# Patient Record
Sex: MALE | Race: WHITE | NOT HISPANIC OR LATINO
[De-identification: names, ages, dates, MRNs, and addresses within clinical notes are randomized per-mention and may not be internally consistent; named-entity substitution may affect disease eponyms.]

---

## 2017-01-24 ENCOUNTER — APPOINTMENT (OUTPATIENT)
Dept: SURGERY | Facility: CLINIC | Age: 62
End: 2017-01-24

## 2017-01-28 ENCOUNTER — FORM ENCOUNTER (OUTPATIENT)
Age: 62
End: 2017-01-28

## 2017-01-29 ENCOUNTER — APPOINTMENT (OUTPATIENT)
Dept: NUCLEAR MEDICINE | Facility: CLINIC | Age: 62
End: 2017-01-29

## 2017-01-29 ENCOUNTER — OUTPATIENT (OUTPATIENT)
Dept: OUTPATIENT SERVICES | Facility: HOSPITAL | Age: 62
LOS: 1 days | End: 2017-01-29
Payer: COMMERCIAL

## 2017-01-29 DIAGNOSIS — Z00.8 ENCOUNTER FOR OTHER GENERAL EXAMINATION: ICD-10-CM

## 2017-01-29 DIAGNOSIS — Z85.819 PERSONAL HISTORY OF MALIGNANT NEOPLASM OF UNSPECIFIED SITE OF LIP, ORAL CAVITY, AND PHARYNX: Chronic | ICD-10-CM

## 2017-01-29 DIAGNOSIS — Z43.1 ENCOUNTER FOR ATTENTION TO GASTROSTOMY: Chronic | ICD-10-CM

## 2017-01-29 PROCEDURE — A9552: CPT

## 2017-01-29 PROCEDURE — 78815 PET IMAGE W/CT SKULL-THIGH: CPT

## 2017-02-14 ENCOUNTER — APPOINTMENT (OUTPATIENT)
Dept: RADIATION ONCOLOGY | Facility: CLINIC | Age: 62
End: 2017-02-14

## 2017-02-14 VITALS
BODY MASS INDEX: 27.13 KG/M2 | DIASTOLIC BLOOD PRESSURE: 88 MMHG | TEMPERATURE: 98.1 F | WEIGHT: 179 LBS | HEIGHT: 68 IN | OXYGEN SATURATION: 99 % | SYSTOLIC BLOOD PRESSURE: 135 MMHG | HEART RATE: 67 BPM | RESPIRATION RATE: 16 BRPM

## 2017-02-16 ENCOUNTER — OTHER (OUTPATIENT)
Age: 62
End: 2017-02-16

## 2017-02-23 ENCOUNTER — APPOINTMENT (OUTPATIENT)
Dept: SURGERY | Facility: CLINIC | Age: 62
End: 2017-02-23

## 2017-04-06 ENCOUNTER — APPOINTMENT (OUTPATIENT)
Dept: SURGERY | Facility: CLINIC | Age: 62
End: 2017-04-06

## 2017-05-04 ENCOUNTER — APPOINTMENT (OUTPATIENT)
Dept: SURGERY | Facility: CLINIC | Age: 62
End: 2017-05-04

## 2017-05-16 ENCOUNTER — APPOINTMENT (OUTPATIENT)
Dept: RADIATION ONCOLOGY | Facility: CLINIC | Age: 62
End: 2017-05-16

## 2017-05-16 VITALS
RESPIRATION RATE: 16 BRPM | HEIGHT: 68 IN | SYSTOLIC BLOOD PRESSURE: 120 MMHG | TEMPERATURE: 98.1 F | WEIGHT: 175 LBS | DIASTOLIC BLOOD PRESSURE: 84 MMHG | BODY MASS INDEX: 26.52 KG/M2 | OXYGEN SATURATION: 97 % | HEART RATE: 79 BPM

## 2017-06-08 ENCOUNTER — APPOINTMENT (OUTPATIENT)
Dept: SURGERY | Facility: CLINIC | Age: 62
End: 2017-06-08

## 2017-07-18 ENCOUNTER — APPOINTMENT (OUTPATIENT)
Dept: SURGERY | Facility: CLINIC | Age: 62
End: 2017-07-18

## 2017-08-22 ENCOUNTER — APPOINTMENT (OUTPATIENT)
Dept: RADIATION ONCOLOGY | Facility: CLINIC | Age: 62
End: 2017-08-22
Payer: COMMERCIAL

## 2017-08-22 VITALS
HEIGHT: 68 IN | RESPIRATION RATE: 16 BRPM | SYSTOLIC BLOOD PRESSURE: 131 MMHG | BODY MASS INDEX: 27.77 KG/M2 | HEART RATE: 92 BPM | WEIGHT: 183.2 LBS | OXYGEN SATURATION: 96 % | TEMPERATURE: 97.9 F | DIASTOLIC BLOOD PRESSURE: 87 MMHG

## 2017-08-22 PROCEDURE — 99214 OFFICE O/P EST MOD 30 MIN: CPT

## 2017-09-28 ENCOUNTER — APPOINTMENT (OUTPATIENT)
Dept: SURGERY | Facility: CLINIC | Age: 62
End: 2017-09-28
Payer: COMMERCIAL

## 2017-09-28 PROCEDURE — 99213 OFFICE O/P EST LOW 20 MIN: CPT

## 2017-10-08 ENCOUNTER — RX RENEWAL (OUTPATIENT)
Age: 62
End: 2017-10-08

## 2018-02-28 ENCOUNTER — APPOINTMENT (OUTPATIENT)
Dept: RADIATION ONCOLOGY | Facility: CLINIC | Age: 63
End: 2018-02-28
Payer: COMMERCIAL

## 2018-02-28 VITALS
TEMPERATURE: 97.7 F | HEART RATE: 70 BPM | RESPIRATION RATE: 16 BRPM | DIASTOLIC BLOOD PRESSURE: 92 MMHG | SYSTOLIC BLOOD PRESSURE: 136 MMHG | WEIGHT: 187.2 LBS | OXYGEN SATURATION: 97 % | HEIGHT: 68 IN | BODY MASS INDEX: 28.37 KG/M2

## 2018-02-28 PROCEDURE — 99213 OFFICE O/P EST LOW 20 MIN: CPT

## 2018-03-02 ENCOUNTER — APPOINTMENT (OUTPATIENT)
Dept: NUCLEAR MEDICINE | Facility: CLINIC | Age: 63
End: 2018-03-02
Payer: COMMERCIAL

## 2018-03-02 ENCOUNTER — OUTPATIENT (OUTPATIENT)
Dept: OUTPATIENT SERVICES | Facility: HOSPITAL | Age: 63
LOS: 1 days | End: 2018-03-02
Payer: COMMERCIAL

## 2018-03-02 DIAGNOSIS — Z85.819 PERSONAL HISTORY OF MALIGNANT NEOPLASM OF UNSPECIFIED SITE OF LIP, ORAL CAVITY, AND PHARYNX: Chronic | ICD-10-CM

## 2018-03-02 DIAGNOSIS — Z00.8 ENCOUNTER FOR OTHER GENERAL EXAMINATION: ICD-10-CM

## 2018-03-02 DIAGNOSIS — Z43.1 ENCOUNTER FOR ATTENTION TO GASTROSTOMY: Chronic | ICD-10-CM

## 2018-03-02 PROCEDURE — 78815 PET IMAGE W/CT SKULL-THIGH: CPT | Mod: 26,PS

## 2018-03-02 PROCEDURE — A9552: CPT

## 2018-03-02 PROCEDURE — 78815 PET IMAGE W/CT SKULL-THIGH: CPT

## 2018-03-15 ENCOUNTER — APPOINTMENT (OUTPATIENT)
Dept: SURGERY | Facility: CLINIC | Age: 63
End: 2018-03-15
Payer: COMMERCIAL

## 2018-03-15 PROCEDURE — 99214 OFFICE O/P EST MOD 30 MIN: CPT

## 2018-03-21 ENCOUNTER — FORM ENCOUNTER (OUTPATIENT)
Age: 63
End: 2018-03-21

## 2018-03-22 ENCOUNTER — OUTPATIENT (OUTPATIENT)
Dept: OUTPATIENT SERVICES | Facility: HOSPITAL | Age: 63
LOS: 1 days | End: 2018-03-22
Payer: COMMERCIAL

## 2018-03-22 ENCOUNTER — APPOINTMENT (OUTPATIENT)
Dept: CT IMAGING | Facility: CLINIC | Age: 63
End: 2018-03-22
Payer: COMMERCIAL

## 2018-03-22 DIAGNOSIS — Z43.1 ENCOUNTER FOR ATTENTION TO GASTROSTOMY: Chronic | ICD-10-CM

## 2018-03-22 DIAGNOSIS — Z85.819 PERSONAL HISTORY OF MALIGNANT NEOPLASM OF UNSPECIFIED SITE OF LIP, ORAL CAVITY, AND PHARYNX: Chronic | ICD-10-CM

## 2018-03-22 DIAGNOSIS — Z00.8 ENCOUNTER FOR OTHER GENERAL EXAMINATION: ICD-10-CM

## 2018-03-22 PROCEDURE — 70491 CT SOFT TISSUE NECK W/DYE: CPT

## 2018-03-22 PROCEDURE — 82565 ASSAY OF CREATININE: CPT

## 2018-03-22 PROCEDURE — 70491 CT SOFT TISSUE NECK W/DYE: CPT | Mod: 26

## 2018-03-26 ENCOUNTER — OTHER (OUTPATIENT)
Age: 63
End: 2018-03-26

## 2018-03-27 ENCOUNTER — OTHER (OUTPATIENT)
Age: 63
End: 2018-03-27

## 2018-03-29 ENCOUNTER — APPOINTMENT (OUTPATIENT)
Dept: RADIATION ONCOLOGY | Facility: CLINIC | Age: 63
End: 2018-03-29
Payer: COMMERCIAL

## 2018-03-29 VITALS
DIASTOLIC BLOOD PRESSURE: 86 MMHG | HEART RATE: 86 BPM | SYSTOLIC BLOOD PRESSURE: 121 MMHG | WEIGHT: 184 LBS | HEIGHT: 68 IN | BODY MASS INDEX: 27.89 KG/M2 | OXYGEN SATURATION: 98 % | TEMPERATURE: 98.1 F | RESPIRATION RATE: 16 BRPM

## 2018-03-29 PROCEDURE — 99213 OFFICE O/P EST LOW 20 MIN: CPT

## 2018-03-29 RX ORDER — OXYCODONE AND ACETAMINOPHEN 10; 325 MG/1; MG/1
10-325 TABLET ORAL
Qty: 42 | Refills: 0 | Status: DISCONTINUED | COMMUNITY
Start: 2017-05-23 | End: 2018-03-29

## 2018-03-29 RX ORDER — SODIUM FLUORIDE 1.1 G/100G
1.1 GEL, DENTIFRICE ORAL
Qty: 51 | Refills: 0 | Status: DISCONTINUED | COMMUNITY
Start: 2017-07-17 | End: 2018-03-29

## 2018-03-29 RX ORDER — AMITRIPTYLINE HYDROCHLORIDE 10 MG/1
10 TABLET, FILM COATED ORAL
Qty: 30 | Refills: 2 | Status: DISCONTINUED | COMMUNITY
Start: 2017-07-18 | End: 2018-03-29

## 2018-03-29 RX ORDER — SODIUM FLUORIDE 6 MG/ML
1.1 PASTE, DENTIFRICE DENTAL
Qty: 100 | Refills: 0 | Status: DISCONTINUED | COMMUNITY
Start: 2017-01-16 | End: 2018-03-29

## 2018-03-30 ENCOUNTER — APPOINTMENT (OUTPATIENT)
Dept: OTOLARYNGOLOGY | Facility: CLINIC | Age: 63
End: 2018-03-30
Payer: COMMERCIAL

## 2018-03-30 PROCEDURE — 31575 DIAGNOSTIC LARYNGOSCOPY: CPT

## 2018-03-30 PROCEDURE — 99205 OFFICE O/P NEW HI 60 MIN: CPT | Mod: 25

## 2018-03-30 RX ORDER — METHYLPREDNISOLONE 4 MG/1
4 TABLET ORAL
Qty: 21 | Refills: 0 | Status: DISCONTINUED | COMMUNITY
Start: 2018-03-06 | End: 2018-03-30

## 2018-03-30 RX ORDER — CEPHALEXIN 500 MG/1
500 CAPSULE ORAL
Qty: 20 | Refills: 0 | Status: DISCONTINUED | COMMUNITY
Start: 2018-01-25 | End: 2018-03-30

## 2018-03-30 RX ORDER — HYDROCODONE BITARTRATE AND ACETAMINOPHEN 10; 300 MG/1; MG/1
10-300 TABLET ORAL
Qty: 60 | Refills: 0 | Status: DISCONTINUED | COMMUNITY
Start: 2017-12-05 | End: 2018-03-30

## 2018-04-06 ENCOUNTER — FORM ENCOUNTER (OUTPATIENT)
Age: 63
End: 2018-04-06

## 2018-04-07 ENCOUNTER — OUTPATIENT (OUTPATIENT)
Dept: OUTPATIENT SERVICES | Facility: HOSPITAL | Age: 63
LOS: 1 days | End: 2018-04-07
Payer: COMMERCIAL

## 2018-04-07 ENCOUNTER — APPOINTMENT (OUTPATIENT)
Dept: CT IMAGING | Facility: CLINIC | Age: 63
End: 2018-04-07

## 2018-04-07 ENCOUNTER — APPOINTMENT (OUTPATIENT)
Dept: CT IMAGING | Facility: CLINIC | Age: 63
End: 2018-04-07
Payer: COMMERCIAL

## 2018-04-07 DIAGNOSIS — Z43.1 ENCOUNTER FOR ATTENTION TO GASTROSTOMY: Chronic | ICD-10-CM

## 2018-04-07 DIAGNOSIS — Z85.819 PERSONAL HISTORY OF MALIGNANT NEOPLASM OF UNSPECIFIED SITE OF LIP, ORAL CAVITY, AND PHARYNX: Chronic | ICD-10-CM

## 2018-04-07 DIAGNOSIS — C06.9 MALIGNANT NEOPLASM OF MOUTH, UNSPECIFIED: ICD-10-CM

## 2018-04-07 PROCEDURE — 75635 CT ANGIO ABDOMINAL ARTERIES: CPT | Mod: 26

## 2018-04-07 PROCEDURE — 70486 CT MAXILLOFACIAL W/O DYE: CPT | Mod: 26

## 2018-04-07 PROCEDURE — 75635 CT ANGIO ABDOMINAL ARTERIES: CPT

## 2018-04-07 PROCEDURE — 70486 CT MAXILLOFACIAL W/O DYE: CPT

## 2018-04-20 ENCOUNTER — APPOINTMENT (OUTPATIENT)
Dept: OTOLARYNGOLOGY | Facility: CLINIC | Age: 63
End: 2018-04-20
Payer: COMMERCIAL

## 2018-04-20 VITALS
DIASTOLIC BLOOD PRESSURE: 74 MMHG | WEIGHT: 180 LBS | HEART RATE: 87 BPM | SYSTOLIC BLOOD PRESSURE: 130 MMHG | BODY MASS INDEX: 27.28 KG/M2 | HEIGHT: 68 IN | OXYGEN SATURATION: 98 %

## 2018-04-20 PROCEDURE — 99215 OFFICE O/P EST HI 40 MIN: CPT

## 2018-04-22 RX ORDER — GABAPENTIN 100 MG/1
100 CAPSULE ORAL
Qty: 90 | Refills: 0 | Status: COMPLETED | COMMUNITY
Start: 2017-11-03

## 2018-04-23 ENCOUNTER — OUTPATIENT (OUTPATIENT)
Dept: OUTPATIENT SERVICES | Facility: HOSPITAL | Age: 63
LOS: 1 days | End: 2018-04-23
Payer: COMMERCIAL

## 2018-04-23 VITALS
HEIGHT: 66.5 IN | HEART RATE: 62 BPM | RESPIRATION RATE: 16 BRPM | DIASTOLIC BLOOD PRESSURE: 90 MMHG | WEIGHT: 179.9 LBS | TEMPERATURE: 96 F | SYSTOLIC BLOOD PRESSURE: 130 MMHG

## 2018-04-23 DIAGNOSIS — C06.9 MALIGNANT NEOPLASM OF MOUTH, UNSPECIFIED: ICD-10-CM

## 2018-04-23 DIAGNOSIS — Z43.1 ENCOUNTER FOR ATTENTION TO GASTROSTOMY: Chronic | ICD-10-CM

## 2018-04-23 DIAGNOSIS — Z85.819 PERSONAL HISTORY OF MALIGNANT NEOPLASM OF UNSPECIFIED SITE OF LIP, ORAL CAVITY, AND PHARYNX: Chronic | ICD-10-CM

## 2018-04-23 LAB
BLD GP AB SCN SERPL QL: NEGATIVE — SIGNIFICANT CHANGE UP
BUN SERPL-MCNC: 19 MG/DL — SIGNIFICANT CHANGE UP (ref 7–23)
CALCIUM SERPL-MCNC: 9.8 MG/DL — SIGNIFICANT CHANGE UP (ref 8.4–10.5)
CHLORIDE SERPL-SCNC: 98 MMOL/L — SIGNIFICANT CHANGE UP (ref 98–107)
CO2 SERPL-SCNC: 29 MMOL/L — SIGNIFICANT CHANGE UP (ref 22–31)
CREAT SERPL-MCNC: 1.11 MG/DL — SIGNIFICANT CHANGE UP (ref 0.5–1.3)
GLUCOSE SERPL-MCNC: 88 MG/DL — SIGNIFICANT CHANGE UP (ref 70–99)
HCT VFR BLD CALC: 47.9 % — SIGNIFICANT CHANGE UP (ref 39–50)
HGB BLD-MCNC: 15.4 G/DL — SIGNIFICANT CHANGE UP (ref 13–17)
MCHC RBC-ENTMCNC: 30.4 PG — SIGNIFICANT CHANGE UP (ref 27–34)
MCHC RBC-ENTMCNC: 32.2 % — SIGNIFICANT CHANGE UP (ref 32–36)
MCV RBC AUTO: 94.7 FL — SIGNIFICANT CHANGE UP (ref 80–100)
NRBC # FLD: 0 — SIGNIFICANT CHANGE UP
PLATELET # BLD AUTO: 237 K/UL — SIGNIFICANT CHANGE UP (ref 150–400)
PMV BLD: 10.2 FL — SIGNIFICANT CHANGE UP (ref 7–13)
POTASSIUM SERPL-MCNC: 3.9 MMOL/L — SIGNIFICANT CHANGE UP (ref 3.5–5.3)
POTASSIUM SERPL-SCNC: 3.9 MMOL/L — SIGNIFICANT CHANGE UP (ref 3.5–5.3)
RBC # BLD: 5.06 M/UL — SIGNIFICANT CHANGE UP (ref 4.2–5.8)
RBC # FLD: 13.4 % — SIGNIFICANT CHANGE UP (ref 10.3–14.5)
RH IG SCN BLD-IMP: POSITIVE — SIGNIFICANT CHANGE UP
SODIUM SERPL-SCNC: 140 MMOL/L — SIGNIFICANT CHANGE UP (ref 135–145)
WBC # BLD: 5.65 K/UL — SIGNIFICANT CHANGE UP (ref 3.8–10.5)
WBC # FLD AUTO: 5.65 K/UL — SIGNIFICANT CHANGE UP (ref 3.8–10.5)

## 2018-04-23 PROCEDURE — 93010 ELECTROCARDIOGRAM REPORT: CPT

## 2018-04-23 RX ORDER — SODIUM CHLORIDE 9 MG/ML
1000 INJECTION, SOLUTION INTRAVENOUS
Qty: 0 | Refills: 0 | Status: DISCONTINUED | OUTPATIENT
Start: 2018-04-26 | End: 2018-04-28

## 2018-04-23 RX ORDER — SODIUM CHLORIDE 9 MG/ML
3 INJECTION INTRAMUSCULAR; INTRAVENOUS; SUBCUTANEOUS EVERY 8 HOURS
Qty: 0 | Refills: 0 | Status: DISCONTINUED | OUTPATIENT
Start: 2018-04-26 | End: 2018-05-09

## 2018-04-23 NOTE — H&P PST ADULT - NEGATIVE ENMT SYMPTOMS
no vertigo/no sinus symptoms/no ear pain/no throat pain/no dysphagia/no gum bleeding/no tinnitus/no nose bleeds/no nasal congestion

## 2018-04-23 NOTE — H&P PST ADULT - HISTORY OF PRESENT ILLNESS
63 y/o male presents to PST for preoperative evaluation with dx of malignant neoplasm of mouth and mandible. 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 Pt presented to the Oral Surgeon a few weeks ago c/o chronic jaw discomfort. s/p biopsy and informed of recurrence of oral cancer. Scheduled for Composite Resection Mandibular Alvelous  Cancer with Resection of Floor of Mouth, Bilateral Neck Dissection, Tracheostomy; Yasir- Resection of Right Mandible and Placement of Reconstruction Plate, Possible Bone Graft on 4/26/2018. 63 y/o male presents to PST for preoperative evaluation with dx of malignant neoplasm of mouth and mandible. 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 Pt presented to the Oral Surgeon a few weeks ago c/o chronic jaw discomfort. s/p biopsy and informed of recurrence of oral cancer. Scheduled for Composite Resection Mandibular Alvelous Cancer with Resection of Floor of Mouth, Bilateral Neck Dissection, Tracheostomy; Yasir- Resection of Right Mandible and Placement of Reconstruction Plate, Possible Bone Graft on 4/26/2018.

## 2018-04-23 NOTE — H&P PST ADULT - PROBLEM SELECTOR PLAN 1
Scheduled for Composite Resection Mandibular Alvelous Cancer with Resection of Floor of Mouth, Bilateral Neck Dissection, Tracheostomy; Yasir- Resection of Right Mandible and Placement of Reconstruction Plate, Possible Bone Graft on 4/26/2018.    Preop instructions given, pt verbalized understanding   Chlorhexidine wash and GI prophylaxis provided   Pt will see his PCP today for medical evaluation  PST labs and EKG to be faxed over   Medical Evaluation requested

## 2018-04-23 NOTE — H&P PST ADULT - ENMT COMMENTS
dx of malignant neoplasm of mouth and mandible. See HPI ulceration to anterior lower jaw with white plaque

## 2018-04-23 NOTE — H&P PST ADULT - NSANTHOSAYNRD_GEN_A_CORE
No. SHIRA screening performed.  STOP BANG Legend: 0-2 = LOW Risk; 3-4 = INTERMEDIATE Risk; 5-8 = HIGH Risk

## 2018-04-23 NOTE — H&P PST ADULT - LYMPHATIC
anterior cervical L/posterior cervical R/anterior cervical R/supraclavicular R/supraclavicular L/posterior cervical L

## 2018-04-23 NOTE — H&P PST ADULT - PMH
Anxiety    Malignant neoplasm  skin  Malignant neoplasm of mouth  and mandible  Squamous cell carcinoma of oral cavity  s/p radiation, chemotherapy 2015- 4/2016

## 2018-04-23 NOTE — H&P PST ADULT - PSH
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

## 2018-04-24 ENCOUNTER — APPOINTMENT (OUTPATIENT)
Dept: PLASTIC SURGERY | Facility: CLINIC | Age: 63
End: 2018-04-24
Payer: COMMERCIAL

## 2018-04-24 VITALS
HEIGHT: 68 IN | OXYGEN SATURATION: 97 % | DIASTOLIC BLOOD PRESSURE: 83 MMHG | BODY MASS INDEX: 27.28 KG/M2 | SYSTOLIC BLOOD PRESSURE: 117 MMHG | RESPIRATION RATE: 16 BRPM | HEART RATE: 79 BPM | WEIGHT: 180 LBS

## 2018-04-24 PROCEDURE — 99244 OFF/OP CNSLTJ NEW/EST MOD 40: CPT

## 2018-04-25 ENCOUNTER — TRANSCRIPTION ENCOUNTER (OUTPATIENT)
Age: 63
End: 2018-04-25

## 2018-04-26 ENCOUNTER — APPOINTMENT (OUTPATIENT)
Dept: OTOLARYNGOLOGY | Facility: HOSPITAL | Age: 63
End: 2018-04-26

## 2018-04-26 ENCOUNTER — RESULT REVIEW (OUTPATIENT)
Age: 63
End: 2018-04-26

## 2018-04-26 ENCOUNTER — INPATIENT (INPATIENT)
Facility: HOSPITAL | Age: 63
LOS: 12 days | Discharge: HOME HEALTH | End: 2018-05-09
Attending: OTOLARYNGOLOGY | Admitting: OTOLARYNGOLOGY
Payer: COMMERCIAL

## 2018-04-26 VITALS
DIASTOLIC BLOOD PRESSURE: 87 MMHG | SYSTOLIC BLOOD PRESSURE: 129 MMHG | OXYGEN SATURATION: 95 % | TEMPERATURE: 98 F | RESPIRATION RATE: 16 BRPM | HEIGHT: 66.5 IN | WEIGHT: 179.9 LBS | HEART RATE: 82 BPM

## 2018-04-26 DIAGNOSIS — Z85.819 PERSONAL HISTORY OF MALIGNANT NEOPLASM OF UNSPECIFIED SITE OF LIP, ORAL CAVITY, AND PHARYNX: Chronic | ICD-10-CM

## 2018-04-26 DIAGNOSIS — Z43.1 ENCOUNTER FOR ATTENTION TO GASTROSTOMY: Chronic | ICD-10-CM

## 2018-04-26 DIAGNOSIS — C06.9 MALIGNANT NEOPLASM OF MOUTH, UNSPECIFIED: ICD-10-CM

## 2018-04-26 LAB
BASE EXCESS BLDA CALC-SCNC: -0.4 MMOL/L — SIGNIFICANT CHANGE UP
BASE EXCESS BLDA CALC-SCNC: 0.8 MMOL/L — SIGNIFICANT CHANGE UP
BASE EXCESS BLDA CALC-SCNC: 0.9 MMOL/L — SIGNIFICANT CHANGE UP
BASE EXCESS BLDA CALC-SCNC: 1.1 MMOL/L — SIGNIFICANT CHANGE UP
BASE EXCESS BLDA CALC-SCNC: 2.7 MMOL/L — SIGNIFICANT CHANGE UP
CA-I BLDA-SCNC: 1.09 MMOL/L — LOW (ref 1.15–1.29)
CA-I BLDA-SCNC: 1.1 MMOL/L — LOW (ref 1.15–1.29)
CA-I BLDA-SCNC: 1.11 MMOL/L — LOW (ref 1.15–1.29)
CA-I BLDA-SCNC: 1.15 MMOL/L — SIGNIFICANT CHANGE UP (ref 1.15–1.29)
CA-I BLDA-SCNC: 1.2 MMOL/L — SIGNIFICANT CHANGE UP (ref 1.15–1.29)
GLUCOSE BLDA-MCNC: 115 MG/DL — HIGH (ref 70–99)
GLUCOSE BLDA-MCNC: 115 MG/DL — HIGH (ref 70–99)
GLUCOSE BLDA-MCNC: 116 MG/DL — HIGH (ref 70–99)
GLUCOSE BLDA-MCNC: 117 MG/DL — HIGH (ref 70–99)
GLUCOSE BLDA-MCNC: 137 MG/DL — HIGH (ref 70–99)
HCO3 BLDA-SCNC: 24 MMOL/L — SIGNIFICANT CHANGE UP (ref 22–26)
HCO3 BLDA-SCNC: 25 MMOL/L — SIGNIFICANT CHANGE UP (ref 22–26)
HCO3 BLDA-SCNC: 25 MMOL/L — SIGNIFICANT CHANGE UP (ref 22–26)
HCO3 BLDA-SCNC: 26 MMOL/L — SIGNIFICANT CHANGE UP (ref 22–26)
HCO3 BLDA-SCNC: 26 MMOL/L — SIGNIFICANT CHANGE UP (ref 22–26)
HCT VFR BLDA CALC: 28.9 % — LOW (ref 39–51)
HCT VFR BLDA CALC: 29.3 % — LOW (ref 39–51)
HCT VFR BLDA CALC: 31.6 % — LOW (ref 39–51)
HCT VFR BLDA CALC: 36.6 % — LOW (ref 39–51)
HCT VFR BLDA CALC: 43 % — SIGNIFICANT CHANGE UP (ref 39–51)
HGB BLDA-MCNC: 10.2 G/DL — LOW (ref 13–17)
HGB BLDA-MCNC: 11.9 G/DL — LOW (ref 13–17)
HGB BLDA-MCNC: 14 G/DL — SIGNIFICANT CHANGE UP (ref 13–17)
HGB BLDA-MCNC: 9.3 G/DL — LOW (ref 13–17)
HGB BLDA-MCNC: 9.5 G/DL — LOW (ref 13–17)
PCO2 BLDA: 36 MMHG — SIGNIFICANT CHANGE UP (ref 35–48)
PCO2 BLDA: 37 MMHG — SIGNIFICANT CHANGE UP (ref 35–48)
PCO2 BLDA: 46 MMHG — SIGNIFICANT CHANGE UP (ref 35–48)
PH BLDA: 7.39 PH — SIGNIFICANT CHANGE UP (ref 7.35–7.45)
PH BLDA: 7.42 PH — SIGNIFICANT CHANGE UP (ref 7.35–7.45)
PH BLDA: 7.43 PH — SIGNIFICANT CHANGE UP (ref 7.35–7.45)
PH BLDA: 7.44 PH — SIGNIFICANT CHANGE UP (ref 7.35–7.45)
PH BLDA: 7.45 PH — SIGNIFICANT CHANGE UP (ref 7.35–7.45)
PO2 BLDA: 184 MMHG — HIGH (ref 83–108)
PO2 BLDA: 226 MMHG — HIGH (ref 83–108)
PO2 BLDA: 239 MMHG — HIGH (ref 83–108)
PO2 BLDA: 274 MMHG — HIGH (ref 83–108)
PO2 BLDA: 322 MMHG — HIGH (ref 83–108)
POTASSIUM BLDA-SCNC: 3.5 MMOL/L — SIGNIFICANT CHANGE UP (ref 3.4–4.5)
POTASSIUM BLDA-SCNC: 3.7 MMOL/L — SIGNIFICANT CHANGE UP (ref 3.4–4.5)
POTASSIUM BLDA-SCNC: 4.1 MMOL/L — SIGNIFICANT CHANGE UP (ref 3.4–4.5)
RH IG SCN BLD-IMP: POSITIVE — SIGNIFICANT CHANGE UP
SAO2 % BLDA: 99 % — SIGNIFICANT CHANGE UP (ref 95–99)
SAO2 % BLDA: 99.4 % — HIGH (ref 95–99)
SAO2 % BLDA: 99.4 % — HIGH (ref 95–99)
SAO2 % BLDA: 99.5 % — HIGH (ref 95–99)
SAO2 % BLDA: 99.6 % — HIGH (ref 95–99)
SODIUM BLDA-SCNC: 136 MMOL/L — SIGNIFICANT CHANGE UP (ref 136–146)
SODIUM BLDA-SCNC: 137 MMOL/L — SIGNIFICANT CHANGE UP (ref 136–146)

## 2018-04-26 PROCEDURE — 38724 REMOVAL OF LYMPH NODES NECK: CPT | Mod: RT,GC

## 2018-04-26 PROCEDURE — 13132 CMPLX RPR F/C/C/M/N/AX/G/H/F: CPT | Mod: 59

## 2018-04-26 PROCEDURE — 88305 TISSUE EXAM BY PATHOLOGIST: CPT | Mod: 26

## 2018-04-26 PROCEDURE — 88309 TISSUE EXAM BY PATHOLOGIST: CPT | Mod: 26

## 2018-04-26 PROCEDURE — 21045 EXTENSIVE JAW SURGERY: CPT | Mod: GC

## 2018-04-26 PROCEDURE — 40844 RECONSTRUCTION OF MOUTH: CPT

## 2018-04-26 PROCEDURE — 88300 SURGICAL PATH GROSS: CPT | Mod: 26,59

## 2018-04-26 PROCEDURE — 20969 BONE/SKIN GRAFT MICROVASC: CPT

## 2018-04-26 PROCEDURE — 88331 PATH CONSLTJ SURG 1 BLK 1SPC: CPT | Mod: 26

## 2018-04-26 PROCEDURE — 88307 TISSUE EXAM BY PATHOLOGIST: CPT | Mod: 26

## 2018-04-26 PROCEDURE — 15100 SPLT AGRFT T/A/L 1ST 100SQCM: CPT

## 2018-04-26 PROCEDURE — 88311 DECALCIFY TISSUE: CPT | Mod: 26

## 2018-04-26 PROCEDURE — 31600 PLANNED TRACHEOSTOMY: CPT | Mod: GC

## 2018-04-26 PROCEDURE — 41116 EXCISION OF MOUTH LESION: CPT | Mod: GC

## 2018-04-26 RX ORDER — HYDROMORPHONE HYDROCHLORIDE 2 MG/ML
0.5 INJECTION INTRAMUSCULAR; INTRAVENOUS; SUBCUTANEOUS
Qty: 0 | Refills: 0 | Status: DISCONTINUED | OUTPATIENT
Start: 2018-04-26 | End: 2018-04-27

## 2018-04-26 RX ORDER — ENOXAPARIN SODIUM 100 MG/ML
40 INJECTION SUBCUTANEOUS ONCE
Qty: 0 | Refills: 0 | Status: COMPLETED | OUTPATIENT
Start: 2018-04-26 | End: 2018-04-26

## 2018-04-26 RX ORDER — ASPIRIN/CALCIUM CARB/MAGNESIUM 324 MG
300 TABLET ORAL DAILY
Qty: 0 | Refills: 0 | Status: DISCONTINUED | OUTPATIENT
Start: 2018-04-26 | End: 2018-04-27

## 2018-04-26 RX ORDER — ONDANSETRON 8 MG/1
4 TABLET, FILM COATED ORAL ONCE
Qty: 0 | Refills: 0 | Status: COMPLETED | OUTPATIENT
Start: 2018-04-26 | End: 2018-04-27

## 2018-04-26 RX ORDER — AMPICILLIN SODIUM AND SULBACTAM SODIUM 250; 125 MG/ML; MG/ML
1.5 INJECTION, POWDER, FOR SUSPENSION INTRAMUSCULAR; INTRAVENOUS EVERY 6 HOURS
Qty: 0 | Refills: 0 | Status: DISCONTINUED | OUTPATIENT
Start: 2018-04-26 | End: 2018-05-05

## 2018-04-26 RX ORDER — AMPICILLIN SODIUM AND SULBACTAM SODIUM 250; 125 MG/ML; MG/ML
3 INJECTION, POWDER, FOR SUSPENSION INTRAMUSCULAR; INTRAVENOUS ONCE
Qty: 0 | Refills: 0 | Status: COMPLETED | OUTPATIENT
Start: 2018-04-26 | End: 2018-04-26

## 2018-04-26 RX ADMIN — HYDROMORPHONE HYDROCHLORIDE 0.5 MILLIGRAM(S): 2 INJECTION INTRAMUSCULAR; INTRAVENOUS; SUBCUTANEOUS at 22:30

## 2018-04-26 RX ADMIN — ENOXAPARIN SODIUM 40 MILLIGRAM(S): 100 INJECTION SUBCUTANEOUS at 22:46

## 2018-04-26 RX ADMIN — HYDROMORPHONE HYDROCHLORIDE 0.5 MILLIGRAM(S): 2 INJECTION INTRAMUSCULAR; INTRAVENOUS; SUBCUTANEOUS at 21:32

## 2018-04-26 RX ADMIN — HYDROMORPHONE HYDROCHLORIDE 0.5 MILLIGRAM(S): 2 INJECTION INTRAMUSCULAR; INTRAVENOUS; SUBCUTANEOUS at 23:15

## 2018-04-26 RX ADMIN — HYDROMORPHONE HYDROCHLORIDE 0.5 MILLIGRAM(S): 2 INJECTION INTRAMUSCULAR; INTRAVENOUS; SUBCUTANEOUS at 21:15

## 2018-04-26 RX ADMIN — AMPICILLIN SODIUM AND SULBACTAM SODIUM 200 GRAM(S): 250; 125 INJECTION, POWDER, FOR SUSPENSION INTRAMUSCULAR; INTRAVENOUS at 22:16

## 2018-04-26 RX ADMIN — SODIUM CHLORIDE 3 MILLILITER(S): 9 INJECTION INTRAMUSCULAR; INTRAVENOUS; SUBCUTANEOUS at 22:17

## 2018-04-26 RX ADMIN — HYDROMORPHONE HYDROCHLORIDE 0.5 MILLIGRAM(S): 2 INJECTION INTRAMUSCULAR; INTRAVENOUS; SUBCUTANEOUS at 22:10

## 2018-04-26 RX ADMIN — HYDROMORPHONE HYDROCHLORIDE 0.5 MILLIGRAM(S): 2 INJECTION INTRAMUSCULAR; INTRAVENOUS; SUBCUTANEOUS at 23:35

## 2018-04-26 NOTE — BRIEF OPERATIVE NOTE - OPERATION/FINDINGS
reconstruction of mandible and floor of mouth with free fibula flap from right leg, skin graft from right thigh

## 2018-04-26 NOTE — CONSULT NOTE ADULT - SUBJECTIVE AND OBJECTIVE BOX
Vital Signs Last 24 Hrs  T(C): 37.5 (04-26-18 @ 21:00), Max: 37.5 (04-26-18 @ 21:00)  T(F): 99.5 (04-26-18 @ 21:00), Max: 99.5 (04-26-18 @ 21:00)  HR: 81 (04-26-18 @ 21:30) (81 - 90)  BP: 118/70 (04-26-18 @ 21:30) (118/70 - 129/87)  BP(mean): --  RR: 12 (04-26-18 @ 21:30) (12 - 16)  SpO2: 100% (04-26-18 @ 21:30) (95% - 100%)  I&O's Detail    26 Apr 2018 07:01  -  26 Apr 2018 22:49  --------------------------------------------------------  IN:  Total IN: 0 mL    OUT:    Bulb: 25 mL    Bulb: 10 mL    Indwelling Catheter - Urethral: 75 mL  Total OUT: 110 mL    Total NET: -110 mL                  CAPILLARY BLOOD GLUCOSE          MEDICATIONS  (STANDING):  ampicillin/sulbactam  IVPB 1.5 Gram(s) IV Intermittent every 6 hours  aspirin Suppository 300 milliGRAM(s) Rectal daily  lactated ringers. 1000 milliLiter(s) (125 mL/Hr) IV Continuous <Continuous>  sodium chloride 0.9% lock flush 3 milliLiter(s) IV Push every 8 hours    MEDICATIONS  (PRN):  HYDROmorphone  Injectable 0.5 milliGRAM(s) IV Push every 10 minutes PRN Severe Pain (7 - 10)  ondansetron Injectable 4 milliGRAM(s) IV Push once PRN Nausea and/or Vomiting Vital Signs Last 24 Hrs  T(C): 37.5 (04-26-18 @ 21:00), Max: 37.5 (04-26-18 @ 21:00)  T(F): 99.5 (04-26-18 @ 21:00), Max: 99.5 (04-26-18 @ 21:00)  HR: 81 (04-26-18 @ 21:30) (81 - 90)  BP: 118/70 (04-26-18 @ 21:30) (118/70 - 129/87)  BP(mean): --  RR: 12 (04-26-18 @ 21:30) (12 - 16)  SpO2: 100% (04-26-18 @ 21:30) (95% - 100%)  I&O's Detail    26 Apr 2018 07:01  -  26 Apr 2018 22:49  --------------------------------------------------------  IN:  Total IN: 0 mL    OUT:    Bulb: 25 mL    Bulb: 10 mL    Indwelling Catheter - Urethral: 75 mL  Total OUT: 110 mL    Total NET: -110 mL      CAPILLARY BLOOD GLUCOSE      MEDICATIONS  (STANDING):  ampicillin/sulbactam  IVPB 1.5 Gram(s) IV Intermittent every 6 hours  aspirin Suppository 300 milliGRAM(s) Rectal daily  lactated ringers. 1000 milliLiter(s) (125 mL/Hr) IV Continuous <Continuous>  sodium chloride 0.9% lock flush 3 milliLiter(s) IV Push every 8 hours    MEDICATIONS  (PRN):  HYDROmorphone  Injectable 0.5 milliGRAM(s) IV Push every 10 minutes PRN Severe Pain (7 - 10)  ondansetron Injectable 4 milliGRAM(s) IV Push once PRN Nausea and/or Vomiting HISTORY OF PRESENT ILLNESS:  63 y/o male presents to [facility] for preoperative evaluation with dx of malignant neoplasm of mouth and mandible. 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 Pt presented to the Oral Surgeon a few weeks ago c/o chronic jaw discomfort. s/p biopsy and informed of recurrence of oral cancer. Now s/p resection andibular alvelous cancer with resection of floor of mouth, bilateral neck dissection, tracheostomy;, Resection of Right Mandible and reconstruction of mandible and floor of mouth with free fibula flap from right leg, skin graft from right thigh    Patient complaining of some pain postop but following all commands and denies any other symptoms.    PAST MEDICAL HISTORY: Malignant neoplasm of mouth  Anxiety  Squamous cell carcinoma of oral cavity  Malignant neoplasm      PAST SURGICAL HISTORY: History of oral cancer  Encounter for PEG (percutaneous endoscopic gastrostomy)  History of lip cancer  H/O shoulder surgery      FAMILY HISTORY: Family history of lung cancer (Father)    ALLERGIES: No Known Allergies      VITAL SIGNS:  ICU Vital Signs Last 24 Hrs  T(C): 36.9 (27 Apr 2018 02:00), Max: 37.5 (26 Apr 2018 21:00)  T(F): 98.4 (27 Apr 2018 02:00), Max: 99.5 (26 Apr 2018 21:00)  HR: 71 (27 Apr 2018 04:00) (67 - 90)  BP: 106/64 (27 Apr 2018 04:00) (104/61 - 129/87)  BP(mean): --  ABP: 109/54 (27 Apr 2018 04:00) (105/51 - 119/66)  ABP(mean): --  RR: 11 (27 Apr 2018 04:00) (10 - 16)  SpO2: 100% (27 Apr 2018 04:00) (95% - 100%)      NEURO  Exam: Following commands, motor and sensation intact  Meds:aspirin Suppository 300 milliGRAM(s) Rectal daily  morphine  - Injectable 2 milliGRAM(s) IV Push every 4 hours PRN Moderate Pain (4 - 6)  morphine  - Injectable 4 milliGRAM(s) IV Push every 4 hours PRN Severe Pain (7 - 10)  ondansetron Injectable 4 milliGRAM(s) IV Push once PRN Nausea and/or Vomiting      RESPIRATORY   HEENT: incision c/d/i flap pink, well perfused, BEKAH drains x 2 serosang  Mechanical Ventilation: trach collar  ABG - ( 26 Apr 2018 19:06 )  pH: 7.44  /  pCO2: 37    /  pO2: 239   / HCO3: 25    / Base Excess: 0.9   /  SaO2: 99.4    Lactate: x      Exam: CTABL  Meds: x    CARDIOVASCULAR  VBG - ( 27 Apr 2018 00:20 )  pH: 7.37  /  pCO2: 46    /  pO2: 252   / HCO3: 25    / Base Excess: 0.7   /  SaO2: 99.2   Lactate: x      Exam: RRR   Meds:x    GI/NUTRITION  Exam: soft, nt, nd  Diet: NPO  Meds: x    GENITOURINARY/RENAL  Meds:lactated ringers. 1000 milliLiter(s) IV Continuous <Continuous>  sodium chloride 0.9% lock flush 3 milliLiter(s) IV Push every 8 hours      04-26 @ 07:01  -  04-27 @ 05:07  --------------------------------------------------------  IN:    IV PiggyBack: 200 mL    lactated ringers.: 875 mL  Total IN: 1075 mL    OUT:    Bulb: 90 mL    Bulb: 45 mL    Indwelling Catheter - Urethral: 445 mL  Total OUT: 580 mL    Total NET: 495 mL        Weight (kg): 81.6 (04-26 @ 06:39)  04-27    136  |  99  |  14  ----------------------------<  195<H>  4.0   |  23  |  0.92    Ca    7.7<L>      27 Apr 2018 00:20  Phos  2.9     04-27  Mg     1.6     04-27      [ x] Sprague catheter, indication: urine output monitoring in critically ill patient    HEMATOLOGIC  [x ] VTE Prophylaxis:  heparin  Injectable 5000 Unit(s) SubCutaneous every 8 hours                          9.7    13.51 )-----------( 163      ( 27 Apr 2018 00:20 )             29.2       Transfusion: [ ] PRBC	[ ] Platelets	[ ] FFP	[ ] Cryoprecipitate      INFECTIOUS DISEASES  Meds:ampicillin/sulbactam  IVPB 1.5 Gram(s) IV Intermittent every 6 hours    RECENT CULTURES:      ENDOCRINE  Meds:  CAPILLARY BLOOD GLUCOSE          PATIENT CARE ACCESS DEVICES:  [x ] Peripheral IV  [ ] Central Venous Line	[ ] R	[ ] L	[ ] IJ	[ ] Fem	[ ] SC	Placed:   [x ] Arterial Line		[ ] R	[ ] L	[ ] Fem	[ ] Rad	[ ] Ax	Placed:   [ ] PICC:					[ ] Mediport  [ ] Urinary Catheter, Date Placed:   [x] Necessity of urinary, arterial, and venous catheters discussed    OTHER MEDICATIONS:     IMAGING STUDIES:

## 2018-04-26 NOTE — ASU PATIENT PROFILE, ADULT - PATIENT REPRESENTATIVE: ( YOU CAN CHOOSE ANY PERSON THAT CAN ASSIST YOU WITH YOUR HEALTH CARE PREFERENCES, DOES NOT HAVE TO BE A SPOUSE, IMMEDIATE FAMILY OR SIGNIFICANT OTHER/PARTNER)
I contacted Niya Reyes at [facility] at [phone] and gave clinicals/codes. She states CPT is listed as \"other medical procedure\" and will need to have notes faxed to [phone] and marked \"urgent\".  I faxed MRI, CT, operative and path reports and marked urg Yes

## 2018-04-27 LAB
BASE EXCESS BLDV CALC-SCNC: -0.3 MMOL/L — SIGNIFICANT CHANGE UP
BASE EXCESS BLDV CALC-SCNC: 0.7 MMOL/L — SIGNIFICANT CHANGE UP
BUN SERPL-MCNC: 14 MG/DL — SIGNIFICANT CHANGE UP (ref 7–23)
BUN SERPL-MCNC: 15 MG/DL — SIGNIFICANT CHANGE UP (ref 7–23)
CALCIUM SERPL-MCNC: 7.6 MG/DL — LOW (ref 8.4–10.5)
CALCIUM SERPL-MCNC: 7.7 MG/DL — LOW (ref 8.4–10.5)
CHLORIDE SERPL-SCNC: 99 MMOL/L — SIGNIFICANT CHANGE UP (ref 98–107)
CHLORIDE SERPL-SCNC: 99 MMOL/L — SIGNIFICANT CHANGE UP (ref 98–107)
CO2 SERPL-SCNC: 23 MMOL/L — SIGNIFICANT CHANGE UP (ref 22–31)
CO2 SERPL-SCNC: 23 MMOL/L — SIGNIFICANT CHANGE UP (ref 22–31)
CREAT SERPL-MCNC: 0.87 MG/DL — SIGNIFICANT CHANGE UP (ref 0.5–1.3)
CREAT SERPL-MCNC: 0.92 MG/DL — SIGNIFICANT CHANGE UP (ref 0.5–1.3)
GAS PNL BLDV: 136 MMOL/L — SIGNIFICANT CHANGE UP (ref 136–146)
GAS PNL BLDV: 137 MMOL/L — SIGNIFICANT CHANGE UP (ref 136–146)
GLUCOSE BLDV-MCNC: 174 — HIGH (ref 70–99)
GLUCOSE BLDV-MCNC: 194 — HIGH (ref 70–99)
GLUCOSE SERPL-MCNC: 179 MG/DL — HIGH (ref 70–99)
GLUCOSE SERPL-MCNC: 195 MG/DL — HIGH (ref 70–99)
HCO3 BLDV-SCNC: 24 MMOL/L — SIGNIFICANT CHANGE UP (ref 20–27)
HCO3 BLDV-SCNC: 25 MMOL/L — SIGNIFICANT CHANGE UP (ref 20–27)
HCT VFR BLD CALC: 27.6 % — LOW (ref 39–50)
HCT VFR BLD CALC: 29.2 % — LOW (ref 39–50)
HCT VFR BLDV CALC: 28.1 % — LOW (ref 39–51)
HCT VFR BLDV CALC: 29.2 % — LOW (ref 39–51)
HGB BLD-MCNC: 9.3 G/DL — LOW (ref 13–17)
HGB BLD-MCNC: 9.7 G/DL — LOW (ref 13–17)
HGB BLDV-MCNC: 9.1 G/DL — LOW (ref 13–17)
HGB BLDV-MCNC: 9.4 G/DL — LOW (ref 13–17)
MAGNESIUM SERPL-MCNC: 1.6 MG/DL — SIGNIFICANT CHANGE UP (ref 1.6–2.6)
MAGNESIUM SERPL-MCNC: 1.7 MG/DL — SIGNIFICANT CHANGE UP (ref 1.6–2.6)
MCHC RBC-ENTMCNC: 30.9 PG — SIGNIFICANT CHANGE UP (ref 27–34)
MCHC RBC-ENTMCNC: 31.4 PG — SIGNIFICANT CHANGE UP (ref 27–34)
MCHC RBC-ENTMCNC: 33.2 % — SIGNIFICANT CHANGE UP (ref 32–36)
MCHC RBC-ENTMCNC: 33.7 % — SIGNIFICANT CHANGE UP (ref 32–36)
MCV RBC AUTO: 93 FL — SIGNIFICANT CHANGE UP (ref 80–100)
MCV RBC AUTO: 93.2 FL — SIGNIFICANT CHANGE UP (ref 80–100)
NRBC # FLD: 0 — SIGNIFICANT CHANGE UP
NRBC # FLD: 0 — SIGNIFICANT CHANGE UP
PCO2 BLDV: 45 MMHG — SIGNIFICANT CHANGE UP (ref 41–51)
PCO2 BLDV: 46 MMHG — SIGNIFICANT CHANGE UP (ref 41–51)
PH BLDV: 7.36 PH — SIGNIFICANT CHANGE UP (ref 7.32–7.43)
PH BLDV: 7.37 PH — SIGNIFICANT CHANGE UP (ref 7.32–7.43)
PHOSPHATE SERPL-MCNC: 2.6 MG/DL — SIGNIFICANT CHANGE UP (ref 2.5–4.5)
PHOSPHATE SERPL-MCNC: 2.9 MG/DL — SIGNIFICANT CHANGE UP (ref 2.5–4.5)
PLATELET # BLD AUTO: 163 K/UL — SIGNIFICANT CHANGE UP (ref 150–400)
PLATELET # BLD AUTO: 182 K/UL — SIGNIFICANT CHANGE UP (ref 150–400)
PMV BLD: 9.4 FL — SIGNIFICANT CHANGE UP (ref 7–13)
PMV BLD: 9.6 FL — SIGNIFICANT CHANGE UP (ref 7–13)
PO2 BLDV: 252 MMHG — HIGH (ref 35–40)
PO2 BLDV: 275 MMHG — HIGH (ref 35–40)
POTASSIUM BLDV-SCNC: 3.7 MMOL/L — SIGNIFICANT CHANGE UP (ref 3.4–4.5)
POTASSIUM BLDV-SCNC: 3.8 MMOL/L — SIGNIFICANT CHANGE UP (ref 3.4–4.5)
POTASSIUM SERPL-MCNC: 3.9 MMOL/L — SIGNIFICANT CHANGE UP (ref 3.5–5.3)
POTASSIUM SERPL-MCNC: 4 MMOL/L — SIGNIFICANT CHANGE UP (ref 3.5–5.3)
POTASSIUM SERPL-SCNC: 3.9 MMOL/L — SIGNIFICANT CHANGE UP (ref 3.5–5.3)
POTASSIUM SERPL-SCNC: 4 MMOL/L — SIGNIFICANT CHANGE UP (ref 3.5–5.3)
RBC # BLD: 2.96 M/UL — LOW (ref 4.2–5.8)
RBC # BLD: 3.14 M/UL — LOW (ref 4.2–5.8)
RBC # FLD: 13.3 % — SIGNIFICANT CHANGE UP (ref 10.3–14.5)
RBC # FLD: 13.4 % — SIGNIFICANT CHANGE UP (ref 10.3–14.5)
SAO2 % BLDV: 99.2 % — HIGH (ref 60–85)
SAO2 % BLDV: 99.4 % — HIGH (ref 60–85)
SODIUM SERPL-SCNC: 136 MMOL/L — SIGNIFICANT CHANGE UP (ref 135–145)
SODIUM SERPL-SCNC: 136 MMOL/L — SIGNIFICANT CHANGE UP (ref 135–145)
WBC # BLD: 13.51 K/UL — HIGH (ref 3.8–10.5)
WBC # BLD: 14.2 K/UL — HIGH (ref 3.8–10.5)
WBC # FLD AUTO: 13.51 K/UL — HIGH (ref 3.8–10.5)
WBC # FLD AUTO: 14.2 K/UL — HIGH (ref 3.8–10.5)

## 2018-04-27 PROCEDURE — 99233 SBSQ HOSP IP/OBS HIGH 50: CPT | Mod: GC

## 2018-04-27 PROCEDURE — 71045 X-RAY EXAM CHEST 1 VIEW: CPT | Mod: 26

## 2018-04-27 PROCEDURE — 93010 ELECTROCARDIOGRAM REPORT: CPT

## 2018-04-27 RX ORDER — HYDROMORPHONE HYDROCHLORIDE 2 MG/ML
0.5 INJECTION INTRAMUSCULAR; INTRAVENOUS; SUBCUTANEOUS EVERY 4 HOURS
Qty: 0 | Refills: 0 | Status: DISCONTINUED | OUTPATIENT
Start: 2018-04-27 | End: 2018-04-27

## 2018-04-27 RX ORDER — ACETAMINOPHEN 500 MG
1000 TABLET ORAL ONCE
Qty: 0 | Refills: 0 | Status: COMPLETED | OUTPATIENT
Start: 2018-04-27 | End: 2018-04-27

## 2018-04-27 RX ORDER — MORPHINE SULFATE 50 MG/1
4 CAPSULE, EXTENDED RELEASE ORAL EVERY 4 HOURS
Qty: 0 | Refills: 0 | Status: DISCONTINUED | OUTPATIENT
Start: 2018-04-27 | End: 2018-04-27

## 2018-04-27 RX ORDER — OXYCODONE HYDROCHLORIDE 5 MG/1
10 TABLET ORAL EVERY 4 HOURS
Qty: 0 | Refills: 0 | Status: DISCONTINUED | OUTPATIENT
Start: 2018-04-27 | End: 2018-04-27

## 2018-04-27 RX ORDER — MORPHINE SULFATE 50 MG/1
2 CAPSULE, EXTENDED RELEASE ORAL EVERY 4 HOURS
Qty: 0 | Refills: 0 | Status: DISCONTINUED | OUTPATIENT
Start: 2018-04-27 | End: 2018-04-27

## 2018-04-27 RX ORDER — OXYCODONE HYDROCHLORIDE 5 MG/1
5 TABLET ORAL EVERY 4 HOURS
Qty: 0 | Refills: 0 | Status: DISCONTINUED | OUTPATIENT
Start: 2018-04-27 | End: 2018-04-27

## 2018-04-27 RX ORDER — HYDROMORPHONE HYDROCHLORIDE 2 MG/ML
1 INJECTION INTRAMUSCULAR; INTRAVENOUS; SUBCUTANEOUS EVERY 4 HOURS
Qty: 0 | Refills: 0 | Status: DISCONTINUED | OUTPATIENT
Start: 2018-04-27 | End: 2018-04-28

## 2018-04-27 RX ORDER — ZOLPIDEM TARTRATE 10 MG/1
5 TABLET ORAL AT BEDTIME
Qty: 0 | Refills: 0 | Status: DISCONTINUED | OUTPATIENT
Start: 2018-04-27 | End: 2018-05-01

## 2018-04-27 RX ORDER — ONDANSETRON 8 MG/1
4 TABLET, FILM COATED ORAL ONCE
Qty: 0 | Refills: 0 | Status: COMPLETED | OUTPATIENT
Start: 2018-04-27 | End: 2018-04-27

## 2018-04-27 RX ORDER — OXYCODONE HYDROCHLORIDE 5 MG/1
10 TABLET ORAL EVERY 4 HOURS
Qty: 0 | Refills: 0 | Status: DISCONTINUED | OUTPATIENT
Start: 2018-04-27 | End: 2018-04-28

## 2018-04-27 RX ORDER — ACETAMINOPHEN 500 MG
650 TABLET ORAL EVERY 6 HOURS
Qty: 0 | Refills: 0 | Status: COMPLETED | OUTPATIENT
Start: 2018-04-27 | End: 2018-04-29

## 2018-04-27 RX ORDER — MAGNESIUM SULFATE 500 MG/ML
2 VIAL (ML) INJECTION ONCE
Qty: 0 | Refills: 0 | Status: COMPLETED | OUTPATIENT
Start: 2018-04-27 | End: 2018-04-27

## 2018-04-27 RX ORDER — OXYCODONE HYDROCHLORIDE 5 MG/1
5 TABLET ORAL EVERY 4 HOURS
Qty: 0 | Refills: 0 | Status: DISCONTINUED | OUTPATIENT
Start: 2018-04-27 | End: 2018-04-28

## 2018-04-27 RX ORDER — GABAPENTIN 400 MG/1
300 CAPSULE ORAL THREE TIMES A DAY
Qty: 0 | Refills: 0 | Status: DISCONTINUED | OUTPATIENT
Start: 2018-04-27 | End: 2018-05-07

## 2018-04-27 RX ORDER — HEPARIN SODIUM 5000 [USP'U]/ML
5000 INJECTION INTRAVENOUS; SUBCUTANEOUS EVERY 8 HOURS
Qty: 0 | Refills: 0 | Status: DISCONTINUED | OUTPATIENT
Start: 2018-04-27 | End: 2018-05-05

## 2018-04-27 RX ORDER — HYDROMORPHONE HYDROCHLORIDE 2 MG/ML
1 INJECTION INTRAMUSCULAR; INTRAVENOUS; SUBCUTANEOUS ONCE
Qty: 0 | Refills: 0 | Status: DISCONTINUED | OUTPATIENT
Start: 2018-04-27 | End: 2018-04-27

## 2018-04-27 RX ORDER — PANTOPRAZOLE SODIUM 20 MG/1
40 TABLET, DELAYED RELEASE ORAL ONCE
Qty: 0 | Refills: 0 | Status: COMPLETED | OUTPATIENT
Start: 2018-04-27 | End: 2018-04-27

## 2018-04-27 RX ORDER — ASPIRIN/CALCIUM CARB/MAGNESIUM 324 MG
81 TABLET ORAL DAILY
Qty: 0 | Refills: 0 | Status: DISCONTINUED | OUTPATIENT
Start: 2018-04-27 | End: 2018-05-09

## 2018-04-27 RX ADMIN — ONDANSETRON 4 MILLIGRAM(S): 8 TABLET, FILM COATED ORAL at 06:26

## 2018-04-27 RX ADMIN — HEPARIN SODIUM 5000 UNIT(S): 5000 INJECTION INTRAVENOUS; SUBCUTANEOUS at 05:37

## 2018-04-27 RX ADMIN — GABAPENTIN 300 MILLIGRAM(S): 400 CAPSULE ORAL at 22:31

## 2018-04-27 RX ADMIN — MORPHINE SULFATE 4 MILLIGRAM(S): 50 CAPSULE, EXTENDED RELEASE ORAL at 07:50

## 2018-04-27 RX ADMIN — Medication 81 MILLIGRAM(S): at 12:25

## 2018-04-27 RX ADMIN — GABAPENTIN 300 MILLIGRAM(S): 400 CAPSULE ORAL at 16:30

## 2018-04-27 RX ADMIN — Medication 400 MILLIGRAM(S): at 23:37

## 2018-04-27 RX ADMIN — OXYCODONE HYDROCHLORIDE 10 MILLIGRAM(S): 5 TABLET ORAL at 15:50

## 2018-04-27 RX ADMIN — HEPARIN SODIUM 5000 UNIT(S): 5000 INJECTION INTRAVENOUS; SUBCUTANEOUS at 21:36

## 2018-04-27 RX ADMIN — Medication 1 MILLIGRAM(S): at 16:30

## 2018-04-27 RX ADMIN — PANTOPRAZOLE SODIUM 40 MILLIGRAM(S): 20 TABLET, DELAYED RELEASE ORAL at 18:39

## 2018-04-27 RX ADMIN — OXYCODONE HYDROCHLORIDE 5 MILLIGRAM(S): 5 TABLET ORAL at 22:06

## 2018-04-27 RX ADMIN — HYDROMORPHONE HYDROCHLORIDE 0.5 MILLIGRAM(S): 2 INJECTION INTRAMUSCULAR; INTRAVENOUS; SUBCUTANEOUS at 01:20

## 2018-04-27 RX ADMIN — Medication 1000 MILLIGRAM(S): at 02:35

## 2018-04-27 RX ADMIN — Medication 400 MILLIGRAM(S): at 02:05

## 2018-04-27 RX ADMIN — ONDANSETRON 4 MILLIGRAM(S): 8 TABLET, FILM COATED ORAL at 12:24

## 2018-04-27 RX ADMIN — OXYCODONE HYDROCHLORIDE 5 MILLIGRAM(S): 5 TABLET ORAL at 21:36

## 2018-04-27 RX ADMIN — HYDROMORPHONE HYDROCHLORIDE 1 MILLIGRAM(S): 2 INJECTION INTRAMUSCULAR; INTRAVENOUS; SUBCUTANEOUS at 20:15

## 2018-04-27 RX ADMIN — AMPICILLIN SODIUM AND SULBACTAM SODIUM 100 GRAM(S): 250; 125 INJECTION, POWDER, FOR SUSPENSION INTRAMUSCULAR; INTRAVENOUS at 04:15

## 2018-04-27 RX ADMIN — HYDROMORPHONE HYDROCHLORIDE 0.5 MILLIGRAM(S): 2 INJECTION INTRAMUSCULAR; INTRAVENOUS; SUBCUTANEOUS at 01:00

## 2018-04-27 RX ADMIN — HYDROMORPHONE HYDROCHLORIDE 0.5 MILLIGRAM(S): 2 INJECTION INTRAMUSCULAR; INTRAVENOUS; SUBCUTANEOUS at 12:48

## 2018-04-27 RX ADMIN — SODIUM CHLORIDE 3 MILLILITER(S): 9 INJECTION INTRAMUSCULAR; INTRAVENOUS; SUBCUTANEOUS at 21:47

## 2018-04-27 RX ADMIN — SODIUM CHLORIDE 125 MILLILITER(S): 9 INJECTION, SOLUTION INTRAVENOUS at 19:55

## 2018-04-27 RX ADMIN — AMPICILLIN SODIUM AND SULBACTAM SODIUM 100 GRAM(S): 250; 125 INJECTION, POWDER, FOR SUSPENSION INTRAMUSCULAR; INTRAVENOUS at 21:36

## 2018-04-27 RX ADMIN — Medication 400 MILLIGRAM(S): at 17:38

## 2018-04-27 RX ADMIN — HYDROMORPHONE HYDROCHLORIDE 0.5 MILLIGRAM(S): 2 INJECTION INTRAMUSCULAR; INTRAVENOUS; SUBCUTANEOUS at 12:33

## 2018-04-27 RX ADMIN — ZOLPIDEM TARTRATE 5 MILLIGRAM(S): 10 TABLET ORAL at 23:37

## 2018-04-27 RX ADMIN — SODIUM CHLORIDE 3 MILLILITER(S): 9 INJECTION INTRAMUSCULAR; INTRAVENOUS; SUBCUTANEOUS at 05:32

## 2018-04-27 RX ADMIN — Medication 1 MILLIGRAM(S): at 09:50

## 2018-04-27 RX ADMIN — SODIUM CHLORIDE 3 MILLILITER(S): 9 INJECTION INTRAMUSCULAR; INTRAVENOUS; SUBCUTANEOUS at 14:11

## 2018-04-27 RX ADMIN — HYDROMORPHONE HYDROCHLORIDE 1 MILLIGRAM(S): 2 INJECTION INTRAMUSCULAR; INTRAVENOUS; SUBCUTANEOUS at 14:27

## 2018-04-27 RX ADMIN — AMPICILLIN SODIUM AND SULBACTAM SODIUM 100 GRAM(S): 250; 125 INJECTION, POWDER, FOR SUSPENSION INTRAMUSCULAR; INTRAVENOUS at 16:00

## 2018-04-27 RX ADMIN — OXYCODONE HYDROCHLORIDE 5 MILLIGRAM(S): 5 TABLET ORAL at 12:26

## 2018-04-27 RX ADMIN — AMPICILLIN SODIUM AND SULBACTAM SODIUM 100 GRAM(S): 250; 125 INJECTION, POWDER, FOR SUSPENSION INTRAMUSCULAR; INTRAVENOUS at 10:23

## 2018-04-27 RX ADMIN — MORPHINE SULFATE 4 MILLIGRAM(S): 50 CAPSULE, EXTENDED RELEASE ORAL at 03:45

## 2018-04-27 RX ADMIN — HYDROMORPHONE HYDROCHLORIDE 1 MILLIGRAM(S): 2 INJECTION INTRAMUSCULAR; INTRAVENOUS; SUBCUTANEOUS at 19:54

## 2018-04-27 RX ADMIN — Medication 50 GRAM(S): at 06:27

## 2018-04-27 RX ADMIN — SODIUM CHLORIDE 125 MILLILITER(S): 9 INJECTION, SOLUTION INTRAVENOUS at 13:00

## 2018-04-27 RX ADMIN — HEPARIN SODIUM 5000 UNIT(S): 5000 INJECTION INTRAVENOUS; SUBCUTANEOUS at 14:18

## 2018-04-27 RX ADMIN — Medication 1000 MILLIGRAM(S): at 17:53

## 2018-04-27 RX ADMIN — MORPHINE SULFATE 4 MILLIGRAM(S): 50 CAPSULE, EXTENDED RELEASE ORAL at 08:05

## 2018-04-27 RX ADMIN — HYDROMORPHONE HYDROCHLORIDE 1 MILLIGRAM(S): 2 INJECTION INTRAMUSCULAR; INTRAVENOUS; SUBCUTANEOUS at 14:12

## 2018-04-27 RX ADMIN — OXYCODONE HYDROCHLORIDE 10 MILLIGRAM(S): 5 TABLET ORAL at 16:05

## 2018-04-27 RX ADMIN — MORPHINE SULFATE 4 MILLIGRAM(S): 50 CAPSULE, EXTENDED RELEASE ORAL at 04:00

## 2018-04-27 RX ADMIN — OXYCODONE HYDROCHLORIDE 5 MILLIGRAM(S): 5 TABLET ORAL at 12:41

## 2018-04-27 NOTE — PROGRESS NOTE ADULT - SUBJECTIVE AND OBJECTIVE BOX
patient is POD1 s/p mandibular reconstruction with fibula free flap from right leg. recovering well. Awake and alert, communicating well.   no complaints this AM. on trach collar    ICU Vital Signs Last 24 Hrs  T(C): 37.2 (27 Apr 2018 06:00), Max: 37.5 (26 Apr 2018 21:00)  T(F): 99 (27 Apr 2018 06:00), Max: 99.5 (26 Apr 2018 21:00)  HR: 66 (27 Apr 2018 06:00) (66 - 90)  BP: 126/72 (27 Apr 2018 06:00) (104/61 - 126/75)  BP(mean): --  ABP: 118/60 (27 Apr 2018 06:00) (105/51 - 119/66)  ABP(mean): --  RR: 14 (27 Apr 2018 06:00) (10 - 16)  SpO2: 100% (27 Apr 2018 06:00) (100% - 100%)    I&O's Detail    26 Apr 2018 07:01  -  27 Apr 2018 07:00  --------------------------------------------------------  IN:    IV PiggyBack: 300 mL    lactated ringers.: 1250 mL  Total IN: 1550 mL    OUT:    Bulb: 60 mL    Bulb: 150 mL    Indwelling Catheter - Urethral: 615 mL    VAC (Vacuum Assisted Closure) System: 150 mL  Total OUT: 975 mL    Total NET: 575 mL                              9.3    14.20 )-----------( 182      ( 27 Apr 2018 05:00 )             27.6   04-27    136  |  99  |  15  ----------------------------<  179<H>  3.9   |  23  |  0.87    Ca    7.6<L>      27 Apr 2018 05:00  Phos  2.6     04-27  Mg     1.7     04-27    NAD  intraoral flap is pink, no congestion. soft  audible implantable doppler signal  skin paddle bleed bright red blood when scratched  suture line is intact  neck soft, c/d/i  trach site clean  LE VAC dressing is on and functioning

## 2018-04-27 NOTE — PROGRESS NOTE ADULT - ASSESSMENT
POD#1 s/p free fibula for mandibular reconstruction    continue q1 hr flap checks  elevate HOB  continue VAC to LE  CAM boot to LE  continue DVT ppx  Aspirin daily  Antibiotics  pain control  TF for nutritional optimization

## 2018-04-27 NOTE — PROGRESS NOTE ADULT - SUBJECTIVE AND OBJECTIVE BOX
62y Male 1 day s/p resection of anterior mandible and floor of mouth, bilateral neck dissection, tracheostomy, reconstruction of mandible and floor of mouth with free fibula flap from right leg, skin graft from right thigh.  Patient examined at bedside in PACU.  MERCEDES overnight. Patient states pain is well controlled. Recovering well. Awake and alert, communicating well.     Vital Signs Last 24 Hrs  T(C): 37.2 (27 Apr 2018 06:00), Max: 37.5 (26 Apr 2018 21:00)  T(F): 99 (27 Apr 2018 06:00), Max: 99.5 (26 Apr 2018 21:00)  HR: 75 (27 Apr 2018 07:00) (66 - 90)  BP: 110/63 (27 Apr 2018 07:00) (104/61 - 126/75)  BP(mean): --  RR: 16 (27 Apr 2018 07:00) (10 - 16)  SpO2: 100% (27 Apr 2018 07:00) (100% - 100%)    PE:   Gen: AAOx3, NAD  IOE: intraoral flap is pink, warm, soft, hemostatic, strong doppler.  Neck: neck soft, c/d/i, 2x BEKAH drains serosanguinous output  Extremities: LE dressing intact                       9.3    14.20 )-----------( 182      ( 27 Apr 2018 05:00 )             27.6     04-27    136  |  99  |  15  ----------------------------<  179<H>  3.9   |  23  |  0.87    Ca    7.6<L>      27 Apr 2018 05:00  Phos  2.6     04-27  Mg     1.7     04-27      I&O's Summary    26 Apr 2018 07:01  -  27 Apr 2018 07:00  --------------------------------------------------------  IN: 1550 mL / OUT: 975 mL / NET: 575 mL          A/P: 62y Male 1 day s/p resection of anterior mandible and floor of mouth, bilateral neck dissection, tracheostomy, reconstruction of mandible and floor of mouth with free fibula flap from right leg, skin graft from right thigh. Patient recovering well.  - Care per primary team 62y Male 1 day s/p resection of squamous cell carcinoma of anterior mandible and floor of mouth, bilateral neck dissection, tracheostomy, reconstruction of mandible and floor of mouth with free fibula flap from right leg, skin graft from right thigh.  Patient examined at bedside in PACU.  MERCEDES overnight. Patient states pain is well controlled. Recovering well. Awake and alert, communicating well.     Vital Signs Last 24 Hrs  T(C): 37.2 (27 Apr 2018 06:00), Max: 37.5 (26 Apr 2018 21:00)  T(F): 99 (27 Apr 2018 06:00), Max: 99.5 (26 Apr 2018 21:00)  HR: 75 (27 Apr 2018 07:00) (66 - 90)  BP: 110/63 (27 Apr 2018 07:00) (104/61 - 126/75)  BP(mean): --  RR: 16 (27 Apr 2018 07:00) (10 - 16)  SpO2: 100% (27 Apr 2018 07:00) (100% - 100%)    PE:   Gen: NAD  EOE: NG tube in place  IOE: intraoral flap is pink, warm, soft, hemostatic, strong doppler.  Neck: neck soft, c/d/i, 2x BEKAH drains with serosanguinous output  Extremities: Right LE dressing intact, BEKAH drain with serosanguinous output                       9.3    14.20 )-----------( 182      ( 27 Apr 2018 05:00 )             27.6     04-27    136  |  99  |  15  ----------------------------<  179<H>  3.9   |  23  |  0.87    Ca    7.6<L>      27 Apr 2018 05:00  Phos  2.6     04-27  Mg     1.7     04-27      I&O's Summary    26 Apr 2018 07:01  -  27 Apr 2018 07:00  --------------------------------------------------------  IN: 1550 mL / OUT: 975 mL / NET: 575 mL          A/P: 62y Male 1 day s/p resection of squamous cell carcinoma of anterior mandible and floor of mouth, bilateral neck dissection, tracheostomy, reconstruction of mandible and floor of mouth with free fibula flap from right leg, skin graft from right thigh. Patient recovering well.  - Care per primary team

## 2018-04-27 NOTE — PROGRESS NOTE ADULT - ASSESSMENT
Neurologic: wean sedation in AM    Respiratory: check ABG/CXR, CPAP -> trach collar     Cardiovascular: HD stable, monitor hemodynamics continuously, implanted doppler checks q1h    Gastrointestinal/Nutrition: NPO,    Genitourinary/Renal: monitor lytes, replete as needed, monitor Is & Os    Hematologic: DVT PPX/ASA, transfuse Hb < 7    Infectious Disease: c/w unasyn drain prophylaxis    Endocrine: no acute issues, not diabetic    ANDREI Roe MD PGY 2   [id number] Neurologic: wean sedation in AM  restart home med gabapentin    Respiratory: continue trach collar     Cardiovascular: HD stable, monitor hemodynamics continuously, implanted doppler checks q1h    Gastrointestinal/Nutrition: NPO - feeding tube in place, start feeds    Genitourinary/Renal: monitor lytes, replete as needed, monitor Is & Os  d/c IVF    Hematologic: DVT PPX/ASA, transfuse Hb < 7    Infectious Disease: c/w unasyn drain prophylaxis    Endocrine: no acute issues, not diabetic    ANDREI Roe MD PGY 2   [id number]

## 2018-04-27 NOTE — PROGRESS NOTE ADULT - SUBJECTIVE AND OBJECTIVE BOX
Pt seen and examined in PACU. No acute events overnight.     AFVSS   NAD, awake and alert   Breathing comfortably on TC   OC/OP: flap soft, warm, pink   +[employer] doppler signal   Neck incision c/d/i with staples, no fluctuance   BEKAH in place with SS output     A/P: 62M POD1 s/p composite resection, b/l SND, trach, and RFFF recon  -routine trach care  -trach collar  -flap checks per PRS   -lovenox/asa   -unasyn   -f/u CXR final read   -NGT feeds  -pain control prn   -dispo: SICU

## 2018-04-27 NOTE — PROGRESS NOTE ADULT - SUBJECTIVE AND OBJECTIVE BOX
HISTORY  61 y/o male presents to [facility] for preoperative evaluation with dx of malignant neoplasm of mouth and mandible. 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 Pt presented to the Oral Surgeon a few weeks ago c/o chronic jaw discomfort. s/p biopsy and informed of recurrence of oral cancer. Now s/p resection andibular alvelous cancer with resection of floor of mouth, bilateral neck dissection, tracheostomy;, Resection of Right Mandible and reconstruction of mandible and floor of mouth with free fibula flap from right leg, skin graft from right thigh    Patient complaining of some pain postop but following all commands and denies any other symptoms.    SUBJECTIVE/ROS:  [ ] A ten-point review of systems was otherwise negative except as noted.  [x ] Due to altered mental status/intubation, subjective information were not able to be obtained from the patient. History was obtained, to the extent possible, from review of the chart and collateral sources of information.      NEURO  Exam: Following commands, motor/sensation intact in upper and lower extremities bilaterally  Meds: aspirin Suppository 300 milliGRAM(s) Rectal daily  morphine  - Injectable 2 milliGRAM(s) IV Push every 4 hours PRN Moderate Pain (4 - 6)  morphine  - Injectable 4 milliGRAM(s) IV Push every 4 hours PRN Severe Pain (7 - 10)  ondansetron Injectable 4 milliGRAM(s) IV Push once PRN Nausea and/or Vomiting    [x] Adequacy of sedation and pain control has been assessed and adjusted      RESPIRATORY  RR: 11 (04-27-18 @ 04:00) (10 - 16)  SpO2: 100% (04-27-18 @ 04:00) (95% - 100%)  Wt(kg): --  Exam: unlabored, clear to auscultation bilaterally  Mechanical Ventilation: trach collar  ABG - ( 26 Apr 2018 19:06 )  pH: 7.44  /  pCO2: 37    /  pO2: 239   / HCO3: 25    / Base Excess: 0.9   /  SaO2: 99.4    Lactate: x      [ NA] Extubation Readiness Assessed  Meds:       CARDIOVASCULAR  HR: 71 (04-27-18 @ 04:00) (67 - 90)  BP: 106/64 (04-27-18 @ 04:00) (104/61 - 129/87)  BP(mean): --  ABP: 109/54 (04-27-18 @ 04:00) (105/51 - 119/66)  ABP(mean): --  Wt(kg): --  CVP(cm H2O): --  VBG - ( 27 Apr 2018 00:20 )  pH: 7.37  /  pCO2: 46    /  pO2: 252   / HCO3: 25    / Base Excess: 0.7   /  SaO2: 99.2   Lactate: x      Exam: RRR   Cardiac Rhythm: NS  Perfusion     [x ]Adequate   [ ]Inadequate  Mentation   [x ]Normal       [ ]Reduced  Extremities  [ x]Warm         [ ]Cool  Volume Status [ ]Hypervolemic [x ]Euvolemic [ ]Hypovolemic  Meds:       GI/NUTRITION  Exam: soft nt nd  Diet: NPO  Meds: x    GENITOURINARY  I&O's Detail    04-26 @ 07:01  -  04-27 @ 05:00  --------------------------------------------------------  IN:    IV PiggyBack: 200 mL    lactated ringers.: 875 mL  Total IN: 1075 mL    OUT:    Bulb: 90 mL    Bulb: 45 mL    Indwelling Catheter - Urethral: 445 mL  Total OUT: 580 mL    Total NET: 495 mL        Weight (kg): 81.6 (04-26 @ 06:39)  04-27    136  |  99  |  14  ----------------------------<  195<H>  4.0   |  23  |  0.92    Ca    7.7<L>      27 Apr 2018 00:20  Phos  2.9     04-27  Mg     1.6     04-27      [ ] Sprague catheter, indication: UOP monitoring  Meds: lactated ringers. 1000 milliLiter(s) IV Continuous <Continuous>  sodium chloride 0.9% lock flush 3 milliLiter(s) IV Push every 8 hours        HEMATOLOGIC  Meds: heparin  Injectable 5000 Unit(s) SubCutaneous every 8 hours    [x] VTE Prophylaxis                        9.7    13.51 )-----------( 163      ( 27 Apr 2018 00:20 )             29.2       Transfusion     [ ] PRBC   [ ] Platelets   [ ] FFP   [ ] Cryoprecipitate      INFECTIOUS DISEASES  T(C): 36.9 (04-27-18 @ 02:00), Max: 37.5 (04-26-18 @ 21:00)  Wt(kg): --  WBC Count: 13.51 K/uL (04-27 @ 00:20)    Recent Cultures: x    Meds: ampicillin/sulbactam  IVPB 1.5 Gram(s) IV Intermittent every 6 hours        ENDOCRINE  Capillary Blood Glucose    Meds: x      ACCESS DEVICES:  [x ] Peripheral IV  [ ] Central Venous Line	[ ] R	[ ] L	[ ] IJ	[ ] Fem	[ ] SC	Placed:   [x ] Arterial Line		[ ] R	[ ] L	[ ] Fem	[ ] Rad	[ ] Ax	Placed:   [ ] PICC:					[ ] Mediport  [x ] Urinary Catheter, Date Placed:   [x ] Necessity of urinary, arterial, and venous catheters discussed    OTHER MEDICATIONS:      CODE STATUS: Full    IMAGING:

## 2018-04-28 LAB
APTT BLD: 25 SEC — LOW (ref 27.5–37.4)
BASE EXCESS BLDA CALC-SCNC: 3.5 MMOL/L — SIGNIFICANT CHANGE UP
BUN SERPL-MCNC: 15 MG/DL — SIGNIFICANT CHANGE UP (ref 7–23)
CA-I BLD-SCNC: 1.06 MMOL/L — SIGNIFICANT CHANGE UP (ref 1.03–1.23)
CALCIUM SERPL-MCNC: 7.6 MG/DL — LOW (ref 8.4–10.5)
CHLORIDE SERPL-SCNC: 101 MMOL/L — SIGNIFICANT CHANGE UP (ref 98–107)
CO2 SERPL-SCNC: 25 MMOL/L — SIGNIFICANT CHANGE UP (ref 22–31)
CREAT SERPL-MCNC: 0.84 MG/DL — SIGNIFICANT CHANGE UP (ref 0.5–1.3)
GLUCOSE SERPL-MCNC: 130 MG/DL — HIGH (ref 70–99)
HCO3 BLDA-SCNC: 28 MMOL/L — HIGH (ref 22–26)
HCT VFR BLD CALC: 22.7 % — LOW (ref 39–50)
HCT VFR BLD CALC: 23.4 % — LOW (ref 39–50)
HGB BLD-MCNC: 7.6 G/DL — LOW (ref 13–17)
HGB BLD-MCNC: 7.7 G/DL — LOW (ref 13–17)
INR BLD: 1.03 — SIGNIFICANT CHANGE UP (ref 0.88–1.17)
MAGNESIUM SERPL-MCNC: 2.2 MG/DL — SIGNIFICANT CHANGE UP (ref 1.6–2.6)
MCHC RBC-ENTMCNC: 31 PG — SIGNIFICANT CHANGE UP (ref 27–34)
MCHC RBC-ENTMCNC: 31.8 PG — SIGNIFICANT CHANGE UP (ref 27–34)
MCHC RBC-ENTMCNC: 32.5 % — SIGNIFICANT CHANGE UP (ref 32–36)
MCHC RBC-ENTMCNC: 33.9 % — SIGNIFICANT CHANGE UP (ref 32–36)
MCV RBC AUTO: 93.8 FL — SIGNIFICANT CHANGE UP (ref 80–100)
MCV RBC AUTO: 95.5 FL — SIGNIFICANT CHANGE UP (ref 80–100)
NRBC # FLD: 0 — SIGNIFICANT CHANGE UP
NRBC # FLD: 0 — SIGNIFICANT CHANGE UP
PCO2 BLDA: 44 MMHG — SIGNIFICANT CHANGE UP (ref 35–48)
PH BLDA: 7.42 PH — SIGNIFICANT CHANGE UP (ref 7.35–7.45)
PHOSPHATE SERPL-MCNC: 1.4 MG/DL — LOW (ref 2.5–4.5)
PLATELET # BLD AUTO: 161 K/UL — SIGNIFICANT CHANGE UP (ref 150–400)
PLATELET # BLD AUTO: 177 K/UL — SIGNIFICANT CHANGE UP (ref 150–400)
PMV BLD: 10 FL — SIGNIFICANT CHANGE UP (ref 7–13)
PMV BLD: 10.3 FL — SIGNIFICANT CHANGE UP (ref 7–13)
PO2 BLDA: 95 MMHG — SIGNIFICANT CHANGE UP (ref 83–108)
POTASSIUM SERPL-MCNC: 3.6 MMOL/L — SIGNIFICANT CHANGE UP (ref 3.5–5.3)
POTASSIUM SERPL-SCNC: 3.6 MMOL/L — SIGNIFICANT CHANGE UP (ref 3.5–5.3)
PROTHROM AB SERPL-ACNC: 11.4 SEC — SIGNIFICANT CHANGE UP (ref 9.8–13.1)
RBC # BLD: 2.42 M/UL — LOW (ref 4.2–5.8)
RBC # BLD: 2.45 M/UL — LOW (ref 4.2–5.8)
RBC # FLD: 13.8 % — SIGNIFICANT CHANGE UP (ref 10.3–14.5)
RBC # FLD: 13.9 % — SIGNIFICANT CHANGE UP (ref 10.3–14.5)
SAO2 % BLDA: 98 % — SIGNIFICANT CHANGE UP (ref 95–99)
SODIUM SERPL-SCNC: 137 MMOL/L — SIGNIFICANT CHANGE UP (ref 135–145)
WBC # BLD: 13.27 K/UL — HIGH (ref 3.8–10.5)
WBC # BLD: 13.36 K/UL — HIGH (ref 3.8–10.5)
WBC # FLD AUTO: 13.27 K/UL — HIGH (ref 3.8–10.5)
WBC # FLD AUTO: 13.36 K/UL — HIGH (ref 3.8–10.5)

## 2018-04-28 PROCEDURE — 93010 ELECTROCARDIOGRAM REPORT: CPT

## 2018-04-28 PROCEDURE — 71045 X-RAY EXAM CHEST 1 VIEW: CPT | Mod: 26

## 2018-04-28 PROCEDURE — 99233 SBSQ HOSP IP/OBS HIGH 50: CPT | Mod: GC

## 2018-04-28 RX ORDER — POTASSIUM PHOSPHATE, MONOBASIC POTASSIUM PHOSPHATE, DIBASIC 236; 224 MG/ML; MG/ML
15 INJECTION, SOLUTION INTRAVENOUS ONCE
Qty: 0 | Refills: 0 | Status: COMPLETED | OUTPATIENT
Start: 2018-04-28 | End: 2018-04-28

## 2018-04-28 RX ORDER — HYDROMORPHONE HYDROCHLORIDE 2 MG/ML
4 INJECTION INTRAMUSCULAR; INTRAVENOUS; SUBCUTANEOUS EVERY 4 HOURS
Qty: 0 | Refills: 0 | Status: DISCONTINUED | OUTPATIENT
Start: 2018-04-28 | End: 2018-04-28

## 2018-04-28 RX ORDER — HYDROMORPHONE HYDROCHLORIDE 2 MG/ML
2 INJECTION INTRAMUSCULAR; INTRAVENOUS; SUBCUTANEOUS EVERY 4 HOURS
Qty: 0 | Refills: 0 | Status: DISCONTINUED | OUTPATIENT
Start: 2018-04-28 | End: 2018-04-28

## 2018-04-28 RX ORDER — HYDROMORPHONE HYDROCHLORIDE 2 MG/ML
1 INJECTION INTRAMUSCULAR; INTRAVENOUS; SUBCUTANEOUS EVERY 4 HOURS
Qty: 0 | Refills: 0 | Status: DISCONTINUED | OUTPATIENT
Start: 2018-04-28 | End: 2018-05-05

## 2018-04-28 RX ORDER — ONDANSETRON 8 MG/1
4 TABLET, FILM COATED ORAL ONCE
Qty: 0 | Refills: 0 | Status: COMPLETED | OUTPATIENT
Start: 2018-04-28 | End: 2018-04-28

## 2018-04-28 RX ORDER — HYDROMORPHONE HYDROCHLORIDE 2 MG/ML
1 INJECTION INTRAMUSCULAR; INTRAVENOUS; SUBCUTANEOUS EVERY 4 HOURS
Qty: 0 | Refills: 0 | Status: DISCONTINUED | OUTPATIENT
Start: 2018-04-28 | End: 2018-04-28

## 2018-04-28 RX ORDER — HYDROMORPHONE HYDROCHLORIDE 2 MG/ML
4 INJECTION INTRAMUSCULAR; INTRAVENOUS; SUBCUTANEOUS EVERY 4 HOURS
Qty: 0 | Refills: 0 | Status: DISCONTINUED | OUTPATIENT
Start: 2018-04-28 | End: 2018-05-01

## 2018-04-28 RX ORDER — HYDROMORPHONE HYDROCHLORIDE 2 MG/ML
2 INJECTION INTRAMUSCULAR; INTRAVENOUS; SUBCUTANEOUS EVERY 4 HOURS
Qty: 0 | Refills: 0 | Status: DISCONTINUED | OUTPATIENT
Start: 2018-04-28 | End: 2018-05-01

## 2018-04-28 RX ADMIN — GABAPENTIN 300 MILLIGRAM(S): 400 CAPSULE ORAL at 22:03

## 2018-04-28 RX ADMIN — Medication 650 MILLIGRAM(S): at 18:05

## 2018-04-28 RX ADMIN — AMPICILLIN SODIUM AND SULBACTAM SODIUM 100 GRAM(S): 250; 125 INJECTION, POWDER, FOR SUSPENSION INTRAMUSCULAR; INTRAVENOUS at 18:05

## 2018-04-28 RX ADMIN — HYDROMORPHONE HYDROCHLORIDE 4 MILLIGRAM(S): 2 INJECTION INTRAMUSCULAR; INTRAVENOUS; SUBCUTANEOUS at 21:05

## 2018-04-28 RX ADMIN — OXYCODONE HYDROCHLORIDE 10 MILLIGRAM(S): 5 TABLET ORAL at 03:10

## 2018-04-28 RX ADMIN — OXYCODONE HYDROCHLORIDE 10 MILLIGRAM(S): 5 TABLET ORAL at 08:55

## 2018-04-28 RX ADMIN — GABAPENTIN 300 MILLIGRAM(S): 400 CAPSULE ORAL at 05:57

## 2018-04-28 RX ADMIN — HYDROMORPHONE HYDROCHLORIDE 1 MILLIGRAM(S): 2 INJECTION INTRAMUSCULAR; INTRAVENOUS; SUBCUTANEOUS at 16:49

## 2018-04-28 RX ADMIN — HYDROMORPHONE HYDROCHLORIDE 2 MILLIGRAM(S): 2 INJECTION INTRAMUSCULAR; INTRAVENOUS; SUBCUTANEOUS at 09:05

## 2018-04-28 RX ADMIN — HYDROMORPHONE HYDROCHLORIDE 4 MILLIGRAM(S): 2 INJECTION INTRAMUSCULAR; INTRAVENOUS; SUBCUTANEOUS at 21:20

## 2018-04-28 RX ADMIN — HYDROMORPHONE HYDROCHLORIDE 1 MILLIGRAM(S): 2 INJECTION INTRAMUSCULAR; INTRAVENOUS; SUBCUTANEOUS at 10:59

## 2018-04-28 RX ADMIN — Medication 650 MILLIGRAM(S): at 01:06

## 2018-04-28 RX ADMIN — Medication 650 MILLIGRAM(S): at 05:46

## 2018-04-28 RX ADMIN — Medication 650 MILLIGRAM(S): at 06:00

## 2018-04-28 RX ADMIN — SODIUM CHLORIDE 3 MILLILITER(S): 9 INJECTION INTRAMUSCULAR; INTRAVENOUS; SUBCUTANEOUS at 13:30

## 2018-04-28 RX ADMIN — ONDANSETRON 4 MILLIGRAM(S): 8 TABLET, FILM COATED ORAL at 11:32

## 2018-04-28 RX ADMIN — HEPARIN SODIUM 5000 UNIT(S): 5000 INJECTION INTRAVENOUS; SUBCUTANEOUS at 14:11

## 2018-04-28 RX ADMIN — Medication 1000 MILLIGRAM(S): at 00:10

## 2018-04-28 RX ADMIN — Medication 650 MILLIGRAM(S): at 10:58

## 2018-04-28 RX ADMIN — HYDROMORPHONE HYDROCHLORIDE 4 MILLIGRAM(S): 2 INJECTION INTRAMUSCULAR; INTRAVENOUS; SUBCUTANEOUS at 14:12

## 2018-04-28 RX ADMIN — SODIUM CHLORIDE 3 MILLILITER(S): 9 INJECTION INTRAMUSCULAR; INTRAVENOUS; SUBCUTANEOUS at 05:37

## 2018-04-28 RX ADMIN — AMPICILLIN SODIUM AND SULBACTAM SODIUM 100 GRAM(S): 250; 125 INJECTION, POWDER, FOR SUSPENSION INTRAMUSCULAR; INTRAVENOUS at 04:10

## 2018-04-28 RX ADMIN — HEPARIN SODIUM 5000 UNIT(S): 5000 INJECTION INTRAVENOUS; SUBCUTANEOUS at 05:47

## 2018-04-28 RX ADMIN — Medication 81 MILLIGRAM(S): at 10:59

## 2018-04-28 RX ADMIN — Medication 650 MILLIGRAM(S): at 00:36

## 2018-04-28 RX ADMIN — HYDROMORPHONE HYDROCHLORIDE 1 MILLIGRAM(S): 2 INJECTION INTRAMUSCULAR; INTRAVENOUS; SUBCUTANEOUS at 17:04

## 2018-04-28 RX ADMIN — Medication 650 MILLIGRAM(S): at 11:22

## 2018-04-28 RX ADMIN — AMPICILLIN SODIUM AND SULBACTAM SODIUM 100 GRAM(S): 250; 125 INJECTION, POWDER, FOR SUSPENSION INTRAMUSCULAR; INTRAVENOUS at 11:27

## 2018-04-28 RX ADMIN — HYDROMORPHONE HYDROCHLORIDE 2 MILLIGRAM(S): 2 INJECTION INTRAMUSCULAR; INTRAVENOUS; SUBCUTANEOUS at 08:42

## 2018-04-28 RX ADMIN — HEPARIN SODIUM 5000 UNIT(S): 5000 INJECTION INTRAVENOUS; SUBCUTANEOUS at 22:03

## 2018-04-28 RX ADMIN — SODIUM CHLORIDE 3 MILLILITER(S): 9 INJECTION INTRAMUSCULAR; INTRAVENOUS; SUBCUTANEOUS at 22:02

## 2018-04-28 RX ADMIN — HYDROMORPHONE HYDROCHLORIDE 1 MILLIGRAM(S): 2 INJECTION INTRAMUSCULAR; INTRAVENOUS; SUBCUTANEOUS at 05:20

## 2018-04-28 RX ADMIN — OXYCODONE HYDROCHLORIDE 10 MILLIGRAM(S): 5 TABLET ORAL at 02:54

## 2018-04-28 RX ADMIN — GABAPENTIN 300 MILLIGRAM(S): 400 CAPSULE ORAL at 14:11

## 2018-04-28 RX ADMIN — POTASSIUM PHOSPHATE, MONOBASIC POTASSIUM PHOSPHATE, DIBASIC 62.5 MILLIMOLE(S): 236; 224 INJECTION, SOLUTION INTRAVENOUS at 05:03

## 2018-04-28 RX ADMIN — HYDROMORPHONE HYDROCHLORIDE 1 MILLIGRAM(S): 2 INJECTION INTRAMUSCULAR; INTRAVENOUS; SUBCUTANEOUS at 11:14

## 2018-04-28 RX ADMIN — HYDROMORPHONE HYDROCHLORIDE 1 MILLIGRAM(S): 2 INJECTION INTRAMUSCULAR; INTRAVENOUS; SUBCUTANEOUS at 05:03

## 2018-04-28 RX ADMIN — OXYCODONE HYDROCHLORIDE 10 MILLIGRAM(S): 5 TABLET ORAL at 08:25

## 2018-04-28 RX ADMIN — HYDROMORPHONE HYDROCHLORIDE 4 MILLIGRAM(S): 2 INJECTION INTRAMUSCULAR; INTRAVENOUS; SUBCUTANEOUS at 14:42

## 2018-04-28 NOTE — PROGRESS NOTE ADULT - ASSESSMENT
Neurologic: wean sedation in AM  restart home med gabapentin    Respiratory: continue trach collar     Cardiovascular: HD stable, monitor hemodynamics continuously, implanted doppler checks q1h    Gastrointestinal/Nutrition: NPO - feeding tube in place, start feeds    Genitourinary/Renal: monitor lytes, replete as needed, monitor Is & Os  d/c IVF    Hematologic: DVT PPX/ASA, transfuse Hb < 7    Infectious Disease: c/w unasyn drain prophylaxis    Endocrine: no acute issues, not diabetic    ANDREI Roe MD PGY 2   [id number] 62 m s/p resection of floor of the mouth lesion , tracheostomy , b/l neck dissection , with fibular free flap , STSG  ( right thigh )     Neurologic: PRN pain control ,Gabapentin , Oxy , tylenol . Started on ambien at HS     Respiratory: continue trach collar     Cardiovascular: HD stable, monitor hemodynamics continuously, implanted doppler checks q1h    Gastrointestinal/Nutrition: NPO / elba tube feeds     Genitourinary/Renal: monitor lytes, replete as needed, monitor Is & Os  d/c IVF    Hematologic: DVT PPX/ASA, transfuse Hb < 7    Infectious Disease: c/w unasyn drain prophylaxis    Endocrine: no acute issues, not diabetic

## 2018-04-28 NOTE — PROGRESS NOTE ADULT - SUBJECTIVE AND OBJECTIVE BOX
CASSIE HUMPHREYBRY  [id number]    Subjective:  Patient is a 62y old  Male who presents with a chief complaint of "I'm having the front part of my jaw removed" (23 Apr 2018 07:21). No acute events overnight, pain in leg improving       Objective:  T(C): 36.8 (04-28-18 @ 04:00), Max: 37.7 (04-27-18 @ 16:00)  HR: 80 (04-28-18 @ 07:37) (74 - 112)  BP: 111/52 (04-27-18 @ 20:00) (111/52 - 127/62)  RR: 16 (04-28-18 @ 07:37) (12 - 22)  SpO2: 99% (04-28-18 @ 07:37) (96% - 100%)  Wt(kg): --   04-28    137  |  101  |  15  ----------------------------<  130<H>  3.6   |  25  |  0.84    Ca    7.6<L>      28 Apr 2018 02:40  Phos  1.4     04-28  Mg     2.2     04-28                          7.6    13.27 )-----------( 161      ( 28 Apr 2018 02:40 )             23.4       04-27 @ 07:01  -  04-28 @ 07:00  --------------------------------------------------------  IN: 4000 mL / OUT: 2319 mL / NET: 1681 mL      PHYSICAL EXAM:    General: NAD, resting comfortably in bed  HEENT: Intraoral flap viable, bright red bleeding on pinprick, strong arterial cook doppler signal, suture line intact, neck incision CDI, drain outputs serosanguinous  MSK: MORENITA vac in place      MEDICATIONS  (STANDING):  acetaminophen    Suspension. 650 milliGRAM(s) Oral every 6 hours  ampicillin/sulbactam  IVPB 1.5 Gram(s) IV Intermittent every 6 hours  aspirin  chewable 81 milliGRAM(s) Oral daily  gabapentin   Solution 300 milliGRAM(s) Oral three times a day  heparin  Injectable 5000 Unit(s) SubCutaneous every 8 hours  lactated ringers. 1000 milliLiter(s) (125 mL/Hr) IV Continuous <Continuous>  sodium chloride 0.9% lock flush 3 milliLiter(s) IV Push every 8 hours    MEDICATIONS  (PRN):  HYDROmorphone  Injectable 1 milliGRAM(s) IV Push every 4 hours PRN Breakthrough pain  oxyCODONE    Solution 5 milliGRAM(s) Oral every 4 hours PRN Moderate Pain (4 - 6)  oxyCODONE    Solution 10 milliGRAM(s) Oral every 4 hours PRN Severe Pain (7 - 10)  zolpidem 5 milliGRAM(s) Oral at bedtime PRN Insomnia      Assessment/Plan:  Patient is a 62y old  Male who presents with a chief complaint of "I'm having the front part of my jaw removed" (23 Apr 2018 07:21). Doing well, POD 2 from free fibula recon of mandible.   - Continue vac  - Continue q2h flap checks  - Continue drains  - DVT ppx  - ASA  - TF

## 2018-04-28 NOTE — PROGRESS NOTE ADULT - SUBJECTIVE AND OBJECTIVE BOX
Pt seen and examined at bedside in SICU.    No acute events overnight.    Vital signs reviewed in EMR  NAD, awake and alert   Breathing comfortably on TC   OC/OP: flap soft, warm, pink   +Zhenai doppler signal   Neck incision c/d/i with staples, no fluctuance   JPs in place with SS output   RLE neurovascularly intact  Drain with SS output    A/P: 62M POD2 s/p composite resection, b/l SND, trach, and RFFF recon  -routine trach care  -trach collar  -flap checks per PRS   -lovenox/asa   -unasyn   -NGT feeds  -pain control prn   -OOB to chair with assistance

## 2018-04-28 NOTE — PROGRESS NOTE ADULT - SUBJECTIVE AND OBJECTIVE BOX
HISTORY  61 y/o male presents to [facility] for preoperative evaluation with dx of malignant neoplasm of mouth and mandible. 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 Pt presented to the Oral Surgeon a few weeks ago c/o chronic jaw discomfort. s/p biopsy and informed of recurrence of oral cancer. Now s/p resection andibular alvelous cancer with resection of floor of mouth, bilateral neck dissection, tracheostomy;, Resection of Right Mandible and reconstruction of mandible and floor of mouth with free fibula flap from right leg, skin graft from right thigh    Patient complaining of some pain postop but following all commands and denies any other symptoms.    SUBJECTIVE/ROS:  [ ] A ten-point review of systems was otherwise negative except as noted.  [x ] Due to altered mental status/intubation, subjective information were not able to be obtained from the patient. History was obtained, to the extent possible, from review of the chart and collateral sources of information.      NEURO  Exam: Following commands, motor/sensation intact in upper and lower extremities bilaterally  Meds: aspirin Suppository 300 milliGRAM(s) Rectal daily  morphine  - Injectable 2 milliGRAM(s) IV Push every 4 hours PRN Moderate Pain (4 - 6)  morphine  - Injectable 4 milliGRAM(s) IV Push every 4 hours PRN Severe Pain (7 - 10)  ondansetron Injectable 4 milliGRAM(s) IV Push once PRN Nausea and/or Vomiting    [x] Adequacy of sedation and pain control has been assessed and adjusted      RESPIRATORY  RR: 11 (04-27-18 @ 04:00) (10 - 16)  SpO2: 100% (04-27-18 @ 04:00) (95% - 100%)  Wt(kg): --  Exam: unlabored, clear to auscultation bilaterally  Mechanical Ventilation: trach collar  ABG - ( 26 Apr 2018 19:06 )  pH: 7.44  /  pCO2: 37    /  pO2: 239   / HCO3: 25    / Base Excess: 0.9   /  SaO2: 99.4    Lactate: x      [ NA] Extubation Readiness Assessed  Meds:       CARDIOVASCULAR  HR: 71 (04-27-18 @ 04:00) (67 - 90)  BP: 106/64 (04-27-18 @ 04:00) (104/61 - 129/87)  BP(mean): --  ABP: 109/54 (04-27-18 @ 04:00) (105/51 - 119/66)  ABP(mean): --  Wt(kg): --  CVP(cm H2O): --  VBG - ( 27 Apr 2018 00:20 )  pH: 7.37  /  pCO2: 46    /  pO2: 252   / HCO3: 25    / Base Excess: 0.7   /  SaO2: 99.2   Lactate: x      Exam: RRR   Cardiac Rhythm: NS  Perfusion     [x ]Adequate   [ ]Inadequate  Mentation   [x ]Normal       [ ]Reduced  Extremities  [ x]Warm         [ ]Cool  Volume Status [ ]Hypervolemic [x ]Euvolemic [ ]Hypovolemic  Meds:       GI/NUTRITION  Exam: soft nt nd  Diet: NPO  Meds: x    GENITOURINARY  I&O's Detail    04-26 @ 07:01  -  04-27 @ 05:00  --------------------------------------------------------  IN:    IV PiggyBack: 200 mL    lactated ringers.: 875 mL  Total IN: 1075 mL    OUT:    Bulb: 90 mL    Bulb: 45 mL    Indwelling Catheter - Urethral: 445 mL  Total OUT: 580 mL    Total NET: 495 mL        Weight (kg): 81.6 (04-26 @ 06:39)  04-27    136  |  99  |  14  ----------------------------<  195<H>  4.0   |  23  |  0.92    Ca    7.7<L>      27 Apr 2018 00:20  Phos  2.9     04-27  Mg     1.6     04-27      [ ] Sprague catheter, indication: UOP monitoring  Meds: lactated ringers. 1000 milliLiter(s) IV Continuous <Continuous>  sodium chloride 0.9% lock flush 3 milliLiter(s) IV Push every 8 hours        HEMATOLOGIC  Meds: heparin  Injectable 5000 Unit(s) SubCutaneous every 8 hours    [x] VTE Prophylaxis                        9.7    13.51 )-----------( 163      ( 27 Apr 2018 00:20 )             29.2       Transfusion     [ ] PRBC   [ ] Platelets   [ ] FFP   [ ] Cryoprecipitate      INFECTIOUS DISEASES  T(C): 36.9 (04-27-18 @ 02:00), Max: 37.5 (04-26-18 @ 21:00)  Wt(kg): --  WBC Count: 13.51 K/uL (04-27 @ 00:20)    Recent Cultures: x    Meds: ampicillin/sulbactam  IVPB 1.5 Gram(s) IV Intermittent every 6 hours        ENDOCRINE  Capillary Blood Glucose    Meds: x      ACCESS DEVICES:  [x ] Peripheral IV  [ ] Central Venous Line	[ ] R	[ ] L	[ ] IJ	[ ] Fem	[ ] SC	Placed:   [x ] Arterial Line		[ ] R	[ ] L	[ ] Fem	[ ] Rad	[ ] Ax	Placed:   [ ] PICC:					[ ] Mediport  [x ] Urinary Catheter, Date Placed:   [x ] Necessity of urinary, arterial, and venous catheters discussed    OTHER MEDICATIONS:      CODE STATUS: Full    IMAGING: SICU AM PROGRESS NOTE:  INTERVAL / OVERNIGHT EVENTS:     Pt has stable flap checks . overnight pt was given 5 of Ambien ( home regimen is 10 mg) . On Ofirmev for breakthrough pain .       HISTORY  63 y/o male presents to [facility] for preoperative evaluation with dx of malignant neoplasm of mouth and mandible. 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 Pt presented to the Oral Surgeon a few weeks ago c/o chronic jaw discomfort. s/p biopsy and informed of recurrence of oral cancer. Now s/p resection andibular alvelous cancer with resection of floor of mouth, bilateral neck dissection, tracheostomy;, Resection of Right Mandible and reconstruction of mandible and floor of mouth with free fibula flap from right leg, skin graft from right thigh    Patient complaining of some pain postop but following all commands and denies any other symptoms.    ====================================================      Neurologic Medications:  acetaminophen    Suspension. 650 milliGRAM(s) Oral every 6 hours  gabapentin   Solution 300 milliGRAM(s) Oral three times a day  HYDROmorphone  Injectable 1 milliGRAM(s) IV Push every 4 hours PRN  oxyCODONE    Solution 5 milliGRAM(s) Oral every 4 hours PRN  oxyCODONE    Solution 10 milliGRAM(s) Oral every 4 hours PRN  zolpidem 5 milliGRAM(s) Oral at bedtime PRN    Respiratory Medications:    Cardiovascular Medications:    Gastrointestinal Medications:  lactated ringers. 1000 milliLiter(s) IV Continuous <Continuous>  sodium chloride 0.9% lock flush 3 milliLiter(s) IV Push every 8 hours    Genitourinary Medications:    Hematologic/Oncologic Medications:  aspirin  chewable 81 milliGRAM(s) Oral daily  heparin  Injectable 5000 Unit(s) SubCutaneous every 8 hours    Antimicrobials/Immunologic Medications:  ampicillin/sulbactam  IVPB 1.5 Gram(s) IV Intermittent every 6 hours    Endocrine/Metabolic Medications:    Topical/Other Medications:  =========================================================    T(C): 36.4 (04-28-18 @ 00:00), Max: 37.7 (04-27-18 @ 16:00)  HR: 82 (04-28-18 @ 01:00) (66 - 112)  BP: 111/52 (04-27-18 @ 20:00) (104/61 - 127/62)  BP(mean): 65 (04-27-18 @ 20:00) (65 - 78)  ABP: 115/53 (04-28-18 @ 01:00) (105/51 - 164/74)  ABP(mean): 71 (04-28-18 @ 01:00) (71 - 105)  RR: 14 (04-28-18 @ 01:00) (10 - 22)  SpO2: 99% (04-28-18 @ 01:00) (96% - 100%)  Wt(kg): --  CVP(mm Hg): --  CI: --  CAPILLARY BLOOD GLUCOSE       N/A      04-26 @ 07:01  -  04-27 @ 07:00  --------------------------------------------------------  IN:    IV PiggyBack: 300 mL    lactated ringers.: 1250 mL  Total IN: 1550 mL    OUT:    Bulb: 60 mL    Bulb: 150 mL    Indwelling Catheter - Urethral: 615 mL    VAC (Vacuum Assisted Closure) System: 150 mL  Total OUT: 975 mL    Total NET: 575 mL      04-27 @ 07:01  -  04-28 @ 02:55  --------------------------------------------------------  IN:    IV PiggyBack: 250 mL    lactated ringers.: 2000 mL    Oral Fluid: 25 mL    Pivot: 480 mL  Total IN: 2755 mL    OUT:    Bulb: 28 mL    Bulb: 81 mL    Indwelling Catheter - Urethral: 1560 mL    VAC (Vacuum Assisted Closure) System: 150 mL  Total OUT: 1819 mL    Total NET: 936 mL    ============================================================    NEURO  Exam: Following commands, motor/sensation intact in upper and lower extremities bilaterally    RESPIRATORY  Exam: unlabored, clear to auscultation bilaterally      CARDIOVASCULAR  Exam: RRR   Cardiac Rhythm: NS      GI/NUTRITION  Exam: soft nt nd  Diet: NPO    ACCESS DEVICES:  [x ] Peripheral IV  [ ] Central Venous Line	[ ] R	[ ] L	[ ] IJ	[ ] Fem	[ ] SC	Placed:   [x ] Arterial Line		[ ] R	[ ] L	[ ] Fem	[ ] Rad	[ ] Ax	Placed:   [ ] PICC:					[ ] Mediport  [x ] Urinary Catheter, Date Placed:   [x ] Necessity of urinary, arterial, and venous catheters discussed    OTHER MEDICATIONS:      CODE STATUS: Full    IMAGING:

## 2018-04-29 LAB
BASE EXCESS BLDA CALC-SCNC: 4.5 MMOL/L — SIGNIFICANT CHANGE UP
BUN SERPL-MCNC: 14 MG/DL — SIGNIFICANT CHANGE UP (ref 7–23)
CA-I BLD-SCNC: 1.03 MMOL/L — SIGNIFICANT CHANGE UP (ref 1.03–1.23)
CALCIUM SERPL-MCNC: 7.8 MG/DL — LOW (ref 8.4–10.5)
CHLORIDE SERPL-SCNC: 101 MMOL/L — SIGNIFICANT CHANGE UP (ref 98–107)
CO2 SERPL-SCNC: 25 MMOL/L — SIGNIFICANT CHANGE UP (ref 22–31)
CREAT SERPL-MCNC: 0.77 MG/DL — SIGNIFICANT CHANGE UP (ref 0.5–1.3)
GLUCOSE SERPL-MCNC: 149 MG/DL — HIGH (ref 70–99)
HCO3 BLDA-SCNC: 28 MMOL/L — HIGH (ref 22–26)
HCT VFR BLD CALC: 22.8 % — LOW (ref 39–50)
HGB BLD-MCNC: 7.3 G/DL — LOW (ref 13–17)
MAGNESIUM SERPL-MCNC: 2.2 MG/DL — SIGNIFICANT CHANGE UP (ref 1.6–2.6)
MCHC RBC-ENTMCNC: 30.8 PG — SIGNIFICANT CHANGE UP (ref 27–34)
MCHC RBC-ENTMCNC: 32 % — SIGNIFICANT CHANGE UP (ref 32–36)
MCV RBC AUTO: 96.2 FL — SIGNIFICANT CHANGE UP (ref 80–100)
NRBC # FLD: 0 — SIGNIFICANT CHANGE UP
PCO2 BLDA: 45 MMHG — SIGNIFICANT CHANGE UP (ref 35–48)
PH BLDA: 7.42 PH — SIGNIFICANT CHANGE UP (ref 7.35–7.45)
PHOSPHATE SERPL-MCNC: 1.6 MG/DL — LOW (ref 2.5–4.5)
PLATELET # BLD AUTO: 188 K/UL — SIGNIFICANT CHANGE UP (ref 150–400)
PMV BLD: 9.9 FL — SIGNIFICANT CHANGE UP (ref 7–13)
PO2 BLDA: 85 MMHG — SIGNIFICANT CHANGE UP (ref 83–108)
POTASSIUM SERPL-MCNC: 3.9 MMOL/L — SIGNIFICANT CHANGE UP (ref 3.5–5.3)
POTASSIUM SERPL-SCNC: 3.9 MMOL/L — SIGNIFICANT CHANGE UP (ref 3.5–5.3)
RBC # BLD: 2.37 M/UL — LOW (ref 4.2–5.8)
RBC # FLD: 14 % — SIGNIFICANT CHANGE UP (ref 10.3–14.5)
SAO2 % BLDA: 96.9 % — SIGNIFICANT CHANGE UP (ref 95–99)
SODIUM SERPL-SCNC: 138 MMOL/L — SIGNIFICANT CHANGE UP (ref 135–145)
WBC # BLD: 13.2 K/UL — HIGH (ref 3.8–10.5)
WBC # FLD AUTO: 13.2 K/UL — HIGH (ref 3.8–10.5)

## 2018-04-29 PROCEDURE — 99233 SBSQ HOSP IP/OBS HIGH 50: CPT | Mod: GC

## 2018-04-29 PROCEDURE — 71045 X-RAY EXAM CHEST 1 VIEW: CPT | Mod: 26

## 2018-04-29 RX ORDER — POTASSIUM PHOSPHATE, MONOBASIC POTASSIUM PHOSPHATE, DIBASIC 236; 224 MG/ML; MG/ML
15 INJECTION, SOLUTION INTRAVENOUS ONCE
Qty: 0 | Refills: 0 | Status: COMPLETED | OUTPATIENT
Start: 2018-04-29 | End: 2018-04-29

## 2018-04-29 RX ADMIN — Medication 1 MILLIGRAM(S): at 12:08

## 2018-04-29 RX ADMIN — POTASSIUM PHOSPHATE, MONOBASIC POTASSIUM PHOSPHATE, DIBASIC 62.5 MILLIMOLE(S): 236; 224 INJECTION, SOLUTION INTRAVENOUS at 06:11

## 2018-04-29 RX ADMIN — HYDROMORPHONE HYDROCHLORIDE 1 MILLIGRAM(S): 2 INJECTION INTRAMUSCULAR; INTRAVENOUS; SUBCUTANEOUS at 04:45

## 2018-04-29 RX ADMIN — HEPARIN SODIUM 5000 UNIT(S): 5000 INJECTION INTRAVENOUS; SUBCUTANEOUS at 15:13

## 2018-04-29 RX ADMIN — HYDROMORPHONE HYDROCHLORIDE 1 MILLIGRAM(S): 2 INJECTION INTRAMUSCULAR; INTRAVENOUS; SUBCUTANEOUS at 00:36

## 2018-04-29 RX ADMIN — Medication 81 MILLIGRAM(S): at 12:07

## 2018-04-29 RX ADMIN — HYDROMORPHONE HYDROCHLORIDE 4 MILLIGRAM(S): 2 INJECTION INTRAMUSCULAR; INTRAVENOUS; SUBCUTANEOUS at 17:37

## 2018-04-29 RX ADMIN — Medication 1 MILLIGRAM(S): at 17:30

## 2018-04-29 RX ADMIN — HYDROMORPHONE HYDROCHLORIDE 4 MILLIGRAM(S): 2 INJECTION INTRAMUSCULAR; INTRAVENOUS; SUBCUTANEOUS at 09:09

## 2018-04-29 RX ADMIN — HYDROMORPHONE HYDROCHLORIDE 2 MILLIGRAM(S): 2 INJECTION INTRAMUSCULAR; INTRAVENOUS; SUBCUTANEOUS at 06:55

## 2018-04-29 RX ADMIN — GABAPENTIN 300 MILLIGRAM(S): 400 CAPSULE ORAL at 06:10

## 2018-04-29 RX ADMIN — Medication 650 MILLIGRAM(S): at 12:07

## 2018-04-29 RX ADMIN — AMPICILLIN SODIUM AND SULBACTAM SODIUM 100 GRAM(S): 250; 125 INJECTION, POWDER, FOR SUSPENSION INTRAMUSCULAR; INTRAVENOUS at 12:07

## 2018-04-29 RX ADMIN — AMPICILLIN SODIUM AND SULBACTAM SODIUM 100 GRAM(S): 250; 125 INJECTION, POWDER, FOR SUSPENSION INTRAMUSCULAR; INTRAVENOUS at 17:19

## 2018-04-29 RX ADMIN — HYDROMORPHONE HYDROCHLORIDE 1 MILLIGRAM(S): 2 INJECTION INTRAMUSCULAR; INTRAVENOUS; SUBCUTANEOUS at 04:28

## 2018-04-29 RX ADMIN — GABAPENTIN 300 MILLIGRAM(S): 400 CAPSULE ORAL at 22:03

## 2018-04-29 RX ADMIN — ZOLPIDEM TARTRATE 5 MILLIGRAM(S): 10 TABLET ORAL at 22:03

## 2018-04-29 RX ADMIN — AMPICILLIN SODIUM AND SULBACTAM SODIUM 100 GRAM(S): 250; 125 INJECTION, POWDER, FOR SUSPENSION INTRAMUSCULAR; INTRAVENOUS at 06:10

## 2018-04-29 RX ADMIN — GABAPENTIN 300 MILLIGRAM(S): 400 CAPSULE ORAL at 15:13

## 2018-04-29 RX ADMIN — HYDROMORPHONE HYDROCHLORIDE 2 MILLIGRAM(S): 2 INJECTION INTRAMUSCULAR; INTRAVENOUS; SUBCUTANEOUS at 02:40

## 2018-04-29 RX ADMIN — SODIUM CHLORIDE 3 MILLILITER(S): 9 INJECTION INTRAMUSCULAR; INTRAVENOUS; SUBCUTANEOUS at 22:03

## 2018-04-29 RX ADMIN — HYDROMORPHONE HYDROCHLORIDE 4 MILLIGRAM(S): 2 INJECTION INTRAMUSCULAR; INTRAVENOUS; SUBCUTANEOUS at 09:39

## 2018-04-29 RX ADMIN — Medication 650 MILLIGRAM(S): at 06:10

## 2018-04-29 RX ADMIN — Medication 650 MILLIGRAM(S): at 00:35

## 2018-04-29 RX ADMIN — SODIUM CHLORIDE 3 MILLILITER(S): 9 INJECTION INTRAMUSCULAR; INTRAVENOUS; SUBCUTANEOUS at 14:50

## 2018-04-29 RX ADMIN — Medication 650 MILLIGRAM(S): at 00:30

## 2018-04-29 RX ADMIN — AMPICILLIN SODIUM AND SULBACTAM SODIUM 100 GRAM(S): 250; 125 INJECTION, POWDER, FOR SUSPENSION INTRAMUSCULAR; INTRAVENOUS at 00:35

## 2018-04-29 RX ADMIN — Medication 650 MILLIGRAM(S): at 05:45

## 2018-04-29 RX ADMIN — Medication 63.75 MILLIMOLE(S): at 10:08

## 2018-04-29 RX ADMIN — HEPARIN SODIUM 5000 UNIT(S): 5000 INJECTION INTRAVENOUS; SUBCUTANEOUS at 22:03

## 2018-04-29 RX ADMIN — HYDROMORPHONE HYDROCHLORIDE 4 MILLIGRAM(S): 2 INJECTION INTRAMUSCULAR; INTRAVENOUS; SUBCUTANEOUS at 17:11

## 2018-04-29 RX ADMIN — HYDROMORPHONE HYDROCHLORIDE 2 MILLIGRAM(S): 2 INJECTION INTRAMUSCULAR; INTRAVENOUS; SUBCUTANEOUS at 02:15

## 2018-04-29 RX ADMIN — HYDROMORPHONE HYDROCHLORIDE 1 MILLIGRAM(S): 2 INJECTION INTRAMUSCULAR; INTRAVENOUS; SUBCUTANEOUS at 00:28

## 2018-04-29 RX ADMIN — HYDROMORPHONE HYDROCHLORIDE 2 MILLIGRAM(S): 2 INJECTION INTRAMUSCULAR; INTRAVENOUS; SUBCUTANEOUS at 06:34

## 2018-04-29 RX ADMIN — SODIUM CHLORIDE 3 MILLILITER(S): 9 INJECTION INTRAMUSCULAR; INTRAVENOUS; SUBCUTANEOUS at 06:11

## 2018-04-29 RX ADMIN — Medication 0.5 MILLIGRAM(S): at 10:52

## 2018-04-29 RX ADMIN — HEPARIN SODIUM 5000 UNIT(S): 5000 INJECTION INTRAVENOUS; SUBCUTANEOUS at 06:10

## 2018-04-29 NOTE — PROGRESS NOTE ADULT - SUBJECTIVE AND OBJECTIVE BOX
SICU DAILY PROGRESS NOTE:  ====================================================  INTERVAL / OVERNIGHT EVENTS:     No acute events overnight.       HISTORY  61 y/o male presents to [facility] for preoperative evaluation with dx of malignant neoplasm of mouth and mandible. 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 Pt presented to the Oral Surgeon a few weeks ago c/o chronic jaw discomfort. s/p biopsy and informed of recurrence of oral cancer. Now s/p resection andibular alvelous cancer with resection of floor of mouth, bilateral neck dissection, tracheostomy;, Resection of Right Mandible and reconstruction of mandible and floor of mouth with free fibula flap from right leg, skin graft from right thigh    Patient complaining of some pain postop but following all commands and denies any other symptoms.    VITALS and I&O's  ====================================================  T(C): 37.9 (04-29-18 @ 00:00), Max: 37.9 (04-29-18 @ 00:00)  HR: 85 (04-29-18 @ 03:00) (80 - 104)  BP: 114/63 (04-29-18 @ 03:00) (114/63 - 125/71)  ABP: 143/72 (04-29-18 @ 03:00) (117/57 - 187/114)  ABP(mean): 95 (04-29-18 @ 03:00) (76 - 136)  RR: 12 (04-29-18 @ 03:00) (8 - 21)  SpO2: 97% (04-29-18 @ 03:00) (96% - 100%)  Wt(kg): --  CVP(mm Hg): --      04-27 @ 07:01  -  04-28 @ 07:00  --------------------------------------------------------  IN:    IV PiggyBack: 500 mL    lactated ringers.: 2875 mL    Oral Fluid: 25 mL    Pivot: 600 mL  Total IN: 4000 mL    OUT:    Bulb: 28 mL    Bulb: 81 mL    Indwelling Catheter - Urethral: 1960 mL    VAC (Vacuum Assisted Closure) System: 250 mL  Total OUT: 2319 mL    Total NET: 1681 mL      04-28 @ 07:01 - 04-29 @ 03:13  --------------------------------------------------------  IN:    IV PiggyBack: 250 mL    lactated ringers.: 375 mL    Pivot: 960 mL  Total IN: 1585 mL    OUT:    Bulb: 30 mL    Bulb: 45 mL    Indwelling Catheter - Urethral: 1580 mL    VAC (Vacuum Assisted Closure) System: 200 mL  Total OUT: 1855 mL    Total NET: -270 mL        Neurologic Medications:  acetaminophen    Suspension. 650 milliGRAM(s) Oral every 6 hours  gabapentin   Solution 300 milliGRAM(s) Oral three times a day  HYDROmorphone  Injectable 1 milliGRAM(s) IV Push every 4 hours PRN  oxyCODONE    Solution 5 milliGRAM(s) Oral every 4 hours PRN  oxyCODONE    Solution 10 milliGRAM(s) Oral every 4 hours PRN  zolpidem 5 milliGRAM(s) Oral at bedtime PRN    Respiratory Medications:    Cardiovascular Medications:    Gastrointestinal Medications:  lactated ringers. 1000 milliLiter(s) IV Continuous <Continuous>  sodium chloride 0.9% lock flush 3 milliLiter(s) IV Push every 8 hours    Genitourinary Medications:    Hematologic/Oncologic Medications:  aspirin  chewable 81 milliGRAM(s) Oral daily  heparin  Injectable 5000 Unit(s) SubCutaneous every 8 hours    Antimicrobials/Immunologic Medications:  ampicillin/sulbactam  IVPB 1.5 Gram(s) IV Intermittent every 6 hours    Endocrine/Metabolic Medications:    Topical/Other Medications:  =========================================================    EXAM:    NEURO  Exam: Following commands, motor/sensation intact in upper and lower extremities bilaterally    RESPIRATORY  Exam: unlabored, clear to auscultation bilaterally      CARDIOVASCULAR  Exam: RRR   Cardiac Rhythm: NS      GI/NUTRITION  Exam: soft nt nd  Diet: NPO w/ Tube feeds Pivot @ 60    ACCESS DEVICES:  [x ] Peripheral IV  [ ] Central Venous Line	[ ] R	[ ] L	[ ] IJ	[ ] Fem	[ ] SC	Placed:   [x ] Arterial Line		[ ] R	[ ] L	[ ] Fem	[ ] Rad	[ ] Ax	Placed:   [ ] PICC:					[ ] Mediport  [x ] Urinary Catheter, Date Placed:   [x ] Necessity of urinary, arterial, and venous catheters discussed    OTHER MEDICATIONS:      CODE STATUS: Full    LABS: SICU DAILY PROGRESS NOTE:  ====================================================  INTERVAL / OVERNIGHT EVENTS:     No acute events overnight.     HISTORY  63 y/o male presents to [facility] for preoperative evaluation with dx of malignant neoplasm of mouth and mandible. 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 Pt presented to the Oral Surgeon a few weeks ago c/o chronic jaw discomfort. s/p biopsy and informed of recurrence of oral cancer. Now s/p resection andibular alvelous cancer with resection of floor of mouth, bilateral neck dissection, tracheostomy;, Resection of Right Mandible and reconstruction of mandible and floor of mouth with free fibula flap from right leg, skin graft from right thigh    Patient complaining of some pain postop but following all commands and denies any other symptoms.    VITALS and I&O's  ====================================================  T(C): 37.9 (04-29-18 @ 00:00), Max: 37.9 (04-29-18 @ 00:00)  HR: 85 (04-29-18 @ 03:00) (80 - 104)  BP: 114/63 (04-29-18 @ 03:00) (114/63 - 125/71)  ABP: 143/72 (04-29-18 @ 03:00) (117/57 - 187/114)  ABP(mean): 95 (04-29-18 @ 03:00) (76 - 136)  RR: 12 (04-29-18 @ 03:00) (8 - 21)  SpO2: 97% (04-29-18 @ 03:00) (96% - 100%)  Wt(kg): --  CVP(mm Hg): --      04-27 @ 07:01  -  04-28 @ 07:00  --------------------------------------------------------  IN:    IV PiggyBack: 500 mL    lactated ringers.: 2875 mL    Oral Fluid: 25 mL    Pivot: 600 mL  Total IN: 4000 mL    OUT:    Bulb: 28 mL    Bulb: 81 mL    Indwelling Catheter - Urethral: 1960 mL    VAC (Vacuum Assisted Closure) System: 250 mL  Total OUT: 2319 mL    Total NET: 1681 mL      04-28 @ 07:01 - 04-29 @ 03:13  --------------------------------------------------------  IN:    IV PiggyBack: 250 mL    lactated ringers.: 375 mL    Pivot: 960 mL  Total IN: 1585 mL    OUT:    Bulb: 30 mL    Bulb: 45 mL    Indwelling Catheter - Urethral: 1580 mL    VAC (Vacuum Assisted Closure) System: 200 mL  Total OUT: 1855 mL    Total NET: -270 mL        Neurologic Medications:  acetaminophen    Suspension. 650 milliGRAM(s) Oral every 6 hours  gabapentin   Solution 300 milliGRAM(s) Oral three times a day  HYDROmorphone  Injectable 1 milliGRAM(s) IV Push every 4 hours PRN  oxyCODONE    Solution 5 milliGRAM(s) Oral every 4 hours PRN  oxyCODONE    Solution 10 milliGRAM(s) Oral every 4 hours PRN  zolpidem 5 milliGRAM(s) Oral at bedtime PRN    Respiratory Medications:    Cardiovascular Medications:    Gastrointestinal Medications:  lactated ringers. 1000 milliLiter(s) IV Continuous <Continuous>  sodium chloride 0.9% lock flush 3 milliLiter(s) IV Push every 8 hours    Genitourinary Medications:    Hematologic/Oncologic Medications:  aspirin  chewable 81 milliGRAM(s) Oral daily  heparin  Injectable 5000 Unit(s) SubCutaneous every 8 hours    Antimicrobials/Immunologic Medications:  ampicillin/sulbactam  IVPB 1.5 Gram(s) IV Intermittent every 6 hours    Endocrine/Metabolic Medications:    Topical/Other Medications:  =========================================================    EXAM:    NEURO  Exam: Following commands, motor/sensation intact in upper and lower extremities bilaterally    RESPIRATORY  Exam: unlabored, clear to auscultation bilaterally      CARDIOVASCULAR  Exam: RRR   Cardiac Rhythm: NS      GI/NUTRITION  Exam: soft nt nd  Diet: NPO w/ Tube feeds Pivot @ 60    ACCESS DEVICES:  [x ] Peripheral IV  [ ] Central Venous Line	[ ] R	[ ] L	[ ] IJ	[ ] Fem	[ ] SC	Placed:   [x ] Arterial Line		[ ] R	[ ] L	[ ] Fem	[ ] Rad	[ ] Ax	Placed:   [ ] PICC:					[ ] Mediport  [x ] Urinary Catheter, Date Placed:   [x ] Necessity of urinary, arterial, and venous catheters discussed    OTHER MEDICATIONS:      CODE STATUS: Full    LABS:

## 2018-04-29 NOTE — PROGRESS NOTE ADULT - ASSESSMENT
62 m s/p resection of floor of the mouth lesion , tracheostomy , b/l neck dissection , with fibular free flap , STSG  ( right thigh )     Neurologic: PRN pain control ,Gabapentin , Oxy , tylenol . Started on ambien at HS     Respiratory: continue trach collar     Cardiovascular: HD stable, monitor hemodynamics continuously, implanted doppler checks q1h    Gastrointestinal/Nutrition: NPO w/ tube feeds Pivot @ 60    Genitourinary/Renal: d/c dow   monitor lytes, replete as needed, monitor Is & Os      Hematologic: DVT PPX/ASA, transfuse Hb < 7    Infectious Disease: c/w unasyn drain prophylaxis    Endocrine: no acute issues, not diabetic 62 m s/p resection of floor of the mouth lesion , tracheostomy , b/l neck dissection , with fibular free flap , STSG  ( right thigh )     Neurologic: PRN pain control ,Gabapentin , Oxy , tylenol . Started on ambien at HS   significant anxiety - prn ativan    Respiratory: continue trach collar    Cardiovascular: HD stable, monitor hemodynamics continuously, implanted doppler checks q2h    Gastrointestinal/Nutrition: NPO w/ tube feeds Pivot @ 60    Genitourinary/Renal: no dow  monitor lytes, replete as needed, monitor Is & Os    Hematologic: DVT PPX/ASA, transfuse Hb < 7    Infectious Disease: c/w unasyn drain prophylaxis    Endocrine: no acute issues, not diabetic

## 2018-04-29 NOTE — PROGRESS NOTE ADULT - SUBJECTIVE AND OBJECTIVE BOX
Pt seen and examined at bedside in SICU.    No acute events overnight.    Vital signs reviewed in EMR  NAD, awake and alert   Breathing comfortably on TC   OC/OP: flap soft, warm, pink   +Bestowed doppler signal   Neck incision c/d/i with staples, no fluctuance   JPs in place with SS output   RLE neurovascularly intact  Drain with SS output    A/P: 62M POD3 s/p composite resection, b/l SND, trach, and RFFF recon  -routine trach care  -trach collar  -flap checks per PRS   -lovenox/asa   -unasyn   -NGT feeds  -pain control prn   -OOB to chair with assistance

## 2018-04-29 NOTE — PROGRESS NOTE ADULT - SUBJECTIVE AND OBJECTIVE BOX
CASSIE RAE  [id number]    Subjective:  Patient is a 62y old  Male who presents with a chief complaint of "I'm having the front part of my jaw removed" (23 Apr 2018 07:21). No acute events overnight, pain in leg improving. OOB to chair.     Vital Signs Last 24 Hrs  T(C): 37.3 (04-29-18 @ 04:00), Max: 37.9 (04-29-18 @ 00:00)  T(F): 99.1 (04-29-18 @ 04:00), Max: 100.2 (04-29-18 @ 00:00)  HR: 81 (04-29-18 @ 08:00) (81 - 104)  BP: 109/68 (04-29-18 @ 08:00) (109/68 - 125/71)  BP(mean): 77 (04-29-18 @ 08:00) (71 - 83)  RR: 11 (04-29-18 @ 08:00) (11 - 21)  SpO2: 99% (04-29-18 @ 08:00) (96% - 100%)  I&O's Detail    28 Apr 2018 07:01  -  29 Apr 2018 07:00  --------------------------------------------------------  IN:    IV PiggyBack: 550 mL    lactated ringers.: 375 mL    Pivot: 1020 mL  Total IN: 1945 mL    OUT:    Bulb: 50 mL    Bulb: 65 mL    Indwelling Catheter - Urethral: 1800 mL    VAC (Vacuum Assisted Closure) System: 200 mL  Total OUT: 2115 mL    Total NET: -170 mL      PHYSICAL EXAM:    General: NAD, resting comfortably in bed  HEENT: Intraoral flap viable, bright red bleeding on pinprick, strong arterial cook doppler signal, suture line intact, neck incision CDI, drain outputs serosanguinous  MSK: RLE vac in place, vac serosang                        7.3    13.20 )-----------( 188      ( 29 Apr 2018 03:20 )             22.8     29 Apr 2018 03:20    138    |  101    |  14     ----------------------------<  149    3.9     |  25     |  0.77     Ca    7.8        29 Apr 2018 03:20  Phos  1.6       29 Apr 2018 03:20  Mg     2.2       29 Apr 2018 03:20        PT/INR - ( 28 Apr 2018 02:40 )   PT: 11.4 SEC;   INR: 1.03          PTT - ( 28 Apr 2018 02:40 )  PTT:25.0 SEC  CAPILLARY BLOOD GLUCOSE      Assessment/Plan:  Patient is a 62y old  Male who presents with a chief complaint of "I'm having the front part of my jaw removed" (23 Apr 2018 07:21). Doing well, POD 3 from free fibula recon of mandible.   - Continue vac  - Continue q2h flap checks  - Continue drains  - DVT ppx  - ASA  - TF   -OOB

## 2018-04-30 LAB
BUN SERPL-MCNC: 13 MG/DL — SIGNIFICANT CHANGE UP (ref 7–23)
CA-I BLD-SCNC: 1.11 MMOL/L — SIGNIFICANT CHANGE UP (ref 1.03–1.23)
CALCIUM SERPL-MCNC: 8.3 MG/DL — LOW (ref 8.4–10.5)
CHLORIDE SERPL-SCNC: 100 MMOL/L — SIGNIFICANT CHANGE UP (ref 98–107)
CO2 SERPL-SCNC: 27 MMOL/L — SIGNIFICANT CHANGE UP (ref 22–31)
CREAT SERPL-MCNC: 0.83 MG/DL — SIGNIFICANT CHANGE UP (ref 0.5–1.3)
GLUCOSE SERPL-MCNC: 129 MG/DL — HIGH (ref 70–99)
HCT VFR BLD CALC: 24.5 % — LOW (ref 39–50)
HGB BLD-MCNC: 7.9 G/DL — LOW (ref 13–17)
MAGNESIUM SERPL-MCNC: 2.1 MG/DL — SIGNIFICANT CHANGE UP (ref 1.6–2.6)
MCHC RBC-ENTMCNC: 31 PG — SIGNIFICANT CHANGE UP (ref 27–34)
MCHC RBC-ENTMCNC: 32.2 % — SIGNIFICANT CHANGE UP (ref 32–36)
MCV RBC AUTO: 96.1 FL — SIGNIFICANT CHANGE UP (ref 80–100)
NRBC # FLD: 0.03 — SIGNIFICANT CHANGE UP
PHOSPHATE SERPL-MCNC: 2.5 MG/DL — SIGNIFICANT CHANGE UP (ref 2.5–4.5)
PLATELET # BLD AUTO: 242 K/UL — SIGNIFICANT CHANGE UP (ref 150–400)
PMV BLD: 9.8 FL — SIGNIFICANT CHANGE UP (ref 7–13)
POTASSIUM SERPL-MCNC: 4 MMOL/L — SIGNIFICANT CHANGE UP (ref 3.5–5.3)
POTASSIUM SERPL-SCNC: 4 MMOL/L — SIGNIFICANT CHANGE UP (ref 3.5–5.3)
RBC # BLD: 2.55 M/UL — LOW (ref 4.2–5.8)
RBC # FLD: 13.8 % — SIGNIFICANT CHANGE UP (ref 10.3–14.5)
SODIUM SERPL-SCNC: 137 MMOL/L — SIGNIFICANT CHANGE UP (ref 135–145)
WBC # BLD: 13.28 K/UL — HIGH (ref 3.8–10.5)
WBC # FLD AUTO: 13.28 K/UL — HIGH (ref 3.8–10.5)

## 2018-04-30 PROCEDURE — 71045 X-RAY EXAM CHEST 1 VIEW: CPT | Mod: 26

## 2018-04-30 PROCEDURE — 99233 SBSQ HOSP IP/OBS HIGH 50: CPT

## 2018-04-30 RX ORDER — METOPROLOL TARTRATE 50 MG
25 TABLET ORAL
Qty: 0 | Refills: 0 | Status: DISCONTINUED | OUTPATIENT
Start: 2018-04-30 | End: 2018-04-30

## 2018-04-30 RX ORDER — METOPROLOL TARTRATE 50 MG
25 TABLET ORAL
Qty: 0 | Refills: 0 | Status: DISCONTINUED | OUTPATIENT
Start: 2018-04-30 | End: 2018-05-07

## 2018-04-30 RX ADMIN — Medication 1 MILLIGRAM(S): at 16:04

## 2018-04-30 RX ADMIN — HYDROMORPHONE HYDROCHLORIDE 2 MILLIGRAM(S): 2 INJECTION INTRAMUSCULAR; INTRAVENOUS; SUBCUTANEOUS at 06:41

## 2018-04-30 RX ADMIN — GABAPENTIN 300 MILLIGRAM(S): 400 CAPSULE ORAL at 05:50

## 2018-04-30 RX ADMIN — AMPICILLIN SODIUM AND SULBACTAM SODIUM 100 GRAM(S): 250; 125 INJECTION, POWDER, FOR SUSPENSION INTRAMUSCULAR; INTRAVENOUS at 00:08

## 2018-04-30 RX ADMIN — HEPARIN SODIUM 5000 UNIT(S): 5000 INJECTION INTRAVENOUS; SUBCUTANEOUS at 21:08

## 2018-04-30 RX ADMIN — Medication 25 MILLIGRAM(S): at 17:01

## 2018-04-30 RX ADMIN — HEPARIN SODIUM 5000 UNIT(S): 5000 INJECTION INTRAVENOUS; SUBCUTANEOUS at 13:30

## 2018-04-30 RX ADMIN — Medication 1 MILLIGRAM(S): at 02:41

## 2018-04-30 RX ADMIN — AMPICILLIN SODIUM AND SULBACTAM SODIUM 100 GRAM(S): 250; 125 INJECTION, POWDER, FOR SUSPENSION INTRAMUSCULAR; INTRAVENOUS at 23:29

## 2018-04-30 RX ADMIN — HYDROMORPHONE HYDROCHLORIDE 2 MILLIGRAM(S): 2 INJECTION INTRAMUSCULAR; INTRAVENOUS; SUBCUTANEOUS at 22:00

## 2018-04-30 RX ADMIN — GABAPENTIN 300 MILLIGRAM(S): 400 CAPSULE ORAL at 21:08

## 2018-04-30 RX ADMIN — SODIUM CHLORIDE 3 MILLILITER(S): 9 INJECTION INTRAMUSCULAR; INTRAVENOUS; SUBCUTANEOUS at 13:17

## 2018-04-30 RX ADMIN — HYDROMORPHONE HYDROCHLORIDE 2 MILLIGRAM(S): 2 INJECTION INTRAMUSCULAR; INTRAVENOUS; SUBCUTANEOUS at 21:08

## 2018-04-30 RX ADMIN — HYDROMORPHONE HYDROCHLORIDE 2 MILLIGRAM(S): 2 INJECTION INTRAMUSCULAR; INTRAVENOUS; SUBCUTANEOUS at 06:56

## 2018-04-30 RX ADMIN — ZOLPIDEM TARTRATE 5 MILLIGRAM(S): 10 TABLET ORAL at 23:29

## 2018-04-30 RX ADMIN — HEPARIN SODIUM 5000 UNIT(S): 5000 INJECTION INTRAVENOUS; SUBCUTANEOUS at 05:50

## 2018-04-30 RX ADMIN — GABAPENTIN 300 MILLIGRAM(S): 400 CAPSULE ORAL at 13:30

## 2018-04-30 RX ADMIN — AMPICILLIN SODIUM AND SULBACTAM SODIUM 100 GRAM(S): 250; 125 INJECTION, POWDER, FOR SUSPENSION INTRAMUSCULAR; INTRAVENOUS at 11:15

## 2018-04-30 RX ADMIN — HYDROMORPHONE HYDROCHLORIDE 2 MILLIGRAM(S): 2 INJECTION INTRAMUSCULAR; INTRAVENOUS; SUBCUTANEOUS at 12:11

## 2018-04-30 RX ADMIN — HYDROMORPHONE HYDROCHLORIDE 1 MILLIGRAM(S): 2 INJECTION INTRAMUSCULAR; INTRAVENOUS; SUBCUTANEOUS at 13:34

## 2018-04-30 RX ADMIN — SODIUM CHLORIDE 3 MILLILITER(S): 9 INJECTION INTRAMUSCULAR; INTRAVENOUS; SUBCUTANEOUS at 05:50

## 2018-04-30 RX ADMIN — Medication 1 MILLIGRAM(S): at 08:41

## 2018-04-30 RX ADMIN — SODIUM CHLORIDE 3 MILLILITER(S): 9 INJECTION INTRAMUSCULAR; INTRAVENOUS; SUBCUTANEOUS at 21:19

## 2018-04-30 RX ADMIN — AMPICILLIN SODIUM AND SULBACTAM SODIUM 100 GRAM(S): 250; 125 INJECTION, POWDER, FOR SUSPENSION INTRAMUSCULAR; INTRAVENOUS at 05:50

## 2018-04-30 RX ADMIN — Medication 81 MILLIGRAM(S): at 11:15

## 2018-04-30 RX ADMIN — HYDROMORPHONE HYDROCHLORIDE 2 MILLIGRAM(S): 2 INJECTION INTRAMUSCULAR; INTRAVENOUS; SUBCUTANEOUS at 13:20

## 2018-04-30 RX ADMIN — HYDROMORPHONE HYDROCHLORIDE 2 MILLIGRAM(S): 2 INJECTION INTRAMUSCULAR; INTRAVENOUS; SUBCUTANEOUS at 16:11

## 2018-04-30 RX ADMIN — AMPICILLIN SODIUM AND SULBACTAM SODIUM 100 GRAM(S): 250; 125 INJECTION, POWDER, FOR SUSPENSION INTRAMUSCULAR; INTRAVENOUS at 17:01

## 2018-04-30 RX ADMIN — HYDROMORPHONE HYDROCHLORIDE 2 MILLIGRAM(S): 2 INJECTION INTRAMUSCULAR; INTRAVENOUS; SUBCUTANEOUS at 16:34

## 2018-04-30 RX ADMIN — HYDROMORPHONE HYDROCHLORIDE 1 MILLIGRAM(S): 2 INJECTION INTRAMUSCULAR; INTRAVENOUS; SUBCUTANEOUS at 13:21

## 2018-04-30 NOTE — PROGRESS NOTE ADULT - SUBJECTIVE AND OBJECTIVE BOX
SICU Daily Progress Note  =====================================================  HPI:  63 y/o male presents to PST for preoperative evaluation with dx of malignant neoplasm of mouth and mandible. 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 Pt presented to the Oral Surgeon a few weeks ago c/o chronic jaw discomfort. s/p biopsy and informed of recurrence of oral cancer. Now s/p resection andibular alvelous cancer with resection of floor of mouth, bilateral neck dissection, tracheostomy;, Resection of Right Mandible and reconstruction of mandible and floor of mouth with free fibula flap from right leg, skin graft from right thigh      POD # 		SICU Day #   ***    Interval/Overnight Events: Patient on trach collar. 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  H/O shoulder surgery: impingement left 2005, right 2007  Malignant neoplasm of mouth      MEDICATIONS:   --------------------------------------------------------------------------------------  Neurologic Medications  gabapentin   Solution 300 milliGRAM(s) Oral three times a day  HYDROmorphone   Tablet 2 milliGRAM(s) Oral every 4 hours PRN Moderate Pain (4 - 6)  HYDROmorphone   Tablet 4 milliGRAM(s) Oral every 4 hours PRN Severe Pain (7 - 10)  HYDROmorphone  Injectable 1 milliGRAM(s) IV Push every 4 hours PRN breakthrough  LORazepam   Injectable 1 milliGRAM(s) IV Push every 6 hours PRN Anxiety  zolpidem 5 milliGRAM(s) Oral at bedtime PRN Insomnia    Respiratory Medications    Cardiovascular Medications    Gastrointestinal Medications  sodium chloride 0.9% lock flush 3 milliLiter(s) IV Push every 8 hours    Genitourinary Medications    Hematologic/Oncologic Medications  aspirin  chewable 81 milliGRAM(s) Oral daily  heparin  Injectable 5000 Unit(s) SubCutaneous every 8 hours    Antimicrobial/Immunologic Medications  ampicillin/sulbactam  IVPB 1.5 Gram(s) IV Intermittent every 6 hours    Endocrine/Metabolic Medications    Topical/Other Medications    --------------------------------------------------------------------------------------    VITAL SIGNS, INS/OUTS (last 24 hours):  --------------------------------------------------------------------------------------  T(C): 37.2 (04-30-18 @ 00:00), Max: 37.3 (04-29-18 @ 04:00)  HR: 111 (04-30-18 @ 03:00) (81 - 198)  BP: 129/76 (04-30-18 @ 03:00) (109/68 - 157/78)  BP(mean): 87 (04-30-18 @ 03:00) (71 - 112)  ABP: --  ABP(mean): --  RR: 16 (04-30-18 @ 03:00) (11 - 19)  SpO2: 99% (04-30-18 @ 03:00) (89% - 100%)  Wt(kg): --  CVP(mm Hg): --  CI: --  CAPILLARY BLOOD GLUCOSE       N/A      04-28 @ 07:01  -  04-29 @ 07:00  --------------------------------------------------------  IN:    IV PiggyBack: 550 mL    lactated ringers.: 375 mL    Pivot: 1020 mL  Total IN: 1945 mL    OUT:    Bulb: 50 mL    Bulb: 65 mL    Indwelling Catheter - Urethral: 1800 mL    VAC (Vacuum Assisted Closure) System: 100 mL  Total OUT: 2015 mL    Total NET: -70 mL      04-29 @ 07:01  -  04-30 @ 03:40  --------------------------------------------------------  CBC (04-30 @ 02:30)                              7.9<L>                         13.28<H>  )----------------(  242        --    % Neutrophils, --    % Lymphocytes, ANC: --                                  24.5<L>  CBC (04-29 @ 03:20)                              7.3<L>                         13.20<H>  )----------------(  188        --    % Neutrophils, --    % Lymphocytes, ANC: --                                  22.8<L>    BMP (04-30 @ 02:30)             --      |  --      |  --    		Ca++ 1.11    Ca --                 ---------------------------------( --    		Mg --                 --      |  --      |  --    			Ph --      BMP (04-29 @ 03:20)             138     |  101     |  14    		Ca++ 1.03    Ca 7.8<L>             ---------------------------------( 149<H>		Mg 2.2                3.9     |  25      |  0.77  			Ph 1.6<L>          ABG (04-29 @ 03:20)     7.42 / 45 / 85 / 28<H> / 4.5 / 96.9%     Lactate:           IN:    Pivot: 720 mL  Total IN: 720 mL    OUT:    Indwelling Catheter - Urethral: 1780 mL  Total OUT: 1780 mL    Total NET: -1060 mL        --------------------------------------------------------------------------------------    EXAM:    NEURO  Exam: Following commands, motor/sensation intact in upper and lower extremities bilaterally    RESPIRATORY  Exam: unlabored, clear to auscultation bilaterally      CARDIOVASCULAR  Exam: RRR   Cardiac Rhythm: NS      GI/NUTRITION  Exam: soft nt nd  Diet: NPO w/ Tube feeds Pivot @ 60    ACCESS DEVICES:  [x ] Peripheral IV  [ ] Central Venous Line	[ ] R	[ ] L	[ ] IJ	[ ] Fem	[ ] SC	Placed:   [x ] Arterial Line		[ ] R	[ ] L	[ ] Fem	[ ] Rad	[ ] Ax	Placed:   [ ] PICC:					[ ] Mediport  [x ] Urinary Catheter, Date Placed:   [x ] Necessity of urinary, arterial, and venous catheters discussed    OTHER MEDICATIONS:      CODE STATUS: Full      Assessment and Plan:   · Assessment		  62 m s/p resection of floor of the mouth lesion , tracheostomy , b/l neck dissection , with fibular free flap , STSG  ( right thigh )     Neurologic: PRN pain control ,Gabapentin , Oxycodone , tylenol   significant anxiety - prn ativan    Respiratory: continue trach collar    Cardiovascular: HD stable, monitor hemodynamics continuously, implanted doppler checks q2h    Gastrointestinal/Nutrition: NPO w/ tube feeds Pivot @ 60    Genitourinary/Renal: no dow  monitor lytes, replete as needed, monitor Is & Os    Hematologic: DVT PPX/ASA, transfuse Hb < 7    Infectious Disease: c/w unasyn drain prophylaxis    Endocrine: no acute issues, not diabetic SICU Daily Progress Note  =====================================================  HPI:  61 y/o male presents to PST for preoperative evaluation with dx of malignant neoplasm of mouth and mandible. 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 Pt presented to the Oral Surgeon a few weeks ago c/o chronic jaw discomfort. s/p biopsy and informed of recurrence of oral cancer. Now s/p resection andibular alvelous cancer with resection of floor of mouth, bilateral neck dissection, tracheostomy;, Resection of Right Mandible and reconstruction of mandible and floor of mouth with free fibula flap from right leg, skin graft from right thigh    Interval/Overnight Events: Patient on trach collar. 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  H/O shoulder surgery: impingement left 2005, right 2007  Malignant neoplasm of mouth      MEDICATIONS:   --------------------------------------------------------------------------------------  Neurologic Medications  gabapentin   Solution 300 milliGRAM(s) Oral three times a day  HYDROmorphone   Tablet 2 milliGRAM(s) Oral every 4 hours PRN Moderate Pain (4 - 6)  HYDROmorphone   Tablet 4 milliGRAM(s) Oral every 4 hours PRN Severe Pain (7 - 10)  HYDROmorphone  Injectable 1 milliGRAM(s) IV Push every 4 hours PRN breakthrough  LORazepam   Injectable 1 milliGRAM(s) IV Push every 6 hours PRN Anxiety  zolpidem 5 milliGRAM(s) Oral at bedtime PRN Insomnia    Respiratory Medications    Cardiovascular Medications    Gastrointestinal Medications  sodium chloride 0.9% lock flush 3 milliLiter(s) IV Push every 8 hours    Genitourinary Medications    Hematologic/Oncologic Medications  aspirin  chewable 81 milliGRAM(s) Oral daily  heparin  Injectable 5000 Unit(s) SubCutaneous every 8 hours    Antimicrobial/Immunologic Medications  ampicillin/sulbactam  IVPB 1.5 Gram(s) IV Intermittent every 6 hours    Endocrine/Metabolic Medications    Topical/Other Medications    --------------------------------------------------------------------------------------    VITAL SIGNS, INS/OUTS (last 24 hours):  --------------------------------------------------------------------------------------  T(C): 37.2 (04-30-18 @ 00:00), Max: 37.3 (04-29-18 @ 04:00)  HR: 111 (04-30-18 @ 03:00) (81 - 198)  BP: 129/76 (04-30-18 @ 03:00) (109/68 - 157/78)  BP(mean): 87 (04-30-18 @ 03:00) (71 - 112)  ABP: --  ABP(mean): --  RR: 16 (04-30-18 @ 03:00) (11 - 19)  SpO2: 99% (04-30-18 @ 03:00) (89% - 100%)  Wt(kg): --  CVP(mm Hg): --  CI: --  CAPILLARY BLOOD GLUCOSE       N/A      04-28 @ 07:01  -  04-29 @ 07:00  --------------------------------------------------------  IN:    IV PiggyBack: 550 mL    lactated ringers.: 375 mL    Pivot: 1020 mL  Total IN: 1945 mL    OUT:    Bulb: 50 mL    Bulb: 65 mL    Indwelling Catheter - Urethral: 1800 mL    VAC (Vacuum Assisted Closure) System: 100 mL  Total OUT: 2015 mL    Total NET: -70 mL      04-29 @ 07:01  -  04-30 @ 03:40  --------------------------------------------------------  CBC (04-30 @ 02:30)                              7.9<L>                         13.28<H>  )----------------(  242        --    % Neutrophils, --    % Lymphocytes, ANC: --                                  24.5<L>  CBC (04-29 @ 03:20)                              7.3<L>                         13.20<H>  )----------------(  188        --    % Neutrophils, --    % Lymphocytes, ANC: --                                  22.8<L>    BMP (04-30 @ 02:30)             --      |  --      |  --    		Ca++ 1.11    Ca --                 ---------------------------------( --    		Mg --                 --      |  --      |  --    			Ph --      BMP (04-29 @ 03:20)             138     |  101     |  14    		Ca++ 1.03    Ca 7.8<L>             ---------------------------------( 149<H>		Mg 2.2                3.9     |  25      |  0.77  			Ph 1.6<L>          ABG (04-29 @ 03:20)     7.42 / 45 / 85 / 28<H> / 4.5 / 96.9%     Lactate:           IN:    Pivot: 720 mL  Total IN: 720 mL    OUT:    Indwelling Catheter - Urethral: 1780 mL  Total OUT: 1780 mL    Total NET: -1060 mL        --------------------------------------------------------------------------------------    EXAM:    NEURO  Exam: Following commands, motor/sensation intact in upper and lower extremities bilaterally    RESPIRATORY  Exam: unlabored, clear to auscultation bilaterally      CARDIOVASCULAR  Exam: RRR   Cardiac Rhythm: NS      GI/NUTRITION  Exam: soft nt nd  Diet: NPO w/ Tube feeds Pivot @ 60    ACCESS DEVICES:  [x ] Peripheral IV  [ ] Central Venous Line	[ ] R	[ ] L	[ ] IJ	[ ] Fem	[ ] SC	Placed:   [x ] Arterial Line		[ ] R	[ ] L	[ ] Fem	[ ] Rad	[ ] Ax	Placed:   [ ] PICC:					[ ] Mediport  [x ] Urinary Catheter, Date Placed:   [x ] Necessity of urinary, arterial, and venous catheters discussed    OTHER MEDICATIONS:      CODE STATUS: Full      Assessment and Plan:   · Assessment		  62 m s/p resection of floor of the mouth lesion , tracheostomy , b/l neck dissection , with fibular free flap , STSG  ( right thigh )     Neurologic: PRN pain control ,Gabapentin , Oxycodone , tylenol   anxiety - prn ativan    Respiratory: continue trach collar, good saturations    Cardiovascular: HD stable, monitor hemodynamics continuously, implanted doppler checks q4h    Gastrointestinal/Nutrition: NPO w/ tube feeds Pivot @ 60    Genitourinary/Renal: dow reinserted for urinary retention  monitor lytes, replete as needed, monitor Is & Os    Hematologic: DVT PPX HSQ, ASA, transfuse Hb < 7    Infectious Disease: c/w unasyn drain prophylaxis    Endocrine: no acute issues, not diabetic    dispo: floor  -----------------------------------------------------------------------------------  critical care diagnoses: fresh tracheostomy, intensive flap monitoring

## 2018-04-30 NOTE — PROGRESS NOTE ADULT - ASSESSMENT
Assessment/Plan:  62M s/p mandibulectomy with RLE Free fibula reconstruction POD 4  - Continue vac  - Continue q2h flap checks while in SICU; however, patient is eligible for floor transfer from Plastic Surgery perspective at which point the flap checks may be q4 hours.  Please confirm with ENT/OMFS whether floor transfer is appropriate prior to listing patient  - Continue drains  - DVT ppx  - ASA  - TF   -OOB with [facility] [place] PGY2

## 2018-04-30 NOTE — PROGRESS NOTE ADULT - SUBJECTIVE AND OBJECTIVE BOX
Seen and evaluated on AM rounds    No acute events overnight. Failed TOV yesterday and dow catheter reinserted    Vital signs reviewed in EMR  Tachycardic to low 100s overnight  NAD, awake and alert   Breathing comfortably on TC   OC/OP: flap soft, warm, intact  Strong arterial doppler signal  Neck incision c/d/i with staples, no fluctuance   JPs in place with SS output   RLE neurovascularly intact  Drain with SS output    A/P: 62M s/p composite resection, b/l SND, trach, and RFFF recon 4/26. Remains stable  -routine trach care  -trach collar  -flap checks per PRS   -lovenox/asa   -unasyn   -NGT feeds  -pain control prn   -OOB to chair with assistance  -OK for floor status when medically cleared by SICU

## 2018-04-30 NOTE — PROGRESS NOTE ADULT - SUBJECTIVE AND OBJECTIVE BOX
CASSIE HUMPHREYBRY  [id number]    Subjective:  C/o pain in RLE; dressing taken down with no abnormalities noted.      T(C): 37.2 (04-30-18 @ 04:00), Max: 37.2 (04-29-18 @ 16:00)  HR: 103 (04-30-18 @ 07:00) (81 - 198)  BP: 143/82 (04-30-18 @ 07:00) (109/68 - 157/78)  BP(mean): 96 (04-30-18 @ 07:00) (73 - 112)  ABP: --  ABP(mean): --  RR: 17 (04-30-18 @ 07:00) (11 - 18)  SpO2: 99% (04-30-18 @ 07:00) (89% - 100%)  Wt(kg): --  CVP(mm Hg): --  CI: --  CAPILLARY BLOOD GLUCOSE       N/A      04-29 @ 07:01  -  04-30 @ 07:00  --------------------------------------------------------  IN:    Pivot: 840 mL  Total IN: 840 mL    OUT:    Bulb: 10 mL    Bulb: 10 mL    Indwelling Catheter - Urethral: 2225 mL    VAC (Vacuum Assisted Closure) System: 20 mL  Total OUT: 2265 mL    Total NET: -1425 mL            PHYSICAL EXAM:    General: NAD, resting comfortably in bed  HEENT: Intraoral flap viable, bright red bleeding on pinprick, strong arterial cook doppler signal, suture line intact, neck incision CDI, drain outputs serosanguinous   MSK: RLE vac in place, holding suction.  Able to flex/extend toes  RLE STSG donor site with occlusive dressing in place with clot (as expected)

## 2018-04-30 NOTE — DIETITIAN INITIAL EVALUATION ADULT. - OTHER INFO
Pt seen for critical care LOS.  At this time, pt s/p surgery for malignant neoplasm of mouth and mandible with resection  FOM, b/l neck dissection and trache.  Pt tolerating TF at this time.  He states that he is not feeling hungry.  Pt denies food allergies, modified diet PTA or recent wt change.

## 2018-05-01 LAB
BUN SERPL-MCNC: 18 MG/DL — SIGNIFICANT CHANGE UP (ref 7–23)
CALCIUM SERPL-MCNC: 8.6 MG/DL — SIGNIFICANT CHANGE UP (ref 8.4–10.5)
CHLORIDE SERPL-SCNC: 96 MMOL/L — LOW (ref 98–107)
CO2 SERPL-SCNC: 25 MMOL/L — SIGNIFICANT CHANGE UP (ref 22–31)
CREAT SERPL-MCNC: 0.82 MG/DL — SIGNIFICANT CHANGE UP (ref 0.5–1.3)
GLUCOSE SERPL-MCNC: 121 MG/DL — HIGH (ref 70–99)
HCT VFR BLD CALC: 24.6 % — LOW (ref 39–50)
HGB BLD-MCNC: 8.2 G/DL — LOW (ref 13–17)
MAGNESIUM SERPL-MCNC: 2.1 MG/DL — SIGNIFICANT CHANGE UP (ref 1.6–2.6)
MCHC RBC-ENTMCNC: 30.9 PG — SIGNIFICANT CHANGE UP (ref 27–34)
MCHC RBC-ENTMCNC: 33.3 % — SIGNIFICANT CHANGE UP (ref 32–36)
MCV RBC AUTO: 92.8 FL — SIGNIFICANT CHANGE UP (ref 80–100)
NRBC # FLD: 0.06 — SIGNIFICANT CHANGE UP
PHOSPHATE SERPL-MCNC: 2.9 MG/DL — SIGNIFICANT CHANGE UP (ref 2.5–4.5)
PLATELET # BLD AUTO: 277 K/UL — SIGNIFICANT CHANGE UP (ref 150–400)
PMV BLD: 9.2 FL — SIGNIFICANT CHANGE UP (ref 7–13)
POTASSIUM SERPL-MCNC: 4.2 MMOL/L — SIGNIFICANT CHANGE UP (ref 3.5–5.3)
POTASSIUM SERPL-SCNC: 4.2 MMOL/L — SIGNIFICANT CHANGE UP (ref 3.5–5.3)
RBC # BLD: 2.65 M/UL — LOW (ref 4.2–5.8)
RBC # FLD: 13.6 % — SIGNIFICANT CHANGE UP (ref 10.3–14.5)
SODIUM SERPL-SCNC: 135 MMOL/L — SIGNIFICANT CHANGE UP (ref 135–145)
WBC # BLD: 12.94 K/UL — HIGH (ref 3.8–10.5)
WBC # FLD AUTO: 12.94 K/UL — HIGH (ref 3.8–10.5)

## 2018-05-01 RX ORDER — OXYCODONE AND ACETAMINOPHEN 5; 325 MG/1; MG/1
1 TABLET ORAL EVERY 4 HOURS
Qty: 0 | Refills: 0 | Status: DISCONTINUED | OUTPATIENT
Start: 2018-05-01 | End: 2018-05-03

## 2018-05-01 RX ORDER — ACETAMINOPHEN 500 MG
1000 TABLET ORAL ONCE
Qty: 0 | Refills: 0 | Status: COMPLETED | OUTPATIENT
Start: 2018-05-01 | End: 2018-05-01

## 2018-05-01 RX ORDER — ZOLPIDEM TARTRATE 10 MG/1
5 TABLET ORAL AT BEDTIME
Qty: 0 | Refills: 0 | Status: DISCONTINUED | OUTPATIENT
Start: 2018-05-02 | End: 2018-05-08

## 2018-05-01 RX ORDER — ZOLPIDEM TARTRATE 10 MG/1
5 TABLET ORAL ONCE
Qty: 0 | Refills: 0 | Status: DISCONTINUED | OUTPATIENT
Start: 2018-05-01 | End: 2018-05-01

## 2018-05-01 RX ORDER — ZOLPIDEM TARTRATE 10 MG/1
5 TABLET ORAL AT BEDTIME
Qty: 0 | Refills: 0 | Status: DISCONTINUED | OUTPATIENT
Start: 2018-05-01 | End: 2018-05-08

## 2018-05-01 RX ORDER — OXYCODONE AND ACETAMINOPHEN 5; 325 MG/1; MG/1
2 TABLET ORAL EVERY 4 HOURS
Qty: 0 | Refills: 0 | Status: DISCONTINUED | OUTPATIENT
Start: 2018-05-01 | End: 2018-05-03

## 2018-05-01 RX ADMIN — HYDROMORPHONE HYDROCHLORIDE 2 MILLIGRAM(S): 2 INJECTION INTRAMUSCULAR; INTRAVENOUS; SUBCUTANEOUS at 16:07

## 2018-05-01 RX ADMIN — HYDROMORPHONE HYDROCHLORIDE 1 MILLIGRAM(S): 2 INJECTION INTRAMUSCULAR; INTRAVENOUS; SUBCUTANEOUS at 20:58

## 2018-05-01 RX ADMIN — ZOLPIDEM TARTRATE 5 MILLIGRAM(S): 10 TABLET ORAL at 22:08

## 2018-05-01 RX ADMIN — HEPARIN SODIUM 5000 UNIT(S): 5000 INJECTION INTRAVENOUS; SUBCUTANEOUS at 13:46

## 2018-05-01 RX ADMIN — SODIUM CHLORIDE 3 MILLILITER(S): 9 INJECTION INTRAMUSCULAR; INTRAVENOUS; SUBCUTANEOUS at 05:51

## 2018-05-01 RX ADMIN — SODIUM CHLORIDE 3 MILLILITER(S): 9 INJECTION INTRAMUSCULAR; INTRAVENOUS; SUBCUTANEOUS at 22:05

## 2018-05-01 RX ADMIN — Medication 400 MILLIGRAM(S): at 23:09

## 2018-05-01 RX ADMIN — AMPICILLIN SODIUM AND SULBACTAM SODIUM 100 GRAM(S): 250; 125 INJECTION, POWDER, FOR SUSPENSION INTRAMUSCULAR; INTRAVENOUS at 05:47

## 2018-05-01 RX ADMIN — HYDROMORPHONE HYDROCHLORIDE 2 MILLIGRAM(S): 2 INJECTION INTRAMUSCULAR; INTRAVENOUS; SUBCUTANEOUS at 10:49

## 2018-05-01 RX ADMIN — GABAPENTIN 300 MILLIGRAM(S): 400 CAPSULE ORAL at 05:47

## 2018-05-01 RX ADMIN — HYDROMORPHONE HYDROCHLORIDE 2 MILLIGRAM(S): 2 INJECTION INTRAMUSCULAR; INTRAVENOUS; SUBCUTANEOUS at 20:07

## 2018-05-01 RX ADMIN — HYDROMORPHONE HYDROCHLORIDE 2 MILLIGRAM(S): 2 INJECTION INTRAMUSCULAR; INTRAVENOUS; SUBCUTANEOUS at 15:07

## 2018-05-01 RX ADMIN — SODIUM CHLORIDE 3 MILLILITER(S): 9 INJECTION INTRAMUSCULAR; INTRAVENOUS; SUBCUTANEOUS at 13:45

## 2018-05-01 RX ADMIN — HYDROMORPHONE HYDROCHLORIDE 1 MILLIGRAM(S): 2 INJECTION INTRAMUSCULAR; INTRAVENOUS; SUBCUTANEOUS at 21:13

## 2018-05-01 RX ADMIN — Medication 81 MILLIGRAM(S): at 12:28

## 2018-05-01 RX ADMIN — HYDROMORPHONE HYDROCHLORIDE 4 MILLIGRAM(S): 2 INJECTION INTRAMUSCULAR; INTRAVENOUS; SUBCUTANEOUS at 00:26

## 2018-05-01 RX ADMIN — HYDROMORPHONE HYDROCHLORIDE 2 MILLIGRAM(S): 2 INJECTION INTRAMUSCULAR; INTRAVENOUS; SUBCUTANEOUS at 09:49

## 2018-05-01 RX ADMIN — HYDROMORPHONE HYDROCHLORIDE 2 MILLIGRAM(S): 2 INJECTION INTRAMUSCULAR; INTRAVENOUS; SUBCUTANEOUS at 06:33

## 2018-05-01 RX ADMIN — AMPICILLIN SODIUM AND SULBACTAM SODIUM 100 GRAM(S): 250; 125 INJECTION, POWDER, FOR SUSPENSION INTRAMUSCULAR; INTRAVENOUS at 17:55

## 2018-05-01 RX ADMIN — GABAPENTIN 300 MILLIGRAM(S): 400 CAPSULE ORAL at 13:46

## 2018-05-01 RX ADMIN — HEPARIN SODIUM 5000 UNIT(S): 5000 INJECTION INTRAVENOUS; SUBCUTANEOUS at 21:01

## 2018-05-01 RX ADMIN — HYDROMORPHONE HYDROCHLORIDE 4 MILLIGRAM(S): 2 INJECTION INTRAMUSCULAR; INTRAVENOUS; SUBCUTANEOUS at 01:26

## 2018-05-01 RX ADMIN — HYDROMORPHONE HYDROCHLORIDE 1 MILLIGRAM(S): 2 INJECTION INTRAMUSCULAR; INTRAVENOUS; SUBCUTANEOUS at 16:35

## 2018-05-01 RX ADMIN — AMPICILLIN SODIUM AND SULBACTAM SODIUM 100 GRAM(S): 250; 125 INJECTION, POWDER, FOR SUSPENSION INTRAMUSCULAR; INTRAVENOUS at 12:28

## 2018-05-01 RX ADMIN — Medication 1000 MILLIGRAM(S): at 23:24

## 2018-05-01 RX ADMIN — HYDROMORPHONE HYDROCHLORIDE 1 MILLIGRAM(S): 2 INJECTION INTRAMUSCULAR; INTRAVENOUS; SUBCUTANEOUS at 16:20

## 2018-05-01 RX ADMIN — HYDROMORPHONE HYDROCHLORIDE 2 MILLIGRAM(S): 2 INJECTION INTRAMUSCULAR; INTRAVENOUS; SUBCUTANEOUS at 19:22

## 2018-05-01 RX ADMIN — HYDROMORPHONE HYDROCHLORIDE 2 MILLIGRAM(S): 2 INJECTION INTRAMUSCULAR; INTRAVENOUS; SUBCUTANEOUS at 05:47

## 2018-05-01 RX ADMIN — GABAPENTIN 300 MILLIGRAM(S): 400 CAPSULE ORAL at 21:01

## 2018-05-01 RX ADMIN — HEPARIN SODIUM 5000 UNIT(S): 5000 INJECTION INTRAVENOUS; SUBCUTANEOUS at 05:47

## 2018-05-01 RX ADMIN — Medication 25 MILLIGRAM(S): at 05:47

## 2018-05-01 RX ADMIN — Medication 25 MILLIGRAM(S): at 17:55

## 2018-05-01 RX ADMIN — ZOLPIDEM TARTRATE 5 MILLIGRAM(S): 10 TABLET ORAL at 23:10

## 2018-05-01 NOTE — PHYSICAL THERAPY INITIAL EVALUATION ADULT - DID THE PATIENT HAVE SURGERY?
yes/s/p Composite Resection Mandibular Alvelous Cancer with Resection of Floor of Mouth, Bilateral Neck Dissection, Tracheostomy; Yasir- Resection of Right Mandible and Placement of Reconstruction Plate, Possible Bone Graft

## 2018-05-01 NOTE — PHYSICAL THERAPY INITIAL EVALUATION ADULT - PERTINENT HX OF CURRENT PROBLEM, REHAB EVAL
61 y/o male presents to [facility] for preoperative evaluation with dx of malignant neoplasm of mouth and mandible. 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 Scheduled for Composite Resection Mandibular Alvelous Cancer with Resection of Floor of Mouth, Bilateral Neck Dissection, Tracheostomy; Yasir- Resection of Right Mandible and Placement of Reconstruction Plate, Possible Bone Graft on 4/26/2018. 63 y/o male presents to [facility] for preoperative evaluation with dx of malignant neoplasm of mouth and mandible. 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

## 2018-05-01 NOTE — PROGRESS NOTE ADULT - ASSESSMENT
Assessment/Plan:  62M s/p mandibulectomy with RLE Free fibula reconstruction POD 5  - Continue vac  -q4h flap checks  - Continue drains  - DVT ppx  - ASA  - TF   -OOB with CAM boot--no weight bearing restrictions

## 2018-05-01 NOTE — PHYSICAL THERAPY INITIAL EVALUATION ADULT - DISCHARGE DISPOSITION, PT EVAL
Nursing notes reviewed and accepted.    Moustapha Spivey is a 6 month old male who presents for 6 month well child exam. Patient presents with Mother and with Grandparent(s).    Concerns raised today include: none     Feeding: Oh Gentle  Solids: baby food and table food  Sleeping: parent's room and crib  Elimination:  Normal wet diapers and bowel movements.    SOCIAL:  Primary caretakers: mom, dad grandma  : family    DEVELOPMENT:  bears some weight on legs when held, rolls over, turns toward voice, imitates speech sounds, transfer object from hand-to-hand, cuddles and sits without support    Birth history, medical history, surgical history, and family history reviewed and updated.    PHYSICAL EXAM:  Height 29.25\" (74.3 cm), weight (!) 9.72 kg, head circumference 44.8 cm (17.64\").  GENERAL:  Well appearing male infant, nontoxic, no acute distress.  Alert and     interactive.  SKIN: Warm, normal turgor. No cyanosis. No bruises or lesions.  HEAD: Normocephalic, atraumatic. Anterior fontanel open, soft and flat.  EYES: Conjunctivae appear normal, non-injected, non-icteric.  NOSE: Appears normal, no flaring.  EARS: Normal pinnae, no preauricular skin tags or pit. Tympanic membranes are transparent with good landmarks.  THROAT:  Oropharynx with moist mucous membranes and no lesions.  NECK:  Supple, no lymphadenopathy or masses.  HEART:  Regular rate and rhythm.  Quiet precordium.  Normal S1, S2.  No murmurs, rubs, gallops. Equal femoral pulses.  LUNGS:  Clear to auscultation bilaterally.  No wheezes, rales, rhonchi.  Normal work of breathing.  ABDOMEN:  Soft, nontender.  No organomegaly or masses.  GENITOURINARY:  David 1 male and Testes descended bilaterally.   MUSCULOSKELETAL:  Hips within normal range of motion. Negative Garcia, Ortolani. Spine straight. Normal sacrum.  EXTREMITIES:  Warm, dry, without abnormalities.  NEUROLOGIC:  Normal tone, bulk, strength.    ASSESSMENT:  6 month old male well  infant.    PLAN:    All parental concerns and questions discussed.    Anticipatory guidance provided, handout given.              Development              Starting solids              Accident prevention: scalding, falls, small toys, smothering              Phase out bottle              Analgesics/antipyretics              Sun exposure              Tobacco-free home              Dental care              Lead exposure risk: none              Vitamin D supplementation if breast feeding              Fluoride supplementation discussed  Immunizations per orders.  Counseling given for each component including the   risks, benefits and possible side effects.     Missed 4 mo appointment, received those vaccines at this visit. Will need Pediarix, Prevnar at 9 mo visit.    Return to clinic for 9 month Well Child Exam or sooner prn illness/concerns.    Lucius Park MD    home w/ home PT

## 2018-05-01 NOTE — PHYSICAL THERAPY INITIAL EVALUATION ADULT - ACTIVE RANGE OF MOTION EXAMINATION, REHAB EVAL
bilateral lower extremity Active ROM was WNL (within normal limits)/huy. upper extremity Active ROM was WNL (within normal limits)

## 2018-05-01 NOTE — PHYSICAL THERAPY INITIAL EVALUATION ADULT - GENERAL OBSERVATIONS, REHAB EVAL
Pt encountered in semisupine position, no distress Axox4, with +IV, +trach to wall humidifer, +right LE wrapped in ace bandage dry/intact, +wound Vac, +NGT, +BEKAH drains, and +cardiac monitor

## 2018-05-01 NOTE — PROGRESS NOTE ADULT - SUBJECTIVE AND OBJECTIVE BOX
CASSIE RAE  [id number]    Subjective:  Transferred to floor, no acute events.  around RLE.     Vital Signs Last 24 Hrs  T(C): 37.5 (05-01-18 @ 05:42), Max: 38 (04-30-18 @ 12:00)  T(F): 99.5 (05-01-18 @ 05:42), Max: 100.4 (04-30-18 @ 12:00)  HR: 95 (05-01-18 @ 05:42) (90 - 119)  BP: 133/86 (05-01-18 @ 05:42) (124/82 - 155/84)  BP(mean): 84 (04-30-18 @ 14:00) (84 - 99)  RR: 20 (05-01-18 @ 05:42) (14 - 21)  SpO2: 99% (05-01-18 @ 05:42) (94% - 100%)  I&O's Detail    30 Apr 2018 07:01  -  01 May 2018 07:00  --------------------------------------------------------  IN:    IV PiggyBack: 100 mL    Pivot: 540 mL  Total IN: 640 mL    OUT:    Bulb: 20 mL    Bulb: 10.5 mL    Indwelling Catheter - Urethral: 2515 mL  Total OUT: 2545.5 mL    Total NET: -1905.5 mL      PHYSICAL EXAM:    General: NAD, resting comfortably in bed  HEENT: Intraoral flap viable, bright red bleeding on pinprick, strong arterial cook doppler signal, suture line intact, neck incision CDI, drain outputs serosanguinous   MSK: RLE vac in place, holding suction.  Able to flex/extend toes  RLE STSG donor site with occlusive dressing in place with clot (as expected)

## 2018-05-01 NOTE — PHYSICAL THERAPY INITIAL EVALUATION ADULT - DIAGNOSIS, PT EVAL
Pt s/p Composite Resection Mandibular Alvelous Cancer with Resection of Floor of Mouth, Bilateral Neck Dissection, Tracheostomy; Yasir- Resection of Right Mandible and Placement of Reconstruction Plate, Possible Bone Graft on 04/24/2018; pt presents with decreased strength, decreased balance, and antalgic gait

## 2018-05-01 NOTE — PHYSICAL THERAPY INITIAL EVALUATION ADULT - PATIENT PROFILE REVIEW, REHAB EVAL
No Formal Activity Order in  computer; Spoke with RN Lexii Ugalde prior to PT evaluation--> Pt OK for PT consult/OOB activity/yes

## 2018-05-01 NOTE — PROGRESS NOTE ADULT - SUBJECTIVE AND OBJECTIVE BOX
Seen and evaluated   No acute events overnight. foely removed at 4am.    Vital signs reviewed in EMR  Tachycardic to low 100s overnight  NAD, awake and alert   Breathing comfortably on TC   OC/OP: flap soft, warm, intact  Strong arterial doppler signal  Neck incision c/d/i with staples, no fluctuance   neck JPs in place with SS output, R 10.5cc/L 20cc  RLE neurovascularly intact  wound vac 0cc    A/P: 62M s/p composite resection, b/l SND, trach, and RFFF recon 4/26. Remains stable  -routine trach care  -trach collar  -flap checks per PRS   -lovenox/asa   -unasyn   -tov 12pm  -bolus NGT feeds, free water  -PT eval today   -pain control prn   -OOB to chair with assistance  -OK for floor status when medically cleared by SICU

## 2018-05-01 NOTE — PHYSICAL THERAPY INITIAL EVALUATION ADULT - ADDITIONAL COMMENTS
Pt reports that he lives in a private house with son and brother with ~3 FARIHA; (+)handrails; bedroom/bathroom on first floor. Prior to hospital admission pt was completely independent and used no assistive device with ambulation. Pt denies any recent falls.    Pt left comfortable in chair, NAD, all lines intact, all precautions maintained, with call bell in reach, and RN are of PT session/pt in chair.

## 2018-05-02 ENCOUNTER — TRANSCRIPTION ENCOUNTER (OUTPATIENT)
Age: 63
End: 2018-05-02

## 2018-05-02 DIAGNOSIS — Z51.5 ENCOUNTER FOR PALLIATIVE CARE: ICD-10-CM

## 2018-05-02 DIAGNOSIS — K59.00 CONSTIPATION, UNSPECIFIED: ICD-10-CM

## 2018-05-02 DIAGNOSIS — C06.9 MALIGNANT NEOPLASM OF MOUTH, UNSPECIFIED: ICD-10-CM

## 2018-05-02 DIAGNOSIS — G89.3 NEOPLASM RELATED PAIN (ACUTE) (CHRONIC): ICD-10-CM

## 2018-05-02 LAB
BUN SERPL-MCNC: 23 MG/DL — SIGNIFICANT CHANGE UP (ref 7–23)
CALCIUM SERPL-MCNC: 8.6 MG/DL — SIGNIFICANT CHANGE UP (ref 8.4–10.5)
CHLORIDE SERPL-SCNC: 97 MMOL/L — LOW (ref 98–107)
CO2 SERPL-SCNC: 24 MMOL/L — SIGNIFICANT CHANGE UP (ref 22–31)
CREAT SERPL-MCNC: 0.81 MG/DL — SIGNIFICANT CHANGE UP (ref 0.5–1.3)
GLUCOSE SERPL-MCNC: 105 MG/DL — HIGH (ref 70–99)
HCT VFR BLD CALC: 25.6 % — LOW (ref 39–50)
HGB BLD-MCNC: 8.2 G/DL — LOW (ref 13–17)
MAGNESIUM SERPL-MCNC: 2.3 MG/DL — SIGNIFICANT CHANGE UP (ref 1.6–2.6)
MCHC RBC-ENTMCNC: 30.3 PG — SIGNIFICANT CHANGE UP (ref 27–34)
MCHC RBC-ENTMCNC: 32 % — SIGNIFICANT CHANGE UP (ref 32–36)
MCV RBC AUTO: 94.5 FL — SIGNIFICANT CHANGE UP (ref 80–100)
NRBC # FLD: 0.09 — SIGNIFICANT CHANGE UP
PHOSPHATE SERPL-MCNC: 3.4 MG/DL — SIGNIFICANT CHANGE UP (ref 2.5–4.5)
PLATELET # BLD AUTO: 338 K/UL — SIGNIFICANT CHANGE UP (ref 150–400)
PMV BLD: 9.4 FL — SIGNIFICANT CHANGE UP (ref 7–13)
POTASSIUM SERPL-MCNC: 3.8 MMOL/L — SIGNIFICANT CHANGE UP (ref 3.5–5.3)
POTASSIUM SERPL-SCNC: 3.8 MMOL/L — SIGNIFICANT CHANGE UP (ref 3.5–5.3)
RBC # BLD: 2.71 M/UL — LOW (ref 4.2–5.8)
RBC # FLD: 13.7 % — SIGNIFICANT CHANGE UP (ref 10.3–14.5)
SODIUM SERPL-SCNC: 135 MMOL/L — SIGNIFICANT CHANGE UP (ref 135–145)
WBC # BLD: 13.64 K/UL — HIGH (ref 3.8–10.5)
WBC # FLD AUTO: 13.64 K/UL — HIGH (ref 3.8–10.5)

## 2018-05-02 PROCEDURE — 99223 1ST HOSP IP/OBS HIGH 75: CPT | Mod: GC

## 2018-05-02 RX ORDER — DOCUSATE SODIUM 100 MG
100 CAPSULE ORAL THREE TIMES A DAY
Qty: 0 | Refills: 0 | Status: DISCONTINUED | OUTPATIENT
Start: 2018-05-02 | End: 2018-05-07

## 2018-05-02 RX ORDER — METHADONE HYDROCHLORIDE 40 MG/1
2.5 TABLET ORAL EVERY 12 HOURS
Qty: 0 | Refills: 0 | Status: DISCONTINUED | OUTPATIENT
Start: 2018-05-02 | End: 2018-05-03

## 2018-05-02 RX ORDER — POLYETHYLENE GLYCOL 3350 17 G/17G
17 POWDER, FOR SOLUTION ORAL ONCE
Qty: 0 | Refills: 0 | Status: COMPLETED | OUTPATIENT
Start: 2018-05-02 | End: 2018-05-02

## 2018-05-02 RX ORDER — TAMSULOSIN HYDROCHLORIDE 0.4 MG/1
0.8 CAPSULE ORAL AT BEDTIME
Qty: 0 | Refills: 0 | Status: DISCONTINUED | OUTPATIENT
Start: 2018-05-02 | End: 2018-05-02

## 2018-05-02 RX ORDER — SENNA PLUS 8.6 MG/1
10 TABLET ORAL AT BEDTIME
Qty: 0 | Refills: 0 | Status: DISCONTINUED | OUTPATIENT
Start: 2018-05-02 | End: 2018-05-07

## 2018-05-02 RX ORDER — DOXAZOSIN MESYLATE 4 MG
1 TABLET ORAL AT BEDTIME
Qty: 0 | Refills: 0 | Status: DISCONTINUED | OUTPATIENT
Start: 2018-05-02 | End: 2018-05-07

## 2018-05-02 RX ADMIN — Medication 81 MILLIGRAM(S): at 12:15

## 2018-05-02 RX ADMIN — AMPICILLIN SODIUM AND SULBACTAM SODIUM 100 GRAM(S): 250; 125 INJECTION, POWDER, FOR SUSPENSION INTRAMUSCULAR; INTRAVENOUS at 07:01

## 2018-05-02 RX ADMIN — HYDROMORPHONE HYDROCHLORIDE 1 MILLIGRAM(S): 2 INJECTION INTRAMUSCULAR; INTRAVENOUS; SUBCUTANEOUS at 05:47

## 2018-05-02 RX ADMIN — AMPICILLIN SODIUM AND SULBACTAM SODIUM 100 GRAM(S): 250; 125 INJECTION, POWDER, FOR SUSPENSION INTRAMUSCULAR; INTRAVENOUS at 01:02

## 2018-05-02 RX ADMIN — HYDROMORPHONE HYDROCHLORIDE 1 MILLIGRAM(S): 2 INJECTION INTRAMUSCULAR; INTRAVENOUS; SUBCUTANEOUS at 06:03

## 2018-05-02 RX ADMIN — METHADONE HYDROCHLORIDE 2.5 MILLIGRAM(S): 40 TABLET ORAL at 18:53

## 2018-05-02 RX ADMIN — OXYCODONE AND ACETAMINOPHEN 2 TABLET(S): 5; 325 TABLET ORAL at 10:22

## 2018-05-02 RX ADMIN — Medication 100 MILLIGRAM(S): at 14:22

## 2018-05-02 RX ADMIN — OXYCODONE AND ACETAMINOPHEN 2 TABLET(S): 5; 325 TABLET ORAL at 09:21

## 2018-05-02 RX ADMIN — HEPARIN SODIUM 5000 UNIT(S): 5000 INJECTION INTRAVENOUS; SUBCUTANEOUS at 14:22

## 2018-05-02 RX ADMIN — HYDROMORPHONE HYDROCHLORIDE 1 MILLIGRAM(S): 2 INJECTION INTRAMUSCULAR; INTRAVENOUS; SUBCUTANEOUS at 12:45

## 2018-05-02 RX ADMIN — GABAPENTIN 300 MILLIGRAM(S): 400 CAPSULE ORAL at 07:12

## 2018-05-02 RX ADMIN — HYDROMORPHONE HYDROCHLORIDE 1 MILLIGRAM(S): 2 INJECTION INTRAMUSCULAR; INTRAVENOUS; SUBCUTANEOUS at 12:15

## 2018-05-02 RX ADMIN — ZOLPIDEM TARTRATE 5 MILLIGRAM(S): 10 TABLET ORAL at 22:59

## 2018-05-02 RX ADMIN — AMPICILLIN SODIUM AND SULBACTAM SODIUM 100 GRAM(S): 250; 125 INJECTION, POWDER, FOR SUSPENSION INTRAMUSCULAR; INTRAVENOUS at 12:15

## 2018-05-02 RX ADMIN — SODIUM CHLORIDE 3 MILLILITER(S): 9 INJECTION INTRAMUSCULAR; INTRAVENOUS; SUBCUTANEOUS at 21:24

## 2018-05-02 RX ADMIN — Medication 25 MILLIGRAM(S): at 18:53

## 2018-05-02 RX ADMIN — SODIUM CHLORIDE 3 MILLILITER(S): 9 INJECTION INTRAMUSCULAR; INTRAVENOUS; SUBCUTANEOUS at 07:12

## 2018-05-02 RX ADMIN — Medication 10 MILLIGRAM(S): at 12:49

## 2018-05-02 RX ADMIN — SODIUM CHLORIDE 3 MILLILITER(S): 9 INJECTION INTRAMUSCULAR; INTRAVENOUS; SUBCUTANEOUS at 14:22

## 2018-05-02 RX ADMIN — AMPICILLIN SODIUM AND SULBACTAM SODIUM 100 GRAM(S): 250; 125 INJECTION, POWDER, FOR SUSPENSION INTRAMUSCULAR; INTRAVENOUS at 18:53

## 2018-05-02 RX ADMIN — HEPARIN SODIUM 5000 UNIT(S): 5000 INJECTION INTRAVENOUS; SUBCUTANEOUS at 21:28

## 2018-05-02 RX ADMIN — GABAPENTIN 300 MILLIGRAM(S): 400 CAPSULE ORAL at 21:28

## 2018-05-02 RX ADMIN — Medication 25 MILLIGRAM(S): at 07:12

## 2018-05-02 RX ADMIN — Medication 100 MILLIGRAM(S): at 07:09

## 2018-05-02 RX ADMIN — POLYETHYLENE GLYCOL 3350 17 GRAM(S): 17 POWDER, FOR SOLUTION ORAL at 07:10

## 2018-05-02 RX ADMIN — Medication 1 MILLIGRAM(S): at 22:59

## 2018-05-02 RX ADMIN — OXYCODONE AND ACETAMINOPHEN 2 TABLET(S): 5; 325 TABLET ORAL at 21:29

## 2018-05-02 RX ADMIN — GABAPENTIN 300 MILLIGRAM(S): 400 CAPSULE ORAL at 14:22

## 2018-05-02 RX ADMIN — HEPARIN SODIUM 5000 UNIT(S): 5000 INJECTION INTRAVENOUS; SUBCUTANEOUS at 07:12

## 2018-05-02 RX ADMIN — OXYCODONE AND ACETAMINOPHEN 2 TABLET(S): 5; 325 TABLET ORAL at 16:00

## 2018-05-02 RX ADMIN — Medication 100 MILLIGRAM(S): at 21:28

## 2018-05-02 RX ADMIN — OXYCODONE AND ACETAMINOPHEN 2 TABLET(S): 5; 325 TABLET ORAL at 17:00

## 2018-05-02 RX ADMIN — OXYCODONE AND ACETAMINOPHEN 2 TABLET(S): 5; 325 TABLET ORAL at 22:29

## 2018-05-02 RX ADMIN — Medication 1 ENEMA: at 15:00

## 2018-05-02 NOTE — PROGRESS NOTE ADULT - SUBJECTIVE AND OBJECTIVE BOX
CASSIE RAE  [id number]    Subjective:  No acute events. OOB yesterday.    Vital Signs Last 24 Hrs  T(C): 37 (05-02-18 @ 06:55), Max: 37.2 (05-01-18 @ 21:00)  T(F): 98.6 (05-02-18 @ 06:55), Max: 99 (05-01-18 @ 21:00)  HR: 93 (05-02-18 @ 06:55) (79 - 100)  BP: 123/80 (05-02-18 @ 06:55) (111/66 - 133/77)  BP(mean): --  RR: 18 (05-02-18 @ 06:55) (17 - 20)  SpO2: 97% (05-02-18 @ 06:55) (96% - 100%)  I&O's Detail    01 May 2018 07:01  -  02 May 2018 07:00  --------------------------------------------------------  IN:    Free Water: 690 mL    IV PiggyBack: 50 mL    Pivot: 500 mL  Total IN: 1240 mL    OUT:    Bulb: 5 mL    Bulb: 7.5 mL    Intermittent Catheterization - Urethral: 2200 mL  Total OUT: 2212.5 mL    Total NET: -972.5 mL      PHYSICAL EXAM:    General: NAD, resting comfortably in bed  HEENT: Intraoral flap viable, bright red bleeding on pinprick, strong arterial cook doppler signal, suture line intact, neck incision CDI, drain outputs serosanguinous, edema dependent  MSK: RLE vac in place, holding suction.  Able to flex/extend toes  RLE STSG donor site with occlusive dressing in place with clot (as expected)

## 2018-05-02 NOTE — DISCHARGE NOTE ADULT - MEDICATION SUMMARY - MEDICATIONS TO TAKE
I will START or STAY ON the medications listed below when I get home from the hospital:    oxyCODONE-acetaminophen 5 mg-325 mg oral tablet  -- 2 tab(s) by mouth every 4 hours, As needed, Severe Pain (7 - 10) MDD:6  -- Indication: For Pain, neoplasm-related    aspirin 81 mg oral tablet, chewable  -- 1 tab(s) by mouth once a day  -- Indication: For blood thinner for flap    gabapentin 300 mg oral capsule  -- 1 cap(s) by mouth 3 times a day MDD:3  -- It is very important that you take or use this exactly as directed.  Do not skip doses or discontinue unless directed by your doctor.  May cause drowsiness.  Alcohol may intensify this effect.  Use care when operating dangerous machinery.    -- Indication: For Pain, neoplasm-related    chlorhexidine 0.12% mucous membrane liquid  -- 15 milliliter(s) by mouth 2 times a day   -- Indication: For oral care    Ambien 10 mg oral tablet  -- 1 tab(s) by mouth once a day (at bedtime), As Needed MDD:1  -- Indication: For insomnia    docusate sodium 10 mg/mL oral liquid  -- 10 milliliter(s) by mouth 3 times a day  -- Indication: For Constipation    senna 8.8 mg/5 mL oral syrup  -- 10 milliliter(s) by mouth once a day (at bedtime)  -- Indication: For Constipation    pentoxifylline 400 mg oral tablet, extended release  -- 1 tab(s) by mouth 2 times a day  -- Indication: For outpt med    vitamin E 400 intl units oral capsule  -- 1 cap(s) by mouth once a day. last dose 4/23/2018  -- Indication: For Supplement

## 2018-05-02 NOTE — DISCHARGE NOTE ADULT - CARE PLAN
Principal Discharge DX:	Malignant neoplasm of mouth  Goal:	Composite mandibulectomy  Assessment and plan of treatment:	same

## 2018-05-02 NOTE — DISCHARGE NOTE ADULT - CARE PROVIDER_API CALL
Krystian Hdz), Otolaryngology  [address] [place] [address]  Phone: [phone]  Fax: [phone] Krystian Hdz), Otolaryngology  [address] [place] [address]  Phone: [phone]  Fax: [phone]    Iam Vasquez), Surgery  [facility]  [address] [facility]  [address]  [place] [address]  Phone: [phone]  Fax: [phone]

## 2018-05-02 NOTE — PROGRESS NOTE ADULT - SUBJECTIVE AND OBJECTIVE BOX
Pt seen and examined at bedside. No acute events overnight. Pt continues to have urinary retention requiring straight cath. Pt with issues with pain control overnight.     NAD, awake and alert   Breathing comfortably on TC   OC/OP: flap soft, warm, intact  Strong arterial doppler signal  Neck incision c/d/i with staples, no fluctuance   neck JPs in place with SS output  RLE neurovascularly intact    A/P: 62M s/p composite resection, b/l SND, trach, and RFFF recon 4/26. Remains stable  -f/u TOV today, may need replacement of dow  -will consider pain consult   -routine trach care  -trach collar  -flap checks per PRS   -lovenox/asa   -unasyn   -tov 12pm  -bolus NGT feeds, free water  -PT: home with services    -pain control prn   -OOB to chair with assistance

## 2018-05-02 NOTE — DISCHARGE NOTE ADULT - HOME CARE AGENCY
[place] [phone]  the nurse will call you the day after discharge to make arrangements to see you in your home.

## 2018-05-02 NOTE — DISCHARGE NOTE ADULT - PATIENT PORTAL LINK FT
You can access the [facility] Patient Portal, offered by [facility], by registering with the following website: [email or web address]

## 2018-05-02 NOTE — PROGRESS NOTE ADULT - ASSESSMENT
Assessment/Plan:  62M s/p mandibulectomy with RLE Free fibula reconstruction POD 6  - Continue vac  -q4h flap checks  - Continue drains  - DVT ppx  - ASA  - TF   -OOB with CAM boot--no weight bearing restrictions

## 2018-05-02 NOTE — CONSULT NOTE ADULT - PROBLEM SELECTOR RECOMMENDATION 2
Pt currently on vargas 300mg TID and percocet 1-2 tabs PRN moderate to severe pain. Pt reports modest relief with percocet. Will add Methadone 2.5mg BID based on previous 24 hour pain requirements. EKG checked. QTc WNL. Continue percocet 1-2 tabs PRN as above.

## 2018-05-02 NOTE — CONSULT NOTE ADULT - SUBJECTIVE AND OBJECTIVE BOX
HPI:  63 y/o male presents to [facility] for preoperative evaluation with dx of malignant neoplasm of mouth and mandible. 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 Pt presented to the Oral Surgeon a few weeks ago c/o chronic jaw discomfort. s/p biopsy and informed of recurrence of oral cancer. Scheduled for Composite Resection Mandibular Alvelous Cancer with Resection of Floor of Mouth, Bilateral Neck Dissection, Tracheostomy; Yasir- Resection of Right Mandible and Placement of Reconstruction Plate, Possible Bone Graft on 4/26/2018. (23 Apr 2018 07:21)      PERTINENT PMH REVIEWED:  [x ] YES [ ] NO           SOCIAL HISTORY:  Significant other/partner:  [ ] YES  [x ] NO            Children:  [x ] YES-2 sons  [ ] NO                   Jain/Spirituality:  Subtance hx:  [ ] YES   [x ] NO           Tobacco hx:  [ x] YES-former  [ ] NO             Alcohol hx: [ x] YES-social   [ ] NO        Home Opiod hx:  [ ] YES  [ x] NO   Living Situation: [x ] Home  [ ] Long term care  [ ] Rehab    REFERRALS:   [ ] Chaplaincy  [ ] Hospice  [ ] Child Life  [ ] Social Work  [ ] Case management [ ] Holistic Therapy     FAMILY HISTORY:  Family history of lung cancer (Father)    [x ] Family history non contributory     BASELINE ADLs (prior to admission):  Independent [ ] moderately [x ] fully   Dependent   [ ] moderately [ ] fully    ADVANCE DIRECTIVES:  [ ] YES [x ] NO   DNR [ ] YES [x ] NO                      MOLST  [ ] YES [x ] NO    Living Will  [ ] YES [x ] NO    Health Care Proxy [ ] YES  [x ] NO      [ x] Surrogate -son Evan                             Phone#:    Allergies    No Known Allergies    Intolerances    MEDICATIONS  (STANDING):  ampicillin/sulbactam  IVPB 1.5 Gram(s) IV Intermittent every 6 hours  aspirin  chewable 81 milliGRAM(s) Oral daily  docusate sodium Liquid 100 milliGRAM(s) Enteral Tube three times a day  gabapentin   Solution 300 milliGRAM(s) Oral three times a day  heparin  Injectable 5000 Unit(s) SubCutaneous every 8 hours  methadone   Solution 2.5 milliGRAM(s) Enteral Tube every 12 hours  metoprolol tartrate 25 milliGRAM(s) Oral two times a day  senna Syrup 10 milliLiter(s) Oral at bedtime  sodium chloride 0.9% lock flush 3 milliLiter(s) IV Push every 8 hours    MEDICATIONS  (PRN):  HYDROmorphone  Injectable 1 milliGRAM(s) IV Push every 4 hours PRN breakthrough  oxyCODONE    5 mG/acetaminophen 325 mG 1 Tablet(s) Oral/Enteral Tube every 4 hours PRN Moderate Pain (4 - 6)  oxyCODONE    5 mG/acetaminophen 325 mG 2 Tablet(s) Oral/Enteral Tube every 4 hours PRN Severe Pain (7 - 10)  zolpidem 5 milliGRAM(s) Oral at bedtime PRN Insomnia  zolpidem 5 milliGRAM(s) Oral at bedtime PRN Insomnia      PRESENT SYMPTOMS:  Source: [x ] Patient   [ x] Family   [ ] Team     Pain: [x ] YES [ ] NO  OLDCARTS: since surgery has pain to R lower leg over site of fibular resection, constant, aching, relived by PO pain meds. Also with pain to BL neck and jaw since surgery, constant aching, throbbing, relieved by PO pain meds.     Dyspnea: [ ] YES [x ] NO   Anxiety: [x ] YES-hx of anxiety, which he states is worsened by pain and lack of control he feels with pain medication [ ] NO  Fatigue: [x ] YES [ ] NO   Nausea: [ ] YES [ ] NO  Loss of appetite: [ ] YES [ ] NO   Constipation: [ ] YES [ ] NO     Other Symptoms:  [ ] All other review of systems negative   [ ] Unable to obtain due to poor mentation     Karnofsky Performance Score/Palliative Performance Status Version 2:         %  Protein Calorie Malutrition:  [ ] Mild   [ ] Moderate   [ ] Severe     Vital Signs Last 24 Hrs  T(C): 36.9 (02 May 2018 10:02), Max: 37.2 (01 May 2018 21:00)  T(F): 98.5 (02 May 2018 10:02), Max: 99 (01 May 2018 21:00)  HR: 88 (02 May 2018 10:02) (79 - 100)  BP: 117/73 (02 May 2018 10:02) (111/66 - 133/77)  BP(mean): --  RR: 18 (02 May 2018 10:02) (17 - 20)  SpO2: 100% (02 May 2018 10:02) (96% - 100%)    Physical Exam:    General: [ ] Alert,  A&O x     [ ] lethargic   [ ] Agitated   [ ] Cachexia   HEENT: [ ] Normal   [ ] Dry mouth   [ ] ET Tube    [ ] Trach   Lungs: [ ] Clear [ ] Rhonchi  [ ] Crackles [ ] Wheezing [ ] Tachypnea  [ ] Audible excessive secretions   Cardiovascular:  [ ] Regular rate and rhythm  [ ] Irregular [ ] Tachycardia   [ ] Bradycardia   Abdomen: [ ] Soft  [ ] Distended  [ ]  [ ] +BS  [ ] Non tender [ ] Tender  [ ]PEG   [ ] NGT   Last BM:     Genitourinary: [ ] Normal [ ] Incontinent   [ ] Oliguria/Anuria   [ ] Sprague  Musculoskeletal:  [ ] Normal   [ ] Generalized weakness  [ ] Bedbound   Neurological: [ ] No focal deficits  [ ] Cognitive impairment     Skin: [ ] Normal   [ ] Pressure ulcers     LABS:                        8.2    13.64 )-----------( 338      ( 02 May 2018 05:30 )             25.6     05-02    135  |  97<L>  |  23  ----------------------------<  105<H>  3.8   |  24  |  0.81    Ca    8.6      02 May 2018 05:30  Phos  3.4     05-02  Mg     2.3     05-02          I&O's Summary    01 May 2018 07:01  -  02 May 2018 07:00  --------------------------------------------------------  IN: 1240 mL / OUT: 2212.5 mL / NET: -972.5 mL    02 May 2018 07:01  -  02 May 2018 10:59  --------------------------------------------------------  IN: 0 mL / OUT: 7.5 mL / NET: -7.5 mL        RADIOLOGY & ADDITIONAL STUDIES: HPI:  61 y/o male presents to [facility] for preoperative evaluation with dx of malignant neoplasm of mouth and mandible. 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 Pt presented to the Oral Surgeon a few weeks ago c/o chronic jaw discomfort. s/p biopsy and informed of recurrence of oral cancer. Scheduled for Composite Resection Mandibular Alvelous Cancer with Resection of Floor of Mouth, Bilateral Neck Dissection, Tracheostomy; Yasir- Resection of Right Mandible and Placement of Reconstruction Plate, Possible Bone Graft on 4/26/2018. (23 Apr 2018 07:21)      PERTINENT PMH REVIEWED:  [x ] YES [ ] NO           SOCIAL HISTORY:  Significant other/partner:  [ ] YES  [x ] NO            Children:  [x ] YES-2 sons  [ ] NO                   Sabianism/Spirituality:  Subtance hx:  [ ] YES   [x ] NO           Tobacco hx:  [ x] YES-former  [ ] NO             Alcohol hx: [ x] YES-social   [ ] NO        Home Opiod hx:  [ ] YES  [ x] NO   Living Situation: [x ] Home  [ ] Long term care  [ ] Rehab    REFERRALS:   [ ] Chaplaincy  [ ] Hospice  [ ] Child Life  [ ] Social Work  [ ] Case management [ ] Holistic Therapy     FAMILY HISTORY:  Family history of lung cancer (Father)    [x ] Family history non contributory     BASELINE ADLs (prior to admission):  Independent [ ] moderately [x ] fully   Dependent   [ ] moderately [ ] fully    ADVANCE DIRECTIVES:  [ ] YES [x ] NO   DNR [ ] YES [x ] NO                      MOLST  [ ] YES [x ] NO    Living Will  [ ] YES [x ] NO    Health Care Proxy [ ] YES  [x ] NO      [ x] Surrogate -son Evan                             Phone#:    Allergies    No Known Allergies    Intolerances    MEDICATIONS  (STANDING):  ampicillin/sulbactam  IVPB 1.5 Gram(s) IV Intermittent every 6 hours  aspirin  chewable 81 milliGRAM(s) Oral daily  docusate sodium Liquid 100 milliGRAM(s) Enteral Tube three times a day  gabapentin   Solution 300 milliGRAM(s) Oral three times a day  heparin  Injectable 5000 Unit(s) SubCutaneous every 8 hours  methadone   Solution 2.5 milliGRAM(s) Enteral Tube every 12 hours  metoprolol tartrate 25 milliGRAM(s) Oral two times a day  senna Syrup 10 milliLiter(s) Oral at bedtime  sodium chloride 0.9% lock flush 3 milliLiter(s) IV Push every 8 hours    MEDICATIONS  (PRN):  HYDROmorphone  Injectable 1 milliGRAM(s) IV Push every 4 hours PRN breakthrough  oxyCODONE    5 mG/acetaminophen 325 mG 1 Tablet(s) Oral/Enteral Tube every 4 hours PRN Moderate Pain (4 - 6)  oxyCODONE    5 mG/acetaminophen 325 mG 2 Tablet(s) Oral/Enteral Tube every 4 hours PRN Severe Pain (7 - 10)  zolpidem 5 milliGRAM(s) Oral at bedtime PRN Insomnia  zolpidem 5 milliGRAM(s) Oral at bedtime PRN Insomnia      PRESENT SYMPTOMS:  Source: [x ] Patient   [ x] Family   [ ] Team     Pain: [x ] YES [ ] NO  OLDCARTS: since surgery has pain to R lower leg over site of fibular resection, constant, aching, relived by PO pain meds. Also with pain to BL neck and jaw since surgery, constant aching, throbbing, relieved by PO pain meds.     Dyspnea: [ ] YES [x ] NO   Anxiety: [x ] YES-hx of anxiety, which he states is worsened by pain and lack of control he feels with pain medication [ ] NO  Fatigue: [x ] YES [ ] NO   Nausea: [ ] YES [x ] NO  Loss of appetite: [x ] YES [ ] NO   Constipation: [ ] YES [x ] NO     Other Symptoms:  [x ] All other review of systems negative   [ ] Unable to obtain due to poor mentation     Karnofsky Performance Score/Palliative Performance Status Version 2:   60      %  Protein Calorie Malutrition:  [ ] Mild   [ ] Moderate   [ ] Severe     Vital Signs Last 24 Hrs  T(C): 36.9 (02 May 2018 10:02), Max: 37.2 (01 May 2018 21:00)  T(F): 98.5 (02 May 2018 10:02), Max: 99 (01 May 2018 21:00)  HR: 88 (02 May 2018 10:02) (79 - 100)  BP: 117/73 (02 May 2018 10:02) (111/66 - 133/77)  BP(mean): --  RR: 18 (02 May 2018 10:02) (17 - 20)  SpO2: 100% (02 May 2018 10:02) (96% - 100%)    Physical Exam:    General: [x ] Alert,  A&O x 3    [ ] lethargic   [ ] Agitated   [ ] Cachexia   HEENT: [ ] Normal   [ ] Dry mouth   [ ] ET Tube    [x ] Trach [x] staple line across neck CDI, BL neck drains, oral swelling  Lungs: [x ] Clear [ ] Rhonchi  [ ] Crackles [ ] Wheezing [ ] Tachypnea  [ ] Audible excessive secretions   Cardiovascular:  [ x] Regular rate and rhythm  [ ] Irregular [ ] Tachycardia   [ ] Bradycardia   Abdomen: [x ] Soft  [ ] Distended  [ x] +BS  [x ] Non tender [ ] Tender  [ ]PEG   [x ] NGT   Last BM:  4/29   Genitourinary: [x ] Normal [ ] Incontinent   [ ] Oliguria/Anuria   [ ] Sprague  Musculoskeletal:  [ ] Normal   [ ] Generalized weakness  [ ] Bedbound [x] R thigh wound CDI, R lateral LE wound CDI w ace wrap in place   Neurological: [x ] No focal deficits  [ ] Cognitive impairment     Skin: [x ] Normal   [ ] Pressure ulcers     LABS:                        8.2    13.64 )-----------( 338      ( 02 May 2018 05:30 )             25.6     05-02    135  |  97<L>  |  23  ----------------------------<  105<H>  3.8   |  24  |  0.81    Ca    8.6      02 May 2018 05:30  Phos  3.4     05-02  Mg     2.3     05-02          I&O's Summary    01 May 2018 07:01  -  02 May 2018 07:00  --------------------------------------------------------  IN: 1240 mL / OUT: 2212.5 mL / NET: -972.5 mL    02 May 2018 07:01  -  02 May 2018 10:59  --------------------------------------------------------  IN: 0 mL / OUT: 7.5 mL / NET: -7.5 mL        RADIOLOGY & ADDITIONAL STUDIES:  < from: CT Mandible No Cont (04.07.18 @ 09:00) >    EXAM:  CT MANDIBLE    PROCEDURE DATE:  04/07/2018        INTERPRETATION:  INDICATIONS:  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      IMPRESSION:    Bone erosion in the region of the mental symphysis involving the buccal   cortex and surrounding the medial incisor tooth roots strongly suspicious   for direct mandibular tumor invasion. Osteoradionecrosis is a possibility.    Metallic foreign body adjacent to the left zygomatic arch.      LTI RENNER M.D., ATTENDING RADIOLOGIST  This document has been electronically signed. Apr 10 2018 11:09AM        < from: CT Neck Soft Tissue w/ IV Cont (03.22.18 @ 10:14) >    EXAM:  CT NECK SOFT TISSUE IC        PROCEDURE DATE:  03/22/2018         IMPRESSION:    New lytic bony destruction involves theanterior superior aspect of the   mandibular symphysis in the midline, involving the roots for both central   incisor teeth. This corresponds to the area of abnormal uptake on the   prior PET/CT study, consistent with tumor.    No evidence for enlarged cervical lymph nodes.      RADHA SCHMIDT M.D., ATTENDING RADIOLOGIST  This document has been electronically signed. Mar 22 2018 10:35AM        < from: NM PET/CT Onc FDG Skull to Thigh, Subsq (03.02.18 @ 12:41) >    EXAM:  PETCT SK-THI ONC FDG SUBS        PROCEDURE DATE:  03/02/2018        COMPARISON:  PET/CT January 29, 2017    OTHER STUDIES USED FOR CORRELATION: None    IMPRESSION:      Since PET/CT January 27, 2017:    1. New FDG avidity in the anterior mandibular buccal region with   associated irregularity of the underlying mandibular cortex, probably   represents recurrent biopsy-proven squamous cell carcinoma. Consider thin   section CTscan to assess extent of osseous involvement.    2. Unchanged size of left level Ia lymph node, with minimal FDG avidity,   nonspecific, not definitive for metastasis.    3. No evidence of distant metastatic disease.                   AKIRA STOCK M.D., ATTENDING RADIOLOGIST  This document has been electronically signed. Mar  2 2018  5:34PM                < end of copied text >      12.   The specimen is received in formalin and the specimen  container is labeled: Tooth #20.  It consists of a fragment of  tooth.  Gross examination only.  No tissue submitted.    In addition to other data that may appear on the specimen  containers, all labels have been inspected to confirm the  presence of the patient's name and date of birth.    TK 04/28/18 18:46 (04.26.18 @ 09:22) HPI:  63 y/o male presents to [facility] for preoperative evaluation with dx of malignant neoplasm of mouth and mandible. 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 Pt presented to the Oral Surgeon a few weeks ago c/o chronic jaw discomfort. s/p biopsy and informed of recurrence of oral cancer. Scheduled for Composite Resection Mandibular Alvelous Cancer with Resection of Floor of Mouth, Bilateral Neck Dissection, Tracheostomy; Yasir- Resection of Right Mandible and Placement of Reconstruction Plate, Possible Bone Graft on 4/26/2018. (23 Apr 2018 07:21)      PERTINENT PMH REVIEWED:  [x ] YES [ ] NO           SOCIAL HISTORY:  Significant other/partner:  [ ] YES  [x ] NO            Children:  [x ] YES-2 sons  [ ] NO                   Amish/Spirituality:  Subtance hx:  [ ] YES   [x ] NO           Tobacco hx:  [ x] YES-former  [ ] NO             Alcohol hx: [ x] YES-social   [ ] NO        Home Opiod hx:  [ ] YES  [ x] NO   Living Situation: [x ] Home  [ ] Long term care  [ ] Rehab    REFERRALS:   [ ] Chaplaincy  [ ] Hospice  [ ] Child Life  [ ] Social Work  [ ] Case management [ ] Holistic Therapy     FAMILY HISTORY:  Family history of lung cancer (Father)    [x ] Family history non contributory     BASELINE ADLs (prior to admission):  Independent [ ] moderately [x ] fully   Dependent   [ ] moderately [ ] fully    ADVANCE DIRECTIVES:  [ ] YES [x ] NO   DNR [ ] YES [x ] NO                      MOLST  [ ] YES [x ] NO    Living Will  [ ] YES [x ] NO    Health Care Proxy [ ] YES  [x ] NO      [ x] Surrogate -son Evan                             Phone#:    Allergies    No Known Allergies    Intolerances    MEDICATIONS  (STANDING):  ampicillin/sulbactam  IVPB 1.5 Gram(s) IV Intermittent every 6 hours  aspirin  chewable 81 milliGRAM(s) Oral daily  docusate sodium Liquid 100 milliGRAM(s) Enteral Tube three times a day  gabapentin   Solution 300 milliGRAM(s) Oral three times a day  heparin  Injectable 5000 Unit(s) SubCutaneous every 8 hours  methadone   Solution 2.5 milliGRAM(s) Enteral Tube every 12 hours  metoprolol tartrate 25 milliGRAM(s) Oral two times a day  senna Syrup 10 milliLiter(s) Oral at bedtime  sodium chloride 0.9% lock flush 3 milliLiter(s) IV Push every 8 hours    MEDICATIONS  (PRN):  HYDROmorphone  Injectable 1 milliGRAM(s) IV Push every 4 hours PRN breakthrough  oxyCODONE    5 mG/acetaminophen 325 mG 1 Tablet(s) Oral/Enteral Tube every 4 hours PRN Moderate Pain (4 - 6)  oxyCODONE    5 mG/acetaminophen 325 mG 2 Tablet(s) Oral/Enteral Tube every 4 hours PRN Severe Pain (7 - 10)  zolpidem 5 milliGRAM(s) Oral at bedtime PRN Insomnia  zolpidem 5 milliGRAM(s) Oral at bedtime PRN Insomnia      PRESENT SYMPTOMS:  Source: [x ] Patient   [ x] Family   [ ] Team     Pain: [x ] YES [ ] NO  OLDCARTS: since surgery has pain to R lower leg over site of fibular resection, constant, aching, relived by PO pain meds. Also with pain to BL neck and jaw since surgery, constant aching, throbbing, relieved by PO pain meds.     Dyspnea: [ ] YES [x ] NO   Anxiety: [x ] YES-hx of anxiety, which he states is worsened by pain and lack of control he feels with pain medication [ ] NO  Fatigue: [x ] YES [ ] NO   Nausea: [ ] YES [x ] NO  Loss of appetite: [x ] YES [ ] NO   Constipation: [ ] YES [x ] NO     Other Symptoms:  [x ] All other review of systems negative   [ ] Unable to obtain due to poor mentation     Karnofsky Performance Score/Palliative Performance Status Version 2:   60      %  Protein Calorie Malutrition:  [ ] Mild   [ ] Moderate   [ ] Severe     Vital Signs Last 24 Hrs  T(C): 36.9 (02 May 2018 10:02), Max: 37.2 (01 May 2018 21:00)  T(F): 98.5 (02 May 2018 10:02), Max: 99 (01 May 2018 21:00)  HR: 88 (02 May 2018 10:02) (79 - 100)  BP: 117/73 (02 May 2018 10:02) (111/66 - 133/77)  BP(mean): --  RR: 18 (02 May 2018 10:02) (17 - 20)  SpO2: 100% (02 May 2018 10:02) (96% - 100%)    Physical Exam:    General: [x ] Alert,  A&O x 3    [ ] lethargic   [ ] Agitated   [ ] Cachexia   HEENT: [ ] Normal   [ ] Dry mouth   [ ] ET Tube    [x ] Trach [x] staple line across neck CDI, BL neck drains, oral swelling  Lungs: [x ] Clear [ ] Rhonchi  [ ] Crackles [ ] Wheezing [ ] Tachypnea  [ ] Audible excessive secretions   Cardiovascular:  [ x] Regular rate and rhythm  [ ] Irregular [ ] Tachycardia   [ ] Bradycardia   Abdomen: [x ] Soft  [ ] Distended  [ x] +BS  [x ] Non tender [ ] Tender  [ ]PEG   [x ] NGT   Last BM:  4/29   Genitourinary: [x ] Normal [ ] Incontinent   [ ] Oliguria/Anuria   [ ] Sprague  Musculoskeletal:  [ ] Normal   [ ] Generalized weakness  [ ] Bedbound [x] R thigh wound CDI, R lateral LE wound CDI w ace wrap in place   Neurological: [x ] No focal deficits  [ ] Cognitive impairment     Skin: [x ] Normal   [ ] Pressure ulcers     LABS:                        8.2    13.64 )-----------( 338      ( 02 May 2018 05:30 )             25.6     05-02    135  |  97<L>  |  23  ----------------------------<  105<H>  3.8   |  24  |  0.81    Ca    8.6      02 May 2018 05:30  Phos  3.4     05-02  Mg     2.3     05-02          I&O's Summary    01 May 2018 07:01  -  02 May 2018 07:00  --------------------------------------------------------  IN: 1240 mL / OUT: 2212.5 mL / NET: -972.5 mL    02 May 2018 07:01  -  02 May 2018 10:59  --------------------------------------------------------  IN: 0 mL / OUT: 7.5 mL / NET: -7.5 mL        RADIOLOGY & ADDITIONAL STUDIES:  < from: CT Mandible No Cont (04.07.18 @ 09:00) >    EXAM:  CT MANDIBLE    PROCEDURE DATE:  04/07/2018        INTERPRETATION:  INDICATIONS:  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      IMPRESSION:    Bone erosion in the region of the mental symphysis involving the buccal   cortex and surrounding the medial incisor tooth roots strongly suspicious   for direct mandibular tumor invasion. Osteoradionecrosis is a possibility.    Metallic foreign body adjacent to the left zygomatic arch.      LIT RENNER M.D., ATTENDING RADIOLOGIST  This document has been electronically signed. Apr 10 2018 11:09AM        < from: CT Neck Soft Tissue w/ IV Cont (03.22.18 @ 10:14) >    EXAM:  CT NECK SOFT TISSUE IC        PROCEDURE DATE:  03/22/2018         IMPRESSION:    New lytic bony destruction involves theanterior superior aspect of the   mandibular symphysis in the midline, involving the roots for both central   incisor teeth. This corresponds to the area of abnormal uptake on the   prior PET/CT study, consistent with tumor.    No evidence for enlarged cervical lymph nodes.      RADHA SHCMIDT M.D., ATTENDING RADIOLOGIST  This document has been electronically signed. Mar 22 2018 10:35AM        < from: NM PET/CT Onc FDG Skull to Thigh, Subsq (03.02.18 @ 12:41) >    EXAM:  [facility] ONC FDG SUBS        PROCEDURE DATE:  03/02/2018        COMPARISON:  PET/CT January 29, 2017    OTHER STUDIES USED FOR CORRELATION: None    IMPRESSION:      Since PET/CT January 27, 2017:    1. New FDG avidity in the anterior mandibular buccal region with   associated irregularity of the underlying mandibular cortex, probably   represents recurrent biopsy-proven squamous cell carcinoma. Consider thin   section CTscan to assess extent of osseous involvement.    2. Unchanged size of left level Ia lymph node, with minimal FDG avidity,   nonspecific, not definitive for metastasis.    3. No evidence of distant metastatic disease.               AKIRA STOCK M.D., ATTENDING RADIOLOGIST  This document has been electronically signed. Mar  2 2018  5:34PM                < end of copied text >

## 2018-05-02 NOTE — DISCHARGE NOTE ADULT - ADDITIONAL INSTRUCTIONS
Wound care;   Tracheostomy stoma site: cover with dry gauze and tape in place  Right leg; xeroform followed by ABD then ace wrap and then brace  Call to schedule an appointment with Dr. Hdz and Dr. Vasquez for next week.   leave graft site open to air  Leave doppler wire covered. it will be removed at your Plastic surgery follow up.

## 2018-05-02 NOTE — CONSULT NOTE ADULT - PROBLEM SELECTOR RECOMMENDATION 4
Met with pt and malgorzata Gordon at bedside. Discussed plan for addressing uncontrolled pain. Plan to initiate methadone which is long acting with both nociceptive and neuropathic properties. Will keep percocet as PRN for breakthrough pain. C/w gabapentin at 300mg TID for now. Emotional support provided. Will FU tomorrow for pain.

## 2018-05-02 NOTE — CONSULT NOTE ADULT - PROBLEM SELECTOR RECOMMENDATION 9
POD#6, Resection Mandibular Alvelous Cancer with Resection of Floor of Mouth, Bilateral Neck Dissection, fibular flap. ENT and Plastics on case.

## 2018-05-02 NOTE — DISCHARGE NOTE ADULT - HOSPITAL COURSE
S/P mandibulectomy, free flap and tracheostomy tube... S/P mandibulectomy, fibular free flap and tracheostomy tube. Patient had a swallow study done and was started on puree diet. Tracheostomy tube was decannulated and patient was discharged home on 5/9/18.

## 2018-05-02 NOTE — DISCHARGE NOTE ADULT - CARE PROVIDERS DIRECT ADDRESSES
,[email or web address]

## 2018-05-03 LAB
HCT VFR BLD CALC: 22.7 % — LOW (ref 39–50)
HGB BLD-MCNC: 7.6 G/DL — LOW (ref 13–17)
MCHC RBC-ENTMCNC: 31 PG — SIGNIFICANT CHANGE UP (ref 27–34)
MCHC RBC-ENTMCNC: 33.5 % — SIGNIFICANT CHANGE UP (ref 32–36)
MCV RBC AUTO: 92.7 FL — SIGNIFICANT CHANGE UP (ref 80–100)
NRBC # FLD: 0.05 — SIGNIFICANT CHANGE UP
PLATELET # BLD AUTO: 398 K/UL — SIGNIFICANT CHANGE UP (ref 150–400)
PMV BLD: 9.3 FL — SIGNIFICANT CHANGE UP (ref 7–13)
RBC # BLD: 2.45 M/UL — LOW (ref 4.2–5.8)
RBC # FLD: 13.8 % — SIGNIFICANT CHANGE UP (ref 10.3–14.5)
WBC # BLD: 15.25 K/UL — HIGH (ref 3.8–10.5)
WBC # FLD AUTO: 15.25 K/UL — HIGH (ref 3.8–10.5)

## 2018-05-03 PROCEDURE — 99233 SBSQ HOSP IP/OBS HIGH 50: CPT | Mod: GC

## 2018-05-03 PROCEDURE — 99223 1ST HOSP IP/OBS HIGH 75: CPT

## 2018-05-03 PROCEDURE — 31502 CHANGE OF WINDPIPE AIRWAY: CPT

## 2018-05-03 RX ORDER — FAMOTIDINE 10 MG/ML
20 INJECTION INTRAVENOUS ONCE
Qty: 0 | Refills: 0 | Status: DISCONTINUED | OUTPATIENT
Start: 2018-05-03 | End: 2018-05-03

## 2018-05-03 RX ORDER — OXYCODONE HYDROCHLORIDE 5 MG/1
5 TABLET ORAL EVERY 4 HOURS
Qty: 0 | Refills: 0 | Status: DISCONTINUED | OUTPATIENT
Start: 2018-05-03 | End: 2018-05-07

## 2018-05-03 RX ORDER — PANTOPRAZOLE SODIUM 20 MG/1
20 TABLET, DELAYED RELEASE ORAL DAILY
Qty: 0 | Refills: 0 | Status: DISCONTINUED | OUTPATIENT
Start: 2018-05-03 | End: 2018-05-03

## 2018-05-03 RX ORDER — ACETAMINOPHEN 500 MG
650 TABLET ORAL EVERY 6 HOURS
Qty: 0 | Refills: 0 | Status: DISCONTINUED | OUTPATIENT
Start: 2018-05-03 | End: 2018-05-07

## 2018-05-03 RX ORDER — PANTOPRAZOLE SODIUM 20 MG/1
40 TABLET, DELAYED RELEASE ORAL DAILY
Qty: 0 | Refills: 0 | Status: DISCONTINUED | OUTPATIENT
Start: 2018-05-03 | End: 2018-05-09

## 2018-05-03 RX ORDER — POLYETHYLENE GLYCOL 3350 17 G/17G
17 POWDER, FOR SOLUTION ORAL DAILY
Qty: 0 | Refills: 0 | Status: DISCONTINUED | OUTPATIENT
Start: 2018-05-03 | End: 2018-05-07

## 2018-05-03 RX ORDER — OXYCODONE HYDROCHLORIDE 5 MG/1
10 TABLET ORAL EVERY 4 HOURS
Qty: 0 | Refills: 0 | Status: DISCONTINUED | OUTPATIENT
Start: 2018-05-03 | End: 2018-05-07

## 2018-05-03 RX ADMIN — Medication 100 MILLIGRAM(S): at 21:13

## 2018-05-03 RX ADMIN — GABAPENTIN 300 MILLIGRAM(S): 400 CAPSULE ORAL at 21:13

## 2018-05-03 RX ADMIN — GABAPENTIN 300 MILLIGRAM(S): 400 CAPSULE ORAL at 06:19

## 2018-05-03 RX ADMIN — OXYCODONE AND ACETAMINOPHEN 2 TABLET(S): 5; 325 TABLET ORAL at 11:09

## 2018-05-03 RX ADMIN — GABAPENTIN 300 MILLIGRAM(S): 400 CAPSULE ORAL at 12:35

## 2018-05-03 RX ADMIN — HEPARIN SODIUM 5000 UNIT(S): 5000 INJECTION INTRAVENOUS; SUBCUTANEOUS at 06:18

## 2018-05-03 RX ADMIN — METHADONE HYDROCHLORIDE 2.5 MILLIGRAM(S): 40 TABLET ORAL at 10:13

## 2018-05-03 RX ADMIN — AMPICILLIN SODIUM AND SULBACTAM SODIUM 100 GRAM(S): 250; 125 INJECTION, POWDER, FOR SUSPENSION INTRAMUSCULAR; INTRAVENOUS at 00:43

## 2018-05-03 RX ADMIN — Medication 30 MILLILITER(S): at 06:16

## 2018-05-03 RX ADMIN — SODIUM CHLORIDE 3 MILLILITER(S): 9 INJECTION INTRAMUSCULAR; INTRAVENOUS; SUBCUTANEOUS at 13:18

## 2018-05-03 RX ADMIN — HYDROMORPHONE HYDROCHLORIDE 1 MILLIGRAM(S): 2 INJECTION INTRAMUSCULAR; INTRAVENOUS; SUBCUTANEOUS at 15:53

## 2018-05-03 RX ADMIN — Medication 25 MILLIGRAM(S): at 06:19

## 2018-05-03 RX ADMIN — AMPICILLIN SODIUM AND SULBACTAM SODIUM 100 GRAM(S): 250; 125 INJECTION, POWDER, FOR SUSPENSION INTRAMUSCULAR; INTRAVENOUS at 17:50

## 2018-05-03 RX ADMIN — OXYCODONE AND ACETAMINOPHEN 2 TABLET(S): 5; 325 TABLET ORAL at 06:17

## 2018-05-03 RX ADMIN — HEPARIN SODIUM 5000 UNIT(S): 5000 INJECTION INTRAVENOUS; SUBCUTANEOUS at 13:31

## 2018-05-03 RX ADMIN — HYDROMORPHONE HYDROCHLORIDE 1 MILLIGRAM(S): 2 INJECTION INTRAMUSCULAR; INTRAVENOUS; SUBCUTANEOUS at 14:43

## 2018-05-03 RX ADMIN — Medication 25 MILLIGRAM(S): at 17:54

## 2018-05-03 RX ADMIN — HEPARIN SODIUM 5000 UNIT(S): 5000 INJECTION INTRAVENOUS; SUBCUTANEOUS at 21:25

## 2018-05-03 RX ADMIN — AMPICILLIN SODIUM AND SULBACTAM SODIUM 100 GRAM(S): 250; 125 INJECTION, POWDER, FOR SUSPENSION INTRAMUSCULAR; INTRAVENOUS at 23:34

## 2018-05-03 RX ADMIN — AMPICILLIN SODIUM AND SULBACTAM SODIUM 100 GRAM(S): 250; 125 INJECTION, POWDER, FOR SUSPENSION INTRAMUSCULAR; INTRAVENOUS at 06:18

## 2018-05-03 RX ADMIN — ZOLPIDEM TARTRATE 5 MILLIGRAM(S): 10 TABLET ORAL at 01:01

## 2018-05-03 RX ADMIN — SENNA PLUS 10 MILLILITER(S): 8.6 TABLET ORAL at 21:13

## 2018-05-03 RX ADMIN — Medication 30 MILLILITER(S): at 19:10

## 2018-05-03 RX ADMIN — ZOLPIDEM TARTRATE 5 MILLIGRAM(S): 10 TABLET ORAL at 21:26

## 2018-05-03 RX ADMIN — PANTOPRAZOLE SODIUM 40 MILLIGRAM(S): 20 TABLET, DELAYED RELEASE ORAL at 06:01

## 2018-05-03 RX ADMIN — SODIUM CHLORIDE 3 MILLILITER(S): 9 INJECTION INTRAMUSCULAR; INTRAVENOUS; SUBCUTANEOUS at 06:12

## 2018-05-03 RX ADMIN — Medication 81 MILLIGRAM(S): at 12:35

## 2018-05-03 RX ADMIN — OXYCODONE AND ACETAMINOPHEN 2 TABLET(S): 5; 325 TABLET ORAL at 07:00

## 2018-05-03 RX ADMIN — AMPICILLIN SODIUM AND SULBACTAM SODIUM 100 GRAM(S): 250; 125 INJECTION, POWDER, FOR SUSPENSION INTRAMUSCULAR; INTRAVENOUS at 12:35

## 2018-05-03 RX ADMIN — SODIUM CHLORIDE 3 MILLILITER(S): 9 INJECTION INTRAMUSCULAR; INTRAVENOUS; SUBCUTANEOUS at 21:21

## 2018-05-03 RX ADMIN — OXYCODONE HYDROCHLORIDE 5 MILLIGRAM(S): 5 TABLET ORAL at 17:54

## 2018-05-03 RX ADMIN — OXYCODONE AND ACETAMINOPHEN 2 TABLET(S): 5; 325 TABLET ORAL at 12:09

## 2018-05-03 RX ADMIN — Medication 100 MILLIGRAM(S): at 12:37

## 2018-05-03 RX ADMIN — Medication 1 MILLIGRAM(S): at 21:13

## 2018-05-03 NOTE — PROGRESS NOTE ADULT - PROBLEM SELECTOR PLAN 1
POD#7, Resection Mandibular Alvelous Cancer with Resection of Floor of Mouth, Bilateral Neck Dissection, fibular flap. ENT and Plastics on case. POD#7, Resection Mandibular Alvelous Cancer with Resection of Floor of Mouth, Bilateral Neck Dissection, fibular flap.   ENT-[place]  Plastics-[place]

## 2018-05-03 NOTE — PROGRESS NOTE ADULT - ASSESSMENT
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 Palliative consulted for pain mgmt.

## 2018-05-03 NOTE — PROGRESS NOTE ADULT - ASSESSMENT
A/P 62M POD 7 from composite mandibulectomy with free fibula reconstruction  - Will take down VAC today and replace dressing with xeroform and ACE  - Will take STSG dressing off and leave open to air  - VTE ppx  - OOB, ambulate with CAM boot  - Care per ENT    Adriana Trinidad PGY2

## 2018-05-03 NOTE — PROGRESS NOTE ADULT - SUBJECTIVE AND OBJECTIVE BOX
Plastic	 Surgery    SUBJECTIVE:   Patient doing well, no acute events overnight. Afebrile    VITALS  T(C): 36.9 (05-03-18 @ 06:14), Max: 36.9 (05-02-18 @ 10:02)  HR: 99 (05-03-18 @ 06:14) (82 - 109)  BP: 124/84 (05-03-18 @ 06:14) (106/75 - 126/65)  BP(mean): --  RR: 20 (05-03-18 @ 06:14) (17 - 20)  SpO2: 100% (05-03-18 @ 06:14) (97% - 100%)  Wt(kg): --  CAPILLARY BLOOD GLUCOSE          Is/Os    05-02 @ 07:01  -  05-03 @ 07:00  --------------------------------------------------------  IN:    Free Water: 680 mL    Pivot: 500 mL  Total IN: 1180 mL    OUT:    Bulb: 7.5 mL    Bulb: 5 mL    Indwelling Catheter - Urethral: 600 mL    Intermittent Catheterization - Urethral: 850 mL  Total OUT: 1462.5 mL    Total NET: -282.5 mL          PHYSICAL EXAM:   General: NAD, Lying in bed comfortably  HEENT: intaoral suture line intact, no collections, no erythema of neck or face. Flap moist, pink, soft, chin with good projection. Strong arterial cook signal  Extrem: RLE dressing intact with VAC holding suction    MEDICATIONS (STANDING): ampicillin/sulbactam  IVPB 1.5 Gram(s) IV Intermittent every 6 hours  aspirin  chewable 81 milliGRAM(s) Oral daily  docusate sodium Liquid 100 milliGRAM(s) Enteral Tube three times a day  doxazosin 1 milliGRAM(s) Oral at bedtime  gabapentin   Solution 300 milliGRAM(s) Oral three times a day  heparin  Injectable 5000 Unit(s) SubCutaneous every 8 hours  methadone   Solution 2.5 milliGRAM(s) Enteral Tube every 12 hours  metoprolol tartrate 25 milliGRAM(s) Oral two times a day  pantoprazole  Injectable 40 milliGRAM(s) IV Push daily  senna Syrup 10 milliLiter(s) Oral at bedtime  sodium chloride 0.9% lock flush 3 milliLiter(s) IV Push every 8 hours    MEDICATIONS (PRN):aluminum hydroxide/magnesium hydroxide/simethicone Suspension 30 milliLiter(s) Oral every 6 hours PRN Dyspepsia  HYDROmorphone  Injectable 1 milliGRAM(s) IV Push every 4 hours PRN breakthrough  oxyCODONE    5 mG/acetaminophen 325 mG 1 Tablet(s) Oral/Enteral Tube every 4 hours PRN Moderate Pain (4 - 6)  oxyCODONE    5 mG/acetaminophen 325 mG 2 Tablet(s) Oral/Enteral Tube every 4 hours PRN Severe Pain (7 - 10)  zolpidem 5 milliGRAM(s) Oral at bedtime PRN Insomnia  zolpidem 5 milliGRAM(s) Oral at bedtime PRN Insomnia      LABS  CBC (05-02 @ 05:30)                              8.2<L>                         13.64<H>  )----------------(  338        --    % Neutrophils, --    % Lymphocytes, ANC: --                                  25.6<L>    BMP (05-02 @ 05:30)             135     |  97<L>   |  23    		Ca++ --      Ca 8.6                ---------------------------------( 105<H>		Mg 2.3                3.8     |  24      |  0.81  			Ph 3.4                   IMAGING STUDIES

## 2018-05-03 NOTE — PROGRESS NOTE ADULT - PROBLEM SELECTOR PLAN 4
Met with pt at bedside. Discussed plans for pain mgmt. Pt expressing frustration over his loss of independence related to his recent surgery. Also upset with idea of needing dow catheter and being reliant on medications for pain management. Reiterated that this is only temporary. Willing to continue medications currently and is trusting in his physician's knowledge of what is best. Emotional support provided.

## 2018-05-03 NOTE — PROGRESS NOTE ADULT - PROBLEM SELECTOR PLAN 2
Pt currently on vargas 300mg TID and percocet 1-2 tabs PRN moderate to severe pain with Dilaudid 1mg IV PRN breakthrough pain. Pt reports pain is much improved today from previous 2 days. In past 24 hours has utilized Dilaudid 1mg IV x2, Oxycodone 5/325mg-  2 tabs x4 doses for a total of 40mg of oxycodone. Pt not enthusiastic about continuing methadone due to it's societal implications. Will DC methadone at this time. Change Percocet to oxycodone so pt may also take Tylenol PRN. New regimen, Tylenol 650mg Q6 hours PRN mild pain, Oxycodone 5mg Q4 hours PRN moderate pain, Oxycodone 10mg Q4 hours PRN severe pain.

## 2018-05-03 NOTE — CONSULT NOTE ADULT - ASSESSMENT
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 Medicine consulted for co-management.    *Madibulectomy, reconstruction:  - post-op management per ENT  - palliative care consulted for symptom management      *Anemia: likely acute blood loss anemia in setting of recent surgery  - cont to monitor hgb  - at present transfusion is not indicated    *Hyperglycemia:  - A1c in AM  - denies h/o diabetes    *Leukocytosis: likely reactive in setting of recent surgery. Patient is afebrile  - monitor    *Constipation:  - continue current bowel regimen  - added Miralax through NG tube daily as needed  - patient reports had BM yesterday after enema    Thanks for the consult. Hospitalist will follow with you.
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 Palliative consulted for pain mgmt.
Neurologic: wean sedation in AM    Respiratory: check ABG/CXR, CPAP -> trach collar     Cardiovascular: HD stable, monitor hemodynamics continuously, implanted doppler checks q1h    Gastrointestinal/Nutrition: NPO,    Genitourinary/Renal: monitor lytes, replete as needed, monitor Is & Os    Hematologic: DVT PPX/ASA, transfuse Hb < 7    Infectious Disease: c/w unasyn drain prophylaxis    Endocrine: no acute issues, not diabetic    ANDREI Roe MD PGY 2   [id number]

## 2018-05-03 NOTE — PROGRESS NOTE ADULT - SUBJECTIVE AND OBJECTIVE BOX
Pt seen and examined. dow replaced yesterday for failed tov after straight cath. on doxazosin now.     NAD, awake and alert   Breathing comfortably on TC   OC/OP: flap soft, warm, intact  Strong arterial doppler signal  Neck incision c/d/i with staples, no fluctuance   neck JPs in place with SS output, R 5cc, L 7.5cc  RLE neurovascularly intact  vac 0cc    A/P: 62M s/p composite resection, b/l SND, trach, and RFFF recon 4/26. Remains stable  -f/u TOV today, may need replacement of dow  -pain consult: aprpeciate recs  -routine trach care  -trach collar  -flap checks per PRS   -lovenox/asa   -unasyn   -cont doxazosin  -bolus NGT feeds, free water  -PT: home with services    -[facility] monday  -lovenox, asa  -OOB to chair with assistance

## 2018-05-03 NOTE — PROGRESS NOTE ADULT - SUBJECTIVE AND OBJECTIVE BOX
INTERVAL HPI/OVERNIGHT EVENTS: Following up for pain. Pt started on Methadone 2.5mg BID yesterday. Expect will take couple of days to see full effect. In past 24 hours has used Dilaudid 1mg IV x2, Percocet 5/325- 2 tablets x4 for a total of 40mg oxycodone.     Allergies    No Known Allergies    Intolerances        ADVANCE DIRECTIVES:    DNR: [ ] YES [x ] NO           PRESENT SYMPTOMS:   SOURCE:  [ ] Patient   [ ] Family   [ ] Team     Pain:     Dyspnea:  [ ] YES [ ] NO  Anxiety:  [ ] YES [ ] NO  Fatigue: [ ] YES [ ] NO  Nausea: [ ] YES [ ] NO  Loss of Appetite: [ ] YES [ ] NO  Constipation [ ] YES   [ ] No     OTHER SYMPTOMS:  [ ] All other ROS negative     [ ] Unable to obtain due to poor mentation    MEDICATIONS  (STANDING):  ampicillin/sulbactam  IVPB 1.5 Gram(s) IV Intermittent every 6 hours  aspirin  chewable 81 milliGRAM(s) Oral daily  docusate sodium Liquid 100 milliGRAM(s) Enteral Tube three times a day  doxazosin 1 milliGRAM(s) Oral at bedtime  gabapentin   Solution 300 milliGRAM(s) Oral three times a day  heparin  Injectable 5000 Unit(s) SubCutaneous every 8 hours  methadone   Solution 2.5 milliGRAM(s) Enteral Tube every 12 hours  metoprolol tartrate 25 milliGRAM(s) Oral two times a day  pantoprazole  Injectable 40 milliGRAM(s) IV Push daily  senna Syrup 10 milliLiter(s) Oral at bedtime  sodium chloride 0.9% lock flush 3 milliLiter(s) IV Push every 8 hours    MEDICATIONS  (PRN):  aluminum hydroxide/magnesium hydroxide/simethicone Suspension 30 milliLiter(s) Oral every 6 hours PRN Dyspepsia  HYDROmorphone  Injectable 1 milliGRAM(s) IV Push every 4 hours PRN breakthrough  oxyCODONE    5 mG/acetaminophen 325 mG 1 Tablet(s) Oral/Enteral Tube every 4 hours PRN Moderate Pain (4 - 6)  oxyCODONE    5 mG/acetaminophen 325 mG 2 Tablet(s) Oral/Enteral Tube every 4 hours PRN Severe Pain (7 - 10)  zolpidem 5 milliGRAM(s) Oral at bedtime PRN Insomnia  zolpidem 5 milliGRAM(s) Oral at bedtime PRN Insomnia      Karnofsky Performance Score/Palliative Performance Status Version 2:  60       %  Protein Calorie Malnutrition:  [ ] Mild   [ ] Moderate   [ ] Severe     Physical Exam:    General: [x ] Alert,  A&O x 3    [ ] lethargic   [ ] Agitated   [ ] Cachexia   HEENT: [ ] Normal   [ ] Dry mouth   [ ] ET Tube    [x ] Trach [x] staple line across neck CDI, BL neck drains, oral swelling  Lungs: [x ] Clear [ ] Rhonchi  [ ] Crackles [ ] Wheezing [ ] Tachypnea  [ ] Audible excessive secretions   Cardiovascular:  [ x] Regular rate and rhythm  [ ] Irregular [ ] Tachycardia   [ ] Bradycardia   Abdomen: [x ] Soft  [ ] Distended  [ x] +BS  [x ] Non tender [ ] Tender  [ ]PEG   [x ] NGT   Last BM:  4/29   Genitourinary: [x ] Normal [ ] Incontinent   [ ] Oliguria/Anuria   [ ] Sprague  Musculoskeletal:  [ ] Normal   [ ] Generalized weakness  [ ] Bedbound [x] R thigh wound CDI, R lateral LE wound CDI w ace wrap in place   Neurological: [x ] No focal deficits  [ ] Cognitive impairment     Skin: [x ] Normal   [ ] Pressure ulcers     Vital Signs Last 24 Hrs  T(C): 36.8 (03 May 2018 09:52), Max: 36.9 (03 May 2018 06:14)  T(F): 98.3 (03 May 2018 09:52), Max: 98.5 (03 May 2018 06:14)  HR: 91 (03 May 2018 09:52) (82 - 109)  BP: 119/73 (03 May 2018 09:52) (106/75 - 126/65)  BP(mean): --  RR: 19 (03 May 2018 09:52) (17 - 20)  SpO2: 98% (03 May 2018 09:52) (97% - 100%)    LABS:                        7.6    15.25 )-----------( 398      ( 03 May 2018 07:14 )             22.7     05-02    135  |  97<L>  |  23  ----------------------------<  105<H>  3.8   |  24  |  0.81    Ca    8.6      02 May 2018 05:30  Phos  3.4     05-02  Mg     2.3     05-02          I&O's Summary    02 May 2018 07:01  -  03 May 2018 07:00  --------------------------------------------------------  IN: 1180 mL / OUT: 2172.5 mL / NET: -992.5 mL    03 May 2018 07:01  -  03 May 2018 11:01  --------------------------------------------------------  IN: 0 mL / OUT: 1.5 mL / NET: -1.5 mL        RADIOLOGY & ADDITIONAL STUDIES: none new INTERVAL HPI/OVERNIGHT EVENTS: Following up for pain. Pt started on Methadone 2.5mg BID yesterday. Has a lot of reservations about taking methadone.  In past 24 hours has used Dilaudid 1mg IV x2, Percocet 5/325- 2 tablets x4 for a total of 40mg oxycodone. Plan to DC methadone as his self reported improvement in pain is likely not related to methadone and though of methadone is distressing to him.     Allergies    No Known Allergies    Intolerances    ADVANCE DIRECTIVES:    DNR: [ ] YES [x ] NO           PRESENT SYMPTOMS:   SOURCE:  [x ] Patient   [ ] Family   [ ] Team     Pain: [x ] YES-improving [ ] NO  Dyspnea:  [ ] YES [x ] NO  Anxiety:  [ ] YES [x ] NO  Fatigue: [x ] YES [ ] NO  Nausea: [ ] YES [x ] NO  Loss of Appetite: [ x] YES [ ] NO  Constipation [ ] YES   [ x] No     OTHER SYMPTOMS:  [x] frustration over having to rely on others for ADLs, lack of independence  [ ] All other ROS negative     [ ] Unable to obtain due to poor mentation    MEDICATIONS  (STANDING):  ampicillin/sulbactam  IVPB 1.5 Gram(s) IV Intermittent every 6 hours  aspirin  chewable 81 milliGRAM(s) Oral daily  docusate sodium Liquid 100 milliGRAM(s) Enteral Tube three times a day  doxazosin 1 milliGRAM(s) Oral at bedtime  gabapentin   Solution 300 milliGRAM(s) Oral three times a day  heparin  Injectable 5000 Unit(s) SubCutaneous every 8 hours  methadone   Solution 2.5 milliGRAM(s) Enteral Tube every 12 hours  metoprolol tartrate 25 milliGRAM(s) Oral two times a day  pantoprazole  Injectable 40 milliGRAM(s) IV Push daily  senna Syrup 10 milliLiter(s) Oral at bedtime  sodium chloride 0.9% lock flush 3 milliLiter(s) IV Push every 8 hours    MEDICATIONS  (PRN):  aluminum hydroxide/magnesium hydroxide/simethicone Suspension 30 milliLiter(s) Oral every 6 hours PRN Dyspepsia  HYDROmorphone  Injectable 1 milliGRAM(s) IV Push every 4 hours PRN breakthrough  oxyCODONE    5 mG/acetaminophen 325 mG 1 Tablet(s) Oral/Enteral Tube every 4 hours PRN Moderate Pain (4 - 6)  oxyCODONE    5 mG/acetaminophen 325 mG 2 Tablet(s) Oral/Enteral Tube every 4 hours PRN Severe Pain (7 - 10)  zolpidem 5 milliGRAM(s) Oral at bedtime PRN Insomnia  zolpidem 5 milliGRAM(s) Oral at bedtime PRN Insomnia      Karnofsky Performance Score/Palliative Performance Status Version 2:  60       %  Protein Calorie Malnutrition:  [ ] Mild   [ ] Moderate   [ ] Severe     Physical Exam:    General: [x ] Alert,  A&O x 3    [ ] lethargic   [ ] Agitated   [ ] Cachexia   HEENT: [ ] Normal   [ ] Dry mouth   [ ] ET Tube    [x ] Trach [x] staple line across neck CDI, BL neck drains, oral swelling  Lungs: [x ] Clear [ ] Rhonchi  [ ] Crackles [ ] Wheezing [ ] Tachypnea  [ ] Audible excessive secretions   Cardiovascular:  [ x] Regular rate and rhythm  [ ] Irregular [ ] Tachycardia   [ ] Bradycardia   Abdomen: [x ] Soft  [ ] Distended  [ x] +BS  [x ] Non tender [ ] Tender  [ ]PEG   [x ] NGT   Last BM:  4/29   Genitourinary: [x ] Normal [ ] Incontinent   [ ] Oliguria/Anuria   [ ] Sprague  Musculoskeletal:  [ ] Normal   [ ] Generalized weakness  [ ] Bedbound [x] R thigh wound clean w granulation tissue no surrounding swelling or erythema, R lateral LE wound and CAM boot.   Neurological: [x ] No focal deficits  [ ] Cognitive impairment     Skin: [x ] Normal   [ ] Pressure ulcers     Vital Signs Last 24 Hrs  T(C): 36.8 (03 May 2018 09:52), Max: 36.9 (03 May 2018 06:14)  T(F): 98.3 (03 May 2018 09:52), Max: 98.5 (03 May 2018 06:14)  HR: 91 (03 May 2018 09:52) (82 - 109)  BP: 119/73 (03 May 2018 09:52) (106/75 - 126/65)  BP(mean): --  RR: 19 (03 May 2018 09:52) (17 - 20)  SpO2: 98% (03 May 2018 09:52) (97% - 100%)    LABS:                        7.6    15.25 )-----------( 398      ( 03 May 2018 07:14 )             22.7     05-02    135  |  97<L>  |  23  ----------------------------<  105<H>  3.8   |  24  |  0.81    Ca    8.6      02 May 2018 05:30  Phos  3.4     05-02  Mg     2.3     05-02          I&O's Summary    02 May 2018 07:01  -  03 May 2018 07:00  --------------------------------------------------------  IN: 1180 mL / OUT: 2172.5 mL / NET: -992.5 mL    03 May 2018 07:01  -  03 May 2018 11:01  --------------------------------------------------------  IN: 0 mL / OUT: 1.5 mL / NET: -1.5 mL        RADIOLOGY & ADDITIONAL STUDIES: none new

## 2018-05-04 LAB
APPEARANCE UR: CLEAR — SIGNIFICANT CHANGE UP
BACTERIA # UR AUTO: SIGNIFICANT CHANGE UP
BILIRUB UR-MCNC: NEGATIVE — SIGNIFICANT CHANGE UP
BLOOD UR QL VISUAL: HIGH
BUN SERPL-MCNC: 19 MG/DL — SIGNIFICANT CHANGE UP (ref 7–23)
CALCIUM SERPL-MCNC: 8.5 MG/DL — SIGNIFICANT CHANGE UP (ref 8.4–10.5)
CHLORIDE SERPL-SCNC: 96 MMOL/L — LOW (ref 98–107)
CO2 SERPL-SCNC: 26 MMOL/L — SIGNIFICANT CHANGE UP (ref 22–31)
COLOR SPEC: YELLOW — SIGNIFICANT CHANGE UP
CREAT SERPL-MCNC: 0.85 MG/DL — SIGNIFICANT CHANGE UP (ref 0.5–1.3)
GLUCOSE SERPL-MCNC: 118 MG/DL — HIGH (ref 70–99)
GLUCOSE UR-MCNC: NEGATIVE — SIGNIFICANT CHANGE UP
HCT VFR BLD CALC: 22.6 % — LOW (ref 39–50)
HGB BLD-MCNC: 7.3 G/DL — LOW (ref 13–17)
KETONES UR-MCNC: NEGATIVE — SIGNIFICANT CHANGE UP
LEUKOCYTE ESTERASE UR-ACNC: NEGATIVE — SIGNIFICANT CHANGE UP
MAGNESIUM SERPL-MCNC: 2.2 MG/DL — SIGNIFICANT CHANGE UP (ref 1.6–2.6)
MCHC RBC-ENTMCNC: 30.2 PG — SIGNIFICANT CHANGE UP (ref 27–34)
MCHC RBC-ENTMCNC: 32.3 % — SIGNIFICANT CHANGE UP (ref 32–36)
MCV RBC AUTO: 93.4 FL — SIGNIFICANT CHANGE UP (ref 80–100)
NITRITE UR-MCNC: NEGATIVE — SIGNIFICANT CHANGE UP
NON-SQ EPI CELLS # UR AUTO: <1 — SIGNIFICANT CHANGE UP
NRBC # FLD: 0.04 — SIGNIFICANT CHANGE UP
PH UR: 8 — SIGNIFICANT CHANGE UP (ref 4.6–8)
PHOSPHATE SERPL-MCNC: 2.6 MG/DL — SIGNIFICANT CHANGE UP (ref 2.5–4.5)
PLATELET # BLD AUTO: 416 K/UL — HIGH (ref 150–400)
PMV BLD: 9 FL — SIGNIFICANT CHANGE UP (ref 7–13)
POTASSIUM SERPL-MCNC: 3.9 MMOL/L — SIGNIFICANT CHANGE UP (ref 3.5–5.3)
POTASSIUM SERPL-SCNC: 3.9 MMOL/L — SIGNIFICANT CHANGE UP (ref 3.5–5.3)
PROT UR-MCNC: 20 MG/DL — SIGNIFICANT CHANGE UP
RBC # BLD: 2.42 M/UL — LOW (ref 4.2–5.8)
RBC # FLD: 13.8 % — SIGNIFICANT CHANGE UP (ref 10.3–14.5)
RBC CASTS # UR COMP ASSIST: SIGNIFICANT CHANGE UP (ref 0–?)
SODIUM SERPL-SCNC: 133 MMOL/L — LOW (ref 135–145)
SP GR SPEC: 1.02 — SIGNIFICANT CHANGE UP (ref 1–1.04)
UROBILINOGEN FLD QL: 4 MG/DL — HIGH
WBC # BLD: 16.4 K/UL — HIGH (ref 3.8–10.5)
WBC # FLD AUTO: 16.4 K/UL — HIGH (ref 3.8–10.5)
WBC UR QL: SIGNIFICANT CHANGE UP (ref 0–?)

## 2018-05-04 PROCEDURE — 99233 SBSQ HOSP IP/OBS HIGH 50: CPT

## 2018-05-04 PROCEDURE — 99233 SBSQ HOSP IP/OBS HIGH 50: CPT | Mod: GC

## 2018-05-04 PROCEDURE — 71045 X-RAY EXAM CHEST 1 VIEW: CPT | Mod: 26

## 2018-05-04 RX ORDER — LACTOBACILLUS ACIDOPHILUS 100MM CELL
1 CAPSULE ORAL EVERY 12 HOURS
Qty: 0 | Refills: 0 | Status: DISCONTINUED | OUTPATIENT
Start: 2018-05-04 | End: 2018-05-09

## 2018-05-04 RX ORDER — POTASSIUM PHOSPHATE, MONOBASIC POTASSIUM PHOSPHATE, DIBASIC 236; 224 MG/ML; MG/ML
15 INJECTION, SOLUTION INTRAVENOUS ONCE
Qty: 0 | Refills: 0 | Status: COMPLETED | OUTPATIENT
Start: 2018-05-04 | End: 2018-05-04

## 2018-05-04 RX ADMIN — SODIUM CHLORIDE 3 MILLILITER(S): 9 INJECTION INTRAMUSCULAR; INTRAVENOUS; SUBCUTANEOUS at 14:18

## 2018-05-04 RX ADMIN — SENNA PLUS 10 MILLILITER(S): 8.6 TABLET ORAL at 21:54

## 2018-05-04 RX ADMIN — POTASSIUM PHOSPHATE, MONOBASIC POTASSIUM PHOSPHATE, DIBASIC 62.5 MILLIMOLE(S): 236; 224 INJECTION, SOLUTION INTRAVENOUS at 07:28

## 2018-05-04 RX ADMIN — OXYCODONE HYDROCHLORIDE 5 MILLIGRAM(S): 5 TABLET ORAL at 10:00

## 2018-05-04 RX ADMIN — Medication 1 MILLIGRAM(S): at 21:54

## 2018-05-04 RX ADMIN — AMPICILLIN SODIUM AND SULBACTAM SODIUM 100 GRAM(S): 250; 125 INJECTION, POWDER, FOR SUSPENSION INTRAMUSCULAR; INTRAVENOUS at 11:57

## 2018-05-04 RX ADMIN — GABAPENTIN 300 MILLIGRAM(S): 400 CAPSULE ORAL at 21:55

## 2018-05-04 RX ADMIN — GABAPENTIN 300 MILLIGRAM(S): 400 CAPSULE ORAL at 06:24

## 2018-05-04 RX ADMIN — HEPARIN SODIUM 5000 UNIT(S): 5000 INJECTION INTRAVENOUS; SUBCUTANEOUS at 21:55

## 2018-05-04 RX ADMIN — Medication 81 MILLIGRAM(S): at 11:58

## 2018-05-04 RX ADMIN — Medication 25 MILLIGRAM(S): at 19:20

## 2018-05-04 RX ADMIN — SODIUM CHLORIDE 3 MILLILITER(S): 9 INJECTION INTRAMUSCULAR; INTRAVENOUS; SUBCUTANEOUS at 22:24

## 2018-05-04 RX ADMIN — OXYCODONE HYDROCHLORIDE 5 MILLIGRAM(S): 5 TABLET ORAL at 14:19

## 2018-05-04 RX ADMIN — OXYCODONE HYDROCHLORIDE 5 MILLIGRAM(S): 5 TABLET ORAL at 23:30

## 2018-05-04 RX ADMIN — Medication 100 MILLIGRAM(S): at 21:54

## 2018-05-04 RX ADMIN — HEPARIN SODIUM 5000 UNIT(S): 5000 INJECTION INTRAVENOUS; SUBCUTANEOUS at 06:24

## 2018-05-04 RX ADMIN — OXYCODONE HYDROCHLORIDE 5 MILLIGRAM(S): 5 TABLET ORAL at 01:00

## 2018-05-04 RX ADMIN — PANTOPRAZOLE SODIUM 40 MILLIGRAM(S): 20 TABLET, DELAYED RELEASE ORAL at 11:58

## 2018-05-04 RX ADMIN — AMPICILLIN SODIUM AND SULBACTAM SODIUM 100 GRAM(S): 250; 125 INJECTION, POWDER, FOR SUSPENSION INTRAMUSCULAR; INTRAVENOUS at 06:25

## 2018-05-04 RX ADMIN — GABAPENTIN 300 MILLIGRAM(S): 400 CAPSULE ORAL at 14:19

## 2018-05-04 RX ADMIN — HEPARIN SODIUM 5000 UNIT(S): 5000 INJECTION INTRAVENOUS; SUBCUTANEOUS at 14:19

## 2018-05-04 RX ADMIN — OXYCODONE HYDROCHLORIDE 5 MILLIGRAM(S): 5 TABLET ORAL at 00:33

## 2018-05-04 RX ADMIN — ZOLPIDEM TARTRATE 5 MILLIGRAM(S): 10 TABLET ORAL at 21:54

## 2018-05-04 RX ADMIN — HYDROMORPHONE HYDROCHLORIDE 1 MILLIGRAM(S): 2 INJECTION INTRAMUSCULAR; INTRAVENOUS; SUBCUTANEOUS at 12:28

## 2018-05-04 RX ADMIN — HYDROMORPHONE HYDROCHLORIDE 1 MILLIGRAM(S): 2 INJECTION INTRAMUSCULAR; INTRAVENOUS; SUBCUTANEOUS at 12:12

## 2018-05-04 RX ADMIN — Medication 25 MILLIGRAM(S): at 06:24

## 2018-05-04 RX ADMIN — OXYCODONE HYDROCHLORIDE 5 MILLIGRAM(S): 5 TABLET ORAL at 22:50

## 2018-05-04 RX ADMIN — Medication 100 MILLIGRAM(S): at 14:19

## 2018-05-04 RX ADMIN — OXYCODONE HYDROCHLORIDE 5 MILLIGRAM(S): 5 TABLET ORAL at 15:49

## 2018-05-04 RX ADMIN — Medication 1 TABLET(S): at 19:24

## 2018-05-04 RX ADMIN — ZOLPIDEM TARTRATE 5 MILLIGRAM(S): 10 TABLET ORAL at 00:38

## 2018-05-04 RX ADMIN — OXYCODONE HYDROCHLORIDE 5 MILLIGRAM(S): 5 TABLET ORAL at 09:01

## 2018-05-04 RX ADMIN — SODIUM CHLORIDE 3 MILLILITER(S): 9 INJECTION INTRAMUSCULAR; INTRAVENOUS; SUBCUTANEOUS at 06:19

## 2018-05-04 RX ADMIN — AMPICILLIN SODIUM AND SULBACTAM SODIUM 100 GRAM(S): 250; 125 INJECTION, POWDER, FOR SUSPENSION INTRAMUSCULAR; INTRAVENOUS at 19:20

## 2018-05-04 RX ADMIN — Medication 100 MILLIGRAM(S): at 06:24

## 2018-05-04 RX ADMIN — Medication 30 MILLILITER(S): at 16:32

## 2018-05-04 RX ADMIN — ZOLPIDEM TARTRATE 5 MILLIGRAM(S): 10 TABLET ORAL at 23:35

## 2018-05-04 NOTE — PROGRESS NOTE ADULT - SUBJECTIVE AND OBJECTIVE BOX
INTERVAL HPI/OVERNIGHT EVENTS: Pt in much better spirits today. States pain and swelling are resolving, has been up out of bed and walking the halls. Had large BM today. Reports that he is eager to go home. In past 24 hours pt has used Dilaudid 1mg IV x1, percocet 5/325- 2 tabs x2 (20mg oxy), oxycodone 5mg x1.     Allergies    No Known Allergies    Intolerances      ADVANCE DIRECTIVES:    DNR: [ ] YES [x ] NO           PRESENT SYMPTOMS:   SOURCE:  [x ] Patient   [ ] Family   [ ] Team     Pain:  [ ] YES [x ] NO  Dyspnea:  [ ] YES [x ] NO  Anxiety:  [x ] YES [ ] NO  Fatigue: [x ] YES [ ] NO  Nausea: [ ] YES [x ] NO  Loss of Appetite: [x ] YES [ ] NO  Constipation [ ] YES   [x ] No     OTHER SYMPTOMS:  [x ] All other ROS negative     [ ] Unable to obtain due to poor mentation    MEDICATIONS  (STANDING):  ampicillin/sulbactam  IVPB 1.5 Gram(s) IV Intermittent every 6 hours  aspirin  chewable 81 milliGRAM(s) Oral daily  docusate sodium Liquid 100 milliGRAM(s) Enteral Tube three times a day  doxazosin 1 milliGRAM(s) Oral at bedtime  gabapentin   Solution 300 milliGRAM(s) Oral three times a day  heparin  Injectable 5000 Unit(s) SubCutaneous every 8 hours  metoprolol tartrate 25 milliGRAM(s) Oral two times a day  pantoprazole  Injectable 40 milliGRAM(s) IV Push daily  senna Syrup 10 milliLiter(s) Oral at bedtime  sodium chloride 0.9% lock flush 3 milliLiter(s) IV Push every 8 hours    MEDICATIONS  (PRN):  acetaminophen    Suspension. 650 milliGRAM(s) Enteral Tube every 6 hours PRN Mild Pain (1 - 3)  aluminum hydroxide/magnesium hydroxide/simethicone Suspension 30 milliLiter(s) Oral every 6 hours PRN Dyspepsia  HYDROmorphone  Injectable 1 milliGRAM(s) IV Push every 4 hours PRN breakthrough  oxyCODONE    Solution 5 milliGRAM(s) Enteral Tube every 4 hours PRN Moderate Pain (4 - 6)  oxyCODONE    Solution 10 milliGRAM(s) Enteral Tube every 4 hours PRN Severe Pain (7 - 10)  polyethylene glycol 3350 17 Gram(s) Oral daily PRN Constipation  zolpidem 5 milliGRAM(s) Oral at bedtime PRN Insomnia  zolpidem 5 milliGRAM(s) Oral at bedtime PRN Insomnia      Karnofsky Performance Score/Palliative Performance Status Version 2:   60      %  Protein Calorie Malnutrition:  [ ] Mild   [ ] Moderate   [ ] Severe     Physical Exam:    General: [x ] Alert,  A&O x 3    [ ] lethargic   [ ] Agitated   [ ] Cachexia   HEENT: [ ] Normal   [ ] Dry mouth   [ ] ET Tube    [x ] Trach [x] staple line across neck CDI, BL neck drains, dec oral and facialswelling  Lungs: [x ] Clear [ ] Rhonchi  [ ] Crackles [ ] Wheezing [ ] Tachypnea  [ ] Audible excessive secretions   Cardiovascular:  [ x] Regular rate and rhythm  [ ] Irregular [ ] Tachycardia   [ ] Bradycardia   Abdomen: [x ] Soft  [ ] Distended  [ x] +BS  [x ] Non tender [ ] Tender  [ ]PEG   [x ] NGT   Last BM:   Genitourinary: [ ] Normal [ ] Incontinent   [ ] Oliguria/Anuria   [x ] Sprague  Musculoskeletal:  [ ] Normal   [ ] Generalized weakness  [ ] Bedbound [x] R thigh wound clean w granulation tissue no surrounding swelling or erythema, R lateral LE wound and CAM boot.   Neurological: [x ] No focal deficits  [ ] Cognitive impairment     Skin: [x ] Normal   [ ] Pressure ulcers        Vital Signs Last 24 Hrs  T(C): 36.7 (04 May 2018 08:58), Max: 38.3 (04 May 2018 01:30)  T(F): 98.1 (04 May 2018 08:58), Max: 100.9 (04 May 2018 01:30)  HR: 87 (04 May 2018 08:58) (78 - 100)  BP: 117/72 (04 May 2018 08:58) (116/68 - 136/77)  BP(mean): --  RR: 18 (04 May 2018 08:58) (17 - 18)  SpO2: 98% (04 May 2018 08:58) (98% - 100%)    LABS:                        7.3    16.40 )-----------( 416      ( 04 May 2018 02:30 )             22.6     05-04    133<L>  |  96<L>  |  19  ----------------------------<  118<H>  3.9   |  26  |  0.85    Ca    8.5      04 May 2018 02:30  Phos  2.6     05-04  Mg     2.2     05-04        Urinalysis Basic - ( 04 May 2018 02:00 )    Color: YELLOW / Appearance: CLEAR / S.019 / pH: 8.0  Gluc: NEGATIVE / Ketone: NEGATIVE  / Bili: NEGATIVE / Urobili: 4 mg/dL   Blood: SMALL / Protein: 20 mg/dL / Nitrite: NEGATIVE   Leuk Esterase: NEGATIVE / RBC: 25-50 / WBC 0-2   Sq Epi: x / Non Sq Epi: x / Bacteria: FEW      I&O's Summary    03 May 2018 07:  -  04 May 2018 07:00  --------------------------------------------------------  IN: 700 mL / OUT: 1003.7 mL / NET: -303.7 mL    04 May 2018 07:01  -  04 May 2018 14:06  --------------------------------------------------------  IN: 0 mL / OUT: 706.5 mL / NET: -706.5 mL        RADIOLOGY & ADDITIONAL STUDIES: none new

## 2018-05-04 NOTE — PROGRESS NOTE ADULT - ATTENDING COMMENTS
62 m s/p resection of floor of the mouth lesion , tracheostomy , b/l neck dissection , with fibular free flap , STSG  ( right thigh )     Neurologic: PRN pain control ,Gabapentin , Oxycodone , tylenol   anxiety - prn ativan    Respiratory: continue trach collar, good saturations    Cardiovascular: HD stable, monitor hemodynamics continuously, implanted doppler checks q4h    Gastrointestinal/Nutrition: NPO w/ tube feeds Pivot @ 60    Genitourinary/Renal: dow reinserted for urinary retention  monitor lytes, replete as needed, monitor Is & Os    Hematologic: DVT PPX HSQ, ASA, transfuse Hb < 7    Infectious Disease: c/w unasyn drain prophylaxis    Endocrine: no acute issues, not diabetic    dispo: floor  -----------------------------------------------------------------------------------  critical care diagnoses: fresh tracheostomy, intensive flap monitoring      The patient is a critical care patient with hemodynamic and metabolic instability in SICU.  I have personally interviewed and examined this patient, reviewed labs and x-rays, discussed with other consultants, House staff and PA's.  I spent 45    minutes  in total providing critical care for the diagnoses, assessment and plan above.  These diagnoses are unrelated to the surgical procedure noted above.  I met with family     min to get further history and make care decisions for this patient who is unable to participate due to altered mental status.  Time involved in performance of separately billable procedures was not counted toward my critical care time.  There is no overlap.
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  Continuing to monitor
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  continuing close flap monitoring as per plastics and ENT  D72.829 Leukocytosis, unspecified type   - likely stress response mediated  Z91.89 At risk for malnutrition   - on TFs  D64.9 Anemia, unspecified type   - no signs of significant bleeding on exam
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  Will transfuse for Hb<7
Pt seen with fellow.  Agree with above. Continue oxy ir prn.  Discharge plan to rehab next week.
Pt seen with fellow.  Agree with above.  Discontinue methadone.  Change percocet to oxy ir liquid prn.  Encourage pt to take meds so he can increase is function.  Will follow closely

## 2018-05-04 NOTE — PROGRESS NOTE ADULT - ASSESSMENT
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 Medicine consulted for co-management.    *Madibulectomy, reconstruction:  - post-op management per ENT  - palliative care consulted for symptom management    *Fever: associated with leukocytosis, source unclear. CXR clear. UA not c/w UTI. Large loose BM today, however on multiple laxatives. Denies abdominal pain  - currently on Unasyn  - added lactobacillus for C diff ppx  - monitor for further loose BM's - if continues may be appropriate to send stool C diff test  - f/u cultures    *Constipation:  - continue current bowel regimen, would decrease intensity if loose BM's continue  - added Miralax through NG tube daily as needed    *Anemia: likely acute blood loss anemia in setting of recent surgery  - cont to monitor hgb  - transfuse if hgb 7, with it as the goal    *Hyperglycemia:  - A1c in AM  - denies h/o diabetes      Thanks for the consult. Hospitalist will follow with you.

## 2018-05-04 NOTE — PROGRESS NOTE ADULT - PROBLEM SELECTOR PLAN 4
Met with pt and brother Danny at bedside. Pt reporting improved mood. States that he thinks he was misinterpreting anxiety and constipation as post-surgical pain. States has had multiple BMs, been up and walking, pain and swelling are improving. He is eager to go home. Emotional support provided.

## 2018-05-04 NOTE — PROGRESS NOTE ADULT - PROBLEM SELECTOR PLAN 2
Pt currently on vargas 300mg TID and oxycodone 5-10mg Q4 hours PRN moderate to severe pain with Dilaudid 1mg IV PRN breakthrough pain. Pt reports continued improvement in pain.

## 2018-05-04 NOTE — PROGRESS NOTE ADULT - SUBJECTIVE AND OBJECTIVE BOX
Plastic	 Surgery    SUBJECTIVE:   Febrile o/n. UA neg, BCx sent. CXR this morning no obvious pathology to my read.    Vital Signs Last 24 Hrs  T(C): 37.3 (18 @ 06:17), Max: 38.3 (18 @ 01:30)  T(F): 99.2 (18 @ 06:17), Max: 100.9 (18 @ 01:30)  HR: 100 (18 @ 06:17) (78 - 100)  BP: 125/80 (18 @ 06:17) (116/68 - 136/77)  BP(mean): --  RR: 18 (18 @ 06:17) (17 - 19)  SpO2: 99% (18 @ 06:17) (98% - 100%)  I&O's Detail    03 May 2018 07:01  -  04 May 2018 07:00  --------------------------------------------------------  IN:    Free Water: 200 mL    IV PiggyBack: 50 mL  Total IN: 250 mL    OUT:    Bulb: 2.5 mL    Bulb: 1.2 mL    Indwelling Catheter - Urethral: 1000 mL  Total OUT: 1003.7 mL    Total NET: -753.7 mL      PHYSICAL EXAM:   General: NAD, Lying in bed comfortably  HEENT: intraoral suture line intact, no collections, no erythema of neck or face. Flap moist, pink, soft, chin with good projection. Strong arterial cook signal.  Extrem: RLE dressing intact, thigh open to air                            7.3    16.40 )-----------( 416      ( 04 May 2018 02:30 )             22.6     04 May 2018 02:30    133    |  96     |  19     ----------------------------<  118    3.9     |  26     |  0.85     Ca    8.5        04 May 2018 02:30  Phos  2.6       04 May 2018 02:30  Mg     2.2       04 May 2018 02:30          CAPILLARY BLOOD GLUCOSE            Urinalysis Basic - ( 04 May 2018 02:00 )    Color: YELLOW / Appearance: CLEAR / S.019 / pH: 8.0  Gluc: NEGATIVE / Ketone: NEGATIVE  / Bili: NEGATIVE / Urobili: 4 mg/dL   Blood: SMALL / Protein: 20 mg/dL / Nitrite: NEGATIVE   Leuk Esterase: NEGATIVE / RBC: 25-50 / WBC 0-2   Sq Epi: x / Non Sq Epi: x / Bacteria: FEW

## 2018-05-04 NOTE — PROGRESS NOTE ADULT - SUBJECTIVE AND OBJECTIVE BOX
Pt seen and examined. low grade fevers overnight 100.6/100.9, otherwise no complaints, no resp issues, denies chills.    NAD, awake and alert   Breathing comfortably on TC   OC/OP: flap soft, warm, intact  Strong arterial doppler signal  Neck incision c/d/i with staples, no fluctuance   neck JPs in place with SS output  RLE neurovascularly intact    UA negative  Wbc 15->16    A/P: 62M s/p composite resection, b/l SND, trach, and RFFF recon 4/26. Remains stable  -f/u CXR  -f/u BCx  -monitor fever curve  -pain consult: aprpeciate recs  -routine trach care  -trach collar  -flap checks per PRS   -lovenox/asa   -unasyn   -cont doxazosin  -bolus NGT feeds, free water  -PT: home with services    [place] [place]  -lovenox, asa  -OOB to chair with assistance

## 2018-05-04 NOTE — PROGRESS NOTE ADULT - ASSESSMENT
A/P 62M POD 8 from composite mandibulectomy with free fibula reconstruction  - leave thigh donor site open to air  -STSG on leg qod xeroform/abd/ace  - VTE ppx  - OOB, ambulate with CAM boot  -f/u official CXR, cxrs, fever  -Swallow monday per ENT  - Care per ENT

## 2018-05-04 NOTE — PROGRESS NOTE ADULT - SUBJECTIVE AND OBJECTIVE BOX
CHIEF COMPLAINT: Patient is a 62y old  male who presents with a chief complaint of "I'm having the front part of my jaw removed" (02 May 2018 07:14)      SUBJECTIVE / OVERNIGHT EVENTS:    Reports feeling much better compared to yesterday. No pain. Denies cough, SOB. Had a large loose BM today, "very smelly." Denies abdominal pain.    MEDICATIONS  (STANDING):  ampicillin/sulbactam  IVPB 1.5 Gram(s) IV Intermittent every 6 hours  aspirin  chewable 81 milliGRAM(s) Oral daily  docusate sodium Liquid 100 milliGRAM(s) Enteral Tube three times a day  doxazosin 1 milliGRAM(s) Oral at bedtime  gabapentin   Solution 300 milliGRAM(s) Oral three times a day  heparin  Injectable 5000 Unit(s) SubCutaneous every 8 hours  lactobacillus acidophilus 1 Tablet(s) Oral every 12 hours  metoprolol tartrate 25 milliGRAM(s) Oral two times a day  pantoprazole  Injectable 40 milliGRAM(s) IV Push daily  senna Syrup 10 milliLiter(s) Oral at bedtime  sodium chloride 0.9% lock flush 3 milliLiter(s) IV Push every 8 hours    MEDICATIONS  (PRN):  acetaminophen    Suspension. 650 milliGRAM(s) Enteral Tube every 6 hours PRN Mild Pain (1 - 3)  aluminum hydroxide/magnesium hydroxide/simethicone Suspension 30 milliLiter(s) Oral every 6 hours PRN Dyspepsia  HYDROmorphone  Injectable 1 milliGRAM(s) IV Push every 4 hours PRN breakthrough  oxyCODONE    Solution 5 milliGRAM(s) Enteral Tube every 4 hours PRN Moderate Pain (4 - 6)  oxyCODONE    Solution 10 milliGRAM(s) Enteral Tube every 4 hours PRN Severe Pain (7 - 10)  polyethylene glycol 3350 17 Gram(s) Oral daily PRN Constipation  zolpidem 5 milliGRAM(s) Oral at bedtime PRN Insomnia  zolpidem 5 milliGRAM(s) Oral at bedtime PRN Insomnia      VITALS:  T(F): 98.4 (18 @ 14:20), Max: 100.9 (18 @ 01:30)  HR: 89 (18 @ 14:20) (78 - 100)  BP: 121/76 (18 @ 14:20) (116/68 - 136/77)  RR: 18 (18 @ 14:20) (17 - 18)  SpO2: 98% (18 @ 14:20)      CAPILLARY BLOOD GLUCOSE    Output     I&O's Summary  T(F): 98.4 (18 @ 14:20), Max: 100.9 (18 @ 01:30)  HR: 89 (18 @ 14:20) (78 - 100)  BP: 121/76 (18 @ 14:20) (116/68 - 136/77)  RR: 18 (18 @ 14:20) (17 - 18)  SpO2: 98% (18 @ 14:20)    PHYSICAL EXAM:  GENERAL: NAD, well-developed  HEAD:  Atraumatic, Normocephalic  EYES: EOMI  NECK: trach in place  CHEST/LUNG: nonlabored breathing  HEART: nl S1/S2  ABDOMEN: nondistended, soft  EXTREMITIES:  no LE edema  PSYCH: alert, answering questions appropriately  NEUROLOGY: non-focal  SKIN: No rashes noted    LABS:              7.3                  133  | 26   | 19           16.40 >-----------< 416     ------------------------< 118                   22.6                 3.9  | 96   | 0.85                                         Ca 8.5   Mg 2.2   Ph 2.6                Urinalysis Basic - ( 04 May 2018 02:00 )    Color: YELLOW / Appearance: CLEAR / S.019 / pH: 8.0  Gluc: NEGATIVE / Ketone: NEGATIVE  / Bili: NEGATIVE / Urobili: 4 mg/dL   Blood: SMALL / Protein: 20 mg/dL / Nitrite: NEGATIVE   Leuk Esterase: NEGATIVE / RBC: 25-50 / WBC 0-2   Sq Epi: x / Non Sq Epi: x / Bacteria: FEW            MICROBIOLOGY:        RADIOLOGY & ADDITIONAL TESTS:    Imaging Personally Reviewed:    < from: Xray Chest 1 View- PORTABLE-Routine (18 @ 00:41) >  IMPRESSION:  Clear lungs.    < end of copied text >      [x] Consultant(s) Notes Reviewed: ENT    [] Care Discussed with Consultants/Other Providers:

## 2018-05-05 LAB
BUN SERPL-MCNC: 24 MG/DL — HIGH (ref 7–23)
CALCIUM SERPL-MCNC: 8.6 MG/DL — SIGNIFICANT CHANGE UP (ref 8.4–10.5)
CHLORIDE SERPL-SCNC: 97 MMOL/L — LOW (ref 98–107)
CO2 SERPL-SCNC: 27 MMOL/L — SIGNIFICANT CHANGE UP (ref 22–31)
CREAT SERPL-MCNC: 0.87 MG/DL — SIGNIFICANT CHANGE UP (ref 0.5–1.3)
GLUCOSE SERPL-MCNC: 123 MG/DL — HIGH (ref 70–99)
HBA1C BLD-MCNC: 5.4 % — SIGNIFICANT CHANGE UP (ref 4–5.6)
HCT VFR BLD CALC: 24.8 % — LOW (ref 39–50)
HGB BLD-MCNC: 8 G/DL — LOW (ref 13–17)
MAGNESIUM SERPL-MCNC: 2.4 MG/DL — SIGNIFICANT CHANGE UP (ref 1.6–2.6)
MCHC RBC-ENTMCNC: 30.5 PG — SIGNIFICANT CHANGE UP (ref 27–34)
MCHC RBC-ENTMCNC: 32.3 % — SIGNIFICANT CHANGE UP (ref 32–36)
MCV RBC AUTO: 94.7 FL — SIGNIFICANT CHANGE UP (ref 80–100)
NRBC # FLD: 0.05 — SIGNIFICANT CHANGE UP
PHOSPHATE SERPL-MCNC: 2.4 MG/DL — LOW (ref 2.5–4.5)
PLATELET # BLD AUTO: 494 K/UL — HIGH (ref 150–400)
PMV BLD: 9 FL — SIGNIFICANT CHANGE UP (ref 7–13)
POTASSIUM SERPL-MCNC: 3.8 MMOL/L — SIGNIFICANT CHANGE UP (ref 3.5–5.3)
POTASSIUM SERPL-SCNC: 3.8 MMOL/L — SIGNIFICANT CHANGE UP (ref 3.5–5.3)
RBC # BLD: 2.62 M/UL — LOW (ref 4.2–5.8)
RBC # FLD: 13.9 % — SIGNIFICANT CHANGE UP (ref 10.3–14.5)
SODIUM SERPL-SCNC: 133 MMOL/L — LOW (ref 135–145)
SPECIMEN SOURCE: SIGNIFICANT CHANGE UP
SPECIMEN SOURCE: SIGNIFICANT CHANGE UP
WBC # BLD: 19.23 K/UL — HIGH (ref 3.8–10.5)
WBC # FLD AUTO: 19.23 K/UL — HIGH (ref 3.8–10.5)

## 2018-05-05 PROCEDURE — 71045 X-RAY EXAM CHEST 1 VIEW: CPT | Mod: 26

## 2018-05-05 PROCEDURE — 99233 SBSQ HOSP IP/OBS HIGH 50: CPT

## 2018-05-05 RX ORDER — AMPICILLIN SODIUM AND SULBACTAM SODIUM 250; 125 MG/ML; MG/ML
1.5 INJECTION, POWDER, FOR SUSPENSION INTRAMUSCULAR; INTRAVENOUS EVERY 6 HOURS
Qty: 0 | Refills: 0 | Status: DISCONTINUED | OUTPATIENT
Start: 2018-05-05 | End: 2018-05-09

## 2018-05-05 RX ORDER — SODIUM,POTASSIUM PHOSPHATES 278-250MG
2 POWDER IN PACKET (EA) ORAL ONCE
Qty: 0 | Refills: 0 | Status: COMPLETED | OUTPATIENT
Start: 2018-05-05 | End: 2018-05-05

## 2018-05-05 RX ORDER — ONDANSETRON 8 MG/1
4 TABLET, FILM COATED ORAL EVERY 12 HOURS
Qty: 0 | Refills: 0 | Status: DISCONTINUED | OUTPATIENT
Start: 2018-05-05 | End: 2018-05-09

## 2018-05-05 RX ORDER — HEPARIN SODIUM 5000 [USP'U]/ML
5000 INJECTION INTRAVENOUS; SUBCUTANEOUS EVERY 8 HOURS
Qty: 0 | Refills: 0 | Status: DISCONTINUED | OUTPATIENT
Start: 2018-05-05 | End: 2018-05-09

## 2018-05-05 RX ADMIN — SODIUM CHLORIDE 3 MILLILITER(S): 9 INJECTION INTRAMUSCULAR; INTRAVENOUS; SUBCUTANEOUS at 21:47

## 2018-05-05 RX ADMIN — Medication 100 MILLIGRAM(S): at 21:41

## 2018-05-05 RX ADMIN — OXYCODONE HYDROCHLORIDE 5 MILLIGRAM(S): 5 TABLET ORAL at 03:32

## 2018-05-05 RX ADMIN — HEPARIN SODIUM 5000 UNIT(S): 5000 INJECTION INTRAVENOUS; SUBCUTANEOUS at 21:41

## 2018-05-05 RX ADMIN — AMPICILLIN SODIUM AND SULBACTAM SODIUM 100 GRAM(S): 250; 125 INJECTION, POWDER, FOR SUSPENSION INTRAMUSCULAR; INTRAVENOUS at 01:45

## 2018-05-05 RX ADMIN — AMPICILLIN SODIUM AND SULBACTAM SODIUM 100 GRAM(S): 250; 125 INJECTION, POWDER, FOR SUSPENSION INTRAMUSCULAR; INTRAVENOUS at 18:25

## 2018-05-05 RX ADMIN — SODIUM CHLORIDE 3 MILLILITER(S): 9 INJECTION INTRAMUSCULAR; INTRAVENOUS; SUBCUTANEOUS at 13:24

## 2018-05-05 RX ADMIN — OXYCODONE HYDROCHLORIDE 10 MILLIGRAM(S): 5 TABLET ORAL at 14:00

## 2018-05-05 RX ADMIN — PANTOPRAZOLE SODIUM 40 MILLIGRAM(S): 20 TABLET, DELAYED RELEASE ORAL at 13:22

## 2018-05-05 RX ADMIN — SENNA PLUS 10 MILLILITER(S): 8.6 TABLET ORAL at 21:42

## 2018-05-05 RX ADMIN — Medication 25 MILLIGRAM(S): at 18:25

## 2018-05-05 RX ADMIN — OXYCODONE HYDROCHLORIDE 5 MILLIGRAM(S): 5 TABLET ORAL at 07:56

## 2018-05-05 RX ADMIN — Medication 100 MILLIGRAM(S): at 13:24

## 2018-05-05 RX ADMIN — Medication 25 MILLIGRAM(S): at 07:12

## 2018-05-05 RX ADMIN — GABAPENTIN 300 MILLIGRAM(S): 400 CAPSULE ORAL at 13:24

## 2018-05-05 RX ADMIN — Medication 1 MILLIGRAM(S): at 21:41

## 2018-05-05 RX ADMIN — HYDROMORPHONE HYDROCHLORIDE 1 MILLIGRAM(S): 2 INJECTION INTRAMUSCULAR; INTRAVENOUS; SUBCUTANEOUS at 18:25

## 2018-05-05 RX ADMIN — GABAPENTIN 300 MILLIGRAM(S): 400 CAPSULE ORAL at 07:12

## 2018-05-05 RX ADMIN — Medication 30 MILLILITER(S): at 04:26

## 2018-05-05 RX ADMIN — Medication 1 TABLET(S): at 18:25

## 2018-05-05 RX ADMIN — Medication 2 PACKET(S): at 18:25

## 2018-05-05 RX ADMIN — AMPICILLIN SODIUM AND SULBACTAM SODIUM 100 GRAM(S): 250; 125 INJECTION, POWDER, FOR SUSPENSION INTRAMUSCULAR; INTRAVENOUS at 07:11

## 2018-05-05 RX ADMIN — OXYCODONE HYDROCHLORIDE 5 MILLIGRAM(S): 5 TABLET ORAL at 07:11

## 2018-05-05 RX ADMIN — AMPICILLIN SODIUM AND SULBACTAM SODIUM 100 GRAM(S): 250; 125 INJECTION, POWDER, FOR SUSPENSION INTRAMUSCULAR; INTRAVENOUS at 13:22

## 2018-05-05 RX ADMIN — OXYCODONE HYDROCHLORIDE 5 MILLIGRAM(S): 5 TABLET ORAL at 02:52

## 2018-05-05 RX ADMIN — Medication 1 TABLET(S): at 07:14

## 2018-05-05 RX ADMIN — HEPARIN SODIUM 5000 UNIT(S): 5000 INJECTION INTRAVENOUS; SUBCUTANEOUS at 07:12

## 2018-05-05 RX ADMIN — Medication 81 MILLIGRAM(S): at 13:23

## 2018-05-05 RX ADMIN — ZOLPIDEM TARTRATE 5 MILLIGRAM(S): 10 TABLET ORAL at 23:14

## 2018-05-05 RX ADMIN — AMPICILLIN SODIUM AND SULBACTAM SODIUM 100 GRAM(S): 250; 125 INJECTION, POWDER, FOR SUSPENSION INTRAMUSCULAR; INTRAVENOUS at 23:32

## 2018-05-05 RX ADMIN — GABAPENTIN 300 MILLIGRAM(S): 400 CAPSULE ORAL at 21:41

## 2018-05-05 RX ADMIN — HYDROMORPHONE HYDROCHLORIDE 1 MILLIGRAM(S): 2 INJECTION INTRAMUSCULAR; INTRAVENOUS; SUBCUTANEOUS at 18:40

## 2018-05-05 RX ADMIN — ONDANSETRON 4 MILLIGRAM(S): 8 TABLET, FILM COATED ORAL at 23:15

## 2018-05-05 RX ADMIN — Medication 100 MILLIGRAM(S): at 07:11

## 2018-05-05 RX ADMIN — OXYCODONE HYDROCHLORIDE 10 MILLIGRAM(S): 5 TABLET ORAL at 13:23

## 2018-05-05 RX ADMIN — SODIUM CHLORIDE 3 MILLILITER(S): 9 INJECTION INTRAMUSCULAR; INTRAVENOUS; SUBCUTANEOUS at 07:15

## 2018-05-05 RX ADMIN — HEPARIN SODIUM 5000 UNIT(S): 5000 INJECTION INTRAVENOUS; SUBCUTANEOUS at 13:23

## 2018-05-05 NOTE — PROGRESS NOTE ADULT - ASSESSMENT
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 Medicine consulted for co-management.    *Madibulectomy, reconstruction:  - post-op management per ENT  - palliative care consulted for symptom management    *Fever: associated with leukocytosis, source unclear. improved fever, Leukocytosis worsening  CXR clear. UA not c/w UTI. Large loose BM today, Denies abdominal pain  - currently on Unasyn  - c/w actobacillus for C diff ppx  - monitor for further loose BM's - if continues may be appropriate to send stool C diff test  - f/u cultures  - If worsening WBC and fever, ID ocnsult    *Constipation:  - continue current bowel regimen, would decrease intensity if loose BM's continue  - added Miralax through NG tube daily as needed    *Anemia: likely acute blood loss anemia in setting of recent surgery  - cont to monitor hgb  - transfuse if hgb 7, with it as the goal    *Hyperglycemia:  - A1c 5.3  - denies h/o diabetes  - c/w Blood glucose monitoring      Thanks for the consult. Hospitalist will follow with you.

## 2018-05-05 NOTE — PROVIDER CONTACT NOTE (MEDICATION) - ASSESSMENT
Patient unable to tolerate feeds and is feeling nauseous. Patient received 100 ml of feeding and could not tolerate the full 250 ml bolus feed. Patient also requesting medication for nausea. Patient states that he does not want to be woken up for vitals until am meds are given.

## 2018-05-05 NOTE — PROGRESS NOTE ADULT - SUBJECTIVE AND OBJECTIVE BOX
Plastic	 Surgery    SUBJECTIVE:   Afebrile overnight. Denies pain this morning.  Per EMR, had a brief episode of unresponsiveness yesterday evening; no obvious change in mental status this morning.  OOB in chair.  WBC increased to 19K this AM    Vital Signs Last 24 Hrs  T(C): 37 (05 May 2018 07:07), Max: 37.3 (05 May 2018 02:49)  T(F): 98.6 (05 May 2018 07:07), Max: 99.1 (05 May 2018 02:49)  HR: 96 (05 May 2018 07:07) (87 - 99)  BP: 112/64 (05 May 2018 07:07) (112/64 - 139/65)  BP(mean): --  RR: 18 (05 May 2018 07:07) (18 - 20)  SpO2: 98% (05 May 2018 07:07) (97% - 100%)    PHYSICAL EXAM:   General: NAD, resting in chair; occluding tracheostomy to speak  HEENT: Post surgical edema continues to improve. No erythema of neck.  Intraoral flap suture line intact, mucosalizing well. Pink, soft, 3sCRT. Excellent arterial doppler signal. JPs serous  Extrem: RLE dressing intact (changed yesterday).  STSG donor site hemostatic               Complete Blood Count in AM (05.05.18 @ 05:50)    WBC Count: 19.23 K/uL    RBC Count: 2.62 M/uL    Hemoglobin: 8.0 g/dL    Hematocrit: 24.8 %    Mean Cell Volume: 94.7 fL    Mean Cell Hemoglobin: 30.5 pg    Mean Cell Hemoglobin Conc: 32.3 %    Red Cell Distrib Width: 13.9 %    Platelet Count - Automated: 494 K/uL    MPV: 9.0 fl    Nucleated RBC #: 0.05

## 2018-05-05 NOTE — PROVIDER CONTACT NOTE (MEDICATION) - ACTION/TREATMENT ORDERED:
await order for zofran, ok to stop tube feeds and ok to not wake patient for vitals if patient is asleep.

## 2018-05-05 NOTE — PROGRESS NOTE ADULT - SUBJECTIVE AND OBJECTIVE BOX
Pt seen and examined at bedside. No acute events overnight.     T(C): 37 (05-05-18 @ 07:07), Max: 37.3 (05-05-18 @ 02:49)  HR: 96 (05-05-18 @ 07:07) (89 - 99)  BP: 112/64 (05-05-18 @ 07:07) (112/64 - 139/65)  RR: 18 (05-05-18 @ 07:07) (18 - 20)  SpO2: 98% (05-05-18 @ 07:07) (97% - 100%)    NAD, awake and alert   Breathing comfortably on TC   6CFS in place secured with sutures   OC/OP: flap soft, warm, intactsignal  Neck incision c/d/i with staples, no fluctuance   neck JPs in place with SS output  RLE neurovascularly intact    WBC 19 from 16     A/P: 62M s/p composite resection, b/l SND, trach, and RFFF recon 4/26. Remains stable  -f/u AM CXR  -f/u BCx  -monitor fever curve/WBC  -pain consult: appreciate recs  -routine trach care  -trach collar  -flap checks per PRS   -lovenox/asa   -unasyn   -cont doxazosin  -bolus NGT feeds, free water  -PT: home with services    -[facility] [place]  -lovenox, asa  -OOB to chair with assistance

## 2018-05-05 NOTE — PROGRESS NOTE ADULT - SUBJECTIVE AND OBJECTIVE BOX
Patient is a 62y old  Male who presents with a chief complaint of "I'm having the front part of my jaw removed" (02 May 2018 07:14)      SUBJECTIVE / OVERNIGHT EVENTS: Pt denies fever or constipation Denies pain    MEDICATIONS  (STANDING):  ampicillin/sulbactam  IVPB 1.5 Gram(s) IV Intermittent every 6 hours  aspirin  chewable 81 milliGRAM(s) Oral daily  docusate sodium Liquid 100 milliGRAM(s) Enteral Tube three times a day  doxazosin 1 milliGRAM(s) Oral at bedtime  gabapentin   Solution 300 milliGRAM(s) Oral three times a day  heparin  Injectable 5000 Unit(s) SubCutaneous every 8 hours  lactobacillus acidophilus 1 Tablet(s) Oral every 12 hours  metoprolol tartrate 25 milliGRAM(s) Oral two times a day  pantoprazole  Injectable 40 milliGRAM(s) IV Push daily  senna Syrup 10 milliLiter(s) Oral at bedtime  sodium chloride 0.9% lock flush 3 milliLiter(s) IV Push every 8 hours    MEDICATIONS  (PRN):  acetaminophen    Suspension. 650 milliGRAM(s) Enteral Tube every 6 hours PRN Mild Pain (1 - 3)  aluminum hydroxide/magnesium hydroxide/simethicone Suspension 30 milliLiter(s) Oral every 6 hours PRN Dyspepsia  HYDROmorphone  Injectable 1 milliGRAM(s) IV Push every 4 hours PRN breakthrough  oxyCODONE    Solution 5 milliGRAM(s) Enteral Tube every 4 hours PRN Moderate Pain (4 - 6)  oxyCODONE    Solution 10 milliGRAM(s) Enteral Tube every 4 hours PRN Severe Pain (7 - 10)  polyethylene glycol 3350 17 Gram(s) Oral daily PRN Constipation  zolpidem 5 milliGRAM(s) Oral at bedtime PRN Insomnia  zolpidem 5 milliGRAM(s) Oral at bedtime PRN Insomnia      T(C): 36.9 (18 @ 10:17), Max: 37.3 (18 @ 02:49)  HR: 105 (18 @ 10:17) (89 - 105)  BP: 126/54 (18 @ 10:17) (112/64 - 139/65)  RR: 18 (18 @ 10:17) (18 - 20)  SpO2: 95% (05-05-18 @ 10:17) (95% - 100%)  CAPILLARY BLOOD GLUCOSE        I&O's Summary    04 May 2018 07:  -  05 May 2018 07:00  --------------------------------------------------------  IN: 740 mL / OUT: 2003.5 mL / NET: -1263.5 mL    05 May 2018 07:01  -  05 May 2018 10:44  --------------------------------------------------------  IN: 450 mL / OUT: 445 mL / NET: 5 mL        PHYSICAL EXAM:  GENERAL: NAD,   HEAD:  Normocephalic  EYES: EOMI,  conjunctiva and sclera clear  NECK: + Drain, + Trach collar  HEART:  s1 s2 + Regular rate and rhythm;   ABDOMEN: Soft, Nontender, Nondistended; Bowel sounds present  EXTREMITIES:  RLE dressing  NEURO: AAOx3  SKIn: neck sutures      LABS:                        8.0    19.23 )-----------( 494      ( 05 May 2018 05:50 )             24.8     05-05    133<L>  |  97<L>  |  24<H>  ----------------------------<  123<H>  3.8   |  27  |  0.87    Ca    8.6      05 May 2018 05:50  Phos  2.4     05-05  Mg     2.4     05-05            Urinalysis Basic - ( 04 May 2018 02:00 )    Color: YELLOW / Appearance: CLEAR / S.019 / pH: 8.0  Gluc: NEGATIVE / Ketone: NEGATIVE  / Bili: NEGATIVE / Urobili: 4 mg/dL   Blood: SMALL / Protein: 20 mg/dL / Nitrite: NEGATIVE   Leuk Esterase: NEGATIVE / RBC: 25-50 / WBC 0-2   Sq Epi: x / Non Sq Epi: x / Bacteria: FEW

## 2018-05-05 NOTE — PROGRESS NOTE ADULT - ASSESSMENT
A/P 62M POD 9 from composite mandibulectomy with free fibula reconstruction  - leave thigh donor site open to air; currently has dried clot overlying donor site.  May begin applying aquaphor to periphery   -STSG on leg qod xeroform/abd/ace  - VTE ppx  - OOB, ambulate with CAM boot  - f/up cultures, monitor for source of rising WBC  -Swallow Monday per ENT  - Care per ENT    Adriana Trinidad PGY2

## 2018-05-06 LAB
BUN SERPL-MCNC: 25 MG/DL — HIGH (ref 7–23)
CALCIUM SERPL-MCNC: 8.7 MG/DL — SIGNIFICANT CHANGE UP (ref 8.4–10.5)
CHLORIDE SERPL-SCNC: 98 MMOL/L — SIGNIFICANT CHANGE UP (ref 98–107)
CO2 SERPL-SCNC: 25 MMOL/L — SIGNIFICANT CHANGE UP (ref 22–31)
CREAT SERPL-MCNC: 0.92 MG/DL — SIGNIFICANT CHANGE UP (ref 0.5–1.3)
GLUCOSE SERPL-MCNC: 116 MG/DL — HIGH (ref 70–99)
HCT VFR BLD CALC: 23.1 % — LOW (ref 39–50)
HGB BLD-MCNC: 7.2 G/DL — LOW (ref 13–17)
MAGNESIUM SERPL-MCNC: 2.3 MG/DL — SIGNIFICANT CHANGE UP (ref 1.6–2.6)
MCHC RBC-ENTMCNC: 30.3 PG — SIGNIFICANT CHANGE UP (ref 27–34)
MCHC RBC-ENTMCNC: 31.2 % — LOW (ref 32–36)
MCV RBC AUTO: 97.1 FL — SIGNIFICANT CHANGE UP (ref 80–100)
NRBC # FLD: 0.02 — SIGNIFICANT CHANGE UP
PHOSPHATE SERPL-MCNC: 3.3 MG/DL — SIGNIFICANT CHANGE UP (ref 2.5–4.5)
PLATELET # BLD AUTO: 479 K/UL — HIGH (ref 150–400)
PMV BLD: 9.3 FL — SIGNIFICANT CHANGE UP (ref 7–13)
POTASSIUM SERPL-MCNC: 4.1 MMOL/L — SIGNIFICANT CHANGE UP (ref 3.5–5.3)
POTASSIUM SERPL-SCNC: 4.1 MMOL/L — SIGNIFICANT CHANGE UP (ref 3.5–5.3)
RBC # BLD: 2.38 M/UL — LOW (ref 4.2–5.8)
RBC # FLD: 14.4 % — SIGNIFICANT CHANGE UP (ref 10.3–14.5)
SODIUM SERPL-SCNC: 136 MMOL/L — SIGNIFICANT CHANGE UP (ref 135–145)
WBC # BLD: 16.95 K/UL — HIGH (ref 3.8–10.5)
WBC # FLD AUTO: 16.95 K/UL — HIGH (ref 3.8–10.5)

## 2018-05-06 PROCEDURE — 99233 SBSQ HOSP IP/OBS HIGH 50: CPT

## 2018-05-06 RX ADMIN — ZOLPIDEM TARTRATE 5 MILLIGRAM(S): 10 TABLET ORAL at 00:42

## 2018-05-06 RX ADMIN — Medication 1 TABLET(S): at 18:02

## 2018-05-06 RX ADMIN — GABAPENTIN 300 MILLIGRAM(S): 400 CAPSULE ORAL at 06:38

## 2018-05-06 RX ADMIN — HEPARIN SODIUM 5000 UNIT(S): 5000 INJECTION INTRAVENOUS; SUBCUTANEOUS at 06:39

## 2018-05-06 RX ADMIN — Medication 25 MILLIGRAM(S): at 18:02

## 2018-05-06 RX ADMIN — SODIUM CHLORIDE 3 MILLILITER(S): 9 INJECTION INTRAMUSCULAR; INTRAVENOUS; SUBCUTANEOUS at 06:36

## 2018-05-06 RX ADMIN — AMPICILLIN SODIUM AND SULBACTAM SODIUM 100 GRAM(S): 250; 125 INJECTION, POWDER, FOR SUSPENSION INTRAMUSCULAR; INTRAVENOUS at 06:38

## 2018-05-06 RX ADMIN — AMPICILLIN SODIUM AND SULBACTAM SODIUM 100 GRAM(S): 250; 125 INJECTION, POWDER, FOR SUSPENSION INTRAMUSCULAR; INTRAVENOUS at 18:02

## 2018-05-06 RX ADMIN — OXYCODONE HYDROCHLORIDE 5 MILLIGRAM(S): 5 TABLET ORAL at 13:27

## 2018-05-06 RX ADMIN — SODIUM CHLORIDE 3 MILLILITER(S): 9 INJECTION INTRAMUSCULAR; INTRAVENOUS; SUBCUTANEOUS at 22:42

## 2018-05-06 RX ADMIN — PANTOPRAZOLE SODIUM 40 MILLIGRAM(S): 20 TABLET, DELAYED RELEASE ORAL at 12:27

## 2018-05-06 RX ADMIN — ZOLPIDEM TARTRATE 5 MILLIGRAM(S): 10 TABLET ORAL at 22:55

## 2018-05-06 RX ADMIN — OXYCODONE HYDROCHLORIDE 5 MILLIGRAM(S): 5 TABLET ORAL at 21:14

## 2018-05-06 RX ADMIN — GABAPENTIN 300 MILLIGRAM(S): 400 CAPSULE ORAL at 12:27

## 2018-05-06 RX ADMIN — Medication 1 TABLET(S): at 06:38

## 2018-05-06 RX ADMIN — HEPARIN SODIUM 5000 UNIT(S): 5000 INJECTION INTRAVENOUS; SUBCUTANEOUS at 13:06

## 2018-05-06 RX ADMIN — GABAPENTIN 300 MILLIGRAM(S): 400 CAPSULE ORAL at 22:55

## 2018-05-06 RX ADMIN — Medication 1 MILLIGRAM(S): at 22:55

## 2018-05-06 RX ADMIN — AMPICILLIN SODIUM AND SULBACTAM SODIUM 100 GRAM(S): 250; 125 INJECTION, POWDER, FOR SUSPENSION INTRAMUSCULAR; INTRAVENOUS at 12:27

## 2018-05-06 RX ADMIN — OXYCODONE HYDROCHLORIDE 5 MILLIGRAM(S): 5 TABLET ORAL at 12:27

## 2018-05-06 RX ADMIN — Medication 81 MILLIGRAM(S): at 12:26

## 2018-05-06 RX ADMIN — OXYCODONE HYDROCHLORIDE 5 MILLIGRAM(S): 5 TABLET ORAL at 21:44

## 2018-05-06 RX ADMIN — SODIUM CHLORIDE 3 MILLILITER(S): 9 INJECTION INTRAMUSCULAR; INTRAVENOUS; SUBCUTANEOUS at 13:06

## 2018-05-06 NOTE — PROVIDER CONTACT NOTE (OTHER) - ACTION/TREATMENT ORDERED:
await orders for blood cultures and ua
Continue to monitor. Provider aware.
Continue to monitor. Provider aware.
await order for zofran and stop tube feeds. ok to not wake up patient for vitals
ok to hold tube feeds as patient will receive feeding in the morning. recheck temperature in 1 hour.
ok to not administer feeds

## 2018-05-06 NOTE — PROVIDER CONTACT NOTE (OTHER) - DATE AND TIME:
03-May-2018 06:45
04-May-2018 00:20
04-May-2018 17:45
05-May-2018 22:50
06-May-2018 07:00
04-May-2018 01:36

## 2018-05-06 NOTE — PROGRESS NOTE ADULT - SUBJECTIVE AND OBJECTIVE BOX
Pt seen and examined at bedside. No acute events overnight.     Vital Signs Last 24 Hrs  T(C): 37.1 (06 May 2018 06:32), Max: 37.1 (06 May 2018 00:40)  T(F): 98.7 (06 May 2018 06:32), Max: 98.8 (06 May 2018 00:40)  HR: 97 (06 May 2018 06:32) (83 - 105)  BP: 109/68 (06 May 2018 06:32) (108/54 - 126/54)  BP(mean): --    NAD, awake and alert   Breathing comfortably on TC   6CFS in place secured with sutures   OC/OP: flap soft, warm, intactsignal  Neck incision c/d/i with staples, no fluctuance   neck JPs in place with SS output  RLE neurovascularly intact    WBC pending  CXR from 5/5 neg for pulmonary consolidation    A/P: 62M s/p composite resection, b/l SND, trach, and RFFF recon 4/26. Remains stable  -f/u BCx  -monitor fever curve/WBC  -pain consult: appreciate recs  -routine trach care  -trach collar  -flap checks per PRS   -lovenox/asa   -unasyn   -cont doxazosin  -bolus NGT feeds, free water  -PT: home with services    [place] [place]  -lovenox, asa  -OOB to chair with assistance

## 2018-05-06 NOTE — PROGRESS NOTE ADULT - ASSESSMENT
A/P 62M s/p composite mandibulectomy with free fibula reconstruction (POD 10)  - leave thigh donor site open to air; currently has dried clot overlying donor site.  May begin applying aquaphor to periphery   - STSG on leg qod xeroform/abd/ace  - VTE ppx  - OOB, ambulate with CAM boot  - f/up cultures, monitor for source of rising WBC  - Swallow Monday per ENT  - Care per ENT

## 2018-05-06 NOTE — PROGRESS NOTE ADULT - ASSESSMENT
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 Medicine consulted for co-management.    *Madibulectomy, reconstruction:  - post-op management per ENT  - palliative care consulted for symptom management    *Fever: associated with leukocytosis, source unclear. improved fever, Leukocytosis mildly improving  CXR clear. UA not c/w UTI. Large loose BM today, Denies abdominal pain,  - Cx, NGTD  - currently on Unasyn  - c/w actobacillus for C diff ppx  - monitor for further loose BM's - if continues may be appropriate to send stool C diff test  - If worsening WBC and fever, ID ocnsult    *Constipation:  - continue current bowel regimen, would decrease intensity if loose BM's continue  - added Miralax through NG tube daily as needed    *Anemia: likely acute blood loss anemia in setting of recent surgery  - cont to monitor hgb  - transfuse if hgb 7, with it as the goal    *Hyperglycemia:  - A1c 5.3  - denies h/o diabetes  - c/w Blood glucose monitoring    * Dysphagia    - RD consult for feed eval        Thanks for the consult. Hospitalist will follow with you.

## 2018-05-06 NOTE — PROVIDER CONTACT NOTE (OTHER) - REASON
episode of shaking
episode of shaking/ unresponsiveness
patient refusing tube feeds
patient refusing tube feeds and temp is 100.6
patient unable to tolerate feeds and is feeling nauseous
patient temp 100.9

## 2018-05-06 NOTE — PROVIDER CONTACT NOTE (OTHER) - BACKGROUND
patient admitted for malignant neoplasm of the oral cavity
Pt. has hax malignant neoplasm of mouth. S/p BL neck dissection and mandibular replacement. Pt. has cuff-deflated trach.
Pt. has hx malignant neoplasm of mouth. S/p BL neck dissection and mandibular replacement. Pt. has cuff-deflated trach.
patient admitted for malignant neoplasm of the oral cavity

## 2018-05-06 NOTE — PROVIDER CONTACT NOTE (OTHER) - NAME OF MD/NP/PA/DO NOTIFIED:
Dr. Bobby Velasquez
[facility] (during rounds)
[place] [id number]
dr. Tej Curtis
dr. Tej Curtis
dr. irene smith

## 2018-05-06 NOTE — PROVIDER CONTACT NOTE (OTHER) - SITUATION
after suctioning trach, pt. coughed for 5 seconds, began shaking and did not respond to verbal commands for 8-10 seconds. Other RNs came to assess. Pt. had no recollection of event.
after suctioning trach, pt. coughed, began shaking and did not respond to verbal commands for several seconds.  Pt. had no recollection of event.
patient refusing tube feeds
patient refusing tube feeds and temp is 100.6
patient unable to tolerate feeds and is feeling nauseous
patient temp 100.9

## 2018-05-06 NOTE — PROVIDER CONTACT NOTE (OTHER) - RECOMMENDATIONS
await orders from ent
await order for zofran and stop tube feeds
do not administer feeds
hold tube feeds and continue to monitor temp

## 2018-05-06 NOTE — PROGRESS NOTE ADULT - SUBJECTIVE AND OBJECTIVE BOX
Plastic Surgery Progress Note (pg [facility]: [id number], [facility]: [phone])    SUBJECTIVE:  Doing well. No overnight events. Tolerating ambulation w/ boot, pain controlled; NOT tolerating TF's well, says they cause significant abdominal discomfort (nausea), and requesting change in brand.    OBJECTIVE:     ** VITAL SIGNS / I&O's **    Vital Signs Last 24 Hrs  T(C): 37.1 (06 May 2018 06:32), Max: 37.1 (06 May 2018 00:40)  T(F): 98.7 (06 May 2018 06:32), Max: 98.8 (06 May 2018 00:40)  HR: 97 (06 May 2018 06:32) (83 - 105)  BP: 109/68 (06 May 2018 06:32) (108/54 - 126/54)  BP(mean): --  RR: 18 (06 May 2018 06:32) (18 - 20)  SpO2: 100% (06 May 2018 06:32) (95% - 100%)      05 May 2018 07:01  -  06 May 2018 07:00  --------------------------------------------------------  IN:    Free Water: 800 mL    IV PiggyBack: 200 mL    Pivot: 600 mL  Total IN: 1600 mL    OUT:    Bulb: 15.5 mL    Bulb: 14 mL    Indwelling Catheter - Urethral: 1530 mL  Total OUT: 1559.5 mL    Total NET: 40.5 mL          ** PHYSICAL EXAM **    -- CONSTITUTIONAL: AOx3. NAD.   -- HEENT: Flap pink/soft/viable; incision c/d/i; +doppler  -- NECK: Incision c/d/i, no collections, JPs scant ss  -- EXTREMITIES: R thigh STSG donor site healing appropriately, fibula donor site dressing c/d/i      **MEDS**  acetaminophen    Suspension. 650 milliGRAM(s) Enteral Tube every 6 hours PRN  aluminum hydroxide/magnesium hydroxide/simethicone Suspension 30 milliLiter(s) Oral every 6 hours PRN  ampicillin/sulbactam  IVPB 1.5 Gram(s) IV Intermittent every 6 hours  aspirin  chewable 81 milliGRAM(s) Oral daily  bisacodyl Suppository 10 milliGRAM(s) Rectal daily PRN  docusate sodium Liquid 100 milliGRAM(s) Enteral Tube three times a day  doxazosin 1 milliGRAM(s) Oral at bedtime  gabapentin   Solution 300 milliGRAM(s) Oral three times a day  heparin  Injectable 5000 Unit(s) SubCutaneous every 8 hours  lactobacillus acidophilus 1 Tablet(s) Oral every 12 hours  metoprolol tartrate 25 milliGRAM(s) Oral two times a day  ondansetron Injectable 4 milliGRAM(s) IV Push every 12 hours PRN  oxyCODONE    Solution 5 milliGRAM(s) Enteral Tube every 4 hours PRN  oxyCODONE    Solution 10 milliGRAM(s) Enteral Tube every 4 hours PRN  pantoprazole  Injectable 40 milliGRAM(s) IV Push daily  polyethylene glycol 3350 17 Gram(s) Oral daily PRN  senna Syrup 10 milliLiter(s) Oral at bedtime  sodium chloride 0.9% lock flush 3 milliLiter(s) IV Push every 8 hours  zolpidem 5 milliGRAM(s) Oral at bedtime PRN  zolpidem 5 milliGRAM(s) Oral at bedtime PRN      ** LABS **                          7.2    16.95 )-----------( 479      ( 06 May 2018 06:25 )             23.1     06 May 2018 06:25    136    |  98     |  25     ----------------------------<  116    4.1     |  25     |  0.92     Ca    8.7        06 May 2018 06:25  Phos  3.3       06 May 2018 06:25  Mg     2.3       06 May 2018 06:25        CAPILLARY BLOOD GLUCOSE

## 2018-05-06 NOTE — PROGRESS NOTE ADULT - SUBJECTIVE AND OBJECTIVE BOX
Patient is a 62y old  Male who presents with a chief complaint of "I'm having the front part of my jaw removed" (02 May 2018 07:14)      SUBJECTIVE / OVERNIGHT EVENTS: Pt denies fever, reports nausea and discomfort with current feed rate/type    MEDICATIONS  (STANDING):  ampicillin/sulbactam  IVPB 1.5 Gram(s) IV Intermittent every 6 hours  aspirin  chewable 81 milliGRAM(s) Oral daily  docusate sodium Liquid 100 milliGRAM(s) Enteral Tube three times a day  doxazosin 1 milliGRAM(s) Oral at bedtime  gabapentin   Solution 300 milliGRAM(s) Oral three times a day  heparin  Injectable 5000 Unit(s) SubCutaneous every 8 hours  lactobacillus acidophilus 1 Tablet(s) Oral every 12 hours  metoprolol tartrate 25 milliGRAM(s) Oral two times a day  pantoprazole  Injectable 40 milliGRAM(s) IV Push daily  senna Syrup 10 milliLiter(s) Oral at bedtime  sodium chloride 0.9% lock flush 3 milliLiter(s) IV Push every 8 hours    MEDICATIONS  (PRN):  acetaminophen    Suspension. 650 milliGRAM(s) Enteral Tube every 6 hours PRN Mild Pain (1 - 3)  aluminum hydroxide/magnesium hydroxide/simethicone Suspension 30 milliLiter(s) Oral every 6 hours PRN Dyspepsia  bisacodyl Suppository 10 milliGRAM(s) Rectal daily PRN Constipation  ondansetron Injectable 4 milliGRAM(s) IV Push every 12 hours PRN Nausea and/or Vomiting  oxyCODONE    Solution 5 milliGRAM(s) Enteral Tube every 4 hours PRN Moderate Pain (4 - 6)  oxyCODONE    Solution 10 milliGRAM(s) Enteral Tube every 4 hours PRN Severe Pain (7 - 10)  polyethylene glycol 3350 17 Gram(s) Oral daily PRN Constipation  zolpidem 5 milliGRAM(s) Oral at bedtime PRN Insomnia  zolpidem 5 milliGRAM(s) Oral at bedtime PRN Insomnia      T(C): 37.4 (05-06-18 @ 10:00), Max: 37.4 (05-06-18 @ 10:00)  HR: 89 (05-06-18 @ 10:00) (83 - 104)  BP: 114/71 (05-06-18 @ 10:00) (108/54 - 123/83)  RR: 14 (05-06-18 @ 10:00) (14 - 20)  SpO2: 99% (05-06-18 @ 10:00) (99% - 100%)  CAPILLARY BLOOD GLUCOSE        I&O's Summary    05 May 2018 07:01  -  06 May 2018 07:00  --------------------------------------------------------  IN: 1600 mL / OUT: 1559.5 mL / NET: 40.5 mL        PHYSICAL EXAM:  GENERAL: NAD,   HEAD:  Normocephalic  EYES: EOMI,  conjunctiva and sclera clear  NECK: + trach  CHEST/LUNG: Clear to auscultation bilaterally; No wheeze  HEART:  s1 s2 + Regular rate and rhythm;   ABDOMEN: Soft, Nontender, Nondistended; Bowel sounds present  SKIn: +sutures      LABS:                        7.2    16.95 )-----------( 479      ( 06 May 2018 06:25 )             23.1     05-06    136  |  98  |  25<H>  ----------------------------<  116<H>  4.1   |  25  |  0.92    Ca    8.7      06 May 2018 06:25  Phos  3.3     05-06  Mg     2.3     05-06

## 2018-05-06 NOTE — PROVIDER CONTACT NOTE (OTHER) - ASSESSMENT
patient refusing tube feeds and temp is 100.9 at this time
VS stable (see flowsheet). Pt. in no acute distress after episode. Pt. appears pale. No respiratory distress. Pt has no recollection of event.
VS stable (see flowsheet). Pt. in no acute distress. No respiratory distress. Pt has no recollection of event, writes "I don't remember you suctioning me and then there were 4 of you in here"
patient refusing tube feeds and temp is 100.6. Patient states that he wants to go to sleep and that he does not want to receive the tube feeds at this time, last tube feed given at 1800 per day shift.
patient refusing tube feeds at this time. Patient states that it makes him feel nauseous
patient unable to tolerate feeds and is feeling nauseous. Patient received 100 ml of feeding and was unable to tolerate the full 250 ml. Patient also requesting medication fro zofran. Patient is requesting to not be woken up for vitals when sleeping

## 2018-05-07 LAB
BUN SERPL-MCNC: 24 MG/DL — HIGH (ref 7–23)
CALCIUM SERPL-MCNC: 8.7 MG/DL — SIGNIFICANT CHANGE UP (ref 8.4–10.5)
CHLORIDE SERPL-SCNC: 95 MMOL/L — LOW (ref 98–107)
CO2 SERPL-SCNC: 26 MMOL/L — SIGNIFICANT CHANGE UP (ref 22–31)
CREAT SERPL-MCNC: 0.88 MG/DL — SIGNIFICANT CHANGE UP (ref 0.5–1.3)
GLUCOSE SERPL-MCNC: 111 MG/DL — HIGH (ref 70–99)
HCT VFR BLD CALC: 24.3 % — LOW (ref 39–50)
HGB BLD-MCNC: 7.7 G/DL — LOW (ref 13–17)
MAGNESIUM SERPL-MCNC: 2.3 MG/DL — SIGNIFICANT CHANGE UP (ref 1.6–2.6)
MCHC RBC-ENTMCNC: 30.2 PG — SIGNIFICANT CHANGE UP (ref 27–34)
MCHC RBC-ENTMCNC: 31.7 % — LOW (ref 32–36)
MCV RBC AUTO: 95.3 FL — SIGNIFICANT CHANGE UP (ref 80–100)
NRBC # FLD: 0 — SIGNIFICANT CHANGE UP
PHOSPHATE SERPL-MCNC: 3.9 MG/DL — SIGNIFICANT CHANGE UP (ref 2.5–4.5)
PLATELET # BLD AUTO: 554 K/UL — HIGH (ref 150–400)
PMV BLD: 8.9 FL — SIGNIFICANT CHANGE UP (ref 7–13)
POTASSIUM SERPL-MCNC: 4 MMOL/L — SIGNIFICANT CHANGE UP (ref 3.5–5.3)
POTASSIUM SERPL-SCNC: 4 MMOL/L — SIGNIFICANT CHANGE UP (ref 3.5–5.3)
RBC # BLD: 2.55 M/UL — LOW (ref 4.2–5.8)
RBC # FLD: 14.3 % — SIGNIFICANT CHANGE UP (ref 10.3–14.5)
SODIUM SERPL-SCNC: 134 MMOL/L — LOW (ref 135–145)
WBC # BLD: 16.11 K/UL — HIGH (ref 3.8–10.5)
WBC # FLD AUTO: 16.11 K/UL — HIGH (ref 3.8–10.5)

## 2018-05-07 PROCEDURE — 99233 SBSQ HOSP IP/OBS HIGH 50: CPT

## 2018-05-07 PROCEDURE — 99233 SBSQ HOSP IP/OBS HIGH 50: CPT | Mod: GC

## 2018-05-07 PROCEDURE — 74230 X-RAY XM SWLNG FUNCJ C+: CPT | Mod: 26

## 2018-05-07 RX ORDER — POLYETHYLENE GLYCOL 3350 17 G/17G
17 POWDER, FOR SOLUTION ORAL DAILY
Qty: 0 | Refills: 0 | Status: DISCONTINUED | OUTPATIENT
Start: 2018-05-07 | End: 2018-05-09

## 2018-05-07 RX ORDER — MORPHINE SULFATE 50 MG/1
2 CAPSULE, EXTENDED RELEASE ORAL EVERY 4 HOURS
Qty: 0 | Refills: 0 | Status: DISCONTINUED | OUTPATIENT
Start: 2018-05-07 | End: 2018-05-07

## 2018-05-07 RX ORDER — OXYCODONE HYDROCHLORIDE 5 MG/1
10 TABLET ORAL EVERY 4 HOURS
Qty: 0 | Refills: 0 | Status: DISCONTINUED | OUTPATIENT
Start: 2018-05-07 | End: 2018-05-07

## 2018-05-07 RX ORDER — OXYCODONE AND ACETAMINOPHEN 5; 325 MG/1; MG/1
2 TABLET ORAL EVERY 4 HOURS
Qty: 0 | Refills: 0 | Status: DISCONTINUED | OUTPATIENT
Start: 2018-05-07 | End: 2018-05-09

## 2018-05-07 RX ORDER — DOXAZOSIN MESYLATE 4 MG
1 TABLET ORAL AT BEDTIME
Qty: 0 | Refills: 0 | Status: DISCONTINUED | OUTPATIENT
Start: 2018-05-07 | End: 2018-05-09

## 2018-05-07 RX ORDER — OXYCODONE AND ACETAMINOPHEN 5; 325 MG/1; MG/1
1 TABLET ORAL EVERY 4 HOURS
Qty: 0 | Refills: 0 | Status: DISCONTINUED | OUTPATIENT
Start: 2018-05-07 | End: 2018-05-09

## 2018-05-07 RX ORDER — DOCUSATE SODIUM 100 MG
100 CAPSULE ORAL THREE TIMES A DAY
Qty: 0 | Refills: 0 | Status: DISCONTINUED | OUTPATIENT
Start: 2018-05-07 | End: 2018-05-09

## 2018-05-07 RX ORDER — OXYCODONE HYDROCHLORIDE 5 MG/1
5 TABLET ORAL EVERY 4 HOURS
Qty: 0 | Refills: 0 | Status: DISCONTINUED | OUTPATIENT
Start: 2018-05-07 | End: 2018-05-07

## 2018-05-07 RX ORDER — MORPHINE SULFATE 50 MG/1
4 CAPSULE, EXTENDED RELEASE ORAL EVERY 4 HOURS
Qty: 0 | Refills: 0 | Status: DISCONTINUED | OUTPATIENT
Start: 2018-05-07 | End: 2018-05-07

## 2018-05-07 RX ORDER — METOPROLOL TARTRATE 50 MG
25 TABLET ORAL
Qty: 0 | Refills: 0 | Status: DISCONTINUED | OUTPATIENT
Start: 2018-05-07 | End: 2018-05-09

## 2018-05-07 RX ORDER — GABAPENTIN 400 MG/1
300 CAPSULE ORAL THREE TIMES A DAY
Qty: 0 | Refills: 0 | Status: DISCONTINUED | OUTPATIENT
Start: 2018-05-07 | End: 2018-05-09

## 2018-05-07 RX ORDER — ACETAMINOPHEN 500 MG
650 TABLET ORAL EVERY 6 HOURS
Qty: 0 | Refills: 0 | Status: DISCONTINUED | OUTPATIENT
Start: 2018-05-07 | End: 2018-05-09

## 2018-05-07 RX ORDER — SENNA PLUS 8.6 MG/1
10 TABLET ORAL AT BEDTIME
Qty: 0 | Refills: 0 | Status: DISCONTINUED | OUTPATIENT
Start: 2018-05-07 | End: 2018-05-09

## 2018-05-07 RX ORDER — ACETAMINOPHEN 500 MG
650 TABLET ORAL EVERY 6 HOURS
Qty: 0 | Refills: 0 | Status: DISCONTINUED | OUTPATIENT
Start: 2018-05-07 | End: 2018-05-07

## 2018-05-07 RX ADMIN — SODIUM CHLORIDE 3 MILLILITER(S): 9 INJECTION INTRAMUSCULAR; INTRAVENOUS; SUBCUTANEOUS at 07:24

## 2018-05-07 RX ADMIN — MORPHINE SULFATE 4 MILLIGRAM(S): 50 CAPSULE, EXTENDED RELEASE ORAL at 15:20

## 2018-05-07 RX ADMIN — ZOLPIDEM TARTRATE 5 MILLIGRAM(S): 10 TABLET ORAL at 00:01

## 2018-05-07 RX ADMIN — AMPICILLIN SODIUM AND SULBACTAM SODIUM 100 GRAM(S): 250; 125 INJECTION, POWDER, FOR SUSPENSION INTRAMUSCULAR; INTRAVENOUS at 18:52

## 2018-05-07 RX ADMIN — Medication 25 MILLIGRAM(S): at 07:36

## 2018-05-07 RX ADMIN — OXYCODONE HYDROCHLORIDE 5 MILLIGRAM(S): 5 TABLET ORAL at 11:06

## 2018-05-07 RX ADMIN — GABAPENTIN 300 MILLIGRAM(S): 400 CAPSULE ORAL at 07:37

## 2018-05-07 RX ADMIN — AMPICILLIN SODIUM AND SULBACTAM SODIUM 100 GRAM(S): 250; 125 INJECTION, POWDER, FOR SUSPENSION INTRAMUSCULAR; INTRAVENOUS at 13:39

## 2018-05-07 RX ADMIN — OXYCODONE AND ACETAMINOPHEN 2 TABLET(S): 5; 325 TABLET ORAL at 23:48

## 2018-05-07 RX ADMIN — AMPICILLIN SODIUM AND SULBACTAM SODIUM 100 GRAM(S): 250; 125 INJECTION, POWDER, FOR SUSPENSION INTRAMUSCULAR; INTRAVENOUS at 00:02

## 2018-05-07 RX ADMIN — MORPHINE SULFATE 4 MILLIGRAM(S): 50 CAPSULE, EXTENDED RELEASE ORAL at 16:20

## 2018-05-07 RX ADMIN — Medication 1 MILLIGRAM(S): at 22:43

## 2018-05-07 RX ADMIN — SODIUM CHLORIDE 3 MILLILITER(S): 9 INJECTION INTRAMUSCULAR; INTRAVENOUS; SUBCUTANEOUS at 22:32

## 2018-05-07 RX ADMIN — Medication 1 TABLET(S): at 18:56

## 2018-05-07 RX ADMIN — GABAPENTIN 300 MILLIGRAM(S): 400 CAPSULE ORAL at 22:43

## 2018-05-07 RX ADMIN — Medication 30 MILLILITER(S): at 22:43

## 2018-05-07 RX ADMIN — OXYCODONE HYDROCHLORIDE 10 MILLIGRAM(S): 5 TABLET ORAL at 02:21

## 2018-05-07 RX ADMIN — SODIUM CHLORIDE 3 MILLILITER(S): 9 INJECTION INTRAMUSCULAR; INTRAVENOUS; SUBCUTANEOUS at 13:32

## 2018-05-07 RX ADMIN — Medication 25 MILLIGRAM(S): at 18:52

## 2018-05-07 RX ADMIN — AMPICILLIN SODIUM AND SULBACTAM SODIUM 100 GRAM(S): 250; 125 INJECTION, POWDER, FOR SUSPENSION INTRAMUSCULAR; INTRAVENOUS at 23:49

## 2018-05-07 RX ADMIN — AMPICILLIN SODIUM AND SULBACTAM SODIUM 100 GRAM(S): 250; 125 INJECTION, POWDER, FOR SUSPENSION INTRAMUSCULAR; INTRAVENOUS at 07:36

## 2018-05-07 RX ADMIN — GABAPENTIN 300 MILLIGRAM(S): 400 CAPSULE ORAL at 13:38

## 2018-05-07 RX ADMIN — Medication 1 TABLET(S): at 07:36

## 2018-05-07 RX ADMIN — PANTOPRAZOLE SODIUM 40 MILLIGRAM(S): 20 TABLET, DELAYED RELEASE ORAL at 13:38

## 2018-05-07 RX ADMIN — Medication 81 MILLIGRAM(S): at 13:38

## 2018-05-07 RX ADMIN — OXYCODONE HYDROCHLORIDE 5 MILLIGRAM(S): 5 TABLET ORAL at 10:06

## 2018-05-07 RX ADMIN — OXYCODONE HYDROCHLORIDE 10 MILLIGRAM(S): 5 TABLET ORAL at 02:51

## 2018-05-07 NOTE — PROGRESS NOTE ADULT - ASSESSMENT
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 Medicine consulted for co-management.    *Madibulectomy, reconstruction:  - post-op management per ENT  - palliative care consulted for symptom management    *Fever: associated with leukocytosis, source unclear  - now afebrile  - cultures negative  - on abx per primary service  - c/w lactobacillus for C diff ppx    *Constipation:  - continue current bowel regimen, would decrease intensity if loose BM's continue  - added Miralax through NG tube daily as needed    *Anemia: likely acute blood loss anemia in setting of recent surgery  - no obvious blood loss other than minimal loss through drains  - cont to monitor hgb  - transfuse if hgb 7, with it as the goal    *Hyperglycemia:  - A1c 5.3  - denies h/o diabetes  - c/w Blood glucose monitoring    * Dysphagia    - RD consult for feed eval        Thanks for the consult. Hospitalist will follow with you.

## 2018-05-07 NOTE — PROGRESS NOTE ADULT - SUBJECTIVE AND OBJECTIVE BOX
Pt seen and examined at bedside. No acute events overnight.     NAD, awake and alert   Breathing comfortably on TC   6CFS in place secured with sutures   OC/OP: flap soft, warm, intactsignal  Neck incision c/d/i with staples, no fluctuance   neck JPs in place with SS output  RLE neurovascularly intact      A/P: 62M s/p composite resection, b/l SND, trach, and RFFF recon 4/26. Remains stable  -f/u BCx: ngtd   -monitor fever curve/WBC  -pain consult: appreciate recs  -routine trach care  -trach collar  -flap checks per PRS   -lovenox/asa   -unasyn   -cont doxazosin  -bolus NGT feeds, free water  -PT: home with services    -[facility] [place] monday  -lovenox, asa  -OOB to chair with assistance

## 2018-05-07 NOTE — SWALLOW VFSS/MBS ASSESSMENT ADULT - PHARYNGEAL PHASE COMMENTS
adequate initiation of the pharyngeal swallow, adequate laryngeal elevation, reduced tongue base retraction resulting in trace/mild vallecular residue post swallow and adequate pharyngeal constriction adequate initiation of the pharyngeal swallow, adequate laryngeal elevation, reduced tongue base retraction, adequate pharyngeal constriction delayed initiation of the pharyngeal swallow, adequate laryngeal elevation, adequate tongue base retraction, adequate pharyngeal constriction delayed initiation of the pharyngeal swallow, reduced laryngeal elevation, reduced tongue base retraction, adequate pharyngeal constriction

## 2018-05-07 NOTE — SWALLOW VFSS/MBS ASSESSMENT ADULT - ROSENBEK'S PENETRATION ASPIRATION SCALE
(1) no aspiration, contrast does not enter airway (2) contrast enters airway, remains above the vocal cords, no residue remains (penetration) 2 & 3

## 2018-05-07 NOTE — SWALLOW VFSS/MBS ASSESSMENT ADULT - DIAGNOSTIC IMPRESSIONS
Patient presents Moderate Oral Stage and Mild to Moderate Pharyngeal Stage Dysphagia. The Oral Stage is characterized by reduced oral containment for cup sip self administered presentation with anterior loss/spillage on the right side of the oral cavity due to reduced lip seal/closure, no anterior loss for straw sip presentation, slow bolus manipulation, slow tongue motion with slow anterior to posterior transfer for puree bolus; piecemeal deglutition for all PO consistencies; with trace/mild oral clearance deficits post swallow. A liquid wash benefits to assist with oral clearance.  The Pharyngeal Stage is characterized by delayed initiation of the pharyngeal swallow (bolus head is at the laryngeal surface of epiglottis for Thin Liquids), reduced laryngeal elevation with incomplete laryngeal vestibular closure, reduced tongue base retraction resulting in trace/mild vallecular residue post primary swallow and adequate pharyngeal constriction.  There is Laryngeal Penetration during the swallow for Nectar Thick Liquids with retrieval and airway protection maintained. There is Laryngeal Penetration during the swallow for Thin Liquids without complete retrieval leaving trace residue in the laryngeal vestibule. Patient appears with adequate laryngeal sensation given reflexive cough response.  There was No Aspiration observed before, during or after the swallow for puree/honey thick liquids. Patient presents Moderate Oral Stage and Mild to Moderate Pharyngeal Stage Dysphagia. The Oral Stage is characterized by reduced oral containment for cup sip self administered presentation with anterior loss/spillage on the right side of the oral cavity due to reduced lip seal/closure, no anterior loss for straw sip presentation with ability to retrieve/suck with straw use, slow bolus manipulation, slow/delayed tongue motion with slow anterior to posterior transfer for puree bolus; piecemeal deglutition for all PO consistencies; with trace/mild oral clearance deficits post swallow. A liquid wash benefits to assist with oral clearance.  The Pharyngeal Stage is characterized by delayed initiation of the pharyngeal swallow (bolus head is over the laryngeal surface of epiglottis/laryngeal vestibule for Thin Liquids), reduced laryngeal elevation with incomplete laryngeal vestibular closure, reduced tongue base retraction resulting in trace/mild vallecular residue post primary swallow and adequate pharyngeal constriction. There is mild pharyngeal clearance deficit located primarily in the vallecular post swallow. Spontaneous reswallow benefit to assist with oropharyngeal clearance.  There is Laryngeal Penetration during the swallow for Nectar Thick Liquids with retrieval with airway protection maintained. There is Laryngeal Penetration during the swallow for Thin Liquids without complete retrieval leaving trace residue in the laryngeal vestibule. Patient with adequate laryngeal sensation given reflexive cough response.  There was No Aspiration observed before, during or after the swallow for puree/honey thick liquids. Patient presents Moderate Oral Stage and Mild to Moderate Pharyngeal Stage Dysphagia. The Oral Stage is characterized by reduced oral containment for cup sip self administered presentation with anterior loss/spillage on the right side of the oral cavity due to reduced lip seal/closure, no anterior loss for straw sip presentation with ability to retrieve/suck with straw use, slow bolus manipulation, slow/delayed tongue motion with slow anterior to posterior transfer for puree bolus; piecemeal deglutition for all PO consistencies; with trace/mild oral clearance deficits post swallow. A liquid wash benefits to assist with oral clearance.  The Pharyngeal Stage is characterized by delayed initiation of the pharyngeal swallow (bolus head is over the laryngeal surface of epiglottis/laryngeal vestibule for Thin Liquids), reduced laryngeal elevation with incomplete laryngeal vestibular closure, reduced tongue base retraction resulting in trace/mild vallecular residue post primary swallow and adequate pharyngeal constriction. There is mild pharyngeal clearance deficit located primarily in the vallecular post swallow. Spontaneous reswallow benefit to assist with oropharyngeal clearance.  There is Laryngeal Penetration during the swallow for Nectar Thick Liquids with retrieval with airway protection maintained. There is Laryngeal Penetration during the swallow for Thin Liquids without retrieval leaving trace residue in the laryngeal vestibule. Patient with adequate laryngeal sensation given reflexive cough response.  There was No Aspiration observed before, during or after the swallow for puree/honey thick liquids.

## 2018-05-07 NOTE — PROGRESS NOTE ADULT - SUBJECTIVE AND OBJECTIVE BOX
CHIEF COMPLAINT: Patient is a 62y old  male who presents with a chief complaint of "I'm having the front part of my jaw removed" (02 May 2018 07:14)      SUBJECTIVE / OVERNIGHT EVENTS:    Reports very fatigued by swallowing eval. Also RLE pain after walking. Denies constipation. Denies diarrhea. Denies SOB. No other complaints today.    MEDICATIONS  (STANDING):  ampicillin/sulbactam  IVPB 1.5 Gram(s) IV Intermittent every 6 hours  aspirin  chewable 81 milliGRAM(s) Oral daily  docusate sodium Liquid 100 milliGRAM(s) Enteral Tube three times a day  doxazosin 1 milliGRAM(s) Oral at bedtime  gabapentin   Solution 300 milliGRAM(s) Oral three times a day  heparin  Injectable 5000 Unit(s) SubCutaneous every 8 hours  lactobacillus acidophilus 1 Tablet(s) Oral every 12 hours  metoprolol tartrate 25 milliGRAM(s) Oral two times a day  pantoprazole  Injectable 40 milliGRAM(s) IV Push daily  senna Syrup 10 milliLiter(s) Oral at bedtime  sodium chloride 0.9% lock flush 3 milliLiter(s) IV Push every 8 hours    MEDICATIONS  (PRN):  acetaminophen    Suspension. 650 milliGRAM(s) Enteral Tube every 6 hours PRN Mild Pain (1 - 3)  aluminum hydroxide/magnesium hydroxide/simethicone Suspension 30 milliLiter(s) Oral every 6 hours PRN Dyspepsia  bisacodyl Suppository 10 milliGRAM(s) Rectal daily PRN Constipation  morphine  - Injectable 4 milliGRAM(s) IV Push every 4 hours PRN Severe Pain (7 - 10)  ondansetron Injectable 4 milliGRAM(s) IV Push every 12 hours PRN Nausea and/or Vomiting  oxyCODONE    Solution 5 milliGRAM(s) Enteral Tube every 4 hours PRN Moderate Pain (4 - 6)  oxyCODONE    Solution 10 milliGRAM(s) Enteral Tube every 4 hours PRN Severe Pain (7 - 10)  polyethylene glycol 3350 17 Gram(s) Oral daily PRN Constipation  zolpidem 5 milliGRAM(s) Oral at bedtime PRN Insomnia  zolpidem 5 milliGRAM(s) Oral at bedtime PRN Insomnia      VITALS:  T(F): 99 (05-07-18 @ 14:00), Max: 99.2 (05-07-18 @ 07:23)  HR: 88 (05-07-18 @ 14:00) (87 - 94)  BP: 104/70 (05-07-18 @ 14:00) (104/70 - 118/72)  RR: 18 (05-07-18 @ 14:00) (18 - 20)  SpO2: 99% (05-07-18 @ 14:00)      CAPILLARY BLOOD GLUCOSE    Output     I&O's Summary  T(F): 99 (05-07-18 @ 14:00), Max: 99.2 (05-07-18 @ 07:23)  HR: 88 (05-07-18 @ 14:00) (87 - 94)  BP: 104/70 (05-07-18 @ 14:00) (104/70 - 118/72)  RR: 18 (05-07-18 @ 14:00) (18 - 20)  SpO2: 99% (05-07-18 @ 14:00)    PHYSICAL EXAM:  GENERAL: middle-aged man sitting in chair, NAD  HEAD:  Atraumatic, Normocephalic  EYES: EOMI  NECK: bilateral drains at base of neck  CHEST/LUNG: nonlabored breathing  HEART: nl S1/S2  ABDOMEN: nondistended, soft  EXTREMITIES:  RLE in wrap/dressings  PSYCH: alert, answering questions appropriately  NEUROLOGY: non-focal  SKIN: No rashes noted    LABS:              7.7                  134  | 26   | 24           16.11 >-----------< 554     ------------------------< 111                   24.3                 4.0  | 95   | 0.88                                         Ca 8.7   Mg 2.3   Ph 3.9                        MICROBIOLOGY:        RADIOLOGY & ADDITIONAL TESTS:    Imaging Personally Reviewed:        [x] Consultant(s) Notes Reviewed: SLP, ENT    [] Care Discussed with Consultants/Other Providers:

## 2018-05-07 NOTE — PROGRESS NOTE ADULT - SUBJECTIVE AND OBJECTIVE BOX
Plastic Surgery Progress Note (pg [facility]: [id number], [facility]: [phone])    SUBJECTIVE:  Doing well. No overnight events. Ambulating well, pain controlled; tolerating TF's.     OBJECTIVE:     ** VITAL SIGNS / I&O's **    Vital Signs Last 24 Hrs  T(C): 37.3 (07 May 2018 07:23), Max: 37.4 (06 May 2018 10:00)  T(F): 99.2 (07 May 2018 07:23), Max: 99.3 (06 May 2018 10:00)  HR: 91 (07 May 2018 07:23) (87 - 92)  BP: 116/67 (07 May 2018 07:23) (111/64 - 118/72)  BP(mean): --  RR: 19 (07 May 2018 07:23) (14 - 20)  SpO2: 100% (07 May 2018 07:23) (99% - 100%)      06 May 2018 07:01  -  07 May 2018 07:00  --------------------------------------------------------  IN:    Free Water: 295 mL    IV PiggyBack: 50 mL    Pivot: 170 mL  Total IN: 515 mL    OUT:    Bulb: 6 mL    Bulb: 6 mL    Indwelling Catheter - Urethral: 950 mL  Total OUT: 962 mL    Total NET: -447 mL          ** PHYSICAL EXAM **    -- CONSTITUTIONAL: AOx3. NAD.   -- HEENT: +Cook doppler signal; incisions intact  -- NECK: Incisions c/d/i, no collections; JPs ss  -- EXTREMITIES: RLE SG healing well; donor site well-healed; m/s intact distally, dressings changed.       **MEDS**  acetaminophen    Suspension. 650 milliGRAM(s) Enteral Tube every 6 hours PRN  aluminum hydroxide/magnesium hydroxide/simethicone Suspension 30 milliLiter(s) Oral every 6 hours PRN  ampicillin/sulbactam  IVPB 1.5 Gram(s) IV Intermittent every 6 hours  aspirin  chewable 81 milliGRAM(s) Oral daily  bisacodyl Suppository 10 milliGRAM(s) Rectal daily PRN  docusate sodium Liquid 100 milliGRAM(s) Enteral Tube three times a day  doxazosin 1 milliGRAM(s) Oral at bedtime  gabapentin   Solution 300 milliGRAM(s) Oral three times a day  heparin  Injectable 5000 Unit(s) SubCutaneous every 8 hours  lactobacillus acidophilus 1 Tablet(s) Oral every 12 hours  metoprolol tartrate 25 milliGRAM(s) Oral two times a day  ondansetron Injectable 4 milliGRAM(s) IV Push every 12 hours PRN  oxyCODONE    Solution 5 milliGRAM(s) Enteral Tube every 4 hours PRN  oxyCODONE    Solution 10 milliGRAM(s) Enteral Tube every 4 hours PRN  pantoprazole  Injectable 40 milliGRAM(s) IV Push daily  polyethylene glycol 3350 17 Gram(s) Oral daily PRN  senna Syrup 10 milliLiter(s) Oral at bedtime  sodium chloride 0.9% lock flush 3 milliLiter(s) IV Push every 8 hours  zolpidem 5 milliGRAM(s) Oral at bedtime PRN  zolpidem 5 milliGRAM(s) Oral at bedtime PRN      ** LABS **                          7.2    16.95 )-----------( 479      ( 06 May 2018 06:25 )             23.1     06 May 2018 06:25    136    |  98     |  25     ----------------------------<  116    4.1     |  25     |  0.92     Ca    8.7        06 May 2018 06:25  Phos  3.3       06 May 2018 06:25  Mg     2.3       06 May 2018 06:25        CAPILLARY BLOOD GLUCOSE

## 2018-05-07 NOTE — PROGRESS NOTE ADULT - PROBLEM SELECTOR PLAN 4
Met with pt and brother Danny at bedside. Pt reporting improved mood. States that he thinks he was misinterpreting anxiety and constipation as post-surgical pain. States has had multiple BMs, been up and walking, pain and swelling are improving. He is eager to go home. Emotional support provided. pt excited about getting better and going home

## 2018-05-07 NOTE — PROGRESS NOTE ADULT - PROBLEM SELECTOR PLAN 1
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   ENT-[facility]  Plastics-[place]

## 2018-05-07 NOTE — PROGRESS NOTE ADULT - PROBLEM SELECTOR PLAN 3
BM today. Continue current bowel regimen. May hold senna and colace if develops loose stools.
BM today. Continue current bowel regimen. May hold senna and colace if develops loose stools.
BM today. Continue current bowel regimen.

## 2018-05-07 NOTE — SWALLOW VFSS/MBS ASSESSMENT ADULT - ADDITIONAL RECOMMENDATIONS
This service to follow as schedules permits. Patient to benefit swallow therapy pending discharge plans (e.g. rehab facility vs home care vs Outpatient at [facility] [phone])

## 2018-05-07 NOTE — PROGRESS NOTE ADULT - PROBLEM SELECTOR PLAN 2
Pt currently on vargas 300mg TID and oxycodone 5-10mg Q4 hours PRN moderate to severe pain with Dilaudid 1mg IV PRN breakthrough pain. Pt reports continued improvement in pain. Pt currently on vargas 300mg TID and oxycodone 5-10mg Q4 hours PRN moderate to severe pain with morphine 4mg IIV PRN breakthrough pain. Pt reports continued improvement in pain.

## 2018-05-07 NOTE — SWALLOW VFSS/MBS ASSESSMENT ADULT - COMMENTS
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      Patient arrived to Radiology for Cinesophagram. Patient was accompanied by Nurse (Navjot).  Patient is with a #6 CFS Tracheostomy with  in place and producing adequate voicing ability to communicate; vocal quality is clear upon phonation.  Patient is also noted with right and left sided drainage tube in place; carotid doppler tube in place on left side of neck; surgical clips/staples and mandibular hardware in place when fluoroscopy turned on for  view. 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      Patient arrived to Radiology for Cinesophagram. Patient was accompanied by Nurse (Navjot).  Patient is with a #6 CFS Tracheostomy with  in place and producing adequate voicing ability to communicate; vocal quality.  Patient is also noted with right and left sided drainage tube in place; carotid doppler tube in place on left side of neck; surgical clips/staples and mandibular hardware in place when fluoroscopy turned on for  view.

## 2018-05-07 NOTE — PROGRESS NOTE ADULT - SUBJECTIVE AND OBJECTIVE BOX
INTERVAL HPI/OVERNIGHT EVENTS: Pt     Allergies    No Known Allergies    Intolerances        Code Status:          PRESENT SYMPTOMS:   SOURCE:  [ ] Patient   [ ] Family   [ ] Team     Pain:   Onset:      Location:          Duration:        Character:         Aggravating factors:          Relieving Factors:             Timing:         Severity:      Dyspnea [ ] YES [ ] NO - Mild [ ]  Moderate [ ]  Severe [ ]   Anxiety:  [ ] YES [ ] NO  Fatigue: [ ] YES [ ] NO  Nausea: [ ] YES [ ] NO  Loss of Appetite: [ ] YES [ ] NO  Constipation [ ] YES   [ ] No     OTHER SYMPTOMS:  [ ] All other ROS negative     [ ] Unable to obtain due to poor mentation    Does the patient meet criteria for Severe Protein Calorie Malnutrition?  Yes [ ]  No [ ]   PPSV less than <30% [ ]  Anasarca [ ]  Albumin <2 [ ]  Catabolic State [ ]  Poor nutritional intake [ ]  Significant weight loss [ ]     MEDICATIONS  (STANDING):  ampicillin/sulbactam  IVPB 1.5 Gram(s) IV Intermittent every 6 hours  aspirin  chewable 81 milliGRAM(s) Oral daily  docusate sodium Liquid 100 milliGRAM(s) Enteral Tube three times a day  doxazosin 1 milliGRAM(s) Oral at bedtime  gabapentin   Solution 300 milliGRAM(s) Oral three times a day  heparin  Injectable 5000 Unit(s) SubCutaneous every 8 hours  lactobacillus acidophilus 1 Tablet(s) Oral every 12 hours  metoprolol tartrate 25 milliGRAM(s) Oral two times a day  pantoprazole  Injectable 40 milliGRAM(s) IV Push daily  senna Syrup 10 milliLiter(s) Oral at bedtime  sodium chloride 0.9% lock flush 3 milliLiter(s) IV Push every 8 hours    MEDICATIONS  (PRN):  acetaminophen    Suspension. 650 milliGRAM(s) Enteral Tube every 6 hours PRN Mild Pain (1 - 3)  aluminum hydroxide/magnesium hydroxide/simethicone Suspension 30 milliLiter(s) Oral every 6 hours PRN Dyspepsia  bisacodyl Suppository 10 milliGRAM(s) Rectal daily PRN Constipation  morphine  - Injectable 4 milliGRAM(s) IV Push every 4 hours PRN Severe Pain (7 - 10)  ondansetron Injectable 4 milliGRAM(s) IV Push every 12 hours PRN Nausea and/or Vomiting  oxyCODONE    Solution 5 milliGRAM(s) Enteral Tube every 4 hours PRN Moderate Pain (4 - 6)  oxyCODONE    Solution 10 milliGRAM(s) Enteral Tube every 4 hours PRN Severe Pain (7 - 10)  polyethylene glycol 3350 17 Gram(s) Oral daily PRN Constipation  zolpidem 5 milliGRAM(s) Oral at bedtime PRN Insomnia  zolpidem 5 milliGRAM(s) Oral at bedtime PRN Insomnia      Palliative Performance Status Version 2:         %  ECOG -        Physical Exam:    General: [ ] Alert,  A&O x     [ ] lethargic   [ ] Agitated   [ ] Cachexia   HEENT: [ ] Normal   [ ] Dry mouth   [ ] ET Tube    [ ] Trach   Lungs: [ ] Clear [ ] Rhonchi  [ ] Crackles [ ] Wheezing [ ] Tachypnea  [ ] Audible excessive secretions   Cardiovascular:  [ ] Regular rate and rhythm  [ ] Irregular [ ] Tachycardia   [ ] Bradycardia   Abdomen: [ ] Soft  [ ] Distended  [ ]  [ ] +BS  [ ] Non tender [ ] Tender  [ ]PEG   [ ] NGT   Last BM:     Genitourinary:  [ ] Normal [ ] Incontinent   [ ] Oliguria/Anuria   [ ] Sprague  Musculoskeletal:  [ ] Normal   [ ] Generalized weakness  [ ] Bedbound  [ ] Edema   Neurological: [ ] No focal deficits  [ ] Cognitive impairment     Skin: [ ] Normal   [ ] Pressure ulcers     Vital Signs Last 24 Hrs  T(C): 37.2 (07 May 2018 14:00), Max: 37.3 (07 May 2018 07:23)  T(F): 99 (07 May 2018 14:00), Max: 99.2 (07 May 2018 07:23)  HR: 88 (07 May 2018 14:00) (88 - 94)  BP: 104/70 (07 May 2018 14:00) (104/70 - 116/67)  BP(mean): --  RR: 18 (07 May 2018 14:00) (18 - 20)  SpO2: 99% (07 May 2018 14:00) (98% - 100%)    LABS:                        7.7    16.11 )-----------( 554      ( 07 May 2018 06:19 )             24.3     05-07    134<L>  |  95<L>  |  24<H>  ----------------------------<  111<H>  4.0   |  26  |  0.88    Ca    8.7      07 May 2018 06:19  Phos  3.9     05-07  Mg     2.3     05-07          I&O's Summary    06 May 2018 07:01  -  07 May 2018 07:00  --------------------------------------------------------  IN: 735 mL / OUT: 962 mL / NET: -227 mL    07 May 2018 07:01  -  07 May 2018 15:30  --------------------------------------------------------  IN: 0 mL / OUT: 402.5 mL / NET: -402.5 mL        RADIOLOGY & ADDITIONAL STUDIES: INTERVAL HPI/OVERNIGHT EVENTS: Pt pain well controlled.  bu +BMt worried about acute events at night    Allergies    No Known Allergies    Intolerances        Code Status:          PRESENT SYMPTOMS:   SOURCE:  [x ] Patient   [ ] Family   [ ] Team     Pain: yes  Onset:today      Location:  right lower leg        Duration: min        Character:  sharp       Aggravating factors:  worsens with movement        Relieving Factors:  pain meds           Timing:         Severity:      Dyspnea [ ] YES [ x] NO - Mild [ ]  Moderate [ ]  Severe [ ]   Anxiety:  [x ] YES [ ] NO  Fatigue: [x ] YES [ ] NO  Nausea: [ ] YES [ x] NO  Loss of Appetite: [ ] YES [ x] NO  Constipation [ ] YES   [x ] No     OTHER SYMPTOMS:  [x ] All other ROS negative     [ ] Unable to obtain due to poor mentation    Does the patient meet criteria for Severe Protein Calorie Malnutrition?  Yes [ ]  No [ ]   PPSV less than <30% [ ]  Anasarca [ ]  Albumin <2 [ ]  Catabolic State [ ]  Poor nutritional intake [ ]  Significant weight loss [ ]     MEDICATIONS  (STANDING):  ampicillin/sulbactam  IVPB 1.5 Gram(s) IV Intermittent every 6 hours  aspirin  chewable 81 milliGRAM(s) Oral daily  docusate sodium Liquid 100 milliGRAM(s) Enteral Tube three times a day  doxazosin 1 milliGRAM(s) Oral at bedtime  gabapentin   Solution 300 milliGRAM(s) Oral three times a day  heparin  Injectable 5000 Unit(s) SubCutaneous every 8 hours  lactobacillus acidophilus 1 Tablet(s) Oral every 12 hours  metoprolol tartrate 25 milliGRAM(s) Oral two times a day  pantoprazole  Injectable 40 milliGRAM(s) IV Push daily  senna Syrup 10 milliLiter(s) Oral at bedtime  sodium chloride 0.9% lock flush 3 milliLiter(s) IV Push every 8 hours    MEDICATIONS  (PRN):  acetaminophen    Suspension. 650 milliGRAM(s) Enteral Tube every 6 hours PRN Mild Pain (1 - 3)  aluminum hydroxide/magnesium hydroxide/simethicone Suspension 30 milliLiter(s) Oral every 6 hours PRN Dyspepsia  bisacodyl Suppository 10 milliGRAM(s) Rectal daily PRN Constipation  morphine  - Injectable 4 milliGRAM(s) IV Push every 4 hours PRN Severe Pain (7 - 10)  ondansetron Injectable 4 milliGRAM(s) IV Push every 12 hours PRN Nausea and/or Vomiting  oxyCODONE    Solution 5 milliGRAM(s) Enteral Tube every 4 hours PRN Moderate Pain (4 - 6)  oxyCODONE    Solution 10 milliGRAM(s) Enteral Tube every 4 hours PRN Severe Pain (7 - 10)  polyethylene glycol 3350 17 Gram(s) Oral daily PRN Constipation  zolpidem 5 milliGRAM(s) Oral at bedtime PRN Insomnia  zolpidem 5 milliGRAM(s) Oral at bedtime PRN Insomnia      Palliative Performance Status Version 2:   80      %  ECOG -        Physical Exam:    General: [x ] Alert,  A&O x 3    [ ] lethargic   [ ] Agitated   [ ] Cachexia   HEENT: [ ] Normal   [ ] Dry mouth   [ ] ET Tube    [ ] Trach +b/l drain   Lungs: [x ] Clear [ ] Rhonchi  [ ] Crackles [ ] Wheezing [ ] Tachypnea  [ ] Audible excessive secretions   Cardiovascular:  [x ] Regular rate and rhythm  [ ] Irregular [ ] Tachycardia   [ ] Bradycardia   Abdomen: [x ] Soft  [ ] Distended  [ ]  [ ] +BS  [ ] Non tender [ ] Tender  [ ]PEG   [ ] NGT   Last BM:     Genitourinary:  [ ] Normal [ ] Incontinent   [ ] Oliguria/Anuria   [ x] Sprague  Musculoskeletal:  [x ] Normal   [ ] Generalized weakness  [ ] Bedbound  [ ] Edema   Neurological: [x ] No focal deficits  [ ] Cognitive impairment     Skin: [ ] Normal   [ ] Pressure ulcers +sutures    Vital Signs Last 24 Hrs  T(C): 37.2 (07 May 2018 14:00), Max: 37.3 (07 May 2018 07:23)  T(F): 99 (07 May 2018 14:00), Max: 99.2 (07 May 2018 07:23)  HR: 88 (07 May 2018 14:00) (88 - 94)  BP: 104/70 (07 May 2018 14:00) (104/70 - 116/67)  BP(mean): --  RR: 18 (07 May 2018 14:00) (18 - 20)  SpO2: 99% (07 May 2018 14:00) (98% - 100%)    LABS:                        7.7    16.11 )-----------( 554      ( 07 May 2018 06:19 )             24.3     05-07    134<L>  |  95<L>  |  24<H>  ----------------------------<  111<H>  4.0   |  26  |  0.88    Ca    8.7      07 May 2018 06:19  Phos  3.9     05-07  Mg     2.3     05-07          I&O's Summary    06 May 2018 07:01  -  07 May 2018 07:00  --------------------------------------------------------  IN: 735 mL / OUT: 962 mL / NET: -227 mL    07 May 2018 07:01  -  07 May 2018 15:30  --------------------------------------------------------  IN: 0 mL / OUT: 402.5 mL / NET: -402.5 mL        RADIOLOGY & ADDITIONAL STUDIES:

## 2018-05-07 NOTE — PROGRESS NOTE ADULT - ASSESSMENT
A/P 62M s/p composite mandibulectomy with free fibula reconstruction (POD 11)  - leave thigh donor site open to air  - STSG on leg qod xeroform/abd/ace  - VTE ppx  - OOB, ambulate with CAM boot  - Swallow today per ENT  - Care per ENT

## 2018-05-07 NOTE — SWALLOW VFSS/MBS ASSESSMENT ADULT - CONSISTENCIES ADMINISTERED
puree No Solid Trials at this time as per patient preference due to inability to masticate solids honey thick nectar thick thin liquid

## 2018-05-07 NOTE — SWALLOW VFSS/MBS ASSESSMENT ADULT - RECOMMENDED CONSISTENCY
1.) Puree with Nectar Thick Liquids  2.) Feeding/Swallowing Guidelines: Sit upright, puree via teaspoon amount, nectar thick liquids via straw use; two swallows per puree; alternate with a liquid wash after every 2 to 3 teaspoon of puree to assist with clearance. Check oral cavity for any pocketing after each meal.   3.) Aspiration and Reflux Precautions  4.) Gentle Oral Hygiene Care as tolerated

## 2018-05-07 NOTE — SWALLOW VFSS/MBS ASSESSMENT ADULT - ORAL PREP COMMENTS
difficulty stripping from teaspoon presentation due to reduced ROM/Sensation for lower lip at this time; utilize upper dentition to strip from teaspoon presentation Patient compensated by tilting head back when utilizing cup sip; Patient is able to sip by straw with mid-tongue placement for retrieval; anterior spillage from oral cavity for cup sip presentation due to reduced lip seal/closure Patient is able to sip by straw with mid-tongue placement for retrieval

## 2018-05-08 LAB
BUN SERPL-MCNC: 20 MG/DL — SIGNIFICANT CHANGE UP (ref 7–23)
CALCIUM SERPL-MCNC: 8.9 MG/DL — SIGNIFICANT CHANGE UP (ref 8.4–10.5)
CHLORIDE SERPL-SCNC: 92 MMOL/L — LOW (ref 98–107)
CO2 SERPL-SCNC: 26 MMOL/L — SIGNIFICANT CHANGE UP (ref 22–31)
CREAT SERPL-MCNC: 0.82 MG/DL — SIGNIFICANT CHANGE UP (ref 0.5–1.3)
GLUCOSE SERPL-MCNC: 110 MG/DL — HIGH (ref 70–99)
HCT VFR BLD CALC: 23.8 % — LOW (ref 39–50)
HGB BLD-MCNC: 7.5 G/DL — LOW (ref 13–17)
MAGNESIUM SERPL-MCNC: 2.3 MG/DL — SIGNIFICANT CHANGE UP (ref 1.6–2.6)
MCHC RBC-ENTMCNC: 29.9 PG — SIGNIFICANT CHANGE UP (ref 27–34)
MCHC RBC-ENTMCNC: 31.5 % — LOW (ref 32–36)
MCV RBC AUTO: 94.8 FL — SIGNIFICANT CHANGE UP (ref 80–100)
NRBC # FLD: 0 — SIGNIFICANT CHANGE UP
PHOSPHATE SERPL-MCNC: 3.6 MG/DL — SIGNIFICANT CHANGE UP (ref 2.5–4.5)
PLATELET # BLD AUTO: 563 K/UL — HIGH (ref 150–400)
PMV BLD: 9 FL — SIGNIFICANT CHANGE UP (ref 7–13)
POTASSIUM SERPL-MCNC: 3.9 MMOL/L — SIGNIFICANT CHANGE UP (ref 3.5–5.3)
POTASSIUM SERPL-SCNC: 3.9 MMOL/L — SIGNIFICANT CHANGE UP (ref 3.5–5.3)
RBC # BLD: 2.51 M/UL — LOW (ref 4.2–5.8)
RBC # FLD: 13.7 % — SIGNIFICANT CHANGE UP (ref 10.3–14.5)
SODIUM SERPL-SCNC: 131 MMOL/L — LOW (ref 135–145)
WBC # BLD: 15.2 K/UL — HIGH (ref 3.8–10.5)
WBC # FLD AUTO: 15.2 K/UL — HIGH (ref 3.8–10.5)

## 2018-05-08 PROCEDURE — 99233 SBSQ HOSP IP/OBS HIGH 50: CPT

## 2018-05-08 RX ORDER — ZOLPIDEM TARTRATE 10 MG/1
5 TABLET ORAL AT BEDTIME
Qty: 0 | Refills: 0 | Status: DISCONTINUED | OUTPATIENT
Start: 2018-05-08 | End: 2018-05-09

## 2018-05-08 RX ADMIN — GABAPENTIN 300 MILLIGRAM(S): 400 CAPSULE ORAL at 14:33

## 2018-05-08 RX ADMIN — OXYCODONE AND ACETAMINOPHEN 2 TABLET(S): 5; 325 TABLET ORAL at 00:18

## 2018-05-08 RX ADMIN — Medication 1 TABLET(S): at 07:29

## 2018-05-08 RX ADMIN — GABAPENTIN 300 MILLIGRAM(S): 400 CAPSULE ORAL at 07:29

## 2018-05-08 RX ADMIN — Medication 100 MILLIGRAM(S): at 21:21

## 2018-05-08 RX ADMIN — SODIUM CHLORIDE 3 MILLILITER(S): 9 INJECTION INTRAMUSCULAR; INTRAVENOUS; SUBCUTANEOUS at 14:32

## 2018-05-08 RX ADMIN — SODIUM CHLORIDE 3 MILLILITER(S): 9 INJECTION INTRAMUSCULAR; INTRAVENOUS; SUBCUTANEOUS at 22:28

## 2018-05-08 RX ADMIN — PANTOPRAZOLE SODIUM 40 MILLIGRAM(S): 20 TABLET, DELAYED RELEASE ORAL at 11:21

## 2018-05-08 RX ADMIN — HEPARIN SODIUM 5000 UNIT(S): 5000 INJECTION INTRAVENOUS; SUBCUTANEOUS at 21:21

## 2018-05-08 RX ADMIN — GABAPENTIN 300 MILLIGRAM(S): 400 CAPSULE ORAL at 22:28

## 2018-05-08 RX ADMIN — POLYETHYLENE GLYCOL 3350 17 GRAM(S): 17 POWDER, FOR SOLUTION ORAL at 18:03

## 2018-05-08 RX ADMIN — OXYCODONE AND ACETAMINOPHEN 2 TABLET(S): 5; 325 TABLET ORAL at 10:05

## 2018-05-08 RX ADMIN — ZOLPIDEM TARTRATE 5 MILLIGRAM(S): 10 TABLET ORAL at 01:32

## 2018-05-08 RX ADMIN — ZOLPIDEM TARTRATE 5 MILLIGRAM(S): 10 TABLET ORAL at 00:23

## 2018-05-08 RX ADMIN — ZOLPIDEM TARTRATE 5 MILLIGRAM(S): 10 TABLET ORAL at 23:13

## 2018-05-08 RX ADMIN — OXYCODONE AND ACETAMINOPHEN 2 TABLET(S): 5; 325 TABLET ORAL at 10:35

## 2018-05-08 RX ADMIN — Medication 25 MILLIGRAM(S): at 18:02

## 2018-05-08 RX ADMIN — Medication 81 MILLIGRAM(S): at 11:21

## 2018-05-08 RX ADMIN — SODIUM CHLORIDE 3 MILLILITER(S): 9 INJECTION INTRAMUSCULAR; INTRAVENOUS; SUBCUTANEOUS at 07:26

## 2018-05-08 RX ADMIN — AMPICILLIN SODIUM AND SULBACTAM SODIUM 100 GRAM(S): 250; 125 INJECTION, POWDER, FOR SUSPENSION INTRAMUSCULAR; INTRAVENOUS at 21:22

## 2018-05-08 RX ADMIN — AMPICILLIN SODIUM AND SULBACTAM SODIUM 100 GRAM(S): 250; 125 INJECTION, POWDER, FOR SUSPENSION INTRAMUSCULAR; INTRAVENOUS at 07:29

## 2018-05-08 RX ADMIN — Medication 1 TABLET(S): at 18:02

## 2018-05-08 RX ADMIN — AMPICILLIN SODIUM AND SULBACTAM SODIUM 100 GRAM(S): 250; 125 INJECTION, POWDER, FOR SUSPENSION INTRAMUSCULAR; INTRAVENOUS at 14:33

## 2018-05-08 RX ADMIN — Medication 100 MILLIGRAM(S): at 14:33

## 2018-05-08 RX ADMIN — Medication 1 MILLIGRAM(S): at 21:21

## 2018-05-08 RX ADMIN — Medication 25 MILLIGRAM(S): at 07:29

## 2018-05-08 RX ADMIN — SENNA PLUS 10 MILLILITER(S): 8.6 TABLET ORAL at 21:21

## 2018-05-08 NOTE — PROGRESS NOTE ADULT - SUBJECTIVE AND OBJECTIVE BOX
CHIEF COMPLAINT: Patient is a 62y old  male who presents with a chief complaint of "I'm having the front part of my jaw removed" (02 May 2018 07:14)      SUBJECTIVE / OVERNIGHT EVENTS:    Feeling well. No complaints today. Passed TOV. Denies CP. Denies SOB. Denies lightheadedness, dizziness, problems with balance.    MEDICATIONS  (STANDING):  ampicillin/sulbactam  IVPB 1.5 Gram(s) IV Intermittent every 6 hours  aspirin  chewable 81 milliGRAM(s) Oral daily  docusate sodium Liquid 100 milliGRAM(s) Oral three times a day  doxazosin 1 milliGRAM(s) Oral at bedtime  gabapentin   Solution 300 milliGRAM(s) Oral three times a day  heparin  Injectable 5000 Unit(s) SubCutaneous every 8 hours  lactobacillus acidophilus 1 Tablet(s) Oral every 12 hours  metoprolol tartrate 25 milliGRAM(s) Oral two times a day  pantoprazole  Injectable 40 milliGRAM(s) IV Push daily  senna Syrup 10 milliLiter(s) Oral at bedtime  sodium chloride 0.9% lock flush 3 milliLiter(s) IV Push every 8 hours    MEDICATIONS  (PRN):  acetaminophen   Tablet. 650 milliGRAM(s) Oral every 6 hours PRN Mild Pain (1 - 3)  aluminum hydroxide/magnesium hydroxide/simethicone Suspension 30 milliLiter(s) Oral every 6 hours PRN Dyspepsia  bisacodyl Suppository 10 milliGRAM(s) Rectal daily PRN Constipation  ondansetron Injectable 4 milliGRAM(s) IV Push every 12 hours PRN Nausea and/or Vomiting  oxyCODONE    5 mG/acetaminophen 325 mG 2 Tablet(s) Oral every 4 hours PRN Severe Pain (7 - 10)  oxyCODONE    5 mG/acetaminophen 325 mG 1 Tablet(s) Oral every 4 hours PRN Moderate Pain (4 - 6)  polyethylene glycol 3350 17 Gram(s) Oral daily PRN Constipation      VITALS:  T(F): 98.3 (05-08-18 @ 09:39), Max: 99 (05-07-18 @ 14:00)  HR: 86 (05-08-18 @ 09:39) (83 - 94)  BP: 115/66 (05-08-18 @ 09:39) (103/70 - 116/67)  RR: 18 (05-08-18 @ 09:39) (18 - 18)  SpO2: 99% (05-08-18 @ 09:39)      CAPILLARY BLOOD GLUCOSE    Output     I&O's Summary  T(F): 98.3 (05-08-18 @ 09:39), Max: 99 (05-07-18 @ 14:00)  HR: 86 (05-08-18 @ 09:39) (83 - 94)  BP: 115/66 (05-08-18 @ 09:39) (103/70 - 116/67)  RR: 18 (05-08-18 @ 09:39) (18 - 18)  SpO2: 99% (05-08-18 @ 09:39)    PHYSICAL EXAM:  GENERAL: NAD, well-developed  HEAD:  Atraumatic, Normocephalic  EYES: EOMI  NECK: Supple, No JVD  CHEST/LUNG: nonlabored breathing  HEART: nl S1/S2  ABDOMEN: nondistended, soft  EXTREMITIES:  no LE edema  PSYCH: alert, answering questions appropriately  NEUROLOGY: non-focal  SKIN: No rashes noted    LABS:              7.5                  131  | 26   | 20           15.20 >-----------< 563     ------------------------< 110                   23.8                 3.9  | 92   | 0.82                                         Ca 8.9   Mg 2.3   Ph 3.6                        MICROBIOLOGY:        RADIOLOGY & ADDITIONAL TESTS:    Imaging Personally Reviewed:        [x] Consultant(s) Notes Reviewed: palliative, plastic surgery    [x] Care Discussed with Consultants/Other Providers: ENT

## 2018-05-08 NOTE — CHART NOTE - NSCHARTNOTEFT_GEN_A_CORE
Pain controlled.  Pt now eating.  Goal is home with home care.  will sign off.  Please call back if goals or symptoms change.  Maryann Torres DO

## 2018-05-08 NOTE — CHART NOTE - NSCHARTNOTEFT_GEN_A_CORE
Nutrition Consult:     Source: Patient [X]    Family [ ]     other [X] Medical Chart   Pt 63 yo male s/p composite mandibulectomy with free fibula reconstruction. At time of visit Pt appears alert, oriented. Pt on PO diet: Dysphagia 1 Purred diet with Nectar thick liquids. Of note Pt passed Swallow VFSS/MBS; SLP rec: Pureed with Nectar Thick Liquids (5/7). Pt appears tolerating PO diet well. Pt had ~75% of lunch today per tray waste observation. No report of nausea, vomiting or diarrhea @ this time. PTA Pt was eating Regular food reported. Pt also stated his height: ~67", his usual body weight: ~180#. No weight loss or weight changes (PTA) voiced. No food related concerns voiced. RDN remains available, Pt made aware.     Diet rx: Dysphagia 1 Purred- Nectar Consistency Fluid   Patient reports [ ] nausea  [ ] vomiting [ ] diarrhea [ ] constipation  [ ]chewing problems [ ] swallowing issues  [ ] other:   PO intake:  < 50% [ ] 50-75% [X]   % [ ]  other :  Source for PO intake [X] Patient [ ] family [ ] chart [ ] staff [ ] other  Enteral /Parenteral Nutrition: N/A     Current Weight:   % Weight Change    Pertinent Medications: Heparin, Aspirin, Maalox, Colace, Miralax, Senna Syrup, Dulcolax (PRN), Zofran (PRN), Lactobacillus Acidophilus, Protonix   Pertinent Labs: (5/8) WBC 15.20 H, H/H 7.5/23.8 L,  H, Na 131 L, Cl 92 L, Glu 110 H     Skin:     Estimated Needs:   [X] no change since previous assessment  [ ] recalculated:       Previous Nutrition Diagnosis:   [ ] Inadequate Energy Intake [ ] Inadequate Oral Intake [ ] Excessive Energy Intake   [ ] Underweight [ ] Increased Nutrient Needs [ ] Overweight/Obesity   [ ] Altered GI Function [ ] Unintended Weight Loss [ ] Food & Nutrition Related Knowledge Deficit [ ] Malnutrition     Nutrition Diagnosis is [ ] ongoing  [ ] resolved [ ] not applicable     New Nutrition Diagnosis: [ ] not applicable    [ ] Inadequate Protein Energy Intake   [ ] Inadequate Oral Intake   [ ] Excessive Energy Intake   [ ] Underweight   [ ] Increased Nutrient Needs   [ ] Overweight/Obesity   [ ] Altered GI Function   [ ] Unintended Weight Loss   [ ] Food & Nutrition Related Knowledge Deficit  [ ] Limited Adherence to nutrition related recommendations   [ ] Malnutrition    [X] other: Functional; chewing difficulty, Swallowing difficulty     Related to: Physiological cause   As evidenced by: Abnormal swallow study, need for pureed consistency diet with thickened liquids     Interventions:   Recommend    1. Suggest: PO diet rx: Dysphagia 1 Purred-Nectar consistency Fluid; PO supplement: Ensure Pudding 1 x 2 can daily (will provide additional ~340 Kcal, ~8 gm Protein);   2. Encourage & assist Pt with meals; Monitor PO diet tolerance; Honor food preferences;   3. Suggest: Repeat swallow study if needed;   4. Monitor labs, weights, hydration status;

## 2018-05-08 NOTE — PROGRESS NOTE ADULT - ASSESSMENT
A/P 62M s/p composite mandibulectomy with free fibula reconstruction (POD 12)  - leave thigh donor site open to air  - STSG on leg qod xeroform/abd/ace  - VTE ppx  - OOB, ambulate with CAM boot  - Care per ENT  - Dispo per ENT

## 2018-05-08 NOTE — PROGRESS NOTE ADULT - SUBJECTIVE AND OBJECTIVE BOX
Plastic Surgery Progress Note (pg [facility]: [id number], [facility]: [phone])    SUBJECTIVE:  Pt doing very well, passed MBS yesterday and was started on dysphagia diet, which he is tolerating; pain is well controlled; passed ToV this morning; ambulating w/o issues.     OBJECTIVE:     ** VITAL SIGNS / I&O's **    Vital Signs Last 24 Hrs  T(C): 37.2 (07 May 2018 22:41), Max: 37.3 (07 May 2018 07:23)  T(F): 99 (07 May 2018 22:41), Max: 99.2 (07 May 2018 07:23)  HR: 83 (08 May 2018 00:21) (83 - 94)  BP: 116/67 (08 May 2018 00:21) (103/70 - 116/67)  BP(mean): --  RR: 18 (08 May 2018 00:21) (18 - 19)  SpO2: 98% (08 May 2018 00:21) (98% - 100%)      07 May 2018 07:01  -  08 May 2018 07:00  --------------------------------------------------------  IN:    IV PiggyBack: 50 mL    Oral Fluid: 360 mL  Total IN: 410 mL    OUT:    Bulb: 2.5 mL    Indwelling Catheter - Urethral: 1400 mL  Total OUT: 1402.5 mL    Total NET: -992.5 mL          ** PHYSICAL EXAM **    -- CONSTITUTIONAL: AOx3. NAD.   -- HEENT: Flap +Doppler, incisions intact, flap soft/pink/viable  -- NECK: Incision c/d/i, no collections, JPs removed; staples in place  -- EXTREMITIES: RLE dressing c/d/i, SG donor site well-healed; m/s intact distally      **MEDS**  acetaminophen   Tablet. 650 milliGRAM(s) Oral every 6 hours PRN  aluminum hydroxide/magnesium hydroxide/simethicone Suspension 30 milliLiter(s) Oral every 6 hours PRN  ampicillin/sulbactam  IVPB 1.5 Gram(s) IV Intermittent every 6 hours  aspirin  chewable 81 milliGRAM(s) Oral daily  bisacodyl Suppository 10 milliGRAM(s) Rectal daily PRN  docusate sodium Liquid 100 milliGRAM(s) Oral three times a day  doxazosin 1 milliGRAM(s) Oral at bedtime  gabapentin   Solution 300 milliGRAM(s) Oral three times a day  heparin  Injectable 5000 Unit(s) SubCutaneous every 8 hours  lactobacillus acidophilus 1 Tablet(s) Oral every 12 hours  metoprolol tartrate 25 milliGRAM(s) Oral two times a day  ondansetron Injectable 4 milliGRAM(s) IV Push every 12 hours PRN  oxyCODONE    5 mG/acetaminophen 325 mG 2 Tablet(s) Oral every 4 hours PRN  oxyCODONE    5 mG/acetaminophen 325 mG 1 Tablet(s) Oral every 4 hours PRN  pantoprazole  Injectable 40 milliGRAM(s) IV Push daily  polyethylene glycol 3350 17 Gram(s) Oral daily PRN  senna Syrup 10 milliLiter(s) Oral at bedtime  sodium chloride 0.9% lock flush 3 milliLiter(s) IV Push every 8 hours      ** LABS **                          7.5    15.20 )-----------( 563      ( 08 May 2018 06:07 )             23.8     08 May 2018 06:07    131    |  92     |  20     ----------------------------<  110    3.9     |  26     |  0.82     Ca    8.9        08 May 2018 06:07  Phos  3.6       08 May 2018 06:07  Mg     2.3       08 May 2018 06:07        CAPILLARY BLOOD GLUCOSE

## 2018-05-08 NOTE — PROGRESS NOTE ADULT - SUBJECTIVE AND OBJECTIVE BOX
Pt seen and examined at bedside. No acute events overnight. MBS performed yesterday and cleared for diet. NG tube and BEKAH drains removed yesterday. Remained capped overnight.    Vital signs reviewed in EMR  NAD, awake and alert   Breathing comfortably on TC   6CFS in place secured with sutures   OC/OP: flap soft, warm, intactsignal  Neck incision c/d/i with staples, no fluctuance   RLE neurovascularly intact    Blood Cx: NGTD    A/P: 62M s/p composite resection, b/l SND, trach, and RFFF recon 4/26. Remains stable. Tolerating PO. Tolerated capping trial overnight. Passed MBS yesterday  -SLP: Puree with Nectar Thick Liquids  -f/u pain consult recs  -decannulate today –will discuss with Dr. Cortes  -flap checks per PRS   -lovenox/asa   -unasyn   -cont doxazosin  -PT: home with services    -OOB to chair with assistance

## 2018-05-08 NOTE — PROGRESS NOTE ADULT - ASSESSMENT
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 Medicine consulted for co-management.    *Madibulectomy, reconstruction:  - post-op management per ENT  - palliative care consulted for symptom management    *Hyponatremia: mild, asymptomatic, likely 2/2 decreased PO intake  - encourage salt intake  - monitor    *Constipation:  - continue bowel regimen    *Anemia: likely acute blood loss anemia in setting of recent surgery  - no obvious blood loss other than minimal loss through drains  - cont to monitor hgb  - transfuse if hgb 7, with it as the goal    *Hyperglycemia:  - A1c 5.3  - denies h/o diabetes  - c/w Blood glucose monitoring      Thanks for the consult. Hospitalist will follow with you.

## 2018-05-09 VITALS
HEART RATE: 67 BPM | RESPIRATION RATE: 17 BRPM | DIASTOLIC BLOOD PRESSURE: 60 MMHG | SYSTOLIC BLOOD PRESSURE: 117 MMHG | OXYGEN SATURATION: 97 % | TEMPERATURE: 98 F

## 2018-05-09 LAB
BACTERIA BLD CULT: SIGNIFICANT CHANGE UP
BACTERIA BLD CULT: SIGNIFICANT CHANGE UP
BUN SERPL-MCNC: 17 MG/DL — SIGNIFICANT CHANGE UP (ref 7–23)
CALCIUM SERPL-MCNC: 9.2 MG/DL — SIGNIFICANT CHANGE UP (ref 8.4–10.5)
CHLORIDE SERPL-SCNC: 95 MMOL/L — LOW (ref 98–107)
CO2 SERPL-SCNC: 27 MMOL/L — SIGNIFICANT CHANGE UP (ref 22–31)
CREAT SERPL-MCNC: 0.91 MG/DL — SIGNIFICANT CHANGE UP (ref 0.5–1.3)
GLUCOSE SERPL-MCNC: 125 MG/DL — HIGH (ref 70–99)
POTASSIUM SERPL-MCNC: 4.2 MMOL/L — SIGNIFICANT CHANGE UP (ref 3.5–5.3)
POTASSIUM SERPL-SCNC: 4.2 MMOL/L — SIGNIFICANT CHANGE UP (ref 3.5–5.3)
SODIUM SERPL-SCNC: 134 MMOL/L — LOW (ref 135–145)

## 2018-05-09 PROCEDURE — 99233 SBSQ HOSP IP/OBS HIGH 50: CPT

## 2018-05-09 PROCEDURE — 99239 HOSP IP/OBS DSCHRG MGMT >30: CPT

## 2018-05-09 RX ORDER — GABAPENTIN 400 MG/1
1 CAPSULE ORAL
Qty: 0 | Refills: 0 | COMMUNITY

## 2018-05-09 RX ORDER — SENNA PLUS 8.6 MG/1
10 TABLET ORAL
Qty: 300 | Refills: 0 | OUTPATIENT
Start: 2018-05-09 | End: 2018-06-07

## 2018-05-09 RX ORDER — CHLORHEXIDINE GLUCONATE 213 G/1000ML
15 SOLUTION TOPICAL
Qty: 900 | Refills: 0 | OUTPATIENT
Start: 2018-05-09 | End: 2018-06-07

## 2018-05-09 RX ORDER — DOCUSATE SODIUM 100 MG
10 CAPSULE ORAL
Qty: 900 | Refills: 0 | OUTPATIENT
Start: 2018-05-09 | End: 2018-06-07

## 2018-05-09 RX ORDER — GABAPENTIN 400 MG/1
1 CAPSULE ORAL
Qty: 15 | Refills: 0 | OUTPATIENT
Start: 2018-05-09 | End: 2018-05-13

## 2018-05-09 RX ORDER — ZOLPIDEM TARTRATE 10 MG/1
5 TABLET ORAL ONCE
Qty: 0 | Refills: 0 | Status: DISCONTINUED | OUTPATIENT
Start: 2018-05-09 | End: 2018-05-09

## 2018-05-09 RX ORDER — ZOLPIDEM TARTRATE 10 MG/1
1 TABLET ORAL
Qty: 10 | Refills: 0 | OUTPATIENT
Start: 2018-05-09 | End: 2018-05-18

## 2018-05-09 RX ORDER — ACETAMINOPHEN 500 MG
2 TABLET ORAL
Qty: 0 | Refills: 0 | COMMUNITY

## 2018-05-09 RX ORDER — ASPIRIN/CALCIUM CARB/MAGNESIUM 324 MG
1 TABLET ORAL
Qty: 30 | Refills: 0 | OUTPATIENT
Start: 2018-05-09 | End: 2018-06-07

## 2018-05-09 RX ORDER — GABAPENTIN 400 MG/1
1 CAPSULE ORAL
Qty: 30 | Refills: 0 | OUTPATIENT
Start: 2018-05-09 | End: 2018-05-18

## 2018-05-09 RX ORDER — ZOLPIDEM TARTRATE 10 MG/1
1 TABLET ORAL
Qty: 0 | Refills: 0 | COMMUNITY

## 2018-05-09 RX ADMIN — SODIUM CHLORIDE 3 MILLILITER(S): 9 INJECTION INTRAMUSCULAR; INTRAVENOUS; SUBCUTANEOUS at 05:02

## 2018-05-09 RX ADMIN — ZOLPIDEM TARTRATE 5 MILLIGRAM(S): 10 TABLET ORAL at 04:00

## 2018-05-09 RX ADMIN — AMPICILLIN SODIUM AND SULBACTAM SODIUM 100 GRAM(S): 250; 125 INJECTION, POWDER, FOR SUSPENSION INTRAMUSCULAR; INTRAVENOUS at 04:30

## 2018-05-09 RX ADMIN — Medication 1 TABLET(S): at 06:30

## 2018-05-09 RX ADMIN — Medication 25 MILLIGRAM(S): at 06:30

## 2018-05-09 RX ADMIN — Medication 100 MILLIGRAM(S): at 06:30

## 2018-05-09 RX ADMIN — Medication 81 MILLIGRAM(S): at 11:52

## 2018-05-09 RX ADMIN — PANTOPRAZOLE SODIUM 40 MILLIGRAM(S): 20 TABLET, DELAYED RELEASE ORAL at 11:51

## 2018-05-09 RX ADMIN — AMPICILLIN SODIUM AND SULBACTAM SODIUM 100 GRAM(S): 250; 125 INJECTION, POWDER, FOR SUSPENSION INTRAMUSCULAR; INTRAVENOUS at 11:51

## 2018-05-09 RX ADMIN — GABAPENTIN 300 MILLIGRAM(S): 400 CAPSULE ORAL at 06:30

## 2018-05-09 RX ADMIN — Medication 650 MILLIGRAM(S): at 11:51

## 2018-05-09 RX ADMIN — HEPARIN SODIUM 5000 UNIT(S): 5000 INJECTION INTRAVENOUS; SUBCUTANEOUS at 06:30

## 2018-05-09 NOTE — PROGRESS NOTE ADULT - PROVIDER SPECIALTY LIST ADULT
ENT
Hospitalist
OMFS
Palliative Care
Plastic Surgery
SICU
ENT
ENT
Plastic Surgery
SICU

## 2018-05-09 NOTE — PROGRESS NOTE ADULT - SUBJECTIVE AND OBJECTIVE BOX
CHIEF COMPLAINT: Patient is a 62y old  male who presents with a chief complaint of "I'm having the front part of my jaw removed" (02 May 2018 07:14)      SUBJECTIVE / OVERNIGHT EVENTS:    Feeling well. Denies pain. Denies SOB, cough.    MEDICATIONS  (STANDING):  ampicillin/sulbactam  IVPB 1.5 Gram(s) IV Intermittent every 6 hours  aspirin  chewable 81 milliGRAM(s) Oral daily  docusate sodium Liquid 100 milliGRAM(s) Oral three times a day  doxazosin 1 milliGRAM(s) Oral at bedtime  gabapentin   Solution 300 milliGRAM(s) Oral three times a day  heparin  Injectable 5000 Unit(s) SubCutaneous every 8 hours  lactobacillus acidophilus 1 Tablet(s) Oral every 12 hours  metoprolol tartrate 25 milliGRAM(s) Oral two times a day  pantoprazole  Injectable 40 milliGRAM(s) IV Push daily  senna Syrup 10 milliLiter(s) Oral at bedtime  sodium chloride 0.9% lock flush 3 milliLiter(s) IV Push every 8 hours    MEDICATIONS  (PRN):  acetaminophen   Tablet. 650 milliGRAM(s) Oral every 6 hours PRN Mild Pain (1 - 3)  aluminum hydroxide/magnesium hydroxide/simethicone Suspension 30 milliLiter(s) Oral every 6 hours PRN Dyspepsia  bisacodyl Suppository 10 milliGRAM(s) Rectal daily PRN Constipation  ondansetron Injectable 4 milliGRAM(s) IV Push every 12 hours PRN Nausea and/or Vomiting  oxyCODONE    5 mG/acetaminophen 325 mG 2 Tablet(s) Oral every 4 hours PRN Severe Pain (7 - 10)  oxyCODONE    5 mG/acetaminophen 325 mG 1 Tablet(s) Oral every 4 hours PRN Moderate Pain (4 - 6)  polyethylene glycol 3350 17 Gram(s) Oral daily PRN Constipation  zolpidem 5 milliGRAM(s) Oral at bedtime PRN Insomnia      VITALS:  T(F): 97.9 (05-09-18 @ 10:24), Max: 98.7 (05-09-18 @ 06:27)  HR: 67 (05-09-18 @ 10:24) (67 - 100)  BP: 117/60 (05-09-18 @ 10:24) (111/69 - 126/61)  RR: 17 (05-09-18 @ 10:24) (16 - 18)  SpO2: 97% (05-09-18 @ 10:24)      CAPILLARY BLOOD GLUCOSE    Output     I&O's Summary  T(F): 97.9 (05-09-18 @ 10:24), Max: 98.7 (05-09-18 @ 06:27)  HR: 67 (05-09-18 @ 10:24) (67 - 100)  BP: 117/60 (05-09-18 @ 10:24) (111/69 - 126/61)  RR: 17 (05-09-18 @ 10:24) (16 - 18)  SpO2: 97% (05-09-18 @ 10:24)    PHYSICAL EXAM:  GENERAL: NAD, well-developed  HEAD:  Atraumatic, Normocephalic  EYES: EOMI  NECK: incision C/D/I  CHEST/LUNG: nonlabored breathing  HEART: nl S1/S2  ABDOMEN: nondistended, soft  PSYCH: alert, answering questions appropriately  NEUROLOGY: non-focal  SKIN: No rashes noted    LABS:              x                    134  | 27   | 17           x     >-----------< x       ------------------------< 125                   x                    4.2  | 95   | 0.91                                         Ca 9.2   Mg x     Ph x                          MICROBIOLOGY:        RADIOLOGY & ADDITIONAL TESTS:    Imaging Personally Reviewed:        [x] Consultant(s) Notes Reviewed: ENT, plastic surgery    [] Care Discussed with Consultants/Other Providers:

## 2018-05-09 NOTE — PROGRESS NOTE ADULT - SUBJECTIVE AND OBJECTIVE BOX
Pt seen and examined at bedside. No acute events overnight. Decannulated yesterday, passed TOV, doing well    Vital signs reviewed in EMR  NAD, awake and alert   Breathing comfortably on TC   OC/OP: flap soft, warm, intactsignal  Neck incision c/d/i with staples, no fluctuance, stoma well appearing  RLE neurovascularly intact    A/P: 62M s/p composite resection, b/l SND, trach, and RFFF recon 4/26. Remains stable. Tolerating PO. Tolerated capping trial overnight. Passed MBS yesterday  -SLP: Puree with Nectar Thick Liquids  -f/u pain consult recs  -flap checks per PRS   -lovenox/asa   -unasyn   -cont doxazosin  -PT: home with services    -OOB to chair with assistance  -dispo planning

## 2018-05-09 NOTE — PROGRESS NOTE ADULT - ASSESSMENT
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 Medicine consulted for co-management.    *Madibulectomy, reconstruction:  - post-op management per ENT  - palliative care consulted for symptom management    *Hyponatremia: mild, asymptomatic, likely 2/2 decreased PO intake  - encourage salt intake  - improving    *Constipation:  - continue bowel regimen    *Anemia: likely acute blood loss anemia in setting of recent surgery  - no obvious blood loss other than minimal loss through drains  - encouraged to eat iron-rich foods  - f/u with PCP for hgb check after d/c    *Hyperglycemia:  - A1c 5.3  - denies h/o diabetes  - c/w Blood glucose monitoring      Thanks for the consult. Hospitalist will follow with you.

## 2018-05-09 NOTE — PROGRESS NOTE ADULT - ASSESSMENT
A/P 62M s/p composite mandibulectomy with free fibula reconstruction (POD 12)  - leave thigh donor site open to air  - STSG on leg qod xeroform/abd/ace  - VTE ppx  - OOB, ambulate with CAM boot  - Care per ENT  - anticipate discharge home today per ENT

## 2018-05-09 NOTE — PROGRESS NOTE ADULT - SUBJECTIVE AND OBJECTIVE BOX
Plastic Surgery Progress Note (pg [facility]: [id number], [facility]: [phone])    SUBJECTIVE:  Doing well. cook doppler cut this morning on rounds. No acute events.    OBJECTIVE:     ** VITAL SIGNS / I&O's **  Vital Signs Last 24 Hrs  T(C): 37.1 (09 May 2018 06:27), Max: 37.1 (09 May 2018 06:27)  T(F): 98.7 (09 May 2018 06:27), Max: 98.7 (09 May 2018 06:27)  HR: 92 (09 May 2018 06:27) (86 - 100)  BP: 126/61 (09 May 2018 06:27) (111/69 - 126/61)  BP(mean): --  RR: 16 (09 May 2018 06:27) (16 - 18)  SpO2: 97% (09 May 2018 06:27) (96% - 100%)      ** PHYSICAL EXAM **    -- CONSTITUTIONAL: AOx3. NAD.   -- HEENT: Flap +Doppler, incisions intact, flap soft/pink/viable  -- NECK: Incision c/d/i, no collections, cook cut.  -- EXTREMITIES: RLE dressing c/d/i, SG donor site well-healed; m/s intact distally

## 2018-05-15 ENCOUNTER — APPOINTMENT (OUTPATIENT)
Dept: PLASTIC SURGERY | Facility: CLINIC | Age: 63
End: 2018-05-15
Payer: COMMERCIAL

## 2018-05-15 VITALS
HEIGHT: 68 IN | OXYGEN SATURATION: 98 % | SYSTOLIC BLOOD PRESSURE: 116 MMHG | DIASTOLIC BLOOD PRESSURE: 75 MMHG | HEART RATE: 89 BPM | RESPIRATION RATE: 16 BRPM

## 2018-05-15 PROCEDURE — 99024 POSTOP FOLLOW-UP VISIT: CPT

## 2018-05-21 ENCOUNTER — APPOINTMENT (OUTPATIENT)
Dept: OTOLARYNGOLOGY | Facility: CLINIC | Age: 63
End: 2018-05-21
Payer: COMMERCIAL

## 2018-05-21 VITALS
WEIGHT: 180 LBS | SYSTOLIC BLOOD PRESSURE: 109 MMHG | BODY MASS INDEX: 27.37 KG/M2 | DIASTOLIC BLOOD PRESSURE: 78 MMHG | HEART RATE: 93 BPM

## 2018-05-21 PROCEDURE — 31575 DIAGNOSTIC LARYNGOSCOPY: CPT | Mod: 58

## 2018-05-21 PROCEDURE — 99024 POSTOP FOLLOW-UP VISIT: CPT

## 2018-05-21 RX ORDER — ASPIRIN ENTERIC COATED TABLETS 81 MG 81 MG/1
81 TABLET, DELAYED RELEASE ORAL
Qty: 30 | Refills: 0 | Status: COMPLETED | COMMUNITY
Start: 2018-05-09

## 2018-05-21 RX ORDER — CHLORHEXIDINE GLUCONATE, 0.12% ORAL RINSE 1.2 MG/ML
0.12 SOLUTION DENTAL
Qty: 946 | Refills: 0 | Status: COMPLETED | COMMUNITY
Start: 2018-05-09

## 2018-05-21 RX ORDER — GABAPENTIN 300 MG/1
300 CAPSULE ORAL
Qty: 90 | Refills: 0 | Status: COMPLETED | COMMUNITY
Start: 2017-12-13

## 2018-06-26 ENCOUNTER — APPOINTMENT (OUTPATIENT)
Dept: PLASTIC SURGERY | Facility: CLINIC | Age: 63
End: 2018-06-26
Payer: COMMERCIAL

## 2018-06-26 VITALS
OXYGEN SATURATION: 97 % | WEIGHT: 180 LBS | HEIGHT: 68 IN | DIASTOLIC BLOOD PRESSURE: 86 MMHG | HEART RATE: 75 BPM | BODY MASS INDEX: 27.28 KG/M2 | RESPIRATION RATE: 16 BRPM | SYSTOLIC BLOOD PRESSURE: 122 MMHG

## 2018-06-26 PROCEDURE — 99024 POSTOP FOLLOW-UP VISIT: CPT

## 2018-07-02 ENCOUNTER — APPOINTMENT (OUTPATIENT)
Dept: OTOLARYNGOLOGY | Facility: CLINIC | Age: 63
End: 2018-07-02
Payer: COMMERCIAL

## 2018-07-02 VITALS
HEIGHT: 67 IN | DIASTOLIC BLOOD PRESSURE: 86 MMHG | HEART RATE: 67 BPM | BODY MASS INDEX: 25.11 KG/M2 | WEIGHT: 160 LBS | SYSTOLIC BLOOD PRESSURE: 121 MMHG

## 2018-07-02 PROCEDURE — 99024 POSTOP FOLLOW-UP VISIT: CPT

## 2018-07-02 PROCEDURE — 31575 DIAGNOSTIC LARYNGOSCOPY: CPT | Mod: 58

## 2018-07-17 PROBLEM — C06.9 MALIGNANT NEOPLASM OF MOUTH, UNSPECIFIED: Chronic | Status: ACTIVE | Noted: 2018-04-23

## 2018-07-29 ENCOUNTER — FORM ENCOUNTER (OUTPATIENT)
Age: 63
End: 2018-07-29

## 2018-07-30 ENCOUNTER — APPOINTMENT (OUTPATIENT)
Dept: NUCLEAR MEDICINE | Facility: CLINIC | Age: 63
End: 2018-07-30
Payer: COMMERCIAL

## 2018-07-30 ENCOUNTER — APPOINTMENT (OUTPATIENT)
Dept: CT IMAGING | Facility: CLINIC | Age: 63
End: 2018-07-30
Payer: COMMERCIAL

## 2018-07-30 ENCOUNTER — OUTPATIENT (OUTPATIENT)
Dept: OUTPATIENT SERVICES | Facility: HOSPITAL | Age: 63
LOS: 1 days | End: 2018-07-30

## 2018-07-30 DIAGNOSIS — Z85.819 PERSONAL HISTORY OF MALIGNANT NEOPLASM OF UNSPECIFIED SITE OF LIP, ORAL CAVITY, AND PHARYNX: Chronic | ICD-10-CM

## 2018-07-30 DIAGNOSIS — Z43.1 ENCOUNTER FOR ATTENTION TO GASTROSTOMY: Chronic | ICD-10-CM

## 2018-07-30 DIAGNOSIS — C06.9 MALIGNANT NEOPLASM OF MOUTH, UNSPECIFIED: ICD-10-CM

## 2018-07-30 PROCEDURE — 70491 CT SOFT TISSUE NECK W/DYE: CPT | Mod: 26

## 2018-07-30 PROCEDURE — 78815 PET IMAGE W/CT SKULL-THIGH: CPT | Mod: 26,PS

## 2018-07-31 ENCOUNTER — APPOINTMENT (OUTPATIENT)
Dept: PLASTIC SURGERY | Facility: CLINIC | Age: 63
End: 2018-07-31
Payer: COMMERCIAL

## 2018-07-31 VITALS
HEIGHT: 67 IN | BODY MASS INDEX: 25.9 KG/M2 | OXYGEN SATURATION: 98 % | WEIGHT: 165 LBS | HEART RATE: 68 BPM | SYSTOLIC BLOOD PRESSURE: 137 MMHG | RESPIRATION RATE: 16 BRPM | DIASTOLIC BLOOD PRESSURE: 84 MMHG

## 2018-07-31 PROCEDURE — 99213 OFFICE O/P EST LOW 20 MIN: CPT

## 2018-07-31 RX ORDER — PNV NO.95/FERROUS FUM/FOLIC AC 28MG-0.8MG
TABLET ORAL
Refills: 0 | Status: DISCONTINUED | COMMUNITY
End: 2018-07-31

## 2018-07-31 RX ORDER — OXYCODONE AND ACETAMINOPHEN 5; 325 MG/1; MG/1
5-325 TABLET ORAL EVERY 6 HOURS
Qty: 120 | Refills: 0 | Status: DISCONTINUED | COMMUNITY
Start: 2018-05-17 | End: 2018-07-31

## 2018-07-31 RX ORDER — OXYCODONE AND ACETAMINOPHEN 5; 325 MG/1; MG/1
5-325 TABLET ORAL
Qty: 28 | Refills: 0 | Status: DISCONTINUED | COMMUNITY
Start: 2018-05-10 | End: 2018-07-31

## 2018-07-31 RX ORDER — DOCUSATE SODIUM 50 MG/5ML
150 LIQUID ORAL
Qty: 1 | Refills: 0 | Status: DISCONTINUED | COMMUNITY
Start: 2018-05-10 | End: 2018-07-31

## 2018-07-31 RX ORDER — PETROLATUM,WHITE 41 %
OINTMENT (GRAM) TOPICAL DAILY
Qty: 1 | Refills: 0 | Status: DISCONTINUED | COMMUNITY
Start: 2018-05-15 | End: 2018-07-31

## 2018-07-31 RX ORDER — PENTOXIFYLLINE 400 MG/1
400 TABLET, EXTENDED RELEASE ORAL
Qty: 60 | Refills: 0 | Status: DISCONTINUED | COMMUNITY
Start: 2017-06-23 | End: 2018-07-31

## 2018-08-06 ENCOUNTER — APPOINTMENT (OUTPATIENT)
Dept: OTOLARYNGOLOGY | Facility: CLINIC | Age: 63
End: 2018-08-06
Payer: COMMERCIAL

## 2018-08-06 VITALS
HEART RATE: 70 BPM | DIASTOLIC BLOOD PRESSURE: 90 MMHG | WEIGHT: 165 LBS | SYSTOLIC BLOOD PRESSURE: 131 MMHG | BODY MASS INDEX: 25.84 KG/M2

## 2018-08-06 PROCEDURE — 31575 DIAGNOSTIC LARYNGOSCOPY: CPT

## 2018-08-06 PROCEDURE — 99214 OFFICE O/P EST MOD 30 MIN: CPT | Mod: 25

## 2018-09-04 ENCOUNTER — OUTPATIENT (OUTPATIENT)
Dept: OUTPATIENT SERVICES | Facility: HOSPITAL | Age: 63
LOS: 1 days | End: 2018-09-04
Payer: COMMERCIAL

## 2018-09-04 DIAGNOSIS — Z85.819 PERSONAL HISTORY OF MALIGNANT NEOPLASM OF UNSPECIFIED SITE OF LIP, ORAL CAVITY, AND PHARYNX: Chronic | ICD-10-CM

## 2018-09-04 DIAGNOSIS — Z43.1 ENCOUNTER FOR ATTENTION TO GASTROSTOMY: Chronic | ICD-10-CM

## 2018-09-04 DIAGNOSIS — Z51.89 ENCOUNTER FOR OTHER SPECIFIED AFTERCARE: ICD-10-CM

## 2018-09-04 DIAGNOSIS — R13.11 DYSPHAGIA, ORAL PHASE: ICD-10-CM

## 2018-09-04 DIAGNOSIS — C06.9 MALIGNANT NEOPLASM OF MOUTH, UNSPECIFIED: ICD-10-CM

## 2018-09-04 DIAGNOSIS — I89.0 LYMPHEDEMA, NOT ELSEWHERE CLASSIFIED: ICD-10-CM

## 2018-11-06 ENCOUNTER — APPOINTMENT (OUTPATIENT)
Dept: OTOLARYNGOLOGY | Facility: CLINIC | Age: 63
End: 2018-11-06
Payer: COMMERCIAL

## 2018-11-06 VITALS
DIASTOLIC BLOOD PRESSURE: 83 MMHG | BODY MASS INDEX: 25.76 KG/M2 | WEIGHT: 170 LBS | HEIGHT: 68 IN | HEART RATE: 92 BPM | SYSTOLIC BLOOD PRESSURE: 145 MMHG

## 2018-11-06 PROCEDURE — 99214 OFFICE O/P EST MOD 30 MIN: CPT | Mod: 25

## 2018-11-06 PROCEDURE — 31575 DIAGNOSTIC LARYNGOSCOPY: CPT

## 2018-11-20 ENCOUNTER — FORM ENCOUNTER (OUTPATIENT)
Age: 63
End: 2018-11-20

## 2018-11-21 ENCOUNTER — OUTPATIENT (OUTPATIENT)
Dept: OUTPATIENT SERVICES | Facility: HOSPITAL | Age: 63
LOS: 1 days | End: 2018-11-21
Payer: COMMERCIAL

## 2018-11-21 ENCOUNTER — APPOINTMENT (OUTPATIENT)
Dept: CT IMAGING | Facility: CLINIC | Age: 63
End: 2018-11-21
Payer: COMMERCIAL

## 2018-11-21 DIAGNOSIS — Z85.819 PERSONAL HISTORY OF MALIGNANT NEOPLASM OF UNSPECIFIED SITE OF LIP, ORAL CAVITY, AND PHARYNX: Chronic | ICD-10-CM

## 2018-11-21 DIAGNOSIS — C06.9 MALIGNANT NEOPLASM OF MOUTH, UNSPECIFIED: ICD-10-CM

## 2018-11-21 DIAGNOSIS — Z43.1 ENCOUNTER FOR ATTENTION TO GASTROSTOMY: Chronic | ICD-10-CM

## 2018-11-21 PROCEDURE — 71260 CT THORAX DX C+: CPT | Mod: 26

## 2018-11-21 PROCEDURE — 70491 CT SOFT TISSUE NECK W/DYE: CPT

## 2018-11-21 PROCEDURE — 70491 CT SOFT TISSUE NECK W/DYE: CPT | Mod: 26

## 2018-11-21 PROCEDURE — 71260 CT THORAX DX C+: CPT

## 2018-12-19 ENCOUNTER — FORM ENCOUNTER (OUTPATIENT)
Age: 63
End: 2018-12-19

## 2018-12-19 ENCOUNTER — APPOINTMENT (OUTPATIENT)
Dept: MRI IMAGING | Facility: CLINIC | Age: 63
End: 2018-12-19
Payer: COMMERCIAL

## 2018-12-20 ENCOUNTER — OUTPATIENT (OUTPATIENT)
Dept: OUTPATIENT SERVICES | Facility: HOSPITAL | Age: 63
LOS: 1 days | End: 2018-12-20

## 2018-12-20 ENCOUNTER — APPOINTMENT (OUTPATIENT)
Dept: MRI IMAGING | Facility: CLINIC | Age: 63
End: 2018-12-20
Payer: COMMERCIAL

## 2018-12-20 DIAGNOSIS — Z85.819 PERSONAL HISTORY OF MALIGNANT NEOPLASM OF UNSPECIFIED SITE OF LIP, ORAL CAVITY, AND PHARYNX: Chronic | ICD-10-CM

## 2018-12-20 DIAGNOSIS — Z43.1 ENCOUNTER FOR ATTENTION TO GASTROSTOMY: Chronic | ICD-10-CM

## 2018-12-20 DIAGNOSIS — C06.9 MALIGNANT NEOPLASM OF MOUTH, UNSPECIFIED: ICD-10-CM

## 2018-12-20 PROCEDURE — 70543 MRI ORBT/FAC/NCK W/O &W/DYE: CPT | Mod: 26

## 2019-01-07 ENCOUNTER — APPOINTMENT (OUTPATIENT)
Dept: OTOLARYNGOLOGY | Facility: CLINIC | Age: 64
End: 2019-01-07
Payer: COMMERCIAL

## 2019-01-07 ENCOUNTER — TRANSCRIPTION ENCOUNTER (OUTPATIENT)
Age: 64
End: 2019-01-07

## 2019-01-07 VITALS
SYSTOLIC BLOOD PRESSURE: 155 MMHG | HEIGHT: 68 IN | BODY MASS INDEX: 25.01 KG/M2 | HEART RATE: 68 BPM | DIASTOLIC BLOOD PRESSURE: 100 MMHG | WEIGHT: 165 LBS

## 2019-01-07 PROCEDURE — 99214 OFFICE O/P EST MOD 30 MIN: CPT | Mod: 25

## 2019-01-07 PROCEDURE — 31575 DIAGNOSTIC LARYNGOSCOPY: CPT

## 2019-01-11 ENCOUNTER — APPOINTMENT (OUTPATIENT)
Dept: PLASTIC SURGERY | Facility: CLINIC | Age: 64
End: 2019-01-11
Payer: COMMERCIAL

## 2019-01-11 VITALS
WEIGHT: 165 LBS | HEART RATE: 73 BPM | DIASTOLIC BLOOD PRESSURE: 102 MMHG | OXYGEN SATURATION: 98 % | RESPIRATION RATE: 16 BRPM | SYSTOLIC BLOOD PRESSURE: 151 MMHG | BODY MASS INDEX: 25.09 KG/M2

## 2019-01-11 PROCEDURE — 99213 OFFICE O/P EST LOW 20 MIN: CPT

## 2019-01-14 NOTE — HISTORY OF PRESENT ILLNESS
[FreeTextEntry1] : 62M with recurrent SCCa oral cavity presents for follow up. Pt Status post composite resection of the anterior mandible bilateral neck and reconstruction with free fibula flap 4/7/18. States he is doing well.  Eating and drinking well, weight is stable, pain, swelling improved. Patient c/o of oral incompetence.

## 2019-01-14 NOTE — PHYSICAL EXAM
[NI] : Normal [de-identified] : intraoral skin island flap viable intact, slightly bulky, lower lip asystemic do to adhesions and tethered to the skin island.  neck incision clean dry intact swelling resolved [de-identified] : right donor side leg incision clean dry intact skin graft 95% take, skin graft site healed

## 2019-01-26 NOTE — PROCEDURE
[Trismus] : trismus preventing mirror examination [None] : none [Flexible Endoscope] : examined with the flexible endoscope [Serial Number: ___] : Serial Number: [unfilled] [de-identified] : No lesions in the NPx, OPx, HPx or larynx.  Stable posttreatment changes, VC are mobile, airway patent.  There is no evidence of any mucosal lesions along the BOT, GT sulcus nor is there any asymmetric submucosal fullness to suggest underlying disease.

## 2019-01-26 NOTE — PHYSICAL EXAM
[de-identified] : Posttreatment changes, no LAD.  Lymphedema is improving. [Laryngoscopy Performed] : laryngoscopy was performed, see procedure section for findings [FreeTextEntry1] : Flap is healing well, edema slowly improving with improved oral competence.  No new lesions are appreciated on inspection or deep palpation within the tongue, BOT or along the GT sulcus bilaterally.   [Normal] : no rashes [de-identified] : R. leg healing well.

## 2019-01-26 NOTE — HISTORY OF PRESENT ILLNESS
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  Last CT neck and chest were on 11/21/18.  Since his last visit, he reports that he underwent some dental work and either during that time or soon thereafter started noticing numbness along the left hemitongue. He denies any pain, dyspnea, dysphagia, otalgia.  He reports that he has noticed some difficulty processing the food bolus in his mouth along the left and consequently hasn't been eating as well and has lost some weight.  He underwent MRI for further evaluation and presents for followup.

## 2019-01-29 PROCEDURE — 92526 ORAL FUNCTION THERAPY: CPT

## 2019-01-29 PROCEDURE — 92610 EVALUATE SWALLOWING FUNCTION: CPT

## 2019-01-29 PROCEDURE — 97140 MANUAL THERAPY 1/> REGIONS: CPT

## 2019-02-05 ENCOUNTER — APPOINTMENT (OUTPATIENT)
Dept: OTOLARYNGOLOGY | Facility: CLINIC | Age: 64
End: 2019-02-05
Payer: COMMERCIAL

## 2019-02-05 VITALS
WEIGHT: 165 LBS | DIASTOLIC BLOOD PRESSURE: 88 MMHG | BODY MASS INDEX: 25.01 KG/M2 | HEART RATE: 93 BPM | SYSTOLIC BLOOD PRESSURE: 132 MMHG | HEIGHT: 68 IN

## 2019-02-05 PROCEDURE — 99214 OFFICE O/P EST MOD 30 MIN: CPT | Mod: 25

## 2019-02-05 PROCEDURE — 31575 DIAGNOSTIC LARYNGOSCOPY: CPT

## 2019-02-10 NOTE — PHYSICAL EXAM
[de-identified] : Posttreatment changes, no LAD.  Lymphedema is improved. [Laryngoscopy Performed] : laryngoscopy was performed, see procedure section for findings [FreeTextEntry1] : Flap is healing well, edema slowly improving with improved oral competence.  No new lesions are appreciated on inspection or deep palpation within the tongue, BOT or along the GT sulcus bilaterally.   [Normal] : no rashes [de-identified] : R. leg healing well.

## 2019-02-10 NOTE — HISTORY OF PRESENT ILLNESS
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  Pt developed numbness along the L side of the tongue.  Pt is eating soft food.  He has lost about 10-15 lbs since last November.  Last CT scans November 2018.  Last MRI 12/2018.  He denies dyspnea, otalgia.  He is eager to proceed with revision of his lower lip.

## 2019-02-10 NOTE — PROCEDURE
[Trismus] : trismus preventing mirror examination [None] : none [Flexible Endoscope] : examined with the flexible endoscope [Serial Number: ___] : Serial Number: [unfilled] [de-identified] : No lesions in the NPx, OPx, HPx or larynx. Stable posttreatment changes, VC are mobile, airway patent. There is no evidence of any mucosal lesions along the BOT, GT sulcus nor is there any asymmetric submucosal fullness to suggest underlying disease.

## 2019-02-11 NOTE — ASU PREOP CHECKLIST - PATIENT SENT TO
Patient is a 54y old  Female who presents with a chief complaint of vomiting. Found to be in ARF 2/2 worsening CKD. Pt had kidney biopsy which came back with sclerosis. After biopsy pt had right kidney hematoma but no intervention needed. Now on HD. Pt also to have anemia of chronic disease 2/2 CKD. Started on epogen now anemia improved.       INTERVAL HPI/OVERNIGHT EVENTS:    MEDICATIONS  (STANDING):  benztropine 0.5 milliGRAM(s) Oral two times a day  calcium acetate 1334 milliGRAM(s) Oral three times a day with meals  chlorhexidine 4% Liquid 1 Application(s) Topical <User Schedule>  docusate sodium 100 milliGRAM(s) Oral two times a day  epoetin seema Injectable 6000 Unit(s) SubCutaneous <User Schedule>  fluticasone propionate 50 MICROgram(s)/spray Nasal Spray 1 Spray(s) Both Nostrils two times a day  insulin glargine Injectable (LANTUS) 8 Unit(s) SubCutaneous at bedtime  insulin lispro (HumaLOG) corrective regimen sliding scale   SubCutaneous Before meals and at bedtime  insulin lispro Injectable (HumaLOG) 4 Unit(s) SubCutaneous three times a day before meals  levothyroxine 25 MICROGram(s) Oral daily  pantoprazole  Injectable 40 milliGRAM(s) IV Push daily  senna 2 Tablet(s) Oral at bedtime  simvastatin 20 milliGRAM(s) Oral at bedtime    MEDICATIONS  (PRN):  ondansetron Injectable 4 milliGRAM(s) IV Push every 6 hours PRN Nausea and/or Vomiting      Allergies    No Known Allergies    Intolerances        Vital Signs Last 24 Hrs  T(C): 37.2 (11 Feb 2019 09:20), Max: 37.5 (11 Feb 2019 00:07)  T(F): 99 (11 Feb 2019 09:20), Max: 99.5 (11 Feb 2019 00:07)  HR: 79 (11 Feb 2019 09:20) (79 - 90)  BP: 98/70 (11 Feb 2019 09:20) (98/70 - 139/62)  BP(mean): --  RR: 17 (11 Feb 2019 09:20) (16 - 17)  SpO2: 100% (11 Feb 2019 09:20) (100% - 100%)    PHYSICAL EXAM:  GENERAL: NAD  HEENT: Supple, No JVD, Normal thyroid  NERVOUS SYSTEM:  Alert & Oriented X2,   CHEST/LUNG: Clear to percussion bilaterally; No rales, rhonchi, wheezing, or rubs. R perm carth   HEART: Regular rate and rhythm; No murmurs, rubs, or gallops  ABDOMEN: Soft, Nontender, Nondistended; Bowel sounds present  EXTREMITIES:  2+ Peripheral Pulses, No clubbing, cyanosis, or edema  SKIN: no rashes      LABS:                        11.3   9.5   )-----------( 238      ( 11 Feb 2019 05:26 )             35.8     02-11    131<L>  |  91<L>  |  73<H>  ----------------------------<  211<H>  4.0   |  26  |  11.00<H>    Ca    9.7      11 Feb 2019 05:26  Phos  6.7     02-11  Mg     2.6     02-11    TPro  9.1<H>  /  Alb  4.0  /  TBili  0.4  /  DBili  x   /  AST  14  /  ALT  20  /  AlkPhos  152<H>  02-11        CAPILLARY BLOOD GLUCOSE      POCT Blood Glucose.: 338 mg/dL (11 Feb 2019 11:29)  POCT Blood Glucose.: 274 mg/dL (11 Feb 2019 09:02)  POCT Blood Glucose.: 177 mg/dL (10 Feb 2019 21:44)  POCT Blood Glucose.: 320 mg/dL (10 Feb 2019 16:55)      RADIOLOGY & ADDITIONAL TESTS: holding area operating room

## 2019-03-05 ENCOUNTER — OUTPATIENT (OUTPATIENT)
Dept: OUTPATIENT SERVICES | Facility: HOSPITAL | Age: 64
LOS: 1 days | End: 2019-03-05
Payer: COMMERCIAL

## 2019-03-05 VITALS
HEART RATE: 60 BPM | TEMPERATURE: 98 F | OXYGEN SATURATION: 96 % | WEIGHT: 162.04 LBS | SYSTOLIC BLOOD PRESSURE: 130 MMHG | HEIGHT: 67 IN | RESPIRATION RATE: 16 BRPM | DIASTOLIC BLOOD PRESSURE: 80 MMHG

## 2019-03-05 DIAGNOSIS — Z01.818 ENCOUNTER FOR OTHER PREPROCEDURAL EXAMINATION: ICD-10-CM

## 2019-03-05 DIAGNOSIS — Z43.1 ENCOUNTER FOR ATTENTION TO GASTROSTOMY: Chronic | ICD-10-CM

## 2019-03-05 DIAGNOSIS — Z85.819 PERSONAL HISTORY OF MALIGNANT NEOPLASM OF UNSPECIFIED SITE OF LIP, ORAL CAVITY, AND PHARYNX: Chronic | ICD-10-CM

## 2019-03-05 DIAGNOSIS — C06.9 MALIGNANT NEOPLASM OF MOUTH, UNSPECIFIED: ICD-10-CM

## 2019-03-05 LAB
ANION GAP SERPL CALC-SCNC: 14 MMO/L — SIGNIFICANT CHANGE UP (ref 7–14)
BLD GP AB SCN SERPL QL: NEGATIVE — SIGNIFICANT CHANGE UP
BUN SERPL-MCNC: 20 MG/DL — SIGNIFICANT CHANGE UP (ref 7–23)
CALCIUM SERPL-MCNC: 10.5 MG/DL — SIGNIFICANT CHANGE UP (ref 8.4–10.5)
CHLORIDE SERPL-SCNC: 98 MMOL/L — SIGNIFICANT CHANGE UP (ref 98–107)
CO2 SERPL-SCNC: 26 MMOL/L — SIGNIFICANT CHANGE UP (ref 22–31)
CREAT SERPL-MCNC: 1.02 MG/DL — SIGNIFICANT CHANGE UP (ref 0.5–1.3)
GLUCOSE SERPL-MCNC: 99 MG/DL — SIGNIFICANT CHANGE UP (ref 70–99)
HCT VFR BLD CALC: 47.7 % — SIGNIFICANT CHANGE UP (ref 39–50)
HGB BLD-MCNC: 15.6 G/DL — SIGNIFICANT CHANGE UP (ref 13–17)
MCHC RBC-ENTMCNC: 30.2 PG — SIGNIFICANT CHANGE UP (ref 27–34)
MCHC RBC-ENTMCNC: 32.7 % — SIGNIFICANT CHANGE UP (ref 32–36)
MCV RBC AUTO: 92.4 FL — SIGNIFICANT CHANGE UP (ref 80–100)
NRBC # FLD: 0 K/UL — LOW (ref 25–125)
PLATELET # BLD AUTO: 255 K/UL — SIGNIFICANT CHANGE UP (ref 150–400)
PMV BLD: 10 FL — SIGNIFICANT CHANGE UP (ref 7–13)
POTASSIUM SERPL-MCNC: 4.2 MMOL/L — SIGNIFICANT CHANGE UP (ref 3.5–5.3)
POTASSIUM SERPL-SCNC: 4.2 MMOL/L — SIGNIFICANT CHANGE UP (ref 3.5–5.3)
RBC # BLD: 5.16 M/UL — SIGNIFICANT CHANGE UP (ref 4.2–5.8)
RBC # FLD: 13.1 % — SIGNIFICANT CHANGE UP (ref 10.3–14.5)
RH IG SCN BLD-IMP: POSITIVE — SIGNIFICANT CHANGE UP
SODIUM SERPL-SCNC: 138 MMOL/L — SIGNIFICANT CHANGE UP (ref 135–145)
WBC # BLD: 6.69 K/UL — SIGNIFICANT CHANGE UP (ref 3.8–10.5)
WBC # FLD AUTO: 6.69 K/UL — SIGNIFICANT CHANGE UP (ref 3.8–10.5)

## 2019-03-05 PROCEDURE — 93010 ELECTROCARDIOGRAM REPORT: CPT

## 2019-03-05 RX ORDER — PENTOXIFYLLINE 400 MG
1 TABLET, EXTENDED RELEASE ORAL
Qty: 0 | Refills: 0 | COMMUNITY

## 2019-03-05 RX ORDER — VITAMIN E 100 UNIT
1 CAPSULE ORAL
Qty: 0 | Refills: 0 | COMMUNITY

## 2019-03-05 RX ORDER — SODIUM CHLORIDE 9 MG/ML
1000 INJECTION, SOLUTION INTRAVENOUS
Qty: 0 | Refills: 0 | Status: DISCONTINUED | OUTPATIENT
Start: 2019-03-07 | End: 2019-03-22

## 2019-03-05 NOTE — H&P PST ADULT - LYMPHATIC
anterior cervical L/posterior cervical R/supraclavicular R/supraclavicular L/anterior cervical R/posterior cervical L

## 2019-03-05 NOTE — H&P PST ADULT - PSH
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

## 2019-03-05 NOTE — H&P PST ADULT - NEGATIVE MUSCULOSKELETAL SYMPTOMS
no arthritis/no muscle cramps/no joint swelling/no muscle weakness/no back pain/no stiffness/no neck pain/no arthralgia

## 2019-03-05 NOTE — H&P PST ADULT - HISTORY OF PRESENT ILLNESS
64 y/o male presents to PST for preoperative evaluation with dx of malignant neoplasm of mouth and mandible in 2015 h/o chemotherapy and radiation,, then underwent Composite Resection Mandibular Alvelous Cancer with Resection of Floor of Mouth, Bilateral Neck Dissection, Tracheostomy; Yasir- Resection of Right Mandible and Placement of Reconstruction Plate, Possible Bone Graft on 4/26/2018.  Pt now with c/o numbness on left side of tongue and mid lower lip extending to chin.  Scheduled for direct laryngoscopy esophagoscopy, revision of oral flap with release of lower lip on 3/7/2019

## 2019-03-05 NOTE — H&P PST ADULT - ENMT COMMENTS
dx of malignant neoplasm of mouth and mandible. See HPI lower facial swelling, contracture of lower lip, heal scar - facial & neck, neck area hyperpigment and tissue firm to touch

## 2019-03-05 NOTE — H&P PST ADULT - NEGATIVE ENMT SYMPTOMS
no sinus symptoms/no gum bleeding/no nose bleeds/no throat pain/no ear pain/no tinnitus/no dysphagia/no vertigo/no nasal congestion

## 2019-03-05 NOTE — H&P PST ADULT - PROBLEM SELECTOR PLAN 1
Scheduled for direct laryngoscopy esophagoscopy, revision of oral flap with release of lower lip on 3/7/2019  preop instructions, gi prophylaxis given   pt verbalized understanding  abo on admit Scheduled for direct laryngoscopy esophagoscopy, revision of oral flap with release of lower lip on 3/7/2019  preop instructions, gi prophylaxis given   pt verbalized understanding  abo on admit  SHIRA precautions recommended.  OR booking notified via fax.

## 2019-03-06 ENCOUNTER — TRANSCRIPTION ENCOUNTER (OUTPATIENT)
Age: 64
End: 2019-03-06

## 2019-03-07 ENCOUNTER — OUTPATIENT (OUTPATIENT)
Dept: OUTPATIENT SERVICES | Facility: HOSPITAL | Age: 64
LOS: 1 days | Discharge: ANOTHER HOSPITAL | End: 2019-03-07
Payer: COMMERCIAL

## 2019-03-07 ENCOUNTER — APPOINTMENT (OUTPATIENT)
Dept: OTOLARYNGOLOGY | Facility: HOSPITAL | Age: 64
End: 2019-03-07

## 2019-03-07 VITALS
SYSTOLIC BLOOD PRESSURE: 147 MMHG | WEIGHT: 162.04 LBS | OXYGEN SATURATION: 100 % | HEIGHT: 67 IN | HEART RATE: 92 BPM | DIASTOLIC BLOOD PRESSURE: 97 MMHG | RESPIRATION RATE: 18 BRPM | TEMPERATURE: 98 F

## 2019-03-07 VITALS
HEART RATE: 95 BPM | SYSTOLIC BLOOD PRESSURE: 149 MMHG | OXYGEN SATURATION: 100 % | RESPIRATION RATE: 16 BRPM | DIASTOLIC BLOOD PRESSURE: 99 MMHG

## 2019-03-07 DIAGNOSIS — C06.9 MALIGNANT NEOPLASM OF MOUTH, UNSPECIFIED: ICD-10-CM

## 2019-03-07 DIAGNOSIS — Z43.1 ENCOUNTER FOR ATTENTION TO GASTROSTOMY: Chronic | ICD-10-CM

## 2019-03-07 DIAGNOSIS — Z85.819 PERSONAL HISTORY OF MALIGNANT NEOPLASM OF UNSPECIFIED SITE OF LIP, ORAL CAVITY, AND PHARYNX: Chronic | ICD-10-CM

## 2019-03-07 PROCEDURE — 31525 DX LARYNGOSCOPY EXCL NB: CPT | Mod: 59,GC

## 2019-03-07 PROCEDURE — 41870 PERIODONTAL MUCOSAL GRAFTING: CPT

## 2019-03-07 PROCEDURE — 43191 ESOPHAGOSCOPY RIGID TRNSO DX: CPT | Mod: GC

## 2019-03-07 RX ORDER — FENTANYL CITRATE 50 UG/ML
50 INJECTION INTRAVENOUS
Qty: 0 | Refills: 0 | Status: DISCONTINUED | OUTPATIENT
Start: 2019-03-07 | End: 2019-03-08

## 2019-03-07 RX ORDER — AMOXICILLIN 250 MG/5ML
1 SUSPENSION, RECONSTITUTED, ORAL (ML) ORAL
Qty: 21 | Refills: 0 | OUTPATIENT
Start: 2019-03-07 | End: 2019-03-13

## 2019-03-07 RX ORDER — FENTANYL CITRATE 50 UG/ML
25 INJECTION INTRAVENOUS
Qty: 0 | Refills: 0 | Status: DISCONTINUED | OUTPATIENT
Start: 2019-03-07 | End: 2019-03-08

## 2019-03-07 RX ORDER — CHLORHEXIDINE GLUCONATE 213 G/1000ML
15 SOLUTION TOPICAL
Qty: 420 | Refills: 0 | OUTPATIENT
Start: 2019-03-07 | End: 2019-03-20

## 2019-03-07 RX ORDER — METOCLOPRAMIDE HCL 10 MG
10 TABLET ORAL ONCE
Qty: 0 | Refills: 0 | Status: DISCONTINUED | OUTPATIENT
Start: 2019-03-07 | End: 2019-03-08

## 2019-03-07 RX ORDER — ONDANSETRON 8 MG/1
4 TABLET, FILM COATED ORAL ONCE
Qty: 0 | Refills: 0 | Status: DISCONTINUED | OUTPATIENT
Start: 2019-03-07 | End: 2019-03-08

## 2019-03-07 RX ORDER — OXYCODONE HYDROCHLORIDE 5 MG/1
1 TABLET ORAL
Qty: 12 | Refills: 0 | OUTPATIENT
Start: 2019-03-07 | End: 2019-03-09

## 2019-03-07 RX ORDER — DEXAMETHASONE 0.5 MG/5ML
8 ELIXIR ORAL ONCE
Qty: 0 | Refills: 0 | Status: DISCONTINUED | OUTPATIENT
Start: 2019-03-07 | End: 2019-03-08

## 2019-03-07 RX ADMIN — FENTANYL CITRATE 50 MICROGRAM(S): 50 INJECTION INTRAVENOUS at 21:04

## 2019-03-07 RX ADMIN — FENTANYL CITRATE 50 MICROGRAM(S): 50 INJECTION INTRAVENOUS at 21:21

## 2019-03-07 NOTE — ASU DISCHARGE PLAN (ADULT/PEDIATRIC) - CARE PROVIDER_API CALL
Krystian Hdz)  Otolaryngology  [address] [place] [address]  Phone: [phone]  Fax: [phone]  Follow Up Time:     Iam Vasquez)  Surgery  PlasticReconstruct  [address] [facility], [address]  [place] [address]  Phone: [phone]  Fax: [phone]  Follow Up Time:     Mahamed Cooley (DMD; MD)  OralMaxillofacial Surgery  [address] [place] [address] [address]  [place] [address]  Phone: [phone]  Fax: [phone]  Follow Up Time:

## 2019-03-07 NOTE — ASU DISCHARGE PLAN (ADULT/PEDIATRIC) - PROVIDER TOKENS
PROVIDER:[TOKEN:[[id number]]],PROVIDER:[TOKEN:[[id number]]],PROVIDER:[TOKEN:[[id number]]]

## 2019-03-25 ENCOUNTER — APPOINTMENT (OUTPATIENT)
Dept: OTOLARYNGOLOGY | Facility: CLINIC | Age: 64
End: 2019-03-25
Payer: COMMERCIAL

## 2019-03-25 VITALS
HEIGHT: 68 IN | HEART RATE: 94 BPM | BODY MASS INDEX: 25.01 KG/M2 | WEIGHT: 165 LBS | DIASTOLIC BLOOD PRESSURE: 95 MMHG | SYSTOLIC BLOOD PRESSURE: 164 MMHG

## 2019-03-25 VITALS — DIASTOLIC BLOOD PRESSURE: 97 MMHG | SYSTOLIC BLOOD PRESSURE: 147 MMHG | HEART RATE: 75 BPM

## 2019-03-25 DIAGNOSIS — Z00.00 ENCOUNTER FOR GENERAL ADULT MEDICAL EXAMINATION W/OUT ABNORMAL FINDINGS: ICD-10-CM

## 2019-03-25 PROCEDURE — 99214 OFFICE O/P EST MOD 30 MIN: CPT | Mod: 25

## 2019-03-25 PROCEDURE — 31575 DIAGNOSTIC LARYNGOSCOPY: CPT

## 2019-03-25 NOTE — PROCEDURE
[Trismus] : trismus preventing mirror examination [None] : none [Flexible Endoscope] : examined with the flexible endoscope [Serial Number: ___] : Serial Number: [unfilled] [de-identified] : No lesions in the NPx, OPx, HPx or larynx.  Stable posttreatment changes, VC are mobile, airway patent.\par

## 2019-03-25 NOTE — HISTORY OF PRESENT ILLNESS
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  Pt is s/p a release of the lower lip scar and mucosal graft on 3/7/19.  Pt is experiencing L tongue numbness and L preauricular pain when he presses on the area.  Pt also has pain and stiffness along the L neck.  Pt takes Tylenol for the pain.  Pt is drooling and he has a lack of appetite.  He denies any dyspnea and his weight remains stable.

## 2019-03-25 NOTE — PHYSICAL EXAM
[de-identified] : Posttreatment changes, no LAD.  Lymphedema is improved. [Laryngoscopy Performed] : laryngoscopy was performed, see procedure section for findings [FreeTextEntry1] : FFF skin paddle is healing well, the mucosal flap has failed and has completely sloughed off with the lower lip essentially scarring back down to its original position.  No new lesions are appreciated on inspection or deep palpation within the tongue, BOT or along the GT sulcus bilaterally.   [Normal] : no rashes [de-identified] : R. leg healing well.

## 2019-03-26 ENCOUNTER — APPOINTMENT (OUTPATIENT)
Dept: PLASTIC SURGERY | Facility: CLINIC | Age: 64
End: 2019-03-26
Payer: COMMERCIAL

## 2019-03-26 VITALS
HEART RATE: 79 BPM | TEMPERATURE: 98.7 F | DIASTOLIC BLOOD PRESSURE: 99 MMHG | RESPIRATION RATE: 16 BRPM | OXYGEN SATURATION: 98 % | SYSTOLIC BLOOD PRESSURE: 155 MMHG

## 2019-03-26 PROCEDURE — 99024 POSTOP FOLLOW-UP VISIT: CPT

## 2019-03-27 NOTE — HISTORY OF PRESENT ILLNESS
[FreeTextEntry1] : 62M with recurrent SCCa oral cavity presents for follow up. Pt Status post composite resection of the anterior mandible bilateral neck and reconstruction with free fibula flap 4/7/18. S/P implant placement and mucosal flap. PAtient seen in Dr. Dow who debrided mucosal flap due to failure.  She is drooling do to oral incompetence. Lower lip scared back down. \par

## 2019-03-27 NOTE — PHYSICAL EXAM
[NI] : Normal [de-identified] : lower lip scared down and tethered with significant oral incompetence. patient cont to c/o tongue numbness and pain left ear.

## 2019-03-27 NOTE — REASON FOR VISIT
[FreeTextEntry1] : DOS: 03/07/2019 s/p lower lip scar release and muscle flap. pt is doing well,here for follow up.

## 2019-04-04 ENCOUNTER — FORM ENCOUNTER (OUTPATIENT)
Age: 64
End: 2019-04-04

## 2019-04-04 ENCOUNTER — OUTPATIENT (OUTPATIENT)
Dept: OUTPATIENT SERVICES | Facility: HOSPITAL | Age: 64
LOS: 1 days | End: 2019-04-04
Payer: COMMERCIAL

## 2019-04-04 DIAGNOSIS — Z43.1 ENCOUNTER FOR ATTENTION TO GASTROSTOMY: Chronic | ICD-10-CM

## 2019-04-04 DIAGNOSIS — M54.2 CERVICALGIA: ICD-10-CM

## 2019-04-04 DIAGNOSIS — C06.9 MALIGNANT NEOPLASM OF MOUTH, UNSPECIFIED: ICD-10-CM

## 2019-04-04 DIAGNOSIS — R13.11 DYSPHAGIA, ORAL PHASE: ICD-10-CM

## 2019-04-04 DIAGNOSIS — Z85.819 PERSONAL HISTORY OF MALIGNANT NEOPLASM OF UNSPECIFIED SITE OF LIP, ORAL CAVITY, AND PHARYNX: Chronic | ICD-10-CM

## 2019-04-04 DIAGNOSIS — I89.0 LYMPHEDEMA, NOT ELSEWHERE CLASSIFIED: ICD-10-CM

## 2019-04-05 ENCOUNTER — APPOINTMENT (OUTPATIENT)
Dept: MRI IMAGING | Facility: CLINIC | Age: 64
End: 2019-04-05
Payer: COMMERCIAL

## 2019-04-05 ENCOUNTER — OUTPATIENT (OUTPATIENT)
Dept: OUTPATIENT SERVICES | Facility: HOSPITAL | Age: 64
LOS: 1 days | End: 2019-04-05
Payer: COMMERCIAL

## 2019-04-05 DIAGNOSIS — Z85.819 PERSONAL HISTORY OF MALIGNANT NEOPLASM OF UNSPECIFIED SITE OF LIP, ORAL CAVITY, AND PHARYNX: Chronic | ICD-10-CM

## 2019-04-05 DIAGNOSIS — R20.0 ANESTHESIA OF SKIN: ICD-10-CM

## 2019-04-05 DIAGNOSIS — C06.9 MALIGNANT NEOPLASM OF MOUTH, UNSPECIFIED: ICD-10-CM

## 2019-04-05 DIAGNOSIS — Z43.1 ENCOUNTER FOR ATTENTION TO GASTROSTOMY: Chronic | ICD-10-CM

## 2019-04-05 PROCEDURE — 70543 MRI ORBT/FAC/NCK W/O &W/DYE: CPT | Mod: 26

## 2019-04-05 PROCEDURE — 70543 MRI ORBT/FAC/NCK W/O &W/DYE: CPT

## 2019-04-05 PROCEDURE — 72050 X-RAY EXAM NECK SPINE 4/5VWS: CPT

## 2019-04-05 PROCEDURE — A9585: CPT

## 2019-04-05 PROCEDURE — 72050 X-RAY EXAM NECK SPINE 4/5VWS: CPT | Mod: 26

## 2019-04-08 ENCOUNTER — APPOINTMENT (OUTPATIENT)
Dept: PLASTIC SURGERY | Facility: CLINIC | Age: 64
End: 2019-04-08
Payer: COMMERCIAL

## 2019-04-08 PROCEDURE — 99024 POSTOP FOLLOW-UP VISIT: CPT

## 2019-04-09 NOTE — PHYSICAL EXAM
[NI] : Normal [de-identified] : lower lip scared down and tethered with significant oral incompetence. patient cont to c/o tongue numbness and pain left ear.

## 2019-04-10 ENCOUNTER — EMERGENCY (EMERGENCY)
Facility: HOSPITAL | Age: 64
LOS: 1 days | Discharge: HOME | End: 2019-04-10
Attending: EMERGENCY MEDICINE
Payer: COMMERCIAL

## 2019-04-10 ENCOUNTER — INPATIENT (INPATIENT)
Facility: HOSPITAL | Age: 64
LOS: 8 days | Discharge: HOME | End: 2019-04-19
Attending: OTOLARYNGOLOGY | Admitting: OTOLARYNGOLOGY
Payer: COMMERCIAL

## 2019-04-10 VITALS
TEMPERATURE: 98 F | SYSTOLIC BLOOD PRESSURE: 146 MMHG | HEIGHT: 67 IN | WEIGHT: 149.91 LBS | DIASTOLIC BLOOD PRESSURE: 93 MMHG | OXYGEN SATURATION: 100 % | HEART RATE: 62 BPM | RESPIRATION RATE: 18 BRPM

## 2019-04-10 VITALS
HEART RATE: 71 BPM | RESPIRATION RATE: 16 BRPM | OXYGEN SATURATION: 100 % | TEMPERATURE: 99 F | SYSTOLIC BLOOD PRESSURE: 189 MMHG | DIASTOLIC BLOOD PRESSURE: 90 MMHG

## 2019-04-10 DIAGNOSIS — Z85.819 PERSONAL HISTORY OF MALIGNANT NEOPLASM OF UNSPECIFIED SITE OF LIP, ORAL CAVITY, AND PHARYNX: Chronic | ICD-10-CM

## 2019-04-10 DIAGNOSIS — Z43.1 ENCOUNTER FOR ATTENTION TO GASTROSTOMY: Chronic | ICD-10-CM

## 2019-04-10 DIAGNOSIS — C06.9 MALIGNANT NEOPLASM OF MOUTH, UNSPECIFIED: ICD-10-CM

## 2019-04-10 LAB
ALBUMIN SERPL ELPH-MCNC: 4.2 G/DL — SIGNIFICANT CHANGE UP (ref 3.3–5.2)
ALBUMIN SERPL ELPH-MCNC: 4.5 G/DL — SIGNIFICANT CHANGE UP (ref 3.3–5)
ALP SERPL-CCNC: 71 U/L — SIGNIFICANT CHANGE UP (ref 40–120)
ALP SERPL-CCNC: 72 U/L — SIGNIFICANT CHANGE UP (ref 40–120)
ALT FLD-CCNC: 14 U/L — SIGNIFICANT CHANGE UP (ref 4–41)
ALT FLD-CCNC: 15 U/L — SIGNIFICANT CHANGE UP
ANION GAP SERPL CALC-SCNC: 11 MMO/L — SIGNIFICANT CHANGE UP (ref 7–14)
ANION GAP SERPL CALC-SCNC: 14 MMOL/L — SIGNIFICANT CHANGE UP (ref 5–17)
APTT BLD: 28.3 SEC — SIGNIFICANT CHANGE UP (ref 27.5–36.3)
AST SERPL-CCNC: 14 U/L — SIGNIFICANT CHANGE UP (ref 4–40)
AST SERPL-CCNC: 15 U/L — SIGNIFICANT CHANGE UP
BASE EXCESS BLDV CALC-SCNC: 2.6 MMOL/L — SIGNIFICANT CHANGE UP
BASOPHILS # BLD AUTO: 0.04 K/UL — SIGNIFICANT CHANGE UP (ref 0–0.2)
BASOPHILS NFR BLD AUTO: 0.4 % — SIGNIFICANT CHANGE UP (ref 0–2)
BILIRUB SERPL-MCNC: 0.5 MG/DL — SIGNIFICANT CHANGE UP (ref 0.2–1.2)
BILIRUB SERPL-MCNC: 0.6 MG/DL — SIGNIFICANT CHANGE UP (ref 0.4–2)
BLD GP AB SCN SERPL QL: NEGATIVE — SIGNIFICANT CHANGE UP
BLOOD GAS VENOUS - CREATININE: 0.76 MG/DL — SIGNIFICANT CHANGE UP (ref 0.5–1.3)
BUN SERPL-MCNC: 16 MG/DL — SIGNIFICANT CHANGE UP (ref 7–23)
BUN SERPL-MCNC: 16 MG/DL — SIGNIFICANT CHANGE UP (ref 8–20)
CALCIUM SERPL-MCNC: 10 MG/DL — SIGNIFICANT CHANGE UP (ref 8.4–10.5)
CALCIUM SERPL-MCNC: 10 MG/DL — SIGNIFICANT CHANGE UP (ref 8.6–10.2)
CHLORIDE BLDV-SCNC: 103 MMOL/L — SIGNIFICANT CHANGE UP (ref 96–108)
CHLORIDE SERPL-SCNC: 100 MMOL/L — SIGNIFICANT CHANGE UP (ref 98–107)
CHLORIDE SERPL-SCNC: 102 MMOL/L — SIGNIFICANT CHANGE UP (ref 98–107)
CO2 SERPL-SCNC: 26 MMOL/L — SIGNIFICANT CHANGE UP (ref 22–29)
CO2 SERPL-SCNC: 28 MMOL/L — SIGNIFICANT CHANGE UP (ref 22–31)
CREAT SERPL-MCNC: 0.87 MG/DL — SIGNIFICANT CHANGE UP (ref 0.5–1.3)
CREAT SERPL-MCNC: 0.88 MG/DL — SIGNIFICANT CHANGE UP (ref 0.5–1.3)
EOSINOPHIL # BLD AUTO: 0.21 K/UL — SIGNIFICANT CHANGE UP (ref 0–0.5)
EOSINOPHIL NFR BLD AUTO: 2.3 % — SIGNIFICANT CHANGE UP (ref 0–6)
GAS PNL BLDV: 137 MMOL/L — SIGNIFICANT CHANGE UP (ref 136–146)
GLUCOSE BLDV-MCNC: 118 — HIGH (ref 70–99)
GLUCOSE SERPL-MCNC: 101 MG/DL — HIGH (ref 70–99)
GLUCOSE SERPL-MCNC: 105 MG/DL — SIGNIFICANT CHANGE UP (ref 70–115)
HCO3 BLDV-SCNC: 26 MMOL/L — SIGNIFICANT CHANGE UP (ref 20–27)
HCT VFR BLD CALC: 42.8 % — SIGNIFICANT CHANGE UP (ref 42–52)
HCT VFR BLD CALC: 43.5 % — SIGNIFICANT CHANGE UP (ref 39–50)
HCT VFR BLDV CALC: 41.6 % — SIGNIFICANT CHANGE UP (ref 39–51)
HGB BLD-MCNC: 14.5 G/DL — SIGNIFICANT CHANGE UP (ref 13–17)
HGB BLD-MCNC: 14.7 G/DL — SIGNIFICANT CHANGE UP (ref 14–18)
HGB BLDV-MCNC: 13.6 G/DL — SIGNIFICANT CHANGE UP (ref 13–17)
IMM GRANULOCYTES NFR BLD AUTO: 0.8 % — SIGNIFICANT CHANGE UP (ref 0–1.5)
INR BLD: 0.99 — SIGNIFICANT CHANGE UP (ref 0.88–1.17)
LACTATE BLDV-MCNC: 1 MMOL/L — SIGNIFICANT CHANGE UP (ref 0.5–2)
LYMPHOCYTES # BLD AUTO: 1.29 K/UL — SIGNIFICANT CHANGE UP (ref 1–3.3)
LYMPHOCYTES # BLD AUTO: 13.9 % — SIGNIFICANT CHANGE UP (ref 13–44)
MCHC RBC-ENTMCNC: 30.3 PG — SIGNIFICANT CHANGE UP (ref 27–34)
MCHC RBC-ENTMCNC: 31 PG — SIGNIFICANT CHANGE UP (ref 27–31)
MCHC RBC-ENTMCNC: 33.3 % — SIGNIFICANT CHANGE UP (ref 32–36)
MCHC RBC-ENTMCNC: 34.3 G/DL — SIGNIFICANT CHANGE UP (ref 32–36)
MCV RBC AUTO: 90.3 FL — SIGNIFICANT CHANGE UP (ref 80–94)
MCV RBC AUTO: 90.8 FL — SIGNIFICANT CHANGE UP (ref 80–100)
MONOCYTES # BLD AUTO: 0.66 K/UL — SIGNIFICANT CHANGE UP (ref 0–0.9)
MONOCYTES NFR BLD AUTO: 7.1 % — SIGNIFICANT CHANGE UP (ref 2–14)
NEUTROPHILS # BLD AUTO: 6.98 K/UL — SIGNIFICANT CHANGE UP (ref 1.8–7.4)
NEUTROPHILS NFR BLD AUTO: 75.5 % — SIGNIFICANT CHANGE UP (ref 43–77)
NRBC # FLD: 0 K/UL — SIGNIFICANT CHANGE UP (ref 0–0)
PCO2 BLDV: 49 MMHG — SIGNIFICANT CHANGE UP (ref 41–51)
PH BLDV: 7.37 PH — SIGNIFICANT CHANGE UP (ref 7.32–7.43)
PLATELET # BLD AUTO: 208 K/UL — SIGNIFICANT CHANGE UP (ref 150–400)
PLATELET # BLD AUTO: 225 K/UL — SIGNIFICANT CHANGE UP (ref 150–400)
PMV BLD: 9.7 FL — SIGNIFICANT CHANGE UP (ref 7–13)
PO2 BLDV: 43 MMHG — HIGH (ref 35–40)
POTASSIUM BLDV-SCNC: 3.9 MMOL/L — SIGNIFICANT CHANGE UP (ref 3.4–4.5)
POTASSIUM SERPL-MCNC: 4.1 MMOL/L — SIGNIFICANT CHANGE UP (ref 3.5–5.3)
POTASSIUM SERPL-MCNC: 4.5 MMOL/L — SIGNIFICANT CHANGE UP (ref 3.5–5.3)
POTASSIUM SERPL-SCNC: 4.1 MMOL/L — SIGNIFICANT CHANGE UP (ref 3.5–5.3)
POTASSIUM SERPL-SCNC: 4.5 MMOL/L — SIGNIFICANT CHANGE UP (ref 3.5–5.3)
PROT SERPL-MCNC: 7.3 G/DL — SIGNIFICANT CHANGE UP (ref 6.6–8.7)
PROT SERPL-MCNC: 7.3 G/DL — SIGNIFICANT CHANGE UP (ref 6–8.3)
PROTHROM AB SERPL-ACNC: 11 SEC — SIGNIFICANT CHANGE UP (ref 9.8–13.1)
RBC # BLD: 4.74 M/UL — SIGNIFICANT CHANGE UP (ref 4.6–6.2)
RBC # BLD: 4.79 M/UL — SIGNIFICANT CHANGE UP (ref 4.2–5.8)
RBC # FLD: 13 % — SIGNIFICANT CHANGE UP (ref 10.3–14.5)
RBC # FLD: 13.5 % — SIGNIFICANT CHANGE UP (ref 11–15.6)
RH IG SCN BLD-IMP: POSITIVE — SIGNIFICANT CHANGE UP
SAO2 % BLDV: 74.5 % — SIGNIFICANT CHANGE UP (ref 60–85)
SODIUM SERPL-SCNC: 139 MMOL/L — SIGNIFICANT CHANGE UP (ref 135–145)
SODIUM SERPL-SCNC: 142 MMOL/L — SIGNIFICANT CHANGE UP (ref 135–145)
WBC # BLD: 6.5 K/UL — SIGNIFICANT CHANGE UP (ref 4.8–10.8)
WBC # BLD: 9.25 K/UL — SIGNIFICANT CHANGE UP (ref 3.8–10.5)
WBC # FLD AUTO: 6.5 K/UL — SIGNIFICANT CHANGE UP (ref 4.8–10.8)
WBC # FLD AUTO: 9.25 K/UL — SIGNIFICANT CHANGE UP (ref 3.8–10.5)

## 2019-04-10 PROCEDURE — 96375 TX/PRO/DX INJ NEW DRUG ADDON: CPT

## 2019-04-10 PROCEDURE — 85027 COMPLETE CBC AUTOMATED: CPT

## 2019-04-10 PROCEDURE — 99284 EMERGENCY DEPT VISIT MOD MDM: CPT | Mod: 25

## 2019-04-10 PROCEDURE — 80053 COMPREHEN METABOLIC PANEL: CPT

## 2019-04-10 PROCEDURE — 96374 THER/PROPH/DIAG INJ IV PUSH: CPT

## 2019-04-10 PROCEDURE — 36415 COLL VENOUS BLD VENIPUNCTURE: CPT

## 2019-04-10 RX ORDER — LIDOCAINE 4 G/100G
1 CREAM TOPICAL
Qty: 5 | Refills: 0
Start: 2019-04-10 | End: 2019-04-14

## 2019-04-10 RX ORDER — KETOROLAC TROMETHAMINE 30 MG/ML
30 SYRINGE (ML) INJECTION ONCE
Qty: 0 | Refills: 0 | Status: DISCONTINUED | OUTPATIENT
Start: 2019-04-10 | End: 2019-04-10

## 2019-04-10 RX ORDER — LIDOCAINE 4 G/100G
1 CREAM TOPICAL ONCE
Qty: 0 | Refills: 0 | Status: COMPLETED | OUTPATIENT
Start: 2019-04-10 | End: 2019-04-10

## 2019-04-10 RX ORDER — SODIUM CHLORIDE 9 MG/ML
1000 INJECTION INTRAMUSCULAR; INTRAVENOUS; SUBCUTANEOUS ONCE
Qty: 0 | Refills: 0 | Status: COMPLETED | OUTPATIENT
Start: 2019-04-10 | End: 2019-04-10

## 2019-04-10 RX ORDER — HYDROMORPHONE HYDROCHLORIDE 2 MG/ML
1 INJECTION INTRAMUSCULAR; INTRAVENOUS; SUBCUTANEOUS
Qty: 0 | Refills: 0 | Status: DISCONTINUED | OUTPATIENT
Start: 2019-04-10 | End: 2019-04-12

## 2019-04-10 RX ORDER — HYDROMORPHONE HYDROCHLORIDE 2 MG/ML
1 INJECTION INTRAMUSCULAR; INTRAVENOUS; SUBCUTANEOUS ONCE
Qty: 0 | Refills: 0 | Status: DISCONTINUED | OUTPATIENT
Start: 2019-04-10 | End: 2019-04-10

## 2019-04-10 RX ORDER — DIAZEPAM 5 MG
5 TABLET ORAL ONCE
Qty: 0 | Refills: 0 | Status: DISCONTINUED | OUTPATIENT
Start: 2019-04-10 | End: 2019-04-10

## 2019-04-10 RX ORDER — CHLORHEXIDINE GLUCONATE 213 G/1000ML
15 SOLUTION TOPICAL
Qty: 0 | Refills: 0 | Status: DISCONTINUED | OUTPATIENT
Start: 2019-04-10 | End: 2019-04-18

## 2019-04-10 RX ORDER — ACETAMINOPHEN 500 MG
650 TABLET ORAL EVERY 6 HOURS
Qty: 0 | Refills: 0 | Status: DISCONTINUED | OUTPATIENT
Start: 2019-04-10 | End: 2019-04-18

## 2019-04-10 RX ORDER — DIAZEPAM 5 MG
1 TABLET ORAL
Qty: 10 | Refills: 0
Start: 2019-04-10 | End: 2019-04-14

## 2019-04-10 RX ADMIN — HYDROMORPHONE HYDROCHLORIDE 1 MILLIGRAM(S): 2 INJECTION INTRAMUSCULAR; INTRAVENOUS; SUBCUTANEOUS at 22:03

## 2019-04-10 RX ADMIN — SODIUM CHLORIDE 1000 MILLILITER(S): 9 INJECTION INTRAMUSCULAR; INTRAVENOUS; SUBCUTANEOUS at 18:42

## 2019-04-10 RX ADMIN — HYDROMORPHONE HYDROCHLORIDE 1 MILLIGRAM(S): 2 INJECTION INTRAMUSCULAR; INTRAVENOUS; SUBCUTANEOUS at 05:59

## 2019-04-10 RX ADMIN — Medication 30 MILLIGRAM(S): at 20:12

## 2019-04-10 RX ADMIN — LIDOCAINE 1 PATCH: 4 CREAM TOPICAL at 06:30

## 2019-04-10 RX ADMIN — HYDROMORPHONE HYDROCHLORIDE 1 MILLIGRAM(S): 2 INJECTION INTRAMUSCULAR; INTRAVENOUS; SUBCUTANEOUS at 20:12

## 2019-04-10 RX ADMIN — Medication 30 MILLIGRAM(S): at 05:59

## 2019-04-10 RX ADMIN — Medication 650 MILLIGRAM(S): at 21:48

## 2019-04-10 RX ADMIN — SODIUM CHLORIDE 2000 MILLILITER(S): 9 INJECTION INTRAMUSCULAR; INTRAVENOUS; SUBCUTANEOUS at 05:59

## 2019-04-10 RX ADMIN — HYDROMORPHONE HYDROCHLORIDE 1 MILLIGRAM(S): 2 INJECTION INTRAMUSCULAR; INTRAVENOUS; SUBCUTANEOUS at 21:48

## 2019-04-10 RX ADMIN — HYDROMORPHONE HYDROCHLORIDE 1 MILLIGRAM(S): 2 INJECTION INTRAMUSCULAR; INTRAVENOUS; SUBCUTANEOUS at 06:31

## 2019-04-10 RX ADMIN — HYDROMORPHONE HYDROCHLORIDE 1 MILLIGRAM(S): 2 INJECTION INTRAMUSCULAR; INTRAVENOUS; SUBCUTANEOUS at 18:40

## 2019-04-10 RX ADMIN — Medication 650 MILLIGRAM(S): at 22:41

## 2019-04-10 RX ADMIN — Medication 30 MILLIGRAM(S): at 06:31

## 2019-04-10 RX ADMIN — Medication 5 MILLIGRAM(S): at 05:58

## 2019-04-10 NOTE — H&P ADULT - HISTORY OF PRESENT ILLNESS
HPI: 63M with PMH of SCC s/p resection in 4/2018 with adjuvant RT. Patient returned to OR for release of lower lip scar and mucosal graft on 3/7/19. Patient has now been experiencing pain for about 2 wks now, worse for the past 2 days with decreased PO intake secondary to pain. Patient seen earlier today for pain at OSH that improved with Toradol and Dilaudid while in the ER and went home with prescriptions for Vicodin and Valium. Since that time the pain has not been well. Patient advised to come to the ER. Pain patient reports is primarily in his neck and radiates down the back into his left trapezius muscle and shoulder. Dull in quality, no complaints of muscle spasms. Patient also reports dull pain in the right oral commissure less compared to the neck pain which is 10/10 and improves to 6-7/10 with dilaudid.  Pt has drooling at baseline, able to tolerate PO, no respiratory changes. Patient has been seen recently for these symptoms, there was no evidence of reccurence on examination, MRI was obtained showing increased size of recurrence along the left FOM extending to the oral tongue, parapharyngeal space,  space and lateral oropharyngeal wall with denervation of left hemiglossus. MRI also showed increased size of nodule in the left neck extending to the left parapharyngeal space associated with the left C4 nerve root/trunk.    PMH: above  PSH: above  Allergies: NKDA  Social: former smoker    T(C): 37 (04-10-19 @ 17:45), Max: 37 (04-10-19 @ 17:45)  HR: 72 (04-10-19 @ 18:42) (62 - 72)  BP: 196/117 (04-10-19 @ 18:42) (146/93 - 196/117)  RR: 18 (04-10-19 @ 18:42) (16 - 18)  SpO2: 100% (04-10-19 @ 18:42) (100% - 100%)    Patient in visible pain, AOx3  No respiratory distress, stridor, stertor on RA  Voice quality: normal  PERRL, EOMI  Nose: bilateral NC clear anteriorly, IT normal, mucosa normal  OC/OP: oral flap well healed and soft/flat, tongue and FOM soft, no appreciable lesions, OP clear  Neck: post treatment changes, soft and flat, no LAD  CN II-XII grossly intact    A/P: 63M with PMH of SCC s/p composite resection and reconstruction presents with intractable pain possible 2/2 recurrence.  - no acute ENT intervention  - admit to ENT service  - pain control, appreciate palliative recommendations  - med onc consult for help with pain control and possible recurrence  - discussed case with attending  - please page with questions

## 2019-04-10 NOTE — ED ADULT NURSE REASSESSMENT NOTE - NS ED NURSE REASSESS COMMENT FT1
report from Natalya REAL. Pt. with oral ca admitted to ent for pain management.  Pt. pain 8/10 paged ENT at [id number]. spoke with Suhas RYAN who stated he will put in meds recommended by oncology for pain management for patient.  MD also made aware of elevated bp. stated he will place orders.

## 2019-04-10 NOTE — ED PROVIDER NOTE - PHYSICAL EXAMINATION
s/p mandibular reconstruction, (+) mild edema noted alongside mandibular arch, no erythema, warmness to touch noted  no trismus  airway patent

## 2019-04-10 NOTE — ED ADULT NURSE NOTE - NSIMPLEMENTINTERV_GEN_ALL_ED
Implemented All Universal Safety Interventions:  [place] to call system. Call bell, personal items and telephone within reach. Instruct patient to call for assistance. Room bathroom lighting operational. Non-slip footwear when patient is off stretcher. Physically safe environment: no spills, clutter or unnecessary equipment. Stretcher in lowest position, wheels locked, appropriate side rails in place.

## 2019-04-10 NOTE — ED ADULT NURSE NOTE - OBJECTIVE STATEMENT
Pt presents to rm 17, A&Ox4, ambulatory at baseline w/o assistance, pmhx of oral CA, here for evaluation of neck pain that radiates to upper back, pt appears uncomfortable due to pain, speaking in complete sentences, airway patent, respirations even and unlabored. Pt states he is not currently on chemo/ radiation. Denies chest pain, shortness of breath, palpitations, diaphoresis, headaches, fevers, dizziness, nausea, vomiting, diarrhea, or urinary symptoms at this time. IV established in right AC with a 20G, labs drawn and sent, call bell in reach, warm blanket provided, bed in lowest position, side rails up x2. Will continue to monitor.

## 2019-04-10 NOTE — ED PROVIDER NOTE - ENMT, MLM
Airway patent, Nasal mucosa clear. Mouth with normal mucosa. Throat has no vesicles, + left neck mass chronic induareted palpable indurated lymph nodes no drooling no stridor .

## 2019-04-10 NOTE — ED ADULT NURSE NOTE - NSIMPLEMENTINTERV_GEN_ALL_ED
Implemented All Universal Safety Interventions:  [place] to call system. Call bell, personal items and telephone within reach. Instruct patient to call for assistance. Room bathroom lighting operational. Non-slip footwear when patient is off stretcher. Physically safe environment: no spills, clutter or unnecessary equipment. Stretcher in lowest position, wheels locked, appropriate side rails in place.

## 2019-04-10 NOTE — ED PROVIDER NOTE - CLINICAL SUMMARY MEDICAL DECISION MAKING FREE TEXT BOX
Lt sided oral/mandibular pain, hx of ca, here for exacerbation of pain, no airway compromise - pain control, labs, ENT consultation, will reasses - will most likely admit as it is pt's 2nd ER visit for same issue today.

## 2019-04-10 NOTE — CHART NOTE - NSCHARTNOTEFT_GEN_A_CORE
Palliative Care was paged for pain control.  Patient with history of malignant mouth and mandible neoplasm is complaining of 10/10 neck pain.  Patient received Dilaudid 1mg and pain was decreased to 6-7/10.    We recommend starting Dilaudid 1.5mg IV Q 2  hours PRN   Also can add trial of Tylenol 1000mg IV Q8 hours for 24 hour     Palliative team will f/u     Please page palliative team if pain is not controlled

## 2019-04-10 NOTE — ED PROVIDER NOTE - CLINICAL SUMMARY MEDICAL DECISION MAKING FREE TEXT BOX
pain likely referred neck mass recent mri reviewed plan of rpainc ontrol f.u established oncologist pt and pts son at bedside agree to plan of care

## 2019-04-10 NOTE — ED PROVIDER NOTE - OBJECTIVE STATEMENT
pt preesents left bneck pain x 2 week throat pain known recent mri growing throat mass followed by oncology no new trauma no drooling no stridor no fever no ah no [id number] strength upper extremities pain 8/10 achy radiated to left shoulder

## 2019-04-10 NOTE — ED PROVIDER NOTE - OBJECTIVE STATEMENT
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 Pt admits that he has been having pain to the area for about 2 wks now, worse for the past 2 days with decreased PO intake secondary to pain. Pt was seen and evaluated at [facility] for the same today am, pain improved after pt received Toradol and Dilaudid while in the ER, went home (with Vicodin and Valium) and since the pain was  not adequately controlled pt attempted to see his ENT onc (Dr Hdz) and was advised to come to the ER. Pt has been drooling at baseline, denies difficulty breathing, CP, sob, denies trismus. (-)HA, vomiting;

## 2019-04-10 NOTE — ED ADULT NURSE NOTE - CHIEF COMPLAINT QUOTE
Pt has a hx of oral CA complaining of severe pain. Pt states he was sent by his oncologist for admission and pain management. Pt appears uncomfortable in triage.

## 2019-04-10 NOTE — ED PROVIDER NOTE - ATTENDING CONTRIBUTION TO CARE
Dr. Coronado:  I performed a face to face bedside interview with patient regarding history of present illness, review of symptoms and past medical history. I completed an independent physical exam.  I have discussed patient's plan of care with PA.   I agree with note as stated above, having amended the EMR as needed to reflect my findings.   This includes HISTORY OF PRESENT ILLNESS, HIV, PAST MEDICAL/SURGICAL/FAMILY/SOCIAL HISTORY, ALLERGIES AND HOME MEDICATIONS, REVIEW OF SYSTEMS, PHYSICAL EXAM, and any PROGRESS NOTES during the time I functioned as the attending physician for this patient.    63M h/o oral cancer s/p chemo/radiation/mandibular resection, presents with worsening pain to jaw and neck.  Was seen at [facility] earlier today for pain, discharged home but unable to control pain at home.  Called ENT-oncologist's office and recommended to present to hospital for pain control.    Exam:  - nad  - +some drooling at corners of mouth, which pt states chronic  - speaking full sentences  - rrr  - ctab   -abd soft ntnd    A/P  - oral/neck pain  - basic labs  - pain control  - ENT consult

## 2019-04-11 ENCOUNTER — TRANSCRIPTION ENCOUNTER (OUTPATIENT)
Age: 64
End: 2019-04-11

## 2019-04-11 DIAGNOSIS — F43.22 ADJUSTMENT DISORDER WITH ANXIETY: ICD-10-CM

## 2019-04-11 DIAGNOSIS — F41.9 ANXIETY DISORDER, UNSPECIFIED: ICD-10-CM

## 2019-04-11 DIAGNOSIS — R11.0 NAUSEA: ICD-10-CM

## 2019-04-11 DIAGNOSIS — Z51.5 ENCOUNTER FOR PALLIATIVE CARE: ICD-10-CM

## 2019-04-11 DIAGNOSIS — R52 PAIN, UNSPECIFIED: ICD-10-CM

## 2019-04-11 DIAGNOSIS — C06.9 MALIGNANT NEOPLASM OF MOUTH, UNSPECIFIED: ICD-10-CM

## 2019-04-11 PROCEDURE — 99223 1ST HOSP IP/OBS HIGH 75: CPT

## 2019-04-11 PROCEDURE — 99223 1ST HOSP IP/OBS HIGH 75: CPT | Mod: GC

## 2019-04-11 RX ORDER — ONDANSETRON 8 MG/1
4 TABLET, FILM COATED ORAL ONCE
Qty: 0 | Refills: 0 | Status: COMPLETED | OUTPATIENT
Start: 2019-04-11 | End: 2019-04-11

## 2019-04-11 RX ORDER — DOCUSATE SODIUM 100 MG
100 CAPSULE ORAL THREE TIMES A DAY
Qty: 0 | Refills: 0 | Status: DISCONTINUED | OUTPATIENT
Start: 2019-04-11 | End: 2019-04-14

## 2019-04-11 RX ORDER — SODIUM CHLORIDE 9 MG/ML
1000 INJECTION, SOLUTION INTRAVENOUS
Qty: 0 | Refills: 0 | Status: DISCONTINUED | OUTPATIENT
Start: 2019-04-11 | End: 2019-04-16

## 2019-04-11 RX ORDER — METHADONE HYDROCHLORIDE 40 MG/1
5 TABLET ORAL ONCE
Qty: 0 | Refills: 0 | Status: DISCONTINUED | OUTPATIENT
Start: 2019-04-11 | End: 2019-04-11

## 2019-04-11 RX ORDER — METHADONE HYDROCHLORIDE 40 MG/1
5 TABLET ORAL EVERY 12 HOURS
Qty: 0 | Refills: 0 | Status: DISCONTINUED | OUTPATIENT
Start: 2019-04-11 | End: 2019-04-15

## 2019-04-11 RX ORDER — SENNA PLUS 8.6 MG/1
1 TABLET ORAL AT BEDTIME
Qty: 0 | Refills: 0 | Status: DISCONTINUED | OUTPATIENT
Start: 2019-04-11 | End: 2019-04-14

## 2019-04-11 RX ORDER — ONDANSETRON 8 MG/1
4 TABLET, FILM COATED ORAL EVERY 8 HOURS
Qty: 0 | Refills: 0 | Status: DISCONTINUED | OUTPATIENT
Start: 2019-04-11 | End: 2019-04-18

## 2019-04-11 RX ORDER — DIAZEPAM 5 MG
5 TABLET ORAL
Qty: 0 | Refills: 0 | Status: DISCONTINUED | OUTPATIENT
Start: 2019-04-11 | End: 2019-04-18

## 2019-04-11 RX ORDER — INFLUENZA VIRUS VACCINE 15; 15; 15; 15 UG/.5ML; UG/.5ML; UG/.5ML; UG/.5ML
0.5 SUSPENSION INTRAMUSCULAR ONCE
Qty: 0 | Refills: 0 | Status: DISCONTINUED | OUTPATIENT
Start: 2019-04-11 | End: 2019-04-19

## 2019-04-11 RX ORDER — DIAZEPAM 5 MG
5 TABLET ORAL
Qty: 0 | Refills: 0 | Status: DISCONTINUED | OUTPATIENT
Start: 2019-04-11 | End: 2019-04-11

## 2019-04-11 RX ADMIN — HYDROMORPHONE HYDROCHLORIDE 1 MILLIGRAM(S): 2 INJECTION INTRAMUSCULAR; INTRAVENOUS; SUBCUTANEOUS at 12:51

## 2019-04-11 RX ADMIN — HYDROMORPHONE HYDROCHLORIDE 1 MILLIGRAM(S): 2 INJECTION INTRAMUSCULAR; INTRAVENOUS; SUBCUTANEOUS at 01:04

## 2019-04-11 RX ADMIN — HYDROMORPHONE HYDROCHLORIDE 1 MILLIGRAM(S): 2 INJECTION INTRAMUSCULAR; INTRAVENOUS; SUBCUTANEOUS at 18:30

## 2019-04-11 RX ADMIN — CHLORHEXIDINE GLUCONATE 15 MILLILITER(S): 213 SOLUTION TOPICAL at 00:05

## 2019-04-11 RX ADMIN — ONDANSETRON 4 MILLIGRAM(S): 8 TABLET, FILM COATED ORAL at 03:19

## 2019-04-11 RX ADMIN — HYDROMORPHONE HYDROCHLORIDE 1 MILLIGRAM(S): 2 INJECTION INTRAMUSCULAR; INTRAVENOUS; SUBCUTANEOUS at 22:43

## 2019-04-11 RX ADMIN — ONDANSETRON 4 MILLIGRAM(S): 8 TABLET, FILM COATED ORAL at 22:43

## 2019-04-11 RX ADMIN — SODIUM CHLORIDE 100 MILLILITER(S): 9 INJECTION, SOLUTION INTRAVENOUS at 06:13

## 2019-04-11 RX ADMIN — METHADONE HYDROCHLORIDE 5 MILLIGRAM(S): 40 TABLET ORAL at 18:07

## 2019-04-11 RX ADMIN — HYDROMORPHONE HYDROCHLORIDE 1 MILLIGRAM(S): 2 INJECTION INTRAMUSCULAR; INTRAVENOUS; SUBCUTANEOUS at 01:31

## 2019-04-11 RX ADMIN — HYDROMORPHONE HYDROCHLORIDE 1 MILLIGRAM(S): 2 INJECTION INTRAMUSCULAR; INTRAVENOUS; SUBCUTANEOUS at 18:05

## 2019-04-11 RX ADMIN — HYDROMORPHONE HYDROCHLORIDE 1 MILLIGRAM(S): 2 INJECTION INTRAMUSCULAR; INTRAVENOUS; SUBCUTANEOUS at 12:36

## 2019-04-11 RX ADMIN — ONDANSETRON 4 MILLIGRAM(S): 8 TABLET, FILM COATED ORAL at 10:35

## 2019-04-11 RX ADMIN — CHLORHEXIDINE GLUCONATE 15 MILLILITER(S): 213 SOLUTION TOPICAL at 06:14

## 2019-04-11 RX ADMIN — HYDROMORPHONE HYDROCHLORIDE 1 MILLIGRAM(S): 2 INJECTION INTRAMUSCULAR; INTRAVENOUS; SUBCUTANEOUS at 20:04

## 2019-04-11 RX ADMIN — HYDROMORPHONE HYDROCHLORIDE 1 MILLIGRAM(S): 2 INJECTION INTRAMUSCULAR; INTRAVENOUS; SUBCUTANEOUS at 20:07

## 2019-04-11 RX ADMIN — Medication 5 MILLIGRAM(S): at 23:48

## 2019-04-11 RX ADMIN — HYDROMORPHONE HYDROCHLORIDE 1 MILLIGRAM(S): 2 INJECTION INTRAMUSCULAR; INTRAVENOUS; SUBCUTANEOUS at 09:10

## 2019-04-11 RX ADMIN — CHLORHEXIDINE GLUCONATE 15 MILLILITER(S): 213 SOLUTION TOPICAL at 19:15

## 2019-04-11 RX ADMIN — HYDROMORPHONE HYDROCHLORIDE 1 MILLIGRAM(S): 2 INJECTION INTRAMUSCULAR; INTRAVENOUS; SUBCUTANEOUS at 22:58

## 2019-04-11 RX ADMIN — HYDROMORPHONE HYDROCHLORIDE 1 MILLIGRAM(S): 2 INJECTION INTRAMUSCULAR; INTRAVENOUS; SUBCUTANEOUS at 09:25

## 2019-04-11 RX ADMIN — METHADONE HYDROCHLORIDE 5 MILLIGRAM(S): 40 TABLET ORAL at 15:20

## 2019-04-11 NOTE — BEHAVIORAL HEALTH ASSESSMENT NOTE - CASE SUMMARY
Patient seen and evaluated, agrees with above assessment and plan, pt. AAOX4, calm, cooperative, polite, linear and coherent. Patient denies feeling depressed, reported feeling anxious in the context of ongoing acute medical issues. He reported that today he is feeling more calmer. Patient remains future oriented, hopeful, adamantly denies SI/I/P, denies HI/I/P, denies acute psychotic sxs. Recommend to continue meds. as written above.

## 2019-04-11 NOTE — DISCHARGE NOTE PROVIDER - NSDCFUADDAPPT_GEN_ALL_CORE_FT
you will need to schedule an appointment with Dr. Martínez for your out patient pain treatment you will need to schedule an appointment with Dr. Carr on 4/29 at 8:40 am  for your out patient pain treatment. The phone number and address is listed.

## 2019-04-11 NOTE — BEHAVIORAL HEALTH ASSESSMENT NOTE - NSBHCHARTREVIEWVS_PSY_A_CORE FT
Vital Signs Last 24 Hrs  T(C): 36.3 (11 Apr 2019 19:18), Max: 36.4 (11 Apr 2019 01:00)  T(F): 97.4 (11 Apr 2019 19:18), Max: 97.6 (11 Apr 2019 05:09)  HR: 59 (11 Apr 2019 19:18) (54 - 66)  BP: 131/91 (11 Apr 2019 19:18) (131/91 - 165/111)  BP(mean): --  RR: 18 (11 Apr 2019 19:18) (16 - 18)  SpO2: 100% (11 Apr 2019 19:18) (98% - 100%)

## 2019-04-11 NOTE — DISCHARGE NOTE PROVIDER - PROVIDER TOKENS
PROVIDER:[TOKEN:[[id number]]] PROVIDER:[TOKEN:[[id number]]],PROVIDER:[TOKEN:[[id number]]] PROVIDER:[TOKEN:[[id number]]],PROVIDER:[TOKEN:[[id number]]]

## 2019-04-11 NOTE — CONSULT NOTE ADULT - PROBLEM SELECTOR RECOMMENDATION 2
Acute onset, possibly 2/2 to IV Dilaudid, likely gradual improvement.   Nausea seems controlled on IV Zofran.   If continued nausea or vomiting, will consider switching to different agent.   Continue IV Zofran 4mg q6h PRN nausea/vomiting. Acute onset, possibly 2/2 to IV Dilaudid, likely gradual improvement.   Nausea seems controlled on IV Zofran.   If continued nausea or vomiting, will consider switching to different opioid agent.   Continue IV Zofran 4mg q6h PRN nausea/vomiting.

## 2019-04-11 NOTE — DISCHARGE NOTE PROVIDER - CARE PROVIDER_API CALL
Krystian Hdz)  Otolaryngology  [address] [place] [address]  Phone: [phone]  Fax: [phone]  Follow Up Time: Krystian Hdz)  Otolaryngology  [address] [place] [address]  Phone: [phone]  Fax: [phone]  Follow Up Time:     Esther Humphries)  [facility] Medicine; Internal Medicine  [address] [place] [address]  Phone: [phone]  Fax: [phone]  Follow Up Time: Krystian Hdz)  Otolaryngology  [address] [place] [address]  Phone: [phone]  Fax: [phone]  Follow Up Time:     Tanner Pascual)  [facility] Medicine; Internal Medicine; Medical Oncology  [address] [place] [address]  Phone: [phone]  Fax: [phone]  Follow Up Time:

## 2019-04-11 NOTE — BEHAVIORAL HEALTH ASSESSMENT NOTE - DETAILS
patient provided services for gun training and disaster protocol tongue numb, drooling, pain nausea neck pain

## 2019-04-11 NOTE — DISCHARGE NOTE PROVIDER - NSDCCPCAREPLAN_GEN_ALL_CORE_FT
PRINCIPAL DISCHARGE DIAGNOSIS  Diagnosis: Squamous cell carcinoma of oral cavity  Assessment and Plan of Treatment: s/p radiation, chemotherapy 2015- 4/2016

## 2019-04-11 NOTE — CONSULT NOTE ADULT - PROBLEM SELECTOR RECOMMENDATION 5
Palliative consulted to assist with symptom management. All symptoms addressed and medications were recommended accordingly (SEE ABOVE). To adjust pain medication regimen based on additional requirements over the next 24 hours. Palliative to continue to follow for symptom management.

## 2019-04-11 NOTE — CONSULT NOTE ADULT - SUBJECTIVE AND OBJECTIVE BOX
HPI: A 63 male patient with PMH of SCC s/p composite resection and reconstructive surgery in 4/2018 with adjuvant RT. Patient returned to OR for release of lower lip scar and mucosal graft on 3/7/19. Patient has now been experiencing pain for about 2 wks now, worse for the past 2 days with decreased PO intake secondary to pain. Patient seen earlier today for pain at OSH that improved with Toradol and Dilaudid while in the ER and went home with prescriptions for Vicodin and Valium. Since that time the pain has not been well. Patient advised to come to the ER. Pain patient reports is primarily in his neck and radiates down the back into his left trapezius muscle and shoulder. Dull in quality, no complaints of muscle spasms. Patient also reports dull pain in the right oral commissure less compared to the neck pain which is 10/10 and improves to 6-7/10 with dilaudid.  Pt has drooling at baseline, able to tolerate PO, no respiratory changes. Patient has been seen recently for these symptoms, there was no evidence of reccurence on examination,    INTERVAL HISTORY: Patient admitted with acute pain likely 2/2 progression of disease. MRI was obtained showing increased size of recurrence along the left FOM extending to the oral tongue, parapharyngeal space,  space and lateral oropharyngeal wall with denervation of left hemiglossus. MRI also showed increased size of nodule in the left neck extending to the left parapharyngeal space associated with the left C4 nerve root/trunk. Report of MRI pending discussion between ENT/Plastic Surgery teams and patient. Patient has an idea that the disease has progressed- as he has person NS friend who reviewed his MRI imaging prior to admission but waiting confirmation by the ENT team.     SUBJECTIVE: Patient seen and evaluated at bedside. Seen lying in bed, in slight distress. Alert, oriented x3, communicative with limited jaw mobility (at baseline), and drooling (at baseline). Reports complaints primary of pain, tongue numbness, anxiety, and nausea. The pain is located in the posterior neck, radiating down the left upper back (trapezious muscle) and left shoulder. Pain is 8-9/10 in severity, with improvement of pain to 3/10 in severity with IV Dilaudid 1mg doses- but the effect only lasts him 1-2 hours. Also complaints of baseline anxiety- since his initial diagnosis- worsens during an acute pain episode, also anxious in anticipation of the pending discussion of the MRI reports. Patient is also nauseous ( new onset, suspects secondary to IV Dilaudid, improved with Zofran). Admits to recent weight loss, and loss of appetite. No further complaints- no constipation, diarrhea, abdominal pain, shortness of breath.     PERTINENT PM/SXH:   Malignant neoplasm of mouth  Anxiety  Squamous cell carcinoma of oral cavity  Malignant neoplasm    History of oral cancer  Encounter for PEG (percutaneous endoscopic gastrostomy)  History of lip cancer  H/O shoulder surgery    FAMILY HISTORY:  Family history of lung cancer in his father who was a prior smoker.      SOCIAL HISTORY: Patient is , with two sons. Lives at home with his brother and his son. 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 Former tobacco chewer for over 15 years- mentions a lot of regret associated with prior use.  Significant other/partner:  [x ]  Children:  [ ]  Roman Catholic/Spirituality: Reports not being Voodoo/spiritual.  Substance hx:  [ ]   Tobacco hx:  [ x]   Alcohol hx: [ ]   Home Opioid hx: yes  [x ] I-Stop Reference No:[id number]  Living Situation: [x ]Home  [ ]Long term care  [ ]Rehab [ ]Other    ADVANCE DIRECTIVES:    DNR     MOLST  [ ]  Living Will  [ ]   DECISION MAKER(s): Patient currently capacitated to make all informed decisions for himself.   [ ] Health Care Proxy(s)  [x ] Surrogate(s)  [ ] Guardian           Name(s): Phone Number(s):  Son Evan Ventura [phone]  Brother Diony Ventura [phone]    BASELINE (I)ADL(s) (prior to admission):  [place]: [x ]Total  [ ] Moderate [ ]Dependent    ALLERGIES:     No Known Allergies    Intolerances    MEDICATIONS  (STANDING):  chlorhexidine 0.12% Liquid 15 milliLiter(s) Swish and Spit two times a day  dextrose 5% + sodium chloride 0.45%. 1000 milliLiter(s) (100 mL/Hr) IV Continuous <Continuous>  influenza   Vaccine 0.5 milliLiter(s) IntraMuscular once  methadone    Tablet 5 milliGRAM(s) Oral every 12 hours  methadone    Tablet 5 milliGRAM(s) Oral once    MEDICATIONS  (PRN):  acetaminophen   Tablet .. 650 milliGRAM(s) Oral every 6 hours PRN Mild Pain (1 - 3)  diazepam    Tablet 5 milliGRAM(s) Oral two times a day PRN anxiety  HYDROmorphone  Injectable 1 milliGRAM(s) IV Push every 2 hours PRN Severe Pain (7 - 10)  ondansetron Injectable 4 milliGRAM(s) IV Push every 8 hours PRN Nausea and/or Vomiting    PRESENT SYMPTOMS: [ ]Unable to obtain due to poor mentation   Source if other than patient:  [ ]Family   [ ]Team   (If unchecked, not present)    Pain (Impact on QOL):  reports pain  Location -  posterior neck , radiating to the left trapezius, and left shoulder                Provoking - limited mobility worsens pain, but at times certain movements worsen pain  Quality -  Radiation -  Severity -  [x ]Mild (1-3) [x ]Moderate (4-6) [x ]Severe (7-10) Improves with IV dilaudid 1mg, to tolerable level 3/10.   Minimal acceptable level [x ]Mild (1-3) [ ]Moderate (4-6) [ ]Severe (7-10)  Timing - continuous pain- improves with IV meds- last effect of 1-2 hours.     PAINAD Score:     [email or web address]    Dyspnea:                           [ ]Mild [ ]Moderate [ ]Severe  Anxiety:                             [x ]Mild [x ]Moderate [ ]Severe  Fatigue:                             [ ]Mild [ ]Moderate [ ]Severe  Nausea:                             [x ]Mild [ x]Moderate [ ]Severe  Loss of appetite:              [x ]Mild [ ]Moderate [ ]Severe  Constipation:                    [ ]Mild [ ]Moderate [ ]Severe    Other Symptoms:  [ ]All other review of systems negative     Karnofsky Performance Score/Palliative Performance Status Version 2:    60-70     %    PHYSICAL EXAM:  Vital Signs Last 24 Hrs  T(C): 36.4 (11 Apr 2019 12:28), Max: 37 (10 Apr 2019 17:45)  T(F): 97.6 (11 Apr 2019 12:28), Max: 98.6 (10 Apr 2019 17:45)  HR: 57 (11 Apr 2019 12:28) (54 - 72)  BP: 153/92 (11 Apr 2019 12:28) (148/97 - 196/117)  BP(mean): --  RR: 18 (11 Apr 2019 12:28) (16 - 18)  SpO2: 99% (11 Apr 2019 12:28) (98% - 100%) I&O's Summary    CRITICAL CARE:  [ ] Shock Present  [ ]Septic [ ]Cardiogenic [ ]Neurologic [ ]Hypovolemic  [ ]  Vasopressors [ ]  Inotropes   [ ] Respiratory failure present  [ ] Acute  [ ] Chronic [ ] Hypoxic  [ ] Hypercarbic [ ] Other  [ ] Other organ failure     GENERAL:  [x ]Alert  [x ]Oriented x3   [ ]Lethargic  [ ]Cachexia  [ ]Unarousable  [x ]Verbal  [ ]Non-Verbal  PSYCHIATRIC:   [x ] Anxiety [ ] Depression [ ] Delirium [ ] Agitation [ ] Other  HEENT: contracted lip, no teeth/dentures, to visible tongue lesions, + drooling   [ ]Normal   [ ]Dry mouth   [ ]ET Tube/Trach  [ ]Oral lesions  PULMONARY: No secretions, no wheezing rales or rhonchi   [x ]Clear [ ]Tachypnea  [ ]Audible excessive secretions   [ ]Rhonchi        [ ]Right [ ]Left [ ]Bilateral  [ ]Crackles        [ ]Right [ ]Left [ ]Bilateral  [ ]Wheezing     [ ]Right [ ]Left [ ]Bilateral  CARDIOVASCULAR:    [x ]Regular [ ]Irregular [ ]Tachy  [ ]Kishore [ ]Murmur [ ]Other  GASTROINTESTINAL:  [x ]Soft  [ ]Distended   [x ]+BS  [x ]Non tender [ ]Tender  [ ]PEG/OGT/ NGT   Last BM:   4/10/19  GENITOURINARY:  [x ]Normal [ ] Incontinent   [ ]Oliguria/Anuria   [ ][place]  MUSCULOSKELETAL:   [x ]Normal   [ ]Weakness  [ ]Bed/Wheelchair bound [ ]Edema  NEUROLOGIC:   [x ]No focal deficits  [ ] Cognitive impairment  [ ] Dysphagia [ ]Dysarthria [ ] Paresis [ ]Other   SKIN:   [x ]Normal   [ ]Pressure ulcer(s)  [ ]Rash    LABS: reviewed                       14.5   9.25  )-----------( 225      ( 10 Apr 2019 18:54 )             43.5     04-10    139  |  100  |  16  ----------------------------<  101<H>  4.1   |  28  |  0.88    Ca    10.0      10 Apr 2019 18:54    TPro  7.3  /  Alb  4.5  /  TBili  0.5  /  DBili  x   /  AST  14  /  ALT  14  /  AlkPhos  72  04-10    PT/INR - ( 10 Apr 2019 21:16 )   PT: 11.0 SEC;   INR: 0.99        PTT - ( 10 Apr 2019 21:16 )  PTT:28.3 SEC    RADIOLOGY & ADDITIONAL STUDIES: reviewed  MR Orbit, Face, and/or Neck w/wo IV Cont, Bilateral (04.05.19 @ 13:08)   IMPRESSION:  Increased size of recurrence along the left floor of mouth, involving the   oral tongue, lobe parapharyngeal space,  space, and lateral   oral pharyngeal wall with left hemiglossus denervation atrophy.  Increased size of nodule of recurrence along the left neck extending into   the paravertebral space associated with the left C4 nerve root/trunk.    PROTEIN CALORIE MALNUTRITION:   [ ]Mild [ ]Moderate [ ]Severe    ORAL INTAKE:   [ ]Unable/mouth care only [ ]Minimal [x ]Moderate [ ]Full Capability  DIET:   [ ]NPO [ ]Tube feeds [ ]TPN [ ]Other     REFERRALS:   [ ]Chaplaincy  [ ] Hospice  [ ]Child Life  [ ]Social Work  [ ]Case management [ ]Holistic Therapy

## 2019-04-11 NOTE — BEHAVIORAL HEALTH ASSESSMENT NOTE - NSBHSOCIALHXDETAILSFT_PSY_A_CORE
patient without mood disorder, good insight, no psychosis, no hx of violence of SA. no need to safe act. patient without mood disorder, good insight, no psychosis, no hx of violence or SA. no need to safe act.  No guns at home.

## 2019-04-11 NOTE — PROVIDER CONTACT NOTE (OTHER) - ACTION/TREATMENT ORDERED:
Pt had a episode of vomiting. BP-158/96, 58, 97.8, F 18, Sa O2-99 %, Lily Little MD made aware. Will continue to monitor.

## 2019-04-11 NOTE — DISCHARGE NOTE PROVIDER - HOSPITAL COURSE
patient with history of composite mandibulectomy presenting with pain... patient with history of composite mandibulectomy presenting with pain. Patient was evaluated by palliative pain service and started on dilaudid. Had Biopsy done and was discharged home for palliative out patient follow up.

## 2019-04-11 NOTE — CONSULT NOTE ADULT - PROBLEM SELECTOR RECOMMENDATION 9
Likely 2/2 progression of cancer.   Located in posterior neck, left upper back, left shoulder.   Given the extent of the cancer progression and nerve involvement likely neuropathic component to pain.   Would recommend starting Methadone 5mg q12 hours.   Continue IV Dilaudid 1mg q3qh PRN severe pain.   To adjust dose based on increased requirements over the next 24 hours.   Ensure bowel regimen, last BM 4/10/19 per pt.   Colace, Senna, Miralax PRN. Likely 2/2 progression of cancer.   Located in posterior neck, left upper back, left shoulder.   Given the extent of the cancer progression and nerve involvement likely neuropathic component.  Recommend starting Methadone 5mg q12 hours.   Continue IV Dilaudid 1mg q3qh PRN severe pain.   To adjust dose based on increased requirements over the next 24 hours.   Ensure bowel regimen, last BM 4/10/19 per pt.   Colace, Senna, Miralax PRN.

## 2019-04-11 NOTE — BEHAVIORAL HEALTH ASSESSMENT NOTE - NSBHCHARTREVIEWLAB_PSY_A_CORE FT
14.5   9.25  )-----------( 225      ( 10 Apr 2019 18:54 )             43.5   04-10    139  |  100  |  16  ----------------------------<  101<H>  4.1   |  28  |  0.88    Ca    10.0      10 Apr 2019 18:54    TPro  7.3  /  Alb  4.5  /  TBili  0.5  /  DBili  x   /  AST  14  /  ALT  14  /  AlkPhos  72  04-10

## 2019-04-11 NOTE — BEHAVIORAL HEALTH ASSESSMENT NOTE - SUMMARY
Patient is a 63M with PMH of SCC s/p resection in 4/2018 with adjuvant RT. Patient returned to OR for release of lower lip scar and mucosal graft on 3/7/19. Patient has now been experiencing pain for about 2 wks now, worse for the past 2 days with decreased PO intake secondary to pain.  MRI showed increased size of nodule in the left neck extending to the left parapharyngeal space associated with the left C4 nerve root/trunk.   Patient reports having increased anxiety since he was told of his diagnosis and possible outcomes.  Patient denies a hx of anxiety or mood changes, but states the unknown and potential outcomes have caused elevation in anxiety, along with the uncertainty that his pain will be treated sufficiently.  Patient denies any depressive symptoms. Does have appetite changes however numbness in tongue causing chewing issues.  sleep affected by pain.  NO ss of gallo or psychosis.  Patient identifies himself as a positive man, denies wanting to give up or having thought of SI.      PLAN  Discussed treatment options with patient.  He is willing to try palliative care recs of valium 5mg BID PRN for anxiety, and is open to changes as needed.  - ongoing support during hospitalization and can offer referrals for support groups on DC  - pain control / palliative care recs Patient is a 63M with PMH of SCC s/p resection in 4/2018 with adjuvant RT. Patient returned to OR for release of lower lip scar and mucosal graft on 3/7/19. Patient has now been experiencing pain for about 2 wks now, worse for the past 2 days with decreased PO intake secondary to pain.  MRI showed increased size of nodule in the left neck extending to the left parapharyngeal space associated with the left C4 nerve root/trunk.   Patient reports having increased anxiety since he was told of his diagnosis and possible outcomes.  Patient denies a hx of anxiety or mood changes, but states the unknown and potential outcomes have caused elevation in anxiety, along with the uncertainty that his pain will be treated sufficiently.  Patient denies any depressive symptoms. Does have appetite changes however numbness in tongue causing chewing issues.  sleep affected by pain.  NO ss of gallo or psychosis.  Patient identifies himself as a positive man, denies wanting to give up or having thought of SI.      PLAN  Discussed treatment options with patient.  He is willing to try palliative care recs of valium 5mg BID PRN for anxiety, and is open to changes as needed.  - ongoing support during hospitalization and can offer referrals for support groups on DC  - pain control / palliative care recs    Received Valium PRN x 1 on 4/11 and Valium prn x1 on 4/12.

## 2019-04-11 NOTE — BEHAVIORAL HEALTH ASSESSMENT NOTE - HPI (INCLUDE ILLNESS QUALITY, SEVERITY, DURATION, TIMING, CONTEXT, MODIFYING FACTORS, ASSOCIATED SIGNS AND SYMPTOMS)
Patient is a 63M with PMH of SCC s/p resection in 4/2018 with adjuvant RT. Patient returned to OR for release of lower lip scar and mucosal graft on 3/7/19. Patient has now been experiencing pain for about 2 wks now, worse for the past 2 days with decreased PO intake secondary to pain. Patient advised to come to the ER. MRI was obtained showing increased size of recurrence along the left FOM extending to the oral tongue, parapharyngeal space,  space and lateral oropharyngeal wall with denervation of left hemiglossus. MRI also showed increased size of nodule in the left neck extending to the left parapharyngeal space associated with the left C4 nerve root/trunk. No formal psychiatric hx. Patient has support from his children and friends.   Psychiatry consulted for anxiety  Patient was seen and assessed at bedside. He is alert and oriented, pleasant.  Patient reports having increased anxiety since he was told of his diagnosis and possible outcomes.  Patient denies a hx of anxiety or mood changes, but states the unknown and potential outcomes have caused elevation in anxiety, along with the uncertainty that his pain will be treated sufficiently.  Patient denies any depressive symptoms including depressed mood, anhedonia, changes in energy/concentration, or feelings of guilt. Does have appetite changes however numbness in tongue causing chewing issues.  sleep affected by pain.  NO ss of gallo or psychosis.  Patient identifies himself as a positive man, denies wanting to give up or having thought of SI.      Discussed treatment options with patient.  He is willing to try palliative care recs and open to changes as needed.

## 2019-04-11 NOTE — PROVIDER CONTACT NOTE (OTHER) - ASSESSMENT
Patient asleep between care. VS stable except HR 54. Patient states woke up and feels lightheaded/dizzy/dehydrated/warm. States tried to drink water but didn't help. Denies chest pain, SOB, nausea or vomiting at this time, no diaphoresis noted. Safety precautions maintained and will continue to monitor

## 2019-04-11 NOTE — BEHAVIORAL HEALTH ASSESSMENT NOTE - RISK ASSESSMENT
Protective factors include no suicide attempts, no violence history, no substance abuse, supportive family, willingness to seek help, no suicidal ideation or homicidal ideation, hopefulness for future.   Risk: pain, diagnosis    patient future oriented, no psychosis, no SI no need for hospitalization. Good insight. Protective factors include no suicide attempts, no violence history, no substance abuse, supportive family, willingness to seek help, no suicidal ideation or homicidal ideation, hopefulness for future.   Risk: pain, diagnosis, acute medical issues, anxiety    patient future oriented, no psychosis, no SI no need for hospitalization. Good insight. Protective factors include no suicide attempts, no violence history, no substance abuse, supportive family, willingness to seek help, no suicidal ideation or homicidal ideation, hopefulness for future.   Risk: pain, diagnosis, acute medical issues, anxiety    patient future oriented, no psychosis, no SI no need for hospitalization. Good insight.  Not an acute risk of harm to self or others at this time

## 2019-04-11 NOTE — PROGRESS NOTE ADULT - SUBJECTIVE AND OBJECTIVE BOX
Patient seen and examined at the bedside this morning. Patient has had pain better controlled, but is generally feeling weak and light headed, started on IV fluid to supplement PO intake. Patient up to the floor overnight, awaiting Med onc and palliative to see and leave official recommendations.    T(C): 36.4 (04-11-19 @ 05:09), Max: 37 (04-10-19 @ 17:45)  HR: 54 (04-11-19 @ 05:09) (54 - 72)  BP: 148/97 (04-11-19 @ 05:09) (148/97 - 196/117)  RR: 16 (04-11-19 @ 05:09) (16 - 18)  SpO2: 98% (04-11-19 @ 05:09) (98% - 100%)    Patient in visible pain, AOx3  No respiratory distress, stridor, stertor on RA  Voice quality: normal  PERRL, EOMI  Nose: bilateral NC clear anteriorly, IT normal, mucosa normal  OC/OP: oral flap well healed and soft/flat, tongue and FOM soft, no appreciable lesions, OP clear  Neck: post treatment changes, soft and flat, no LAD  CN II-XII grossly intact    A/P: 63M with PMH of SCC s/p composite resection and reconstruction presents with intractable pain possible 2/2 recurrence. Pain improved  - regular diet as tolerated  - IVF at this time, discontinue when taking more PO  - f/u home BP meds  - pain control, appreciate palliative and med onc recommendations  - OOB Ad rosalina

## 2019-04-11 NOTE — PROVIDER CONTACT NOTE (OTHER) - ASSESSMENT
Patient vomited x1 approx 200 mL, states feels better, no dizziness/nausea noted at this time. HOB elevated, no acute distress noted, safety precautions maintained, will continue to monitor

## 2019-04-11 NOTE — BEHAVIORAL HEALTH ASSESSMENT NOTE - NSBHSUICPROTECTFACT_PSY_A_CORE
Positive therapeutic relationships/Identifies reasons for living/High frustration tolerance/Future oriented/Supportive social network or family/Ability to cope with stress/Responsibility to family and others

## 2019-04-11 NOTE — DISCHARGE NOTE PROVIDER - CARE PROVIDERS DIRECT ADDRESSES
,[email or web address]

## 2019-04-12 DIAGNOSIS — Z71.89 OTHER SPECIFIED COUNSELING: ICD-10-CM

## 2019-04-12 PROCEDURE — 99497 ADVNCD CARE PLAN 30 MIN: CPT | Mod: 25

## 2019-04-12 PROCEDURE — 99233 SBSQ HOSP IP/OBS HIGH 50: CPT

## 2019-04-12 PROCEDURE — 90792 PSYCH DIAG EVAL W/MED SRVCS: CPT

## 2019-04-12 RX ORDER — HYDROMORPHONE HYDROCHLORIDE 2 MG/ML
1 INJECTION INTRAMUSCULAR; INTRAVENOUS; SUBCUTANEOUS
Qty: 0 | Refills: 0 | Status: DISCONTINUED | OUTPATIENT
Start: 2019-04-12 | End: 2019-04-13

## 2019-04-12 RX ORDER — NEBIVOLOL HYDROCHLORIDE 5 MG/1
20 TABLET ORAL AT BEDTIME
Qty: 0 | Refills: 0 | Status: DISCONTINUED | OUTPATIENT
Start: 2019-04-12 | End: 2019-04-18

## 2019-04-12 RX ADMIN — HYDROMORPHONE HYDROCHLORIDE 1 MILLIGRAM(S): 2 INJECTION INTRAMUSCULAR; INTRAVENOUS; SUBCUTANEOUS at 12:37

## 2019-04-12 RX ADMIN — HYDROMORPHONE HYDROCHLORIDE 1 MILLIGRAM(S): 2 INJECTION INTRAMUSCULAR; INTRAVENOUS; SUBCUTANEOUS at 14:32

## 2019-04-12 RX ADMIN — ONDANSETRON 4 MILLIGRAM(S): 8 TABLET, FILM COATED ORAL at 14:23

## 2019-04-12 RX ADMIN — NEBIVOLOL HYDROCHLORIDE 20 MILLIGRAM(S): 5 TABLET ORAL at 19:12

## 2019-04-12 RX ADMIN — HYDROMORPHONE HYDROCHLORIDE 1 MILLIGRAM(S): 2 INJECTION INTRAMUSCULAR; INTRAVENOUS; SUBCUTANEOUS at 04:57

## 2019-04-12 RX ADMIN — METHADONE HYDROCHLORIDE 5 MILLIGRAM(S): 40 TABLET ORAL at 06:46

## 2019-04-12 RX ADMIN — HYDROMORPHONE HYDROCHLORIDE 1 MILLIGRAM(S): 2 INJECTION INTRAMUSCULAR; INTRAVENOUS; SUBCUTANEOUS at 09:05

## 2019-04-12 RX ADMIN — Medication 5 MILLIGRAM(S): at 06:55

## 2019-04-12 RX ADMIN — HYDROMORPHONE HYDROCHLORIDE 1 MILLIGRAM(S): 2 INJECTION INTRAMUSCULAR; INTRAVENOUS; SUBCUTANEOUS at 23:10

## 2019-04-12 RX ADMIN — HYDROMORPHONE HYDROCHLORIDE 1 MILLIGRAM(S): 2 INJECTION INTRAMUSCULAR; INTRAVENOUS; SUBCUTANEOUS at 12:50

## 2019-04-12 RX ADMIN — METHADONE HYDROCHLORIDE 5 MILLIGRAM(S): 40 TABLET ORAL at 17:27

## 2019-04-12 RX ADMIN — CHLORHEXIDINE GLUCONATE 15 MILLILITER(S): 213 SOLUTION TOPICAL at 05:13

## 2019-04-12 RX ADMIN — CHLORHEXIDINE GLUCONATE 15 MILLILITER(S): 213 SOLUTION TOPICAL at 17:26

## 2019-04-12 RX ADMIN — HYDROMORPHONE HYDROCHLORIDE 1 MILLIGRAM(S): 2 INJECTION INTRAMUSCULAR; INTRAVENOUS; SUBCUTANEOUS at 17:40

## 2019-04-12 RX ADMIN — HYDROMORPHONE HYDROCHLORIDE 1 MILLIGRAM(S): 2 INJECTION INTRAMUSCULAR; INTRAVENOUS; SUBCUTANEOUS at 18:00

## 2019-04-12 RX ADMIN — HYDROMORPHONE HYDROCHLORIDE 1 MILLIGRAM(S): 2 INJECTION INTRAMUSCULAR; INTRAVENOUS; SUBCUTANEOUS at 02:12

## 2019-04-12 RX ADMIN — HYDROMORPHONE HYDROCHLORIDE 1 MILLIGRAM(S): 2 INJECTION INTRAMUSCULAR; INTRAVENOUS; SUBCUTANEOUS at 14:21

## 2019-04-12 RX ADMIN — HYDROMORPHONE HYDROCHLORIDE 1 MILLIGRAM(S): 2 INJECTION INTRAMUSCULAR; INTRAVENOUS; SUBCUTANEOUS at 05:12

## 2019-04-12 RX ADMIN — HYDROMORPHONE HYDROCHLORIDE 1 MILLIGRAM(S): 2 INJECTION INTRAMUSCULAR; INTRAVENOUS; SUBCUTANEOUS at 01:57

## 2019-04-12 RX ADMIN — HYDROMORPHONE HYDROCHLORIDE 1 MILLIGRAM(S): 2 INJECTION INTRAMUSCULAR; INTRAVENOUS; SUBCUTANEOUS at 22:40

## 2019-04-12 RX ADMIN — HYDROMORPHONE HYDROCHLORIDE 1 MILLIGRAM(S): 2 INJECTION INTRAMUSCULAR; INTRAVENOUS; SUBCUTANEOUS at 08:48

## 2019-04-12 NOTE — PROGRESS NOTE ADULT - PROBLEM SELECTOR PLAN 6
Advanced Care Planning Time: 15 minutes face to face. Patient is currently capacitated to make all informed health decisions for himself- today appointed his son Evan Ventura as his primary HCP and his brother Diony Ventura as his secondary HCP. Health care proxy documentation completed and placed in the chart. At this time- patients goals are to have his symptoms well controlled, with plan for biopsy to determine need for chemotherapy. Palliative consulted to assist with symptom management. Symptoms better controlled on current regimen. All symptoms addressed and medications were recommended accordingly (SEE ABOVE). To adjust pain medication regimen based on additional requirements over the next 24 hours. Palliative to continue to follow for symptom management.

## 2019-04-12 NOTE — PROGRESS NOTE ADULT - PROBLEM SELECTOR PLAN 3
Chronic, related to underlying pain, and his fear of his disease progression.   Today, symptoms improved, no anxiousness.   Recommend Diazepam 5mg BID PRN anxiety. Will likely also help with any spasmodic pain.

## 2019-04-12 NOTE — PROGRESS NOTE ADULT - PROBLEM SELECTOR PLAN 2
Acute onset, possibly 2/2 to IV Dilaudid, symptoms now improved.   Nausea seems controlled on IV Zofran.   Continue IV Zofran 4mg q6h PRN nausea/vomiting.

## 2019-04-12 NOTE — PROGRESS NOTE ADULT - PROBLEM SELECTOR PLAN 5
Palliative consulted to assist with symptom management. Symptoms better controlled on current regimen. All symptoms addressed and medications were recommended accordingly (SEE ABOVE). To adjust pain medication regimen based on additional requirements over the next 24 hours. Palliative to continue to follow for symptom management. MRI was obtained showing increased size of recurrence along the left FOM extending to the oral tongue, parapharyngeal space,  space and lateral oropharyngeal wall with denervation of left hemiglossus. MRI also showed increased size of nodule in the left neck extending to the left parapharyngeal space associated with the left C4 nerve root/trunk. Planned for biopsy sometime next week - based on which will determine need for chemotherapy.   Management per ENT/Oncology teams.

## 2019-04-12 NOTE — PROGRESS NOTE ADULT - ASSESSMENT
A 63 male patient with PMH of SCC s/p composite resection and reconstructive surgery in 4/2018 with adjuvant RT admitted with acute pain likely 2/2 progression of disease. Palliative consulted to assist with symptom management.

## 2019-04-12 NOTE — PROGRESS NOTE ADULT - SUBJECTIVE AND OBJECTIVE BOX
Patient seen and examined at the bedside. No acute events overnight. Pain control improved this morning.     T(C): 36.6 (04-12-19 @ 04:57), Max: 36.6 (04-12-19 @ 04:57)  HR: 65 (04-12-19 @ 05:12) (55 - 68)  BP: 140/91 (04-12-19 @ 05:12) (131/91 - 157/99)  RR: 19 (04-12-19 @ 05:12) (16 - 20)  SpO2: 96% (04-12-19 @ 05:12) (96% - 100%)    NAD, awake, alert  No respiratory distress, stridor, stertor on RA  OC/OP: oral flap well healed and soft/flat, tongue and FOM soft, no appreciable lesions, OP clear  Neck: post treatment changes, soft and flat, no LAD    A/P: 63M with PMH of SCC s/p composite resection and reconstruction presents with intractable pain - with imaging concerning for possible recurrence  - regular diet as tolerated  - pain control per palliative recs   - valium prn   - appreciate psych recs   - OOB Ad rosalina  - dispo: home with pain and anxiety controlled

## 2019-04-12 NOTE — PROGRESS NOTE ADULT - SUBJECTIVE AND OBJECTIVE BOX
SUBJECTIVE/INTERVAL EVENTS:   Patient seen and evaluated at bedside.   Alert, awake, oriented x3, ambulating freely.   Seen dressed, reports taking a shower to feel "fresh"  Appears comfortable and in no apparent distress.   Denies any current pain- reports improvement while on IV Dilaudid.   Received 8 rescue doses of IV Dilaudid in the last 24 hours.   Methadone 5mg BID was initiated yesterday- effect of Methadone may take up to 3-4 days to start taking effect.   Reports improvement in nausea- since yesterday.   Denies any further complaints- patient overall was more conversive today- periodically smiling, making jokes.   Planned for possible biopsy next week, prior to consideration for chemotherapy.    ADVANCE DIRECTIVES:    DNR     MOLST  [ ]  Living Will  [ ]   DECISION MAKER(s): Patient currently capacitated to make all informed decisions for himself.   [ ] Health Care Proxy(s)  [x ] Surrogate(s)  [ ] Guardian           Name(s): Phone Number(s):  Son Evan [facility] [phone]  Brother Diony [facility] [phone]    BASELINE (I)ADL(s) (prior to admission):  [place]: [x ]Total  [ ] Moderate [ ]Dependent    ALLERGIES:     No Known Allergies    Intolerances    MEDICATIONS  (STANDING):  chlorhexidine 0.12% Liquid 15 milliLiter(s) Swish and Spit two times a day  dextrose 5% + sodium chloride 0.45%. 1000 milliLiter(s) (100 mL/Hr) IV Continuous <Continuous>  influenza   Vaccine 0.5 milliLiter(s) IntraMuscular once  methadone    Tablet 5 milliGRAM(s) Oral every 12 hours  nebivolol 20 milliGRAM(s) Oral at bedtime    MEDICATIONS  (PRN):  acetaminophen   Tablet .. 650 milliGRAM(s) Oral every 6 hours PRN Mild Pain (1 - 3)  diazepam    Tablet 5 milliGRAM(s) Oral two times a day PRN anxiety  docusate sodium 100 milliGRAM(s) Oral three times a day PRN Constipation  HYDROmorphone  Injectable 1 milliGRAM(s) IV Push every 2 hours PRN Severe Pain (7 - 10)  ondansetron Injectable 4 milliGRAM(s) IV Push every 8 hours PRN Nausea and/or Vomiting  senna 1 Tablet(s) Oral at bedtime PRN Constipation    PRESENT SYMPTOMS: [ ]Unable to obtain due to poor mentation   Source if other than patient:  [ ]Family   [ ]Team   (If unchecked, not present)    Pain (Impact on QOL):  reports improvement in pain  Location -  posterior neck , radiating to the left trapezius, and left shoulder                Provoking -   Quality -  Radiation -  Severity -  [x ]Mild (1-3) [x ]Moderate (4-6) [ ]Severe (7-10) Improves with IV dilaudid 1mg, to tolerable level 3/10.   Minimal acceptable level [x ]Mild (1-3) [ ]Moderate (4-6) [ ]Severe (7-10)  Timing - continuous pain- improves with IV meds- last effect of 1-2 hours.     PAINAD Score:     [email or web address]    Dyspnea:                           [ ]Mild [ ]Moderate [ ]Severe  Anxiety:                             [ ]Mild [ ]Moderate [ ]Severe  Fatigue:                             [ ]Mild [ ]Moderate [ ]Severe  Nausea:                             [x ]Mild [ x]Moderate [ ]Severe  Loss of appetite:              [x ]Mild [ ]Moderate [ ]Severe  Constipation:                    [ ]Mild [ ]Moderate [ ]Severe    Other Symptoms:  [ ]All other review of systems negative     Karnofsky Performance Score/Palliative Performance Status Version 2:    70     %    PHYSICAL EXAM:  Vital Signs Last 24 Hrs  T(C): 36.5 (12 Apr 2019 14:19), Max: 36.8 (12 Apr 2019 12:35)  T(F): 97.7 (12 Apr 2019 14:19), Max: 98.2 (12 Apr 2019 12:35)  HR: 66 (12 Apr 2019 14:31) (56 - 69)  BP: 142/90 (12 Apr 2019 14:31) (131/91 - 165/100)  BP(mean): --  RR: 17 (12 Apr 2019 14:31) (16 - 20)  SpO2: 97% (12 Apr 2019 14:31) (96% - 100%) I&O's Summary    11 Apr 2019 07:01  -  12 Apr 2019 07:00  --------------------------------------------------------  IN: 1300 mL / OUT: 400 mL / NET: 900 mL    12 Apr 2019 07:01  -  12 Apr 2019 15:07  --------------------------------------------------------  IN: 0 mL / OUT: 400 mL / NET: -400 ml    CRITICAL CARE:  [ ] Shock Present  [ ]Septic [ ]Cardiogenic [ ]Neurologic [ ]Hypovolemic  [ ]  Vasopressors [ ]  Inotropes   [ ] Respiratory failure present  [ ] Acute  [ ] Chronic [ ] Hypoxic  [ ] Hypercarbic [ ] Other  [ ] Other organ failure     GENERAL:  [x ]Alert  [x ]Oriented x3   [ ]Lethargic  [ ]Cachexia  [ ]Unarousable  [x ]Verbal  [ ]Non-Verbal  PSYCHIATRIC: calm cooperative   [  ] Anxiety [ ] Depression [ ] Delirium [ ] Agitation [ ] Other  HEENT: contracted lip, no teeth/dentures, to visible tongue lesions, + drooling   [ ]Normal   [ ]Dry mouth   [ ]ET Tube/Trach  [ ]Oral lesions  PULMONARY: No secretions, no wheezing rales or rhonchi   [x ]Clear [ ]Tachypnea  [ ]Audible excessive secretions   [ ]Rhonchi        [ ]Right [ ]Left [ ]Bilateral  [ ]Crackles        [ ]Right [ ]Left [ ]Bilateral  [ ]Wheezing     [ ]Right [ ]Left [ ]Bilateral  CARDIOVASCULAR:    [x ]Regular [ ]Irregular [ ]Tachy  [ ]Kishore [ ]Murmur [ ]Other  GASTROINTESTINAL:  [x ]Soft  [ ]Distended   [x ]+BS  [x ]Non tender [ ]Tender  [ ]PEG/OGT/ NGT   Last BM:   4/10/19  GENITOURINARY:  [x ]Normal [ ] Incontinent   [ ]Oliguria/Anuria   [ ][place]ague  MUSCULOSKELETAL:   [x ]Normal   [ ]Weakness  [ ]Bed/Wheelchair bound [ ]Edema  NEUROLOGIC:   [x ]No focal deficits  [ ] Cognitive impairment  [ ] Dysphagia [ ]Dysarthria [ ] Paresis [ ]Other   SKIN:   [x ]Normal   [ ]Pressure ulcer(s)  [ ]Rash    LABS: reviewed               14.5   9.25  )-----------( 225      ( 10 Apr 2019 18:54 )             43.5     04-10    139  |  100  |  16  ----------------------------<  101<H>  4.1   |  28  |  0.88    Ca    10.0      10 Apr 2019 18:54    TPro  7.3  /  Alb  4.5  /  TBili  0.5  /  DBili  x   /  AST  14  /  ALT  14  /  AlkPhos  72  04-10    PT/INR - ( 10 Apr 2019 21:16 )   PT: 11.0 SEC;   INR: 0.99       PTT - ( 10 Apr 2019 21:16 )  PTT:28.3 SEC    RADIOLOGY & ADDITIONAL STUDIES: reviewed  MR Orbit, Face, and/or Neck w/wo IV Cont, Bilateral (04.05.19 @ 13:08)   IMPRESSION:  Increased size of recurrence along the left floor of mouth, involving the   oral tongue, lobe parapharyngeal space,  space, and lateral   oral pharyngeal wall with left hemiglossus denervation atrophy.  Increased size of nodule of recurrence along the left neck extending into   the paravertebral space associated with the left C4 nerve root/trunk.    PROTEIN CALORIE MALNUTRITION:   [ ]Mild [ ]Moderate [ ]Severe    ORAL INTAKE:   [ ]Unable/mouth care only [ ]Minimal [x ]Moderate [ ]Full Capability  DIET:   [ ]NPO [ ]Tube feeds [ ]TPN [ ]Other     REFERRALS:   [ ]Chaplaincy  [ ] Hospice  [ ]Child Life  [ ]Social Work  [ ]Case management [ ]Holistic Therapy

## 2019-04-13 LAB
BLD GP AB SCN SERPL QL: NEGATIVE — SIGNIFICANT CHANGE UP
RH IG SCN BLD-IMP: POSITIVE — SIGNIFICANT CHANGE UP

## 2019-04-13 RX ORDER — HYDROMORPHONE HYDROCHLORIDE 2 MG/ML
1 INJECTION INTRAMUSCULAR; INTRAVENOUS; SUBCUTANEOUS
Qty: 0 | Refills: 0 | Status: DISCONTINUED | OUTPATIENT
Start: 2019-04-13 | End: 2019-04-18

## 2019-04-13 RX ADMIN — NEBIVOLOL HYDROCHLORIDE 20 MILLIGRAM(S): 5 TABLET ORAL at 21:54

## 2019-04-13 RX ADMIN — HYDROMORPHONE HYDROCHLORIDE 1 MILLIGRAM(S): 2 INJECTION INTRAMUSCULAR; INTRAVENOUS; SUBCUTANEOUS at 23:03

## 2019-04-13 RX ADMIN — HYDROMORPHONE HYDROCHLORIDE 1 MILLIGRAM(S): 2 INJECTION INTRAMUSCULAR; INTRAVENOUS; SUBCUTANEOUS at 16:00

## 2019-04-13 RX ADMIN — HYDROMORPHONE HYDROCHLORIDE 1 MILLIGRAM(S): 2 INJECTION INTRAMUSCULAR; INTRAVENOUS; SUBCUTANEOUS at 11:15

## 2019-04-13 RX ADMIN — ONDANSETRON 4 MILLIGRAM(S): 8 TABLET, FILM COATED ORAL at 11:10

## 2019-04-13 RX ADMIN — METHADONE HYDROCHLORIDE 5 MILLIGRAM(S): 40 TABLET ORAL at 18:05

## 2019-04-13 RX ADMIN — SODIUM CHLORIDE 100 MILLILITER(S): 9 INJECTION, SOLUTION INTRAVENOUS at 14:51

## 2019-04-13 RX ADMIN — METHADONE HYDROCHLORIDE 5 MILLIGRAM(S): 40 TABLET ORAL at 05:42

## 2019-04-13 RX ADMIN — HYDROMORPHONE HYDROCHLORIDE 1 MILLIGRAM(S): 2 INJECTION INTRAMUSCULAR; INTRAVENOUS; SUBCUTANEOUS at 20:49

## 2019-04-13 RX ADMIN — SODIUM CHLORIDE 100 MILLILITER(S): 9 INJECTION, SOLUTION INTRAVENOUS at 11:06

## 2019-04-13 RX ADMIN — HYDROMORPHONE HYDROCHLORIDE 1 MILLIGRAM(S): 2 INJECTION INTRAMUSCULAR; INTRAVENOUS; SUBCUTANEOUS at 02:46

## 2019-04-13 RX ADMIN — Medication 5 MILLIGRAM(S): at 11:09

## 2019-04-13 RX ADMIN — HYDROMORPHONE HYDROCHLORIDE 1 MILLIGRAM(S): 2 INJECTION INTRAMUSCULAR; INTRAVENOUS; SUBCUTANEOUS at 18:45

## 2019-04-13 RX ADMIN — CHLORHEXIDINE GLUCONATE 15 MILLILITER(S): 213 SOLUTION TOPICAL at 18:05

## 2019-04-13 RX ADMIN — HYDROMORPHONE HYDROCHLORIDE 1 MILLIGRAM(S): 2 INJECTION INTRAMUSCULAR; INTRAVENOUS; SUBCUTANEOUS at 07:57

## 2019-04-13 RX ADMIN — HYDROMORPHONE HYDROCHLORIDE 1 MILLIGRAM(S): 2 INJECTION INTRAMUSCULAR; INTRAVENOUS; SUBCUTANEOUS at 22:48

## 2019-04-13 RX ADMIN — ONDANSETRON 4 MILLIGRAM(S): 8 TABLET, FILM COATED ORAL at 20:41

## 2019-04-13 RX ADMIN — Medication 5 MILLIGRAM(S): at 23:32

## 2019-04-13 RX ADMIN — HYDROMORPHONE HYDROCHLORIDE 1 MILLIGRAM(S): 2 INJECTION INTRAMUSCULAR; INTRAVENOUS; SUBCUTANEOUS at 02:16

## 2019-04-13 RX ADMIN — HYDROMORPHONE HYDROCHLORIDE 1 MILLIGRAM(S): 2 INJECTION INTRAMUSCULAR; INTRAVENOUS; SUBCUTANEOUS at 20:34

## 2019-04-13 RX ADMIN — HYDROMORPHONE HYDROCHLORIDE 1 MILLIGRAM(S): 2 INJECTION INTRAMUSCULAR; INTRAVENOUS; SUBCUTANEOUS at 15:40

## 2019-04-13 RX ADMIN — HYDROMORPHONE HYDROCHLORIDE 1 MILLIGRAM(S): 2 INJECTION INTRAMUSCULAR; INTRAVENOUS; SUBCUTANEOUS at 18:30

## 2019-04-13 RX ADMIN — CHLORHEXIDINE GLUCONATE 15 MILLILITER(S): 213 SOLUTION TOPICAL at 05:42

## 2019-04-13 RX ADMIN — HYDROMORPHONE HYDROCHLORIDE 1 MILLIGRAM(S): 2 INJECTION INTRAMUSCULAR; INTRAVENOUS; SUBCUTANEOUS at 11:00

## 2019-04-13 NOTE — PROGRESS NOTE ADULT - ASSESSMENT
A/P: 63M with history of composite resection and free flap reconstruction in April 2018 admitted for pain and suspected recurrence  -pain control per palliative, appreciate recommendations  -continue home medications  -bowel regimen  -OOB  -diet as tolerated  -appreciate medicine recommendations

## 2019-04-13 NOTE — PROVIDER CONTACT NOTE (MEDICATION) - ASSESSMENT
neck pain secondary to CA. Patient ordered for 1mg Dilaudid q 3 hours for 7-10 pain. Patient not due for pain meds until 1840.

## 2019-04-13 NOTE — PROGRESS NOTE ADULT - SUBJECTIVE AND OBJECTIVE BOX
Patient seen and examined at bedside this AM  Reports pain is improved    Exam  NAD, awake and alert  Breathing comfortably on room air  No stridor/stertor  Flap in place; left neck incision intact – tender to palpation

## 2019-04-14 DIAGNOSIS — I10 ESSENTIAL (PRIMARY) HYPERTENSION: ICD-10-CM

## 2019-04-14 DIAGNOSIS — K59.03 DRUG INDUCED CONSTIPATION: ICD-10-CM

## 2019-04-14 PROCEDURE — 99223 1ST HOSP IP/OBS HIGH 75: CPT

## 2019-04-14 RX ORDER — DOCUSATE SODIUM 100 MG
100 CAPSULE ORAL THREE TIMES A DAY
Qty: 0 | Refills: 0 | Status: DISCONTINUED | OUTPATIENT
Start: 2019-04-14 | End: 2019-04-18

## 2019-04-14 RX ORDER — POLYETHYLENE GLYCOL 3350 17 G/17G
17 POWDER, FOR SOLUTION ORAL
Qty: 0 | Refills: 0 | Status: DISCONTINUED | OUTPATIENT
Start: 2019-04-14 | End: 2019-04-18

## 2019-04-14 RX ORDER — ACETAMINOPHEN 500 MG
1000 TABLET ORAL ONCE
Qty: 0 | Refills: 0 | Status: COMPLETED | OUTPATIENT
Start: 2019-04-14 | End: 2019-04-14

## 2019-04-14 RX ORDER — SENNA PLUS 8.6 MG/1
2 TABLET ORAL AT BEDTIME
Qty: 0 | Refills: 0 | Status: DISCONTINUED | OUTPATIENT
Start: 2019-04-14 | End: 2019-04-18

## 2019-04-14 RX ORDER — SENNA PLUS 8.6 MG/1
1 TABLET ORAL AT BEDTIME
Qty: 0 | Refills: 0 | Status: DISCONTINUED | OUTPATIENT
Start: 2019-04-14 | End: 2019-04-14

## 2019-04-14 RX ORDER — DOCUSATE SODIUM 100 MG
100 CAPSULE ORAL DAILY
Qty: 0 | Refills: 0 | Status: DISCONTINUED | OUTPATIENT
Start: 2019-04-14 | End: 2019-04-14

## 2019-04-14 RX ORDER — GABAPENTIN 400 MG/1
100 CAPSULE ORAL THREE TIMES A DAY
Qty: 0 | Refills: 0 | Status: DISCONTINUED | OUTPATIENT
Start: 2019-04-14 | End: 2019-04-17

## 2019-04-14 RX ORDER — ONDANSETRON 8 MG/1
4 TABLET, FILM COATED ORAL ONCE
Qty: 0 | Refills: 0 | Status: COMPLETED | OUTPATIENT
Start: 2019-04-14 | End: 2019-04-14

## 2019-04-14 RX ORDER — CYCLOBENZAPRINE HYDROCHLORIDE 10 MG/1
5 TABLET, FILM COATED ORAL THREE TIMES A DAY
Qty: 0 | Refills: 0 | Status: DISCONTINUED | OUTPATIENT
Start: 2019-04-14 | End: 2019-04-18

## 2019-04-14 RX ADMIN — METHADONE HYDROCHLORIDE 5 MILLIGRAM(S): 40 TABLET ORAL at 07:17

## 2019-04-14 RX ADMIN — CHLORHEXIDINE GLUCONATE 15 MILLILITER(S): 213 SOLUTION TOPICAL at 07:17

## 2019-04-14 RX ADMIN — GABAPENTIN 100 MILLIGRAM(S): 400 CAPSULE ORAL at 22:55

## 2019-04-14 RX ADMIN — CYCLOBENZAPRINE HYDROCHLORIDE 5 MILLIGRAM(S): 10 TABLET, FILM COATED ORAL at 16:53

## 2019-04-14 RX ADMIN — HYDROMORPHONE HYDROCHLORIDE 1 MILLIGRAM(S): 2 INJECTION INTRAMUSCULAR; INTRAVENOUS; SUBCUTANEOUS at 03:41

## 2019-04-14 RX ADMIN — HYDROMORPHONE HYDROCHLORIDE 1 MILLIGRAM(S): 2 INJECTION INTRAMUSCULAR; INTRAVENOUS; SUBCUTANEOUS at 11:44

## 2019-04-14 RX ADMIN — HYDROMORPHONE HYDROCHLORIDE 1 MILLIGRAM(S): 2 INJECTION INTRAMUSCULAR; INTRAVENOUS; SUBCUTANEOUS at 09:37

## 2019-04-14 RX ADMIN — HYDROMORPHONE HYDROCHLORIDE 1 MILLIGRAM(S): 2 INJECTION INTRAMUSCULAR; INTRAVENOUS; SUBCUTANEOUS at 09:55

## 2019-04-14 RX ADMIN — CHLORHEXIDINE GLUCONATE 15 MILLILITER(S): 213 SOLUTION TOPICAL at 19:06

## 2019-04-14 RX ADMIN — HYDROMORPHONE HYDROCHLORIDE 1 MILLIGRAM(S): 2 INJECTION INTRAMUSCULAR; INTRAVENOUS; SUBCUTANEOUS at 03:26

## 2019-04-14 RX ADMIN — ONDANSETRON 4 MILLIGRAM(S): 8 TABLET, FILM COATED ORAL at 03:28

## 2019-04-14 RX ADMIN — HYDROMORPHONE HYDROCHLORIDE 1 MILLIGRAM(S): 2 INJECTION INTRAMUSCULAR; INTRAVENOUS; SUBCUTANEOUS at 23:25

## 2019-04-14 RX ADMIN — HYDROMORPHONE HYDROCHLORIDE 1 MILLIGRAM(S): 2 INJECTION INTRAMUSCULAR; INTRAVENOUS; SUBCUTANEOUS at 12:00

## 2019-04-14 RX ADMIN — HYDROMORPHONE HYDROCHLORIDE 1 MILLIGRAM(S): 2 INJECTION INTRAMUSCULAR; INTRAVENOUS; SUBCUTANEOUS at 14:05

## 2019-04-14 RX ADMIN — Medication 100 MILLIGRAM(S): at 22:55

## 2019-04-14 RX ADMIN — METHADONE HYDROCHLORIDE 5 MILLIGRAM(S): 40 TABLET ORAL at 19:06

## 2019-04-14 RX ADMIN — HYDROMORPHONE HYDROCHLORIDE 1 MILLIGRAM(S): 2 INJECTION INTRAMUSCULAR; INTRAVENOUS; SUBCUTANEOUS at 13:51

## 2019-04-14 RX ADMIN — Medication 1000 MILLIGRAM(S): at 17:23

## 2019-04-14 RX ADMIN — HYDROMORPHONE HYDROCHLORIDE 1 MILLIGRAM(S): 2 INJECTION INTRAMUSCULAR; INTRAVENOUS; SUBCUTANEOUS at 22:55

## 2019-04-14 RX ADMIN — SENNA PLUS 2 TABLET(S): 8.6 TABLET ORAL at 22:55

## 2019-04-14 RX ADMIN — POLYETHYLENE GLYCOL 3350 17 GRAM(S): 17 POWDER, FOR SOLUTION ORAL at 19:06

## 2019-04-14 RX ADMIN — Medication 1000 MILLIGRAM(S): at 01:32

## 2019-04-14 RX ADMIN — Medication 400 MILLIGRAM(S): at 16:53

## 2019-04-14 RX ADMIN — CYCLOBENZAPRINE HYDROCHLORIDE 5 MILLIGRAM(S): 10 TABLET, FILM COATED ORAL at 22:55

## 2019-04-14 RX ADMIN — Medication 400 MILLIGRAM(S): at 01:02

## 2019-04-14 NOTE — CONSULT NOTE ADULT - PROBLEM SELECTOR RECOMMENDATION 9
pain still not adequately controlled, methadone 5 bid started 4/11, getting very frequent dilaudid IV - f/u with PC tomorrow re possible titration methadone  - consider add Neurontin 100 tid for possible neuropathic component and flexeril 5 tid for muscle spasm; hot packs to neck area, could also try lidocaine patch

## 2019-04-14 NOTE — PROGRESS NOTE ADULT - SUBJECTIVE AND OBJECTIVE BOX
Patient seen and examined at bedside this AM  Reports pain is improved/unchanged  Dilaudid frequency increased to q2h  Added tylenol IV x1    Exam  NAD, awake and alert  Breathing comfortably on room air  No stridor/stertor  Flap in place; left neck incision intact – tender to palpation    Assessment and Plan: 	  A/P: 63M with history of composite resection and free flap reconstruction in April 2018 admitted for pain and suspected recurrence  -pain control per palliative, appreciate recommendations  -continue home medications  -bowel regimen  -OOB  -diet as tolerated  -appreciate medicine recommendations

## 2019-04-14 NOTE — PROVIDER CONTACT NOTE (OTHER) - ACTION/TREATMENT ORDERED:
Patient doesn't need any additional BP meds as per conversation with medicine attending as per MD, elevated BP more likely from pain

## 2019-04-14 NOTE — CONSULT NOTE ADULT - PROBLEM SELECTOR RECOMMENDATION 4
MRI was obtained showing increased size of recurrence along the left FOM extending to the oral tongue, parapharyngeal space,  space and lateral oropharyngeal wall with denervation of left hemiglossus. MRI also showed increased size of nodule in the left neck extending to the left parapharyngeal space associated with the left C4 nerve root/trunk. Report of MRI pending discussion between ENT/Plastic Surgery teams.
aggressive bowel regimen - colace tid, senna 2hs, Miralax bid until BM;  lactulose PRN

## 2019-04-14 NOTE — CONSULT NOTE ADULT - ASSESSMENT
A 63 male patient with PMH of SCC s/p composite resection and reconstructive surgery in 4/2018 with adjuvant RT admitted with acute pain likely 2/2 progression of disease. Palliative consulted to assist with symptom management.
63M with PMH of SCC s/p resection in 4/2018 with adjuvant RT. Patient returned to OR for release of lower lip scar and mucosal graft on 3/7/19.  Admitted for pain and suspected recurrence.  Persistently hypertensive in setting uncontrolled pain

## 2019-04-14 NOTE — CONSULT NOTE ADULT - PROBLEM SELECTOR RECOMMENDATION 3
Chronic, related to underlying pain, and his fear of his disease progression.   Recommend Diazepam 5mg BID PRN anxiety. Will likely also help with any spasmodic pain.
suspected recurrence, management per ENT

## 2019-04-14 NOTE — CONSULT NOTE ADULT - SUBJECTIVE AND OBJECTIVE BOX
Patient is a 63y old  Male who presents with a chief complaint of Pain control    HPI: 63M with PMH of SCC s/p resection in 4/2018 with adjuvant RT. Patient returned to OR for release of lower lip scar and mucosal graft on 3/7/19. PTA pt with worsening pain over several weeks, decreased PO intake secondary to pain.  Seen at OSH, pain improved with Toradol and Dilaudid while in the ER and went home with prescriptions for Vicodin and Valium. Pain getting worse so came in. Pain primarily in his neck and radiates down the back into his left trapezius muscle and shoulder. Pt has drooling at baseline, able to tolerate PO, no respiratory changes. Patient has been seen recently for these symptoms, there was no evidence of recurrence on examination, MRI was obtained showing increased size of recurrence along the left FOM extending to the oral tongue, parapharyngeal space,  space and lateral oropharyngeal wall with denervation of left hemiglossus. MRI also showed increased size of nodule in the left neck extending to the left parapharyngeal space associated with the left C4 nerve root/trunk.    Pt seen by PC, started on methadone 5 bid on 4/11, requiring dilaudid 1q2 for severe L lateral neck pain, sharp, stabbing, with squeezing, tightness of traps.  No BM since here.  Never has high bp until few weeks ago, PMD start Bystolic 5 then increased to 20 during weeks PTA.  No chest pain, headache.  Feels woozy and foggy after dilaudid, also itchy.    Allergies  No Known Allergies    HOME MEDICATIONS:   Bystolic 5 -->20qd started 1 week PTA    MEDICATIONS  (STANDING):  chlorhexidine 0.12% Liquid 15 milliLiter(s) Swish and Spit two times a day  dextrose 5% + sodium chloride 0.45%. 1000 milliLiter(s) (100 mL/Hr) IV Continuous <Continuous>  docusate sodium 100 milliGRAM(s) Oral daily  influenza   Vaccine 0.5 milliLiter(s) IntraMuscular once  methadone    Tablet 5 milliGRAM(s) Oral every 12 hours  nebivolol 20 milliGRAM(s) Oral at bedtime    MEDICATIONS  (PRN):  acetaminophen   Tablet .. 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  Malignant neoplasm: skin  History of oral cancer: insertion of chemo port &amp; removal 2015  Encounter for PEG (percutaneous endoscopic gastrostomy): inserted 2015 &amp; removal of peg 2015  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, madibular reconstruction with bone graft 2018  H/O shoulder surgery: impingement left 2005, right 2007    SOCIAL HISTORY:  , worked in security systems and fire arms training  d/c tobacco 2015, no drugs, no recent alcohol    FAMILY HISTORY:  Family history of lung cancer    REVIEW OF SYSTEMS:  CONSTITUTIONAL: No fever;  ++weight loss and fatigue  EYES: No eye pain, visual disturbances, or discharge  ENMT:  No difficulty hearing, tinnitus, vertigo  NECK: ++pain  BREASTS: No pain, masses, or nipple discharge  RESPIRATORY: No cough, wheezing, chills or hemoptysis; No shortness of breath  CARDIOVASCULAR: No chest pain, palpitations, dizziness, or leg swelling  GASTROINTESTINAL: No abdominal or epigastric pain. No nausea, vomiting, or hematemesis; No diarrhea or constipation. No melena or hematochezia.  GENITOURINARY: No dysuria, frequency, hematuria, or incontinence  NEUROLOGICAL: No headaches, memory loss, loss of strength, numbness, or tremors  SKIN: No itching, burning, rashes, or lesions   LYMPH NODES: No enlarged glands  ENDOCRINE: No heat or cold intolerance; No hair loss  MUSCULOSKELETAL: No muscle or back pain  PSYCHIATRIC: No depression, anxiety, mood swings, or difficulty sleeping  HEME/LYMPH: No easy bruising, or bleeding gums  ALLERGY AND IMMUNOLOGIC: No hives or eczema  [  ] All other ROS negative  [  ] Unable to obtain due to poor mental status    Vital Signs Last 24 Hrs  T(C): 36.8 (14 Apr 2019 09:37), Max: 36.8 (14 Apr 2019 09:37)  T(F): 98.2 (14 Apr 2019 09:37), Max: 98.2 (14 Apr 2019 09:37)  HR: 51 (14 Apr 2019 11:40) (51 - 110)  BP: 156/103 (14 Apr 2019 11:40) (119/77 - 164/106)  BP(mean): --  RR: 16 (14 Apr 2019 11:40) (16 - 19)  SpO2: 100% (14 Apr 2019 11:40) (96% - 100%)    PHYSICAL EXAM:  GENERAL: thin WM, sitting up in bed  EYES: EOMI, PERRLA, conjunctiva and sclera clear  lower lip/jaw post surgical changes, limited opening of mouth; L lateral neck tenderness, muscle tightness  RESPIRATORY: Clear to auscultation bilaterally; No rales, rhonchi, wheezing, or rubs  CARDIOVASCULAR: Regular rate and rhythm; No murmurs, rubs, or gallops  GASTROINTESTINAL: Soft, Nontender, Nondistended; Bowel sounds present  GENITOURINARY: Not examined  EXTREMITIES:  2+ Peripheral Pulses, No clubbing, cyanosis, or edema  NERVOUS SYSTEM:  Alert & Oriented X3; Moving all 4 extremities; No gross sensory deficits  SKIN: No rashes or lesions; Incisions C/D/I  PSYCH: mild anxiety    LABS:  Comprehensive Metabolic Panel (04.10.19 @ 18:54)    Sodium, Serum: 139 mmol/L    Potassium, Serum: 4.1 mmol/L    Chloride, Serum: 100 mmol/L    Carbon Dioxide, Serum: 28 mmol/L    Anion Gap, Serum: 11 mmo/L    Blood Urea Nitrogen, Serum: 16 mg/dL    Creatinine, Serum: 0.88 mg/dL    Glucose, Serum: 101 mg/dL    Calcium, Total Serum: 10.0 mg/dL    Protein Total, Serum: 7.3 g/dL    Albumin, Serum: 4.5 g/dL    Bilirubin Total, Serum: 0.5 mg/dL    Alkaline Phosphatase, Serum: 72 u/L    Aspartate Aminotransferase (AST/SGOT): 14 u/L    Alanine Aminotransferase (ALT/SGPT): 14 u/L    eGFR if Non : 93:     Complete Blood Count + Automated Diff (04.10.19 @ 18:54)    Nucleated RBC #: 0 K/uL    WBC Count: 9.25 K/uL    RBC Count: 4.79 M/uL    Hemoglobin: 14.5 g/dL    Hematocrit: 43.5 %    Mean Cell Volume: 90.8 fL    Mean Cell Hemoglobin: 30.3 pg    Mean Cell Hemoglobin Conc: 33.3 %    Red Cell Distrib Width: 13.0 %    Platelet Count - Automated: 225 K/uL    MPV: 9.7 fl    Auto Neutrophil #: 6.98 K/uL    Auto Lymphocyte #: 1.29 K/uL    Auto Monocyte #: 0.66 K/uL    Auto Eosinophil #: 0.21 K/uL    Auto Basophil #: 0.04 K/uL    Auto Neutrophil %: 75.5 %    Auto Lymphocyte %: 13.9 %    Auto Monocyte %: 7.1 %    Auto Eosinophil %: 2.3 %    Auto Basophil %: 0.4 %    Auto Immature Granulocyte %: 0.8: (Includes meta, myelo and promyelocytes) %    RADIOLOGY & ADDITIONAL STUDIES:    EKG:   Personally Reviewed:  [ ] YES     Imaging:   Personally Reviewed:  [ ] YES               Consultant(s) notes reviewed:    Care Discussed with Consultant(s)/Other Providers:  ENT re overall care Patient is a 63y old  Male who presents with a chief complaint of Pain control    HPI: 63M with PMH of SCC s/p resection in 4/2018 with adjuvant RT. Patient returned to OR for release of lower lip scar and mucosal graft on 3/7/19. PTA pt with worsening pain over several weeks, decreased PO intake secondary to pain.  Seen at OSH, pain improved with Toradol and Dilaudid while in the ER and went home with prescriptions for Vicodin and Valium. Subsequently pain getting worse so came in. Pain primarily in his neck and radiates down the back into his left trapezius muscle and shoulder. Pt has drooling at baseline, able to tolerate PO, no respiratory changes. Patient has been seen recently for these symptoms, there was no evidence of recurrence on examination, MRI was obtained showing increased size of recurrence along the left FOM extending to the oral tongue, parapharyngeal space,  space and lateral oropharyngeal wall with denervation of left hemiglossus. MRI also showed increased size of nodule in the left neck extending to the left parapharyngeal space associated with the left C4 nerve root/trunk.    Pt seen by PC, started on methadone 5 bid on 4/11, requiring dilaudid 1q2 for severe L lateral neck pain, sharp, stabbing, with squeezing, tightness of traps.  No BM since here.  Never had high bp until few weeks ago, PMD start Bystolic 5 then increased to 20 during the week PTA.  No chest pain, headache.  Feels woozy and foggy after dilaudid, also itchy.    Allergies  No Known Allergies    HOME MEDICATIONS:   Bystolic 5 -->20qd started 1 week PTA    MEDICATIONS  (STANDING):  chlorhexidine 0.12% Liquid 15 milliLiter(s) Swish and Spit two times a day  dextrose 5% + sodium chloride 0.45%. 1000 milliLiter(s) (100 mL/Hr) IV Continuous <Continuous>  docusate sodium 100 milliGRAM(s) Oral daily  influenza   Vaccine 0.5 milliLiter(s) IntraMuscular once  methadone    Tablet 5 milliGRAM(s) Oral every 12 hours  nebivolol 20 milliGRAM(s) Oral at bedtime    MEDICATIONS  (PRN):  acetaminophen   Tablet .. 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  Malignant neoplasm: skin  History of oral cancer: insertion of chemo port &amp; removal 2015  Encounter for PEG (percutaneous endoscopic gastrostomy): inserted 2015 &amp; removal of peg 2015  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, madibular reconstruction with bone graft 2018  H/O shoulder surgery: impingement left 2005, right 2007    SOCIAL HISTORY:  , worked in security systems and fire arms training  d/c tobacco 2015, no drugs, no recent alcohol    FAMILY HISTORY:  Family history of lung cancer    REVIEW OF SYSTEMS:  CONSTITUTIONAL: No fever;  ++weight loss and fatigue  EYES: No eye pain, visual disturbances, or discharge  ENMT:  No difficulty hearing, tinnitus, vertigo  NECK: ++pain  BREASTS: No pain, masses, or nipple discharge  RESPIRATORY: No cough, wheezing, chills or hemoptysis; No shortness of breath  CARDIOVASCULAR: No chest pain, palpitations, dizziness, or leg swelling  GASTROINTESTINAL: No abdominal or epigastric pain. No nausea, vomiting, or hematemesis; No diarrhea. No melena or hematochezia.  constipation  GENITOURINARY: No dysuria, frequency, hematuria, or incontinence  NEUROLOGICAL: No headaches, memory loss, loss of strength, numbness, or tremors  SKIN: No itching, burning, rashes, or lesions   LYMPH NODES: No enlarged glands  ENDOCRINE: No heat or cold intolerance; No hair loss  MUSCULOSKELETAL: No muscle or back pain  PSYCHIATRIC: No depression, anxiety, mood swings, or difficulty sleeping  HEME/LYMPH: No easy bruising, or bleeding gums  ALLERGY AND IMMUNOLOGIC: No hives or eczema  [  ] All other ROS negative  [  ] Unable to obtain due to poor mental status    Vital Signs Last 24 Hrs  T(C): 36.8 (14 Apr 2019 09:37), Max: 36.8 (14 Apr 2019 09:37)  T(F): 98.2 (14 Apr 2019 09:37), Max: 98.2 (14 Apr 2019 09:37)  HR: 51 (14 Apr 2019 11:40) (51 - 110)  BP: 156/103 (14 Apr 2019 11:40) (119/77 - 164/106)  BP(mean): --  RR: 16 (14 Apr 2019 11:40) (16 - 19)  SpO2: 100% (14 Apr 2019 11:40) (96% - 100%)    PHYSICAL EXAM:  GENERAL: thin WM, sitting up in bed  EYES: EOMI, PERRLA, conjunctiva and sclera clear  lower lip/jaw post surgical changes, limited opening of mouth; L lateral neck tenderness, muscle tightness  RESPIRATORY: Clear to auscultation bilaterally; No rales, rhonchi, wheezing, or rubs  CARDIOVASCULAR: Regular rate and rhythm; No murmurs, rubs, or gallops  GASTROINTESTINAL: Soft, Nontender, Nondistended; Bowel sounds present  GENITOURINARY: Not examined  EXTREMITIES:  2+ Peripheral Pulses, No clubbing, cyanosis, or edema  NERVOUS SYSTEM:  Alert & Oriented X3; Moving all 4 extremities; No gross sensory deficits  SKIN: No rashes or lesions; Incisions C/D/I  PSYCH: mild anxiety    LABS:  Comprehensive Metabolic Panel (04.10.19 @ 18:54)    Sodium, Serum: 139 mmol/L    Potassium, Serum: 4.1 mmol/L    Chloride, Serum: 100 mmol/L    Carbon Dioxide, Serum: 28 mmol/L    Anion Gap, Serum: 11 mmo/L    Blood Urea Nitrogen, Serum: 16 mg/dL    Creatinine, Serum: 0.88 mg/dL    Glucose, Serum: 101 mg/dL    Calcium, Total Serum: 10.0 mg/dL    Protein Total, Serum: 7.3 g/dL    Albumin, Serum: 4.5 g/dL    Bilirubin Total, Serum: 0.5 mg/dL    Alkaline Phosphatase, Serum: 72 u/L    Aspartate Aminotransferase (AST/SGOT): 14 u/L    Alanine Aminotransferase (ALT/SGPT): 14 u/L    eGFR if Non : 93:     Complete Blood Count + Automated Diff (04.10.19 @ 18:54)    Nucleated RBC #: 0 K/uL    WBC Count: 9.25 K/uL    RBC Count: 4.79 M/uL    Hemoglobin: 14.5 g/dL    Hematocrit: 43.5 %    Mean Cell Volume: 90.8 fL    Mean Cell Hemoglobin: 30.3 pg    Mean Cell Hemoglobin Conc: 33.3 %    Red Cell Distrib Width: 13.0 %    Platelet Count - Automated: 225 K/uL    MPV: 9.7 fl    Auto Neutrophil #: 6.98 K/uL    Auto Lymphocyte #: 1.29 K/uL    Auto Monocyte #: 0.66 K/uL    Auto Eosinophil #: 0.21 K/uL    Auto Basophil #: 0.04 K/uL    Auto Neutrophil %: 75.5 %    Auto Lymphocyte %: 13.9 %    Auto Monocyte %: 7.1 %    Auto Eosinophil %: 2.3 %    Auto Basophil %: 0.4 %    Auto Immature Granulocyte %: 0.8: (Includes meta, myelo and promyelocytes) %    RADIOLOGY & ADDITIONAL STUDIES:    EKG:   Personally Reviewed:  [ ] YES     Imaging:   Personally Reviewed:  [ ] YES               Consultant(s) notes reviewed:    Care Discussed with Consultant(s)/Other Providers:  ENT re overall care

## 2019-04-14 NOTE — CONSULT NOTE ADULT - PROBLEM SELECTOR RECOMMENDATION 2
suspect largely d/t uncontrolled pain - Bystolic titrated up to 20 in past 1-2 weeks, pt with no headache, chest pain.  Would aim for better pain control in short term.  If needed can add second agent such as low dose Norvasc 2.5 suspect largely d/t uncontrolled pain - Bystolic titrated up to 20 in past 1-2 weeks, pt with no headache, chest pain.  Would aim for better pain control in short term.  If needed can add second agent such as low dose Norvasc 2.5 or PO hydralazine.

## 2019-04-15 PROCEDURE — 99233 SBSQ HOSP IP/OBS HIGH 50: CPT

## 2019-04-15 RX ORDER — METHADONE HYDROCHLORIDE 40 MG/1
5 TABLET ORAL EVERY 8 HOURS
Qty: 0 | Refills: 0 | Status: DISCONTINUED | OUTPATIENT
Start: 2019-04-15 | End: 2019-04-17

## 2019-04-15 RX ORDER — ACETAMINOPHEN 500 MG
1000 TABLET ORAL ONCE
Qty: 0 | Refills: 0 | Status: COMPLETED | OUTPATIENT
Start: 2019-04-15 | End: 2019-04-15

## 2019-04-15 RX ADMIN — HYDROMORPHONE HYDROCHLORIDE 1 MILLIGRAM(S): 2 INJECTION INTRAMUSCULAR; INTRAVENOUS; SUBCUTANEOUS at 17:05

## 2019-04-15 RX ADMIN — HYDROMORPHONE HYDROCHLORIDE 1 MILLIGRAM(S): 2 INJECTION INTRAMUSCULAR; INTRAVENOUS; SUBCUTANEOUS at 12:05

## 2019-04-15 RX ADMIN — Medication 400 MILLIGRAM(S): at 15:57

## 2019-04-15 RX ADMIN — Medication 100 MILLIGRAM(S): at 21:20

## 2019-04-15 RX ADMIN — Medication 1000 MILLIGRAM(S): at 23:20

## 2019-04-15 RX ADMIN — POLYETHYLENE GLYCOL 3350 17 GRAM(S): 17 POWDER, FOR SOLUTION ORAL at 18:59

## 2019-04-15 RX ADMIN — HYDROMORPHONE HYDROCHLORIDE 1 MILLIGRAM(S): 2 INJECTION INTRAMUSCULAR; INTRAVENOUS; SUBCUTANEOUS at 21:20

## 2019-04-15 RX ADMIN — METHADONE HYDROCHLORIDE 5 MILLIGRAM(S): 40 TABLET ORAL at 05:20

## 2019-04-15 RX ADMIN — Medication 1000 MILLIGRAM(S): at 16:27

## 2019-04-15 RX ADMIN — HYDROMORPHONE HYDROCHLORIDE 1 MILLIGRAM(S): 2 INJECTION INTRAMUSCULAR; INTRAVENOUS; SUBCUTANEOUS at 16:48

## 2019-04-15 RX ADMIN — NEBIVOLOL HYDROCHLORIDE 20 MILLIGRAM(S): 5 TABLET ORAL at 21:20

## 2019-04-15 RX ADMIN — METHADONE HYDROCHLORIDE 5 MILLIGRAM(S): 40 TABLET ORAL at 16:48

## 2019-04-15 RX ADMIN — GABAPENTIN 100 MILLIGRAM(S): 400 CAPSULE ORAL at 13:52

## 2019-04-15 RX ADMIN — Medication 100 MILLIGRAM(S): at 05:20

## 2019-04-15 RX ADMIN — Medication 400 MILLIGRAM(S): at 22:44

## 2019-04-15 RX ADMIN — CHLORHEXIDINE GLUCONATE 15 MILLILITER(S): 213 SOLUTION TOPICAL at 18:59

## 2019-04-15 RX ADMIN — HYDROMORPHONE HYDROCHLORIDE 1 MILLIGRAM(S): 2 INJECTION INTRAMUSCULAR; INTRAVENOUS; SUBCUTANEOUS at 05:01

## 2019-04-15 RX ADMIN — CHLORHEXIDINE GLUCONATE 15 MILLILITER(S): 213 SOLUTION TOPICAL at 05:20

## 2019-04-15 RX ADMIN — HYDROMORPHONE HYDROCHLORIDE 1 MILLIGRAM(S): 2 INJECTION INTRAMUSCULAR; INTRAVENOUS; SUBCUTANEOUS at 19:15

## 2019-04-15 RX ADMIN — Medication 100 MILLIGRAM(S): at 13:50

## 2019-04-15 RX ADMIN — HYDROMORPHONE HYDROCHLORIDE 1 MILLIGRAM(S): 2 INJECTION INTRAMUSCULAR; INTRAVENOUS; SUBCUTANEOUS at 21:35

## 2019-04-15 RX ADMIN — SENNA PLUS 2 TABLET(S): 8.6 TABLET ORAL at 21:20

## 2019-04-15 RX ADMIN — GABAPENTIN 100 MILLIGRAM(S): 400 CAPSULE ORAL at 05:20

## 2019-04-15 RX ADMIN — HYDROMORPHONE HYDROCHLORIDE 1 MILLIGRAM(S): 2 INJECTION INTRAMUSCULAR; INTRAVENOUS; SUBCUTANEOUS at 07:20

## 2019-04-15 RX ADMIN — HYDROMORPHONE HYDROCHLORIDE 1 MILLIGRAM(S): 2 INJECTION INTRAMUSCULAR; INTRAVENOUS; SUBCUTANEOUS at 04:31

## 2019-04-15 RX ADMIN — GABAPENTIN 100 MILLIGRAM(S): 400 CAPSULE ORAL at 21:20

## 2019-04-15 RX ADMIN — HYDROMORPHONE HYDROCHLORIDE 1 MILLIGRAM(S): 2 INJECTION INTRAMUSCULAR; INTRAVENOUS; SUBCUTANEOUS at 18:59

## 2019-04-15 RX ADMIN — HYDROMORPHONE HYDROCHLORIDE 1 MILLIGRAM(S): 2 INJECTION INTRAMUSCULAR; INTRAVENOUS; SUBCUTANEOUS at 11:52

## 2019-04-15 RX ADMIN — POLYETHYLENE GLYCOL 3350 17 GRAM(S): 17 POWDER, FOR SOLUTION ORAL at 05:20

## 2019-04-15 RX ADMIN — CYCLOBENZAPRINE HYDROCHLORIDE 5 MILLIGRAM(S): 10 TABLET, FILM COATED ORAL at 11:52

## 2019-04-15 NOTE — PROGRESS NOTE ADULT - SUBJECTIVE AND OBJECTIVE BOX
Patient seen and examined at bedside this AM  Reports pain is improved – has been getting dilaudid q2  Had elevated diastolic BP yesterday, likely secondary to pain  Started on gabapentin, flexeril PRN per medicnie    Exam  NAD, awake and alert  Breathing comfortably on room air  No stridor/stertor  Flap in place; left neck incision intact – tender to palpation

## 2019-04-15 NOTE — PROGRESS NOTE ADULT - PROBLEM SELECTOR PLAN 1
Likely 2/2 progression of cancer.   Located in posterior neck, left upper back, left shoulder.   Given the extent of the cancer progression and nerve involvement likely neuropathic component.  Increased Methadone to 5mg TID.  Continue with Dilaudid 1mg IV prn.  To adjust dose based on increased requirements over the next 24 hours.   Ensure bowel regimen, last BM 4/10/19 per pt.   Colace, Senna, Miralax PRN.

## 2019-04-15 NOTE — PROGRESS NOTE ADULT - PROBLEM SELECTOR PLAN 4
MRI was obtained showing increased size of recurrence along the left FOM extending to the oral tongue, parapharyngeal space,  space and lateral oropharyngeal wall with denervation of left hemiglossus. MRI also showed increased size of nodule in the left neck extending to the left parapharyngeal space associated with the left C4 nerve root/trunk. Planned for biopsy sometime next week - based on which will determine need for chemotherapy.   Management per ENT/Oncology teams.

## 2019-04-15 NOTE — PROGRESS NOTE ADULT - ASSESSMENT
A/P: 63M with history of composite resection and free flap reconstruction in April 2018 admitted for pain and suspected recurrence  -pain control per palliative, appreciate recommendations  -appreciate medicine recommendations  -continue home medications  -aggressive bowel regimen (made standing)  -OOB  -diet as tolerated: ensure  -will d/w attending

## 2019-04-15 NOTE — PROGRESS NOTE ADULT - SUBJECTIVE AND OBJECTIVE BOX
CHIEF COMPLAINT: Patient is a 63y old  male who presents with a chief complaint of Pain control (15 Apr 2019 06:41)      SUBJECTIVE / OVERNIGHT EVENTS:        MEDICATIONS  (STANDING):  chlorhexidine 0.12% Liquid 15 milliLiter(s) Swish and Spit two times a day  dextrose 5% + sodium chloride 0.45%. 1000 milliLiter(s) (100 mL/Hr) IV Continuous <Continuous>  docusate sodium 100 milliGRAM(s) Oral three times a day  gabapentin 100 milliGRAM(s) Oral three times a day  influenza   Vaccine 0.5 milliLiter(s) IntraMuscular once  methadone    Tablet 5 milliGRAM(s) Oral every 12 hours  nebivolol 20 milliGRAM(s) Oral at bedtime  polyethylene glycol 3350 17 Gram(s) Oral two times a day  senna 2 Tablet(s) Oral at bedtime    MEDICATIONS  (PRN):  acetaminophen   Tablet .. 650 milliGRAM(s) Oral every 6 hours PRN Mild Pain (1 - 3)  cyclobenzaprine 5 milliGRAM(s) Oral three times a day PRN Muscle Spasm  diazepam    Tablet 5 milliGRAM(s) Oral two times a day PRN anxiety  HYDROmorphone  Injectable 1 milliGRAM(s) IV Push every 2 hours PRN Severe Pain (7 - 10)  ondansetron Injectable 4 milliGRAM(s) IV Push every 8 hours PRN Nausea and/or Vomiting      VITALS:  T(F): 98.1 (04-15-19 @ 09:18), Max: 98.8 (04-14-19 @ 22:00)  HR: 62 (04-15-19 @ 11:50) (51 - 62)  BP: 151/102 (04-15-19 @ 11:50) (135/82 - 163/108)  RR: 16 (04-15-19 @ 11:50) (16 - 18)  SpO2: 98% (04-15-19 @ 11:50)      CAPILLARY BLOOD GLUCOSE    Output     I&O's Summary  T(F): 98.1 (04-15-19 @ 09:18), Max: 98.8 (04-14-19 @ 22:00)  HR: 62 (04-15-19 @ 11:50) (51 - 62)  BP: 151/102 (04-15-19 @ 11:50) (135/82 - 163/108)  RR: 16 (04-15-19 @ 11:50) (16 - 18)  SpO2: 98% (04-15-19 @ 11:50)    PHYSICAL EXAM:  GENERAL: NAD, well-developed, slightly dysarthric speech  HEAD:  L cheek/jaw swelling  EYES: EOMI  NECK: Supple, No JVD  CHEST/LUNG: nonlabored breathing  HEART: nl S1/S2  ABDOMEN: nondistended, soft  EXTREMITIES:  no LE edema  PSYCH: alert, answering questions appropriately  NEUROLOGY: non-focal  SKIN: No rashes noted    LABS:                        MICROBIOLOGY:        RADIOLOGY & ADDITIONAL TESTS:    Imaging Personally Reviewed:    < from: Xray Cervical Spine Complete 4 or 5 View (04.05.19 @ 13:30) >    IMPRESSION:  Patient again noted to be status post mandibular reconstruction. Multiple   surgical clips again noted in bilateral submandibular and neck regions.   Coiled thin electrodewiring also noted in left submandibular region.    No compression fractures spondylolistheses or evidence for atlantoaxial   or subaxial dynamic instability.     Significantly narrowed C5-C6 and posterior C4-C5 disc spaces. Preserved   remaining intervertebral disc spaces. Mild multilevel posterior facet   arthrosis.    Slightly narrowed right C4-C5 neural foramen. Evaluation of left   neuroforaminal patency slightly limited by suboptimal positioning.    Prevertebral soft tissues and predental interval within normal limits.   Atlantoaxial and posterior facet alignment maintained.  Intact odontoid.  No discrete lytic or blastic lesions.    Clear visualized lung apices and midline normal caliber trachea.    < end of copied text >        [x] Care Discussed with Consultants/Other Providers:      Kenyetta Batista M.D.  Hospitalist  Pager [phone]

## 2019-04-15 NOTE — PROGRESS NOTE ADULT - SUBJECTIVE AND OBJECTIVE BOX
SUBJECTIVE AND OBJECTIVE:  Patient reporting poorly controlled head and neck pain.    INTERVAL HPI/OVERNIGHT EVENTS:  Pain, as above.    DNR on chart:   Allergies    No Known Allergies    Intolerances    MEDICATIONS  (STANDING):  chlorhexidine 0.12% Liquid 15 milliLiter(s) Swish and Spit two times a day  dextrose 5% + sodium chloride 0.45%. 1000 milliLiter(s) (100 mL/Hr) IV Continuous <Continuous>  docusate sodium 100 milliGRAM(s) Oral three times a day  gabapentin 100 milliGRAM(s) Oral three times a day  influenza   Vaccine 0.5 milliLiter(s) IntraMuscular once  methadone    Tablet 5 milliGRAM(s) Oral every 8 hours  nebivolol 20 milliGRAM(s) Oral at bedtime  polyethylene glycol 3350 17 Gram(s) Oral two times a day  senna 2 Tablet(s) Oral at bedtime    MEDICATIONS  (PRN):  acetaminophen   Tablet .. 650 milliGRAM(s) Oral every 6 hours PRN Mild Pain (1 - 3)  cyclobenzaprine 5 milliGRAM(s) Oral three times a day PRN Muscle Spasm  diazepam    Tablet 5 milliGRAM(s) Oral two times a day PRN anxiety  HYDROmorphone  Injectable 1 milliGRAM(s) IV Push every 2 hours PRN Severe Pain (7 - 10)  ondansetron Injectable 4 milliGRAM(s) IV Push every 8 hours PRN Nausea and/or Vomiting      ITEMS UNCHECKED ARE NOT PRESENT    PRESENT SYMPTOMS: [ ]Unable to obtain due to poor mentation   Source if other than patient:  [ ]Family   [ ]Team     Pain (Impact on QOL):  Unable to ADLS  Location: Head and Neck  Minimal acceptable level (0-10 scale): 6          Aggravating factors: movement  Quality: sharp  Radiation: to left shoulder  Severity (0-10 scale):     Timing: intermittent    Dyspnea:                           [ ]Mild [ ]Moderate [ ]Severe  Anxiety:                             [x ]Mild [ ]Moderate [ ]Severe  Fatigue:                             [x ]Mild [ ]Moderate [ ]Severe  Nausea:                             [ ]Mild [ ]Moderate [ ]Severe  Loss of appetite:              [x ]Mild [ ]Moderate [ ]Severe  Constipation:                    [ ]Mild [ ]Moderate [ ]Severe  Grief Present                    [ ] Yes  [x ] No   PAIN AD Score:	  [email or web address].[place].[email or web address]/cog/painad.pdf (Ctrl + left click to view)    Other Symptoms:  [x ]All other review of systems negative     Karnofsky Performance Score/Palliative Performance Status Version 2:     60    %    http://palliative.info/resource_material/[email or web address].pdf  PHYSICAL EXAM:  Vital Signs Last 24 Hrs  T(C): 36.2 (16 Apr 2019 10:21), Max: 36.7 (15 Apr 2019 18:00)  T(F): 97.2 (16 Apr 2019 10:21), Max: 98.1 (16 Apr 2019 05:59)  HR: 67 (16 Apr 2019 10:21) (51 - 67)  BP: 153/80 (16 Apr 2019 10:21) (119/82 - 165/95)  BP(mean): --  RR: 17 (16 Apr 2019 10:21) (16 - 18)  SpO2: 100% (16 Apr 2019 10:21) (93% - 100%) I&O's Summary    15 Apr 2019 07:01  -  16 Apr 2019 07:00  --------------------------------------------------------  IN: 2880 mL / OUT: 0 mL / NET: 2880 mL     GENERAL:  [x ]Alert  [x ]Oriented x  3 [ ]Lethargic  [ ]Cachexia  [ ]Unarousable  [ ]Verbal  [ ]Non-Verbal  Behavioral:   [ ] Anxiety  [ ] Delirium [ ] Agitation [x ] Other  HEENT:  [ ]Normal   [x ]Dry mouth   [ ]ET Tube/Trach  [ ]Oral lesions  PULMONARY:   [ x]Clear [ ]Tachypnea  [ ]Audible excessive secretions   [ ]Rhonchi        [ ]Right [ ]Left [ ]Bilateral  [ ]Crackles        [ ]Right [ ]Left [ ]Bilateral  [ ]Wheezing     [ ]Right [ ]Left [ ]Bilateral  CARDIOVASCULAR:    [x ]Regular [ ]Irregular [ ]Tachy  [ ]Kishore [ ]Murmur [ ]Other  GASTROINTESTINAL:  [x ]Soft  [ ]Distended   [x ]+BS  [ x]Non tender [ ]Tender  [ ]PEG [ ]OGT/ NGT   Last BM:    GENITOURINARY:  [ x]Normal [ ] Incontinent   [ ]Oliguria/Anuria   [ ]Sprague  MUSCULOSKELETAL:   [ ]Normal   [x ]Weakness  [ ]Bed/Wheelchair bound [ ]Edema  NEUROLOGIC:   [x ]No focal deficits  [ ] Cognitive impairment  [ ] Dysphagia [ ]Dysarthria [ ] Paresis [ ]Other   SKIN:   [x ]Normal   [ ]Pressure ulcer(s)  [ ]Rash    CRITICAL CARE:  [ ] Shock Present  [ ]Septic [ ]Cardiogenic [ ]Neurologic [ ]Hypovolemic  [ ]  Vasopressors [ ]  Inotropes   [ ] Respiratory failure present  [ ] Acute  [ ] Chronic [ ] Hypoxic  [ ] Hypercarbic [ ] Other  [ ] Other organ failure     LABS:            RADIOLOGY & ADDITIONAL STUDIES:    Protein Calorie Malnutrition Present: [ ] yes [ ] no  [ ] PPSV2 < or = 30%  [ ] significant weight loss [ ] poor nutritional intake [ ] anasarca [ ] catabolic state Albumin, Serum: 4.5 g/dL (04-10-19 @ 18:54)  Artificial Nutrition [ ]     REFERRALS:   [ ]Chaplaincy  [ ] Hospice  [ ]Child Life  [ ]Social Work  [ ]Case management [ ]Holistic Therapy   Goals of Care Document:

## 2019-04-15 NOTE — PROGRESS NOTE ADULT - ASSESSMENT
63M with PMH of SCC s/p resection in 4/2018 with adjuvant RT. Patient returned to OR for release of lower lip scar and mucosal graft on 3/7/19.  Admitted for pain and suspected recurrence.  Persistently hypertensive in setting uncontrolled pain    Problem/Recommendation - 1:  Problem: Pain. Recommendation: pain still not adequately controlled  methadone 5 bid started 4/11  getting very frequent dilaudid IV  f/u with PC tomorrow re possible titration methadone  consider add Neurontin 100 tid for possible neuropathic component and flexeril 5 tid for muscle spasm  hot packs to neck area, could also try lidocaine patch.    Problem/Recommendation - 2:  ·  Problem: Essential hypertension.  Recommendation: suspect largely d/t uncontrolled pain   Bystolic titrated up to 20 in past 1-2 weeks, pt with no headache, chest pain.    Would aim for better pain control in short term  If needed can add second agent such as low dose Norvasc 2.5 or PO hydralazine.    Problem/Recommendation - 3:  ·  Problem: Squamous cell carcinoma of oral cavity.  Recommendation: suspected recurrence, management per ENT.     Problem/Recommendation - 4:  ·  Problem: Drug-induced constipation.  Recommendation:   aggressive bowel regimen  colace tid, senna 2hs, Miralax bid until BM;  lactulose PRN.

## 2019-04-15 NOTE — PROGRESS NOTE ADULT - PROBLEM SELECTOR PLAN 5
Palliative consulted to assist with symptom management. Symptoms better controlled on current regimen. All symptoms addressed and medications were recommended accordingly (SEE ABOVE). To adjust pain medication regimen based on additional requirements over the next 24 hours. Palliative to continue to follow for symptom management.

## 2019-04-16 PROCEDURE — 99233 SBSQ HOSP IP/OBS HIGH 50: CPT

## 2019-04-16 RX ADMIN — HYDROMORPHONE HYDROCHLORIDE 1 MILLIGRAM(S): 2 INJECTION INTRAMUSCULAR; INTRAVENOUS; SUBCUTANEOUS at 04:01

## 2019-04-16 RX ADMIN — HYDROMORPHONE HYDROCHLORIDE 1 MILLIGRAM(S): 2 INJECTION INTRAMUSCULAR; INTRAVENOUS; SUBCUTANEOUS at 13:54

## 2019-04-16 RX ADMIN — Medication 100 MILLIGRAM(S): at 06:00

## 2019-04-16 RX ADMIN — HYDROMORPHONE HYDROCHLORIDE 1 MILLIGRAM(S): 2 INJECTION INTRAMUSCULAR; INTRAVENOUS; SUBCUTANEOUS at 18:16

## 2019-04-16 RX ADMIN — CYCLOBENZAPRINE HYDROCHLORIDE 5 MILLIGRAM(S): 10 TABLET, FILM COATED ORAL at 23:22

## 2019-04-16 RX ADMIN — Medication 100 MILLIGRAM(S): at 13:34

## 2019-04-16 RX ADMIN — METHADONE HYDROCHLORIDE 5 MILLIGRAM(S): 40 TABLET ORAL at 07:53

## 2019-04-16 RX ADMIN — HYDROMORPHONE HYDROCHLORIDE 1 MILLIGRAM(S): 2 INJECTION INTRAMUSCULAR; INTRAVENOUS; SUBCUTANEOUS at 15:57

## 2019-04-16 RX ADMIN — METHADONE HYDROCHLORIDE 5 MILLIGRAM(S): 40 TABLET ORAL at 00:29

## 2019-04-16 RX ADMIN — HYDROMORPHONE HYDROCHLORIDE 1 MILLIGRAM(S): 2 INJECTION INTRAMUSCULAR; INTRAVENOUS; SUBCUTANEOUS at 04:16

## 2019-04-16 RX ADMIN — HYDROMORPHONE HYDROCHLORIDE 1 MILLIGRAM(S): 2 INJECTION INTRAMUSCULAR; INTRAVENOUS; SUBCUTANEOUS at 06:37

## 2019-04-16 RX ADMIN — HYDROMORPHONE HYDROCHLORIDE 1 MILLIGRAM(S): 2 INJECTION INTRAMUSCULAR; INTRAVENOUS; SUBCUTANEOUS at 20:43

## 2019-04-16 RX ADMIN — METHADONE HYDROCHLORIDE 5 MILLIGRAM(S): 40 TABLET ORAL at 15:40

## 2019-04-16 RX ADMIN — GABAPENTIN 100 MILLIGRAM(S): 400 CAPSULE ORAL at 23:20

## 2019-04-16 RX ADMIN — HYDROMORPHONE HYDROCHLORIDE 1 MILLIGRAM(S): 2 INJECTION INTRAMUSCULAR; INTRAVENOUS; SUBCUTANEOUS at 17:56

## 2019-04-16 RX ADMIN — GABAPENTIN 100 MILLIGRAM(S): 400 CAPSULE ORAL at 06:00

## 2019-04-16 RX ADMIN — HYDROMORPHONE HYDROCHLORIDE 1 MILLIGRAM(S): 2 INJECTION INTRAMUSCULAR; INTRAVENOUS; SUBCUTANEOUS at 21:13

## 2019-04-16 RX ADMIN — CYCLOBENZAPRINE HYDROCHLORIDE 5 MILLIGRAM(S): 10 TABLET, FILM COATED ORAL at 06:00

## 2019-04-16 RX ADMIN — Medication 100 MILLIGRAM(S): at 23:23

## 2019-04-16 RX ADMIN — CHLORHEXIDINE GLUCONATE 15 MILLILITER(S): 213 SOLUTION TOPICAL at 06:00

## 2019-04-16 RX ADMIN — POLYETHYLENE GLYCOL 3350 17 GRAM(S): 17 POWDER, FOR SOLUTION ORAL at 06:00

## 2019-04-16 RX ADMIN — GABAPENTIN 100 MILLIGRAM(S): 400 CAPSULE ORAL at 13:34

## 2019-04-16 RX ADMIN — HYDROMORPHONE HYDROCHLORIDE 1 MILLIGRAM(S): 2 INJECTION INTRAMUSCULAR; INTRAVENOUS; SUBCUTANEOUS at 23:51

## 2019-04-16 RX ADMIN — HYDROMORPHONE HYDROCHLORIDE 1 MILLIGRAM(S): 2 INJECTION INTRAMUSCULAR; INTRAVENOUS; SUBCUTANEOUS at 10:30

## 2019-04-16 RX ADMIN — HYDROMORPHONE HYDROCHLORIDE 1 MILLIGRAM(S): 2 INJECTION INTRAMUSCULAR; INTRAVENOUS; SUBCUTANEOUS at 23:21

## 2019-04-16 RX ADMIN — HYDROMORPHONE HYDROCHLORIDE 1 MILLIGRAM(S): 2 INJECTION INTRAMUSCULAR; INTRAVENOUS; SUBCUTANEOUS at 06:52

## 2019-04-16 RX ADMIN — HYDROMORPHONE HYDROCHLORIDE 1 MILLIGRAM(S): 2 INJECTION INTRAMUSCULAR; INTRAVENOUS; SUBCUTANEOUS at 13:34

## 2019-04-16 RX ADMIN — SODIUM CHLORIDE 100 MILLILITER(S): 9 INJECTION, SOLUTION INTRAVENOUS at 10:13

## 2019-04-16 RX ADMIN — HYDROMORPHONE HYDROCHLORIDE 1 MILLIGRAM(S): 2 INJECTION INTRAMUSCULAR; INTRAVENOUS; SUBCUTANEOUS at 10:13

## 2019-04-16 RX ADMIN — SENNA PLUS 2 TABLET(S): 8.6 TABLET ORAL at 23:21

## 2019-04-16 RX ADMIN — NEBIVOLOL HYDROCHLORIDE 20 MILLIGRAM(S): 5 TABLET ORAL at 23:21

## 2019-04-16 RX ADMIN — POLYETHYLENE GLYCOL 3350 17 GRAM(S): 17 POWDER, FOR SOLUTION ORAL at 17:56

## 2019-04-16 RX ADMIN — HYDROMORPHONE HYDROCHLORIDE 1 MILLIGRAM(S): 2 INJECTION INTRAMUSCULAR; INTRAVENOUS; SUBCUTANEOUS at 15:41

## 2019-04-16 RX ADMIN — CHLORHEXIDINE GLUCONATE 15 MILLILITER(S): 213 SOLUTION TOPICAL at 17:56

## 2019-04-16 NOTE — PROGRESS NOTE ADULT - PROBLEM SELECTOR PLAN 1
Likely 2/2 progression of cancer.   Located in posterior neck, left upper back, left shoulder.   Given the extent of the cancer progression and nerve involvement likely neuropathic component.  Increased Methadone to 5mg TID.  Continue with Dilaudid 1mg IV prn.  Hesitant to increase Methadone at this point, given that patient is starting to feel fatigued and an increase in Methadone might precipitate further lethargy.   Colace, Senna, Miralax PRN.

## 2019-04-16 NOTE — PROGRESS NOTE ADULT - SUBJECTIVE AND OBJECTIVE BOX
Patient seen and examined at bedside this AM  Reports pain is improved – has been getting dilaudid q2 with PRN tylenol IV  Following up recs from palliative    Exam  NAD, awake and alert  Breathing comfortably on room air  No stridor/stertor  Flap in place; left neck incision intact – tender to palpation    A/P: 63M with history of composite resection and free flap reconstruction in April 2018 admitted for pain and suspected recurrence  -pain control per palliative, appreciate recommendations  -appreciate medicine recommendations  -continue home medications  -aggressive bowel regimen (made standing)  -OOB  -diet as tolerated: ensure  -will d/w attending

## 2019-04-16 NOTE — PROGRESS NOTE ADULT - SUBJECTIVE AND OBJECTIVE BOX
SUBJECTIVE AND OBJECTIVE:  Patient reporting poorly controlled head and neck pain.  Intermittently lethargic.  INTERVAL HPI/OVERNIGHT EVENTS:  Pain, as above.    DNR on chart:   Allergies    No Known Allergies    Intolerances    MEDICATIONS  (STANDING):  chlorhexidine 0.12% Liquid 15 milliLiter(s) Swish and Spit two times a day  dextrose 5% + sodium chloride 0.45%. 1000 milliLiter(s) (100 mL/Hr) IV Continuous <Continuous>  docusate sodium 100 milliGRAM(s) Oral three times a day  gabapentin 100 milliGRAM(s) Oral three times a day  influenza   Vaccine 0.5 milliLiter(s) IntraMuscular once  methadone    Tablet 5 milliGRAM(s) Oral every 8 hours  nebivolol 20 milliGRAM(s) Oral at bedtime  polyethylene glycol 3350 17 Gram(s) Oral two times a day  senna 2 Tablet(s) Oral at bedtime    MEDICATIONS  (PRN):  acetaminophen   Tablet .. 650 milliGRAM(s) Oral every 6 hours PRN Mild Pain (1 - 3)  cyclobenzaprine 5 milliGRAM(s) Oral three times a day PRN Muscle Spasm  diazepam    Tablet 5 milliGRAM(s) Oral two times a day PRN anxiety  HYDROmorphone  Injectable 1 milliGRAM(s) IV Push every 2 hours PRN Severe Pain (7 - 10)  ondansetron Injectable 4 milliGRAM(s) IV Push every 8 hours PRN Nausea and/or Vomiting      ITEMS UNCHECKED ARE NOT PRESENT    PRESENT SYMPTOMS: [ ]Unable to obtain due to poor mentation   Source if other than patient:  [ ]Family   [ ]Team     Pain (Impact on QOL):  Unable to ADLS  Location: Head and Neck  Minimal acceptable level (0-10 scale): 6          Aggravating factors: movement  Quality: sharp  Radiation: to left shoulder  Severity (0-10 scale):     Timing: intermittent    Dyspnea:                           [ ]Mild [ ]Moderate [ ]Severe  Anxiety:                             [x ]Mild [ ]Moderate [ ]Severe  Fatigue:                             [x ]Mild [ ]Moderate [ ]Severe  Nausea:                             [ ]Mild [ ]Moderate [ ]Severe  Loss of appetite:              [x ]Mild [ ]Moderate [ ]Severe  Constipation:                    [ ]Mild [ ]Moderate [ ]Severe  Grief Present                    [ ] Yes  [x ] No   PAIN AD Score:	  [email or web address] (Ctrl + left click to view)    Other Symptoms:  [x ]All other review of systems negative     Karnofsky Performance Score/Palliative Performance Status Version 2:     60    %    http://palliative.info/resource_material/[email or web address].pdf  PHYSICAL EXAM:  Vital Signs Last 24 Hrs  T(C): 36.2 (16 Apr 2019 10:21), Max: 36.7 (15 Apr 2019 18:00)  T(F): 97.2 (16 Apr 2019 10:21), Max: 98.1 (16 Apr 2019 05:59)  HR: 67 (16 Apr 2019 10:21) (51 - 67)  BP: 153/80 (16 Apr 2019 10:21) (119/82 - 165/95)  BP(mean): --  RR: 17 (16 Apr 2019 10:21) (16 - 18)  SpO2: 100% (16 Apr 2019 10:21) (93% - 100%) I&O's Summary    15 Apr 2019 07:01  -  16 Apr 2019 07:00  --------------------------------------------------------  IN: 2880 mL / OUT: 0 mL / NET: 2880 mL     GENERAL:  [x ]Alert  [x ]Oriented x  3 [ ]Lethargic  [ ]Cachexia  [ ]Unarousable  [ ]Verbal  [ ]Non-Verbal  Behavioral:   [ ] Anxiety  [ ] Delirium [ ] Agitation [x ] Other  HEENT:  [ ]Normal   [x ]Dry mouth   [ ]ET Tube/Trach  [ ]Oral lesions  PULMONARY:   [ x]Clear [ ]Tachypnea  [ ]Audible excessive secretions   [ ]Rhonchi        [ ]Right [ ]Left [ ]Bilateral  [ ]Crackles        [ ]Right [ ]Left [ ]Bilateral  [ ]Wheezing     [ ]Right [ ]Left [ ]Bilateral  CARDIOVASCULAR:    [x ]Regular [ ]Irregular [ ]Tachy  [ ]Kishore [ ]Murmur [ ]Other  GASTROINTESTINAL:  [x ]Soft  [ ]Distended   [x ]+BS  [ x]Non tender [ ]Tender  [ ]PEG [ ]OGT/ NGT   Last BM:    GENITOURINARY:  [ x]Normal [ ] Incontinent   [ ]Oliguria/Anuria   [ ]Sprague  MUSCULOSKELETAL:   [ ]Normal   [x ]Weakness  [ ]Bed/Wheelchair bound [ ]Edema  NEUROLOGIC:   [x ]No focal deficits  [ ] Cognitive impairment  [ ] Dysphagia [ ]Dysarthria [ ] Paresis [ ]Other   SKIN:   [x ]Normal   [ ]Pressure ulcer(s)  [ ]Rash    CRITICAL CARE:  [ ] Shock Present  [ ]Septic [ ]Cardiogenic [ ]Neurologic [ ]Hypovolemic  [ ]  Vasopressors [ ]  Inotropes   [ ] Respiratory failure present  [ ] Acute  [ ] Chronic [ ] Hypoxic  [ ] Hypercarbic [ ] Other  [ ] Other organ failure     LABS:            RADIOLOGY & ADDITIONAL STUDIES:    Protein Calorie Malnutrition Present: [ ] yes [ ] no  [ ] PPSV2 < or = 30%  [ ] significant weight loss [ ] poor nutritional intake [ ] anasarca [ ] catabolic state Albumin, Serum: 4.5 g/dL (04-10-19 @ 18:54)  Artificial Nutrition [ ]     REFERRALS:   [ ]Chaplaincy  [ ] Hospice  [ ]Child Life  [ ]Social Work  [ ]Case management [ ]Holistic Therapy   Goals of Care Document:

## 2019-04-16 NOTE — PROGRESS NOTE ADULT - ASSESSMENT
63M with PMH of SCC s/p resection in 4/2018 with adjuvant RT. Patient returned to OR for release of lower lip scar and mucosal graft on 3/7/19.  Admitted for pain and suspected recurrence.  Persistently hypertensive in setting uncontrolled pain    Problem/Recommendation - 1:  Problem: Pain. Recommendation:  methadone 5 bid started 4/11  appreciate palliative care recommendations for management of pain  on cyclobenzaprine for muscle spasms  hot packs to neck area, could also try lidocaine patch    Problem/Recommendation - 2:  ·  Problem: Essential hypertension.  Recommendation: suspect largely d/t uncontrolled pain   BP at goal  Cont Bystolic 20 mg daily    Problem/Recommendation - 3:  ·  Problem: Squamous cell carcinoma of oral cavity.  Recommendation: suspected recurrence, management per ENT  medically optimized for procedure tomorrow - DL with biopsy  RCRI 0 - at low risk for guy-operative cardiac events    Problem/Recommendation - 4:  ·  Problem: Drug-induced constipation.  Recommendation:   bowel regimen to ensure having regular BM's  colace tid, senna 2hs, Miralax bid until BM;  lactulose PRN.

## 2019-04-16 NOTE — PROGRESS NOTE ADULT - SUBJECTIVE AND OBJECTIVE BOX
CHIEF COMPLAINT: Patient is a 63y old  male who presents with a chief complaint of Pain control (16 Apr 2019 07:45)      SUBJECTIVE / OVERNIGHT EVENTS:    Pain being managed by palliative care. Denies SOB. Denies CP.    MEDICATIONS  (STANDING):  chlorhexidine 0.12% Liquid 15 milliLiter(s) Swish and Spit two times a day  dextrose 5% + sodium chloride 0.45%. 1000 milliLiter(s) (100 mL/Hr) IV Continuous <Continuous>  docusate sodium 100 milliGRAM(s) Oral three times a day  gabapentin 100 milliGRAM(s) Oral three times a day  influenza   Vaccine 0.5 milliLiter(s) IntraMuscular once  methadone    Tablet 5 milliGRAM(s) Oral every 8 hours  nebivolol 20 milliGRAM(s) Oral at bedtime  polyethylene glycol 3350 17 Gram(s) Oral two times a day  senna 2 Tablet(s) Oral at bedtime    MEDICATIONS  (PRN):  acetaminophen   Tablet .. 650 milliGRAM(s) Oral every 6 hours PRN Mild Pain (1 - 3)  cyclobenzaprine 5 milliGRAM(s) Oral three times a day PRN Muscle Spasm  diazepam    Tablet 5 milliGRAM(s) Oral two times a day PRN anxiety  HYDROmorphone  Injectable 1 milliGRAM(s) IV Push every 2 hours PRN Severe Pain (7 - 10)  ondansetron Injectable 4 milliGRAM(s) IV Push every 8 hours PRN Nausea and/or Vomiting      VITALS:  T(F): 97.2 (04-16-19 @ 10:21), Max: 98.1 (04-16-19 @ 05:59)  HR: 67 (04-16-19 @ 10:21) (51 - 67)  BP: 153/80 (04-16-19 @ 10:21) (119/82 - 165/95)  RR: 17 (04-16-19 @ 10:21) (16 - 18)  SpO2: 100% (04-16-19 @ 10:21)      CAPILLARY BLOOD GLUCOSE    Output     I&O's Summary  T(F): 97.2 (04-16-19 @ 10:21), Max: 98.1 (04-16-19 @ 05:59)  HR: 67 (04-16-19 @ 10:21) (51 - 67)  BP: 153/80 (04-16-19 @ 10:21) (119/82 - 165/95)  RR: 17 (04-16-19 @ 10:21) (16 - 18)  SpO2: 100% (04-16-19 @ 10:21)    PHYSICAL EXAM:  GENERAL: NAD, well-developed, muffled speech  HEAD:  L cheek/jaw swelling  EYES: EOMI  NECK: Supple, No JVD  CHEST/LUNG: nonlabored breathing  HEART: nl S1/S2  ABDOMEN: nondistended, soft  EXTREMITIES:  no LE edema  PSYCH: alert, answering questions appropriately  NEUROLOGY: non-focal  SKIN: No rashes noted    LABS:                        MICROBIOLOGY:        RADIOLOGY & ADDITIONAL TESTS:    Imaging Personally Reviewed:    < from: CT Neck Soft Tissue w/ IV Cont (11.21.18 @ 08:53) >  IMPRESSION:    Unchanged postsurgical 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    < end of copied text >        [x] Care Discussed with Consultants/Other Providers:      Kenyetta Batista M.D.  Hospitalist  Pager [phone]

## 2019-04-17 PROCEDURE — 99233 SBSQ HOSP IP/OBS HIGH 50: CPT

## 2019-04-17 RX ORDER — METHADONE HYDROCHLORIDE 40 MG/1
7.5 TABLET ORAL EVERY 8 HOURS
Qty: 0 | Refills: 0 | Status: DISCONTINUED | OUTPATIENT
Start: 2019-04-17 | End: 2019-04-18

## 2019-04-17 RX ORDER — GABAPENTIN 400 MG/1
200 CAPSULE ORAL EVERY 8 HOURS
Qty: 0 | Refills: 0 | Status: DISCONTINUED | OUTPATIENT
Start: 2019-04-17 | End: 2019-04-18

## 2019-04-17 RX ORDER — SODIUM CHLORIDE 9 MG/ML
1000 INJECTION, SOLUTION INTRAVENOUS
Qty: 0 | Refills: 0 | Status: DISCONTINUED | OUTPATIENT
Start: 2019-04-17 | End: 2019-04-18

## 2019-04-17 RX ADMIN — METHADONE HYDROCHLORIDE 7.5 MILLIGRAM(S): 40 TABLET ORAL at 23:32

## 2019-04-17 RX ADMIN — HYDROMORPHONE HYDROCHLORIDE 1 MILLIGRAM(S): 2 INJECTION INTRAMUSCULAR; INTRAVENOUS; SUBCUTANEOUS at 19:38

## 2019-04-17 RX ADMIN — HYDROMORPHONE HYDROCHLORIDE 1 MILLIGRAM(S): 2 INJECTION INTRAMUSCULAR; INTRAVENOUS; SUBCUTANEOUS at 07:33

## 2019-04-17 RX ADMIN — POLYETHYLENE GLYCOL 3350 17 GRAM(S): 17 POWDER, FOR SOLUTION ORAL at 19:38

## 2019-04-17 RX ADMIN — HYDROMORPHONE HYDROCHLORIDE 1 MILLIGRAM(S): 2 INJECTION INTRAMUSCULAR; INTRAVENOUS; SUBCUTANEOUS at 05:12

## 2019-04-17 RX ADMIN — CYCLOBENZAPRINE HYDROCHLORIDE 5 MILLIGRAM(S): 10 TABLET, FILM COATED ORAL at 07:08

## 2019-04-17 RX ADMIN — HYDROMORPHONE HYDROCHLORIDE 1 MILLIGRAM(S): 2 INJECTION INTRAMUSCULAR; INTRAVENOUS; SUBCUTANEOUS at 05:42

## 2019-04-17 RX ADMIN — POLYETHYLENE GLYCOL 3350 17 GRAM(S): 17 POWDER, FOR SOLUTION ORAL at 05:16

## 2019-04-17 RX ADMIN — METHADONE HYDROCHLORIDE 5 MILLIGRAM(S): 40 TABLET ORAL at 01:02

## 2019-04-17 RX ADMIN — SENNA PLUS 2 TABLET(S): 8.6 TABLET ORAL at 22:18

## 2019-04-17 RX ADMIN — HYDROMORPHONE HYDROCHLORIDE 1 MILLIGRAM(S): 2 INJECTION INTRAMUSCULAR; INTRAVENOUS; SUBCUTANEOUS at 08:03

## 2019-04-17 RX ADMIN — GABAPENTIN 100 MILLIGRAM(S): 400 CAPSULE ORAL at 05:12

## 2019-04-17 RX ADMIN — HYDROMORPHONE HYDROCHLORIDE 1 MILLIGRAM(S): 2 INJECTION INTRAMUSCULAR; INTRAVENOUS; SUBCUTANEOUS at 22:17

## 2019-04-17 RX ADMIN — HYDROMORPHONE HYDROCHLORIDE 1 MILLIGRAM(S): 2 INJECTION INTRAMUSCULAR; INTRAVENOUS; SUBCUTANEOUS at 16:35

## 2019-04-17 RX ADMIN — Medication 650 MILLIGRAM(S): at 07:33

## 2019-04-17 RX ADMIN — Medication 100 MILLIGRAM(S): at 05:12

## 2019-04-17 RX ADMIN — Medication 100 MILLIGRAM(S): at 14:51

## 2019-04-17 RX ADMIN — NEBIVOLOL HYDROCHLORIDE 20 MILLIGRAM(S): 5 TABLET ORAL at 23:32

## 2019-04-17 RX ADMIN — Medication 650 MILLIGRAM(S): at 07:08

## 2019-04-17 RX ADMIN — HYDROMORPHONE HYDROCHLORIDE 1 MILLIGRAM(S): 2 INJECTION INTRAMUSCULAR; INTRAVENOUS; SUBCUTANEOUS at 19:53

## 2019-04-17 RX ADMIN — Medication 100 MILLIGRAM(S): at 22:18

## 2019-04-17 RX ADMIN — CHLORHEXIDINE GLUCONATE 15 MILLILITER(S): 213 SOLUTION TOPICAL at 19:38

## 2019-04-17 RX ADMIN — HYDROMORPHONE HYDROCHLORIDE 1 MILLIGRAM(S): 2 INJECTION INTRAMUSCULAR; INTRAVENOUS; SUBCUTANEOUS at 10:52

## 2019-04-17 RX ADMIN — METHADONE HYDROCHLORIDE 7.5 MILLIGRAM(S): 40 TABLET ORAL at 14:51

## 2019-04-17 RX ADMIN — HYDROMORPHONE HYDROCHLORIDE 1 MILLIGRAM(S): 2 INJECTION INTRAMUSCULAR; INTRAVENOUS; SUBCUTANEOUS at 01:56

## 2019-04-17 RX ADMIN — HYDROMORPHONE HYDROCHLORIDE 1 MILLIGRAM(S): 2 INJECTION INTRAMUSCULAR; INTRAVENOUS; SUBCUTANEOUS at 01:26

## 2019-04-17 RX ADMIN — HYDROMORPHONE HYDROCHLORIDE 1 MILLIGRAM(S): 2 INJECTION INTRAMUSCULAR; INTRAVENOUS; SUBCUTANEOUS at 22:47

## 2019-04-17 RX ADMIN — CHLORHEXIDINE GLUCONATE 15 MILLILITER(S): 213 SOLUTION TOPICAL at 05:12

## 2019-04-17 RX ADMIN — GABAPENTIN 200 MILLIGRAM(S): 400 CAPSULE ORAL at 16:21

## 2019-04-17 RX ADMIN — HYDROMORPHONE HYDROCHLORIDE 1 MILLIGRAM(S): 2 INJECTION INTRAMUSCULAR; INTRAVENOUS; SUBCUTANEOUS at 16:20

## 2019-04-17 RX ADMIN — GABAPENTIN 200 MILLIGRAM(S): 400 CAPSULE ORAL at 22:19

## 2019-04-17 RX ADMIN — HYDROMORPHONE HYDROCHLORIDE 1 MILLIGRAM(S): 2 INJECTION INTRAMUSCULAR; INTRAVENOUS; SUBCUTANEOUS at 11:15

## 2019-04-17 NOTE — PROGRESS NOTE ADULT - ASSESSMENT
63M with PMH of SCC s/p resection in 4/2018 with adjuvant RT. Patient returned to OR for release of lower lip scar and mucosal graft on 3/7/19.  Admitted for pain and suspected recurrence.  Persistently hypertensive in setting uncontrolled pain    Problem/Recommendation - 1:  Problem: Pain. Recommendation:  methadone being titrated by palliative care  appreciate palliative care recommendations for management of pain  on cyclobenzaprine for muscle spasms  hot packs to neck area, could also try lidocaine patch    Problem/Recommendation - 2:  ·  Problem: Essential hypertension.  Recommendation: suspect largely d/t uncontrolled pain   still experiencing periodic diastolic hypertension  Cont Bystolic 20 mg daily  will consider adjusting to alternate medication if BP remains above goal of <140/90    Problem/Recommendation - 3:  ·  Problem: Squamous cell carcinoma of oral cavity.  Recommendation: suspected recurrence, management per ENT  medically optimized for procedure tomorrow - DL with biopsy  RCRI 0 - at low risk for guy-operative cardiac events    Problem/Recommendation - 4:  ·  Problem: Drug-induced constipation.  Recommendation:   bowel regimen to ensure having regular BM's  colace tid, senna 2hs, Miralax

## 2019-04-17 NOTE — DIETITIAN INITIAL EVALUATION ADULT. - DIET TYPE
pureed/Ensure Enlive 240mls 3x daily (1050kcal, 60g protein). Glucerna Therapeutic Nutrition 240mls 3x daily (660kcals, 30g protein) Ensure Pudding 3x daily (510 kcals, 12g protein).

## 2019-04-17 NOTE — DIETITIAN INITIAL EVALUATION ADULT. - ORAL INTAKE PTA
poor/decreased appetite and po intake since Nov 2018. Endorsed "tongue numbness", physically and emotionally  inability to eat.

## 2019-04-17 NOTE — DIETITIAN INITIAL EVALUATION ADULT. - ENERGY NEEDS
Height (cm): 172.72 (10 Apr 2019 21:22) Weight (kg): 72.6 (10 Apr 2019 21:22)  BMI (kg/m2): 24.3 (10 Apr 2019 21:22) IBW: 154lbs (+/-10%) %IBW: 103%  No edema or pressure injury noted.

## 2019-04-17 NOTE — DIETITIAN INITIAL EVALUATION ADULT. - PERTINENT MEDS FT
MEDICATIONS  (STANDING):  chlorhexidine 0.12% Liquid 15 milliLiter(s) Swish and Spit two times a day  docusate sodium 100 milliGRAM(s) Oral three times a day  gabapentin   Solution 200 milliGRAM(s) Oral every 8 hours  influenza   Vaccine 0.5 milliLiter(s) IntraMuscular once  methadone   Solution 7.5 milliGRAM(s) Oral every 8 hours  nebivolol 20 milliGRAM(s) Oral at bedtime  polyethylene glycol 3350 17 Gram(s) Oral two times a day  senna 2 Tablet(s) Oral at bedtime    MEDICATIONS  (PRN):  acetaminophen   Tablet .. 650 milliGRAM(s) Oral every 6 hours PRN Mild Pain (1 - 3)  cyclobenzaprine 5 milliGRAM(s) Oral three times a day PRN Muscle Spasm  diazepam    Tablet 5 milliGRAM(s) Oral two times a day PRN anxiety  HYDROmorphone  Injectable 1 milliGRAM(s) IV Push every 2 hours PRN Severe Pain (7 - 10)  ondansetron Injectable 4 milliGRAM(s) IV Push every 8 hours PRN Nausea and/or Vomiting

## 2019-04-17 NOTE — DIETITIAN INITIAL EVALUATION ADULT. - NS AS NUTRI INTERV MEDICAL AND FOOD SUPPLEMENTS
1. Recommend Ensure Enlive 240mls 3x daily (1050kcal, 60g protein). and Will provide Hormel Vital 500 Shake x 1 per day. Recommend Ensure Enlive 240mls 3x daily (1050kcal, 60g protein). and Will provide Hormel Vital 500 Shake x 1 per day.

## 2019-04-17 NOTE — PROGRESS NOTE ADULT - SUBJECTIVE AND OBJECTIVE BOX
Patient seen and examined at bedside this AM  Reports pain is improved – has been getting dilaudid q2  Planning for OR biopsy on Thursday    Exam  NAD, awake and alert  Breathing comfortably on room air  No stridor/stertor  Flap in place; left neck incision intact – tender to palpation    A/P: 63M with history of composite resection and free flap reconstruction in April 2018 admitted for pain and suspected recurrence  -to OR tomorrow for DL, biopsy  -NPO/IVF @ midnight  -pain control per palliative, appreciate recommendations  -appreciate medicine recommendations  -continue home medications  -aggressive bowel regimen (made standing)  -OOB  -diet as tolerated: ensure  -will d/w attending

## 2019-04-17 NOTE — PROGRESS NOTE ADULT - SUBJECTIVE AND OBJECTIVE BOX
CHIEF COMPLAINT: Patient is a 63y old  male who presents with a chief complaint of Pain control (17 Apr 2019 06:03)      SUBJECTIVE / OVERNIGHT EVENTS:    Pain reasonably controlled. Denies lethargic or somnolence. Denies constipation.    MEDICATIONS  (STANDING):  chlorhexidine 0.12% Liquid 15 milliLiter(s) Swish and Spit two times a day  docusate sodium 100 milliGRAM(s) Oral three times a day  gabapentin   Solution 200 milliGRAM(s) Oral every 8 hours  influenza   Vaccine 0.5 milliLiter(s) IntraMuscular once  methadone   Solution 7.5 milliGRAM(s) Oral every 8 hours  nebivolol 20 milliGRAM(s) Oral at bedtime  polyethylene glycol 3350 17 Gram(s) Oral two times a day  senna 2 Tablet(s) Oral at bedtime    MEDICATIONS  (PRN):  acetaminophen   Tablet .. 650 milliGRAM(s) Oral every 6 hours PRN Mild Pain (1 - 3)  cyclobenzaprine 5 milliGRAM(s) Oral three times a day PRN Muscle Spasm  diazepam    Tablet 5 milliGRAM(s) Oral two times a day PRN anxiety  HYDROmorphone  Injectable 1 milliGRAM(s) IV Push every 2 hours PRN Severe Pain (7 - 10)  ondansetron Injectable 4 milliGRAM(s) IV Push every 8 hours PRN Nausea and/or Vomiting      VITALS:  T(F): 97.9 (04-17-19 @ 09:48), Max: 98.7 (04-16-19 @ 17:53)  HR: 68 (04-17-19 @ 10:50) (56 - 73)  BP: 145/89 (04-17-19 @ 10:50) (123/88 - 158/107)  RR: 18 (04-17-19 @ 09:48) (16 - 18)  SpO2: 98% (04-17-19 @ 10:50)      CAPILLARY BLOOD GLUCOSE    Output     I&O's Summary  T(F): 97.9 (04-17-19 @ 09:48), Max: 98.7 (04-16-19 @ 17:53)  HR: 68 (04-17-19 @ 10:50) (56 - 73)  BP: 145/89 (04-17-19 @ 10:50) (123/88 - 158/107)  RR: 18 (04-17-19 @ 09:48) (16 - 18)  SpO2: 98% (04-17-19 @ 10:50)    PHYSICAL EXAM:  GENERAL: NAD, well-developed  EYES: EOMI  NECK: Supple, No JVD  CHEST/LUNG: nonlabored breathing  HEART: nl S1/S2  ABDOMEN: nondistended, soft  EXTREMITIES:  no LE edema  PSYCH: alert, answering questions appropriately  NEUROLOGY: non-focal  SKIN: No rashes noted    LABS:                        MICROBIOLOGY:        RADIOLOGY & ADDITIONAL TESTS:    Imaging Personally Reviewed:    < from: Xray Cervical Spine Complete 4 or 5 View (04.05.19 @ 13:30) >      < end of copied text >      [x] Care Discussed with Consultants/Other Providers:      Kenyetta Batista M.D.  Hospitalist  Pager [phone]

## 2019-04-17 NOTE — DIETITIAN INITIAL EVALUATION ADULT. - OTHER INFO
Initial Dietitian Evaluation 2/2 to extended length of stay. 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 Admitted for pain and suspected recurrence per chart review. Currently on Pureed diet, states tolerating well. Appetite improved. Consuming PO supplement Ensure Plus (brought from home) and Hormel Shake (in-house provides 520kcal and 22g pro) between meals. Patient denies any nausea/vomiting/diarrhea/constipation or difficulty chewing and swallowing. NKFA. Not consuming PO supplement Glucerna or Ensure pudding 2/2 taste preference-recommend d/c same at this time.

## 2019-04-17 NOTE — PROGRESS NOTE ADULT - PROBLEM SELECTOR PLAN 1
Likely 2/2 progression of cancer.   Located in posterior neck, left upper back, left shoulder.   Given the extent of the cancer progression and nerve involvement likely neuropathic component.  Increase Methadone to 7.5mg TID.  Continue with Dilaudid 1mg IV prn.  Depending on patient status tomorrow, will likely attempt transition to PO Dilaudid.   Colace, Senna, Miralax PRN.

## 2019-04-17 NOTE — PROGRESS NOTE ADULT - SUBJECTIVE AND OBJECTIVE BOX
SUBJECTIVE AND OBJECTIVE:  Pain is slightly better today.  Intermittently lethargic.  INTERVAL HPI/OVERNIGHT EVENTS:  Pain, as above.    DNR on chart:   Allergies    No Known Allergies    Intolerances    MEDICATIONS  (STANDING):  chlorhexidine 0.12% Liquid 15 milliLiter(s) Swish and Spit two times a day  dextrose 5% + sodium chloride 0.45%. 1000 milliLiter(s) (100 mL/Hr) IV Continuous <Continuous>  docusate sodium 100 milliGRAM(s) Oral three times a day  gabapentin 100 milliGRAM(s) Oral three times a day  influenza   Vaccine 0.5 milliLiter(s) IntraMuscular once  methadone    Tablet 5 milliGRAM(s) Oral every 8 hours  nebivolol 20 milliGRAM(s) Oral at bedtime  polyethylene glycol 3350 17 Gram(s) Oral two times a day  senna 2 Tablet(s) Oral at bedtime    MEDICATIONS  (PRN):  acetaminophen   Tablet .. 650 milliGRAM(s) Oral every 6 hours PRN Mild Pain (1 - 3)  cyclobenzaprine 5 milliGRAM(s) Oral three times a day PRN Muscle Spasm  diazepam    Tablet 5 milliGRAM(s) Oral two times a day PRN anxiety  HYDROmorphone  Injectable 1 milliGRAM(s) IV Push every 2 hours PRN Severe Pain (7 - 10)  ondansetron Injectable 4 milliGRAM(s) IV Push every 8 hours PRN Nausea and/or Vomiting      ITEMS UNCHECKED ARE NOT PRESENT    PRESENT SYMPTOMS: [ ]Unable to obtain due to poor mentation   Source if other than patient:  [ ]Family   [ ]Team     Pain (Impact on QOL):  Unable to ADLS  Location: Head and Neck  Minimal acceptable level (0-10 scale): 6          Aggravating factors: movement  Quality: sharp  Radiation: to left shoulder  Severity (0-10 scale):     Timing: intermittent    Dyspnea:                           [ ]Mild [ ]Moderate [ ]Severe  Anxiety:                             [x ]Mild [ ]Moderate [ ]Severe  Fatigue:                             [x ]Mild [ ]Moderate [ ]Severe  Nausea:                             [ ]Mild [ ]Moderate [ ]Severe  Loss of appetite:              [x ]Mild [ ]Moderate [ ]Severe  Constipation:                    [ ]Mild [ ]Moderate [ ]Severe  Grief Present                    [ ] Yes  [x ] No   PAIN AD Score:	  [email or web address].[email or web address]/painad.pdf (Ctrl + left click to view)    Other Symptoms:  [x ]All other review of systems negative     Karnofsky Performance Score/Palliative Performance Status Version 2:     60    %    http://palliative.info/resource_material/[email or web address].pdf  PHYSICAL EXAM:  Vital Signs Last 24 Hrs  T(C): 36.2 (16 Apr 2019 10:21), Max: 36.7 (15 Apr 2019 18:00)  T(F): 97.2 (16 Apr 2019 10:21), Max: 98.1 (16 Apr 2019 05:59)  HR: 67 (16 Apr 2019 10:21) (51 - 67)  BP: 153/80 (16 Apr 2019 10:21) (119/82 - 165/95)  BP(mean): --  RR: 17 (16 Apr 2019 10:21) (16 - 18)  SpO2: 100% (16 Apr 2019 10:21) (93% - 100%) I&O's Summary    15 Apr 2019 07:01  -  16 Apr 2019 07:00  --------------------------------------------------------  IN: 2880 mL / OUT: 0 mL / NET: 2880 mL     GENERAL:  [x ]Alert  [x ]Oriented x  3 [ ]Lethargic  [ ]Cachexia  [ ]Unarousable  [ ]Verbal  [ ]Non-Verbal  Behavioral:   [ ] Anxiety  [ ] Delirium [ ] Agitation [x ] Other  HEENT:  [ ]Normal   [x ]Dry mouth   [ ]ET Tube/Trach  [ ]Oral lesions  PULMONARY:   [ x]Clear [ ]Tachypnea  [ ]Audible excessive secretions   [ ]Rhonchi        [ ]Right [ ]Left [ ]Bilateral  [ ]Crackles        [ ]Right [ ]Left [ ]Bilateral  [ ]Wheezing     [ ]Right [ ]Left [ ]Bilateral  CARDIOVASCULAR:    [x ]Regular [ ]Irregular [ ]Tachy  [ ]Kishore [ ]Murmur [ ]Other  GASTROINTESTINAL:  [x ]Soft  [ ]Distended   [x ]+BS  [ x]Non tender [ ]Tender  [ ]PEG [ ]OGT/ NGT   Last BM:    GENITOURINARY:  [ x]Normal [ ] Incontinent   [ ]Oliguria/Anuria   [ ]Sprague  MUSCULOSKELETAL:   [ ]Normal   [x ]Weakness  [ ]Bed/Wheelchair bound [ ]Edema  NEUROLOGIC:   [x ]No focal deficits  [ ] Cognitive impairment  [ ] Dysphagia [ ]Dysarthria [ ] Paresis [ ]Other   SKIN:   [x ]Normal   [ ]Pressure ulcer(s)  [ ]Rash    CRITICAL CARE:  [ ] Shock Present  [ ]Septic [ ]Cardiogenic [ ]Neurologic [ ]Hypovolemic  [ ]  Vasopressors [ ]  Inotropes   [ ] Respiratory failure present  [ ] Acute  [ ] Chronic [ ] Hypoxic  [ ] Hypercarbic [ ] Other  [ ] Other organ failure     LABS:            RADIOLOGY & ADDITIONAL STUDIES:    Protein Calorie Malnutrition Present: [ ] yes [ ] no  [ ] PPSV2 < or = 30%  [ ] significant weight loss [ ] poor nutritional intake [ ] anasarca [ ] catabolic state Albumin, Serum: 4.5 g/dL (04-10-19 @ 18:54)  Artificial Nutrition [ ]     REFERRALS:   [ ]Chaplaincy  [ ] Hospice  [ ]Child Life  [ ]Social Work  [ ]Case management [ ]Holistic Therapy   Goals of Care Document:

## 2019-04-18 ENCOUNTER — RESULT REVIEW (OUTPATIENT)
Age: 64
End: 2019-04-18

## 2019-04-18 LAB
ANION GAP SERPL CALC-SCNC: 10 MMO/L — SIGNIFICANT CHANGE UP (ref 7–14)
APTT BLD: 29.3 SEC — SIGNIFICANT CHANGE UP (ref 27.5–36.3)
BUN SERPL-MCNC: 14 MG/DL — SIGNIFICANT CHANGE UP (ref 7–23)
CALCIUM SERPL-MCNC: 9.6 MG/DL — SIGNIFICANT CHANGE UP (ref 8.4–10.5)
CHLORIDE SERPL-SCNC: 93 MMOL/L — LOW (ref 98–107)
CO2 SERPL-SCNC: 32 MMOL/L — HIGH (ref 22–31)
CREAT SERPL-MCNC: 0.76 MG/DL — SIGNIFICANT CHANGE UP (ref 0.5–1.3)
GLUCOSE SERPL-MCNC: 94 MG/DL — SIGNIFICANT CHANGE UP (ref 70–99)
HCT VFR BLD CALC: 43.6 % — SIGNIFICANT CHANGE UP (ref 39–50)
HGB BLD-MCNC: 14.4 G/DL — SIGNIFICANT CHANGE UP (ref 13–17)
INR BLD: 1.02 — SIGNIFICANT CHANGE UP (ref 0.88–1.17)
MAGNESIUM SERPL-MCNC: 2.1 MG/DL — SIGNIFICANT CHANGE UP (ref 1.6–2.6)
MCHC RBC-ENTMCNC: 30.3 PG — SIGNIFICANT CHANGE UP (ref 27–34)
MCHC RBC-ENTMCNC: 33 % — SIGNIFICANT CHANGE UP (ref 32–36)
MCV RBC AUTO: 91.8 FL — SIGNIFICANT CHANGE UP (ref 80–100)
NRBC # FLD: 0 K/UL — SIGNIFICANT CHANGE UP (ref 0–0)
PHOSPHATE SERPL-MCNC: 4.1 MG/DL — SIGNIFICANT CHANGE UP (ref 2.5–4.5)
PLATELET # BLD AUTO: 234 K/UL — SIGNIFICANT CHANGE UP (ref 150–400)
PMV BLD: 9.6 FL — SIGNIFICANT CHANGE UP (ref 7–13)
POTASSIUM SERPL-MCNC: 4.1 MMOL/L — SIGNIFICANT CHANGE UP (ref 3.5–5.3)
POTASSIUM SERPL-SCNC: 4.1 MMOL/L — SIGNIFICANT CHANGE UP (ref 3.5–5.3)
PROTHROM AB SERPL-ACNC: 11.3 SEC — SIGNIFICANT CHANGE UP (ref 9.8–13.1)
RBC # BLD: 4.75 M/UL — SIGNIFICANT CHANGE UP (ref 4.2–5.8)
RBC # FLD: 12.7 % — SIGNIFICANT CHANGE UP (ref 10.3–14.5)
SODIUM SERPL-SCNC: 135 MMOL/L — SIGNIFICANT CHANGE UP (ref 135–145)
WBC # BLD: 7.55 K/UL — SIGNIFICANT CHANGE UP (ref 3.8–10.5)
WBC # FLD AUTO: 7.55 K/UL — SIGNIFICANT CHANGE UP (ref 3.8–10.5)

## 2019-04-18 PROCEDURE — 88305 TISSUE EXAM BY PATHOLOGIST: CPT | Mod: 26

## 2019-04-18 PROCEDURE — 88331 PATH CONSLTJ SURG 1 BLK 1SPC: CPT | Mod: 26

## 2019-04-18 RX ORDER — METHADONE HYDROCHLORIDE 40 MG/1
7.5 TABLET ORAL EVERY 8 HOURS
Qty: 0 | Refills: 0 | Status: DISCONTINUED | OUTPATIENT
Start: 2019-04-18 | End: 2019-04-18

## 2019-04-18 RX ORDER — SENNA PLUS 8.6 MG/1
2 TABLET ORAL AT BEDTIME
Qty: 0 | Refills: 0 | Status: DISCONTINUED | OUTPATIENT
Start: 2019-04-18 | End: 2019-04-19

## 2019-04-18 RX ORDER — GABAPENTIN 400 MG/1
4 CAPSULE ORAL
Qty: 400 | Refills: 0
Start: 2019-04-18 | End: 2019-05-17

## 2019-04-18 RX ORDER — HYDROMORPHONE HYDROCHLORIDE 2 MG/ML
1 INJECTION INTRAMUSCULAR; INTRAVENOUS; SUBCUTANEOUS
Qty: 60 | Refills: 0
Start: 2019-04-18 | End: 2019-04-27

## 2019-04-18 RX ORDER — HYDROMORPHONE HYDROCHLORIDE 2 MG/ML
2 INJECTION INTRAMUSCULAR; INTRAVENOUS; SUBCUTANEOUS
Qty: 0 | Refills: 0 | Status: DISCONTINUED | OUTPATIENT
Start: 2019-04-18 | End: 2019-04-19

## 2019-04-18 RX ORDER — METHADONE HYDROCHLORIDE 40 MG/1
3.75 TABLET ORAL
Qty: 100 | Refills: 0
Start: 2019-04-18 | End: 2019-04-27

## 2019-04-18 RX ORDER — CYCLOBENZAPRINE HYDROCHLORIDE 10 MG/1
5 TABLET, FILM COATED ORAL THREE TIMES A DAY
Qty: 0 | Refills: 0 | Status: DISCONTINUED | OUTPATIENT
Start: 2019-04-18 | End: 2019-04-19

## 2019-04-18 RX ORDER — DOCUSATE SODIUM 100 MG
100 CAPSULE ORAL THREE TIMES A DAY
Qty: 0 | Refills: 0 | Status: DISCONTINUED | OUTPATIENT
Start: 2019-04-18 | End: 2019-04-19

## 2019-04-18 RX ORDER — DIAZEPAM 5 MG
5 TABLET ORAL
Qty: 0 | Refills: 0 | Status: DISCONTINUED | OUTPATIENT
Start: 2019-04-18 | End: 2019-04-19

## 2019-04-18 RX ORDER — POLYETHYLENE GLYCOL 3350 17 G/17G
17 POWDER, FOR SOLUTION ORAL
Qty: 1 | Refills: 0
Start: 2019-04-18 | End: 2019-05-17

## 2019-04-18 RX ORDER — METHADONE HYDROCHLORIDE 40 MG/1
7.5 TABLET ORAL EVERY 8 HOURS
Qty: 0 | Refills: 0 | Status: DISCONTINUED | OUTPATIENT
Start: 2019-04-18 | End: 2019-04-19

## 2019-04-18 RX ORDER — SENNA PLUS 8.6 MG/1
2 TABLET ORAL
Qty: 60 | Refills: 0
Start: 2019-04-18 | End: 2019-05-17

## 2019-04-18 RX ORDER — HYDROMORPHONE HYDROCHLORIDE 2 MG/ML
4 INJECTION INTRAMUSCULAR; INTRAVENOUS; SUBCUTANEOUS EVERY 4 HOURS
Qty: 0 | Refills: 0 | Status: DISCONTINUED | OUTPATIENT
Start: 2019-04-18 | End: 2019-04-19

## 2019-04-18 RX ORDER — POLYETHYLENE GLYCOL 3350 17 G/17G
17 POWDER, FOR SOLUTION ORAL AT BEDTIME
Qty: 0 | Refills: 0 | Status: DISCONTINUED | OUTPATIENT
Start: 2019-04-18 | End: 2019-04-19

## 2019-04-18 RX ORDER — DOCUSATE SODIUM 100 MG
10 CAPSULE ORAL
Qty: 100 | Refills: 0
Start: 2019-04-18 | End: 2019-05-17

## 2019-04-18 RX ORDER — GABAPENTIN 400 MG/1
200 CAPSULE ORAL EVERY 8 HOURS
Qty: 0 | Refills: 0 | Status: DISCONTINUED | OUTPATIENT
Start: 2019-04-18 | End: 2019-04-19

## 2019-04-18 RX ORDER — HYDROMORPHONE HYDROCHLORIDE 2 MG/ML
1 INJECTION INTRAMUSCULAR; INTRAVENOUS; SUBCUTANEOUS
Qty: 0 | Refills: 0 | Status: DISCONTINUED | OUTPATIENT
Start: 2019-04-18 | End: 2019-04-18

## 2019-04-18 RX ADMIN — HYDROMORPHONE HYDROCHLORIDE 1 MILLIGRAM(S): 2 INJECTION INTRAMUSCULAR; INTRAVENOUS; SUBCUTANEOUS at 02:11

## 2019-04-18 RX ADMIN — POLYETHYLENE GLYCOL 3350 17 GRAM(S): 17 POWDER, FOR SOLUTION ORAL at 21:01

## 2019-04-18 RX ADMIN — Medication 100 MILLIGRAM(S): at 06:35

## 2019-04-18 RX ADMIN — Medication 100 MILLIGRAM(S): at 17:06

## 2019-04-18 RX ADMIN — METHADONE HYDROCHLORIDE 7.5 MILLIGRAM(S): 40 TABLET ORAL at 22:11

## 2019-04-18 RX ADMIN — METHADONE HYDROCHLORIDE 7.5 MILLIGRAM(S): 40 TABLET ORAL at 06:35

## 2019-04-18 RX ADMIN — HYDROMORPHONE HYDROCHLORIDE 1 MILLIGRAM(S): 2 INJECTION INTRAMUSCULAR; INTRAVENOUS; SUBCUTANEOUS at 02:41

## 2019-04-18 RX ADMIN — GABAPENTIN 200 MILLIGRAM(S): 400 CAPSULE ORAL at 06:35

## 2019-04-18 RX ADMIN — Medication 5 MILLIGRAM(S): at 21:01

## 2019-04-18 RX ADMIN — GABAPENTIN 200 MILLIGRAM(S): 400 CAPSULE ORAL at 21:01

## 2019-04-18 RX ADMIN — SENNA PLUS 2 TABLET(S): 8.6 TABLET ORAL at 21:02

## 2019-04-18 RX ADMIN — HYDROMORPHONE HYDROCHLORIDE 4 MILLIGRAM(S): 2 INJECTION INTRAMUSCULAR; INTRAVENOUS; SUBCUTANEOUS at 14:48

## 2019-04-18 RX ADMIN — Medication 100 MILLIGRAM(S): at 21:02

## 2019-04-18 RX ADMIN — CHLORHEXIDINE GLUCONATE 15 MILLILITER(S): 213 SOLUTION TOPICAL at 06:35

## 2019-04-18 RX ADMIN — HYDROMORPHONE HYDROCHLORIDE 4 MILLIGRAM(S): 2 INJECTION INTRAMUSCULAR; INTRAVENOUS; SUBCUTANEOUS at 19:03

## 2019-04-18 RX ADMIN — HYDROMORPHONE HYDROCHLORIDE 4 MILLIGRAM(S): 2 INJECTION INTRAMUSCULAR; INTRAVENOUS; SUBCUTANEOUS at 15:45

## 2019-04-18 RX ADMIN — POLYETHYLENE GLYCOL 3350 17 GRAM(S): 17 POWDER, FOR SOLUTION ORAL at 07:53

## 2019-04-18 RX ADMIN — METHADONE HYDROCHLORIDE 7.5 MILLIGRAM(S): 40 TABLET ORAL at 14:47

## 2019-04-18 RX ADMIN — HYDROMORPHONE HYDROCHLORIDE 4 MILLIGRAM(S): 2 INJECTION INTRAMUSCULAR; INTRAVENOUS; SUBCUTANEOUS at 23:36

## 2019-04-18 RX ADMIN — Medication 5 MILLIGRAM(S): at 17:06

## 2019-04-18 RX ADMIN — GABAPENTIN 200 MILLIGRAM(S): 400 CAPSULE ORAL at 14:48

## 2019-04-18 RX ADMIN — HYDROMORPHONE HYDROCHLORIDE 4 MILLIGRAM(S): 2 INJECTION INTRAMUSCULAR; INTRAVENOUS; SUBCUTANEOUS at 23:06

## 2019-04-18 NOTE — PROGRESS NOTE ADULT - SUBJECTIVE AND OBJECTIVE BOX
Patient seen and examined at bedside this AM  Reports pain is improved   NPO/IVF for OR today    Exam  Vital Signs Last 24 Hrs  T(C): 36.8 (18 Apr 2019 07:00), Max: 36.9 (17 Apr 2019 17:54)  T(F): 98.2 (18 Apr 2019 07:00), Max: 98.5 (17 Apr 2019 17:54)  HR: 66 (18 Apr 2019 07:00) (59 - 80)  BP: 138/98 (18 Apr 2019 07:00) (123/81 - 155/98)  BP(mean): --  RR: 17 (18 Apr 2019 07:00) (17 - 18)  SpO2: 97% (18 Apr 2019 07:00) (95% - 100%)  NAD, awake and alert  Breathing comfortably on room air  No stridor/stertor  Flap in place; left neck incision intact – tender to palpation    A/P: 63M with history of composite resection and free flap reconstruction in April 2018 admitted for pain and suspected recurrence  -OR today for DL, biopsy  -NPO/IVF   -pain control per palliative, appreciate recommendations  -appreciate medicine recommendations  -continue home medications  -aggressive bowel regimen (made standing)  -OOB

## 2019-04-18 NOTE — PRE-OP CHECKLIST - 2.
[facility] vital signs on flow sheet Consent 1/Introductory Paragraph: The rationale for Mohs was explained to the patient and consent was obtained. The risks, benefits and alternatives to therapy were discussed in detail. Specifically, the risks of infection, scarring, bleeding, prolonged wound healing, incomplete removal, allergy to anesthesia, nerve injury and recurrence were addressed. Prior to the procedure, the treatment site was clearly identified and confirmed by the patient. All components of Universal Protocol/PAUSE Rule completed.

## 2019-04-19 ENCOUNTER — INBOUND DOCUMENT (OUTPATIENT)
Age: 64
End: 2019-04-19

## 2019-04-19 ENCOUNTER — TRANSCRIPTION ENCOUNTER (OUTPATIENT)
Age: 64
End: 2019-04-19

## 2019-04-19 VITALS
OXYGEN SATURATION: 98 % | DIASTOLIC BLOOD PRESSURE: 77 MMHG | RESPIRATION RATE: 18 BRPM | SYSTOLIC BLOOD PRESSURE: 118 MMHG | HEART RATE: 80 BPM | TEMPERATURE: 98 F

## 2019-04-19 RX ORDER — HYDROMORPHONE HYDROCHLORIDE 2 MG/ML
2 INJECTION INTRAMUSCULAR; INTRAVENOUS; SUBCUTANEOUS
Qty: 0 | Refills: 0 | Status: DISCONTINUED | OUTPATIENT
Start: 2019-04-19 | End: 2019-04-19

## 2019-04-19 RX ADMIN — GABAPENTIN 200 MILLIGRAM(S): 400 CAPSULE ORAL at 05:02

## 2019-04-19 RX ADMIN — HYDROMORPHONE HYDROCHLORIDE 2 MILLIGRAM(S): 2 INJECTION INTRAMUSCULAR; INTRAVENOUS; SUBCUTANEOUS at 02:01

## 2019-04-19 RX ADMIN — HYDROMORPHONE HYDROCHLORIDE 2 MILLIGRAM(S): 2 INJECTION INTRAMUSCULAR; INTRAVENOUS; SUBCUTANEOUS at 01:46

## 2019-04-19 RX ADMIN — HYDROMORPHONE HYDROCHLORIDE 4 MILLIGRAM(S): 2 INJECTION INTRAMUSCULAR; INTRAVENOUS; SUBCUTANEOUS at 05:02

## 2019-04-19 RX ADMIN — Medication 5 MILLIGRAM(S): at 10:46

## 2019-04-19 RX ADMIN — Medication 100 MILLIGRAM(S): at 05:02

## 2019-04-19 RX ADMIN — HYDROMORPHONE HYDROCHLORIDE 4 MILLIGRAM(S): 2 INJECTION INTRAMUSCULAR; INTRAVENOUS; SUBCUTANEOUS at 10:44

## 2019-04-19 RX ADMIN — HYDROMORPHONE HYDROCHLORIDE 4 MILLIGRAM(S): 2 INJECTION INTRAMUSCULAR; INTRAVENOUS; SUBCUTANEOUS at 06:00

## 2019-04-19 RX ADMIN — METHADONE HYDROCHLORIDE 7.5 MILLIGRAM(S): 40 TABLET ORAL at 06:04

## 2019-04-19 NOTE — PROGRESS NOTE ADULT - SUBJECTIVE AND OBJECTIVE BOX
Patient seen and examined at bedside this AM  Reports pain is improved   OR yesterday positive SCC    Exam  T(C): 36.4 (04-19-19 @ 05:01), Max: 37 (04-18-19 @ 13:00)  HR: 77 (04-19-19 @ 05:01) (66 - 95)  BP: 123/80 (04-19-19 @ 05:01) (110/75 - 155/101)  RR: 16 (04-19-19 @ 05:01) (12 - 18)  SpO2: 96% (04-19-19 @ 05:01) (95% - 100%)  NAD, awake and alert  Breathing comfortably on room air  No stridor/stertor  Flap in place; left neck incision intact – tender to palpation    A/P: 63M with history of composite resection and free flap reconstruction in April 2018 admitted for pain and suspected recurrence  -NPO/IVF   -pain control per palliative, appreciate recommendations  -appreciate medicine recommendations  -continue home medications  -aggressive bowel regimen (made standing)  -OOB  -discharge home

## 2019-04-19 NOTE — PROGRESS NOTE ADULT - REASON FOR ADMISSION
Pain control

## 2019-04-19 NOTE — DISCHARGE NOTE NURSING/CASE MANAGEMENT/SOCIAL WORK - NSDCFUADDAPPT_GEN_ALL_CORE_FT
you will need to schedule an appointment with Dr. Carr on 4/29 at 8:40 am  for your out patient pain treatment. The phone number and address is listed.

## 2019-04-19 NOTE — DISCHARGE NOTE NURSING/CASE MANAGEMENT/SOCIAL WORK - NSDCDPATPORTLINK_GEN_ALL_CORE
You can access the [facility] Patient Portal, offered by [facility], by registering with the following website: [email or web address]

## 2019-04-21 ENCOUNTER — FORM ENCOUNTER (OUTPATIENT)
Age: 64
End: 2019-04-21

## 2019-04-22 ENCOUNTER — TRANSCRIPTION ENCOUNTER (OUTPATIENT)
Age: 64
End: 2019-04-22

## 2019-04-22 ENCOUNTER — APPOINTMENT (OUTPATIENT)
Dept: NUCLEAR MEDICINE | Facility: CLINIC | Age: 64
End: 2019-04-22
Payer: COMMERCIAL

## 2019-04-22 ENCOUNTER — OUTPATIENT (OUTPATIENT)
Dept: OUTPATIENT SERVICES | Facility: HOSPITAL | Age: 64
LOS: 1 days | End: 2019-04-22

## 2019-04-22 DIAGNOSIS — Z43.1 ENCOUNTER FOR ATTENTION TO GASTROSTOMY: Chronic | ICD-10-CM

## 2019-04-22 DIAGNOSIS — Z85.819 PERSONAL HISTORY OF MALIGNANT NEOPLASM OF UNSPECIFIED SITE OF LIP, ORAL CAVITY, AND PHARYNX: Chronic | ICD-10-CM

## 2019-04-22 DIAGNOSIS — C06.9 MALIGNANT NEOPLASM OF MOUTH, UNSPECIFIED: ICD-10-CM

## 2019-04-22 PROCEDURE — 78815 PET IMAGE W/CT SKULL-THIGH: CPT | Mod: 26,PS

## 2019-04-23 ENCOUNTER — CLINICAL ADVICE (OUTPATIENT)
Age: 64
End: 2019-04-23

## 2019-04-23 ENCOUNTER — APPOINTMENT (OUTPATIENT)
Dept: RADIATION ONCOLOGY | Facility: CLINIC | Age: 64
End: 2019-04-23
Payer: COMMERCIAL

## 2019-04-23 VITALS
BODY MASS INDEX: 23.04 KG/M2 | OXYGEN SATURATION: 99 % | DIASTOLIC BLOOD PRESSURE: 86 MMHG | HEART RATE: 76 BPM | RESPIRATION RATE: 16 BRPM | WEIGHT: 152 LBS | SYSTOLIC BLOOD PRESSURE: 117 MMHG | HEIGHT: 68 IN

## 2019-04-23 PROCEDURE — 99214 OFFICE O/P EST MOD 30 MIN: CPT | Mod: 25

## 2019-04-23 NOTE — VITALS
[Pain Duration: ___] : Pain duration: [unfilled] [Pain Interferes with ADLs] : Pain interferes with activities of daily living. [Opioid] : opioid [Pain Description/Quality: ___] : Pain description/quality: [unfilled] [Pain Location: ___] : Pain Location: [unfilled] [70: Cares for self; unalbe to carry on normal activity or do active work.] : 70: Cares for self; unable to carry on normal activity or do active work. [ECOG Performance Status: 2 - Ambulatory and capable of all self care but unable to carry out any work activities] : Performance Status: 2 - Ambulatory and capable of all self care but unable to carry out any work activities. Up and about more than 50% of waking hours [Maximal Pain Intensity: 9/10] : 9/10 [Least Pain Intensity: 7/10] : 7/10

## 2019-04-23 NOTE — REASON FOR VISIT
[Re-evaluation] : re-evaluation for [Head and Neck Cancer] : head and neck cancer [Family Member] : family member

## 2019-04-24 ENCOUNTER — OUTPATIENT (OUTPATIENT)
Dept: OUTPATIENT SERVICES | Facility: HOSPITAL | Age: 64
LOS: 1 days | Discharge: HOME | End: 2019-04-24

## 2019-04-24 ENCOUNTER — INBOUND DOCUMENT (OUTPATIENT)
Age: 64
End: 2019-04-24

## 2019-04-24 DIAGNOSIS — M79.609 PAIN IN UNSPECIFIED LIMB: ICD-10-CM

## 2019-04-24 DIAGNOSIS — Z43.1 ENCOUNTER FOR ATTENTION TO GASTROSTOMY: Chronic | ICD-10-CM

## 2019-04-24 DIAGNOSIS — Z85.819 PERSONAL HISTORY OF MALIGNANT NEOPLASM OF UNSPECIFIED SITE OF LIP, ORAL CAVITY, AND PHARYNX: Chronic | ICD-10-CM

## 2019-04-29 ENCOUNTER — RESULT REVIEW (OUTPATIENT)
Age: 64
End: 2019-04-29

## 2019-04-29 ENCOUNTER — OUTPATIENT (OUTPATIENT)
Dept: OUTPATIENT SERVICES | Facility: HOSPITAL | Age: 64
LOS: 1 days | Discharge: HOME | End: 2019-04-29

## 2019-04-29 ENCOUNTER — APPOINTMENT (OUTPATIENT)
Dept: HEMATOLOGY ONCOLOGY | Facility: CLINIC | Age: 64
End: 2019-04-29
Payer: COMMERCIAL

## 2019-04-29 ENCOUNTER — APPOINTMENT (OUTPATIENT)
Age: 64
End: 2019-04-29
Payer: COMMERCIAL

## 2019-04-29 VITALS
SYSTOLIC BLOOD PRESSURE: 113 MMHG | RESPIRATION RATE: 16 BRPM | WEIGHT: 152.12 LBS | HEIGHT: 67.72 IN | DIASTOLIC BLOOD PRESSURE: 77 MMHG | BODY MASS INDEX: 23.32 KG/M2 | HEART RATE: 61 BPM | TEMPERATURE: 97.6 F | OXYGEN SATURATION: 100 %

## 2019-04-29 VITALS
WEIGHT: 151 LBS | SYSTOLIC BLOOD PRESSURE: 137 MMHG | BODY MASS INDEX: 23.15 KG/M2 | HEART RATE: 62 BPM | HEIGHT: 67.72 IN | OXYGEN SATURATION: 98 % | DIASTOLIC BLOOD PRESSURE: 88 MMHG

## 2019-04-29 DIAGNOSIS — Z85.819 PERSONAL HISTORY OF MALIGNANT NEOPLASM OF UNSPECIFIED SITE OF LIP, ORAL CAVITY, AND PHARYNX: Chronic | ICD-10-CM

## 2019-04-29 DIAGNOSIS — M79.609 PAIN IN UNSPECIFIED LIMB: ICD-10-CM

## 2019-04-29 DIAGNOSIS — Z43.1 ENCOUNTER FOR ATTENTION TO GASTROSTOMY: Chronic | ICD-10-CM

## 2019-04-29 DIAGNOSIS — Z87.891 PERSONAL HISTORY OF NICOTINE DEPENDENCE: ICD-10-CM

## 2019-04-29 DIAGNOSIS — R20.0 ANESTHESIA OF SKIN: ICD-10-CM

## 2019-04-29 DIAGNOSIS — B37.0 CANDIDAL STOMATITIS: ICD-10-CM

## 2019-04-29 LAB
ALBUMIN SERPL ELPH-MCNC: 4.8 G/DL
ALP BLD-CCNC: 74 U/L
ALT SERPL-CCNC: 34 U/L
ANION GAP SERPL CALC-SCNC: 13 MMOL/L
AST SERPL-CCNC: 21 U/L
BASOPHILS # BLD AUTO: 0.1 K/UL — SIGNIFICANT CHANGE UP (ref 0–0.2)
BASOPHILS NFR BLD AUTO: 0.5 % — SIGNIFICANT CHANGE UP (ref 0–2)
BILIRUB SERPL-MCNC: 1 MG/DL
BUN SERPL-MCNC: 14 MG/DL
CALCIUM SERPL-MCNC: 10.5 MG/DL
CHLORIDE SERPL-SCNC: 93 MMOL/L
CO2 SERPL-SCNC: 30 MMOL/L
CREAT SERPL-MCNC: 0.84 MG/DL
EOSINOPHIL # BLD AUTO: 0 K/UL — SIGNIFICANT CHANGE UP (ref 0–0.5)
EOSINOPHIL NFR BLD AUTO: 0.3 % — SIGNIFICANT CHANGE UP (ref 0–6)
GLUCOSE SERPL-MCNC: 112 MG/DL
HCT VFR BLD CALC: 46 % — SIGNIFICANT CHANGE UP (ref 39–50)
HGB BLD-MCNC: 15.3 G/DL — SIGNIFICANT CHANGE UP (ref 13–17)
INR PPP: 1.02 RATIO
LYMPHOCYTES # BLD AUTO: 0.9 K/UL — LOW (ref 1–3.3)
LYMPHOCYTES # BLD AUTO: 6.6 % — LOW (ref 13–44)
MAGNESIUM SERPL-MCNC: 2.1 MG/DL
MCHC RBC-ENTMCNC: 30.5 PG — SIGNIFICANT CHANGE UP (ref 27–34)
MCHC RBC-ENTMCNC: 33.2 G/DL — SIGNIFICANT CHANGE UP (ref 32–36)
MCV RBC AUTO: 91.9 FL — SIGNIFICANT CHANGE UP (ref 80–100)
MONOCYTES # BLD AUTO: 0.7 K/UL — SIGNIFICANT CHANGE UP (ref 0–0.9)
MONOCYTES NFR BLD AUTO: 4.9 % — SIGNIFICANT CHANGE UP (ref 2–14)
NEUTROPHILS # BLD AUTO: 12 K/UL — HIGH (ref 1.8–7.4)
NEUTROPHILS NFR BLD AUTO: 87.7 % — HIGH (ref 43–77)
PLATELET # BLD AUTO: 307 K/UL — SIGNIFICANT CHANGE UP (ref 150–400)
POTASSIUM SERPL-SCNC: 5.2 MMOL/L
PROT SERPL-MCNC: 7.6 G/DL
PT BLD: 11.5 SEC
RBC # BLD: 5.01 M/UL — SIGNIFICANT CHANGE UP (ref 4.2–5.8)
RBC # FLD: 11.7 % — SIGNIFICANT CHANGE UP (ref 10.3–14.5)
SODIUM SERPL-SCNC: 136 MMOL/L
WBC # BLD: 13.7 K/UL — HIGH (ref 3.8–10.5)
WBC # FLD AUTO: 13.7 K/UL — HIGH (ref 3.8–10.5)

## 2019-04-29 PROCEDURE — 99205 OFFICE O/P NEW HI 60 MIN: CPT

## 2019-04-29 RX ORDER — OXYCODONE AND ACETAMINOPHEN 7.5; 325 MG/1; MG/1
7.5-325 TABLET ORAL
Qty: 40 | Refills: 0 | Status: DISCONTINUED | COMMUNITY
Start: 2019-04-23 | End: 2019-04-29

## 2019-04-29 NOTE — PHYSICAL EXAM
[Ambulatory and capable of all self care but unable to carry out any work activities] : Status 2- Ambulatory and capable of all self care but unable to carry out any work activities. Up and about more than 50% of waking hours [Normal] : affect appropriate [de-identified] : microstomia with drooling. Status post composite resection and fibular graft reconstruction of right jaw and neck dissection. Large firm minimally mobile mass left mid neck. Buckle mucosa left side nodularity, swelling of left anterior lateral tongue.\par mcrostomia with drooling. atatus post composite resection and fibular graft reconstruction of right jaw and neck dissection. large firm minimally mobile mass left mid neck. \par mcrostomia with drooling. atatus post composite resection and fibular graft reconstruction of right jaw and neck dissection. large firm minimally mobile mass left mid neck. \par macrostomia with drooling.

## 2019-04-29 NOTE — ADDENDUM
[FreeTextEntry1] : I, Jovanny Díaz, acted solely as a scribe for Dr. Yajaira Cai on this date 4/29/19.

## 2019-04-29 NOTE — RESULTS/DATA
[FreeTextEntry1] : 4/22/19 PET:\par Status post left composite mandibular resection with free flap reconstruction and bilateral neck dissection.  Limited evaluation of oral cavity and neck due to metallic artifact. New irregular FDG avidity with central photopenia along the left posterior floor of mouth/left oral tongue, SUV 14.5 (image 62 of the dedicated head and neck series), \par corresponding to masslike nodular enhancement which measures approximately 4.6 x 2.0 x 4.2 cm, on recent MRI. Multiple adjacent smaller foci superiorly and inferiorly associated with multiple surgical clips New FDG-avid focus in the left lower neck, SUV 8.4, corresponding to metastatic nodule between the scalene musculature, \par associated with left C4 nerve root/trunk on MRI.  New FDG-avid small left level IV cervical node, 0.8 x 0.7 cm, SUV 7.1.  New FDG-avid focus associated with plate and screws in the anterior mandibular region, slightly to the left of the midline, SUV 10.1. 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 The area of abnormal enhancement measures approximately 4.6 x 2.0 x 4.2 cm. Posteriorly, abnormally enhancing soft tissue encases the external and internal carotid arteries and abuts the common carotid artery proximal to the carotid bifurcation.  Medially, there is mass effect upon the floor of mouth with denervation fatty atrophy of the left hemiglossus.\par The anterior extent of abnormally enhancing soft tissue is difficult to discern due to susceptibility artifact.  Superiorly, abnormally enhancing soft tissue spans the glossotonsillar sulcus, and is seen involving the left lateral oropharyngeal wall up to the soft palate. Abnormal signal and enhancement is inseparable from the left medial pterygoid. There is however, no abnormal asymmetric enhancement extending along V3 through the skull base to the cavernous sinus or Meckel's cave. There is no evidence for abnormal enhancement along the left parotid gland or intratemporal facial nerve. Laterally, there is abnormal enhancement which spans the left floor of mouth into the flap, and extends underneath the native mandible to involve the insertion of the left masseter muscle. There is however, no visible extension into the overlying skin. Evaluation of mandibular marrow signal abnormality is limited by susceptibility abnormality. There has been interval enlargement of a nodule of metastatic disease deep to the left neck dissection (2:22, 12:28) between scalene musculature. This nodule of abnormal signal and enhancement measures approximately 0.7 x 1.0 x 2.2 cm, and appears associated with the left C4 nerve root and trunk. There is no evidence for extension into the neural foramen, or spinal canal.  The remainder of the brachial plexus appears intact. There is otherwise no evidence for pathologic lymphadenopathy.\par \par 4/26/18 Pathology:\par Lymph nodes, left level 1, neck dissection: 8 lymph nodes negative for metastatic carcinoma. Submandibular gland with chronic inflammation and atrophy, negative for carcinoma\par Lymph nodes, left level 2, neck dissection: 13 lymph nodes negative for metastatic carcinoma\par Lymph nodes, left level 3, neck dissection: 6 lymph nodes negative for metastatic carcinoma\par Lymph nodes, left level 4, neck dissection: 13 lymph nodes negative for metastatic carcinoma\par Oral cavity, composite resection mandibular alveolar cancer - Invasive squamous cell carcinoma, well-differentiated.  Resection margins negative for carcinoma\par Left lower lip, excision: Lip tissue, negative for carcinoma\par Right lower lip, excision: Lip tissue, negative for carcinoma\par Right floor of mouth, excision: Squamous mucosa, negative for carcinoma\par Left floor of mouth, excision: Squamous mucosa, negative for carcinoma\par Deep margin, excision: Soft tissue and skeletal muscle, negative for carcinoma\par Lymph nodes, right level 1,2,3,4, neck dissection: 9 lymph nodes negative for metastatic carcinoma. Submandibular gland with chronic inflammation and atrophy, negative for carcinoma\par Tooth #20, removal: Tooth, gross examination only\par Specimen:  Mandibular alveolar\par Procedure:  Composite resection\par Specimen Size:  5 x 1.7 x 0.5 cm portion of mandibular ramus, and soft tissue anterior (3.8 x 3.3 x 0.8 cm, inked blue) and soft tissue posterior (2.7 x 1 x 0.5 cm, inked black)\par Additional dimensions: Not applicable\par Specimen Laterality: Not applicable\par Tumor Site: Mandibular alveolar\par Tumor Focality: Unifocal\par Tumor Size: 1.8 x 1.2 x 0.4 cm\par Tumor Depth of Invasion (DOI) (millimeters): 3 mm\par Tumor Thickness (pT1 and pT2 tumors): 0.4 cm\par \par Tumor Description Gross Subtype: Solid\par \par Histologic Type: Squamous cell carcinoma\par Histologic Grade:  Well differentiated\par Margins:\par Uninvolved by invasive tumor\par Distance from closest margin (millimeters): 2 mm\par Specify location of closest margin, per orientation, if possible: Anterior soft tissue margin\par Lymph-Vascular Invasion: Not identified\par Perineural Invasion: Not identified\par Lymph Nodes, Extranodal Extension: Not identified\par Pathologic Staging (pTNM): Primary Tumor (pT):  pT1 \par Regional Lymph Nodes:  pN0\par # Lymph nodes examined:  39\par # Lymph nodes involved:0\par Size of Largest Metastatic Deposit (centimeters): Not applicable\par Distant Metastasis (pM):  pMX\par

## 2019-04-29 NOTE — CONSULT LETTER
[Dear  ___] : Dear  [unfilled], [Consult Letter:] : I had the pleasure of evaluating your patient, [unfilled]. [Please see my note below.] : Please see my note below. [Consult Closing:] : Thank you very much for allowing me to participate in the care of this patient.  If you have any questions, please do not hesitate to contact me. [Sincerely,] : Sincerely, [DrSofiya  ___] : Dr. LECHUGA [FreeTextEntry3] : Dr. Yajaira Cai

## 2019-04-29 NOTE — HISTORY OF PRESENT ILLNESS
[de-identified] : The patient was diagnosed with recurrent head and neck SCC in April 2019 at the age of 63.  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  He completed radiation therapy (7,000 cGy) to the oral cavity on 2/11/16.  He continued with pain in the right lower lip. He was referred to Dr. Mahamed Paulson.  Biopsy 2/13/2018 of the right mandibular vestibule showed squamous cell carcinoma, well differentiated. PET/CT 3/2/18 revealed new FDG avidity in the anterior mandibular buccal region with associated irregularity of the underlying mandibular cortex, unchanged level lA lymph node. No evidence of distant metastasis. He followed with Dr. Farley who ordered a CT neck on 3/22/18 which revealed new lytic bony destruction, anterior superior aspect of the mandibular symphysis in the midline involving the roots for both central incisor teeth, no lymph nodes.  Biopsy was c/w SCCa in the area with erosion of the mandible, T4a N0 M0. \par \par He is now s/p composite resection and reconstruction with FFF on April 26, 2018. Final pathology pT1 N0 M0; however, given his original presentation, likely still  [id number] mandibular alveolar SCCa. He is s/p a release of the lower lip scar and mucosal graft on 3/7/19. An MRI 4/5/19 showed masslike nodular enhancement, increased in comparison to prior MR 12/20/2018 along the left posterior floor of mouth, spanning the base of tongue, glossotonsillar sulcus, and inferior left parapharyngeal space,  space, system with progression of recurrent disease.  Biopsy positive for invasive squamous cell carcinoma, keratinizing, well to moderately differentiated.  Follow up PET showed new irregular FDG avidity with central photopenia along the left posterior floor of mouth/left oral tongue, SUV 14.5, corresponding to masslike nodular enhancement which measures approximately 4.6 x 2.0 x 4.2 cm, on recent MRI.  [de-identified] : Patient presents for initial evaluation.  Notes he "is suffering a lot, pain 8/10."  He is s/p a release of the lower lip scar and mucosal graft on 3/7/19.  He complains of L tongue numbness and L preauricular pain when he presses on the area and pain and stiffness along the L neck.  + left trapezius pain.  + left neck pain w/ stiffness x 4 weeks.  + decreased appetite.  No other complaints today.

## 2019-04-30 LAB
HBV CORE IGG+IGM SER QL: NONREACTIVE
HBV SURFACE AB SER QL: NONREACTIVE
HBV SURFACE AG SER QL: NONREACTIVE
HCV AB SER QL: NONREACTIVE
HCV S/CO RATIO: 0.07 S/CO

## 2019-05-01 ENCOUNTER — LABORATORY RESULT (OUTPATIENT)
Age: 64
End: 2019-05-01

## 2019-05-01 ENCOUNTER — APPOINTMENT (OUTPATIENT)
Age: 64
End: 2019-05-01

## 2019-05-02 DIAGNOSIS — R11.2 NAUSEA WITH VOMITING, UNSPECIFIED: ICD-10-CM

## 2019-05-02 DIAGNOSIS — C76.0 MALIGNANT NEOPLASM OF HEAD, FACE AND NECK: ICD-10-CM

## 2019-05-02 DIAGNOSIS — C06.9 MALIGNANT NEOPLASM OF MOUTH, UNSPECIFIED: ICD-10-CM

## 2019-05-02 DIAGNOSIS — Z51.11 ENCOUNTER FOR ANTINEOPLASTIC CHEMOTHERAPY: ICD-10-CM

## 2019-05-02 RX ORDER — METHADONE HYDROCHLORIDE 10 MG/5ML
10 SOLUTION ORAL
Qty: 72 | Refills: 0 | Status: DISCONTINUED | COMMUNITY
Start: 2019-04-23 | End: 2019-05-02

## 2019-05-02 RX ORDER — HYDROMORPHONE HYDROCHLORIDE 4 MG/1
4 TABLET ORAL EVERY 4 HOURS
Qty: 180 | Refills: 0 | Status: DISCONTINUED | COMMUNITY
Start: 2019-04-29 | End: 2019-05-01

## 2019-05-04 NOTE — LETTER GREETING
[Follow-Up] : Your patient, [unfilled] was seen in my office today for follow-up [Dear Doctor] : Dear Doctor, [Please see my note below.] : Please see my note below. [FreeTextEntry2] : Lester Farley MD

## 2019-05-04 NOTE — ASSESSMENT
[Supportive] : Goals of care discussed with patient: Supportive [FreeTextEntry1] : 63 year old gentleman with recurrent squamous cell cancer left floor of mouth along left oral tongue and left neck nodule associated with C4 nerve root/trunk on MRI  and left level IV cervical LN. Mixed pain syndrome.\par \par Gabapentin liquid 15 ml Hs\par Morphine liquid 1-2 ml every 4 hours as needed\par Fentanyl patch 50 mcg/hr every 3 days\par Discontinue Hydromorphone prn \par FU at [facility] 2-4 weeks with Dr Rogers

## 2019-05-04 NOTE — HISTORY OF PRESENT ILLNESS
[Disease: _____________________] : Disease: [unfilled] [de-identified] : squamous cell cancer  [de-identified] : 63 year old gentleman recurrent SCC of the head and neck here for evaluation of disease related pain.\par \par Oncologic Hx\par 2015 Diagnosed with  [id number] SCC lip completed radiation therapy 7000 cGy 2/11/16.\par \par 7/28/16 Alveolar vestibule biopsy:carcinoma in situ\par 9/30/16 Excision and repair of lower lip sulcus cancer\par \par 8/2017 Pain to level of lower lip Amitriptyline 10 mg nightly for pain; changed to gabapentin\par \par 2/13/18 Leukoplakia on oral exam, biopsy by Dr Paulson of the right mandibular vestibule: squamous cell carcinoma, well differentiated. \par \par 3/2/18 PET/CT new FDG avidity in the anterior mandibular buccal region with associated irregularity of the underlying mandibular cortex. \par \par 3/22/18 CT Scan: new lytic bony destruction anterior superior aspect of the mandibular symphysis in the midline involving the roots for both central incisor teeth; corresponds to + uptake on PET CT .\par \par 4/26/18 Composite resection of oral cavity cancer with segmental mandibulectomy  and floor of mouth resection, right neck dissection, left neck dissection, tracheostomy; Free osteocutaneous fibular flap, vestibuloplasty, complex closure of the neck, split thickness skin graft for squamous cell cancer  with erosion of the mandible right anterior mandibular vestibule, T4a N0 M0. \par Adjuvant RT \par \par November 2018 began to experience left tongue numbness which progressed and included left pre auricular numbness; pain stiffness along the left neck. \par \par 3/7/19 Direct laryngoscopy  and esophagoscopy.  Revision of the soft tissue pedicle and debulking of the flap; lease of lower lip scar and mucosal graft for flap failure\par implant placement drooling do to oral incompetence.\par \par 4/22/19 Biopsy left base of tongue - Invasive squamous cell carcinoma, keratinizing, well to moderately differentiated \par \par PET CT shows new FDG-avid lesion along the left posterior floor of mouth/left oral tongue, left neck nodule, c/w  recurrent disease; FDG-avid small left level IV cervical node, suspicious for metastasis; . New FDG-avid focus associated with plate and screws in the anterior mandibular region, slightly to the left of the midline, possibly post surgical/treatment change.\par \par He is scheduled to see Dr Lei today for medical oncology evaluation. [de-identified] : Maximal Pain Intensity: 8/10 "high tolerance"\par Least Pain Intensity: ?4/10 \par Pain description/quality: pressing sometimes burning starting to have numbness \par Pain duration constant; relief is in minutes \par Pain Location: left post neck to shoulder\par Pain interferes with ADL's\par \par The only relief he feels is measured in minutes-positional change is one example.  he is up at night ; usually at 12- 2- 4 am "sometimes howling" \par 4 mg hydromorphone -not sure if it provides relief, but notes that he can sleep and feels calmer after. IV dosing clearly helped pain relief as inpatient.  Caused vomiting. Did not take Zofran for nausea all weekend told of potential interaction with methadone, but took Dilaudid about 3 times and at night, with methadone starting 4/23/19.\par Thenausea has been present since hospitalization, exacerbated by motion, standing too quickly ?medications.

## 2019-05-04 NOTE — REVIEW OF SYSTEMS
[Recent Change In Weight] : ~T recent weight change [Negative] : Heme/Lymph [FreeTextEntry2] : 3 lbs [FreeTextEntry4] : reports right ear discomfort and persistent pain in right lower lip area.; persistence of discomfort and numbness of right lower lip; also report intense (9/10) pain over the left next area.; has issues with drooling and limited PO intake.

## 2019-05-04 NOTE — PHYSICAL EXAM
[Normal] : the ears, nose and oropharynx were normal in appearance [de-identified] : deferred [de-identified] : mcrostomia with drooling. atatus post composite resection and fibular graft reconstruction of right jaw and neck dissection. large firm minimally mobile mass left mid neck. [de-identified] : obvious severe pain , unable to hold still. [FreeTextEntry1] : deferred [de-identified] : deferred

## 2019-05-04 NOTE — REASON FOR VISIT
[Initial Consultation] : an initial consultation [Family Member] : family member [FreeTextEntry2] : pain

## 2019-05-04 NOTE — ASSESSMENT
[Metastatic disease without local control] : Metastatic disease without local control [FreeTextEntry1] : extensive locoregional recurrence  of H & N  SCCA with resultant severe pain .

## 2019-05-04 NOTE — ADDENDUM
[FreeTextEntry1] : Given Zuplenz 8 mg x1 Po for nausea/vomiting  during visit\par EKG 3/19/19 NSR \par Fentanyl needs preauthorization\par Instructed to continue Methadone, DISCONTINUE hydromorphone ; Roxanol 20 mg Po q 4h prn

## 2019-05-04 NOTE — REVIEW OF SYSTEMS
[Dysphagia] : dysphagia [Dizziness] : dizziness [Negative] : Allergic/Immunologic [Fatigue] : fatigue [Recent Change In Weight] : ~T recent weight change [Patient Intake Form Reviewed] : Patient intake form was reviewed [Anxiety] : anxiety [FreeTextEntry9] : Hx sciatica; see interval Hx [FreeTextEntry2] : lost 75 to 69 kg 1 month  anorexia [FreeTextEntry4] : +drooling  [FreeTextEntry7] : nausea since hospitalization;  moving bowels regularly with laxatives [de-identified] : post surgical and RT changes [de-identified] : with nausea, positional

## 2019-05-04 NOTE — PHYSICAL EXAM
[Ambulatory and capable of all self care but unable to carry out any work activities] : Status 2- Ambulatory and capable of all self care but unable to carry out any work activities. Up and about more than 50% of waking hours [Normal] : affect appropriate [de-identified] : microstomia; dental implants (3) otherwise edentulous; drooling

## 2019-05-04 NOTE — RESULTS/DATA
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 \par PET/CT is done as part of the subsequent treatment strategy evaluation. \par \par RADIOPHARMACEUTICAL: 12.0 mCi F-18, FDG, I.V. \par \par TECHNIQUE: Fasting blood sugar measured prior to injection of \par radiopharmaceutical was 97 mg/dl. Following intravenous injection of the \par radiopharmaceutical and an uptake period of approximately 50 minutes, \par FDG-PET/CT was obtained on a [employer] Discovery 710 from skull base to mid thigh. \par Dedicated imaging of the head and neck was also performed beginning \par approximately 72 minutes after injection. Oral contrast was given during the \par uptake period. CT was performed during shallow respiration. The CT protocol \par was optimized for PET attenuation correction and anatomic localization. The \par CT protocol was not designed to produce and cannot replace state-of-the-art \par diagnostic CT images with specific imaging protocols for different body \par parts and indications. Images were reviewed on a dedicated workstation using \par multiplanar reconstruction. \par \par The standardized uptake values (SUV) are normalized to patient body weight \par and indicate the highest activity concentration (SUVmax) in a given disease \par site. All image numbers refer to axial image number. \par \par COMPARISON: PET/CT 7/30/2018. \par \par OTHER STUDIES USED FOR CORRELATION: MRI orbit and C-spine 4/5/2019. \par \par FINDINGS: \par \par HEAD/NECK: Status post left composite mandibular resection with free flap \par reconstruction and bilateral neck dissection. Limited evaluation of oral \par cavity and neck due to metallic artifact. New irregular FDG avidity with \par central photopenia along the left posterior floor of mouth/left oral tongue, \par SUV 14.5 (image 62 of the dedicated head and neck series), corresponding to \par masslike nodular enhancement which measures approximately 4.6 x 2.0 x 4.2 \par cm, on recent MRI. Multiple adjacent smaller foci superiorly and inferiorly \par associated with multiple surgical clips New FDG-avid focus in the left lower \par neck, SUV 8.4 (image 74), corresponding to metastatic nodule between the \par scalene musculature, associated with left C4 nerve root/trunk on MRI. \par \par New FDG-avid small left level IV cervical node, 0.8 x 0.7 cm (image 93), SUV \par 7.1. \par \par New FDG-avid focus associated with plate and screws in the anterior \par mandibular region, slightly to the left of the midline, SUV 10.1 (image 64). \par \par Postsurgical/treatment changes in the oral cavity and neck. Physiologic FDG \par activity in the brain and bilateral neck muscles. \par \par THORAX: Physiologic FDG activity. \par \par LUNGS: No abnormal FDG activity. No pulmonary nodules. \par \par PLEURA/PERICARDIUM: No abnormal FDG activity. No pleural or pericardial \par effusion. \par \par HEPATOBILIARY/PANCREAS: Physiologic FDG activity. Liver SUV mean 2.6; \par previously 2.4. Cholelithiasis, unchanged. \par \par SPLEEN: Physiologic FDG activity. \par \par ADRENAL GLANDS: No abnormal FDG activity. No nodules. \par \par KIDNEYS/URINARY BLADDER: Physiologic excreted FDG activity. \par \par ABDOMINOPELVIC NODES: No abnormal FDG activity. \par \par BOWEL/PERITONEUM/MESENTERY: No abnormal FDG activity. \par \par PELVIC ORGANS: No abnormal FDG activity. \par \par BONES/SOFT TISSUES: No abnormal FDG activity. \par \par IMPRESSION: Since FDG-PET/CT scan 7/30/2018: \par \par 1. New FDG-avid lesion along the left posterior floor of mouth/left oral \par tongue and and left neck nodule, compatible with known recurrent disease. \par \par 2. New FDG-avid small left level IV cervical node, suspicious for metastasis. \par \par 3. New FDG-avid focus associated with plate and screws in the anterior \par mandibular region, slightly to the left of the midline, possibly post \par surgical/treatment change. Please correlate clinically. \par \par \par LES WILLAM M.D., NUCLEAR MEDICINE ATTENDING \par This document has been electronically signed. Apr 23 2019 10:19AM \par \par \par CASSIE RAE    Surgical Final Report\par \par Final Diagnosis\par \par 1. Momo, left base, biopsy\par - Invasive squamous cell carcinoma, keratinizing, well to\par moderately differentiated\par \par Verified by: YSABEL COFFMAN MD\par (Electronic Signature)\par Reported on: 04/22/19 12:38 EDT, [address], [place], [place]\par [address]\par _________________________________________________________________\par \par Intraoperative Consultation\par 1- Left base of tongue\par - Invasive squamous cell carcinoma with keratinization\par By Dr. NORRIS Phillip\par \par Frozen section Performed at [facility],\par Department of Pathology, [address] [place] [address].\par \par Clinical History\par Malignant neoplasm of mouth\par \par Specimen(s) Submitted\par 1- Left base of tongue\par \par Gross Description\par The specimen is received fresh for intraoperative consultation\par and the specimen container is labeled: Left base of tongue.  It\par consists of a multiple fragments of tan pink soft tissue\par measuring together 1.5 x 1.0 x 0.3 cm in aggregate.  Entirely\par submitted for frozen examination in one cassette, 1FSA.  Frozen\par cassette was opened to confirm the presence of tissue inside the\par cassette.\par

## 2019-05-04 NOTE — HISTORY OF PRESENT ILLNESS
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  Recently underwent Biopsy with Dr. Mahamed Paulson on 2/13/2018 of the right mandibular vestibule which showed squamous cell carcinoma, well differentiated.  PET/CT 3/2/18 revealed new FDG avidity  in the anterior mandibular buccal region with associated irregularity of the underlying mandibular cortex, unchanged level lA lymph node.  No evidence of distant metastasis.  Followed with Dr. Farley -- CT scan neck 3/22/18 revealed new lytic bony destruction anterior superior aspect of the mandibular symphysis in the midline involving the roots for both central incisor teeth, no lymph nodes. \par \par Mr. Jarvis then developed biopsy proven SCCa in the area with erosion of the mandible, T4a N0 M0.  He is now s/p composite resection and reconstruction with FFF on April 26, 2018.  Final pathology is reported as pT1 N0 M0.  However, given his original presentation, Dr. Hdz thinks it is still a T4a N0 M0 mandibular alveolar SCCa.  He is well healed and has undergone lymphedema therapy.  Since his last visit, he developed numbness along the left side of the tongue and MRI suggest possible enhancement along the left inferolateral tongue and possibly the GT sulcus.  However, his exam by Dr. Hdz, besides the numbness along the left hemitongue, was completely stable and unremarkable for any mucosal or submucosal lesions. \par \par He presents today reporting 9/10 pain left neck and back of left neck.  He reports no relief from the Dilaudid pain pills he takes, and is waiting for pharmacy to dispense Methadone.\par  \par

## 2019-05-05 ENCOUNTER — FORM ENCOUNTER (OUTPATIENT)
Age: 64
End: 2019-05-05

## 2019-05-06 ENCOUNTER — OUTPATIENT (OUTPATIENT)
Dept: OUTPATIENT SERVICES | Facility: HOSPITAL | Age: 64
LOS: 1 days | End: 2019-05-06

## 2019-05-06 ENCOUNTER — APPOINTMENT (OUTPATIENT)
Dept: MRI IMAGING | Facility: CLINIC | Age: 64
End: 2019-05-06
Payer: COMMERCIAL

## 2019-05-06 DIAGNOSIS — Z85.819 PERSONAL HISTORY OF MALIGNANT NEOPLASM OF UNSPECIFIED SITE OF LIP, ORAL CAVITY, AND PHARYNX: Chronic | ICD-10-CM

## 2019-05-06 DIAGNOSIS — C06.9 MALIGNANT NEOPLASM OF MOUTH, UNSPECIFIED: ICD-10-CM

## 2019-05-06 DIAGNOSIS — Z43.1 ENCOUNTER FOR ATTENTION TO GASTROSTOMY: Chronic | ICD-10-CM

## 2019-05-06 PROCEDURE — 72156 MRI NECK SPINE W/O & W/DYE: CPT | Mod: 26

## 2019-05-07 ENCOUNTER — APPOINTMENT (OUTPATIENT)
Dept: OTOLARYNGOLOGY | Facility: CLINIC | Age: 64
End: 2019-05-07

## 2019-05-08 ENCOUNTER — APPOINTMENT (OUTPATIENT)
Dept: HEMATOLOGY ONCOLOGY | Facility: CLINIC | Age: 64
End: 2019-05-08
Payer: COMMERCIAL

## 2019-05-08 VITALS
BODY MASS INDEX: 23 KG/M2 | HEART RATE: 110 BPM | WEIGHT: 150 LBS | SYSTOLIC BLOOD PRESSURE: 111 MMHG | OXYGEN SATURATION: 97 % | HEIGHT: 67.72 IN | DIASTOLIC BLOOD PRESSURE: 78 MMHG

## 2019-05-08 DIAGNOSIS — Z85.819 PERSONAL HISTORY OF MALIGNANT NEOPLASM OF UNSPECIFIED SITE OF LIP, ORAL CAVITY, AND PHARYNX: ICD-10-CM

## 2019-05-08 PROCEDURE — 99215 OFFICE O/P EST HI 40 MIN: CPT

## 2019-05-09 ENCOUNTER — OUTPATIENT (OUTPATIENT)
Dept: OUTPATIENT SERVICES | Facility: HOSPITAL | Age: 64
LOS: 1 days | End: 2019-05-09
Payer: COMMERCIAL

## 2019-05-09 DIAGNOSIS — R13.11 DYSPHAGIA, ORAL PHASE: ICD-10-CM

## 2019-05-09 DIAGNOSIS — Z85.819 PERSONAL HISTORY OF MALIGNANT NEOPLASM OF UNSPECIFIED SITE OF LIP, ORAL CAVITY, AND PHARYNX: Chronic | ICD-10-CM

## 2019-05-09 DIAGNOSIS — Z43.1 ENCOUNTER FOR ATTENTION TO GASTROSTOMY: Chronic | ICD-10-CM

## 2019-05-09 DIAGNOSIS — C06.9 MALIGNANT NEOPLASM OF MOUTH, UNSPECIFIED: ICD-10-CM

## 2019-05-09 DIAGNOSIS — Z51.89 ENCOUNTER FOR OTHER SPECIFIED AFTERCARE: ICD-10-CM

## 2019-05-13 NOTE — HISTORY OF PRESENT ILLNESS
[Disease: _____________________] : Disease: [unfilled] [de-identified] : Recent changes in meds reviewed.Pt is intermittently adherent to prev rec, but has a detailed journal w his son to present.nausea is only partly controled and his appetite and assoc depression is an issue.Sleeps poorly at night.All; s/s are still present. [de-identified] : 63 year old gentleman recurrent SCC of the head and neck here for evaluation of disease related pain.\par \par Oncologic Hx\par 2015 Diagnosed with  [id number] SCC lip completed radiation therapy 7000 cGy 2/11/16.\par \par 7/28/16 Alveolar vestibule biopsy:carcinoma in situ\par 9/30/16 Excision and repair of lower lip sulcus cancer\par \par 8/2017 Pain to level of lower lip Amitriptyline 10 mg nightly for pain; changed to gabapentin\par \par 2/13/18 Leukoplakia on oral exam, biopsy by Dr Paulson of the right mandibular vestibule: squamous cell carcinoma, well differentiated. \par \par 3/2/18 PET/CT new FDG avidity in the anterior mandibular buccal region with associated irregularity of the underlying mandibular cortex. \par \par 3/22/18 CT Scan: new lytic bony destruction anterior superior aspect of the mandibular symphysis in the midline involving the roots for both central incisor teeth; corresponds to + uptake on PET CT .\par \par 4/26/18 Composite resection of oral cavity cancer with segmental mandibulectomy  and floor of mouth resection, right neck dissection, left neck dissection, tracheostomy; Free osteocutaneous fibular flap, vestibuloplasty, complex closure of the neck, split thickness skin graft for squamous cell cancer  with erosion of the mandible right anterior mandibular vestibule, T4a N0 M0. \par Adjuvant RT \par \par November 2018 began to experience left tongue numbness which progressed and included left pre auricular numbness; pain stiffness along the left neck. \par \par 3/7/19 Direct laryngoscopy  and esophagoscopy.  Revision of the soft tissue pedicle and debulking of the flap; lease of lower lip scar and mucosal graft for flap failure\par implant placement drooling do to oral incompetence.\par \par 4/22/19 Biopsy left base of tongue - Invasive squamous cell carcinoma, keratinizing, well to moderately differentiated \par \par PET CT shows new FDG-avid lesion along the left posterior floor of mouth/left oral tongue, left neck nodule, c/w  recurrent disease; FDG-avid small left level IV cervical node, suspicious for metastasis; . New FDG-avid focus associated with plate and screws in the anterior mandibular region, slightly to the left of the midline, possibly post surgical/treatment change.\par \par He is scheduled to see Dr Lei today for medical oncology evaluation. [de-identified] : squamous cell cancer

## 2019-05-13 NOTE — REVIEW OF SYSTEMS
[Patient Intake Form Reviewed] : Patient intake form was reviewed [Dysphagia] : dysphagia [Fatigue] : fatigue [Recent Change In Weight] : ~T recent weight change [Anxiety] : anxiety [Dizziness] : dizziness [Negative] : Heme/Lymph [FreeTextEntry2] : lost 75 to 69 kg 1 month  anorexia [FreeTextEntry4] : +drooling  [FreeTextEntry7] : nausea since hospitalization;  moving bowels regularly with laxatives [FreeTextEntry9] : Hx sciatica; see interval Hx [de-identified] : post surgical and RT changes [de-identified] : with nausea, positional

## 2019-05-13 NOTE — PHYSICAL EXAM
[Ambulatory and capable of all self care but unable to carry out any work activities] : Status 2- Ambulatory and capable of all self care but unable to carry out any work activities. Up and about more than 50% of waking hours [Normal] : affect appropriate [de-identified] : microstomia; dental implants (3) otherwise edentulous; drooling

## 2019-05-13 NOTE — ASSESSMENT
[Supportive] : Goals of care discussed with patient: Supportive [FreeTextEntry1] : Ongoing s/s control issues, principally nausea w poor appetite.Some degree of a reactive depression.Will trial mitazripine as well as cont current meds w structure.RTC 1 mos.Cont journal.

## 2019-05-13 NOTE — RESULTS/DATA
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 \par PET/CT is done as part of the subsequent treatment strategy evaluation. \par \par RADIOPHARMACEUTICAL: 12.0 mCi F-18, FDG, I.V. \par \par TECHNIQUE: Fasting blood sugar measured prior to injection of \par radiopharmaceutical was 97 mg/dl. Following intravenous injection of the \par radiopharmaceutical and an uptake period of approximately 50 minutes, \par FDG-PET/CT was obtained on a [employer] Discovery 710 from skull base to mid thigh. \par Dedicated imaging of the head and neck was also performed beginning \par approximately 72 minutes after injection. Oral contrast was given during the \par uptake period. CT was performed during shallow respiration. The CT protocol \par was optimized for PET attenuation correction and anatomic localization. The \par CT protocol was not designed to produce and cannot replace state-of-the-art \par diagnostic CT images with specific imaging protocols for different body \par parts and indications. Images were reviewed on a dedicated workstation using \par multiplanar reconstruction. \par \par The standardized uptake values (SUV) are normalized to patient body weight \par and indicate the highest activity concentration (SUVmax) in a given disease \par site. All image numbers refer to axial image number. \par \par COMPARISON: PET/CT 7/30/2018. \par \par OTHER STUDIES USED FOR CORRELATION: MRI orbit and C-spine 4/5/2019. \par \par FINDINGS: \par \par HEAD/NECK: Status post left composite mandibular resection with free flap \par reconstruction and bilateral neck dissection. Limited evaluation of oral \par cavity and neck due to metallic artifact. New irregular FDG avidity with \par central photopenia along the left posterior floor of mouth/left oral tongue, \par SUV 14.5 (image 62 of the dedicated head and neck series), corresponding to \par masslike nodular enhancement which measures approximately 4.6 x 2.0 x 4.2 \par cm, on recent MRI. Multiple adjacent smaller foci superiorly and inferiorly \par associated with multiple surgical clips New FDG-avid focus in the left lower \par neck, SUV 8.4 (image 74), corresponding to metastatic nodule between the \par scalene musculature, associated with left C4 nerve root/trunk on MRI. \par \par New FDG-avid small left level IV cervical node, 0.8 x 0.7 cm (image 93), SUV \par 7.1. \par \par New FDG-avid focus associated with plate and screws in the anterior \par mandibular region, slightly to the left of the midline, SUV 10.1 (image 64). \par \par Postsurgical/treatment changes in the oral cavity and neck. Physiologic FDG \par activity in the brain and bilateral neck muscles. \par \par THORAX: Physiologic FDG activity. \par \par LUNGS: No abnormal FDG activity. No pulmonary nodules. \par \par PLEURA/PERICARDIUM: No abnormal FDG activity. No pleural or pericardial \par effusion. \par \par HEPATOBILIARY/PANCREAS: Physiologic FDG activity. Liver SUV mean 2.6; \par previously 2.4. Cholelithiasis, unchanged. \par \par SPLEEN: Physiologic FDG activity. \par \par ADRENAL GLANDS: No abnormal FDG activity. No nodules. \par \par KIDNEYS/URINARY BLADDER: Physiologic excreted FDG activity. \par \par ABDOMINOPELVIC NODES: No abnormal FDG activity. \par \par BOWEL/PERITONEUM/MESENTERY: No abnormal FDG activity. \par \par PELVIC ORGANS: No abnormal FDG activity. \par \par BONES/SOFT TISSUES: No abnormal FDG activity. \par \par IMPRESSION: Since FDG-PET/CT scan 7/30/2018: \par \par 1. New FDG-avid lesion along the left posterior floor of mouth/left oral \par tongue and and left neck nodule, compatible with known recurrent disease. \par \par 2. New FDG-avid small left level IV cervical node, suspicious for metastasis. \par \par 3. New FDG-avid focus associated with plate and screws in the anterior \par mandibular region, slightly to the left of the midline, possibly post \par surgical/treatment change. Please correlate clinically. \par \par \par LES WILLAM M.D., NUCLEAR MEDICINE ATTENDING \par This document has been electronically signed. Apr 23 2019 10:19AM \par \par \par CASSIE RAE    Surgical Final Report\par \par Final Diagnosis\par \par 1. Momo, left base, biopsy\par - Invasive squamous cell carcinoma, keratinizing, well to\par moderately differentiated\par \par Verified by: YSABEL COFFMAN MD\par (Electronic Signature)\par Reported on: 04/22/19 12:38 EDT, [address], [place], [place]\par [address]\par _________________________________________________________________\par \par Intraoperative Consultation\par 1- Left base of tongue\par - Invasive squamous cell carcinoma with keratinization\par By Dr. NORRIS Phillip\par \par Frozen section Performed at [facility],\par Department of Pathology, [address] [place] [address].\par \par Clinical History\par Malignant neoplasm of mouth\par \par Specimen(s) Submitted\par 1- Left base of tongue\par \par Gross Description\par The specimen is received fresh for intraoperative consultation\par and the specimen container is labeled: Left base of tongue.  It\par consists of a multiple fragments of tan pink soft tissue\par measuring together 1.5 x 1.0 x 0.3 cm in aggregate.  Entirely\par submitted for frozen examination in one cassette, 1FSA.  Frozen\par cassette was opened to confirm the presence of tissue inside the\par cassette.\par

## 2019-05-14 ENCOUNTER — APPOINTMENT (OUTPATIENT)
Dept: OTOLARYNGOLOGY | Facility: CLINIC | Age: 64
End: 2019-05-14
Payer: COMMERCIAL

## 2019-05-14 VITALS
SYSTOLIC BLOOD PRESSURE: 137 MMHG | HEIGHT: 67 IN | DIASTOLIC BLOOD PRESSURE: 91 MMHG | WEIGHT: 150 LBS | BODY MASS INDEX: 23.54 KG/M2 | HEART RATE: 91 BPM

## 2019-05-14 PROCEDURE — 99214 OFFICE O/P EST MOD 30 MIN: CPT | Mod: 25

## 2019-05-14 PROCEDURE — 31575 DIAGNOSTIC LARYNGOSCOPY: CPT

## 2019-05-14 RX ORDER — ACETAMINOPHEN 325 MG/1
TABLET, FILM COATED ORAL
Refills: 0 | Status: COMPLETED | COMMUNITY
End: 2019-05-14

## 2019-05-17 ENCOUNTER — MEDICATION RENEWAL (OUTPATIENT)
Age: 64
End: 2019-05-17

## 2019-05-17 ENCOUNTER — MESSAGE (OUTPATIENT)
Age: 64
End: 2019-05-17

## 2019-05-22 ENCOUNTER — LABORATORY RESULT (OUTPATIENT)
Age: 64
End: 2019-05-22

## 2019-05-22 ENCOUNTER — RESULT REVIEW (OUTPATIENT)
Age: 64
End: 2019-05-22

## 2019-05-22 ENCOUNTER — APPOINTMENT (OUTPATIENT)
Dept: HEMATOLOGY ONCOLOGY | Facility: CLINIC | Age: 64
End: 2019-05-22
Payer: COMMERCIAL

## 2019-05-22 VITALS
HEART RATE: 115 BPM | HEIGHT: 67 IN | SYSTOLIC BLOOD PRESSURE: 113 MMHG | DIASTOLIC BLOOD PRESSURE: 80 MMHG | OXYGEN SATURATION: 98 % | BODY MASS INDEX: 23.24 KG/M2 | WEIGHT: 148.05 LBS

## 2019-05-22 LAB
BASOPHILS # BLD AUTO: 0.1 K/UL — SIGNIFICANT CHANGE UP (ref 0–0.2)
BASOPHILS NFR BLD AUTO: 1.3 % — SIGNIFICANT CHANGE UP (ref 0–2)
EOSINOPHIL # BLD AUTO: 0.2 K/UL — SIGNIFICANT CHANGE UP (ref 0–0.5)
EOSINOPHIL NFR BLD AUTO: 2.7 % — SIGNIFICANT CHANGE UP (ref 0–6)
HCT VFR BLD CALC: 44.9 % — SIGNIFICANT CHANGE UP (ref 39–50)
HGB BLD-MCNC: 14.8 G/DL — SIGNIFICANT CHANGE UP (ref 13–17)
LYMPHOCYTES # BLD AUTO: 1 K/UL — SIGNIFICANT CHANGE UP (ref 1–3.3)
LYMPHOCYTES # BLD AUTO: 11.1 % — LOW (ref 13–44)
MCHC RBC-ENTMCNC: 29.8 PG — SIGNIFICANT CHANGE UP (ref 27–34)
MCHC RBC-ENTMCNC: 33 G/DL — SIGNIFICANT CHANGE UP (ref 32–36)
MCV RBC AUTO: 90.4 FL — SIGNIFICANT CHANGE UP (ref 80–100)
MONOCYTES # BLD AUTO: 0.7 K/UL — SIGNIFICANT CHANGE UP (ref 0–0.9)
MONOCYTES NFR BLD AUTO: 8.2 % — SIGNIFICANT CHANGE UP (ref 2–14)
NEUTROPHILS # BLD AUTO: 6.7 K/UL — SIGNIFICANT CHANGE UP (ref 1.8–7.4)
NEUTROPHILS NFR BLD AUTO: 76.7 % — SIGNIFICANT CHANGE UP (ref 43–77)
PLATELET # BLD AUTO: 281 K/UL — SIGNIFICANT CHANGE UP (ref 150–400)
RBC # BLD: 4.97 M/UL — SIGNIFICANT CHANGE UP (ref 4.2–5.8)
RBC # FLD: 11.9 % — SIGNIFICANT CHANGE UP (ref 10.3–14.5)
WBC # BLD: 8.8 K/UL — SIGNIFICANT CHANGE UP (ref 3.8–10.5)
WBC # FLD AUTO: 8.8 K/UL — SIGNIFICANT CHANGE UP (ref 3.8–10.5)

## 2019-05-22 PROCEDURE — 99215 OFFICE O/P EST HI 40 MIN: CPT

## 2019-05-22 NOTE — HISTORY OF PRESENT ILLNESS
[de-identified] : The patient was diagnosed with recurrent head and neck SCC in April 2019 at the age of 63.  He was originally diagnosed with squamous cell carcinoma of the lower lip [id number], stage 0, in September 2015.  He completed radiation therapy (7,000 cGy) to the oral cavity on 2/11/16.  He continued with pain in the right lower lip. He was referred to Dr. Mahamed Paulson.  Biopsy 2/13/2018 of the right mandibular vestibule showed squamous cell carcinoma, well differentiated. PET/CT 3/2/18 revealed new FDG avidity in the anterior mandibular buccal region with associated irregularity of the underlying mandibular cortex, unchanged level lA lymph node. No evidence of distant metastasis. He followed with Dr. Farley who ordered a CT neck on 3/22/18 which revealed new lytic bony destruction, anterior superior aspect of the mandibular symphysis in the midline involving the roots for both central incisor teeth, no lymph nodes.  Biopsy was c/w SCCa in the area with erosion of the mandible, T4a N0 M0. \par \par He is now s/p composite resection and reconstruction with FFF on April 26, 2018. Final pathology pT1 N0 M0; however, given his original presentation, likely still  [id number] mandibular alveolar SCCa. He is s/p a release of the lower lip scar and mucosal graft on 3/7/19. An MRI 4/5/19 showed masslike nodular enhancement, increased in comparison to prior MR 12/20/2018 along the left posterior floor of mouth, spanning the base of tongue, glossotonsillar sulcus, and inferior left parapharyngeal space,  space, system with progression of recurrent disease.  Biopsy positive for invasive squamous cell carcinoma, keratinizing, well to moderately differentiated.  Follow up PET showed new irregular FDG avidity with central photopenia along the left posterior floor of mouth/left oral tongue, SUV 14.5, corresponding to masslike nodular enhancement which measures approximately 4.6 x 2.0 x 4.2 cm, on recent MRI.  [de-identified] : Patient presents for follow up. No longer suffering from pain. No longer taking gabapentin, only taking morphine rarely, off Remeron. On fentanyl every 3 days. Plans to taper off the fentanyl from 50 to 25 MCG/HR Transdermal Patch 72 Hour. He reports a weird taste in his mouth. He reports episodes of fatigue, which are often relieved by rest. He is s/p a release of the lower lip scar and mucosal graft on 3/7/19.  He complains of L tongue numbness and L preauricular pain when he presses on the area and pain and stiffness along the L neck.  + left trapezius pain.  + left neck pain w/ stiffness x 4 weeks.  + decreased appetite.  Pt is experiencing increased pressure in area of bulky tumor/ both and pain mandibular region.  No other complaints today.

## 2019-05-22 NOTE — RESULTS/DATA
[FreeTextEntry1] : 5/6/19 MRI Spine: Ill-defined soft tissue lesion on the left at the level of the C3/C4 and C4/C5 neural foramen extending into the adjacent perivertebral space. Question of lesion abutting vs. surrounding the left vertebral artery. Anterior extent of mass is not visualized secondary to susceptibility artifact. Multilevel cervical spondylosis. \par \par 4/22/19 PET:\par Status post left composite mandibular resection with free flap reconstruction and bilateral neck dissection.  Limited evaluation of oral cavity and neck due to metallic artifact. New irregular FDG avidity with central photopenia along the left posterior floor of mouth/left oral tongue, SUV 14.5 (image 62 of the dedicated head and neck series), \par corresponding to masslike nodular enhancement which measures approximately 4.6 x 2.0 x 4.2 cm, on recent MRI. Multiple adjacent smaller foci superiorly and inferiorly associated with multiple surgical clips New FDG-avid focus in the left lower neck, SUV 8.4, corresponding to metastatic nodule between the scalene musculature, \par associated with left C4 nerve root/trunk on MRI.  New FDG-avid small left level IV cervical node, 0.8 x 0.7 cm, SUV 7.1.  New FDG-avid focus associated with plate and screws in the anterior mandibular region, slightly to the left of the midline, SUV 10.1. 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 The area of abnormal enhancement measures approximately 4.6 x 2.0 x 4.2 cm. Posteriorly, abnormally enhancing soft tissue encases the external and internal carotid arteries and abuts the common carotid artery proximal to the carotid bifurcation.  Medially, there is mass effect upon the floor of mouth with denervation fatty atrophy of the left hemiglossus.\par The anterior extent of abnormally enhancing soft tissue is difficult to discern due to susceptibility artifact.  Superiorly, abnormally enhancing soft tissue spans the glossotonsillar sulcus, and is seen involving the left lateral oropharyngeal wall up to the soft palate. Abnormal signal and enhancement is inseparable from the left medial pterygoid. There is however, no abnormal asymmetric enhancement extending along V3 through the skull base to the cavernous sinus or Meckel's cave. There is no evidence for abnormal enhancement along the left parotid gland or intratemporal facial nerve. Laterally, there is abnormal enhancement which spans the left floor of mouth into the flap, and extends underneath the native mandible to involve the insertion of the left masseter muscle. There is however, no visible extension into the overlying skin. Evaluation of mandibular marrow signal abnormality is limited by susceptibility abnormality. There has been interval enlargement of a nodule of metastatic disease deep to the left neck dissection (2:22, 12:28) between scalene musculature. This nodule of abnormal signal and enhancement measures approximately 0.7 x 1.0 x 2.2 cm, and appears associated with the left C4 nerve root and trunk. There is no evidence for extension into the neural foramen, or spinal canal.  The remainder of the brachial plexus appears intact. There is otherwise no evidence for pathologic lymphadenopathy.\par \par 4/26/18 Pathology:\par Lymph nodes, left level 1, neck dissection: 8 lymph nodes negative for metastatic carcinoma. Submandibular gland with chronic inflammation and atrophy, negative for carcinoma\par Lymph nodes, left level 2, neck dissection: 13 lymph nodes negative for metastatic carcinoma\par Lymph nodes, left level 3, neck dissection: 6 lymph nodes negative for metastatic carcinoma\par Lymph nodes, left level 4, neck dissection: 13 lymph nodes negative for metastatic carcinoma\par Oral cavity, composite resection mandibular alveolar cancer - Invasive squamous cell carcinoma, well-differentiated.  Resection margins negative for carcinoma\par Left lower lip, excision: Lip tissue, negative for carcinoma\par Right lower lip, excision: Lip tissue, negative for carcinoma\par Right floor of mouth, excision: Squamous mucosa, negative for carcinoma\par Left floor of mouth, excision: Squamous mucosa, negative for carcinoma\par Deep margin, excision: Soft tissue and skeletal muscle, negative for carcinoma\par Lymph nodes, right level 1,2,3,4, neck dissection: 9 lymph nodes negative for metastatic carcinoma. Submandibular gland with chronic inflammation and atrophy, negative for carcinoma\par Tooth #20, removal: Tooth, gross examination only\par Specimen:  Mandibular alveolar\par Procedure:  Composite resection\par Specimen Size:  5 x 1.7 x 0.5 cm portion of mandibular ramus, and soft tissue anterior (3.8 x 3.3 x 0.8 cm, inked blue) and soft tissue posterior (2.7 x 1 x 0.5 cm, inked black)\par Additional dimensions: Not applicable\par Specimen Laterality: Not applicable\par Tumor Site: Mandibular alveolar\par Tumor Focality: Unifocal\par Tumor Size: 1.8 x 1.2 x 0.4 cm\par Tumor Depth of Invasion (DOI) (millimeters): 3 mm\par Tumor Thickness (pT1 and pT2 tumors): 0.4 cm\par \par Tumor Description Gross Subtype: Solid\par \par Histologic Type: Squamous cell carcinoma\par Histologic Grade:  Well differentiated\par Margins:\par Uninvolved by invasive tumor\par Distance from closest margin (millimeters): 2 mm\par Specify location of closest margin, per orientation, if possible: Anterior soft tissue margin\par Lymph-Vascular Invasion: Not identified\par Perineural Invasion: Not identified\par Lymph Nodes, Extranodal Extension: Not identified\par Pathologic Staging (pTNM): Primary Tumor (pT):  pT1 \par Regional Lymph Nodes:  pN0\par # Lymph nodes examined:  39\par # Lymph nodes involved:0\par Size of Largest Metastatic Deposit (centimeters): Not applicable\par Distant Metastasis (pM):  pMX\par

## 2019-05-22 NOTE — ADDENDUM
[FreeTextEntry1] : Documented by Braydon Paniagua acting as a scribe for Dr. Yajaira Cai on 05/22/2019.\par

## 2019-05-22 NOTE — PHYSICAL EXAM
[Ambulatory and capable of all self care but unable to carry out any work activities] : Status 2- Ambulatory and capable of all self care but unable to carry out any work activities. Up and about more than 50% of waking hours [Normal] : affect appropriate [de-identified] : microstomia with drooling. Status post composite resection and fibular graft reconstruction of right jaw and neck dissection. Large firm minimally mobile mass left mid neck. Buckle mucosa left side nodularity, swelling of left anterior lateral tongue.\par mcrostomia with drooling. atatus post composite resection and fibular graft reconstruction of right jaw and neck dissection. large firm minimally mobile mass left mid neck. \par mcrostomia with drooling. atatus post composite resection and fibular graft reconstruction of right jaw and neck dissection. large firm minimally mobile mass left mid neck. \par macrostomia with drooling.

## 2019-05-23 LAB
ALBUMIN SERPL ELPH-MCNC: 4.3 G/DL
ALP BLD-CCNC: 76 U/L
ALT SERPL-CCNC: 30 U/L
ANION GAP SERPL CALC-SCNC: 12 MMOL/L
AST SERPL-CCNC: 17 U/L
BILIRUB SERPL-MCNC: 0.7 MG/DL
BUN SERPL-MCNC: 13 MG/DL
CALCIUM SERPL-MCNC: 9.9 MG/DL
CHLORIDE SERPL-SCNC: 96 MMOL/L
CO2 SERPL-SCNC: 30 MMOL/L
CREAT SERPL-MCNC: 0.86 MG/DL
GLUCOSE SERPL-MCNC: 113 MG/DL
MAGNESIUM SERPL-MCNC: 2.2 MG/DL
POTASSIUM SERPL-SCNC: 4.4 MMOL/L
PROT SERPL-MCNC: 7 G/DL
SODIUM SERPL-SCNC: 138 MMOL/L

## 2019-05-23 RX ORDER — CHOLECALCIFEROL (VITAMIN D3) 1250 MCG
1.25 MG CAPSULE ORAL
Qty: 8 | Refills: 3 | Status: ACTIVE | COMMUNITY
Start: 2019-05-23 | End: 1900-01-01

## 2019-05-24 PROCEDURE — 92610 EVALUATE SWALLOWING FUNCTION: CPT

## 2019-05-24 PROCEDURE — 92526 ORAL FUNCTION THERAPY: CPT

## 2019-05-29 ENCOUNTER — RESULT REVIEW (OUTPATIENT)
Age: 64
End: 2019-05-29

## 2019-05-29 ENCOUNTER — APPOINTMENT (OUTPATIENT)
Age: 64
End: 2019-05-29

## 2019-05-29 ENCOUNTER — LABORATORY RESULT (OUTPATIENT)
Age: 64
End: 2019-05-29

## 2019-05-29 ENCOUNTER — OTHER (OUTPATIENT)
Age: 64
End: 2019-05-29

## 2019-05-29 ENCOUNTER — APPOINTMENT (OUTPATIENT)
Dept: HEMATOLOGY ONCOLOGY | Facility: CLINIC | Age: 64
End: 2019-05-29

## 2019-05-29 LAB
BASOPHILS # BLD AUTO: 0.2 K/UL — SIGNIFICANT CHANGE UP (ref 0–0.2)
BASOPHILS NFR BLD AUTO: 2.2 % — HIGH (ref 0–2)
EOSINOPHIL # BLD AUTO: 0.3 K/UL — SIGNIFICANT CHANGE UP (ref 0–0.5)
EOSINOPHIL NFR BLD AUTO: 3.7 % — SIGNIFICANT CHANGE UP (ref 0–6)
HCT VFR BLD CALC: 38.9 % — LOW (ref 39–50)
HGB BLD-MCNC: 13.4 G/DL — SIGNIFICANT CHANGE UP (ref 13–17)
LYMPHOCYTES # BLD AUTO: 1.2 K/UL — SIGNIFICANT CHANGE UP (ref 1–3.3)
LYMPHOCYTES # BLD AUTO: 14.2 % — SIGNIFICANT CHANGE UP (ref 13–44)
MCHC RBC-ENTMCNC: 30.6 PG — SIGNIFICANT CHANGE UP (ref 27–34)
MCHC RBC-ENTMCNC: 34.4 G/DL — SIGNIFICANT CHANGE UP (ref 32–36)
MCV RBC AUTO: 88.9 FL — SIGNIFICANT CHANGE UP (ref 80–100)
MONOCYTES # BLD AUTO: 0.7 K/UL — SIGNIFICANT CHANGE UP (ref 0–0.9)
MONOCYTES NFR BLD AUTO: 8.7 % — SIGNIFICANT CHANGE UP (ref 2–14)
NEUTROPHILS # BLD AUTO: 6.1 K/UL — SIGNIFICANT CHANGE UP (ref 1.8–7.4)
NEUTROPHILS NFR BLD AUTO: 71.2 % — SIGNIFICANT CHANGE UP (ref 43–77)
PLATELET # BLD AUTO: 291 K/UL — SIGNIFICANT CHANGE UP (ref 150–400)
RBC # BLD: 4.37 M/UL — SIGNIFICANT CHANGE UP (ref 4.2–5.8)
RBC # FLD: 11.7 % — SIGNIFICANT CHANGE UP (ref 10.3–14.5)
WBC # BLD: 8.6 K/UL — SIGNIFICANT CHANGE UP (ref 3.8–10.5)
WBC # FLD AUTO: 8.6 K/UL — SIGNIFICANT CHANGE UP (ref 3.8–10.5)

## 2019-06-04 ENCOUNTER — RX RENEWAL (OUTPATIENT)
Age: 64
End: 2019-06-04

## 2019-06-04 PROCEDURE — 97110 THERAPEUTIC EXERCISES: CPT

## 2019-06-04 PROCEDURE — 97140 MANUAL THERAPY 1/> REGIONS: CPT

## 2019-06-04 PROCEDURE — 97163 PT EVAL HIGH COMPLEX 45 MIN: CPT

## 2019-06-04 RX ORDER — FENTANYL 25 UG/H
25 PATCH, EXTENDED RELEASE TRANSDERMAL
Qty: 10 | Refills: 0 | Status: DISCONTINUED | COMMUNITY
Start: 2019-05-22 | End: 2019-06-04

## 2019-06-04 RX ORDER — DIAZEPAM 5 MG/1
5 TABLET ORAL
Qty: 15 | Refills: 0 | Status: DISCONTINUED | COMMUNITY
Start: 2019-05-17 | End: 2019-06-04

## 2019-06-04 RX ORDER — FENTANYL 25 UG/H
25 PATCH, EXTENDED RELEASE TRANSDERMAL
Qty: 1 | Refills: 0 | Status: DISCONTINUED | COMMUNITY
Start: 2019-06-04 | End: 2019-06-04

## 2019-06-10 ENCOUNTER — OUTPATIENT (OUTPATIENT)
Dept: OUTPATIENT SERVICES | Facility: HOSPITAL | Age: 64
LOS: 1 days | Discharge: HOME | End: 2019-06-10

## 2019-06-10 ENCOUNTER — OTHER (OUTPATIENT)
Age: 64
End: 2019-06-10

## 2019-06-10 ENCOUNTER — APPOINTMENT (OUTPATIENT)
Dept: HEMATOLOGY ONCOLOGY | Facility: CLINIC | Age: 64
End: 2019-06-10
Payer: COMMERCIAL

## 2019-06-10 VITALS
DIASTOLIC BLOOD PRESSURE: 81 MMHG | WEIGHT: 145.44 LBS | HEART RATE: 93 BPM | HEIGHT: 67 IN | BODY MASS INDEX: 22.83 KG/M2 | SYSTOLIC BLOOD PRESSURE: 116 MMHG | OXYGEN SATURATION: 97 %

## 2019-06-10 DIAGNOSIS — Z85.819 PERSONAL HISTORY OF MALIGNANT NEOPLASM OF UNSPECIFIED SITE OF LIP, ORAL CAVITY, AND PHARYNX: Chronic | ICD-10-CM

## 2019-06-10 DIAGNOSIS — Z43.1 ENCOUNTER FOR ATTENTION TO GASTROSTOMY: Chronic | ICD-10-CM

## 2019-06-10 DIAGNOSIS — C06.9 MALIGNANT NEOPLASM OF MOUTH, UNSPECIFIED: ICD-10-CM

## 2019-06-10 PROCEDURE — 99214 OFFICE O/P EST MOD 30 MIN: CPT

## 2019-06-10 NOTE — HISTORY OF PRESENT ILLNESS
[de-identified] : The patient was diagnosed with recurrent head and neck SCC in April 2019 at the age of 63.  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  He completed radiation therapy (7,000 cGy) to the oral cavity on 2/11/16.  He continued with pain in the right lower lip. He was referred to Dr. Mahamed Paulson.  Biopsy 2/13/2018 of the right mandibular vestibule showed squamous cell carcinoma, well differentiated. PET/CT 3/2/18 revealed new FDG avidity in the anterior mandibular buccal region with associated irregularity of the underlying mandibular cortex, unchanged level lA lymph node. No evidence of distant metastasis. He followed with Dr. Farley who ordered a CT neck on 3/22/18 which revealed new lytic bony destruction, anterior superior aspect of the mandibular symphysis in the midline involving the roots for both central incisor teeth, no lymph nodes.  Biopsy was c/w SCCa in the area with erosion of the mandible, T4a N0 M0. \par \par He is now s/p composite resection and reconstruction with FFF on April 26, 2018. Final pathology pT1 N0 M0; however, given his original presentation, likely still  [id number] mandibular alveolar SCCa. He is s/p a release of the lower lip scar and mucosal graft on 3/7/19. An MRI 4/5/19 showed masslike nodular enhancement, increased in comparison to prior MR 12/20/2018 along the left posterior floor of mouth, spanning the base of tongue, glossotonsillar sulcus, and inferior left parapharyngeal space,  space, system with progression of recurrent disease.  Biopsy positive for invasive squamous cell carcinoma, keratinizing, well to moderately differentiated.  Follow up PET showed new irregular FDG avidity with central photopenia along the left posterior floor of mouth/left oral tongue, SUV 14.5, corresponding to masslike nodular enhancement which measures approximately 4.6 x 2.0 x 4.2 cm, on recent MRI.  [de-identified] : Patient presents for C2D13 Nivolumab for recurrent head and neck SCC.  He is s/p a release of the lower lip scar and mucosal graft on 3/7/19. + Odynophagia, recently much worse.  +Dysgeusia. + Fatigue. + Mucositis. + Left neck pain stiffness improved.  + Decreased appetite with associated weight loss. Left trapezius pain resolved.  No diarrhea or constipation. Mild nausea, no emesis. No edema. No neuropathy.

## 2019-06-10 NOTE — PHYSICAL EXAM
[Ambulatory and capable of all self care but unable to carry out any work activities] : Status 2- Ambulatory and capable of all self care but unable to carry out any work activities. Up and about more than 50% of waking hours [Normal] : affect appropriate [de-identified] : microstomia with drooling. Status post composite resection and fibular graft reconstruction of right jaw and neck dissection. Large firm minimally mobile mass left mid neck. Buckle mucosa left side nodularity, swelling of left anterior lateral tongue.\par mcrostomia with drooling. atatus post composite resection and fibular graft reconstruction of right jaw and neck dissection. large firm minimally mobile mass left mid neck. \par mcrostomia with drooling. atatus post composite resection and fibular graft reconstruction of right jaw and neck dissection. large firm minimally mobile mass left mid neck. \par macrostomia with drooling.

## 2019-06-11 RX ORDER — CEFUROXIME AXETIL 250 MG
1 TABLET ORAL
Qty: 0 | Refills: 0 | DISCHARGE
Start: 2019-06-11 | End: 2019-06-18

## 2019-06-12 ENCOUNTER — RX RENEWAL (OUTPATIENT)
Age: 64
End: 2019-06-12

## 2019-06-13 ENCOUNTER — EMERGENCY (EMERGENCY)
Facility: HOSPITAL | Age: 64
LOS: 1 days | Discharge: OTHER INSTITUTION | End: 2019-06-13
Attending: EMERGENCY MEDICINE
Payer: COMMERCIAL

## 2019-06-13 ENCOUNTER — TRANSCRIPTION ENCOUNTER (OUTPATIENT)
Age: 64
End: 2019-06-13

## 2019-06-13 ENCOUNTER — INPATIENT (INPATIENT)
Facility: HOSPITAL | Age: 64
LOS: 4 days | Discharge: HOME HEALTH | End: 2019-06-18
Attending: HOSPITALIST | Admitting: HOSPITALIST
Payer: COMMERCIAL

## 2019-06-13 VITALS
SYSTOLIC BLOOD PRESSURE: 137 MMHG | RESPIRATION RATE: 15 BRPM | DIASTOLIC BLOOD PRESSURE: 86 MMHG | OXYGEN SATURATION: 96 % | TEMPERATURE: 99 F | HEART RATE: 85 BPM

## 2019-06-13 VITALS
DIASTOLIC BLOOD PRESSURE: 81 MMHG | SYSTOLIC BLOOD PRESSURE: 122 MMHG | OXYGEN SATURATION: 97 % | RESPIRATION RATE: 18 BRPM | TEMPERATURE: 98 F | HEART RATE: 89 BPM

## 2019-06-13 VITALS
TEMPERATURE: 98 F | DIASTOLIC BLOOD PRESSURE: 72 MMHG | RESPIRATION RATE: 20 BRPM | OXYGEN SATURATION: 97 % | HEIGHT: 68 IN | HEART RATE: 95 BPM | WEIGHT: 138.89 LBS | SYSTOLIC BLOOD PRESSURE: 116 MMHG

## 2019-06-13 DIAGNOSIS — Z85.819 PERSONAL HISTORY OF MALIGNANT NEOPLASM OF UNSPECIFIED SITE OF LIP, ORAL CAVITY, AND PHARYNX: Chronic | ICD-10-CM

## 2019-06-13 DIAGNOSIS — Z43.1 ENCOUNTER FOR ATTENTION TO GASTROSTOMY: Chronic | ICD-10-CM

## 2019-06-13 DIAGNOSIS — Z98.890 OTHER SPECIFIED POSTPROCEDURAL STATES: Chronic | ICD-10-CM

## 2019-06-13 LAB
ALBUMIN SERPL ELPH-MCNC: 4.3 G/DL — SIGNIFICANT CHANGE UP (ref 3.3–5.2)
ALP SERPL-CCNC: 69 U/L — SIGNIFICANT CHANGE UP (ref 40–120)
ALT FLD-CCNC: 18 U/L — SIGNIFICANT CHANGE UP
ANION GAP SERPL CALC-SCNC: 15 MMOL/L — SIGNIFICANT CHANGE UP (ref 5–17)
APTT BLD: 29.1 SEC — SIGNIFICANT CHANGE UP (ref 27.5–36.3)
AST SERPL-CCNC: 27 U/L — SIGNIFICANT CHANGE UP
BASOPHILS # BLD AUTO: 0 K/UL — SIGNIFICANT CHANGE UP (ref 0–0.2)
BASOPHILS NFR BLD AUTO: 0.6 % — SIGNIFICANT CHANGE UP (ref 0–2)
BILIRUB SERPL-MCNC: 0.7 MG/DL — SIGNIFICANT CHANGE UP (ref 0.4–2)
BUN SERPL-MCNC: 11 MG/DL — SIGNIFICANT CHANGE UP (ref 8–20)
CALCIUM SERPL-MCNC: 10.3 MG/DL — HIGH (ref 8.6–10.2)
CHLORIDE SERPL-SCNC: 95 MMOL/L — LOW (ref 98–107)
CO2 SERPL-SCNC: 27 MMOL/L — SIGNIFICANT CHANGE UP (ref 22–29)
CREAT SERPL-MCNC: 0.6 MG/DL — SIGNIFICANT CHANGE UP (ref 0.5–1.3)
EOSINOPHIL # BLD AUTO: 0.4 K/UL — SIGNIFICANT CHANGE UP (ref 0–0.5)
EOSINOPHIL NFR BLD AUTO: 4.1 % — SIGNIFICANT CHANGE UP (ref 0–5)
GLUCOSE SERPL-MCNC: 89 MG/DL — SIGNIFICANT CHANGE UP (ref 70–115)
HCT VFR BLD CALC: 43.6 % — SIGNIFICANT CHANGE UP (ref 42–52)
HGB BLD-MCNC: 14.6 G/DL — SIGNIFICANT CHANGE UP (ref 14–18)
INR BLD: 1.08 RATIO — SIGNIFICANT CHANGE UP (ref 0.88–1.16)
LYMPHOCYTES # BLD AUTO: 1.1 K/UL — SIGNIFICANT CHANGE UP (ref 1–4.8)
LYMPHOCYTES # BLD AUTO: 12.3 % — LOW (ref 20–55)
MCHC RBC-ENTMCNC: 30 PG — SIGNIFICANT CHANGE UP (ref 27–31)
MCHC RBC-ENTMCNC: 33.5 G/DL — SIGNIFICANT CHANGE UP (ref 32–36)
MCV RBC AUTO: 89.7 FL — SIGNIFICANT CHANGE UP (ref 80–94)
MONOCYTES # BLD AUTO: 1 K/UL — HIGH (ref 0–0.8)
MONOCYTES NFR BLD AUTO: 10.9 % — HIGH (ref 3–10)
NEUTROPHILS # BLD AUTO: 6.5 K/UL — SIGNIFICANT CHANGE UP (ref 1.8–8)
NEUTROPHILS NFR BLD AUTO: 71.7 % — SIGNIFICANT CHANGE UP (ref 37–73)
PLATELET # BLD AUTO: 302 K/UL — SIGNIFICANT CHANGE UP (ref 150–400)
POTASSIUM SERPL-MCNC: 5 MMOL/L — SIGNIFICANT CHANGE UP (ref 3.5–5.3)
POTASSIUM SERPL-SCNC: 5 MMOL/L — SIGNIFICANT CHANGE UP (ref 3.5–5.3)
PROT SERPL-MCNC: 8.4 G/DL — SIGNIFICANT CHANGE UP (ref 6.6–8.7)
PROTHROM AB SERPL-ACNC: 12.4 SEC — SIGNIFICANT CHANGE UP (ref 10–12.9)
RBC # BLD: 4.86 M/UL — SIGNIFICANT CHANGE UP (ref 4.6–6.2)
RBC # FLD: 13 % — SIGNIFICANT CHANGE UP (ref 11–15.6)
SODIUM SERPL-SCNC: 137 MMOL/L — SIGNIFICANT CHANGE UP (ref 135–145)
TYPE + AB SCN PNL BLD: SIGNIFICANT CHANGE UP
WBC # BLD: 9 K/UL — SIGNIFICANT CHANGE UP (ref 4.8–10.8)
WBC # FLD AUTO: 9 K/UL — SIGNIFICANT CHANGE UP (ref 4.8–10.8)

## 2019-06-13 PROCEDURE — 96376 TX/PRO/DX INJ SAME DRUG ADON: CPT

## 2019-06-13 PROCEDURE — 85610 PROTHROMBIN TIME: CPT

## 2019-06-13 PROCEDURE — 70491 CT SOFT TISSUE NECK W/DYE: CPT

## 2019-06-13 PROCEDURE — 80053 COMPREHEN METABOLIC PANEL: CPT

## 2019-06-13 PROCEDURE — 85730 THROMBOPLASTIN TIME PARTIAL: CPT

## 2019-06-13 PROCEDURE — 99285 EMERGENCY DEPT VISIT HI MDM: CPT

## 2019-06-13 PROCEDURE — 85027 COMPLETE CBC AUTOMATED: CPT

## 2019-06-13 PROCEDURE — 96375 TX/PRO/DX INJ NEW DRUG ADDON: CPT

## 2019-06-13 PROCEDURE — 86900 BLOOD TYPING SEROLOGIC ABO: CPT

## 2019-06-13 PROCEDURE — 86850 RBC ANTIBODY SCREEN: CPT

## 2019-06-13 PROCEDURE — 96365 THER/PROPH/DIAG IV INF INIT: CPT | Mod: XU

## 2019-06-13 PROCEDURE — 96361 HYDRATE IV INFUSION ADD-ON: CPT

## 2019-06-13 PROCEDURE — 86901 BLOOD TYPING SEROLOGIC RH(D): CPT

## 2019-06-13 PROCEDURE — 70491 CT SOFT TISSUE NECK W/DYE: CPT | Mod: 26

## 2019-06-13 PROCEDURE — 99285 EMERGENCY DEPT VISIT HI MDM: CPT | Mod: 25

## 2019-06-13 PROCEDURE — 36415 COLL VENOUS BLD VENIPUNCTURE: CPT

## 2019-06-13 RX ORDER — SODIUM CHLORIDE 9 MG/ML
1000 INJECTION INTRAMUSCULAR; INTRAVENOUS; SUBCUTANEOUS ONCE
Refills: 0 | Status: COMPLETED | OUTPATIENT
Start: 2019-06-13 | End: 2019-06-13

## 2019-06-13 RX ORDER — DEXAMETHASONE 0.5 MG/5ML
10 ELIXIR ORAL ONCE
Refills: 0 | Status: COMPLETED | OUTPATIENT
Start: 2019-06-13 | End: 2019-06-13

## 2019-06-13 RX ORDER — HYDROMORPHONE HYDROCHLORIDE 2 MG/ML
1 INJECTION INTRAMUSCULAR; INTRAVENOUS; SUBCUTANEOUS ONCE
Refills: 0 | Status: DISCONTINUED | OUTPATIENT
Start: 2019-06-13 | End: 2019-06-13

## 2019-06-13 RX ADMIN — HYDROMORPHONE HYDROCHLORIDE 1 MILLIGRAM(S): 2 INJECTION INTRAMUSCULAR; INTRAVENOUS; SUBCUTANEOUS at 22:14

## 2019-06-13 RX ADMIN — SODIUM CHLORIDE 1000 MILLILITER(S): 9 INJECTION INTRAMUSCULAR; INTRAVENOUS; SUBCUTANEOUS at 21:05

## 2019-06-13 RX ADMIN — HYDROMORPHONE HYDROCHLORIDE 1 MILLIGRAM(S): 2 INJECTION INTRAMUSCULAR; INTRAVENOUS; SUBCUTANEOUS at 20:08

## 2019-06-13 RX ADMIN — Medication 10 MILLIGRAM(S): at 22:00

## 2019-06-13 RX ADMIN — SODIUM CHLORIDE 1000 MILLILITER(S): 9 INJECTION INTRAMUSCULAR; INTRAVENOUS; SUBCUTANEOUS at 19:53

## 2019-06-13 RX ADMIN — SODIUM CHLORIDE 1000 MILLILITER(S): 9 INJECTION INTRAMUSCULAR; INTRAVENOUS; SUBCUTANEOUS at 20:05

## 2019-06-13 RX ADMIN — Medication 1 MILLIGRAM(S): at 22:14

## 2019-06-13 RX ADMIN — Medication 1 MILLIGRAM(S): at 20:03

## 2019-06-13 RX ADMIN — Medication 102 MILLIGRAM(S): at 21:41

## 2019-06-13 RX ADMIN — SODIUM CHLORIDE 1000 MILLILITER(S): 9 INJECTION INTRAMUSCULAR; INTRAVENOUS; SUBCUTANEOUS at 15:58

## 2019-06-13 RX ADMIN — HYDROMORPHONE HYDROCHLORIDE 1 MILLIGRAM(S): 2 INJECTION INTRAMUSCULAR; INTRAVENOUS; SUBCUTANEOUS at 20:03

## 2019-06-13 NOTE — ED PROVIDER NOTE - PROGRESS NOTE DETAILS
AJM: ct shows worsening tumor. spoke with ENT who notes pt shoulde be transferred to [facility] as Dr Hdz from ENT is there and he is the one who perated on him in the past. pt and son updated and consent to transfer. accepted by dr souza ENt and dr na GARCIA at [facility] ed

## 2019-06-13 NOTE — ED ADULT NURSE NOTE - OBJECTIVE STATEMENT
Assumed pt care at 1541.  Pt reports he is unable to swallow even water since 2100 last night.  Difficulty swallowing saliva.  Here for possible PEG placement.  Has had PEG in the past.  Currently on immunotherapy.

## 2019-06-13 NOTE — ED ADULT NURSE NOTE - PSH
Encounter for PEG (percutaneous endoscopic gastrostomy)  inserted 2015 & removal of peg 2015  H/O shoulder surgery  impingement left 2005, right 2007  History of lip cancer  excision of lower lip cancer 2014, madibular reconstruction with bone graft 2018  History of mandibular surgery    History of oral cancer  insertion of chemo port & removal 2015

## 2019-06-13 NOTE — ED PROVIDER NOTE - ENMT, MLM
Airway patent, Nasal mucosa clear. + swelling in oropharynx likely 2/2 tumor. Tolerating secretions. Unable to visualize posterior pharynx.

## 2019-06-13 NOTE — ED STATDOCS - PROGRESS NOTE DETAILS
62 y/o M pt with hx of head/neck cancer (unspecified) presents to the ED c/o dehydration with assoc. decreased appetite/intake. Pt states he feels as if his throat is closing up. Reports he has been unable to feed himself due to throat swelling. Pt was sent into ED by Dr. Cai, for possible PEG tube placement. No further complaints at this time.

## 2019-06-13 NOTE — ED ADULT TRIAGE NOTE - CHIEF COMPLAINT QUOTE
Pt brought in by EMS as a transfer from [place] for 4 different kinds of mouth CA and eval of need for PEG tube.  As per EMS, was told pt has tumor that is sitting on carotid.  Pt received decadron and dilaudid prior to leaving [place].  20g to L AC.  Denies any difficulty breathing, respirations even and unlabored on room air.  No signs of compromised airway present.  Pt denies any chemo but states is on immunotherapy.  PMHx: anxiety, malignancy of mouth Pt brought in by EMS as a transfer from [place] for 4 different kinds of mouth CA and eval of need for PEG tube.  As per EMS, was told pt has tumor that is sitting on carotid.  Pt received decadron and dilaudid prior to leaving [place].  20g to L AC.  Denies any difficulty breathing, respirations even and unlabored on room air.  No signs of compromised airway present.  Pt denies any chemo but states is on immunotherapy.  PMHx: anxiety, malignancy of mouth

## 2019-06-13 NOTE — ED PROVIDER NOTE - CLINICAL SUMMARY MEDICAL DECISION MAKING FREE TEXT BOX
pt presenting with difficulty swallowing likely 2/2 tumor and pharyngitis. will obtain ct neck with IV contrast. IVF, labs, likely admit for PEG

## 2019-06-13 NOTE — ED ADULT TRIAGE NOTE - CHIEF COMPLAINT QUOTE
Patient sent from [facility], was advised to go to ED by Dr. Cai for possible PEG Tube Placement, pt. has throat cancer, feels like his throat is closing up.  Decreased appetite/intake.

## 2019-06-13 NOTE — ED ADULT NURSE NOTE - CHIEF COMPLAINT QUOTE
Patient sent from [facility], was advised to go to ED by Dr. Cai for possible PEG Tube Placement, pt. has throat cancer, feels like his throat is closing up.  Decreased appetite/intake.

## 2019-06-13 NOTE — ED PROVIDER NOTE - OBJECTIVE STATEMENT
62 y/o M pt with hx of head/neck cancer (unspecified) presents to the ED c/o dehydration with assoc. decreased appetite/intake. Pt states he feels as if his throat is closing up. Reports he has been unable to feed himself due to throat swelling. pt seen by PMD several days ago who prescribed cefuroxime. No fevers. symptoms improving but still significant difficulty feeding himself. Pt notes years ago he had a PEG tube which helped him. He spoke with his onc dr salinas who recommended coming for admission for PEG placement. No fevers, chills, sweats, chest pain, sob. Pt is able to tolerate secretions. Pt on immunotherapy

## 2019-06-14 ENCOUNTER — APPOINTMENT (OUTPATIENT)
Dept: THORACIC SURGERY | Facility: HOSPITAL | Age: 64
End: 2019-06-14

## 2019-06-14 DIAGNOSIS — Z29.9 ENCOUNTER FOR PROPHYLACTIC MEASURES, UNSPECIFIED: ICD-10-CM

## 2019-06-14 DIAGNOSIS — C06.9 MALIGNANT NEOPLASM OF MOUTH, UNSPECIFIED: ICD-10-CM

## 2019-06-14 DIAGNOSIS — F41.9 ANXIETY DISORDER, UNSPECIFIED: ICD-10-CM

## 2019-06-14 DIAGNOSIS — R13.10 DYSPHAGIA, UNSPECIFIED: ICD-10-CM

## 2019-06-14 DIAGNOSIS — E86.0 DEHYDRATION: ICD-10-CM

## 2019-06-14 DIAGNOSIS — R52 PAIN, UNSPECIFIED: ICD-10-CM

## 2019-06-14 DIAGNOSIS — Z79.899 OTHER LONG TERM (CURRENT) DRUG THERAPY: ICD-10-CM

## 2019-06-14 DIAGNOSIS — Z51.5 ENCOUNTER FOR PALLIATIVE CARE: ICD-10-CM

## 2019-06-14 LAB
ANION GAP SERPL CALC-SCNC: 13 MMO/L — SIGNIFICANT CHANGE UP (ref 7–14)
APTT BLD: 29.9 SEC — SIGNIFICANT CHANGE UP (ref 27.5–36.3)
BUN SERPL-MCNC: 11 MG/DL — SIGNIFICANT CHANGE UP (ref 7–23)
CALCIUM SERPL-MCNC: 9.9 MG/DL — SIGNIFICANT CHANGE UP (ref 8.4–10.5)
CHLORIDE SERPL-SCNC: 97 MMOL/L — LOW (ref 98–107)
CO2 SERPL-SCNC: 27 MMOL/L — SIGNIFICANT CHANGE UP (ref 22–31)
CREAT SERPL-MCNC: 0.62 MG/DL — SIGNIFICANT CHANGE UP (ref 0.5–1.3)
GLUCOSE SERPL-MCNC: 151 MG/DL — HIGH (ref 70–99)
HCT VFR BLD CALC: 41.9 % — SIGNIFICANT CHANGE UP (ref 39–50)
HGB BLD-MCNC: 13.6 G/DL — SIGNIFICANT CHANGE UP (ref 13–17)
INR BLD: 1.17 — SIGNIFICANT CHANGE UP (ref 0.88–1.17)
MAGNESIUM SERPL-MCNC: 2.1 MG/DL — SIGNIFICANT CHANGE UP (ref 1.6–2.6)
MCHC RBC-ENTMCNC: 28.7 PG — SIGNIFICANT CHANGE UP (ref 27–34)
MCHC RBC-ENTMCNC: 32.5 % — SIGNIFICANT CHANGE UP (ref 32–36)
MCV RBC AUTO: 88.4 FL — SIGNIFICANT CHANGE UP (ref 80–100)
NRBC # FLD: 0 K/UL — SIGNIFICANT CHANGE UP (ref 0–0)
PHOSPHATE SERPL-MCNC: 3.9 MG/DL — SIGNIFICANT CHANGE UP (ref 2.5–4.5)
PLATELET # BLD AUTO: 304 K/UL — SIGNIFICANT CHANGE UP (ref 150–400)
PMV BLD: 9.4 FL — SIGNIFICANT CHANGE UP (ref 7–13)
POTASSIUM SERPL-MCNC: 4.2 MMOL/L — SIGNIFICANT CHANGE UP (ref 3.5–5.3)
POTASSIUM SERPL-SCNC: 4.2 MMOL/L — SIGNIFICANT CHANGE UP (ref 3.5–5.3)
PROTHROM AB SERPL-ACNC: 13.1 SEC — SIGNIFICANT CHANGE UP (ref 9.8–13.1)
RBC # BLD: 4.74 M/UL — SIGNIFICANT CHANGE UP (ref 4.2–5.8)
RBC # FLD: 12.2 % — SIGNIFICANT CHANGE UP (ref 10.3–14.5)
SODIUM SERPL-SCNC: 137 MMOL/L — SIGNIFICANT CHANGE UP (ref 135–145)
WBC # BLD: 7.22 K/UL — SIGNIFICANT CHANGE UP (ref 3.8–10.5)
WBC # FLD AUTO: 7.22 K/UL — SIGNIFICANT CHANGE UP (ref 3.8–10.5)

## 2019-06-14 PROCEDURE — 43246 EGD PLACE GASTROSTOMY TUBE: CPT

## 2019-06-14 PROCEDURE — 99222 1ST HOSP IP/OBS MODERATE 55: CPT

## 2019-06-14 PROCEDURE — 12345: CPT | Mod: NC,GC

## 2019-06-14 PROCEDURE — ZZZZZ: CPT

## 2019-06-14 PROCEDURE — 99223 1ST HOSP IP/OBS HIGH 75: CPT

## 2019-06-14 PROCEDURE — 99221 1ST HOSP IP/OBS SF/LOW 40: CPT

## 2019-06-14 PROCEDURE — 99222 1ST HOSP IP/OBS MODERATE 55: CPT | Mod: GC

## 2019-06-14 RX ORDER — SCOPALAMINE 1 MG/3D
1 PATCH, EXTENDED RELEASE TRANSDERMAL
Qty: 0 | Refills: 0 | DISCHARGE

## 2019-06-14 RX ORDER — SODIUM CHLORIDE 9 MG/ML
1000 INJECTION, SOLUTION INTRAVENOUS
Refills: 0 | Status: DISCONTINUED | OUTPATIENT
Start: 2019-06-14 | End: 2019-06-15

## 2019-06-14 RX ORDER — SENNA PLUS 8.6 MG/1
2 TABLET ORAL AT BEDTIME
Refills: 0 | Status: DISCONTINUED | OUTPATIENT
Start: 2019-06-14 | End: 2019-06-16

## 2019-06-14 RX ORDER — DIAZEPAM 5 MG
5 TABLET ORAL DAILY
Refills: 0 | Status: DISCONTINUED | OUTPATIENT
Start: 2019-06-14 | End: 2019-06-14

## 2019-06-14 RX ORDER — MORPHINE SULFATE 50 MG/1
10 CAPSULE, EXTENDED RELEASE ORAL EVERY 4 HOURS
Refills: 0 | Status: DISCONTINUED | OUTPATIENT
Start: 2019-06-14 | End: 2019-06-14

## 2019-06-14 RX ORDER — HYDROMORPHONE HYDROCHLORIDE 2 MG/ML
0.6 INJECTION INTRAMUSCULAR; INTRAVENOUS; SUBCUTANEOUS EVERY 4 HOURS
Refills: 0 | Status: DISCONTINUED | OUTPATIENT
Start: 2019-06-14 | End: 2019-06-15

## 2019-06-14 RX ORDER — ENOXAPARIN SODIUM 100 MG/ML
40 INJECTION SUBCUTANEOUS EVERY 24 HOURS
Refills: 0 | Status: DISCONTINUED | OUTPATIENT
Start: 2019-06-14 | End: 2019-06-14

## 2019-06-14 RX ORDER — HYDROMORPHONE HYDROCHLORIDE 2 MG/ML
0.5 INJECTION INTRAMUSCULAR; INTRAVENOUS; SUBCUTANEOUS
Refills: 0 | Status: DISCONTINUED | OUTPATIENT
Start: 2019-06-14 | End: 2019-06-14

## 2019-06-14 RX ORDER — HYDROMORPHONE HYDROCHLORIDE 2 MG/ML
0.5 INJECTION INTRAMUSCULAR; INTRAVENOUS; SUBCUTANEOUS ONCE
Refills: 0 | Status: DISCONTINUED | OUTPATIENT
Start: 2019-06-14 | End: 2019-06-14

## 2019-06-14 RX ORDER — SCOPALAMINE 1 MG/3D
1 PATCH, EXTENDED RELEASE TRANSDERMAL
Refills: 0 | Status: DISCONTINUED | OUTPATIENT
Start: 2019-06-14 | End: 2019-06-18

## 2019-06-14 RX ORDER — SODIUM CHLORIDE 9 MG/ML
1000 INJECTION, SOLUTION INTRAVENOUS ONCE
Refills: 0 | Status: COMPLETED | OUTPATIENT
Start: 2019-06-14 | End: 2019-06-14

## 2019-06-14 RX ORDER — HYDROMORPHONE HYDROCHLORIDE 2 MG/ML
1 INJECTION INTRAMUSCULAR; INTRAVENOUS; SUBCUTANEOUS ONCE
Refills: 0 | Status: DISCONTINUED | OUTPATIENT
Start: 2019-06-14 | End: 2019-06-14

## 2019-06-14 RX ORDER — POLYETHYLENE GLYCOL 3350 17 G/17G
17 POWDER, FOR SOLUTION ORAL DAILY
Refills: 0 | Status: DISCONTINUED | OUTPATIENT
Start: 2019-06-14 | End: 2019-06-18

## 2019-06-14 RX ORDER — HYDROMORPHONE HYDROCHLORIDE 2 MG/ML
0.6 INJECTION INTRAMUSCULAR; INTRAVENOUS; SUBCUTANEOUS EVERY 4 HOURS
Refills: 0 | Status: DISCONTINUED | OUTPATIENT
Start: 2019-06-14 | End: 2019-06-14

## 2019-06-14 RX ORDER — ONDANSETRON 8 MG/1
4 TABLET, FILM COATED ORAL ONCE
Refills: 0 | Status: COMPLETED | OUTPATIENT
Start: 2019-06-14 | End: 2019-06-14

## 2019-06-14 RX ORDER — ASPIRIN/SOD BICARB/CITRIC ACID 324 MG
2 TABLET, EFFERVESCENT ORAL
Qty: 0 | Refills: 0 | DISCHARGE

## 2019-06-14 RX ORDER — MORPHINE SULFATE 50 MG/1
0.5 CAPSULE, EXTENDED RELEASE ORAL
Qty: 0 | Refills: 0 | DISCHARGE

## 2019-06-14 RX ADMIN — ENOXAPARIN SODIUM 40 MILLIGRAM(S): 100 INJECTION SUBCUTANEOUS at 05:34

## 2019-06-14 RX ADMIN — SODIUM CHLORIDE 1000 MILLILITER(S): 9 INJECTION, SOLUTION INTRAVENOUS at 00:55

## 2019-06-14 RX ADMIN — HYDROMORPHONE HYDROCHLORIDE 0.6 MILLIGRAM(S): 2 INJECTION INTRAMUSCULAR; INTRAVENOUS; SUBCUTANEOUS at 22:31

## 2019-06-14 RX ADMIN — HYDROMORPHONE HYDROCHLORIDE 0.5 MILLIGRAM(S): 2 INJECTION INTRAMUSCULAR; INTRAVENOUS; SUBCUTANEOUS at 16:03

## 2019-06-14 RX ADMIN — HYDROMORPHONE HYDROCHLORIDE 0.6 MILLIGRAM(S): 2 INJECTION INTRAMUSCULAR; INTRAVENOUS; SUBCUTANEOUS at 22:01

## 2019-06-14 RX ADMIN — Medication 0.5 MILLIGRAM(S): at 00:54

## 2019-06-14 RX ADMIN — HYDROMORPHONE HYDROCHLORIDE 0.5 MILLIGRAM(S): 2 INJECTION INTRAMUSCULAR; INTRAVENOUS; SUBCUTANEOUS at 00:54

## 2019-06-14 RX ADMIN — HYDROMORPHONE HYDROCHLORIDE 0.5 MILLIGRAM(S): 2 INJECTION INTRAMUSCULAR; INTRAVENOUS; SUBCUTANEOUS at 15:48

## 2019-06-14 RX ADMIN — ONDANSETRON 4 MILLIGRAM(S): 8 TABLET, FILM COATED ORAL at 15:52

## 2019-06-14 RX ADMIN — Medication 0.5 MILLIGRAM(S): at 16:00

## 2019-06-14 RX ADMIN — SODIUM CHLORIDE 75 MILLILITER(S): 9 INJECTION, SOLUTION INTRAVENOUS at 16:16

## 2019-06-14 RX ADMIN — HYDROMORPHONE HYDROCHLORIDE 0.6 MILLIGRAM(S): 2 INJECTION INTRAMUSCULAR; INTRAVENOUS; SUBCUTANEOUS at 18:17

## 2019-06-14 RX ADMIN — SODIUM CHLORIDE 75 MILLILITER(S): 9 INJECTION, SOLUTION INTRAVENOUS at 04:56

## 2019-06-14 RX ADMIN — HYDROMORPHONE HYDROCHLORIDE 1 MILLIGRAM(S): 2 INJECTION INTRAMUSCULAR; INTRAVENOUS; SUBCUTANEOUS at 20:00

## 2019-06-14 RX ADMIN — HYDROMORPHONE HYDROCHLORIDE 1 MILLIGRAM(S): 2 INJECTION INTRAMUSCULAR; INTRAVENOUS; SUBCUTANEOUS at 20:28

## 2019-06-14 RX ADMIN — HYDROMORPHONE HYDROCHLORIDE 0.6 MILLIGRAM(S): 2 INJECTION INTRAMUSCULAR; INTRAVENOUS; SUBCUTANEOUS at 17:47

## 2019-06-14 NOTE — CONSULT NOTE ADULT - ASSESSMENT
63M hx of anxiety and oral cavity SCC s/p prior resection with logoregional recurrence on palliative immunotherapy presents with worsening of chronic dysphagia.   Thoracic surgery consulted for EGD, PEG placement.  -OR today for EGD, PEG placement  -KEEP NPO  D/W Dr. Schroeder

## 2019-06-14 NOTE — H&P ADULT - HISTORY OF PRESENT ILLNESS
Please note patient currently is sedated/somnolent and required several redirections for HPI/ROS.    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 Said that he feels like solid food gets stuck in his throat and has pain associated with swallowing. Denies any cough/choaking sensation with PO intake, denies any issues with tolerating his oral secretions. Said that this has been going on for a few days and seems to be worsening. He was at his oncologist's office for a follow up and mentioned these complaints to him and was subsequently sent to [facility] for PEG eval. There he had a CT Neck that showed worsening disease and thickening of the epiglottis/supraglottic hypopharyngeal walls and was sent to [facility] for further management and ENT eval. Currently patient is without any complaints.     On arrival to the ED, his vitals were T 98.6, P 85, /86, RR 15, O2 sat 96% RA. At [facility], he was given Dilaudid 1mg IVP x1, decadron 10mg IVPB x1, ativan 1mg IVP x1, and NS 2L. At [facility] he was given Dilaudid 0.5mg IVP x1, ativan 0.5mg IVP x1, and LS 1L. He was evaluated by ENT and was admitted to medicine.

## 2019-06-14 NOTE — ED PROVIDER NOTE - CLINICAL SUMMARY MEDICAL DECISION MAKING FREE TEXT BOX
[place]: 63M with PMH of OP SCC, transferred for ENT eval 2/2 dysphagia 2/2 XRT changes vs new disease.  No trouble breathing.  On exam, HDS, NAD, lungs CTAB, heart sounds normal, abd soft, NT, ND, no CVAT, LEs without edema, wwp, skin normal temperature and color, neuro: alert and oriented, no focal deficits.  Labs and imaging available from OSH, will continue hydrating, ENT consult, admit.

## 2019-06-14 NOTE — CONSULT NOTE ADULT - SUBJECTIVE AND OBJECTIVE BOX
Patient is a 63y old  Male who presents with a chief complaint of Worsening dysphagia (14 Jun 2019 09:59)      HPI:  Please note patient currently is sedated/somnolent and required several redirections for HPI/ROS.    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 Said that he feels like solid food gets stuck in his throat and has pain associated with swallowing. Denies any cough/choaking sensation with PO intake, denies any issues with tolerating his oral secretions. Said that this has been going on for a few days and seems to be worsening. He was at his oncologist's office for a follow up and mentioned these complaints to him and was subsequently sent to [facility] for PEG eval. There he had a CT Neck that showed worsening disease and thickening of the epiglottis/supraglottic hypopharyngeal walls and was sent to [facility] for further management and ENT eval. Currently patient is without any complaints.     On arrival to the ED, his vitals were T 98.6, P 85, /86, RR 15, O2 sat 96% RA. At [facility], he was given Dilaudid 1mg IVP x1, decadron 10mg IVPB x1, ativan 1mg IVP x1, and NS 2L. At [facility] he was given Dilaudid 0.5mg IVP x1, ativan 0.5mg IVP x1, and LS 1L. He was evaluated by ENT and was admitted to medicine. 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 1000 milliLiter(s) (75 mL/Hr) IV Continuous <Continuous>    MEDICATIONS  (PRN):  LORazepam   Injectable 0.5 milliGRAM(s) IV Push every 6 hours PRN Anxiety  morphine   Solution 10 milliGRAM(s) Oral every 4 hours PRN Moderate to severe pain      Allergies    No Known Allergies    Intolerances        Vital Signs Last 24 Hrs  T(C): 37.1 (14 Jun 2019 07:07), Max: 37.1 (14 Jun 2019 00:17)  T(F): 98.8 (14 Jun 2019 07:07), Max: 98.8 (14 Jun 2019 07:07)  HR: 80 (14 Jun 2019 07:07) (76 - 95)  BP: 140/88 (14 Jun 2019 07:07) (116/72 - 140/88)  BP(mean): --  RR: 18 (14 Jun 2019 07:07) (15 - 20)  SpO2: 97% (14 Jun 2019 07:07) (95% - 100%)    PHYSICAL EXAM  General: adult in NAD  HEENT: clear oropharynx, anicteric sclera, pink conjunctiva  Neck: supple  CV: normal S1/S2 with no murmur rubs or gallops  Lungs: positive air movement b/l ant lungs, clear to auscultation, no wheezes, no rales  Abdomen: soft non-tender non-distended, no hepatosplenomegaly  Ext: no clubbing cyanosis or edema  Skin: no rashes and no petechiae  Neuro: alert and oriented X 3, none focal    LABS:                          13.6   7.22  )-----------( 304      ( 14 Jun 2019 05:14 )             41.9         Mean Cell Volume : 88.4 fL  Mean Cell Hemoglobin : 28.7 pg  Mean Cell Hemoglobin Concentration : 32.5 %  Auto Neutrophil # : x  Auto Lymphocyte # : x  Auto Monocyte # : x  Auto Eosinophil # : x  Auto Basophil # : x  Auto Neutrophil % : x  Auto Lymphocyte % : x  Auto Monocyte % : x  Auto Eosinophil % : x  Auto Basophil % : x      Serial CBC's  06-14 @ 05:14  Hct-41.9 / Hgb-13.6 / Plat-304 / RBC-4.74 / WBC-7.22  Serial CBC's  06-13 @ 16:19  Hct-43.6 / Hgb-14.6 / Plat-302 / RBC-4.86 / WBC-9.0      06-14    137  |  97<L>  |  11  ----------------------------<  151<H>  4.2   |  27  |  0.62    Ca    9.9      14 Jun 2019 05:14  Phos  3.9     06-14  Mg     2.1     06-14    TPro  8.4  /  Alb  4.3  /  TBili  0.7  /  DBili  x   /  AST  27  /  ALT  18  /  AlkPhos  69  06-13      PT/INR - ( 14 Jun 2019 10:24 )   PT: 13.1 SEC;   INR: 1.17          PTT - ( 14 Jun 2019 10:24 )  PTT:29.9 SEC                RADIOLOGY & ADDITIONAL STUDIES: Patient is a 63y old  Male who presents with a chief complaint of Worsening dysphagia (14 Jun 2019 09:59)      HPI:  Please note patient currently is sedated/somnolent and required several redirections for HPI/ROS.    63m with metastatic SCC of the mouth/neck s/p resection/reconstruciton 4/18 with adjuvent RT, now with recurrence on immunotherapy with nivolumab and Anxiety who presents to the hospital with complaints of worsening dysphagia/odynophagia. Said that he feels like solid food gets stuck in his throat and has pain associated with swallowing. Denies any cough/choaking sensation with PO intake, denies any issues with tolerating his oral secretions. Said that this has been going on for a few days and seems to be worsening. He was at his oncologist's office for a follow up and mentioned these complaints to him and was subsequently sent to [facility] for PEG eval. There he had a CT Neck that showed worsening disease and thickening of the epiglottis/supraglottic hypopharyngeal walls and was sent to [facility] for further management and ENT eval. Currently patient is without any complaints.     On arrival to the ED, his vitals were T 98.6, P 85, /86, RR 15, O2 sat 96% RA. At [facility], he was given Dilaudid 1mg IVP x1, decadron 10mg IVPB x1, ativan 1mg IVP x1, and NS 2L. At [facility] he was given Dilaudid 0.5mg IVP x1, ativan 0.5mg IVP x1, and LS 1L. He was evaluated by ENT and was admitted to medicine. 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 1000 milliLiter(s) (75 mL/Hr) IV Continuous <Continuous>    MEDICATIONS  (PRN):  LORazepam   Injectable 0.5 milliGRAM(s) IV Push every 6 hours PRN Anxiety  morphine   Solution 10 milliGRAM(s) Oral every 4 hours PRN Moderate to severe pain      Allergies    No Known Allergies    Intolerances        Vital Signs Last 24 Hrs  T(C): 37.1 (14 Jun 2019 07:07), Max: 37.1 (14 Jun 2019 00:17)  T(F): 98.8 (14 Jun 2019 07:07), Max: 98.8 (14 Jun 2019 07:07)  HR: 80 (14 Jun 2019 07:07) (76 - 95)  BP: 140/88 (14 Jun 2019 07:07) (116/72 - 140/88)  BP(mean): --  RR: 18 (14 Jun 2019 07:07) (15 - 20)  SpO2: 97% (14 Jun 2019 07:07) (95% - 100%)      LABS:                          13.6   7.22  )-----------( 304      ( 14 Jun 2019 05:14 )             41.9         Mean Cell Volume : 88.4 fL  Mean Cell Hemoglobin : 28.7 pg  Mean Cell Hemoglobin Concentration : 32.5 %  Auto Neutrophil # : x  Auto Lymphocyte # : x  Auto Monocyte # : x  Auto Eosinophil # : x  Auto Basophil # : x  Auto Neutrophil % : x  Auto Lymphocyte % : x  Auto Monocyte % : x  Auto Eosinophil % : x  Auto Basophil % : x      Serial CBC's  06-14 @ 05:14  Hct-41.9 / Hgb-13.6 / Plat-304 / RBC-4.74 / WBC-7.22  Serial CBC's  06-13 @ 16:19  Hct-43.6 / Hgb-14.6 / Plat-302 / RBC-4.86 / WBC-9.0      06-14    137  |  97<L>  |  11  ----------------------------<  151<H>  4.2   |  27  |  0.62    Ca    9.9      14 Jun 2019 05:14  Phos  3.9     06-14  Mg     2.1     06-14    TPro  8.4  /  Alb  4.3  /  TBili  0.7  /  DBili  x   /  AST  27  /  ALT  18  /  AlkPhos  69  06-13      PT/INR - ( 14 Jun 2019 10:24 )   PT: 13.1 SEC;   INR: 1.17          PTT - ( 14 Jun 2019 10:24 )  PTT:29.9 SEC        RADIOLOGY & ADDITIONAL STUDIES:      IMPRESSION:     Recurrent tumor in the left aspect of the oral cavity. New abnormal   thickening involving the epiglottis. New posterior supraglottic   hypopharyngeal wall thickening. This can represent postradiation changes.   Correlate clinically. Direct visual inspection is recommended.

## 2019-06-14 NOTE — CONSULT NOTE ADULT - PROBLEM SELECTOR RECOMMENDATION 9
limited consult, unable to assess pain as he has been in surgery for PEG patient on iv dilaudid 0.6mg q4 prn pain  can convert to dilaudid 3mg tablet (via peg) when able to use PEG.

## 2019-06-14 NOTE — ED PROVIDER NOTE - PHYSICAL EXAMINATION
On exam, HDS, NAD, well healed scarring of lower jaw, lungs CTAB, heart sounds normal, abd soft, NT, ND, no CVAT, LEs without edema, wwp, skin normal temperature and color, neuro: alert and oriented, no focal deficits.

## 2019-06-14 NOTE — H&P ADULT - PROBLEM SELECTOR PLAN 3
- Seems to be on valium 5mg prn for anxiety, patient currently unsure of his meds, review of [facility] ( Reference #: [id number]) shows that he was last prescribe diazepam 5mg on 5/17, would therefore place on diazepam 5mg prn for anxiety for now and clarify his meds in AM with pharmacy

## 2019-06-14 NOTE — PROGRESS NOTE ADULT - PROBLEM SELECTOR PLAN 4
- Patient unsure of meds, review of ISTOP ( Reference #: [id number]), OMR, and [facility] records show multiple medications with overlaps  -Will clarify meds today  - For now,  patient restarted on morphine solution 10mg prn pain - Patient unsure of meds, review of ISTOP ( Reference #: [id number]), OMR, and [facility] records show multiple medications with overlaps  -Will clarify meds today

## 2019-06-14 NOTE — H&P ADULT - PROBLEM SELECTOR PLAN 1
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

## 2019-06-14 NOTE — ED PROVIDER NOTE - OBJECTIVE STATEMENT
63M PMH head/neck squamous cell carcinoma transferred from [place] for ENT evaluation. Pt on immunotherapy monthly, initially presented to [facility] for evaluation of suspected dehydration and peg tube, then complained of throat pain and dysphagia so CT performed. CT showed worsening tumor so transferred to ENT as follows with [facility]/ENT from here. Had complained of sore throat earlier in the week and given     ENT: Dr. Hdz  Onc Dr. Lei

## 2019-06-14 NOTE — H&P ADULT - NSHPLABSRESULTS_GEN_ALL_CORE
LABS and ADDITIONAL STUDIES:                        14.6   9.0   )-----------( 302      ( 13 Jun 2019 16:19 )             43.6     06-13    137  |  95<L>  |  11.0  ----------------------------<  89  5.0   |  27.0  |  0.60    Ca    10.3<H>      13 Jun 2019 16:19    TPro  8.4  /  Alb  4.3  /  TBili  0.7  /  DBili  x   /  AST  27  /  ALT  18  /  AlkPhos  69  06-13    LIVER FUNCTIONS - ( 13 Jun 2019 16:19 )  Alb: 4.3 g/dL / Pro: 8.4 g/dL / ALK PHOS: 69 U/L / ALT: 18 U/L / AST: 27 U/L / GGT: x           PT/INR - ( 13 Jun 2019 16:19 )   PT: 12.4 sec;   INR: 1.08 ratio    PTT - ( 13 Jun 2019 16:19 )  PTT:29.1 sec    < from: CT Neck Soft Tissue w/ IV Cont (06.13.19 @ 20:33) >    IMPRESSION:     Recurrent tumor in the left aspect of the oral cavity. New abnormal   thickening involving the epiglottis. New posterior supraglottic   hypopharyngeal wall thickening. This can represent postradiation changes.   Correlate clinically. Direct visual inspection is recommended.    < end of copied text >

## 2019-06-14 NOTE — H&P ADULT - NSICDXPASTSURGICALHX_GEN_ALL_CORE_FT
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

## 2019-06-14 NOTE — CONSULT NOTE ADULT - PROBLEM SELECTOR RECOMMENDATION 4
patient now s/p PEG  recommend dilaudid IV 0.6mg q4 prn pain, can change to dilaudid tablets 3mg via peg when able to use PEG

## 2019-06-14 NOTE — H&P ADULT - NSHPPHYSICALEXAM_GEN_ALL_CORE
Vital Signs Last 24 Hrs  T(C): 36.5 (14 Jun 2019 04:30), Max: 37.1 (14 Jun 2019 00:17)  T(F): 97.7 (14 Jun 2019 04:30), Max: 98.7 (14 Jun 2019 00:17)  HR: 82 (14 Jun 2019 04:30) (76 - 95)  BP: 131/90 (14 Jun 2019 04:30) (116/72 - 137/86)  BP(mean): --  RR: 16 (14 Jun 2019 04:30) (15 - 20)  SpO2: 99% (14 Jun 2019 04:30) (95% - 100%)    GENERAL: No acute distress, well-developed  ENT: EOMI, PERRL, conjunctiva and sclera clear, Neck supple, No JVD, moist mucosa  CHEST/LUNG: Clear to auscultation bilaterally; No wheeze, equal breath sounds bilaterally   BACK: No spinal tenderness  HEART: Regular rate and rhythm; No murmurs, rubs, or gallops  ABDOMEN: Soft, Nontender, Nondistended; Bowel sounds present  EXTREMITIES:  No clubbing, cyanosis, or edema  PSYCH: Nl behavior, nl affect  NEUROLOGY: AAOx3, non-focal  SKIN: Normal color, No rashes or lesions

## 2019-06-14 NOTE — CHART NOTE - NSCHARTNOTEFT_GEN_A_CORE
Istop#[id number]    Rx Written	Rx Dispensed	Drug	Quantity	Days Supply	Prescriber Name  06/04/2019	06/10/2019	morphine sulf 100 mg/5 ml conc	60ml	20	Destiny Parks NP  06/04/2019	06/05/2019	fentanyl 25 mcg/hr patch	5	15	Esther Hernandez (MD)  05/17/2019	05/17/2019	diazepam 5 mg tablet	15	15	Destiny Parks NP  04/29/2019	04/30/2019	morphine sulf 100 mg/5 ml conc	180ml	30	DTanner Alves MD  04/29/2019	04/30/2019	fentanyl 50 mcg/hr patch	10	30	DTanner Alves MD  04/18/2019	04/24/2019	methadone 10 mg/5 ml solution	100ml	10	Shai De La Torre  04/23/2019	04/23/2019	oxycodone-acetaminophen 7.5-325 mg tablet	40	8	Shai Timmons MD  04/18/2019	04/19/2019	hydromorphone 4 mg tablet	60	10	Shai De La Torre  04/19/2019	04/19/2019	oxycodone-acetaminophen 5-325 mg tablet	30	4	[facility]  04/10/2019	04/10/2019	hydrocodone-acetaminophen 5-325 mg tablet	9	3	Bernardino Jacobs DO  04/10/2019	04/10/2019	diazepam 5 mg tablet	10	5	Bernardino Jacobs DO  03/07/2019	03/07/2019	oxycodone-acetaminophen 5-325 mg tablet	12	3	[facility]  07/17/2018	07/17/2018	hydrocodone-acetaminophen  mg tablet	42	21	Ha Hewitt MD, MD  EM PGY-2  [id number]

## 2019-06-14 NOTE — H&P ADULT - PROBLEM SELECTOR PLAN 2
- Currently on immunotherapy monthly with nivolumab, seems to have possible disease progression, consider onc eval in AM for assessment

## 2019-06-14 NOTE — ED ADULT NURSE NOTE - NSIMPLEMENTINTERV_GEN_ALL_ED
Implemented All Universal Safety Interventions:  [place] to call system. Call bell, personal items and telephone within reach. Instruct patient to call for assistance. Room bathroom lighting operational. Non-slip footwear when patient is off stretcher. Physically safe environment: no spills, clutter or unnecessary equipment. Stretcher in lowest position, wheels locked, appropriate side rails in place.

## 2019-06-14 NOTE — PROGRESS NOTE ADULT - PROBLEM SELECTOR PLAN 3
- Seems to be on valium 5mg prn for anxiety, patient currently unsure of his meds, review of IStop ( Reference #: [id number]) shows that he was last prescribe diazepam 5mg on 5/17, would therefore place on diazepam 5mg prn for anxiety for now - Seems to be on valium 5mg prn for anxiety, patient currently unsure of his meds, review of IStop ( Reference #: [id number]) shows that he was last prescribed diazepam 5mg on 5/17  -currently on ativan .5mg IV q6 prn for anxiety

## 2019-06-14 NOTE — H&P ADULT - PROBLEM SELECTOR PLAN 4
- Patient unsure of meds, review of ISTOP ( Reference #: [id number]), OMR, and [facility] records show multiple medications with overlaps, would need to clarify meds in AM  - For now, will re-start patient on morphine solution 10mg prn pain

## 2019-06-14 NOTE — ED ADULT NURSE NOTE - OBJECTIVE STATEMENT
Pt received in room 10, AA&OX4. Pt was transferred here from [place] with "4 types of cancer in mouth and for evaluation for PEG tube". Pt c/o difficulty and pain with swallowing anything PO. Pt denies SOB or difficultly breathing. Pt breathing even and unlabored, no accessory muscle use, airway patent. Pt denies headache, nausea, vomiting, diarrhea, dizziness, weakness. Pt reports he is receiving Immunotherapy, pt denies receiving Chemotherapy. Medications given at [facility] before transfer. 20 G placed in RT AC at [facility]. MD at bedside for evaluation. Will continue to monitor.

## 2019-06-14 NOTE — CONSULT NOTE ADULT - SUBJECTIVE AND OBJECTIVE BOX
HPI:  Please note patient currently is sedated/somnolent and required several redirections for HPI/ROS.    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 Said that he feels like solid food gets stuck in his throat and has pain associated with swallowing. Denies any cough/choaking sensation with PO intake, denies any issues with tolerating his oral secretions. Said that this has been going on for a few days and seems to be worsening. He was at his oncologist's office for a follow up and mentioned these complaints to him and was subsequently sent to [facility] for PEG eval. There he had a CT Neck that showed worsening disease and thickening of the epiglottis/supraglottic hypopharyngeal walls and was sent to [facility] for further management and ENT eval. Currently patient is without any complaints.     On arrival to the ED, his vitals were T 98.6, P 85, /86, RR 15, O2 sat 96% RA. At [facility], he was given Dilaudid 1mg IVP x1, decadron 10mg IVPB x1, ativan 1mg IVP x1, and NS 2L. At [facility] he was given Dilaudid 0.5mg IVP x1, ativan 0.5mg IVP x1, and LS 1L. He was evaluated by ENT and was admitted to medicine. (14 Jun 2019 04:47)    Patient currently not at bedside, is receiving PEG.  Per allscripts notes, patient was previously on fentanyl patch 25mcg, with po morphine prn,  and has been tapering down opioids.      PERTINENT PM/SXH:   Malignant neoplasm of mouth  Anxiety  Squamous cell carcinoma of oral cavity  Malignant neoplasm    History of mandibular surgery  History of oral cancer  Encounter for PEG (percutaneous endoscopic gastrostomy)  History of lip cancer  H/O shoulder surgery    FAMILY HISTORY:  Family history of lung cancer: in Father    ITEMS NOT CHECKED ARE NOT PRESENT    SOCIAL HISTORY:   Significant other/partner:  [ ]  Children:  [ ]  Rastafari/Spirituality:  Substance hx:  [ ]   Tobacco hx:  [ ]   Alcohol hx: [ ]   Home Opioid hx:  [x ] I-Stop Reference No: [id number]  Living Situation: [ x] Home  [ ]Long term care  [ ]Rehab [ ]Other    ADVANCE DIRECTIVES:    DNR  MOLST  [ ]  Living Will  [ ]   DECISION MAKER(s):  [ ] Health Care Proxy(s)  [ ] Surrogate(s)  [ ] Guardian           Name(s): Phone Number(s):    BASELINE (I)ADL(s) (prior to admission):  [place]: [x ]Total  [ ] Moderate [ ]Dependent    Allergies    No Known Allergies    Intolerances    MEDICATIONS  (STANDING):  sodium chloride 0.45%. 1000 milliLiter(s) (75 mL/Hr) IV Continuous <Continuous>    MEDICATIONS  (PRN):  LORazepam   Injectable 0.5 milliGRAM(s) IV Push every 6 hours PRN Anxiety  morphine   Solution 10 milliGRAM(s) Oral every 4 hours PRN Moderate to severe pain    PRESENT SYMPTOMS: [ ]Unable to obtain due to poor mentation   Source if other than patient:  [ ]Family   [ ]Team     Pain (Impact on QOL):    Location -         Minimal acceptable level (0-10 scale):                    Aggrevating factors -  Quality -  Radiation -  Severity (0-10 scale) -    Timing -    PAIN AD Score:     [email or web address] (press ctrl +  left click to view)    Dyspnea:                           [ ]Mild [ ]Moderate [ ]Severe  Anxiety:                             [ ]Mild [ ]Moderate [ ]Severe  Fatigue:                             [ ]Mild [ ]Moderate [ ]Severe  Nausea:                             [ ]Mild [ ]Moderate [ ]Severe  Loss of appetite:              [ ]Mild [ ]Moderate [ ]Severe  Constipation:                    [ ]Mild [ ]Moderate [ ]Severe    Other Symptoms:  [ ]All other review of systems negative     Karnofsky Performance Score/Palliative Performance Status Version 2:         %  PHYSICAL EXAM:  Vital Signs Last 24 Hrs  T(C): 37.1 (14 Jun 2019 07:07), Max: 37.1 (14 Jun 2019 00:17)  T(F): 98.8 (14 Jun 2019 07:07), Max: 98.8 (14 Jun 2019 07:07)  HR: 80 (14 Jun 2019 07:07) (76 - 95)  BP: 140/88 (14 Jun 2019 07:07) (116/72 - 140/88)  BP(mean): --  RR: 18 (14 Jun 2019 07:07) (15 - 20)  SpO2: 97% (14 Jun 2019 07:07) (95% - 100%) I&O's Summary    GENERAL:  [ ]Alert  [ ]Oriented x   [ ]Lethargic  [ ]Cachexia  [ ]Unarousable  [ ]Verbal  [ ]Non-Verbal  Behavioral:   [ ] Anxiety  [ ] Delirium [ ] Agitation [ ] Other  HEENT:  [ ]Normal   [ ]Dry mouth   [ ]ET Tube/Trach  [ ]Oral lesions  PULMONARY:   [ ]Clear [ ]Tachypnea  [ ]Audible excessive secretions   [ ]Rhonchi        [ ]Right [ ]Left [ ]Bilateral  [ ]Crackles        [ ]Right [ ]Left [ ]Bilateral  [ ]Wheezing     [ ]Right [ ]Left [ ]Bilateral  CARDIOVASCULAR:    [ ]Regular [ ]Irregular [ ]Tachy  [ ]Kishore [ ]Murmur [ ]Other  GASTROINTESTINAL:  [ ]Soft  [ ]Distended   [ ]+BS  [ ]Non tender [ ]Tender  [ ]PEG [ ]OGT/ NGT  Last BM: GENITOURINARY:  [ ]Normal [ ] Incontinent   [ ]Oliguria/Anuria   [ ]Sprague  MUSCULOSKELETAL:   [ ]Normal   [ ]Weakness  [ ]Bed/Wheelchair bound [ ]Edema  NEUROLOGIC:   [ ]No focal deficits  [ ] Cognitive impairment  [ ] Dysphagia [ ]Dysarthria [ ] Paresis [ ]Other   SKIN:   [ ]Normal   [ ]Pressure ulcer(s)  [ ]Rash    CRITICAL CARE:  [ ] Shock Present  [ ]Septic [ ]Cardiogenic [ ]Neurologic [ ]Hypovolemic  [ ]  Vasopressors [ ]  Inotropes   [ ] Respiratory failure present  [ ] Acute  [ ] Chronic [ ] Hypoxic  [ ] Hypercarbic [ ] Other  [ ] Other organ failure     LABS:                        13.6   7.22  )-----------( 304      ( 14 Jun 2019 05:14 )             41.9   06-14    137  |  97<L>  |  11  ----------------------------<  151<H>  4.2   |  27  |  0.62    Ca    9.9      14 Jun 2019 05:14  Phos  3.9     06-14  Mg     2.1     06-14    TPro  8.4  /  Alb  4.3  /  TBili  0.7  /  DBili  x   /  AST  27  /  ALT  18  /  AlkPhos  69  06-13  PT/INR - ( 14 Jun 2019 10:24 )   PT: 13.1 SEC;   INR: 1.17          PTT - ( 14 Jun 2019 10:24 )  PTT:29.9 SEC      RADIOLOGY & ADDITIONAL STUDIES:  < from: CT Neck Soft Tissue w/ IV Cont (06.13.19 @ 20:33) >     EXAM:  CT NECK SOFT TISSUE IC                          PROCEDURE DATE:  06/13/2019          INTERPRETATION:  TECHNIQUE: Contrast-enhanced CT neck soft tissues    COMPARISON: CT neck 11/21/2018. MRI cervical spine dated 5/6/2019. PET/CT   dated 4/22/2019    CLINICAL INDICATIONS: worsening swelling in throat. diff swallowing,   history of squamous cell carcinoma cavity, recurrent oral cavity cancer,   persistent left upper neck edema    FINDINGS:    AERODIGESTIVE TRACT:     Redemonstration of postoperative changes from a composite mandibular   resection, fibular free flap construction, with plate and screws, and   bilateral lymph node dissection. Metallic hardware causes streak artifact.    There is recurrent neoplasm in the left aspect of the oral cavity, best   seen on image 7, series 4, which measures 4.9 x 3.0 cm, not significantly   changed as compared to the prior PET/CT..     Continued loss of the soft tissue planes with slight loss of the   submucosal fat planes, left side greater than right which may reflect   treatment sequela.       PAROTID GLANDS: Posterior mid sequela with fatty infiltration unchanged   from prior.   SUBMANDIBULAR GLANDS: Surgically absent.     Moderate inflammatory mucosal changes are seen throughout the various   portions of the paranasal sinuses.     IMPRESSION:     Recurrent tumor in the left aspect of the oral cavity. New abnormal   thickening involving the epiglottis. New posterior supraglottic   hypopharyngeal wall thickening. This can represent postradiation changes.   Correlate clinically. Direct visual inspection is recommended.                  < end of copied text >      PROTEIN CALORIE MALNUTRITION:   [ ] PPSV2 < or = to 30% [ ] significant weight loss  [ ] poor nutritional intake [ ] catabolic state [ ] anasarca     Albumin, Serum: 4.3 g/dL (06-13-19 @ 16:19)  Artificial Nutrition [ ]     REFERRALS:   [ ]Chaplaincy  [ ] Hospice  [ ]Child Life  [ ]Social Work  [ ]Case management [ ]Holistic Therapy   Goals of Care Discussion Document: HPI:  Please note patient currently is sedated/somnolent and required several redirections for HPI/ROS.    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 Said that he feels like solid food gets stuck in his throat and has pain associated with swallowing. Denies any cough/choaking sensation with PO intake, denies any issues with tolerating his oral secretions. Said that this has been going on for a few days and seems to be worsening. He was at his oncologist's office for a follow up and mentioned these complaints to him and was subsequently sent to [facility] for PEG eval. There he had a CT Neck that showed worsening disease and thickening of the epiglottis/supraglottic hypopharyngeal walls and was sent to [facility] for further management and ENT eval. Currently patient is without any complaints.     On arrival to the ED, his vitals were T 98.6, P 85, /86, RR 15, O2 sat 96% RA. At [facility], he was given Dilaudid 1mg IVP x1, decadron 10mg IVPB x1, ativan 1mg IVP x1, and NS 2L. At [facility] he was given Dilaudid 0.5mg IVP x1, ativan 0.5mg IVP x1, and LS 1L. He was evaluated by ENT and was admitted to medicine. (14 Jun 2019 04:47)    Patient currently not at bedside, is receiving PEG.  Per allscripts notes, patient was previously on fentanyl patch 25mcg, with po morphine prn,  and has been tapering down opioids.  after multiple attempts to see patient, , patient still not back to his bed, unable to see patient.    PERTINENT PM/SXH:   Malignant neoplasm of mouth  Anxiety  Squamous cell carcinoma of oral cavity  Malignant neoplasm    History of mandibular surgery  History of oral cancer  Encounter for PEG (percutaneous endoscopic gastrostomy)  History of lip cancer  H/O shoulder surgery    FAMILY HISTORY:  Family history of lung cancer: in Father    ITEMS NOT CHECKED ARE NOT PRESENT    SOCIAL HISTORY:   Significant other/partner:  [ ]  Children:  [ ]  Church/Spirituality:  Substance hx:  [ ]   Tobacco hx:  [ ]   Alcohol hx: [ ]   Home Opioid hx:  [x ] I-Stop Reference No: [id number]  Living Situation: [ x] Home  [ ]Long term care  [ ]Rehab [ ]Other    ADVANCE DIRECTIVES:    DNR  MOLST  [ ]  Living Will  [ ]   DECISION MAKER(s):  [ ] Health Care Proxy(s)  [ ] Surrogate(s)  [ ] Guardian           Name(s): Phone Number(s):    BASELINE (I)ADL(s) (prior to admission):  [place]: [x ]Total  [ ] Moderate [ ]Dependent    Allergies    No Known Allergies    Intolerances    MEDICATIONS  (STANDING):  sodium chloride 0.45%. 1000 milliLiter(s) (75 mL/Hr) IV Continuous <Continuous>    MEDICATIONS  (PRN):  LORazepam   Injectable 0.5 milliGRAM(s) IV Push every 6 hours PRN Anxiety  morphine   Solution 10 milliGRAM(s) Oral every 4 hours PRN Moderate to severe pain    PRESENT SYMPTOMS: [ ]Unable to obtain due to poor mentation   Source if other than patient:  [ ]Family   [ ]Team     Pain (Impact on QOL):    Location -         Minimal acceptable level (0-10 scale):                    Aggrevating factors -  Quality -  Radiation -  Severity (0-10 scale) -    Timing -    PAIN AD Score:     [email or web address]/painad.pdf (press ctrl +  left click to view)    Dyspnea:                           [ ]Mild [ ]Moderate [ ]Severe  Anxiety:                             [ ]Mild [ ]Moderate [ ]Severe  Fatigue:                             [ ]Mild [ ]Moderate [ ]Severe  Nausea:                             [ ]Mild [ ]Moderate [ ]Severe  Loss of appetite:              [ ]Mild [ ]Moderate [ ]Severe  Constipation:                    [ ]Mild [ ]Moderate [ ]Severe    Other Symptoms:  [ ]All other review of systems negative     Karnofsky Performance Score/Palliative Performance Status Version 2:         %  PHYSICAL EXAM:  Vital Signs Last 24 Hrs  T(C): 37.1 (14 Jun 2019 07:07), Max: 37.1 (14 Jun 2019 00:17)  T(F): 98.8 (14 Jun 2019 07:07), Max: 98.8 (14 Jun 2019 07:07)  HR: 80 (14 Jun 2019 07:07) (76 - 95)  BP: 140/88 (14 Jun 2019 07:07) (116/72 - 140/88)  BP(mean): --  RR: 18 (14 Jun 2019 07:07) (15 - 20)  SpO2: 97% (14 Jun 2019 07:07) (95% - 100%) I&O's Summary    GENERAL:  [ ]Alert  [ ]Oriented x   [ ]Lethargic  [ ]Cachexia  [ ]Unarousable  [ ]Verbal  [ ]Non-Verbal  Behavioral:   [ ] Anxiety  [ ] Delirium [ ] Agitation [ ] Other  HEENT:  [ ]Normal   [ ]Dry mouth   [ ]ET Tube/Trach  [ ]Oral lesions  PULMONARY:   [ ]Clear [ ]Tachypnea  [ ]Audible excessive secretions   [ ]Rhonchi        [ ]Right [ ]Left [ ]Bilateral  [ ]Crackles        [ ]Right [ ]Left [ ]Bilateral  [ ]Wheezing     [ ]Right [ ]Left [ ]Bilateral  CARDIOVASCULAR:    [ ]Regular [ ]Irregular [ ]Tachy  [ ]Kishore [ ]Murmur [ ]Other  GASTROINTESTINAL:  [ ]Soft  [ ]Distended   [ ]+BS  [ ]Non tender [ ]Tender  [ ]PEG [ ]OGT/ NGT  Last BM: GENITOURINARY:  [ ]Normal [ ] Incontinent   [ ]Oliguria/Anuria   [ ]Sprague  MUSCULOSKELETAL:   [ ]Normal   [ ]Weakness  [ ]Bed/Wheelchair bound [ ]Edema  NEUROLOGIC:   [ ]No focal deficits  [ ] Cognitive impairment  [ ] Dysphagia [ ]Dysarthria [ ] Paresis [ ]Other   SKIN:   [ ]Normal   [ ]Pressure ulcer(s)  [ ]Rash    CRITICAL CARE:  [ ] Shock Present  [ ]Septic [ ]Cardiogenic [ ]Neurologic [ ]Hypovolemic  [ ]  Vasopressors [ ]  Inotropes   [ ] Respiratory failure present  [ ] Acute  [ ] Chronic [ ] Hypoxic  [ ] Hypercarbic [ ] Other  [ ] Other organ failure     LABS:                        13.6   7.22  )-----------( 304      ( 14 Jun 2019 05:14 )             41.9   06-14    137  |  97<L>  |  11  ----------------------------<  151<H>  4.2   |  27  |  0.62    Ca    9.9      14 Jun 2019 05:14  Phos  3.9     06-14  Mg     2.1     06-14    TPro  8.4  /  Alb  4.3  /  TBili  0.7  /  DBili  x   /  AST  27  /  ALT  18  /  AlkPhos  69  06-13  PT/INR - ( 14 Jun 2019 10:24 )   PT: 13.1 SEC;   INR: 1.17          PTT - ( 14 Jun 2019 10:24 )  PTT:29.9 SEC      RADIOLOGY & ADDITIONAL STUDIES:  < from: CT Neck Soft Tissue w/ IV Cont (06.13.19 @ 20:33) >     EXAM:  CT NECK SOFT TISSUE IC                          PROCEDURE DATE:  06/13/2019          INTERPRETATION:  TECHNIQUE: Contrast-enhanced CT neck soft tissues    COMPARISON: CT neck 11/21/2018. MRI cervical spine dated 5/6/2019. PET/CT   dated 4/22/2019    CLINICAL INDICATIONS: worsening swelling in throat. diff swallowing,   history of squamous cell carcinoma cavity, recurrent oral cavity cancer,   persistent left upper neck edema    FINDINGS:    AERODIGESTIVE TRACT:     Redemonstration of postoperative changes from a composite mandibular   resection, fibular free flap construction, with plate and screws, and   bilateral lymph node dissection. Metallic hardware causes streak artifact.    There is recurrent neoplasm in the left aspect of the oral cavity, best   seen on image 7, series 4, which measures 4.9 x 3.0 cm, not significantly   changed as compared to the prior PET/CT..     Continued loss of the soft tissue planes with slight loss of the   submucosal fat planes, left side greater than right which may reflect   treatment sequela.       PAROTID GLANDS: Posterior mid sequela with fatty infiltration unchanged   from prior.   SUBMANDIBULAR GLANDS: Surgically absent.     Moderate inflammatory mucosal changes are seen throughout the various   portions of the paranasal sinuses.     IMPRESSION:     Recurrent tumor in the left aspect of the oral cavity. New abnormal   thickening involving the epiglottis. New posterior supraglottic   hypopharyngeal wall thickening. This can represent postradiation changes.   Correlate clinically. Direct visual inspection is recommended.                  < end of copied text >      PROTEIN CALORIE MALNUTRITION:   [ ] PPSV2 < or = to 30% [ ] significant weight loss  [ ] poor nutritional intake [ ] catabolic state [ ] anasarca     Albumin, Serum: 4.3 g/dL (06-13-19 @ 16:19)  Artificial Nutrition [ ]     REFERRALS:   [ ]Chaplaincy  [ ] Hospice  [ ]Child Life  [ ]Social Work  [ ]Case management [ ]Holistic Therapy   Goals of Care Discussion Document: HPI:  Please note patient currently is sedated/somnolent and required several redirections for HPI/ROS.    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 Said that he feels like solid food gets stuck in his throat and has pain associated with swallowing. Denies any cough/choaking sensation with PO intake, denies any issues with tolerating his oral secretions. Said that this has been going on for a few days and seems to be worsening. He was at his oncologist's office for a follow up and mentioned these complaints to him and was subsequently sent to [facility] for PEG eval. There he had a CT Neck that showed worsening disease and thickening of the epiglottis/supraglottic hypopharyngeal walls and was sent to [facility] for further management and ENT eval. Currently patient is without any complaints.     On arrival to the ED, his vitals were T 98.6, P 85, /86, RR 15, O2 sat 96% RA. At [facility], he was given Dilaudid 1mg IVP x1, decadron 10mg IVPB x1, ativan 1mg IVP x1, and NS 2L. At [facility] he was given Dilaudid 0.5mg IVP x1, ativan 0.5mg IVP x1, and LS 1L. He was evaluated by ENT and was admitted to medicine. (14 Jun 2019 04:47)    Patient currently not at bedside, is receiving PEG.  Per allscripts notes, patient was previously on fentanyl patch 25mcg, with po morphine prn,  and has been tapering down opioids.      PERTINENT PM/SXH:   Malignant neoplasm of mouth  Anxiety  Squamous cell carcinoma of oral cavity  Malignant neoplasm    History of mandibular surgery  History of oral cancer  Encounter for PEG (percutaneous endoscopic gastrostomy)  History of lip cancer  H/O shoulder surgery    FAMILY HISTORY:  Family history of lung cancer: in Father    ITEMS NOT CHECKED ARE NOT PRESENT    SOCIAL HISTORY:   Significant other/partner:  [ ]  Children:  [ ]  Faith/Spirituality:  Substance hx:  [ ]   Tobacco hx:  [ ]   Alcohol hx: [ ]   Home Opioid hx:  [x ] I-Stop Reference No: [id number]  Living Situation: [ x] Home  [ ]Long term care  [ ]Rehab [ ]Other    ADVANCE DIRECTIVES:    DNR  MOLST  [ ]  Living Will  [ ]   DECISION MAKER(s):  [ ] Health Care Proxy(s)  [ ] Surrogate(s)  [ ] Guardian           Name(s): Phone Number(s):    BASELINE (I)ADL(s) (prior to admission):  [place]: [x ]Total  [ ] Moderate [ ]Dependent    Allergies    No Known Allergies    Intolerances    MEDICATIONS  (STANDING):  sodium chloride 0.45%. 1000 milliLiter(s) (75 mL/Hr) IV Continuous <Continuous>    MEDICATIONS  (PRN):  LORazepam   Injectable 0.5 milliGRAM(s) IV Push every 6 hours PRN Anxiety  morphine   Solution 10 milliGRAM(s) Oral every 4 hours PRN Moderate to severe pain    PRESENT SYMPTOMS: [ ]Unable to obtain due to poor mentation   Source if other than patient:  [ ]Family   [ ]Team     Pain (Impact on QOL):    Location -         Minimal acceptable level (0-10 scale):                    Aggrevating factors -  Quality -  Radiation -  Severity (0-10 scale) -    Timing -    PAIN AD Score:     [email or web address]/painad.pdf (press ctrl +  left click to view)    Dyspnea:                           [ ]Mild [ ]Moderate [ ]Severe  Anxiety:                             [ ]Mild [ ]Moderate [ ]Severe  Fatigue:                             [ ]Mild [ ]Moderate [ ]Severe  Nausea:                             [ ]Mild [ ]Moderate [ ]Severe  Loss of appetite:              [ ]Mild [ ]Moderate [ ]Severe  Constipation:                    [ ]Mild [ ]Moderate [ ]Severe    Other Symptoms:  [ ]All other review of systems negative     Karnofsky Performance Score/Palliative Performance Status Version 2:         %  PHYSICAL EXAM:  Vital Signs Last 24 Hrs  T(C): 37.1 (14 Jun 2019 07:07), Max: 37.1 (14 Jun 2019 00:17)  T(F): 98.8 (14 Jun 2019 07:07), Max: 98.8 (14 Jun 2019 07:07)  HR: 80 (14 Jun 2019 07:07) (76 - 95)  BP: 140/88 (14 Jun 2019 07:07) (116/72 - 140/88)  BP(mean): --  RR: 18 (14 Jun 2019 07:07) (15 - 20)  SpO2: 97% (14 Jun 2019 07:07) (95% - 100%) I&O's Summary    GENERAL:  [ ]Alert  [ ]Oriented x   [ ]Lethargic  [ ]Cachexia  [ ]Unarousable  [ ]Verbal  [ ]Non-Verbal  Behavioral:   [ ] Anxiety  [ ] Delirium [ ] Agitation [ ] Other  HEENT:  [ ]Normal   [ ]Dry mouth   [ ]ET Tube/Trach  [ ]Oral lesions  PULMONARY:   [ ]Clear [ ]Tachypnea  [ ]Audible excessive secretions   [ ]Rhonchi        [ ]Right [ ]Left [ ]Bilateral  [ ]Crackles        [ ]Right [ ]Left [ ]Bilateral  [ ]Wheezing     [ ]Right [ ]Left [ ]Bilateral  CARDIOVASCULAR:    [ ]Regular [ ]Irregular [ ]Tachy  [ ]Kishore [ ]Murmur [ ]Other  GASTROINTESTINAL:  [ ]Soft  [ ]Distended   [ ]+BS  [ ]Non tender [ ]Tender  [ ]PEG [ ]OGT/ NGT  Last BM: GENITOURINARY:  [ ]Normal [ ] Incontinent   [ ]Oliguria/Anuria   [ ]Sprague  MUSCULOSKELETAL:   [ ]Normal   [ ]Weakness  [ ]Bed/Wheelchair bound [ ]Edema  NEUROLOGIC:   [ ]No focal deficits  [ ] Cognitive impairment  [ ] Dysphagia [ ]Dysarthria [ ] Paresis [ ]Other   SKIN:   [ ]Normal   [ ]Pressure ulcer(s)  [ ]Rash    CRITICAL CARE:  [ ] Shock Present  [ ]Septic [ ]Cardiogenic [ ]Neurologic [ ]Hypovolemic  [ ]  Vasopressors [ ]  Inotropes   [ ] Respiratory failure present  [ ] Acute  [ ] Chronic [ ] Hypoxic  [ ] Hypercarbic [ ] Other  [ ] Other organ failure     LABS:                        13.6   7.22  )-----------( 304      ( 14 Jun 2019 05:14 )             41.9   06-14    137  |  97<L>  |  11  ----------------------------<  151<H>  4.2   |  27  |  0.62    Ca    9.9      14 Jun 2019 05:14  Phos  3.9     06-14  Mg     2.1     06-14    TPro  8.4  /  Alb  4.3  /  TBili  0.7  /  DBili  x   /  AST  27  /  ALT  18  /  AlkPhos  69  06-13  PT/INR - ( 14 Jun 2019 10:24 )   PT: 13.1 SEC;   INR: 1.17          PTT - ( 14 Jun 2019 10:24 )  PTT:29.9 SEC      RADIOLOGY & ADDITIONAL STUDIES:  < from: CT Neck Soft Tissue w/ IV Cont (06.13.19 @ 20:33) >     EXAM:  CT NECK SOFT TISSUE IC                          PROCEDURE DATE:  06/13/2019          INTERPRETATION:  TECHNIQUE: Contrast-enhanced CT neck soft tissues    COMPARISON: CT neck 11/21/2018. MRI cervical spine dated 5/6/2019. PET/CT   dated 4/22/2019    CLINICAL INDICATIONS: worsening swelling in throat. diff swallowing,   history of squamous cell carcinoma cavity, recurrent oral cavity cancer,   persistent left upper neck edema    FINDINGS:    AERODIGESTIVE TRACT:     Redemonstration of postoperative changes from a composite mandibular   resection, fibular free flap construction, with plate and screws, and   bilateral lymph node dissection. Metallic hardware causes streak artifact.    There is recurrent neoplasm in the left aspect of the oral cavity, best   seen on image 7, series 4, which measures 4.9 x 3.0 cm, not significantly   changed as compared to the prior PET/CT..     Continued loss of the soft tissue planes with slight loss of the   submucosal fat planes, left side greater than right which may reflect   treatment sequela.       PAROTID GLANDS: Posterior mid sequela with fatty infiltration unchanged   from prior.   SUBMANDIBULAR GLANDS: Surgically absent.     Moderate inflammatory mucosal changes are seen throughout the various   portions of the paranasal sinuses.     IMPRESSION:     Recurrent tumor in the left aspect of the oral cavity. New abnormal   thickening involving the epiglottis. New posterior supraglottic   hypopharyngeal wall thickening. This can represent postradiation changes.   Correlate clinically. Direct visual inspection is recommended.                  < end of copied text >      PROTEIN CALORIE MALNUTRITION:   [ ] PPSV2 < or = to 30% [ ] significant weight loss  [ ] poor nutritional intake [ ] catabolic state [ ] anasarca     Albumin, Serum: 4.3 g/dL (06-13-19 @ 16:19)  Artificial Nutrition [ ]     REFERRALS:   [ ]Chaplaincy  [ ] Hospice  [ ]Child Life  [ ]Social Work  [ ]Case management [ ]Holistic Therapy   Goals of Care Discussion Document: HPI:  Please note patient currently is sedated/somnolent and required several redirections for HPI/ROS.    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 Said that he feels like solid food gets stuck in his throat and has pain associated with swallowing. Denies any cough/choaking sensation with PO intake, denies any issues with tolerating his oral secretions. Said that this has been going on for a few days and seems to be worsening. He was at his oncologist's office for a follow up and mentioned these complaints to him and was subsequently sent to [facility] for PEG eval. There he had a CT Neck that showed worsening disease and thickening of the epiglottis/supraglottic hypopharyngeal walls and was sent to [facility] for further management and ENT eval. Currently patient is without any complaints.     On arrival to the ED, his vitals were T 98.6, P 85, /86, RR 15, O2 sat 96% RA. At [facility], he was given Dilaudid 1mg IVP x1, decadron 10mg IVPB x1, ativan 1mg IVP x1, and NS 2L. At [facility] he was given Dilaudid 0.5mg IVP x1, ativan 0.5mg IVP x1, and LS 1L. He was evaluated by ENT and was admitted to medicine. (14 Jun 2019 04:47)    Patient currently not at bedside, is receiving PEG.  Per allscripts notes, patient was previously on fentanyl patch 25mcg, with po morphine prn,  and has been tapering down opioids.    Patient seen in PACU, reports pain at PEG site.    PERTINENT PM/SXH:   Malignant neoplasm of mouth  Anxiety  Squamous cell carcinoma of oral cavity  Malignant neoplasm    History of mandibular surgery  History of oral cancer  Encounter for PEG (percutaneous endoscopic gastrostomy)  History of lip cancer  H/O shoulder surgery    FAMILY HISTORY:  Family history of lung cancer: in Father    ITEMS NOT CHECKED ARE NOT PRESENT    SOCIAL HISTORY:  , has 2 sons, lives at home with his brother and his son.   Significant other/partner:  [ ]  Children:  [ ]  Gnosticism/Spirituality:  Substance hx:  [ ]   Tobacco hx: former tobacco use, chewed tobacco for >15 years[ ]   Alcohol hx: [ ]   Home Opioid hx:  [x ] I-Stop Reference No: [id number]  Living Situation: [ x] Home  [ ]Long term care  [ ]Rehab [ ]Other    ADVANCE DIRECTIVES:    DNR  MOLST  [ ]  Living Will  [ ]   DECISION MAKER(s):  [ ] Health Care Proxy(s)  [x ] Surrogate(s)  [ ] Guardian           Name(s): Phone Number(s):  Son Evan Ventura [phone]  Brother Diony Ventura [phone]    BASELINE (I)ADL(s) (prior to admission):  [place]: [x ]Total  [ ] Moderate [ ]Dependent    Allergies    No Known Allergies    Intolerances    MEDICATIONS  (STANDING):  sodium chloride 0.45%. 1000 milliLiter(s) (75 mL/Hr) IV Continuous <Continuous>    MEDICATIONS  (PRN):  LORazepam   Injectable 0.5 milliGRAM(s) IV Push every 6 hours PRN Anxiety  morphine   Solution 10 milliGRAM(s) Oral every 4 hours PRN Moderate to severe pain    PRESENT SYMPTOMS: [ ]Unable to obtain due to poor mentation   Source if other than patient:  [ ]Family   [ ]Team     Pain (Impact on QOL):  yes  Location - peg site        Minimal acceptable level (0-10 scale):                    Aggrevating factors -  Quality -  Radiation -  Severity (0-10 scale) -    Timing -    PAIN AD Score:     [email or web address] (press ctrl +  left click to view)    Dyspnea:                           [ ]Mild [ ]Moderate [ ]Severe  Anxiety:                             [ ]Mild [ ]Moderate [ ]Severe  Fatigue:                             [ ]Mild [ ]Moderate [ ]Severe  Nausea:                             [ ]Mild [ ]Moderate [ ]Severe  Loss of appetite:              [ ]Mild [ ]Moderate [ ]Severe  Constipation:                    [ ]Mild [ ]Moderate [ ]Severe    Other Symptoms:  [ ]All other review of systems negative     Karnofsky Performance Score/Palliative Performance Status Version 2:  60      %  PHYSICAL EXAM:  Vital Signs Last 24 Hrs  T(C): 37.1 (14 Jun 2019 07:07), Max: 37.1 (14 Jun 2019 00:17)  T(F): 98.8 (14 Jun 2019 07:07), Max: 98.8 (14 Jun 2019 07:07)  HR: 80 (14 Jun 2019 07:07) (76 - 95)  BP: 140/88 (14 Jun 2019 07:07) (116/72 - 140/88)  BP(mean): --  RR: 18 (14 Jun 2019 07:07) (15 - 20)  SpO2: 97% (14 Jun 2019 07:07) (95% - 100%) I&O's Summary    GENERAL:  [ x]Alert  [x ]Oriented x   [x ]Lethargic  [ ]Cachexia  [ ]Unarousable  [x ]Verbal  [ ]Non-Verbal  Behavioral:   [ ] Anxiety  [ ] Delirium [ ] Agitation [ ] Other  HEENT:  [ ]Normal   [ ]Dry mouth   [ ]ET Tube/Trach  [ ]Oral lesions  PULMONARY:   [x ]Clear [ ]Tachypnea  [ ]Audible excessive secretions   [ ]Rhonchi        [ ]Right [ ]Left [ ]Bilateral  [ ]Crackles        [ ]Right [ ]Left [ ]Bilateral  [ ]Wheezing     [ ]Right [ ]Left [ ]Bilateral  CARDIOVASCULAR:    [ ]Regular [ ]Irregular [x ]Tachy  [ ]Kishore [ ]Murmur [ ]Other  GASTROINTESTINAL:  [ ]Soft  [ ]Distended   [ ]+BS  [ ]Non tender [ ]Tender  [x ]PEG [ ]OGT/ NGT  Last BM: GENITOURINARY:  [ ]Normal [ ] Incontinent   [ ]Oliguria/Anuria   [ ]Sprague  MUSCULOSKELETAL:   [x ]Normal   [ ]Weakness  [ ]Bed/Wheelchair bound [ ]Edema  NEUROLOGIC:   [ ]No focal deficits  [ ] Cognitive impairment  [ ] Dysphagia [ ]Dysarthria [ ] Paresis [ ]Other   SKIN:   [ ]Normal   [ ]Pressure ulcer(s)  [ ]Rash    CRITICAL CARE:  [ ] Shock Present  [ ]Septic [ ]Cardiogenic [ ]Neurologic [ ]Hypovolemic  [ ]  Vasopressors [ ]  Inotropes   [ ] Respiratory failure present  [ ] Acute  [ ] Chronic [ ] Hypoxic  [ ] Hypercarbic [ ] Other  [ ] Other organ failure     LABS:                        13.6   7.22  )-----------( 304      ( 14 Jun 2019 05:14 )             41.9   06-14    137  |  97<L>  |  11  ----------------------------<  151<H>  4.2   |  27  |  0.62    Ca    9.9      14 Jun 2019 05:14  Phos  3.9     06-14  Mg     2.1     06-14    TPro  8.4  /  Alb  4.3  /  TBili  0.7  /  DBili  x   /  AST  27  /  ALT  18  /  AlkPhos  69  06-13  PT/INR - ( 14 Jun 2019 10:24 )   PT: 13.1 SEC;   INR: 1.17          PTT - ( 14 Jun 2019 10:24 )  PTT:29.9 SEC      RADIOLOGY & ADDITIONAL STUDIES:  < from: CT Neck Soft Tissue w/ IV Cont (06.13.19 @ 20:33) >     EXAM:  CT NECK SOFT TISSUE IC                          PROCEDURE DATE:  06/13/2019          INTERPRETATION:  TECHNIQUE: Contrast-enhanced CT neck soft tissues    COMPARISON: CT neck 11/21/2018. MRI cervical spine dated 5/6/2019. PET/CT   dated 4/22/2019    CLINICAL INDICATIONS: worsening swelling in throat. diff swallowing,   history of squamous cell carcinoma cavity, recurrent oral cavity cancer,   persistent left upper neck edema    FINDINGS:    AERODIGESTIVE TRACT:     Redemonstration of postoperative changes from a composite mandibular   resection, fibular free flap construction, with plate and screws, and   bilateral lymph node dissection. Metallic hardware causes streak artifact.    There is recurrent neoplasm in the left aspect of the oral cavity, best   seen on image 7, series 4, which measures 4.9 x 3.0 cm, not significantly   changed as compared to the prior PET/CT..     Continued loss of the soft tissue planes with slight loss of the   submucosal fat planes, left side greater than right which may reflect   treatment sequela.       PAROTID GLANDS: Posterior mid sequela with fatty infiltration unchanged   from prior.   SUBMANDIBULAR GLANDS: Surgically absent.     Moderate inflammatory mucosal changes are seen throughout the various   portions of the paranasal sinuses.     IMPRESSION:     Recurrent tumor in the left aspect of the oral cavity. New abnormal   thickening involving the epiglottis. New posterior supraglottic   hypopharyngeal wall thickening. This can represent postradiation changes.   Correlate clinically. Direct visual inspection is recommended.                  < end of copied text >      PROTEIN CALORIE MALNUTRITION:   [ ] PPSV2 < or = to 30% [ ] significant weight loss  [ ] poor nutritional intake [ ] catabolic state [ ] anasarca     Albumin, Serum: 4.3 g/dL (06-13-19 @ 16:19)  Artificial Nutrition [ ]     REFERRALS:   [ ]Chaplaincy  [ ] Hospice  [ ]Child Life  [ ]Social Work  [ ]Case management [ ]Holistic Therapy   Goals of Care Discussion Document:

## 2019-06-14 NOTE — CONSULT NOTE ADULT - SUBJECTIVE AND OBJECTIVE BOX
63M hx of anxiety and oral cavity SCC s/p prior resection with logoregional recurrence on palliative immunotherapy presents with worsening of chronic dysphagia.  Dysphagia to solids worse than liquids.  Patient also reports unintentional weight loss.  Thoracic surgery consulted for EGD, PEG placement.       Vital Signs:  Vital Signs Last 24 Hrs  T(C): 37.1 (06-14-19 @ 07:07), Max: 37.1 (06-14-19 @ 00:17)  T(F): 98.8 (06-14-19 @ 07:07), Max: 98.8 (06-14-19 @ 07:07)  HR: 80 (06-14-19 @ 07:07) (76 - 95)  BP: 140/88 (06-14-19 @ 07:07) (116/72 - 140/88)  RR: 18 (06-14-19 @ 07:07) (15 - 20)  SpO2: 97% (06-14-19 @ 07:07) (95% - 100%) on (O2)      General:  axious, thin appearing  Neurology: Awake, nonfocal, CARLTON x 4  Eyes: Scleras clear, PERRLA/ EOMI, Gross vision intact  Respiratory: respirations nonlabored  CV: RRR, S1S2  Abdominal: Soft, NT, ND +BS, +scar from previous peg  Extremities: No edema                          13.6   7.22  )-----------( 304      ( 14 Jun 2019 05:14 )             41.9     06-14    137  |  97<L>  |  11  ----------------------------<  151<H>  4.2   |  27  |  0.62    Ca    9.9      14 Jun 2019 05:14  Phos  3.9     06-14  Mg     2.1     06-14    TPro  8.4  /  Alb  4.3  /  TBili  0.7  /  DBili  x   /  AST  27  /  ALT  18  /  AlkPhos  69  06-13    PT/INR - ( 13 Jun 2019 16:19 )   PT: 12.4 sec;   INR: 1.08 ratio         PTT - ( 13 Jun 2019 16:19 )  PTT:29.1 sec  MEDICATIONS  (STANDING):  sodium chloride 0.45%. 1000 milliLiter(s) (75 mL/Hr) IV Continuous <Continuous>    MEDICATIONS  (PRN):  LORazepam   Injectable 0.5 milliGRAM(s) IV Push every 6 hours PRN Anxiety  morphine   Solution 10 milliGRAM(s) Oral every 4 hours PRN Moderate to severe pain        Assessment  63y Male  w/ 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

## 2019-06-14 NOTE — ED PROVIDER NOTE - ATTENDING CONTRIBUTION TO CARE
I, Jennifer Cabot, MD, have performed a history and physical exam of the patient and discussed their management with the resident. I reviewed the resident's note and agree with the documented findings and plan of care. My medical decision making and observations are found above.    Cabot: 63M with PMH of OP SCC, transferred for ENT eval 2/2 dysphagia 2/2 XRT changes vs new disease.  No trouble breathing.  On exam, HDS, NAD, lungs CTAB, heart sounds normal, abd soft, NT, ND, no CVAT, LEs without edema, wwp, skin normal temperature and color, neuro: alert and oriented, no focal deficits.  Labs and imaging available from OSH, will continue hydrating, ENT consult, admit.

## 2019-06-14 NOTE — CONSULT NOTE ADULT - SUBJECTIVE AND OBJECTIVE BOX
HPI: 63M well known to ENT service with history of oral cavity SCC s/p prior resection with logoregional recurrence on palliative immunotherapy presents with worsening of chronic dysphagia. Pt reports he is struggling to get down and solid or puree food. He can get down very small amounts with difficulty. Liquids are less difficult.     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, madibular reconstruction with bone graft 2018  H/O shoulder surgery: impingement left 2005, right 2007    Vital Signs Last 24 Hrs  T(C): 37.1 (14 Jun 2019 07:07), Max: 37.1 (14 Jun 2019 00:17)  T(F): 98.8 (14 Jun 2019 07:07), Max: 98.8 (14 Jun 2019 07:07)  HR: 80 (14 Jun 2019 07:07) (76 - 95)  BP: 140/88 (14 Jun 2019 07:07) (116/72 - 140/88)  BP(mean): --  RR: 18 (14 Jun 2019 07:07) (15 - 20)  SpO2: 97% (14 Jun 2019 07:07) (95% - 100%)    NAD, awake, alert   Breathing comfortably on RA, no stridor   NC clear  OC/OP post op changes as expected   Neck post treatment changes     A/P: 63M with recurrent oral cavity SCC on palliative immunotherapy presents with worsening dysphagia   -no acute intervention   -recommend consulting CT surgery Dr. Schroeder for placement of PEG   -will follow   -discussed with dr nina

## 2019-06-14 NOTE — PROGRESS NOTE ADULT - PROBLEM SELECTOR PLAN 2
- Currently on immunotherapy monthly with nivolumab, seems to have possible disease progression  -will consult onc and palliative - Currently on immunotherapy monthly with nivolumab, seems to have possible disease progression  -as per onc, plan for outpatient follow up

## 2019-06-14 NOTE — CONSULT NOTE ADULT - ASSESSMENT
63 year old male with SCC in mouth/neck s/p resection and reconstruction, adjuvent RT, now with recurrence. Patient on immunotherapy and presented on 6/14/19 with dysphagia. Pt receiving PEG tube. 63 year old male with SCC in mouth/neck s/p resection and reconstruction, adjuvent RT, now with recurrence. Patient on immunotherapy and presented on 6/14/19 with dysphagia. Pt seen in PACU, now s/p PEG tube.

## 2019-06-14 NOTE — H&P ADULT - ASSESSMENT
This is a 63 M with history of SCC of the mouth and Anxiety who presents to the hospital with c/o dysphagia found to have likely worsening SCC.

## 2019-06-14 NOTE — PROGRESS NOTE ADULT - PROBLEM SELECTOR PLAN 1
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, the thickening of the epiglottis/supraglottic hypopharyngeal wall is likely related to the recurrence of his SCC, no recent RT therapy to suggest RT related changes  - Seen by ENT, f/u recs from ENT, was given steroids at [facility] but ENT currently states no need for further steroids and no need for abx  - Will obtain S&S eval, patient states that he is able to tolerate liquid intake, will therefore place patient pn full liquid diet  - Nutrition eval for calorie counts and assessments, if unable to keep oral intake up then might need a PEG for nutritional support 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, the thickening of the epiglottis/supraglottic hypopharyngeal wall is likely related to the recurrence of his SCC, no recent RT therapy to suggest RT related changes  - Seen by ENT, f/u recs from ENT, was given steroids at [facility] but ENT currently states no need for further steroids and no need for abx  - Will obtain S&S eval, patient states that he is able to tolerate liquid intake, will therefore place patient on full liquid diet 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, the thickening of the epiglottis/supraglottic hypopharyngeal wall is likely related to the recurrence of his SCC, no recent RT therapy to suggest RT related changes  - Seen by ENT, f/u recs from ENT, was given steroids at [facility] but ENT currently states no need for further steroids and no need for abx  -plan for PEG today  - Will obtain S&S eval

## 2019-06-14 NOTE — H&P ADULT - NSHPREVIEWOFSYSTEMS_GEN_ALL_CORE
REVIEW OF SYSTEMS:    CONSTITUTIONAL: No weakness, fevers or chills  EYES: No visual changes or eye discharge  ENT: +dysphagia and odynophagia; No rhinorrhea or sore throat  NECK: No pain or stiffness  RESPIRATORY: No cough, wheezing, hemoptysis; No shortness of breath  CARDIOVASCULAR: No chest pain or palpitations; No lower extremity edema  GASTROINTESTINAL: No abdominal or epigastric pain. No nausea, vomiting, or hematemesis; No diarrhea or constipation. No melena or hematochezia.  BACK: No back pain  GENITOURINARY: No dysuria, frequency or hematuria  NEUROLOGICAL: No numbness or weakness  SKIN: No itching, burning, rashes, or lesions

## 2019-06-14 NOTE — SWALLOW BEDSIDE ASSESSMENT ADULT - COMMENTS
63 M with history of SCC of the mouth and Anxiety who presents to the hospital with c/o dysphagia found to have likely worsening SCC.      Clinical Swallow Eval Ordered; However, Surgery Team Physician at Bedside indicate that Patient is NPO for PEG placement today.  SLP called Team 1 at [employer] [phone] to confirm Patient is NPO for PEG placement.  Team 1 will Reconsult as needed. 63 M with history of SCC of the mouth and Anxiety who presents to the hospital with c/o dysphagia found to have likely worsening SCC.      ENT Note 6/14/2019 - 63M with recurrent oral cavity SCC on palliative immunotherapy presents with worsening dysphagia   -no acute intervention   -recommend consulting CT surgery Dr. Schroeder for placement of PEG   -will follow   -discussed with dr nina     Clinical Swallow Eval Ordered; However, Surgery Team Physician at Bedside indicate that Patient is NPO for PEG placement today.  SLP called Team 1 at [employer] Link [phone] to confirm Patient is NPO for PEG placement.  Team 1 will Reconsult as needed. 63 M with history of SCC of the mouth and Anxiety who presents to the hospital with c/o dysphagia found to have likely worsening SCC.      ENT Note 6/14/2019 - 63M with recurrent oral cavity SCC on palliative immunotherapy presents with worsening dysphagia   -no acute intervention   -recommend consulting CT surgery Dr. Schroeder for placement of PEG   -will follow   -discussed with dr nina     Clinical Swallow Eval Ordered; However, Surgery Team Physician at Bedside revealed that Patient is NPO for PEG placement today.  SLP called Team 1 at [employer] Link [phone] to confirm Patient is NPO for PEG placement.  Team 1 will Reconsult as needed.

## 2019-06-14 NOTE — H&P ADULT - NSICDXPASTMEDICALHX_GEN_ALL_CORE_FT
PAST MEDICAL HISTORY:  Anxiety     Malignant neoplasm skin    Malignant neoplasm of mouth and mandible    Squamous cell carcinoma of oral cavity s/p radiation, chemotherapy 2015- 4/2016

## 2019-06-14 NOTE — ED ADULT NURSE REASSESSMENT NOTE - NS ED NURSE REASSESS COMMENT FT1
Medications given as per MD order. Pt in no acute distress at this time and resting comfortably. Son at bedside. Will continue to monitor.

## 2019-06-14 NOTE — PROGRESS NOTE ADULT - SUBJECTIVE AND OBJECTIVE BOX
Patient is a 63y old  Male who presents with a chief complaint of Worsening dysphagia (14 Jun 2019 12:06)      SUBJECTIVE / OVERNIGHT EVENTS:  No acute events overnight. The pt denies SOB, CP, palpitations, n/v/d, fevers, chills or other issues.      MEDICATIONS  (STANDING):  sodium chloride 0.45%. 1000 milliLiter(s) (75 mL/Hr) IV Continuous <Continuous>    MEDICATIONS  (PRN):  LORazepam   Injectable 0.5 milliGRAM(s) IV Push every 6 hours PRN Anxiety  morphine   Solution 10 milliGRAM(s) Oral every 4 hours PRN Moderate to severe pain      Vital Signs Last 24 Hrs  T(C): 36.8 (14 Jun 2019 11:12), Max: 37.1 (14 Jun 2019 00:17)  T(F): 98.3 (14 Jun 2019 11:12), Max: 98.8 (14 Jun 2019 07:07)  HR: 100 (14 Jun 2019 11:12) (76 - 100)  BP: 124/80 (14 Jun 2019 11:12) (116/72 - 140/88)  BP(mean): --  RR: 15 (14 Jun 2019 11:12) (15 - 20)  SpO2: 97% (14 Jun 2019 11:12) (95% - 100%)    CAPILLARY BLOOD GLUCOSE        I&O's Summary      PHYSICAL EXAM:  	GENERAL: No acute distress, well-developed  	CHEST/LUNG: Clear to auscultation bilaterally; No wheeze, equal breath sounds bilaterally   	BACK: No spinal tenderness  	HEART: Regular rate and rhythm; No murmurs, rubs, or gallops  	ABDOMEN: Soft, Nontender, Nondistended; Bowel sounds present  	EXTREMITIES:  No clubbing, cyanosis, or edema  	PSYCH: Nl behavior, nl affect  	NEUROLOGY: AAOx3, non-focal  SKIN: Normal color, No rashes or lesions  LABS:                        13.6   7.22  )-----------( 304      ( 14 Jun 2019 05:14 )             41.9     Auto Eosinophil # x     / Auto Eosinophil % x     / Auto Neutrophil # x     / Auto Neutrophil % x     / BANDS % x                            14.6   9.0   )-----------( 302      ( 13 Jun 2019 16:19 )             43.6     Auto Eosinophil # 0.4   / Auto Eosinophil % 4.1   / Auto Neutrophil # 6.5   / Auto Neutrophil % 71.7  / BANDS % x        06-14    137  |  97<L>  |  11  ----------------------------<  151<H>  4.2   |  27  |  0.62  06-13    137  |  95<L>  |  11.0  ----------------------------<  89  5.0   |  27.0  |  0.60    Ca    9.9      14 Jun 2019 05:14  Mg     2.1     06-14  Phos  3.9     06-14  TPro  8.4  /  Alb  4.3  /  TBili  0.7  /  DBili  x   /  AST  27  /  ALT  18  /  AlkPhos  69  06-13    PT/INR - ( 14 Jun 2019 10:24 )   PT: 13.1 SEC;   INR: 1.17          PTT - ( 14 Jun 2019 10:24 )  PTT:29.9 SEC            RESPIRATORY  VENT:    ABG:     VBG:     RADIOLOGY & ADDITIONAL TESTS:  (Imaging Personally Reviewed): CT neck    Consultant(s) Notes Reviewed:  palliative, heme onc,, CT    Care Discussed with Consultants/Other Providers: NA

## 2019-06-14 NOTE — CONSULT NOTE ADULT - ASSESSMENT
INCOMPLETE     -Agree with PEG  -  -Supportive care, pain control, Nutrition, PT, DVT ppx  -Outpatient oncology f/u with outside oncologist    Will follow. Please do not hesitate to call back with questions.     Valarie Jean MD  Medical Oncology Attending 63m with metastatic SCC of the mouth/neck s/p resection/reconstruciton 4/18 with adjuvent RT, now with recurrence on immunotherapy with nivolumab and Anxiety who presents to the hospital with complaints of worsening dysphagia/odynophagia    -Agree with PEG  -No acute oncologic interventions  -Supportive care, pain control, Nutrition, PT, DVT ppx  -Outpatient oncology f/u with outside oncologist    Will follow. Please do not hesitate to call back with questions.     Valarie Jean MD  Medical Oncology Attending

## 2019-06-15 DIAGNOSIS — E43 UNSPECIFIED SEVERE PROTEIN-CALORIE MALNUTRITION: ICD-10-CM

## 2019-06-15 LAB
ALBUMIN SERPL ELPH-MCNC: 3.8 G/DL — SIGNIFICANT CHANGE UP (ref 3.3–5)
ALP SERPL-CCNC: 51 U/L — SIGNIFICANT CHANGE UP (ref 40–120)
ALT FLD-CCNC: 12 U/L — SIGNIFICANT CHANGE UP (ref 4–41)
ANION GAP SERPL CALC-SCNC: 13 MMO/L — SIGNIFICANT CHANGE UP (ref 7–14)
AST SERPL-CCNC: 13 U/L — SIGNIFICANT CHANGE UP (ref 4–40)
BILIRUB SERPL-MCNC: 0.9 MG/DL — SIGNIFICANT CHANGE UP (ref 0.2–1.2)
BUN SERPL-MCNC: 14 MG/DL — SIGNIFICANT CHANGE UP (ref 7–23)
CALCIUM SERPL-MCNC: 9.5 MG/DL — SIGNIFICANT CHANGE UP (ref 8.4–10.5)
CHLORIDE SERPL-SCNC: 97 MMOL/L — LOW (ref 98–107)
CO2 SERPL-SCNC: 27 MMOL/L — SIGNIFICANT CHANGE UP (ref 22–31)
CREAT SERPL-MCNC: 0.76 MG/DL — SIGNIFICANT CHANGE UP (ref 0.5–1.3)
GLUCOSE SERPL-MCNC: 118 MG/DL — HIGH (ref 70–99)
HCT VFR BLD CALC: 37.2 % — LOW (ref 39–50)
HGB BLD-MCNC: 12.4 G/DL — LOW (ref 13–17)
MAGNESIUM SERPL-MCNC: 2.1 MG/DL — SIGNIFICANT CHANGE UP (ref 1.6–2.6)
MCHC RBC-ENTMCNC: 29.3 PG — SIGNIFICANT CHANGE UP (ref 27–34)
MCHC RBC-ENTMCNC: 33.3 % — SIGNIFICANT CHANGE UP (ref 32–36)
MCV RBC AUTO: 87.9 FL — SIGNIFICANT CHANGE UP (ref 80–100)
NRBC # FLD: 0 K/UL — SIGNIFICANT CHANGE UP (ref 0–0)
PHOSPHATE SERPL-MCNC: 3.5 MG/DL — SIGNIFICANT CHANGE UP (ref 2.5–4.5)
PLATELET # BLD AUTO: 282 K/UL — SIGNIFICANT CHANGE UP (ref 150–400)
PMV BLD: 9.4 FL — SIGNIFICANT CHANGE UP (ref 7–13)
POTASSIUM SERPL-MCNC: 4 MMOL/L — SIGNIFICANT CHANGE UP (ref 3.5–5.3)
POTASSIUM SERPL-SCNC: 4 MMOL/L — SIGNIFICANT CHANGE UP (ref 3.5–5.3)
PROT SERPL-MCNC: 6.9 G/DL — SIGNIFICANT CHANGE UP (ref 6–8.3)
RBC # BLD: 4.23 M/UL — SIGNIFICANT CHANGE UP (ref 4.2–5.8)
RBC # FLD: 12.4 % — SIGNIFICANT CHANGE UP (ref 10.3–14.5)
SODIUM SERPL-SCNC: 137 MMOL/L — SIGNIFICANT CHANGE UP (ref 135–145)
WBC # BLD: 8.53 K/UL — SIGNIFICANT CHANGE UP (ref 3.8–10.5)
WBC # FLD AUTO: 8.53 K/UL — SIGNIFICANT CHANGE UP (ref 3.8–10.5)

## 2019-06-15 PROCEDURE — 99233 SBSQ HOSP IP/OBS HIGH 50: CPT | Mod: GC

## 2019-06-15 RX ORDER — MORPHINE SULFATE 50 MG/1
10 CAPSULE, EXTENDED RELEASE ORAL THREE TIMES A DAY
Refills: 0 | Status: DISCONTINUED | OUTPATIENT
Start: 2019-06-15 | End: 2019-06-15

## 2019-06-15 RX ORDER — HYDROMORPHONE HYDROCHLORIDE 2 MG/ML
3 INJECTION INTRAMUSCULAR; INTRAVENOUS; SUBCUTANEOUS EVERY 4 HOURS
Refills: 0 | Status: DISCONTINUED | OUTPATIENT
Start: 2019-06-15 | End: 2019-06-15

## 2019-06-15 RX ORDER — HYDROMORPHONE HYDROCHLORIDE 2 MG/ML
0.5 INJECTION INTRAMUSCULAR; INTRAVENOUS; SUBCUTANEOUS ONCE
Refills: 0 | Status: DISCONTINUED | OUTPATIENT
Start: 2019-06-15 | End: 2019-06-15

## 2019-06-15 RX ORDER — MORPHINE SULFATE 50 MG/1
20 CAPSULE, EXTENDED RELEASE ORAL EVERY 4 HOURS
Refills: 0 | Status: DISCONTINUED | OUTPATIENT
Start: 2019-06-15 | End: 2019-06-15

## 2019-06-15 RX ORDER — MORPHINE SULFATE 50 MG/1
10 CAPSULE, EXTENDED RELEASE ORAL EVERY 4 HOURS
Refills: 0 | Status: DISCONTINUED | OUTPATIENT
Start: 2019-06-15 | End: 2019-06-18

## 2019-06-15 RX ADMIN — HYDROMORPHONE HYDROCHLORIDE 0.6 MILLIGRAM(S): 2 INJECTION INTRAMUSCULAR; INTRAVENOUS; SUBCUTANEOUS at 02:33

## 2019-06-15 RX ADMIN — MORPHINE SULFATE 10 MILLIGRAM(S): 50 CAPSULE, EXTENDED RELEASE ORAL at 17:56

## 2019-06-15 RX ADMIN — MORPHINE SULFATE 10 MILLIGRAM(S): 50 CAPSULE, EXTENDED RELEASE ORAL at 17:26

## 2019-06-15 RX ADMIN — MORPHINE SULFATE 10 MILLIGRAM(S): 50 CAPSULE, EXTENDED RELEASE ORAL at 22:05

## 2019-06-15 RX ADMIN — SENNA PLUS 2 TABLET(S): 8.6 TABLET ORAL at 21:37

## 2019-06-15 RX ADMIN — MORPHINE SULFATE 10 MILLIGRAM(S): 50 CAPSULE, EXTENDED RELEASE ORAL at 21:37

## 2019-06-15 RX ADMIN — SCOPALAMINE 1 PATCH: 1 PATCH, EXTENDED RELEASE TRANSDERMAL at 07:00

## 2019-06-15 RX ADMIN — HYDROMORPHONE HYDROCHLORIDE 0.6 MILLIGRAM(S): 2 INJECTION INTRAMUSCULAR; INTRAVENOUS; SUBCUTANEOUS at 02:03

## 2019-06-15 RX ADMIN — Medication 0.5 MILLIGRAM(S): at 01:01

## 2019-06-15 RX ADMIN — HYDROMORPHONE HYDROCHLORIDE 0.6 MILLIGRAM(S): 2 INJECTION INTRAMUSCULAR; INTRAVENOUS; SUBCUTANEOUS at 06:46

## 2019-06-15 RX ADMIN — POLYETHYLENE GLYCOL 3350 17 GRAM(S): 17 POWDER, FOR SOLUTION ORAL at 12:53

## 2019-06-15 RX ADMIN — HYDROMORPHONE HYDROCHLORIDE 0.6 MILLIGRAM(S): 2 INJECTION INTRAMUSCULAR; INTRAVENOUS; SUBCUTANEOUS at 06:16

## 2019-06-15 RX ADMIN — SCOPALAMINE 1 PATCH: 1 PATCH, EXTENDED RELEASE TRANSDERMAL at 06:19

## 2019-06-15 RX ADMIN — HYDROMORPHONE HYDROCHLORIDE 0.5 MILLIGRAM(S): 2 INJECTION INTRAMUSCULAR; INTRAVENOUS; SUBCUTANEOUS at 05:05

## 2019-06-15 RX ADMIN — HYDROMORPHONE HYDROCHLORIDE 3 MILLIGRAM(S): 2 INJECTION INTRAMUSCULAR; INTRAVENOUS; SUBCUTANEOUS at 12:50

## 2019-06-15 RX ADMIN — HYDROMORPHONE HYDROCHLORIDE 3 MILLIGRAM(S): 2 INJECTION INTRAMUSCULAR; INTRAVENOUS; SUBCUTANEOUS at 13:30

## 2019-06-15 RX ADMIN — Medication 0.5 MILLIGRAM(S): at 22:50

## 2019-06-15 RX ADMIN — HYDROMORPHONE HYDROCHLORIDE 0.5 MILLIGRAM(S): 2 INJECTION INTRAMUSCULAR; INTRAVENOUS; SUBCUTANEOUS at 04:35

## 2019-06-15 RX ADMIN — SCOPALAMINE 1 PATCH: 1 PATCH, EXTENDED RELEASE TRANSDERMAL at 19:07

## 2019-06-15 NOTE — DIETITIAN INITIAL EVALUATION ADULT. - PROBLEM SELECTOR PLAN 4
- Patient unsure of meds, review of ISTOP ( Reference #: [id number]), OMR, and AllScripts records show multiple medications with overlaps, would need to clarify meds in AM  - For now, will re-start patient on morphine solution 10mg prn pain

## 2019-06-15 NOTE — DIETITIAN INITIAL EVALUATION ADULT. - NS AS NUTRI INTERV ENTERAL NUTRITION3
See enteral recommendation above (change to [facility]) to provide increased nutrition to optimize overall nutritional status.

## 2019-06-15 NOTE — DIETITIAN INITIAL EVALUATION ADULT. - NS FNS WEIGHT CHANGE REASON
Pt. reported weight loss from 170# from November; 150 lbs 5/14/19 as per Allscripts record (1m)/unintentional

## 2019-06-15 NOTE — PROGRESS NOTE ADULT - PROBLEM SELECTOR PLAN 3
- Seems to be on valium 5mg prn for anxiety, patient currently unsure of his meds, review of IStop ( Reference #: [id number]) shows that he was last prescribed diazepam 5mg on 5/17  -currently on ativan .5mg IV q6 prn for anxiety - Seems to be on valium 5mg prn for anxiety, patient currently unsure of his meds, review of IStop ( Reference #: [id number]) shows that he was last prescribed diazepam 5mg on 5/17  -currently on ativan .5mg IV q6 prn for anxiety  and dilaudid 3mg q4 as per palliative - Seems to be on valium 5mg prn for anxiety, patient currently unsure of his meds, review of IStop ( Reference #: [id number]) shows that he was last prescribed diazepam 5mg on 5/17  -currently on ativan .5mg IV q6 prn for anxiety  and dilaudid 3mg q4 via PEG as per palliative

## 2019-06-15 NOTE — DIETITIAN INITIAL EVALUATION ADULT. - PROBLEM SELECTOR PLAN 1
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

## 2019-06-15 NOTE — PROGRESS NOTE ADULT - SUBJECTIVE AND OBJECTIVE BOX
POST ANESTHESIA EVALUATION    63y Male POSTOP DAY 1 S/P PEG insertion    MENTAL STATUS: Patient participation [  x] Awake     [  ] Arousable     [  ] Sedated    AIRWAY PATENCY: [x  ] Satisfactory  [  ] Other:     Vital Signs Last 24 Hrs  T(C): 36.8 (15 Keaton 2019 06:10), Max: 36.8 (14 Jun 2019 11:12)  T(F): 98.3 (15 Keaton 2019 06:10), Max: 98.3 (14 Jun 2019 11:12)  HR: 77 (15 Keaton 2019 06:10) (76 - 101)  BP: 128/87 (15 Keaton 2019 06:10) (115/74 - 144/86)  BP(mean): 93 (14 Jun 2019 16:00) (79 - 104)  RR: 18 (15 Keaton 2019 06:10) (13 - 20)  SpO2: 100% (15 Keaton 2019 06:10) (97% - 100%)  I&O's Summary    14 Jun 2019 07:01  -  15 Keaton 2019 07:00  --------------------------------------------------------  IN: 850 mL / OUT: 875 mL / NET: -25 mL          NAUSEA/ VOMITTING:  [ x ] NONE  [  ] CONTROLLED [  ] OTHER     PAIN: [ x ] CONTROLLED WITH CURRENT REGIMEN  [  ] OTHER    [ x ] NO APPARENT ANESTHESIA COMPLICATIONS      Comments:

## 2019-06-15 NOTE — DIETITIAN INITIAL EVALUATION ADULT. - PERTINENT LABORATORY DATA
06-15 Na137 mmol/L Glu 118 mg/dL<H> K+ 4.0 mmol/L Cr  0.76 mg/dL BUN 14 mg/dL 06-15 Phos 3.5 mg/dL 06-15 Alb 3.8 g/dL

## 2019-06-15 NOTE — PROGRESS NOTE ADULT - PROBLEM SELECTOR PLAN 1
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, the thickening of the epiglottis/supraglottic hypopharyngeal wall is likely related to the recurrence of his SCC, no recent RT therapy to suggest RT related changes  - Seen by ENT, f/u recs from ENT, was given steroids at [facility] but ENT currently states no need for further steroids and no need for abx  -pt s/p PEG, as per CT surg, they will loosen peg tube 6/16/19  - reconsult S&S eval as pt was NPO yesterday for PEG  -nutritional optimization 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

## 2019-06-15 NOTE — PROGRESS NOTE ADULT - SUBJECTIVE AND OBJECTIVE BOX
Patient is a 63y old  Male who presents with a chief complaint of Worsening dysphagia (15 Keaton 2019 10:06)      SUBJECTIVE / OVERNIGHT EVENTS:  No acute events overnight. Pt complains of some discomfort around PEG site and rates the pain as 3/10. He denies SOB, CP, fevers, chills or other issues.     MEDICATIONS  (STANDING):  bisacodyl 5 milliGRAM(s) Oral at bedtime  dextrose 5% + sodium chloride 0.45%. 1000 milliLiter(s) (75 mL/Hr) IV Continuous <Continuous>  polyethylene glycol 3350 17 Gram(s) Oral daily  scopolamine   Patch 1 Patch Transdermal every 72 hours  senna 2 Tablet(s) Oral at bedtime  sodium chloride 0.45%. 1000 milliLiter(s) (75 mL/Hr) IV Continuous <Continuous>    MEDICATIONS  (PRN):  HYDROmorphone   Tablet 3 milliGRAM(s) Enteral Tube every 4 hours PRN Severe Pain (7 - 10)  LORazepam   Injectable 0.5 milliGRAM(s) IV Push every 6 hours PRN Anxiety      Vital Signs Last 24 Hrs  T(C): 36.9 (15 Keaton 2019 10:33), Max: 36.9 (15 Keaton 2019 10:33)  T(F): 98.4 (15 Keaton 2019 10:33), Max: 98.4 (15 Keaton 2019 10:33)  HR: 74 (15 Keaton 2019 10:33) (74 - 101)  BP: 124/89 (15 Keaton 2019 10:33) (115/74 - 144/86)  BP(mean): 93 (14 Jun 2019 16:00) (79 - 104)  RR: 18 (15 Keaton 2019 10:33) (13 - 20)  SpO2: 100% (15 Keaton 2019 10:33) (97% - 100%)    CAPILLARY BLOOD GLUCOSE        I&O's Summary    14 Jun 2019 07:01  -  15 Keaton 2019 07:00  --------------------------------------------------------  IN: 850 mL / OUT: 875 mL / NET: -25 mL        PHYSICAL EXAM:  	GENERAL: No acute distress, well-developed  	CHEST/LUNG: Clear to auscultation bilaterally; No wheeze, equal breath sounds bilaterally   	BACK: No spinal tenderness  	HEART: Regular rate and rhythm; No murmurs, rubs, or gallops  	ABDOMEN: Soft, Nontender, Nondistended; Bowel sounds present  	EXTREMITIES:  No clubbing, cyanosis, or edema  	PSYCH: Nl behavior, nl affect  	NEUROLOGY: AAOx3, non-focal  SKIN: Normal color, No rashes or lesions    LABS:                        12.4   8.53  )-----------( 282      ( 15 Keaton 2019 06:27 )             37.2     Auto Eosinophil # x     / Auto Eosinophil % x     / Auto Neutrophil # x     / Auto Neutrophil % x     / BANDS % x                            13.6   7.22  )-----------( 304      ( 14 Jun 2019 05:14 )             41.9     Auto Eosinophil # x     / Auto Eosinophil % x     / Auto Neutrophil # x     / Auto Neutrophil % x     / BANDS % x                            14.6   9.0   )-----------( 302      ( 13 Jun 2019 16:19 )             43.6     Auto Eosinophil # 0.4   / Auto Eosinophil % 4.1   / Auto Neutrophil # 6.5   / Auto Neutrophil % 71.7  / BANDS % x        06-15    137  |  97<L>  |  14  ----------------------------<  118<H>  4.0   |  27  |  0.76  06-14    137  |  97<L>  |  11  ----------------------------<  151<H>  4.2   |  27  |  0.62  06-13    137  |  95<L>  |  11.0  ----------------------------<  89  5.0   |  27.0  |  0.60    Ca    9.5      15 Keaton 2019 06:27  Mg     2.1     06-15  Phos  3.5     06-15  TPro  6.9  /  Alb  3.8  /  TBili  0.9  /  DBili  x   /  AST  13  /  ALT  12  /  AlkPhos  51  06-15  TPro  8.4  /  Alb  4.3  /  TBili  0.7  /  DBili  x   /  AST  27  /  ALT  18  /  AlkPhos  69  06-13    PT/INR - ( 14 Jun 2019 10:24 )   PT: 13.1 SEC;   INR: 1.17          PTT - ( 14 Jun 2019 10:24 )  PTT:29.9 SEC            RESPIRATORY  VENT:    ABG:     VBG:     RADIOLOGY & ADDITIONAL TESTS:  (Imaging Personally Reviewed): MOLLY    Consultant(s) Notes Reviewed:  CT surgery     Care Discussed with Consultants/Other Providers: MOLLY Patient is a 63y old  Male who presents with a chief complaint of Worsening dysphagia (15 Keaton 2019 10:06)    SUBJECTIVE / OVERNIGHT EVENTS:  No acute events overnight. Pt complains of some discomfort around PEG site and rates the pain as 3/10. He denies SOB, CP, fevers, chills or other issues.     MEDICATIONS  (STANDING):  bisacodyl 5 milliGRAM(s) Oral at bedtime  dextrose 5% + sodium chloride 0.45%. 1000 milliLiter(s) (75 mL/Hr) IV Continuous <Continuous>  polyethylene glycol 3350 17 Gram(s) Oral daily  scopolamine   Patch 1 Patch Transdermal every 72 hours  senna 2 Tablet(s) Oral at bedtime  sodium chloride 0.45%. 1000 milliLiter(s) (75 mL/Hr) IV Continuous <Continuous>    MEDICATIONS  (PRN):  HYDROmorphone   Tablet 3 milliGRAM(s) Enteral Tube every 4 hours PRN Severe Pain (7 - 10)  LORazepam   Injectable 0.5 milliGRAM(s) IV Push every 6 hours PRN Anxiety    Vital Signs Last 24 Hrs  T(C): 36.9 (15 Keaton 2019 10:33), Max: 36.9 (15 Keaton 2019 10:33)  T(F): 98.4 (15 Keaton 2019 10:33), Max: 98.4 (15 Keaton 2019 10:33)  HR: 74 (15 Keaton 2019 10:33) (74 - 101)  BP: 124/89 (15 Keaton 2019 10:33) (115/74 - 144/86)  BP(mean): 93 (14 Jun 2019 16:00) (79 - 104)  RR: 18 (15 Keaton 2019 10:33) (13 - 20)  SpO2: 100% (15 Keaton 2019 10:33) (97% - 100%)    I&O's Summary    14 Jun 2019 07:01  -  15 Keaton 2019 07:00  --------------------------------------------------------  IN: 850 mL / OUT: 875 mL / NET: -25 mL      PHYSICAL EXAM:  	GENERAL: No acute distress, well-developed  	CHEST/LUNG: Clear to auscultation bilaterally; No wheeze, equal breath sounds bilaterally   	BACK: No spinal tenderness  	HEART: Regular rate and rhythm; No murmurs, rubs, or gallops  	ABDOMEN: Soft, Nontender, Nondistended; Bowel sounds present  	EXTREMITIES:  No clubbing, cyanosis, or edema  	PSYCH: Nl behavior, nl affect  	NEUROLOGY: AAOx3, non-focal  SKIN: Normal color, No rashes or lesions    LABS:                        12.4   8.53  )-----------( 282      ( 15 Keaton 2019 06:27 )             37.2     Auto Eosinophil # x     / Auto Eosinophil % x     / Auto Neutrophil # x     / Auto Neutrophil % x     / BANDS % x                            13.6   7.22  )-----------( 304      ( 14 Jun 2019 05:14 )             41.9     Auto Eosinophil # x     / Auto Eosinophil % x     / Auto Neutrophil # x     / Auto Neutrophil % x     / BANDS % x                            14.6   9.0   )-----------( 302      ( 13 Jun 2019 16:19 )             43.6     Auto Eosinophil # 0.4   / Auto Eosinophil % 4.1   / Auto Neutrophil # 6.5   / Auto Neutrophil % 71.7  / BANDS % x        06-15    137  |  97<L>  |  14  ----------------------------<  118<H>  4.0   |  27  |  0.76  06-14    137  |  97<L>  |  11  ----------------------------<  151<H>  4.2   |  27  |  0.62  06-13    137  |  95<L>  |  11.0  ----------------------------<  89  5.0   |  27.0  |  0.60    Ca    9.5      15 Keaton 2019 06:27  Mg     2.1     06-15  Phos  3.5     06-15  TPro  6.9  /  Alb  3.8  /  TBili  0.9  /  DBili  x   /  AST  13  /  ALT  12  /  AlkPhos  51  06-15  TPro  8.4  /  Alb  4.3  /  TBili  0.7  /  DBili  x   /  AST  27  /  ALT  18  /  AlkPhos  69  06-13    PT/INR - ( 14 Jun 2019 10:24 )   PT: 13.1 SEC;   INR: 1.17     PTT - ( 14 Jun 2019 10:24 )  PTT:29.9 SEC    RADIOLOGY & ADDITIONAL TESTS:  (Imaging Personally Reviewed): MOLLY    Consultant(s) Notes Reviewed:  CT surgery, pallcare, oncology   Care Discussed with Consultants/Other Providers: MOLLY

## 2019-06-15 NOTE — DIETITIAN INITIAL EVALUATION ADULT. - PERTINENT MEDS FT
MEDICATIONS  (STANDING):  bisacodyl 5 milliGRAM(s) Oral at bedtime  polyethylene glycol 3350 17 Gram(s) Oral daily  scopolamine   Patch 1 Patch Transdermal every 72 hours  senna 2 Tablet(s) Oral at bedtime    MEDICATIONS  (PRN):  HYDROmorphone   Tablet 3 milliGRAM(s) Enteral Tube every 4 hours PRN Severe Pain (7 - 10)  LORazepam   Injectable 0.5 milliGRAM(s) IV Push every 6 hours PRN Anxiety

## 2019-06-15 NOTE — DIETITIAN INITIAL EVALUATION ADULT. - ETIOLOGY
physiological causes increasing nutrient needs, worsening dysphagia in the context of chronic illness - SCC of the oral cavity

## 2019-06-15 NOTE — PROGRESS NOTE ADULT - SUBJECTIVE AND OBJECTIVE BOX
Patient s/p EGD/PEG tube placement, resting comfortably at present.  Peg tube to gravity drainage, no signs of redness or bleeding noted.    Vital Signs Last 24 Hrs  T(C): 36.4 (14 Jun 2019 22:05), Max: 37.1 (14 Jun 2019 00:17)  T(F): 97.5 (14 Jun 2019 22:05), Max: 98.8 (14 Jun 2019 07:07)  HR: 86 (14 Jun 2019 22:21) (79 - 101)  BP: 116/85 (14 Jun 2019 22:21) (115/74 - 144/86)  BP(mean): 93 (14 Jun 2019 16:00) (79 - 104)  RR: 18 (14 Jun 2019 22:05) (13 - 20)  SpO2: 98% (14 Jun 2019 22:05) (97% - 100%)    I&O's Detail    14 Jun 2019 07:01  -  15 Keaton 2019 00:03  --------------------------------------------------------  IN:    dextrose 5% + sodium chloride 0.45%.: 100 mL    sodium chloride 0.45%.: 150 mL  Total IN: 250 mL    OUT:    Gastrostomy Tube: 275 mL    Voided: 500 mL  Total OUT: 775 mL    Total NET: -525 mL      MEDICATIONS  (STANDING):  bisacodyl 5 milliGRAM(s) Oral at bedtime  dextrose 5% + sodium chloride 0.45%. 1000 milliLiter(s) (75 mL/Hr) IV Continuous <Continuous>  polyethylene glycol 3350 17 Gram(s) Oral daily  scopolamine   Patch 1 Patch Transdermal every 72 hours  senna 2 Tablet(s) Oral at bedtime  sodium chloride 0.45%. 1000 milliLiter(s) (75 mL/Hr) IV Continuous <Continuous>

## 2019-06-15 NOTE — CHART NOTE - NSCHARTNOTEFT_GEN_A_CORE
NUTRITION SERVICES     Upon Nutritional Assessment by the Registered Dietitian your patient was determined to meet criteria/ has evidence of the following diagnosis/diagnoses:  [ ] Mild Protein Calorie Malnutrition   [ ] Moderate Protein Calorie Malnutrition   [X] Severe Protein Calorie Malnutrition   [ ] Unspecified Protein Calorie Malnutrition   [ ] Underweight / BMI <19  [ ] Morbid Obesity / BMI >40    Findings as based on:  •  Comprehensive nutrition assessment and consultation

## 2019-06-15 NOTE — PROGRESS NOTE ADULT - PROBLEM SELECTOR PLAN 5
- Lovenox for DVT ppx likely due to cancer and dysphagia  - appreciate nutrition eval 6/15  - c/w TFs

## 2019-06-15 NOTE — DIETITIAN INITIAL EVALUATION ADULT. - PROBLEM SELECTOR PLAN 3
- Seems to be on valium 5mg prn for anxiety, patient currently unsure of his meds, review of IStop ( Reference #: [id number]) shows that he was last prescribe diazepam 5mg on 5/17, would therefore place on diazepam 5mg prn for anxiety for now and clarify his meds in AM with pharmacy

## 2019-06-15 NOTE — DIETITIAN INITIAL EVALUATION ADULT. - NS AS NUTRI INTERV ENTERAL NUTRITION
Suggest TwoCalHN 1000ml / day at 30 ml / hr with increase in rate by 10 ml / hr as tolerated until goal rate of 55ml/hr can be reached via enteral route to meet increased nutritional demands (to provide 2000kcal, 83.5gm protein, 700ml water)./Composition

## 2019-06-15 NOTE — DIETITIAN INITIAL EVALUATION ADULT. - SIGNS/SYMPTOMS
NPO, s/p PEG placement, significant weight loss Weight loss >5% in 1 month, muscle wasting of the temporal region, <75% intake for > 1 month

## 2019-06-15 NOTE — PROGRESS NOTE ADULT - PROBLEM SELECTOR PLAN 4
- Patient unsure of meds, review of ISTOP ( Reference #: [id number]), OMR, and [facility]Script[facility] records show multiple medications with overlaps  -Will call pharmacy

## 2019-06-16 LAB
ANION GAP SERPL CALC-SCNC: 11 MMO/L — SIGNIFICANT CHANGE UP (ref 7–14)
BUN SERPL-MCNC: 15 MG/DL — SIGNIFICANT CHANGE UP (ref 7–23)
CALCIUM SERPL-MCNC: 10 MG/DL — SIGNIFICANT CHANGE UP (ref 8.4–10.5)
CHLORIDE SERPL-SCNC: 99 MMOL/L — SIGNIFICANT CHANGE UP (ref 98–107)
CO2 SERPL-SCNC: 29 MMOL/L — SIGNIFICANT CHANGE UP (ref 22–31)
CREAT SERPL-MCNC: 0.77 MG/DL — SIGNIFICANT CHANGE UP (ref 0.5–1.3)
GLUCOSE SERPL-MCNC: 145 MG/DL — HIGH (ref 70–99)
HCT VFR BLD CALC: 40 % — SIGNIFICANT CHANGE UP (ref 39–50)
HCV AB S/CO SERPL IA: 0.1 S/CO — SIGNIFICANT CHANGE UP (ref 0–0.99)
HCV AB SERPL-IMP: SIGNIFICANT CHANGE UP
HGB BLD-MCNC: 12.8 G/DL — LOW (ref 13–17)
MAGNESIUM SERPL-MCNC: 2.2 MG/DL — SIGNIFICANT CHANGE UP (ref 1.6–2.6)
MCHC RBC-ENTMCNC: 29.1 PG — SIGNIFICANT CHANGE UP (ref 27–34)
MCHC RBC-ENTMCNC: 32 % — SIGNIFICANT CHANGE UP (ref 32–36)
MCV RBC AUTO: 90.9 FL — SIGNIFICANT CHANGE UP (ref 80–100)
NRBC # FLD: 0 K/UL — SIGNIFICANT CHANGE UP (ref 0–0)
PHOSPHATE SERPL-MCNC: 4.2 MG/DL — SIGNIFICANT CHANGE UP (ref 2.5–4.5)
PLATELET # BLD AUTO: 306 K/UL — SIGNIFICANT CHANGE UP (ref 150–400)
PMV BLD: 9.3 FL — SIGNIFICANT CHANGE UP (ref 7–13)
POTASSIUM SERPL-MCNC: 4.1 MMOL/L — SIGNIFICANT CHANGE UP (ref 3.5–5.3)
POTASSIUM SERPL-SCNC: 4.1 MMOL/L — SIGNIFICANT CHANGE UP (ref 3.5–5.3)
RBC # BLD: 4.4 M/UL — SIGNIFICANT CHANGE UP (ref 4.2–5.8)
RBC # FLD: 12.6 % — SIGNIFICANT CHANGE UP (ref 10.3–14.5)
SODIUM SERPL-SCNC: 139 MMOL/L — SIGNIFICANT CHANGE UP (ref 135–145)
WBC # BLD: 7.74 K/UL — SIGNIFICANT CHANGE UP (ref 3.8–10.5)
WBC # FLD AUTO: 7.74 K/UL — SIGNIFICANT CHANGE UP (ref 3.8–10.5)

## 2019-06-16 PROCEDURE — 99232 SBSQ HOSP IP/OBS MODERATE 35: CPT | Mod: GC

## 2019-06-16 RX ORDER — MORPHINE SULFATE 50 MG/1
2 CAPSULE, EXTENDED RELEASE ORAL DAILY
Refills: 0 | Status: DISCONTINUED | OUTPATIENT
Start: 2019-06-16 | End: 2019-06-18

## 2019-06-16 RX ORDER — MORPHINE SULFATE 50 MG/1
2 CAPSULE, EXTENDED RELEASE ORAL ONCE
Refills: 0 | Status: DISCONTINUED | OUTPATIENT
Start: 2019-06-16 | End: 2019-06-16

## 2019-06-16 RX ORDER — MORPHINE SULFATE 50 MG/1
5 CAPSULE, EXTENDED RELEASE ORAL
Refills: 0 | Status: DISCONTINUED | OUTPATIENT
Start: 2019-06-16 | End: 2019-06-18

## 2019-06-16 RX ORDER — SENNA PLUS 8.6 MG/1
10 TABLET ORAL AT BEDTIME
Refills: 0 | Status: DISCONTINUED | OUTPATIENT
Start: 2019-06-16 | End: 2019-06-18

## 2019-06-16 RX ADMIN — MORPHINE SULFATE 2 MILLIGRAM(S): 50 CAPSULE, EXTENDED RELEASE ORAL at 00:35

## 2019-06-16 RX ADMIN — MORPHINE SULFATE 10 MILLIGRAM(S): 50 CAPSULE, EXTENDED RELEASE ORAL at 21:00

## 2019-06-16 RX ADMIN — MORPHINE SULFATE 10 MILLIGRAM(S): 50 CAPSULE, EXTENDED RELEASE ORAL at 20:13

## 2019-06-16 RX ADMIN — SCOPALAMINE 1 PATCH: 1 PATCH, EXTENDED RELEASE TRANSDERMAL at 19:21

## 2019-06-16 RX ADMIN — Medication 0.5 MILLIGRAM(S): at 21:33

## 2019-06-16 RX ADMIN — MORPHINE SULFATE 5 MILLIGRAM(S): 50 CAPSULE, EXTENDED RELEASE ORAL at 23:29

## 2019-06-16 RX ADMIN — MORPHINE SULFATE 10 MILLIGRAM(S): 50 CAPSULE, EXTENDED RELEASE ORAL at 07:30

## 2019-06-16 RX ADMIN — MORPHINE SULFATE 10 MILLIGRAM(S): 50 CAPSULE, EXTENDED RELEASE ORAL at 02:50

## 2019-06-16 RX ADMIN — MORPHINE SULFATE 10 MILLIGRAM(S): 50 CAPSULE, EXTENDED RELEASE ORAL at 07:07

## 2019-06-16 RX ADMIN — SCOPALAMINE 1 PATCH: 1 PATCH, EXTENDED RELEASE TRANSDERMAL at 06:17

## 2019-06-16 RX ADMIN — MORPHINE SULFATE 10 MILLIGRAM(S): 50 CAPSULE, EXTENDED RELEASE ORAL at 14:01

## 2019-06-16 RX ADMIN — MORPHINE SULFATE 10 MILLIGRAM(S): 50 CAPSULE, EXTENDED RELEASE ORAL at 03:20

## 2019-06-16 RX ADMIN — POLYETHYLENE GLYCOL 3350 17 GRAM(S): 17 POWDER, FOR SOLUTION ORAL at 13:31

## 2019-06-16 RX ADMIN — MORPHINE SULFATE 2 MILLIGRAM(S): 50 CAPSULE, EXTENDED RELEASE ORAL at 01:05

## 2019-06-16 RX ADMIN — MORPHINE SULFATE 10 MILLIGRAM(S): 50 CAPSULE, EXTENDED RELEASE ORAL at 13:31

## 2019-06-16 RX ADMIN — SENNA PLUS 10 MILLILITER(S): 8.6 TABLET ORAL at 21:33

## 2019-06-16 NOTE — PROGRESS NOTE ADULT - SUBJECTIVE AND OBJECTIVE BOX
pt seen and examined at bedside. He is tolerating tube feeds without complaints. PEG loosened at bedside and tolerated well by patient.    Vital Signs Last 24 Hrs  T(C): 36.8 (16 Jun 2019 14:15), Max: 36.9 (15 Keaton 2019 18:16)  T(F): 98.3 (16 Jun 2019 14:15), Max: 98.5 (16 Jun 2019 10:00)  HR: 80 (16 Jun 2019 14:15) (72 - 87)  BP: 122/78 (16 Jun 2019 14:15) (102/67 - 127/91)  BP(mean): --  RR: 16 (16 Jun 2019 14:15) (16 - 18)  SpO2: 99% (16 Jun 2019 14:15) (98% - 99%)    A+Ox3  abdomen soft, NT, ND  PEG intact with tube feeds running - PEG loosened today    Plan  follow up nutrition consult and tube feeds  Pain management  continue care per primary team  please contact with additional concerns    Miranda KELLEY [phone]

## 2019-06-16 NOTE — PROGRESS NOTE ADULT - SUBJECTIVE AND OBJECTIVE BOX
Progress Note    CASSIE RAE 63y (1955) Male [id number]  06-14-19 (2d)    Dr. Uzma Murguia, [facility] PGY2  Pager# [phone]    Chief Complaint: Worsening dysphagia    Subjective:  Patient seen and examined at bedside. Patient required IV Morphine 2mg overnight.     Review of Systems:  CONSTITUTIONAL: No fever, weight loss, or fatigue  EYES: No eye pain, visual disturbances, or discharge  ENMT:  No difficulty hearing, tinnitus, vertigo; No sinus or throat pain  NECK: +pain  RESPIRATORY: No cough, wheezing, chills or hemoptysis; No shortness of breath  CARDIOVASCULAR: No chest pain, palpitations, dizziness, or leg swelling  GASTROINTESTINAL: No abdominal or epigastric pain. No nausea, vomiting, or hematemesis; No diarrhea or constipation. No melena or hematochezia.  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  Malignant neoplasm (199.1): skin  History of mandibular surgery (Z98.890)  History of oral cancer (Z85.819): insertion of chemo port &amp; removal 2015  Encounter for PEG (percutaneous endoscopic gastrostomy) (Z43.1): inserted 2015 &amp; removal of peg 2015  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, madibular reconstruction with bone graft 2018  H/O shoulder surgery (V45.89): impingement left 2005, right 2007    bisacodyl 5 milliGRAM(s) Oral at bedtime  LORazepam   Injectable 0.5 milliGRAM(s) IV Push every 6 hours PRN  morphine   Solution 5 milliGRAM(s) Oral every 2 hours PRN  morphine   Solution 10 milliGRAM(s) Enteral Tube every 4 hours PRN  polyethylene glycol 3350 17 Gram(s) Oral daily  scopolamine   Patch 1 Patch Transdermal every 72 hours  senna Syrup 10 milliLiter(s) Oral at bedtime    Objective:  T(C): 36.6 (06-16-19 @ 05:06), Max: 36.9 (06-15-19 @ 10:33)  HR: 78 (06-16-19 @ 05:06) (72 - 87)  BP: 102/67 (06-16-19 @ 05:06) (102/67 - 127/91)  RR: 18 (06-16-19 @ 05:06) (16 - 18)  SpO2: 99% (06-16-19 @ 05:06) (98% - 100%)    Physical exam:  GENERAL: NAD, cachectic   HEAD:  Atraumatic, Normocephalic  EYES: EOMI, PERRLA, conjunctiva and sclera clear  NECK: +well healed scar.  CHEST/LUNG: Clear to auscultation bilaterally; No wheeze  HEART: Regular rate and rhythm; No murmurs, rubs, or gallops  ABDOMEN: Soft, Nontender, Nondistended; Bowel sounds present. PEG site clean and intact  EXTREMITIES:  2+ Peripheral Pulses, No clubbing, cyanosis, or edema  PSYCH: AAOx3  NEUROLOGY: non-focal  SKIN: No rashes or lesions      06-15-19 @ 07:01  -  06-16-19 @ 07:00  --------------------------------------------------------  IN: 860 mL / OUT: 975 mL / NET: -115 mL        CAPILLARY BLOOD GLUCOSE      (06-15 @ 06:27)                      12.4  8.53 )-----------( 282                 37.2    Neutrophils = -- (--%)  Lymphocytes = -- (--%)  Eosinophils = -- (--%)  Basophils = -- (--%)  Monocytes = -- (--%)  Bands = --%    06-15    137  |  97<L>  |  14  ----------------------------<  118<H>  4.0   |  27  |  0.76    Ca    9.5      15 Keaton 2019 06:27  Phos  3.5     06-15  Mg     2.1     06-15    TPro  6.9  /  Alb  3.8  /  TBili  0.9  /  DBili  x   /  AST  13  /  ALT  12  /  AlkPhos  51  06-15    ( 14 Jun 2019 10:24 )   PT: 13.1 SEC;   INR: 1.17 ;       PTT:29.9 SEC      WBC Trend: 8.53<--, 7.22<--, 9.0<--    Hb Trend: 12.4<--, 13.6<--, 14.6<--        New imaging in last 24 hours: none  Consult notes reviewed: anesthesia, CT surgery, dietician

## 2019-06-16 NOTE — PROGRESS NOTE ADULT - PROBLEM SELECTOR PLAN 4
- Patient unsure of meds, review of ISTOP ( Reference #: [id number]), OMR, and [facility] records show multiple medications with overlaps  -Will call pharmacy

## 2019-06-16 NOTE — PROGRESS NOTE ADULT - PROBLEM SELECTOR PLAN 3
- Seems to be on valium 5mg prn for anxiety, patient currently unsure of his meds, review of IStop ( Reference #: [id number]) shows that he was last prescribed diazepam 5mg on 5/17  -currently on ativan .5mg IV q6 prn for anxiety  and dilaudid 3mg q4 via PEG as per palliative

## 2019-06-16 NOTE — PROGRESS NOTE ADULT - PROBLEM SELECTOR PLAN 2
- Currently on immunotherapy monthly with nivolumab, seems to have possible disease progression  -as per onc, plan for outpatient follow up

## 2019-06-16 NOTE — PROGRESS NOTE ADULT - PROBLEM SELECTOR PLAN 1
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

## 2019-06-17 ENCOUNTER — APPOINTMENT (OUTPATIENT)
Dept: GASTROENTEROLOGY | Facility: CLINIC | Age: 64
End: 2019-06-17

## 2019-06-17 ENCOUNTER — TRANSCRIPTION ENCOUNTER (OUTPATIENT)
Age: 64
End: 2019-06-17

## 2019-06-17 PROCEDURE — 99232 SBSQ HOSP IP/OBS MODERATE 35: CPT | Mod: GC

## 2019-06-17 RX ADMIN — MORPHINE SULFATE 2 MILLIGRAM(S): 50 CAPSULE, EXTENDED RELEASE ORAL at 14:04

## 2019-06-17 RX ADMIN — POLYETHYLENE GLYCOL 3350 17 GRAM(S): 17 POWDER, FOR SOLUTION ORAL at 11:29

## 2019-06-17 RX ADMIN — Medication 0.5 MILLIGRAM(S): at 04:56

## 2019-06-17 RX ADMIN — MORPHINE SULFATE 5 MILLIGRAM(S): 50 CAPSULE, EXTENDED RELEASE ORAL at 00:23

## 2019-06-17 RX ADMIN — MORPHINE SULFATE 5 MILLIGRAM(S): 50 CAPSULE, EXTENDED RELEASE ORAL at 03:25

## 2019-06-17 RX ADMIN — Medication 0.5 MILLIGRAM(S): at 19:51

## 2019-06-17 RX ADMIN — MORPHINE SULFATE 10 MILLIGRAM(S): 50 CAPSULE, EXTENDED RELEASE ORAL at 21:29

## 2019-06-17 RX ADMIN — MORPHINE SULFATE 2 MILLIGRAM(S): 50 CAPSULE, EXTENDED RELEASE ORAL at 13:23

## 2019-06-17 RX ADMIN — MORPHINE SULFATE 10 MILLIGRAM(S): 50 CAPSULE, EXTENDED RELEASE ORAL at 06:52

## 2019-06-17 RX ADMIN — MORPHINE SULFATE 10 MILLIGRAM(S): 50 CAPSULE, EXTENDED RELEASE ORAL at 07:30

## 2019-06-17 RX ADMIN — MORPHINE SULFATE 10 MILLIGRAM(S): 50 CAPSULE, EXTENDED RELEASE ORAL at 22:00

## 2019-06-17 RX ADMIN — MORPHINE SULFATE 10 MILLIGRAM(S): 50 CAPSULE, EXTENDED RELEASE ORAL at 00:18

## 2019-06-17 RX ADMIN — Medication 0.5 MILLIGRAM(S): at 11:28

## 2019-06-17 RX ADMIN — SENNA PLUS 10 MILLILITER(S): 8.6 TABLET ORAL at 21:29

## 2019-06-17 RX ADMIN — MORPHINE SULFATE 10 MILLIGRAM(S): 50 CAPSULE, EXTENDED RELEASE ORAL at 01:01

## 2019-06-17 RX ADMIN — MORPHINE SULFATE 5 MILLIGRAM(S): 50 CAPSULE, EXTENDED RELEASE ORAL at 03:56

## 2019-06-17 NOTE — CHART NOTE - NSCHARTNOTEFT_GEN_A_CORE
NUTRITION FOLLOW-UP: Pt is currently on Two Edwardo tube feeding via Gastrostomy for total of 1320 ml in 24 hours. Pt is receiving it as a continuous feed. Consult was requested for recommendation for bolus feeds. For four feedings per day q 6hr feeding should be 330 ml per feeding. If Pt does not tolerated 4 feeding it can be changed to 5 feeding per day at 264 ml per feeding.     Weight: 61.2 kg    Labs: Glu-145    Current Diet: see above    Enteral Recommendations: See above    RD to Remain Available: Destiny Aragon MS, RDN, CDN pager [phone]     Additional Recommendations:   1) Monitor weight, labs, tube feeding tolerance and skin integrity.

## 2019-06-17 NOTE — DISCHARGE NOTE PROVIDER - PROVIDER TOKENS
PROVIDER:[TOKEN:[[id number]],FOLLOWUP:[2 weeks]] PROVIDER:[TOKEN:[[id number]],FOLLOWUP:[2 weeks]],PROVIDER:[TOKEN:[[id number]]]

## 2019-06-17 NOTE — PROGRESS NOTE ADULT - SUBJECTIVE AND OBJECTIVE BOX
Patient is a 63y old  Male who presents with a chief complaint of Worsening dysphagia (17 Jun 2019 05:34)      SUBJECTIVE / OVERNIGHT EVENTS: No acute events overnight. Patient denies F/C, HA, CP, SOB, abdominal pain, N/V/D/C. Ednorsed anxiety ON, well controlled with ativan and morphine PRN.    MEDICATIONS  (STANDING):  bisacodyl 5 milliGRAM(s) Oral at bedtime  polyethylene glycol 3350 17 Gram(s) Oral daily  scopolamine   Patch 1 Patch Transdermal every 72 hours  senna Syrup 10 milliLiter(s) Oral at bedtime    MEDICATIONS  (PRN):  LORazepam   Injectable 0.5 milliGRAM(s) IV Push every 6 hours PRN Anxiety  morphine   Solution 5 milliGRAM(s) Oral every 2 hours PRN for breakthrough pain  morphine   Solution 10 milliGRAM(s) Enteral Tube every 4 hours PRN Severe Pain (7 - 10)  morphine  - Injectable 2 milliGRAM(s) IV Push daily PRN ok to give prior to dressing changes      T(F): 98.6 (06-17 @ 05:00), Max: 98.7 (06-16 @ 21:51)  HR: 86 (06-17 @ 05:00) (73 - 86)  BP: 112/69 (06-17 @ 05:00) (108/66 - 122/78)  RR: 18 (06-17 @ 05:00) (16 - 18)  SpO2: 99% (06-17 @ 05:00) (97% - 99%)    CAPILLARY BLOOD GLUCOSE        I&O's Summary    16 Jun 2019 07:01  -  17 Jun 2019 07:00  --------------------------------------------------------  IN: 1325 mL / OUT: 1650 mL / NET: -325 mL      PHYSICAL EXAM:  GENERAL: NAD, cachectic   HEENT: EOMI, PERRLA, conjunctiva and sclera clear, +scar  CHEST/LUNG: Clear to auscultation bilaterally; No wheeze  HEART: Regular rate and rhythm; No murmurs, rubs, or gallops  ABDOMEN: Soft, Nontender, Nondistended; Bowel sounds present. PEG site clean and intact  EXTREMITIES:  2+ Peripheral Pulses, No clubbing, cyanosis, or edema  PSYCH: AAOx3  NEUROLOGY: non-focal  SKIN: No rashes or lesions    LABS & IMAGING:  Labs personally reviewed [X]                        12.8   7.74  )-----------( 306      ( 16 Jun 2019 07:05 )             40.0     Hgb Trend: 12.8<--, 12.4<--, 13.6<--, 14.6<--    06-16    139  |  99  |  15  ----------------------------<  145<H>  4.1   |  29  |  0.77    Ca    10.0      16 Jun 2019 07:05  Phos  4.2     06-16  Mg     2.2     06-16      Creatinine Trend: 0.77<--, 0.76<--, 0.62<--, 0.60<--

## 2019-06-17 NOTE — PROGRESS NOTE ADULT - PROBLEM SELECTOR PLAN 4
- Patient unsure of meds, review of ISTOP ( Reference #: [id number]), OMR, and AllScripts records show multiple medications with overlaps  -Will call pharmacy

## 2019-06-17 NOTE — PROGRESS NOTE ADULT - PROBLEM SELECTOR PLAN 3
- Seems to be on valium 5mg prn for anxiety, patient currently unsure of his meds, review of IStop ( Reference #: [id number]) shows that he was last prescribed diazepam 5mg on 5/17  -currently on ativan .5mg IV q6 prn for anxiety and dilaudid 3mg q4 via PEG as per palliative

## 2019-06-17 NOTE — DISCHARGE NOTE PROVIDER - NSDCCPCAREPLAN_GEN_ALL_CORE_FT
PRINCIPAL DISCHARGE DIAGNOSIS  Diagnosis: Dysphagia, unspecified type  Assessment and Plan of Treatment: You were found to have difficulty swallowing likely due to worsening of your cancer and had a PEG tube placed; you recieved feeds and medications via the PEG tube. Please follow up with an oncologist for further management and a surgeon for management of your PEG.      SECONDARY DISCHARGE DIAGNOSES  Diagnosis: Severe protein-calorie malnutrition  Assessment and Plan of Treatment: You were found to have malnutrution. Your nutritional status was optimized. Please follow up with a dietician and a primary care doctor within a week of discharge.    Diagnosis: Pain  Assessment and Plan of Treatment: You recieved morphine for your pain via PEG tube, which helped to control your pain. You can continue to take your medications as prescribed PRINCIPAL DISCHARGE DIAGNOSIS  Diagnosis: Dysphagia, unspecified type  Assessment and Plan of Treatment: You were found to have difficulty swallowing likely due to worsening of your cancer and had a PEG tube placed; you recieved feeds and medications via the PEG tube.  You will now manage your own PEG tube feeds at home with bolus feeds every day. We provided inpatient training on the feeds and you felt comfortbale managing them yourself. Please follow up with an oncologist for further management of your cancer and a surgeon for management of your PEG.      SECONDARY DISCHARGE DIAGNOSES  Diagnosis: Severe protein-calorie malnutrition  Assessment and Plan of Treatment: You were found to have malnutrution. Your nutritional status was optimized. Please follow up with a dietician and a primary care doctor within a week of discharge. Please monitor your weights at home to share wiht your primary care doctor when you follow up.    Diagnosis: Pain  Assessment and Plan of Treatment: You recieved morphine for your pain via PEG tube, which helped to control your pain. You can continue to take your medications as prescribed when you return home. Please see your onoclogist for further managment of your pain. If you should start having increasing amounts of pain with PEG tube feedings or pain at your PEG tube insertion site, please see a physician as soon as possible.

## 2019-06-17 NOTE — DISCHARGE NOTE PROVIDER - CARE PROVIDER_API CALL
Yajaira Cai)  Medical Oncology  [facility], [address] [place] [address]  Phone: [phone]  Fax: [phone]  Follow Up Time: 2 weeks Yajaira Cai)  Medical Oncology  [facility], [address] [place] [address]  Phone: [phone]  Fax: [phone]  Follow Up Time: 2 weeks    Alberto Schroeder)  Surgery; Thoracic Surgery  [address] [place] [address], [place] [address]  Phone: [phone]  Fax: [phone]  Follow Up Time:

## 2019-06-17 NOTE — DISCHARGE NOTE PROVIDER - CARE PROVIDERS DIRECT ADDRESSES
[email or web address]

## 2019-06-17 NOTE — PROGRESS NOTE ADULT - PROBLEM SELECTOR PLAN 1
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

## 2019-06-17 NOTE — DISCHARGE NOTE PROVIDER - HOSPITAL COURSE
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 Said that he feels like solid food gets stuck in his throat and has pain associated with swallowing. Denies any cough/choaking sensation with PO intake, denies any issues with tolerating his oral secretions. Said that this has been going on for a few days and seems to be worsening. He was at his oncologist's office for a follow up and mentioned these complaints to him and was subsequently sent to [facility] for PEG eval. There he had a CT Neck that showed worsening disease and thickening of the epiglottis/supraglottic hypopharyngeal walls and was sent to [facility] for further management and ENT eval. Currently patient is without any complaints.         On arrival to the ED, his vitals were T 98.6, P 85, /86, RR 15, O2 sat 96% RA. At [facility], he was given Dilaudid 1mg IVP x1, decadron 10mg IVPB x1, ativan 1mg IVP x1, and NS 2L. At [facility] he was given Dilaudid 0.5mg IVP x1, ativan 0.5mg IVP x1, and LS 1L. He was evaluated by ENT and was admitted to medicine.         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 Pt was seen by ENT, was given steroids at [facility] but ENT currently states no need for further steroids and no need for abx. Pt underwent PEG placement and feeds were resumed, morphine was delivered via PEG. Pt seen by oncology, who recommended outpatient management. 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 Said that he feels like solid food gets stuck in his throat and has pain associated with swallowing. Denies any cough/choaking sensation with PO intake, denies any issues with tolerating his oral secretions. Said that this has been going on for a few days and seems to be worsening. He was at his oncologist's office for a follow up and mentioned these complaints to him and was subsequently sent to [facility] for PEG eval. There he had a CT Neck that showed worsening disease and thickening of the epiglottis/supraglottic hypopharyngeal walls and was sent to [facility] for further management and ENT eval. Currently patient is without any complaints.         On arrival to the ED, his vitals were T 98.6, P 85, /86, RR 15, O2 sat 96% RA. At [facility], he was given Dilaudid 1mg IVP x1, decadron 10mg IVPB x1, ativan 1mg IVP x1, and NS 2L. At [facility] he was given Dilaudid 0.5mg IVP x1, ativan 0.5mg IVP x1, and LS 1L. He was evaluated by ENT and was admitted to medicine.         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 Pt was seen by ENT, was given steroids at [facility] but ENT currently states no need for further steroids and no need for abx. Pt underwent PEG placement and feeds were resumed, morphine was delivered via PEG. Pt seen by oncology, who recommended outpatient management.        In anticipation of discharge, pt was transitioned to bolus feeds from continuous with assistance of Nutrition c/s. Pt confirms that he feels capable of administering PEG feeds at home given prior experience and demonstration during nutrition evaluation. At the time of discharge, he continues to require frequent morphine solution PRN via PEG. He has not required further IV morphine aside from prophylactic dose prior to abdominal dressing changes. He also utilizes his PRN ativan for anxiety with regular frequency. Overall he feels his symptoms are well controlled and is looking forward to returning home. He is scheduled to follow up with outside Oncology as an outpatient after discharge for further management.    . 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 Said that he feels like solid food gets stuck in his throat and has pain associated with swallowing. Denies any cough/choaking sensation with PO intake, denies any issues with tolerating his oral secretions. Said that this has been going on for a few days and seems to be worsening. He was at his oncologist's office for a follow up and mentioned these complaints to him and was subsequently sent to [facility] for PEG eval. There he had a CT Neck that showed worsening disease and thickening of the epiglottis/supraglottic hypopharyngeal walls and was sent to [facility] for further management and ENT eval. Currently patient is without any complaints.         On arrival to the ED, his vitals were T 98.6, P 85, /86, RR 15, O2 sat 96% RA. At [facility], he was given Dilaudid 1mg IVP x1, decadron 10mg IVPB x1, ativan 1mg IVP x1, and NS 2L. At [facility] he was given Dilaudid 0.5mg IVP x1, ativan 0.5mg IVP x1, and LS 1L. He was evaluated by ENT and was admitted to medicine.         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 Pt was seen by ENT, was given steroids at [facility] but ENT currently states no need for further steroids and no need for abx. Pt underwent PEG placement and feeds were resumed, morphine was delivered via PEG. Pt seen by oncology, who recommended outpatient management.        In anticipation of discharge, pt was transitioned to bolus feeds from continuous with assistance of Nutrition c/s. Pt confirms that he feels capable of administering PEG feeds at home given prior experience and demonstration during nutrition evaluation. At the time of discharge, he continues to require frequent morphine solution PRN via PEG. He has not required further IV morphine aside from prophylactic dose prior to abdominal dressing changes. He also utilizes his PRN ativan for anxiety with regular frequency. Overall he feels his symptoms are well controlled and is looking forward to returning home. He is scheduled to follow up with outside Oncology as an outpatient after discharge for further management. He is comfortable administering his feeds.    .

## 2019-06-17 NOTE — CHART NOTE - NSCHARTNOTEFT_GEN_A_CORE
RD visited with pt. in anticipation of discharge.  Team recommended initiating bolus feeds, reported to tolerate current formula TwoCalHN without complication.  Suggest initiating bolus feedings - maintain TwoCalHN formula 1000 ml/day in 250 ml bolus four times daily (every 6 hours). Pt. to be provided with 2000kcal, 83.5gm protein and 700 ml water.  Patient stated he has self-administered bolus feedings in the past with previous feeding tube placement and stated "I am a pro at this".  He was in agreement with recommended regimen.  RD remains available for further intervention as needed.

## 2019-06-17 NOTE — CHART NOTE - NSCHARTNOTEFT_GEN_A_CORE
63M with history of SCC of the mouth and anxiety who presented to the hospital with c/o dysphagia found to have likely worsening SCC now s/p PEG placement. Patient is strict NPO status due to dysphagia (R13.12) and cannot receive any nutrition orally. Patient's only form of nutrition is via gastrostomy tube which will be lifelong. Patient will be discharged on bolus PEG feeds of TwoCal at 330cc every 6 hours.     Gudelia Diego MD  PGY2 | Internal Medicine  [phone] / [phone] 63M with history of SCC of the mouth and anxiety who presented to the hospital with c/o dysphagia found to have likely worsening SCC now s/p PEG placement. Patient is strict NPO status due to dysphagia (R13.12) and cannot receive any nutrition orally. Patient's only form of nutrition is via gastrostomy tube which will be lifelong. Patient will be discharged on bolus PEG feeds of TwoCal at 250cc every 6 hours.     Gudelia Diego MD  PGY2 | Internal Medicine  [phone] / [phone]

## 2019-06-18 ENCOUNTER — TRANSCRIPTION ENCOUNTER (OUTPATIENT)
Age: 64
End: 2019-06-18

## 2019-06-18 VITALS
TEMPERATURE: 98 F | SYSTOLIC BLOOD PRESSURE: 114 MMHG | DIASTOLIC BLOOD PRESSURE: 75 MMHG | OXYGEN SATURATION: 98 % | HEART RATE: 94 BPM | RESPIRATION RATE: 18 BRPM

## 2019-06-18 PROCEDURE — 99239 HOSP IP/OBS DSCHRG MGMT >30: CPT | Mod: GC

## 2019-06-18 RX ADMIN — MORPHINE SULFATE 5 MILLIGRAM(S): 50 CAPSULE, EXTENDED RELEASE ORAL at 01:44

## 2019-06-18 RX ADMIN — Medication 0.5 MILLIGRAM(S): at 02:04

## 2019-06-18 RX ADMIN — MORPHINE SULFATE 10 MILLIGRAM(S): 50 CAPSULE, EXTENDED RELEASE ORAL at 13:29

## 2019-06-18 RX ADMIN — SCOPALAMINE 1 PATCH: 1 PATCH, EXTENDED RELEASE TRANSDERMAL at 05:29

## 2019-06-18 RX ADMIN — MORPHINE SULFATE 10 MILLIGRAM(S): 50 CAPSULE, EXTENDED RELEASE ORAL at 13:59

## 2019-06-18 RX ADMIN — Medication 0.5 MILLIGRAM(S): at 10:15

## 2019-06-18 RX ADMIN — MORPHINE SULFATE 10 MILLIGRAM(S): 50 CAPSULE, EXTENDED RELEASE ORAL at 06:18

## 2019-06-18 RX ADMIN — MORPHINE SULFATE 10 MILLIGRAM(S): 50 CAPSULE, EXTENDED RELEASE ORAL at 05:29

## 2019-06-18 RX ADMIN — POLYETHYLENE GLYCOL 3350 17 GRAM(S): 17 POWDER, FOR SOLUTION ORAL at 13:14

## 2019-06-18 RX ADMIN — MORPHINE SULFATE 5 MILLIGRAM(S): 50 CAPSULE, EXTENDED RELEASE ORAL at 01:09

## 2019-06-18 RX ADMIN — SCOPALAMINE 1 PATCH: 1 PATCH, EXTENDED RELEASE TRANSDERMAL at 06:18

## 2019-06-18 NOTE — DISCHARGE NOTE NURSING/CASE MANAGEMENT/SOCIAL WORK - NSDCPEEMAIL_GEN_ALL_CORE
[facility] email [email or web address]

## 2019-06-18 NOTE — PROGRESS NOTE ADULT - REASON FOR ADMISSION
Worsening dysphagia

## 2019-06-18 NOTE — PROGRESS NOTE ADULT - PROVIDER SPECIALTY LIST ADULT
Anesthesia
Internal Medicine
Thoracic Surgery
Thoracic Surgery
Internal Medicine

## 2019-06-18 NOTE — DISCHARGE NOTE NURSING/CASE MANAGEMENT/SOCIAL WORK - NSDCPEWEB_GEN_ALL_CORE
[facility] for Tobacco Control website --- [email or web address] website --- [email or web address]

## 2019-06-18 NOTE — PROGRESS NOTE ADULT - PROBLEM SELECTOR PLAN 6
- Lovenox for DVT ppx  - Dipo: Pending CM arranging home ffeds
- Lovenox for DVT ppx
- Lovenox for DVT ppx  - Dispo: Likely today with confirmation of home feeds
- Lovenox for DVT ppx

## 2019-06-18 NOTE — PROGRESS NOTE ADULT - ASSESSMENT
This is a 63 M with history of SCC of the mouth and Anxiety who presents to the hospital with c/o dysphagia found to have likely worsening SCC.
This is a 63 M with history of SCC of the mouth and Anxiety who presents to the hospital with c/o dysphagia found to have likely worsening SCC.
63 M with history of SCC of the mouth and Anxiety who presents to the hospital with c/o dysphagia found to have likely worsening SCC now s/p PEG placement.
Patient s/p EGD/PEG tube placement     Plan:  Continue Peg tube to gravity drainage  May use for feeds 6/15/19  Will loosen peg tube 6/16/19  Pain management
63 M with history of SCC of the mouth and anxiety who presents to the hospital with c/o dysphagia found to have likely worsening SCC now s/p PEG placement. Establishing home feeds via PEG prior to safe discharge. Planned ot go today with confirmation of home feed capability.
63 M with history of SCC of the mouth and anxiety who presents to the hospital with c/o dysphagia found to have likely worsening SCC now s/p PEG placement. Establishing home feeds via PEG prior to safe discharge.

## 2019-06-18 NOTE — PROGRESS NOTE ADULT - ATTENDING COMMENTS
Patient seen and examined prior to discharge. Offers no new complaints. Tolerating PEG bolus feeds. CM working to arrange for delivery of home feeds. Remains medically stable for dc provided this can be arranged.    TIME SPENT ON DISCHARGE PLANNINmins
Patient seen and examined, care d/w resident.    Feels OK, pain better, had a BM, looking forward to discharge     SCC w/ recurrence worsening dysphagia and weight loss s/p PEG by CT surgery on 6/14  started feeds on 6/15, tolerating thus far  will need OP arrangement of feeds  c/w pain management  OP oncology f/u  dispo planning, possibly Monday if tolerates feeds and SW/CM arranges home needs
Patient seen and examined. Case d/w HS1. Medically stable for dc home today if tube feeds can be arranged. Patient amenable to dc home today. RD recs re: tube feeding.    TIME SPENT ON DISCHARGE PLANNINmins
Patient seen and examined, care d/w resident.    SCC w/ recurrence worsening dysphagia and weight loss s/p PEG by CT surgery on 6/14  Cleared for feeds today  will need OP arrangement of feeds  c/w pain management  OP oncology f/u  dispo planning, possibly Monday if tolerates feeds and SW/CM arranges home needs
Seen and examined 6/14 at 6 pm.  S/p PEG in some pain, to gravity, f/u CT surgery.

## 2019-06-18 NOTE — PROGRESS NOTE ADULT - PROBLEM SELECTOR PLAN 1
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

## 2019-06-18 NOTE — PROGRESS NOTE ADULT - PROBLEM SELECTOR PROBLEM 2
Squamous cell carcinoma of oral cavity

## 2019-06-18 NOTE — PROGRESS NOTE ADULT - PROBLEM SELECTOR PLAN 4
- Patient unsure of meds, review of ISTOP ( Reference #: [id number]), OMR, and AllScripts records show multiple medications with overlaps  - Restarted on home dose pain control, defer further management to outpatient oncologist

## 2019-06-18 NOTE — PROGRESS NOTE ADULT - PROBLEM SELECTOR PLAN 5
likely due to cancer and dysphagia  - appreciate nutrition evals  - c/w TFs  - modified to bolus feeds that pt can initiate at home

## 2019-06-18 NOTE — DISCHARGE NOTE NURSING/CASE MANAGEMENT/SOCIAL WORK - NSDCDPATPORTLINK_GEN_ALL_CORE
You can access the [facility] Portal, offered by [facility], by registering with the following website: [email or web address]

## 2019-06-18 NOTE — PROGRESS NOTE ADULT - SUBJECTIVE AND OBJECTIVE BOX
Patient is a 63y old  Male who presents with a chief complaint of Worsening dysphagia (18 Jun 2019 07:54)      SUBJECTIVE / OVERNIGHT EVENTS: No acute events overnight. Patient denies F/C, HA, CP, SOB, abdominal pain, N/V/D/C. Pt tolerating bolus feeds inpatient. Wants to leave as soon as possible. Overall doing well with PEG tube and feels confident that he can manage it at home.    MEDICATIONS  (STANDING):  bisacodyl 5 milliGRAM(s) Oral at bedtime  polyethylene glycol 3350 17 Gram(s) Oral daily  scopolamine   Patch 1 Patch Transdermal every 72 hours  senna Syrup 10 milliLiter(s) Oral at bedtime    MEDICATIONS  (PRN):  LORazepam   Injectable 0.5 milliGRAM(s) IV Push every 6 hours PRN Anxiety  morphine   Solution 5 milliGRAM(s) Oral every 2 hours PRN for breakthrough pain  morphine   Solution 10 milliGRAM(s) Enteral Tube every 4 hours PRN Severe Pain (7 - 10)  morphine  - Injectable 2 milliGRAM(s) IV Push daily PRN ok to give prior to dressing changes      T(F): 98.8 (06-18 @ 05:27), Max: 98.8 (06-17 @ 18:18)  HR: 92 (06-18 @ 05:27) (86 - 109)  BP: 114/79 (06-18 @ 05:27) (103/77 - 123/91)  RR: 16 (06-18 @ 05:27) (16 - 18)  SpO2: 100% (06-18 @ 05:27) (98% - 100%)    CAPILLARY BLOOD GLUCOSE        I&O's Summary    17 Jun 2019 07:01  -  18 Jun 2019 07:00  --------------------------------------------------------  IN: 690 mL / OUT: 950 mL / NET: -260 mL      PHYSICAL EXAM:  GENERAL: NAD, sleeping comfortably  HEENT: conjunctiva and sclera clear, +post surgical scar  CHEST/LUNG: Clear to auscultation bilaterally; No wheeze  HEART: Regular rate and rhythm; No murmurs, rubs, or gallops  ABDOMEN: Soft, Nontender, Nondistended; Bowel sounds present. PEG site c/d/i nontender  EXTREMITIES:  2+ Peripheral Pulses, No clubbing, cyanosis, or edema  PSYCH: AAOx3  NEUROLOGY: non-focal  SKIN: No rashes or lesions    LABS & IMAGING:  Labs personally reviewed [X]    Hgb Trend: 12.8<--, 12.4<--, 13.6<--, 14.6<--          Creatinine Trend: 0.77<--, 0.76<--, 0.62<--, 0.60<--

## 2019-06-20 ENCOUNTER — RESULT REVIEW (OUTPATIENT)
Age: 64
End: 2019-06-20

## 2019-06-20 ENCOUNTER — APPOINTMENT (OUTPATIENT)
Dept: HEMATOLOGY ONCOLOGY | Facility: CLINIC | Age: 64
End: 2019-06-20
Payer: COMMERCIAL

## 2019-06-20 VITALS
TEMPERATURE: 98.2 F | HEIGHT: 67 IN | DIASTOLIC BLOOD PRESSURE: 81 MMHG | WEIGHT: 135 LBS | BODY MASS INDEX: 21.19 KG/M2 | OXYGEN SATURATION: 97 % | HEART RATE: 101 BPM | SYSTOLIC BLOOD PRESSURE: 117 MMHG

## 2019-06-20 LAB
BASOPHILS # BLD AUTO: 0.1 K/UL — SIGNIFICANT CHANGE UP (ref 0–0.2)
BASOPHILS NFR BLD AUTO: 1.2 % — SIGNIFICANT CHANGE UP (ref 0–2)
EOSINOPHIL # BLD AUTO: 0.2 K/UL — SIGNIFICANT CHANGE UP (ref 0–0.5)
EOSINOPHIL NFR BLD AUTO: 2 % — SIGNIFICANT CHANGE UP (ref 0–6)
HCT VFR BLD CALC: 44.8 % — SIGNIFICANT CHANGE UP (ref 39–50)
HGB BLD-MCNC: 14.4 G/DL — SIGNIFICANT CHANGE UP (ref 13–17)
LYMPHOCYTES # BLD AUTO: 0.7 K/UL — LOW (ref 1–3.3)
LYMPHOCYTES # BLD AUTO: 6 % — LOW (ref 13–44)
MCHC RBC-ENTMCNC: 28.8 PG — SIGNIFICANT CHANGE UP (ref 27–34)
MCHC RBC-ENTMCNC: 32.3 G/DL — SIGNIFICANT CHANGE UP (ref 32–36)
MCV RBC AUTO: 89.3 FL — SIGNIFICANT CHANGE UP (ref 80–100)
MONOCYTES # BLD AUTO: 0.8 K/UL — SIGNIFICANT CHANGE UP (ref 0–0.9)
MONOCYTES NFR BLD AUTO: 7.6 % — SIGNIFICANT CHANGE UP (ref 2–14)
NEUTROPHILS # BLD AUTO: 9.2 K/UL — HIGH (ref 1.8–7.4)
NEUTROPHILS NFR BLD AUTO: 83.3 % — HIGH (ref 43–77)
PLATELET # BLD AUTO: 335 K/UL — SIGNIFICANT CHANGE UP (ref 150–400)
RBC # BLD: 5.01 M/UL — SIGNIFICANT CHANGE UP (ref 4.2–5.8)
RBC # FLD: 11.6 % — SIGNIFICANT CHANGE UP (ref 10.3–14.5)
WBC # BLD: 11 K/UL — HIGH (ref 3.8–10.5)
WBC # FLD AUTO: 11 K/UL — HIGH (ref 3.8–10.5)

## 2019-06-20 PROCEDURE — 99215 OFFICE O/P EST HI 40 MIN: CPT

## 2019-06-21 LAB
ALBUMIN SERPL ELPH-MCNC: 4.2 G/DL
ALP BLD-CCNC: 68 U/L
ALT SERPL-CCNC: 21 U/L
ANION GAP SERPL CALC-SCNC: 12 MMOL/L
AST SERPL-CCNC: 17 U/L
BILIRUB SERPL-MCNC: 0.6 MG/DL
BUN SERPL-MCNC: 15 MG/DL
CALCIUM SERPL-MCNC: 9.9 MG/DL
CHLORIDE SERPL-SCNC: 95 MMOL/L
CO2 SERPL-SCNC: 31 MMOL/L
CREAT SERPL-MCNC: 0.83 MG/DL
GLUCOSE SERPL-MCNC: 208 MG/DL
MAGNESIUM SERPL-MCNC: 2.1 MG/DL
POTASSIUM SERPL-SCNC: 4.6 MMOL/L
PROT SERPL-MCNC: 7 G/DL
SODIUM SERPL-SCNC: 138 MMOL/L

## 2019-06-21 RX ORDER — ONDANSETRON 8 MG/1
8 TABLET ORAL EVERY 8 HOURS
Qty: 90 | Refills: 0 | Status: ACTIVE | COMMUNITY
Start: 2019-04-25 | End: 1900-01-01

## 2019-06-26 ENCOUNTER — LABORATORY RESULT (OUTPATIENT)
Age: 64
End: 2019-06-26

## 2019-06-26 ENCOUNTER — RESULT REVIEW (OUTPATIENT)
Age: 64
End: 2019-06-26

## 2019-06-26 ENCOUNTER — APPOINTMENT (OUTPATIENT)
Age: 64
End: 2019-06-26

## 2019-06-26 LAB
BASOPHILS # BLD AUTO: 0.2 K/UL — SIGNIFICANT CHANGE UP (ref 0–0.2)
BASOPHILS NFR BLD AUTO: 1.5 % — SIGNIFICANT CHANGE UP (ref 0–2)
EOSINOPHIL # BLD AUTO: 0.5 K/UL — SIGNIFICANT CHANGE UP (ref 0–0.5)
EOSINOPHIL NFR BLD AUTO: 3.3 % — SIGNIFICANT CHANGE UP (ref 0–6)
HCT VFR BLD CALC: 41.4 % — SIGNIFICANT CHANGE UP (ref 39–50)
HGB BLD-MCNC: 14 G/DL — SIGNIFICANT CHANGE UP (ref 13–17)
LYMPHOCYTES # BLD AUTO: 1.5 K/UL — SIGNIFICANT CHANGE UP (ref 1–3.3)
LYMPHOCYTES # BLD AUTO: 9.9 % — LOW (ref 13–44)
MCHC RBC-ENTMCNC: 29.3 PG — SIGNIFICANT CHANGE UP (ref 27–34)
MCHC RBC-ENTMCNC: 33.6 G/DL — SIGNIFICANT CHANGE UP (ref 32–36)
MCV RBC AUTO: 87.1 FL — SIGNIFICANT CHANGE UP (ref 80–100)
MONOCYTES # BLD AUTO: 0.6 K/UL — SIGNIFICANT CHANGE UP (ref 0–0.9)
MONOCYTES NFR BLD AUTO: 3.9 % — SIGNIFICANT CHANGE UP (ref 2–14)
NEUTROPHILS # BLD AUTO: 12 K/UL — HIGH (ref 1.8–7.4)
NEUTROPHILS NFR BLD AUTO: 81.3 % — HIGH (ref 43–77)
PLATELET # BLD AUTO: 367 K/UL — SIGNIFICANT CHANGE UP (ref 150–400)
RBC # BLD: 4.76 M/UL — SIGNIFICANT CHANGE UP (ref 4.2–5.8)
RBC # FLD: 11.7 % — SIGNIFICANT CHANGE UP (ref 10.3–14.5)
WBC # BLD: 14.8 K/UL — HIGH (ref 3.8–10.5)
WBC # FLD AUTO: 14.8 K/UL — HIGH (ref 3.8–10.5)

## 2019-06-26 NOTE — PHYSICAL EXAM
[de-identified] : Posttreatment changes, no LAD.  Lymphedema is improved. [Laryngoscopy Performed] : laryngoscopy was performed, see procedure section for findings [FreeTextEntry1] : FFF skin paddle is healing well, the mucosal flap has failed and has completely sloughed off with the lower lip essentially scarring back down to its original position.  The L. BOT lesion is stable with TTP. [Normal] : no rashes [de-identified] : R. leg healing well.

## 2019-06-26 NOTE — HISTORY OF PRESENT ILLNESS
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   Pt has a recurrence is here to discuss treatment.  He has started systemic palliation with immunotherapy with current plans for dosing Q4 weeks.  He feels that his pain control is improving.  He denies any dyspnea.  He continues to have significant dysphagia with solids.  His weight remains stable.

## 2019-06-26 NOTE — PROCEDURE
[Trismus] : trismus preventing mirror examination [None] : none [Flexible Endoscope] : examined with the flexible endoscope [Serial Number: ___] : Serial Number: [unfilled] [de-identified] : No lesions in the NPx, HPx or larynx.  Stable L. BOT mass, VC are mobile, airway patent.

## 2019-06-27 DIAGNOSIS — Z51.11 ENCOUNTER FOR ANTINEOPLASTIC CHEMOTHERAPY: ICD-10-CM

## 2019-06-27 DIAGNOSIS — R11.2 NAUSEA WITH VOMITING, UNSPECIFIED: ICD-10-CM

## 2019-06-28 RX ORDER — ONDANSETRON 4 MG/1
4 TABLET, ORALLY DISINTEGRATING ORAL
Qty: 30 | Refills: 6 | Status: ACTIVE | COMMUNITY
Start: 2019-05-08 | End: 1900-01-01

## 2019-06-28 RX ORDER — HYOSCYAMINE SULFATE 0.12 MG/1
0.12 TABLET, ORALLY DISINTEGRATING ORAL
Qty: 30 | Refills: 3 | Status: ACTIVE | COMMUNITY
Start: 2019-06-28 | End: 1900-01-01

## 2019-06-28 RX ORDER — SCOPALAMINE 1 MG/3D
1 PATCH, EXTENDED RELEASE TRANSDERMAL
Qty: 10 | Refills: 5 | Status: DISCONTINUED | COMMUNITY
Start: 2019-06-10 | End: 2019-06-28

## 2019-06-28 RX ORDER — FENTANYL 50 UG/H
50 PATCH, EXTENDED RELEASE TRANSDERMAL
Qty: 10 | Refills: 0 | Status: DISCONTINUED | COMMUNITY
Start: 2019-04-29 | End: 2019-06-28

## 2019-07-01 ENCOUNTER — OTHER (OUTPATIENT)
Age: 64
End: 2019-07-01

## 2019-07-08 ENCOUNTER — APPOINTMENT (OUTPATIENT)
Dept: OTOLARYNGOLOGY | Facility: CLINIC | Age: 64
End: 2019-07-08
Payer: COMMERCIAL

## 2019-07-08 VITALS
SYSTOLIC BLOOD PRESSURE: 111 MMHG | DIASTOLIC BLOOD PRESSURE: 81 MMHG | HEART RATE: 99 BPM | BODY MASS INDEX: 21.19 KG/M2 | WEIGHT: 135 LBS | HEIGHT: 67 IN

## 2019-07-08 PROCEDURE — 31575 DIAGNOSTIC LARYNGOSCOPY: CPT

## 2019-07-08 PROCEDURE — 99214 OFFICE O/P EST MOD 30 MIN: CPT | Mod: 25

## 2019-07-08 RX ORDER — GABAPENTIN 250 MG/5ML
250 SOLUTION ORAL
Qty: 450 | Refills: 1 | Status: COMPLETED | COMMUNITY
Start: 2019-04-29 | End: 2019-07-08

## 2019-07-08 RX ORDER — GABAPENTIN 250 MG/5ML
250 SOLUTION ORAL
Qty: 450 | Refills: 0 | Status: COMPLETED | COMMUNITY
Start: 2019-05-08 | End: 2019-07-08

## 2019-07-08 RX ORDER — GABAPENTIN 250 MG/5ML
250 SOLUTION ORAL
Refills: 0 | Status: DISCONTINUED | COMMUNITY
End: 2019-07-08

## 2019-07-15 ENCOUNTER — APPOINTMENT (OUTPATIENT)
Dept: HEMATOLOGY ONCOLOGY | Facility: CLINIC | Age: 64
End: 2019-07-15
Payer: COMMERCIAL

## 2019-07-15 VITALS
HEIGHT: 67 IN | DIASTOLIC BLOOD PRESSURE: 83 MMHG | BODY MASS INDEX: 21.82 KG/M2 | HEART RATE: 107 BPM | OXYGEN SATURATION: 96 % | SYSTOLIC BLOOD PRESSURE: 118 MMHG | WEIGHT: 139 LBS

## 2019-07-15 PROCEDURE — 99214 OFFICE O/P EST MOD 30 MIN: CPT

## 2019-07-15 NOTE — PROCEDURE
[None] : none [Trismus] : trismus preventing mirror examination [Flexible Endoscope] : examined with the flexible endoscope [Serial Number: ___] : Serial Number: [unfilled] [de-identified] : No lesions in the NPx, HPx or larynx. Stable L. BOT mass, VC are mobile, airway patent.  [de-identified] : Recurrent oral cancer

## 2019-07-15 NOTE — PHYSICAL EXAM
[Laryngoscopy Performed] : laryngoscopy was performed, see procedure section for findings [Normal] : orientation to person, place, and time: normal [FreeTextEntry1] : FFF skin paddle is healing well, the mucosal flap has failed and has completely sloughed off with the lower lip essentially scarring back down to its original position.  The L. BOT lesion is stable with TTP. [de-identified] : Posttreatment changes, no LAD.  Lymphedema is improved. [de-identified] : R. leg healing well.

## 2019-07-15 NOTE — HISTORY OF PRESENT ILLNESS
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   Pt has a recurrence is here to discuss treatment.  He has started systemic palliation with immunotherapy with current plans for dosing Q4 weeks.  He feels that his pain control is improving.  He denies any dyspnea.  pt has PEG tube, and most of his nutrition through peg tube.  small amount of l water by mouth.  His weight remains stable.

## 2019-07-16 ENCOUNTER — OUTPATIENT (OUTPATIENT)
Dept: OUTPATIENT SERVICES | Facility: HOSPITAL | Age: 64
LOS: 1 days | Discharge: HOME | End: 2019-07-16

## 2019-07-16 DIAGNOSIS — Z43.1 ENCOUNTER FOR ATTENTION TO GASTROSTOMY: Chronic | ICD-10-CM

## 2019-07-16 DIAGNOSIS — Z85.819 PERSONAL HISTORY OF MALIGNANT NEOPLASM OF UNSPECIFIED SITE OF LIP, ORAL CAVITY, AND PHARYNX: Chronic | ICD-10-CM

## 2019-07-16 DIAGNOSIS — Z98.890 OTHER SPECIFIED POSTPROCEDURAL STATES: Chronic | ICD-10-CM

## 2019-07-16 DIAGNOSIS — M79.609 PAIN IN UNSPECIFIED LIMB: ICD-10-CM

## 2019-07-19 ENCOUNTER — OUTPATIENT (OUTPATIENT)
Dept: OUTPATIENT SERVICES | Facility: HOSPITAL | Age: 64
LOS: 1 days | Discharge: HOME | End: 2019-07-19

## 2019-07-19 ENCOUNTER — APPOINTMENT (OUTPATIENT)
Dept: HEMATOLOGY ONCOLOGY | Facility: CLINIC | Age: 64
End: 2019-07-19
Payer: COMMERCIAL

## 2019-07-19 VITALS
RESPIRATION RATE: 16 BRPM | HEART RATE: 89 BPM | WEIGHT: 139 LBS | SYSTOLIC BLOOD PRESSURE: 101 MMHG | BODY MASS INDEX: 21.77 KG/M2 | TEMPERATURE: 98.4 F | DIASTOLIC BLOOD PRESSURE: 66 MMHG | OXYGEN SATURATION: 98 %

## 2019-07-19 DIAGNOSIS — Z85.819 PERSONAL HISTORY OF MALIGNANT NEOPLASM OF UNSPECIFIED SITE OF LIP, ORAL CAVITY, AND PHARYNX: Chronic | ICD-10-CM

## 2019-07-19 DIAGNOSIS — Z98.890 OTHER SPECIFIED POSTPROCEDURAL STATES: Chronic | ICD-10-CM

## 2019-07-19 DIAGNOSIS — Z92.3 PERSONAL HISTORY OF IRRADIATION: ICD-10-CM

## 2019-07-19 DIAGNOSIS — Z43.1 ENCOUNTER FOR ATTENTION TO GASTROSTOMY: Chronic | ICD-10-CM

## 2019-07-19 DIAGNOSIS — C76.0 MALIGNANT NEOPLASM OF HEAD, FACE AND NECK: ICD-10-CM

## 2019-07-19 DIAGNOSIS — Z51.5 ENCOUNTER FOR PALLIATIVE CARE: ICD-10-CM

## 2019-07-19 DIAGNOSIS — C06.9 MALIGNANT NEOPLASM OF MOUTH, UNSPECIFIED: ICD-10-CM

## 2019-07-19 PROCEDURE — 99214 OFFICE O/P EST MOD 30 MIN: CPT

## 2019-07-24 ENCOUNTER — RESULT REVIEW (OUTPATIENT)
Age: 64
End: 2019-07-24

## 2019-07-24 ENCOUNTER — LABORATORY RESULT (OUTPATIENT)
Age: 64
End: 2019-07-24

## 2019-07-24 ENCOUNTER — APPOINTMENT (OUTPATIENT)
Dept: HEMATOLOGY ONCOLOGY | Facility: CLINIC | Age: 64
End: 2019-07-24
Payer: COMMERCIAL

## 2019-07-24 ENCOUNTER — APPOINTMENT (OUTPATIENT)
Age: 64
End: 2019-07-24

## 2019-07-24 LAB
BASOPHILS # BLD AUTO: 0.2 K/UL — SIGNIFICANT CHANGE UP (ref 0–0.2)
BASOPHILS NFR BLD AUTO: 1.2 % — SIGNIFICANT CHANGE UP (ref 0–2)
EOSINOPHIL # BLD AUTO: 0.2 K/UL — SIGNIFICANT CHANGE UP (ref 0–0.5)
EOSINOPHIL NFR BLD AUTO: 1.5 % — SIGNIFICANT CHANGE UP (ref 0–6)
HCT VFR BLD CALC: 43.5 % — SIGNIFICANT CHANGE UP (ref 39–50)
HGB BLD-MCNC: 14.5 G/DL — SIGNIFICANT CHANGE UP (ref 13–17)
LYMPHOCYTES # BLD AUTO: 1.4 K/UL — SIGNIFICANT CHANGE UP (ref 1–3.3)
LYMPHOCYTES # BLD AUTO: 8.3 % — LOW (ref 13–44)
MCHC RBC-ENTMCNC: 29.2 PG — SIGNIFICANT CHANGE UP (ref 27–34)
MCHC RBC-ENTMCNC: 33.3 G/DL — SIGNIFICANT CHANGE UP (ref 32–36)
MCV RBC AUTO: 87.7 FL — SIGNIFICANT CHANGE UP (ref 80–100)
MONOCYTES # BLD AUTO: 0.7 K/UL — SIGNIFICANT CHANGE UP (ref 0–0.9)
MONOCYTES NFR BLD AUTO: 4.3 % — SIGNIFICANT CHANGE UP (ref 2–14)
NEUTROPHILS # BLD AUTO: 14.1 K/UL — HIGH (ref 1.8–7.4)
NEUTROPHILS NFR BLD AUTO: 84.6 % — HIGH (ref 43–77)
PLATELET # BLD AUTO: 388 K/UL — SIGNIFICANT CHANGE UP (ref 150–400)
RBC # BLD: 4.96 M/UL — SIGNIFICANT CHANGE UP (ref 4.2–5.8)
RBC # FLD: 13 % — SIGNIFICANT CHANGE UP (ref 10.3–14.5)
WBC # BLD: 16.6 K/UL — HIGH (ref 3.8–10.5)
WBC # FLD AUTO: 16.6 K/UL — HIGH (ref 3.8–10.5)

## 2019-07-24 PROCEDURE — 99214 OFFICE O/P EST MOD 30 MIN: CPT

## 2019-07-25 DIAGNOSIS — Z51.11 ENCOUNTER FOR ANTINEOPLASTIC CHEMOTHERAPY: ICD-10-CM

## 2019-07-25 PROBLEM — Z51.5 PALLIATIVE CARE BY SPECIALIST: Status: ACTIVE | Noted: 2019-05-13

## 2019-07-25 NOTE — HISTORY OF PRESENT ILLNESS
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 \par \par 3/2/18 PET/CT new FDG avidity in the anterior mandibular buccal region with associated irregularity of the underlying mandibular cortex. \par \par 3/22/18 CT Scan: new lytic bony destruction anterior superior aspect of the mandibular symphysis in the midline involving the roots for both central incisor teeth; corresponds to + uptake on PET CT .\par \par 4/26/18 Composite resection of oral cavity cancer with segmental mandibulectomy  and floor of mouth resection, right neck dissection, left neck dissection, tracheostomy; Free osteocutaneous fibular flap, vestibuloplasty, complex closure of the neck, split thickness skin graft for squamous cell cancer  with erosion of the mandible right anterior mandibular vestibule, T4a N0 M0. \par Adjuvant RT \par \par November 2018 began to experience left tongue numbness which progressed and included left pre auricular numbness; pain stiffness along the left neck. \par \par 3/7/19 Direct laryngoscopy  and esophagoscopy.  Revision of the soft tissue pedicle and debulking of the flap; lease of lower lip scar and mucosal graft for flap failure\par implant placement drooling do to oral incompetence.\par \par 4/22/19 Biopsy left base of tongue - Invasive squamous cell carcinoma, keratinizing, well to moderately differentiated \par \par PET CT shows new FDG-avid lesion along the left posterior floor of mouth/left oral tongue, left neck nodule, c/w  recurrent disease; FDG-avid small left level IV cervical node, suspicious for metastasis; . New FDG-avid focus associated with plate and screws in the anterior mandibular region, slightly to the left of the midline, possibly post surgical/treatment change.\par \par He is scheduled to see Dr Lei today for medical oncology evaluation. [de-identified] : squamous cell cancer  [de-identified] : Doing much better since last visit in may.Using minimal meds.ImmRx seems to have made a difference..Less depressed.

## 2019-07-25 NOTE — ASSESSMENT
[FreeTextEntry1] : Improved s/s, w ongoing s/s control issues,but much more settled w his routine.Will see on a prn basis as he has little req. for analgesics [Supportive] : Goals of care discussed with patient: Supportive

## 2019-07-25 NOTE — PHYSICAL EXAM
[Ambulatory and capable of all self care but unable to carry out any work activities] : Status 2- Ambulatory and capable of all self care but unable to carry out any work activities. Up and about more than 50% of waking hours [Normal] : affect appropriate [de-identified] : microstomia; dental implants (3) otherwise edentulous; drooling

## 2019-07-25 NOTE — DISCUSSION/SUMMARY
[FreeTextEntry1] : Evan called to let us know pain is well controlled but Mr Ventura is unable to sleep with any intervention other than Valium 5 mg. DR Rogers is now following him at [facility] with his primary oncologist, Dr Cai, last seen on 5/8.  It may enhance analgesic benefit as well. Discussed with Dr. Rogers who agrees that it would be beneficial.

## 2019-07-25 NOTE — REVIEW OF SYSTEMS
[Patient Intake Form Reviewed] : Patient intake form was reviewed [Fatigue] : fatigue [Recent Change In Weight] : ~T recent weight change [Dysphagia] : dysphagia [Dizziness] : dizziness [Anxiety] : anxiety [Negative] : Allergic/Immunologic [FreeTextEntry2] : lost 75 to 69 kg 1 month  anorexia [FreeTextEntry4] : +drooling  [FreeTextEntry7] : nausea since hospitalization;  moving bowels regularly with laxatives [FreeTextEntry9] : Hx sciatica; see interval Hx [de-identified] : post surgical and RT changes [de-identified] : with nausea, positional

## 2019-07-25 NOTE — RESULTS/DATA
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 \par PET/CT is done as part of the subsequent treatment strategy evaluation. \par \par RADIOPHARMACEUTICAL: 12.0 mCi F-18, FDG, I.V. \par \par TECHNIQUE: Fasting blood sugar measured prior to injection of \par radiopharmaceutical was 97 mg/dl. Following intravenous injection of the \par radiopharmaceutical and an uptake period of approximately 50 minutes, \par FDG-PET/CT was obtained on a [employer] Discovery 710 from skull base to mid thigh. \par Dedicated imaging of the head and neck was also performed beginning \par approximately 72 minutes after injection. Oral contrast was given during the \par uptake period. CT was performed during shallow respiration. The CT protocol \par was optimized for PET attenuation correction and anatomic localization. The \par CT protocol was not designed to produce and cannot replace state-of-the-art \par diagnostic CT images with specific imaging protocols for different body \par parts and indications. Images were reviewed on a dedicated workstation using \par multiplanar reconstruction. \par \par The standardized uptake values (SUV) are normalized to patient body weight \par and indicate the highest activity concentration (SUVmax) in a given disease \par site. All image numbers refer to axial image number. \par \par COMPARISON: PET/CT 7/30/2018. \par \par OTHER STUDIES USED FOR CORRELATION: MRI orbit and C-spine 4/5/2019. \par \par FINDINGS: \par \par HEAD/NECK: Status post left composite mandibular resection with free flap \par reconstruction and bilateral neck dissection. Limited evaluation of oral \par cavity and neck due to metallic artifact. New irregular FDG avidity with \par central photopenia along the left posterior floor of mouth/left oral tongue, \par SUV 14.5 (image 62 of the dedicated head and neck series), corresponding to \par masslike nodular enhancement which measures approximately 4.6 x 2.0 x 4.2 \par cm, on recent MRI. Multiple adjacent smaller foci superiorly and inferiorly \par associated with multiple surgical clips New FDG-avid focus in the left lower \par neck, SUV 8.4 (image 74), corresponding to metastatic nodule between the \par scalene musculature, associated with left C4 nerve root/trunk on MRI. \par \par New FDG-avid small left level IV cervical node, 0.8 x 0.7 cm (image 93), SUV \par 7.1. \par \par New FDG-avid focus associated with plate and screws in the anterior \par mandibular region, slightly to the left of the midline, SUV 10.1 (image 64). \par \par Postsurgical/treatment changes in the oral cavity and neck. Physiologic FDG \par activity in the brain and bilateral neck muscles. \par \par THORAX: Physiologic FDG activity. \par \par LUNGS: No abnormal FDG activity. No pulmonary nodules. \par \par PLEURA/PERICARDIUM: No abnormal FDG activity. No pleural or pericardial \par effusion. \par \par HEPATOBILIARY/PANCREAS: Physiologic FDG activity. Liver SUV mean 2.6; \par previously 2.4. Cholelithiasis, unchanged. \par \par SPLEEN: Physiologic FDG activity. \par \par ADRENAL GLANDS: No abnormal FDG activity. No nodules. \par \par KIDNEYS/URINARY BLADDER: Physiologic excreted FDG activity. \par \par ABDOMINOPELVIC NODES: No abnormal FDG activity. \par \par BOWEL/PERITONEUM/MESENTERY: No abnormal FDG activity. \par \par PELVIC ORGANS: No abnormal FDG activity. \par \par BONES/SOFT TISSUES: No abnormal FDG activity. \par \par IMPRESSION: Since FDG-PET/CT scan 7/30/2018: \par \par 1. New FDG-avid lesion along the left posterior floor of mouth/left oral \par tongue and and left neck nodule, compatible with known recurrent disease. \par \par 2. New FDG-avid small left level IV cervical node, suspicious for metastasis. \par \par 3. New FDG-avid focus associated with plate and screws in the anterior \par mandibular region, slightly to the left of the midline, possibly post \par surgical/treatment change. Please correlate clinically. \par \par \par LES WILLAM M.D., NUCLEAR MEDICINE ATTENDING \par This document has been electronically signed. Apr 23 2019 10:19AM \par \par \par CASSIE RAE    Surgical Final Report\par \par Final Diagnosis\par \par 1. Momo, left base, biopsy\par - Invasive squamous cell carcinoma, keratinizing, well to\par moderately differentiated\par \par Verified by: YSABEL COFFMAN MD\par (Electronic Signature)\par Reported on: 04/22/19 12:38 EDT, [address], [place], [place]\par [address]\par _________________________________________________________________\par \par Intraoperative Consultation\par 1- Left base of tongue\par - Invasive squamous cell carcinoma with keratinization\par By Dr. NORRIS Phillip\par \par Frozen section Performed at [facility],\par Department of Pathology, [address] [place] [address].\par \par Clinical History\par Malignant neoplasm of mouth\par \par Specimen(s) Submitted\par 1- Left base of tongue\par \par Gross Description\par The specimen is received fresh for intraoperative consultation\par and the specimen container is labeled: Left base of tongue.  It\par consists of a multiple fragments of tan pink soft tissue\par measuring together 1.5 x 1.0 x 0.3 cm in aggregate.  Entirely\par submitted for frozen examination in one cassette, 1FSA.  Frozen\par cassette was opened to confirm the presence of tissue inside the\par cassette.\par

## 2019-07-29 ENCOUNTER — FORM ENCOUNTER (OUTPATIENT)
Age: 64
End: 2019-07-29

## 2019-07-30 ENCOUNTER — OUTPATIENT (OUTPATIENT)
Dept: OUTPATIENT SERVICES | Facility: HOSPITAL | Age: 64
LOS: 1 days | End: 2019-07-30
Payer: COMMERCIAL

## 2019-07-30 ENCOUNTER — OTHER (OUTPATIENT)
Age: 64
End: 2019-07-30

## 2019-07-30 DIAGNOSIS — Z85.819 PERSONAL HISTORY OF MALIGNANT NEOPLASM OF UNSPECIFIED SITE OF LIP, ORAL CAVITY, AND PHARYNX: Chronic | ICD-10-CM

## 2019-07-30 DIAGNOSIS — Z98.890 OTHER SPECIFIED POSTPROCEDURAL STATES: Chronic | ICD-10-CM

## 2019-07-30 DIAGNOSIS — R13.10 DYSPHAGIA, UNSPECIFIED: ICD-10-CM

## 2019-07-30 DIAGNOSIS — R11.2 NAUSEA WITH VOMITING, UNSPECIFIED: ICD-10-CM

## 2019-07-30 DIAGNOSIS — Z43.1 ENCOUNTER FOR ATTENTION TO GASTROSTOMY: Chronic | ICD-10-CM

## 2019-07-30 PROCEDURE — 92611 MOTION FLUOROSCOPY/SWALLOW: CPT

## 2019-07-30 PROCEDURE — 74230 X-RAY XM SWLNG FUNCJ C+: CPT | Mod: 26

## 2019-07-30 PROCEDURE — 74230 X-RAY XM SWLNG FUNCJ C+: CPT

## 2019-07-31 ENCOUNTER — RESULT REVIEW (OUTPATIENT)
Age: 64
End: 2019-07-31

## 2019-07-31 ENCOUNTER — APPOINTMENT (OUTPATIENT)
Dept: HEMATOLOGY ONCOLOGY | Facility: CLINIC | Age: 64
End: 2019-07-31
Payer: COMMERCIAL

## 2019-07-31 ENCOUNTER — LABORATORY RESULT (OUTPATIENT)
Age: 64
End: 2019-07-31

## 2019-07-31 VITALS
OXYGEN SATURATION: 94 % | DIASTOLIC BLOOD PRESSURE: 77 MMHG | WEIGHT: 141.05 LBS | SYSTOLIC BLOOD PRESSURE: 109 MMHG | HEIGHT: 67 IN | HEART RATE: 85 BPM | BODY MASS INDEX: 22.14 KG/M2

## 2019-07-31 LAB
BASOPHILS # BLD AUTO: 0.2 K/UL — SIGNIFICANT CHANGE UP (ref 0–0.2)
BASOPHILS NFR BLD AUTO: 1.3 % — SIGNIFICANT CHANGE UP (ref 0–2)
EOSINOPHIL # BLD AUTO: 0.2 K/UL — SIGNIFICANT CHANGE UP (ref 0–0.5)
EOSINOPHIL NFR BLD AUTO: 1.1 % — SIGNIFICANT CHANGE UP (ref 0–6)
HCT VFR BLD CALC: 49.6 % — SIGNIFICANT CHANGE UP (ref 39–50)
HGB BLD-MCNC: 15.6 G/DL — SIGNIFICANT CHANGE UP (ref 13–17)
LYMPHOCYTES # BLD AUTO: 1.3 K/UL — SIGNIFICANT CHANGE UP (ref 1–3.3)
LYMPHOCYTES # BLD AUTO: 6.9 % — LOW (ref 13–44)
MCHC RBC-ENTMCNC: 28.2 PG — SIGNIFICANT CHANGE UP (ref 27–34)
MCHC RBC-ENTMCNC: 31.6 G/DL — LOW (ref 32–36)
MCV RBC AUTO: 89.4 FL — SIGNIFICANT CHANGE UP (ref 80–100)
MONOCYTES # BLD AUTO: 1.2 K/UL — HIGH (ref 0–0.9)
MONOCYTES NFR BLD AUTO: 6.7 % — SIGNIFICANT CHANGE UP (ref 2–14)
NEUTROPHILS # BLD AUTO: 15.8 K/UL — HIGH (ref 1.8–7.4)
NEUTROPHILS NFR BLD AUTO: 84 % — HIGH (ref 43–77)
PLATELET # BLD AUTO: 344 K/UL — SIGNIFICANT CHANGE UP (ref 150–400)
RBC # BLD: 5.55 M/UL — SIGNIFICANT CHANGE UP (ref 4.2–5.8)
RBC # FLD: 13.8 % — SIGNIFICANT CHANGE UP (ref 10.3–14.5)
WBC # BLD: 18.8 K/UL — HIGH (ref 3.8–10.5)
WBC # FLD AUTO: 18.8 K/UL — HIGH (ref 3.8–10.5)

## 2019-07-31 PROCEDURE — 99215 OFFICE O/P EST HI 40 MIN: CPT

## 2019-07-31 NOTE — RESULTS/DATA
[FreeTextEntry1] : 7/30/19 Modified Barium Swallow:\par XR Modified barium swallow\par Modified barium esophagram is performed in conjunction with the speech pathologist. Fluoroscopy time 3.8 minutes. Please refer to official speech and swallow report for full details.\par \par 6/13/19 CT Neck:\par Redemonstration of post-op changes. There is recurrent neoplasm in the left aspect of the oral cavity 4.9 x 3.0 cm, not significantly change since prior PET 4/22/19. \par IMPRESSION: recurrent tumor in the left oral aspect of the cavity. New abnormal thickening involving the epiglottis. New posterior supraglottic hyperpharyngeal thickening. This can represent post radiation changes. Direct visualization recommended. \par \par 5/6/19 MRI Spine: Ill-defined soft tissue lesion on the left at the level of the C3/C4 and C4/C5 neural foramen extending into the adjacent perivertebral space. Question of lesion abutting vs. surrounding the left vertebral artery. Anterior extent of mass is not visualized secondary to susceptibility artifact. Multilevel cervical spondylosis. \par \par 4/22/19 PET:\par Status post left composite mandibular resection with free flap reconstruction and bilateral neck dissection.  Limited evaluation of oral cavity and neck due to metallic artifact. New irregular FDG avidity with central photopenia along the left posterior floor of mouth/left oral tongue, SUV 14.5 (image 62 of the dedicated head and neck series), \par corresponding to masslike nodular enhancement which measures approximately 4.6 x 2.0 x 4.2 cm, on recent MRI. Multiple adjacent smaller foci superiorly and inferiorly associated with multiple surgical clips New FDG-avid focus in the left lower neck, SUV 8.4, corresponding to metastatic nodule between the scalene musculature, \par associated with left C4 nerve root/trunk on MRI.  New FDG-avid small left level IV cervical node, 0.8 x 0.7 cm, SUV 7.1.  New FDG-avid focus associated with plate and screws in the anterior mandibular region, slightly to the left of the midline, SUV 10.1. 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 The area of abnormal enhancement measures approximately 4.6 x 2.0 x 4.2 cm. Posteriorly, abnormally enhancing soft tissue encases the external and internal carotid arteries and abuts the common carotid artery proximal to the carotid bifurcation.  Medially, there is mass effect upon the floor of mouth with denervation fatty atrophy of the left hemiglossus.\par The anterior extent of abnormally enhancing soft tissue is difficult to discern due to susceptibility artifact.  Superiorly, abnormally enhancing soft tissue spans the glossotonsillar sulcus, and is seen involving the left lateral oropharyngeal wall up to the soft palate. Abnormal signal and enhancement is inseparable from the left medial pterygoid. There is however, no abnormal asymmetric enhancement extending along V3 through the skull base to the cavernous sinus or Meckel's cave. There is no evidence for abnormal enhancement along the left parotid gland or intratemporal facial nerve. Laterally, there is abnormal enhancement which spans the left floor of mouth into the flap, and extends underneath the native mandible to involve the insertion of the left masseter muscle. There is however, no visible extension into the overlying skin. Evaluation of mandibular marrow signal abnormality is limited by susceptibility abnormality. There has been interval enlargement of a nodule of metastatic disease deep to the left neck dissection (2:22, 12:28) between scalene musculature. This nodule of abnormal signal and enhancement measures approximately 0.7 x 1.0 x 2.2 cm, and appears associated with the left C4 nerve root and trunk. There is no evidence for extension into the neural foramen, or spinal canal.  The remainder of the brachial plexus appears intact. There is otherwise no evidence for pathologic lymphadenopathy.\par \par 4/26/18 Pathology:\par Lymph nodes, left level 1, neck dissection: 8 lymph nodes negative for metastatic carcinoma. Submandibular gland with chronic inflammation and atrophy, negative for carcinoma\par Lymph nodes, left level 2, neck dissection: 13 lymph nodes negative for metastatic carcinoma\par Lymph nodes, left level 3, neck dissection: 6 lymph nodes negative for metastatic carcinoma\par Lymph nodes, left level 4, neck dissection: 13 lymph nodes negative for metastatic carcinoma\par Oral cavity, composite resection mandibular alveolar cancer - Invasive squamous cell carcinoma, well-differentiated.  Resection margins negative for carcinoma\par Left lower lip, excision: Lip tissue, negative for carcinoma\par Right lower lip, excision: Lip tissue, negative for carcinoma\par Right floor of mouth, excision: Squamous mucosa, negative for carcinoma\par Left floor of mouth, excision: Squamous mucosa, negative for carcinoma\par Deep margin, excision: Soft tissue and skeletal muscle, negative for carcinoma\par Lymph nodes, right level 1,2,3,4, neck dissection: 9 lymph nodes negative for metastatic carcinoma. Submandibular gland with chronic inflammation and atrophy, negative for carcinoma\par Tooth #20, removal: Tooth, gross examination only\par Specimen:  Mandibular alveolar\par Procedure:  Composite resection\par Specimen Size:  5 x 1.7 x 0.5 cm portion of mandibular ramus, and soft tissue anterior (3.8 x 3.3 x 0.8 cm, inked blue) and soft tissue posterior (2.7 x 1 x 0.5 cm, inked black)\par Additional dimensions: Not applicable\par Specimen Laterality: Not applicable\par Tumor Site: Mandibular alveolar\par Tumor Focality: Unifocal\par Tumor Size: 1.8 x 1.2 x 0.4 cm\par Tumor Depth of Invasion (DOI) (millimeters): 3 mm\par Tumor Thickness (pT1 and pT2 tumors): 0.4 cm\par \par Tumor Description Gross Subtype: Solid\par \par Histologic Type: Squamous cell carcinoma\par Histologic Grade:  Well differentiated\par Margins:\par Uninvolved by invasive tumor\par Distance from closest margin (millimeters): 2 mm\par Specify location of closest margin, per orientation, if possible: Anterior soft tissue margin\par Lymph-Vascular Invasion: Not identified\par Perineural Invasion: Not identified\par Lymph Nodes, Extranodal Extension: Not identified\par Pathologic Staging (pTNM): Primary Tumor (pT):  pT1 \par Regional Lymph Nodes:  pN0\par # Lymph nodes examined:  39\par # Lymph nodes involved:0\par Size of Largest Metastatic Deposit (centimeters): Not applicable\par Distant Metastasis (pM):  pMX\par

## 2019-07-31 NOTE — HISTORY OF PRESENT ILLNESS
[de-identified] : The patient was diagnosed with recurrent head and neck SCC in April 2019 at the age of 63.  He was originally diagnosed with squamous cell carcinoma of the lower lip [id number], stage 0, in September 2015.  He completed radiation therapy (7,000 cGy) to the oral cavity on 2/11/16.  He continued with pain in the right lower lip. He was referred to Dr. aMhamed Paulson.  Biopsy 2/13/2018 of the right mandibular vestibule showed squamous cell carcinoma, well differentiated. PET/CT 3/2/18 revealed new FDG avidity in the anterior mandibular buccal region with associated irregularity of the underlying mandibular cortex, unchanged level lA lymph node. No evidence of distant metastasis. He followed with Dr. Farley who ordered a CT neck on 3/22/18 which revealed new lytic bony destruction, anterior superior aspect of the mandibular symphysis in the midline involving the roots for both central incisor teeth, no lymph nodes.  Biopsy was c/w SCCa in the area with erosion of the mandible, T4a N0 M0. \par \par He is now s/p composite resection and reconstruction with FFF on April 26, 2018. Final pathology pT1 N0 M0; however, given his original presentation, likely still  [id number] mandibular alveolar SCCa. He is s/p a release of the lower lip scar and mucosal graft on 3/7/19. An MRI 4/5/19 showed masslike nodular enhancement, increased in comparison to prior MR 12/20/2018 along the left posterior floor of mouth, spanning the base of tongue, glossotonsillar sulcus, and inferior left parapharyngeal space,  space, system with progression of recurrent disease.  Biopsy positive for invasive squamous cell carcinoma, keratinizing, well to moderately differentiated.  Follow up PET showed new irregular FDG avidity with central photopenia along the left posterior floor of mouth/left oral tongue, SUV 14.5, corresponding to masslike nodular enhancement which measures approximately 4.6 x 2.0 x 4.2 cm, on recent MRI.  [de-identified] : Patient presents for C4D8 Nivolumab for recurrent head and neck SCC.  He is s/p a release of the lower lip scar and mucosal graft on 3/7/19.  + Fatigue but much improved s/p starting low dose dexamethasone, which has also improved appetite. + Odynophagia and dysphagia, improved with recent PO intake of "poached eggs."  + Excessive secretions, improved, using suction machine less often - not using in the past week.  Nutrition remains nearly 80% via PEG, weight stable. + Constipation.  No diarrhea, no nausea, no emesis. No edema. No neuropathy. No fevers. Swallow test 7/30/19. Wnl for patient. Had coughing fit 7/30/19 minimal stranding with pink/red mucus discharge. Stopped following coughing. \par \par Patient was discharged from [facility] 6/18/19, admitted for dysphagia. PEG was placed.

## 2019-07-31 NOTE — PHYSICAL EXAM
[Ambulatory and capable of all self care but unable to carry out any work activities] : Status 2- Ambulatory and capable of all self care but unable to carry out any work activities. Up and about more than 50% of waking hours [Normal] : affect appropriate [de-identified] : microstomia with drooling. Status post composite resection and fibular graft reconstruction of right jaw and neck dissection. Large firm minimally mobile mass left mid neck, no longer palpable. Buckle mucosa left side nodularity, swelling of left anterior lateral tongue.\par mcrostomia with drooling. atatus post composite resection and fibular graft reconstruction of right jaw and neck dissection. large firm minimally mobile mass left mid neck. \par mcrostomia with drooling. atatus post composite resection and fibular graft reconstruction of right jaw and neck dissection. large firm minimally mobile mass left mid neck. \par macrostomia with drooling.

## 2019-08-01 ENCOUNTER — RX RENEWAL (OUTPATIENT)
Age: 64
End: 2019-08-01

## 2019-08-01 ENCOUNTER — OTHER (OUTPATIENT)
Age: 64
End: 2019-08-01

## 2019-08-01 ENCOUNTER — APPOINTMENT (OUTPATIENT)
Dept: HEMATOLOGY ONCOLOGY | Facility: CLINIC | Age: 64
End: 2019-08-01

## 2019-08-02 ENCOUNTER — RESULT REVIEW (OUTPATIENT)
Age: 64
End: 2019-08-02

## 2019-08-02 LAB — CORTIS SERPL-MCNC: 0.5 UG/DL

## 2019-08-07 LAB
ALBUMIN SERPL ELPH-MCNC: 4.3 G/DL
ALP BLD-CCNC: 80 U/L
ALT SERPL-CCNC: 21 U/L
ANION GAP SERPL CALC-SCNC: 11 MMOL/L
AST SERPL-CCNC: 15 U/L
BILIRUB SERPL-MCNC: 0.6 MG/DL
BUN SERPL-MCNC: 26 MG/DL
CALCIUM SERPL-MCNC: 10.6 MG/DL
CHLORIDE SERPL-SCNC: 95 MMOL/L
CO2 SERPL-SCNC: 32 MMOL/L
CORTIS SERPL-MCNC: 0.6 UG/DL
CREAT SERPL-MCNC: 0.74 MG/DL
GLUCOSE SERPL-MCNC: 87 MG/DL
MAGNESIUM SERPL-MCNC: 2.2 MG/DL
POTASSIUM SERPL-SCNC: 5 MMOL/L
PROT SERPL-MCNC: 7.3 G/DL
SODIUM SERPL-SCNC: 138 MMOL/L
T3RU NFR SERPL: 1 TBI
T4 SERPL-MCNC: 5.9 UG/DL
TSH SERPL-ACNC: 0.81 UIU/ML

## 2019-08-08 ENCOUNTER — APPOINTMENT (OUTPATIENT)
Dept: ENDOCRINOLOGY | Facility: CLINIC | Age: 64
End: 2019-08-08
Payer: COMMERCIAL

## 2019-08-08 VITALS
HEIGHT: 67 IN | SYSTOLIC BLOOD PRESSURE: 110 MMHG | BODY MASS INDEX: 22.13 KG/M2 | DIASTOLIC BLOOD PRESSURE: 68 MMHG | HEART RATE: 73 BPM | WEIGHT: 141 LBS

## 2019-08-08 DIAGNOSIS — R94.7 ABNORMAL RESULTS OF OTHER ENDOCRINE FUNCTION STUDIES: ICD-10-CM

## 2019-08-08 DIAGNOSIS — E55.9 VITAMIN D DEFICIENCY, UNSPECIFIED: ICD-10-CM

## 2019-08-08 PROCEDURE — 99204 OFFICE O/P NEW MOD 45 MIN: CPT

## 2019-08-08 NOTE — HISTORY OF PRESENT ILLNESS
[FreeTextEntry1] : neck and mass scuamos treated with surgery, XRT and chemotherapy. \par He is doing immunotherapy with nivolumab. \par He will start clinical trial at [facility]. \par no nausea, no vomiting, no dizziness,

## 2019-08-08 NOTE — PHYSICAL EXAM
[Alert] : alert [No Acute Distress] : no acute distress [Well Nourished] : well nourished [Well Developed] : well developed [Normal Sclera/Conjunctiva] : normal sclera/conjunctiva [EOMI] : extra ocular movement intact [Normal Oropharynx] : the oropharynx was normal [No Proptosis] : no proptosis [Thyroid Not Enlarged] : the thyroid was not enlarged [No Respiratory Distress] : no respiratory distress [No Thyroid Nodules] : there were no palpable thyroid nodules [No Accessory Muscle Use] : no accessory muscle use [Clear to Auscultation] : lungs were clear to auscultation bilaterally [Normal S1, S2] : normal S1 and S2 [Normal Rate] : heart rate was normal  [Pedal Pulses Normal] : the pedal pulses are present [Regular Rhythm] : with a regular rhythm [No Edema] : there was no peripheral edema [Not Tender] : non-tender [Normal Bowel Sounds] : normal bowel sounds [Soft] : abdomen soft [Not Distended] : not distended [Anterior Cervical Nodes] : anterior cervical nodes [Post Cervical Nodes] : posterior cervical nodes [Axillary Nodes] : axillary nodes [Normal] : normal and non tender [No Spinal Tenderness] : no spinal tenderness [No Stigmata of Cushings Syndrome] : no stigmata of cushings syndrome [Spine Straight] : spine straight [Normal Gait] : normal gait [Normal Strength/Tone] : muscle strength and tone were normal [No Rash] : no rash [Normal Reflexes] : deep tendon reflexes were 2+ and symmetric [No Tremors] : no tremors [Oriented x3] : oriented to person, place, and time [Acanthosis Nigricans] : no acanthosis nigricans

## 2019-08-08 NOTE — ASSESSMENT
[FreeTextEntry1] : Abnormal endocrine tests. He takes dexamethasone for about 3 weeks now for low appetite . Low cortisol is result of taking steroids. Will not repeat blood work. He needs to be weaned off gradually and then we can repeat cortisol afterwards.Continue dexa 2 mg qd for next week then he will need 1 mg qd for one week , trying gradual weaning off. Will reach oncology team to see the long term plan with steroid. \par he has neck/mouth carcinoma s/p surgery, XRT and chemoT now with cortisol am repeatedly low 0.5-0.6 \par he is completely asymptomatic now, not exhibiting any signs of adrenal insufficiency \par tfts normal \par \par vitamin d deficiency: takes 1000 u qd

## 2019-08-19 ENCOUNTER — OUTPATIENT (OUTPATIENT)
Dept: OUTPATIENT SERVICES | Facility: HOSPITAL | Age: 64
LOS: 1 days | Discharge: HOME | End: 2019-08-19

## 2019-08-19 DIAGNOSIS — C76.0 MALIGNANT NEOPLASM OF HEAD, FACE AND NECK: ICD-10-CM

## 2019-08-19 DIAGNOSIS — Z43.1 ENCOUNTER FOR ATTENTION TO GASTROSTOMY: Chronic | ICD-10-CM

## 2019-08-19 DIAGNOSIS — C06.9 MALIGNANT NEOPLASM OF MOUTH, UNSPECIFIED: ICD-10-CM

## 2019-08-19 DIAGNOSIS — Z98.890 OTHER SPECIFIED POSTPROCEDURAL STATES: Chronic | ICD-10-CM

## 2019-08-19 DIAGNOSIS — Z85.819 PERSONAL HISTORY OF MALIGNANT NEOPLASM OF UNSPECIFIED SITE OF LIP, ORAL CAVITY, AND PHARYNX: Chronic | ICD-10-CM

## 2019-08-21 ENCOUNTER — LABORATORY RESULT (OUTPATIENT)
Age: 64
End: 2019-08-21

## 2019-08-21 ENCOUNTER — RESULT REVIEW (OUTPATIENT)
Age: 64
End: 2019-08-21

## 2019-08-21 ENCOUNTER — APPOINTMENT (OUTPATIENT)
Dept: HEMATOLOGY ONCOLOGY | Facility: CLINIC | Age: 64
End: 2019-08-21
Payer: COMMERCIAL

## 2019-08-21 ENCOUNTER — FORM ENCOUNTER (OUTPATIENT)
Age: 64
End: 2019-08-21

## 2019-08-21 ENCOUNTER — RECORD ABSTRACTING (OUTPATIENT)
Age: 64
End: 2019-08-21

## 2019-08-21 ENCOUNTER — APPOINTMENT (OUTPATIENT)
Age: 64
End: 2019-08-21

## 2019-08-21 VITALS
BODY MASS INDEX: 22 KG/M2 | OXYGEN SATURATION: 98 % | SYSTOLIC BLOOD PRESSURE: 120 MMHG | HEIGHT: 67 IN | HEART RATE: 78 BPM | WEIGHT: 140.19 LBS | DIASTOLIC BLOOD PRESSURE: 73 MMHG

## 2019-08-21 DIAGNOSIS — E87.1 HYPO-OSMOLALITY AND HYPONATREMIA: ICD-10-CM

## 2019-08-21 LAB
BASOPHILS # BLD AUTO: 0.2 K/UL — SIGNIFICANT CHANGE UP (ref 0–0.2)
BASOPHILS NFR BLD AUTO: 1.2 % — SIGNIFICANT CHANGE UP (ref 0–2)
EOSINOPHIL # BLD AUTO: 0.2 K/UL — SIGNIFICANT CHANGE UP (ref 0–0.5)
EOSINOPHIL NFR BLD AUTO: 1.2 % — SIGNIFICANT CHANGE UP (ref 0–6)
HCT VFR BLD CALC: 39.7 % — SIGNIFICANT CHANGE UP (ref 39–50)
HGB BLD-MCNC: 13.4 G/DL — SIGNIFICANT CHANGE UP (ref 13–17)
LYMPHOCYTES # BLD AUTO: 0.8 K/UL — LOW (ref 1–3.3)
LYMPHOCYTES # BLD AUTO: 5.8 % — LOW (ref 13–44)
MCHC RBC-ENTMCNC: 29.2 PG — SIGNIFICANT CHANGE UP (ref 27–34)
MCHC RBC-ENTMCNC: 33.7 G/DL — SIGNIFICANT CHANGE UP (ref 32–36)
MCV RBC AUTO: 86.8 FL — SIGNIFICANT CHANGE UP (ref 80–100)
MONOCYTES # BLD AUTO: 1.1 K/UL — HIGH (ref 0–0.9)
MONOCYTES NFR BLD AUTO: 8.5 % — SIGNIFICANT CHANGE UP (ref 2–14)
NEUTROPHILS # BLD AUTO: 10.9 K/UL — HIGH (ref 1.8–7.4)
NEUTROPHILS NFR BLD AUTO: 83.3 % — HIGH (ref 43–77)
PLATELET # BLD AUTO: 277 K/UL — SIGNIFICANT CHANGE UP (ref 150–400)
RBC # BLD: 4.57 M/UL — SIGNIFICANT CHANGE UP (ref 4.2–5.8)
RBC # FLD: 13.6 % — SIGNIFICANT CHANGE UP (ref 10.3–14.5)
WBC # BLD: 13.1 K/UL — HIGH (ref 3.8–10.5)
WBC # FLD AUTO: 13.1 K/UL — HIGH (ref 3.8–10.5)

## 2019-08-21 PROCEDURE — 99215 OFFICE O/P EST HI 40 MIN: CPT

## 2019-08-21 NOTE — HISTORY OF PRESENT ILLNESS
[de-identified] : The patient was diagnosed with recurrent head and neck SCC in April 2019 at the age of 63.  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  He completed radiation therapy (7,000 cGy) to the oral cavity on 2/11/16.  He continued with pain in the right lower lip. He was referred to Dr. Mahamed Paulson.  Biopsy 2/13/2018 of the right mandibular vestibule showed squamous cell carcinoma, well differentiated. PET/CT 3/2/18 revealed new FDG avidity in the anterior mandibular buccal region with associated irregularity of the underlying mandibular cortex, unchanged level lA lymph node. No evidence of distant metastasis. He followed with Dr. Farley who ordered a CT neck on 3/22/18 which revealed new lytic bony destruction, anterior superior aspect of the mandibular symphysis in the midline involving the roots for both central incisor teeth, no lymph nodes.  Biopsy was c/w SCCa in the area with erosion of the mandible, T4a N0 M0. \par \par He is now s/p composite resection and reconstruction with FFF on April 26, 2018. Final pathology pT1 N0 M0; however, given his original presentation, likely still  [id number] mandibular alveolar SCCa. He is s/p a release of the lower lip scar and mucosal graft on 3/7/19. An MRI 4/5/19 showed masslike nodular enhancement, increased in comparison to prior MR 12/20/2018 along the left posterior floor of mouth, spanning the base of tongue, glossotonsillar sulcus, and inferior left parapharyngeal space,  space, system with progression of recurrent disease.  Biopsy positive for invasive squamous cell carcinoma, keratinizing, well to moderately differentiated.  Follow up PET showed new irregular FDG avidity with central photopenia along the left posterior floor of mouth/left oral tongue, SUV 14.5, corresponding to masslike nodular enhancement which measures approximately 4.6 x 2.0 x 4.2 cm, on recent MRI.  [de-identified] : Patient presents for C5D1 Nivolumab for recurrent head and neck SCC.  + Fatigue but much improved s/p starting low dose dexamethasone, which has also improved appetite. Odynophagia and dysphagia improved.  + Excessive secretions, improved, using suction machine less often.  Nutrition remains nearly 80% via PEG, weight stable. + Constipation.  No diarrhea, no nausea, no emesis. No edema. No neuropathy. No fevers. Swallow test 7/30/19 Wnl for patient.  Since Saturday, complaints include, PEG dependency, dysphasia, insomnia, left neck/shoulder stiffness for which he is seeing Dr. Tan; missed dexamethasone dose 2-3 days in a row. Patient also complains of left side tongue swelling, sore throat.

## 2019-08-21 NOTE — PHYSICAL EXAM
[Normal] : supple without JVD, no thyromegaly or masses appreciated [de-identified] : microstomia with drooling. Status post composite resection and fibular graft reconstruction of right jaw and neck dissection. Large firm minimally mobile mass left mid neck, no longer palpable. Buckle mucosa left side nodularity, swelling of left anterior lateral tongue. Left tongue, swollen, anebulus\par mcrostomia with drooling. atatus post composite resection and fibular graft reconstruction of right jaw and neck dissection. large firm minimally mobile mass left mid neck. \par mcrostomia with drooling. atatus post composite resection and fibular graft reconstruction of right jaw and neck dissection. large firm minimally mobile mass left mid neck. \par macrostomia with drooling. [de-identified] : No palpable neck nodes

## 2019-08-22 ENCOUNTER — APPOINTMENT (OUTPATIENT)
Dept: NUCLEAR MEDICINE | Facility: CLINIC | Age: 64
End: 2019-08-22
Payer: COMMERCIAL

## 2019-08-22 ENCOUNTER — OUTPATIENT (OUTPATIENT)
Dept: OUTPATIENT SERVICES | Facility: HOSPITAL | Age: 64
LOS: 1 days | End: 2019-08-22

## 2019-08-22 DIAGNOSIS — Z51.11 ENCOUNTER FOR ANTINEOPLASTIC CHEMOTHERAPY: ICD-10-CM

## 2019-08-22 DIAGNOSIS — Z85.819 PERSONAL HISTORY OF MALIGNANT NEOPLASM OF UNSPECIFIED SITE OF LIP, ORAL CAVITY, AND PHARYNX: Chronic | ICD-10-CM

## 2019-08-22 DIAGNOSIS — C06.9 MALIGNANT NEOPLASM OF MOUTH, UNSPECIFIED: ICD-10-CM

## 2019-08-22 DIAGNOSIS — Z43.1 ENCOUNTER FOR ATTENTION TO GASTROSTOMY: Chronic | ICD-10-CM

## 2019-08-22 DIAGNOSIS — R11.2 NAUSEA WITH VOMITING, UNSPECIFIED: ICD-10-CM

## 2019-08-22 DIAGNOSIS — Z98.890 OTHER SPECIFIED POSTPROCEDURAL STATES: Chronic | ICD-10-CM

## 2019-08-22 PROCEDURE — 78815 PET IMAGE W/CT SKULL-THIGH: CPT | Mod: 26,PS

## 2019-08-23 PROBLEM — E87.1 HYPONATREMIA: Status: ACTIVE | Noted: 2019-08-23

## 2019-08-24 ENCOUNTER — EMERGENCY (EMERGENCY)
Facility: HOSPITAL | Age: 64
LOS: 1 days | Discharge: HOME | End: 2019-08-24
Attending: EMERGENCY MEDICINE
Payer: COMMERCIAL

## 2019-08-24 VITALS
SYSTOLIC BLOOD PRESSURE: 75 MMHG | HEIGHT: 67 IN | DIASTOLIC BLOOD PRESSURE: 88 MMHG | RESPIRATION RATE: 20 BRPM | WEIGHT: 136.03 LBS | HEART RATE: 82 BPM | OXYGEN SATURATION: 99 % | TEMPERATURE: 99 F

## 2019-08-24 VITALS
OXYGEN SATURATION: 99 % | RESPIRATION RATE: 16 BRPM | DIASTOLIC BLOOD PRESSURE: 85 MMHG | TEMPERATURE: 98 F | HEART RATE: 80 BPM | SYSTOLIC BLOOD PRESSURE: 160 MMHG

## 2019-08-24 DIAGNOSIS — Z43.1 ENCOUNTER FOR ATTENTION TO GASTROSTOMY: Chronic | ICD-10-CM

## 2019-08-24 DIAGNOSIS — Z85.819 PERSONAL HISTORY OF MALIGNANT NEOPLASM OF UNSPECIFIED SITE OF LIP, ORAL CAVITY, AND PHARYNX: Chronic | ICD-10-CM

## 2019-08-24 DIAGNOSIS — Z98.890 OTHER SPECIFIED POSTPROCEDURAL STATES: Chronic | ICD-10-CM

## 2019-08-24 LAB
ALBUMIN SERPL ELPH-MCNC: 3.9 G/DL — SIGNIFICANT CHANGE UP (ref 3.3–5.2)
ALP SERPL-CCNC: 73 U/L — SIGNIFICANT CHANGE UP (ref 40–120)
ALT FLD-CCNC: 24 U/L — SIGNIFICANT CHANGE UP
ANION GAP SERPL CALC-SCNC: 12 MMOL/L — SIGNIFICANT CHANGE UP (ref 5–17)
AST SERPL-CCNC: 19 U/L — SIGNIFICANT CHANGE UP
BILIRUB SERPL-MCNC: 0.9 MG/DL — SIGNIFICANT CHANGE UP (ref 0.4–2)
BUN SERPL-MCNC: 9 MG/DL — SIGNIFICANT CHANGE UP (ref 8–20)
CALCIUM SERPL-MCNC: 9.3 MG/DL — SIGNIFICANT CHANGE UP (ref 8.6–10.2)
CHLORIDE SERPL-SCNC: 85 MMOL/L — LOW (ref 98–107)
CO2 SERPL-SCNC: 29 MMOL/L — SIGNIFICANT CHANGE UP (ref 22–29)
CREAT SERPL-MCNC: 0.52 MG/DL — SIGNIFICANT CHANGE UP (ref 0.5–1.3)
GLUCOSE SERPL-MCNC: 167 MG/DL — HIGH (ref 70–115)
HCT VFR BLD CALC: 43.6 % — SIGNIFICANT CHANGE UP (ref 39–50)
HGB BLD-MCNC: 14.7 G/DL — SIGNIFICANT CHANGE UP (ref 13–17)
LACTATE BLDV-MCNC: 1.4 MMOL/L — SIGNIFICANT CHANGE UP (ref 0.5–2)
MCHC RBC-ENTMCNC: 30 PG — SIGNIFICANT CHANGE UP (ref 27–34)
MCHC RBC-ENTMCNC: 33.7 GM/DL — SIGNIFICANT CHANGE UP (ref 32–36)
MCV RBC AUTO: 89 FL — SIGNIFICANT CHANGE UP (ref 80–100)
PLATELET # BLD AUTO: 297 K/UL — SIGNIFICANT CHANGE UP (ref 150–400)
POTASSIUM SERPL-MCNC: 4.2 MMOL/L — SIGNIFICANT CHANGE UP (ref 3.5–5.3)
POTASSIUM SERPL-SCNC: 4.2 MMOL/L — SIGNIFICANT CHANGE UP (ref 3.5–5.3)
PROT SERPL-MCNC: 7.3 G/DL — SIGNIFICANT CHANGE UP (ref 6.6–8.7)
RBC # BLD: 4.9 M/UL — SIGNIFICANT CHANGE UP (ref 4.2–5.8)
RBC # FLD: 14.7 % — HIGH (ref 10.3–14.5)
SODIUM SERPL-SCNC: 126 MMOL/L — LOW (ref 135–145)
WBC # BLD: 9.8 K/UL — SIGNIFICANT CHANGE UP (ref 3.8–10.5)
WBC # FLD AUTO: 9.8 K/UL — SIGNIFICANT CHANGE UP (ref 3.8–10.5)

## 2019-08-24 PROCEDURE — 87040 BLOOD CULTURE FOR BACTERIA: CPT

## 2019-08-24 PROCEDURE — 85027 COMPLETE CBC AUTOMATED: CPT

## 2019-08-24 PROCEDURE — 36415 COLL VENOUS BLD VENIPUNCTURE: CPT

## 2019-08-24 PROCEDURE — 70491 CT SOFT TISSUE NECK W/DYE: CPT

## 2019-08-24 PROCEDURE — 99284 EMERGENCY DEPT VISIT MOD MDM: CPT | Mod: 25

## 2019-08-24 PROCEDURE — 96365 THER/PROPH/DIAG IV INF INIT: CPT | Mod: XU

## 2019-08-24 PROCEDURE — 99284 EMERGENCY DEPT VISIT MOD MDM: CPT

## 2019-08-24 PROCEDURE — 70491 CT SOFT TISSUE NECK W/DYE: CPT | Mod: 26

## 2019-08-24 PROCEDURE — 83605 ASSAY OF LACTIC ACID: CPT

## 2019-08-24 PROCEDURE — 96366 THER/PROPH/DIAG IV INF ADDON: CPT | Mod: XU

## 2019-08-24 PROCEDURE — 80053 COMPREHEN METABOLIC PANEL: CPT

## 2019-08-24 PROCEDURE — 96375 TX/PRO/DX INJ NEW DRUG ADDON: CPT | Mod: XU

## 2019-08-24 RX ORDER — DIAZEPAM 5 MG
5 TABLET ORAL ONCE
Refills: 0 | Status: DISCONTINUED | OUTPATIENT
Start: 2019-08-24 | End: 2019-08-24

## 2019-08-24 RX ORDER — SODIUM CHLORIDE 9 MG/ML
1000 INJECTION INTRAMUSCULAR; INTRAVENOUS; SUBCUTANEOUS ONCE
Refills: 0 | Status: COMPLETED | OUTPATIENT
Start: 2019-08-24 | End: 2019-08-24

## 2019-08-24 RX ORDER — AZTREONAM 2 G
160 VIAL (EA) INJECTION
Qty: 2240 | Refills: 0
Start: 2019-08-24 | End: 2019-08-30

## 2019-08-24 RX ORDER — MORPHINE SULFATE 50 MG/1
2 CAPSULE, EXTENDED RELEASE ORAL ONCE
Refills: 0 | Status: DISCONTINUED | OUTPATIENT
Start: 2019-08-24 | End: 2019-08-24

## 2019-08-24 RX ADMIN — MORPHINE SULFATE 2 MILLIGRAM(S): 50 CAPSULE, EXTENDED RELEASE ORAL at 17:07

## 2019-08-24 RX ADMIN — Medication 5 MILLIGRAM(S): at 14:59

## 2019-08-24 RX ADMIN — Medication 100 MILLIGRAM(S): at 12:12

## 2019-08-24 RX ADMIN — SODIUM CHLORIDE 2000 MILLILITER(S): 9 INJECTION INTRAMUSCULAR; INTRAVENOUS; SUBCUTANEOUS at 15:00

## 2019-08-24 RX ADMIN — Medication 600 MILLIGRAM(S): at 17:07

## 2019-08-24 RX ADMIN — MORPHINE SULFATE 2 MILLIGRAM(S): 50 CAPSULE, EXTENDED RELEASE ORAL at 12:13

## 2019-08-24 NOTE — ED ADULT NURSE NOTE - OBJECTIVE STATEMENT
62yo male c/o left sided neck abscess x 1 day. pt and son state yesterday looked like a "pimple" today it was more swollen, surrounding skin was reddening and painful. pt has hx throat ca with resection- pt receives meals/meds via PEG s/p surgery but reports increased difficulty swallowing. last ca tx 2 years ago. pt denies any recent fevers, chills, n/v/d, urinary symptoms, recent weight loss. resp spontaneous even and unlabored, lungs clear, skin warm and dry. no drooling, swallowing secretions

## 2019-08-24 NOTE — ED STATDOCS - NS_ ATTENDINGSCRIBEDETAILS _ED_A_ED_FT
I, Keyon Maza, performed the initial face to face bedside interview with this patient regarding history of present illness, review of symptoms and relevant past medical, social and family history.  I completed an independent physical examination.   The history, relevant review of systems, past medical and surgical history, medical decision making, and physical examination was documented by the scribe in my presence and I attest to the accuracy of the documentation.

## 2019-08-24 NOTE — ED PROVIDER NOTE - CLINICAL SUMMARY MEDICAL DECISION MAKING FREE TEXT BOX
64 yo male with cellulitis and abscess to left neck region x2 days. I aspirated 1 cc of cloudy purulent liquid mixed with blood. Will draw labs and give IV ABX and follow up CT scan.

## 2019-08-24 NOTE — ED ADULT NURSE NOTE - NSIMPLEMENTINTERV_GEN_ALL_ED
Implemented All Fall Risk Interventions:  [place] to call system. Call bell, personal items and telephone within reach. Instruct patient to call for assistance. Room bathroom lighting operational. Non-slip footwear when patient is off stretcher. Physically safe environment: no spills, clutter or unnecessary equipment. Stretcher in lowest position, wheels locked, appropriate side rails in place. Provide visual cue, wrist band, yellow gown, etc. Monitor gait and stability. Monitor for mental status changes and reorient to person, place, and time. Review medications for side effects contributing to fall risk. Reinforce activity limits and safety measures with patient and family.

## 2019-08-24 NOTE — ED STATDOCS - PROGRESS NOTE DETAILS
64 y/o M pt with PMHx of throat and neck CA (chemotherapy ended 3 years ago), mandibular resection, 39 lymph node removal presents to the ED c/o left sided redness and a growing firm abscess on the underside of his jaw. Pt states that it has been red and growing in size for the past few days. The area is very painful, and he has been unable to move his head without more pain than he normally experiences at baseline. Associated sore throat and neck pain. Pt denies attempting to drain the collection or agitating it. Since Sunday, he has been feeling extremely fatigued, and states that he has not been exposed to radiation since 2016. Pt is unable to swallow or drink since last Sunday. NKDA. Pt states that stiffness of neck is normal at baseline. Denies CP, HA, fever, chills. Focused eval, protocol orders entered. Pt to be moved to main ED for complete evaluation by another provider. Dr. Hdz is surgeon.

## 2019-08-24 NOTE — ED PROVIDER NOTE - PROGRESS NOTE DETAILS
labs and CT scans discussed with patient. Copies of results discussed with patient. Pt. will be discharged home.

## 2019-08-27 NOTE — DISEASE MANAGEMENT
[Pathological] : TNM Stage: p [0] : 0 [NTNM] : 0 [MTNM] : 0 [TTNM] : is [de-identified] : oral cavity and neck [de-identified] : 7,000 cGy 80

## 2019-08-29 ENCOUNTER — RESULT REVIEW (OUTPATIENT)
Age: 64
End: 2019-08-29

## 2019-08-29 ENCOUNTER — APPOINTMENT (OUTPATIENT)
Dept: HEMATOLOGY ONCOLOGY | Facility: CLINIC | Age: 64
End: 2019-08-29
Payer: COMMERCIAL

## 2019-08-29 ENCOUNTER — APPOINTMENT (OUTPATIENT)
Dept: HEMATOLOGY ONCOLOGY | Facility: CLINIC | Age: 64
End: 2019-08-29

## 2019-08-29 VITALS
HEIGHT: 67 IN | DIASTOLIC BLOOD PRESSURE: 68 MMHG | BODY MASS INDEX: 21.52 KG/M2 | SYSTOLIC BLOOD PRESSURE: 101 MMHG | OXYGEN SATURATION: 99 % | WEIGHT: 137.13 LBS | HEART RATE: 94 BPM

## 2019-08-29 LAB
BASOPHILS # BLD AUTO: 0.1 K/UL — SIGNIFICANT CHANGE UP (ref 0–0.2)
BASOPHILS NFR BLD AUTO: 0.8 % — SIGNIFICANT CHANGE UP (ref 0–2)
CULTURE RESULTS: SIGNIFICANT CHANGE UP
CULTURE RESULTS: SIGNIFICANT CHANGE UP
EOSINOPHIL # BLD AUTO: 0.4 K/UL — SIGNIFICANT CHANGE UP (ref 0–0.5)
EOSINOPHIL NFR BLD AUTO: 3.2 % — SIGNIFICANT CHANGE UP (ref 0–6)
HCT VFR BLD CALC: 45 % — SIGNIFICANT CHANGE UP (ref 39–50)
HGB BLD-MCNC: 14.6 G/DL — SIGNIFICANT CHANGE UP (ref 13–17)
LYMPHOCYTES # BLD AUTO: 0.5 K/UL — LOW (ref 1–3.3)
LYMPHOCYTES # BLD AUTO: 4.1 % — LOW (ref 13–44)
MCHC RBC-ENTMCNC: 28.4 PG — SIGNIFICANT CHANGE UP (ref 27–34)
MCHC RBC-ENTMCNC: 32.3 G/DL — SIGNIFICANT CHANGE UP (ref 32–36)
MCV RBC AUTO: 87.9 FL — SIGNIFICANT CHANGE UP (ref 80–100)
MONOCYTES # BLD AUTO: 0.9 K/UL — SIGNIFICANT CHANGE UP (ref 0–0.9)
MONOCYTES NFR BLD AUTO: 7.3 % — SIGNIFICANT CHANGE UP (ref 2–14)
NEUTROPHILS # BLD AUTO: 10.5 K/UL — HIGH (ref 1.8–7.4)
NEUTROPHILS NFR BLD AUTO: 84.5 % — HIGH (ref 43–77)
PLATELET # BLD AUTO: 413 K/UL — HIGH (ref 150–400)
RBC # BLD: 5.12 M/UL — SIGNIFICANT CHANGE UP (ref 4.2–5.8)
RBC # FLD: 13.7 % — SIGNIFICANT CHANGE UP (ref 10.3–14.5)
SPECIMEN SOURCE: SIGNIFICANT CHANGE UP
SPECIMEN SOURCE: SIGNIFICANT CHANGE UP
WBC # BLD: 12.4 K/UL — HIGH (ref 3.8–10.5)
WBC # FLD AUTO: 12.4 K/UL — HIGH (ref 3.8–10.5)

## 2019-08-29 PROCEDURE — 99215 OFFICE O/P EST HI 40 MIN: CPT

## 2019-08-29 NOTE — PHYSICAL EXAM
[Ambulatory and capable of all self care but unable to carry out any work activities] : Status 2- Ambulatory and capable of all self care but unable to carry out any work activities. Up and about more than 50% of waking hours [Normal] : grossly intact [de-identified] : microstomia with drooling. Status post composite resection and fibular graft reconstruction of right jaw and neck dissection. Large firm minimally mobile mass left mid neck, no longer palpable. Buckle mucosa left side nodularity, swelling of left anterior lateral tongue. Left tongue, swollen, anebulus\par mcrostomia with drooling. atatus post composite resection and fibular graft reconstruction of right jaw and neck dissection. large firm minimally mobile mass left mid neck. \par mcrostomia with drooling. atatus post composite resection and fibular graft reconstruction of right jaw and neck dissection. large firm minimally mobile mass left mid neck. \par macrostomia with drooling. [de-identified] : No palpable neck nodes

## 2019-08-29 NOTE — HISTORY OF PRESENT ILLNESS
[de-identified] : The patient was diagnosed with recurrent head and neck SCC in April 2019 at the age of 63.  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  He completed radiation therapy (7,000 cGy) to the oral cavity on 2/11/16.  He continued with pain in the right lower lip. He was referred to Dr. Mahamed Paulson.  Biopsy 2/13/2018 of the right mandibular vestibule showed squamous cell carcinoma, well differentiated. PET/CT 3/2/18 revealed new FDG avidity in the anterior mandibular buccal region with associated irregularity of the underlying mandibular cortex, unchanged level lA lymph node. No evidence of distant metastasis. He followed with Dr. Farley who ordered a CT neck on 3/22/18 which revealed new lytic bony destruction, anterior superior aspect of the mandibular symphysis in the midline involving the roots for both central incisor teeth, no lymph nodes.  Biopsy was c/w SCCa in the area with erosion of the mandible, T4a N0 M0. \par \par He is now s/p composite resection and reconstruction with FFF on April 26, 2018. Final pathology pT1 N0 M0; however, given his original presentation, likely still  [id number] mandibular alveolar SCCa. He is s/p a release of the lower lip scar and mucosal graft on 3/7/19. An MRI 4/5/19 showed masslike nodular enhancement, increased in comparison to prior MR 12/20/2018 along the left posterior floor of mouth, spanning the base of tongue, glossotonsillar sulcus, and inferior left parapharyngeal space,  space, system with progression of recurrent disease.  Biopsy positive for invasive squamous cell carcinoma, keratinizing, well to moderately differentiated.  Follow up PET showed new irregular FDG avidity with central photopenia along the left posterior floor of mouth/left oral tongue, SUV 14.5, corresponding to masslike nodular enhancement which measures approximately 4.6 x 2.0 x 4.2 cm, on recent MRI.  [de-identified] : Patient presents for C5D9 Nivolumab for recurrent head and neck SCC.  + Fatigue but much improved s/p starting low dose dexamethasone, which has also improved appetite. Odynophagia and dysphagia improved.  + Excessive secretions, improved, using suction machine less often.  Nutrition remains nearly 80% via PEG, weight stable. + Constipation.  No diarrhea, no nausea, no emesis. No edema. No neuropathy. No fevers. Swallow test 7/30/19 Wnl for patient.  Since Saturday, complaints include, PEG dependency, dysphasia, insomnia, left neck/shoulder stiffness for which he is seeing Dr. Tan; missed dexamethasone dose 2-3 days in a row. Patient also complains of left side tongue swelling, sore throat.  Blister on neck for which he went to ED.   Found a staple that was in over a year ago-some purulence noted per patient.  Treated with Cleomycin IV- discharged on Cipro – woke up yesterday with a rash on left side torso from armpit to waist front and back.

## 2019-08-29 NOTE — RESULTS/DATA
[FreeTextEntry1] : 8/22/19 PET:  HEAD/NECK: Postsurgical changes of composite mandibular resection with fibular free flap \par reconstruction and bilateral cervical lymph node dissection, again noted. Slightly increased FDG \par avidity of difficult to delineate mass along the left posterior floor of mouth/left base of tongue, SUV \par 18.9 (image 58 of the dedicated head and neck series); previous SUV 14.5. \par Few new FDG avid left neck lymph nodes/masses. New centrally necrotic peripherally FDG avid \par focus in the region of the left parotid gland, 3.5 x 2.3 cm (image 58), SUV 10.0. Inferiorly, in the left \par level Ib cervical node location new difficult to delineate FDG avid mass, SUV 20.6 (image 64). \par Several mildly FDG avid small left level IIb lymph nodes. Small lymph node just lateral to the internal \par jugular vein and anterior to the mastoid process, 0.9 x 0.6 cm (image 49 of the head and neck \par series), SUV 5.4. \par Previously seen FDG-avid metastatic left lower neck nodule between the scalene musculature, \par associated with left C4 nerve root/trunk, no longer present. \par Unchanged FDG-avid small left level IV cervical node, 0.8 x 0.7 cm (image 88 of the head and neck \par series), SUV 6.5; previously 0.8 x 0.7 cm, SUV 7.1.\par Resolution of previously seen FDG-avid focus associated with plate and screws in the anterior \par mandibular region, slightly to the left of the midline, SUV 10.1. \par New FDG-avidity along the right posterior floor of mouth region, just medial to the hyoid bone and \par lateral to the vallecula, SUV 9.3 (image 66). New mildly FDG avid difficult to delineate small lymph \par node, posterior to the right thyroid lobe and right common carotid artery, 0.6 cm (image 84), SUV \par 4.0.  THORAX: Several new FDG-avid difficult to delineate small mediastinal and right hilar nodes. For \par reference, right paratracheal node, SUV 3.9 (image 96). Right perihilar node, SUV 3.1 (image 83). \par LUNGS: New groundglass opacity in the lingula, 1.5 cm (image 112), SUV 1.4. New cluster of tiny \par nodular opacities in the anterior right upper lobe, SUV 1.3 (image 105).\par \par 7/30/19 Modified Barium Swallow:\par XR Modified barium swallow\par Modified barium esophagram is performed in conjunction with the speech pathologist. Fluoroscopy time 3.8 minutes. Please refer to official speech and swallow report for full details.\par \par 6/13/19 CT Neck:\par Redemonstration of post-op changes. There is recurrent neoplasm in the left aspect of the oral cavity 4.9 x 3.0 cm, not significantly change since prior PET 4/22/19. \par IMPRESSION: recurrent tumor in the left oral aspect of the cavity. New abnormal thickening involving the epiglottis. New posterior supraglottic hyperpharyngeal thickening. This can represent post radiation changes. Direct visualization recommended. \par \par 5/6/19 MRI Spine: Ill-defined soft tissue lesion on the left at the level of the C3/C4 and C4/C5 neural foramen extending into the adjacent perivertebral space. Question of lesion abutting vs. surrounding the left vertebral artery. Anterior extent of mass is not visualized secondary to susceptibility artifact. Multilevel cervical spondylosis. \par \par 4/22/19 PET:\par Status post left composite mandibular resection with free flap reconstruction and bilateral neck dissection.  Limited evaluation of oral cavity and neck due to metallic artifact. New irregular FDG avidity with central photopenia along the left posterior floor of mouth/left oral tongue, SUV 14.5 (image 62 of the dedicated head and neck series), \par corresponding to masslike nodular enhancement which measures approximately 4.6 x 2.0 x 4.2 cm, on recent MRI. Multiple adjacent smaller foci superiorly and inferiorly associated with multiple surgical clips New FDG-avid focus in the left lower neck, SUV 8.4, corresponding to metastatic nodule between the scalene musculature, \par associated with left C4 nerve root/trunk on MRI.  New FDG-avid small left level IV cervical node, 0.8 x 0.7 cm, SUV 7.1.  New FDG-avid focus associated with plate and screws in the anterior mandibular region, slightly to the left of the midline, SUV 10.1. 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 The area of abnormal enhancement measures approximately 4.6 x 2.0 x 4.2 cm. Posteriorly, abnormally enhancing soft tissue encases the external and internal carotid arteries and abuts the common carotid artery proximal to the carotid bifurcation.  Medially, there is mass effect upon the floor of mouth with denervation fatty atrophy of the left hemiglossus.\par The anterior extent of abnormally enhancing soft tissue is difficult to discern due to susceptibility artifact.  Superiorly, abnormally enhancing soft tissue spans the glossotonsillar sulcus, and is seen involving the left lateral oropharyngeal wall up to the soft palate. Abnormal signal and enhancement is inseparable from the left medial pterygoid. There is however, no abnormal asymmetric enhancement extending along V3 through the skull base to the cavernous sinus or Meckel's cave. There is no evidence for abnormal enhancement along the left parotid gland or intratemporal facial nerve. Laterally, there is abnormal enhancement which spans the left floor of mouth into the flap, and extends underneath the native mandible to involve the insertion of the left masseter muscle. There is however, no visible extension into the overlying skin. Evaluation of mandibular marrow signal abnormality is limited by susceptibility abnormality. There has been interval enlargement of a nodule of metastatic disease deep to the left neck dissection (2:22, 12:28) between scalene musculature. This nodule of abnormal signal and enhancement measures approximately 0.7 x 1.0 x 2.2 cm, and appears associated with the left C4 nerve root and trunk. There is no evidence for extension into the neural foramen, or spinal canal.  The remainder of the brachial plexus appears intact. There is otherwise no evidence for pathologic lymphadenopathy.\par \par 4/26/18 Pathology:\par Lymph nodes, left level 1, neck dissection: 8 lymph nodes negative for metastatic carcinoma. Submandibular gland with chronic inflammation and atrophy, negative for carcinoma\par Lymph nodes, left level 2, neck dissection: 13 lymph nodes negative for metastatic carcinoma\par Lymph nodes, left level 3, neck dissection: 6 lymph nodes negative for metastatic carcinoma\par Lymph nodes, left level 4, neck dissection: 13 lymph nodes negative for metastatic carcinoma\par Oral cavity, composite resection mandibular alveolar cancer - Invasive squamous cell carcinoma, well-differentiated.  Resection margins negative for carcinoma\par Left lower lip, excision: Lip tissue, negative for carcinoma\par Right lower lip, excision: Lip tissue, negative for carcinoma\par Right floor of mouth, excision: Squamous mucosa, negative for carcinoma\par Left floor of mouth, excision: Squamous mucosa, negative for carcinoma\par Deep margin, excision: Soft tissue and skeletal muscle, negative for carcinoma\par Lymph nodes, right level 1,2,3,4, neck dissection: 9 lymph nodes negative for metastatic carcinoma. Submandibular gland with chronic inflammation and atrophy, negative for carcinoma\par Tooth #20, removal: Tooth, gross examination only\par Specimen:  Mandibular alveolar\par Procedure:  Composite resection\par Specimen Size:  5 x 1.7 x 0.5 cm portion of mandibular ramus, and soft tissue anterior (3.8 x 3.3 x 0.8 cm, inked blue) and soft tissue posterior (2.7 x 1 x 0.5 cm, inked black)\par Additional dimensions: Not applicable\par Specimen Laterality: Not applicable\par Tumor Site: Mandibular alveolar\par Tumor Focality: Unifocal\par Tumor Size: 1.8 x 1.2 x 0.4 cm\par Tumor Depth of Invasion (DOI) (millimeters): 3 mm\par Tumor Thickness (pT1 and pT2 tumors): 0.4 cm\par \par Tumor Description Gross Subtype: Solid\par \par Histologic Type: Squamous cell carcinoma\par Histologic Grade:  Well differentiated\par Margins:\par Uninvolved by invasive tumor\par Distance from closest margin (millimeters): 2 mm\par Specify location of closest margin, per orientation, if possible: Anterior soft tissue margin\par Lymph-Vascular Invasion: Not identified\par Perineural Invasion: Not identified\par Lymph Nodes, Extranodal Extension: Not identified\par Pathologic Staging (pTNM): Primary Tumor (pT):  pT1 \par Regional Lymph Nodes:  pN0\par # Lymph nodes examined:  39\par # Lymph nodes involved:0\par Size of Largest Metastatic Deposit (centimeters): Not applicable\par Distant Metastasis (pM):  pMX\par

## 2019-08-30 LAB
ALBUMIN SERPL ELPH-MCNC: 3.8 G/DL
ALP BLD-CCNC: 73 U/L
ALT SERPL-CCNC: 117 U/L
ANION GAP SERPL CALC-SCNC: 13 MMOL/L
AST SERPL-CCNC: 62 U/L
BILIRUB SERPL-MCNC: 0.3 MG/DL
BUN SERPL-MCNC: 10 MG/DL
CALCIUM SERPL-MCNC: 9.3 MG/DL
CHLORIDE SERPL-SCNC: 93 MMOL/L
CO2 SERPL-SCNC: 26 MMOL/L
CREAT SERPL-MCNC: 0.62 MG/DL
GLUCOSE SERPL-MCNC: 150 MG/DL
MAGNESIUM SERPL-MCNC: 2 MG/DL
POTASSIUM SERPL-SCNC: 4.6 MMOL/L
PROT SERPL-MCNC: 6.5 G/DL
SODIUM SERPL-SCNC: 131 MMOL/L

## 2019-08-30 RX ORDER — ALPRAZOLAM 0.5 MG/1
0.5 TABLET ORAL
Qty: 60 | Refills: 0 | Status: ACTIVE | COMMUNITY
Start: 2019-07-15 | End: 1900-01-01

## 2019-09-03 ENCOUNTER — EMERGENCY (EMERGENCY)
Facility: HOSPITAL | Age: 64
LOS: 1 days | Discharge: HOME | End: 2019-09-03
Attending: EMERGENCY MEDICINE
Payer: COMMERCIAL

## 2019-09-03 VITALS
HEART RATE: 98 BPM | SYSTOLIC BLOOD PRESSURE: 100 MMHG | TEMPERATURE: 99 F | OXYGEN SATURATION: 96 % | HEIGHT: 67 IN | RESPIRATION RATE: 18 BRPM | DIASTOLIC BLOOD PRESSURE: 69 MMHG | WEIGHT: 130.07 LBS

## 2019-09-03 VITALS
HEART RATE: 79 BPM | DIASTOLIC BLOOD PRESSURE: 73 MMHG | RESPIRATION RATE: 16 BRPM | TEMPERATURE: 98 F | OXYGEN SATURATION: 98 % | SYSTOLIC BLOOD PRESSURE: 120 MMHG

## 2019-09-03 DIAGNOSIS — Z43.1 ENCOUNTER FOR ATTENTION TO GASTROSTOMY: Chronic | ICD-10-CM

## 2019-09-03 DIAGNOSIS — Z98.890 OTHER SPECIFIED POSTPROCEDURAL STATES: Chronic | ICD-10-CM

## 2019-09-03 DIAGNOSIS — Z85.819 PERSONAL HISTORY OF MALIGNANT NEOPLASM OF UNSPECIFIED SITE OF LIP, ORAL CAVITY, AND PHARYNX: Chronic | ICD-10-CM

## 2019-09-03 LAB
ALBUMIN SERPL ELPH-MCNC: 3.5 G/DL — SIGNIFICANT CHANGE UP (ref 3.3–5.2)
ALP SERPL-CCNC: 74 U/L — SIGNIFICANT CHANGE UP (ref 40–120)
ALT FLD-CCNC: 381 U/L — HIGH
ANION GAP SERPL CALC-SCNC: 11 MMOL/L — SIGNIFICANT CHANGE UP (ref 5–17)
APPEARANCE UR: CLEAR — SIGNIFICANT CHANGE UP
APTT BLD: 27.8 SEC — SIGNIFICANT CHANGE UP (ref 27.5–36.3)
AST SERPL-CCNC: 148 U/L — HIGH
BACTERIA # UR AUTO: ABNORMAL
BASOPHILS # BLD AUTO: 0.09 K/UL — SIGNIFICANT CHANGE UP (ref 0–0.2)
BASOPHILS NFR BLD AUTO: 0.6 % — SIGNIFICANT CHANGE UP (ref 0–2)
BILIRUB SERPL-MCNC: 0.6 MG/DL — SIGNIFICANT CHANGE UP (ref 0.4–2)
BILIRUB UR-MCNC: NEGATIVE — SIGNIFICANT CHANGE UP
BUN SERPL-MCNC: 8 MG/DL — SIGNIFICANT CHANGE UP (ref 8–20)
CALCIUM SERPL-MCNC: 9.5 MG/DL — SIGNIFICANT CHANGE UP (ref 8.6–10.2)
CHLORIDE SERPL-SCNC: 92 MMOL/L — LOW (ref 98–107)
CO2 SERPL-SCNC: 29 MMOL/L — SIGNIFICANT CHANGE UP (ref 22–29)
COLOR SPEC: YELLOW — SIGNIFICANT CHANGE UP
CREAT SERPL-MCNC: 0.53 MG/DL — SIGNIFICANT CHANGE UP (ref 0.5–1.3)
DIFF PNL FLD: NEGATIVE — SIGNIFICANT CHANGE UP
EOSINOPHIL # BLD AUTO: 0.18 K/UL — SIGNIFICANT CHANGE UP (ref 0–0.5)
EOSINOPHIL NFR BLD AUTO: 1.2 % — SIGNIFICANT CHANGE UP (ref 0–6)
EPI CELLS # UR: SIGNIFICANT CHANGE UP
GLUCOSE SERPL-MCNC: 114 MG/DL — SIGNIFICANT CHANGE UP (ref 70–115)
GLUCOSE UR QL: NEGATIVE MG/DL — SIGNIFICANT CHANGE UP
HCT VFR BLD CALC: 43.5 % — SIGNIFICANT CHANGE UP (ref 39–50)
HGB BLD-MCNC: 14.2 G/DL — SIGNIFICANT CHANGE UP (ref 13–17)
IMM GRANULOCYTES NFR BLD AUTO: 1.3 % — SIGNIFICANT CHANGE UP (ref 0–1.5)
INR BLD: 1.13 RATIO — SIGNIFICANT CHANGE UP (ref 0.88–1.16)
KETONES UR-MCNC: NEGATIVE — SIGNIFICANT CHANGE UP
LEUKOCYTE ESTERASE UR-ACNC: NEGATIVE — SIGNIFICANT CHANGE UP
LIDOCAIN IGE QN: 23 U/L — SIGNIFICANT CHANGE UP (ref 22–51)
LYMPHOCYTES # BLD AUTO: 1.08 K/UL — SIGNIFICANT CHANGE UP (ref 1–3.3)
LYMPHOCYTES # BLD AUTO: 7.2 % — LOW (ref 13–44)
MCHC RBC-ENTMCNC: 29 PG — SIGNIFICANT CHANGE UP (ref 27–34)
MCHC RBC-ENTMCNC: 32.6 GM/DL — SIGNIFICANT CHANGE UP (ref 32–36)
MCV RBC AUTO: 89 FL — SIGNIFICANT CHANGE UP (ref 80–100)
MONOCYTES # BLD AUTO: 1.43 K/UL — HIGH (ref 0–0.9)
MONOCYTES NFR BLD AUTO: 9.5 % — SIGNIFICANT CHANGE UP (ref 2–14)
NEUTROPHILS # BLD AUTO: 12.07 K/UL — HIGH (ref 1.8–7.4)
NEUTROPHILS NFR BLD AUTO: 80.2 % — HIGH (ref 43–77)
NITRITE UR-MCNC: NEGATIVE — SIGNIFICANT CHANGE UP
PH UR: 7 — SIGNIFICANT CHANGE UP (ref 5–8)
PLATELET # BLD AUTO: 420 K/UL — HIGH (ref 150–400)
POTASSIUM SERPL-MCNC: 4.5 MMOL/L — SIGNIFICANT CHANGE UP (ref 3.5–5.3)
POTASSIUM SERPL-SCNC: 4.5 MMOL/L — SIGNIFICANT CHANGE UP (ref 3.5–5.3)
PROT SERPL-MCNC: 7.1 G/DL — SIGNIFICANT CHANGE UP (ref 6.6–8.7)
PROT UR-MCNC: 15 MG/DL
PROTHROM AB SERPL-ACNC: 13.1 SEC — HIGH (ref 10–12.9)
RBC # BLD: 4.89 M/UL — SIGNIFICANT CHANGE UP (ref 4.2–5.8)
RBC # FLD: 14.7 % — HIGH (ref 10.3–14.5)
RBC CASTS # UR COMP ASSIST: SIGNIFICANT CHANGE UP /HPF (ref 0–4)
SODIUM SERPL-SCNC: 132 MMOL/L — LOW (ref 135–145)
SP GR SPEC: 1 — LOW (ref 1.01–1.02)
UROBILINOGEN FLD QL: NEGATIVE MG/DL — SIGNIFICANT CHANGE UP
WBC # BLD: 15.04 K/UL — HIGH (ref 3.8–10.5)
WBC # FLD AUTO: 15.04 K/UL — HIGH (ref 3.8–10.5)
WBC UR QL: SIGNIFICANT CHANGE UP

## 2019-09-03 PROCEDURE — 83605 ASSAY OF LACTIC ACID: CPT

## 2019-09-03 PROCEDURE — 74177 CT ABD & PELVIS W/CONTRAST: CPT

## 2019-09-03 PROCEDURE — 96375 TX/PRO/DX INJ NEW DRUG ADDON: CPT | Mod: XU

## 2019-09-03 PROCEDURE — 85610 PROTHROMBIN TIME: CPT

## 2019-09-03 PROCEDURE — 74177 CT ABD & PELVIS W/CONTRAST: CPT | Mod: 26

## 2019-09-03 PROCEDURE — 85730 THROMBOPLASTIN TIME PARTIAL: CPT

## 2019-09-03 PROCEDURE — 96374 THER/PROPH/DIAG INJ IV PUSH: CPT | Mod: XU

## 2019-09-03 PROCEDURE — 99284 EMERGENCY DEPT VISIT MOD MDM: CPT | Mod: 25

## 2019-09-03 PROCEDURE — 87040 BLOOD CULTURE FOR BACTERIA: CPT

## 2019-09-03 PROCEDURE — 99284 EMERGENCY DEPT VISIT MOD MDM: CPT

## 2019-09-03 PROCEDURE — 86780 TREPONEMA PALLIDUM: CPT

## 2019-09-03 PROCEDURE — 76705 ECHO EXAM OF ABDOMEN: CPT | Mod: 26

## 2019-09-03 PROCEDURE — 85027 COMPLETE CBC AUTOMATED: CPT

## 2019-09-03 PROCEDURE — 96361 HYDRATE IV INFUSION ADD-ON: CPT

## 2019-09-03 PROCEDURE — 36415 COLL VENOUS BLD VENIPUNCTURE: CPT

## 2019-09-03 PROCEDURE — 80053 COMPREHEN METABOLIC PANEL: CPT

## 2019-09-03 PROCEDURE — 81001 URINALYSIS AUTO W/SCOPE: CPT

## 2019-09-03 PROCEDURE — 71260 CT THORAX DX C+: CPT

## 2019-09-03 PROCEDURE — 87591 N.GONORRHOEAE DNA AMP PROB: CPT

## 2019-09-03 PROCEDURE — 70491 CT SOFT TISSUE NECK W/DYE: CPT

## 2019-09-03 PROCEDURE — 71260 CT THORAX DX C+: CPT | Mod: 26

## 2019-09-03 PROCEDURE — 83690 ASSAY OF LIPASE: CPT

## 2019-09-03 PROCEDURE — 76705 ECHO EXAM OF ABDOMEN: CPT

## 2019-09-03 PROCEDURE — 71045 X-RAY EXAM CHEST 1 VIEW: CPT | Mod: 26

## 2019-09-03 PROCEDURE — 70491 CT SOFT TISSUE NECK W/DYE: CPT | Mod: 26

## 2019-09-03 PROCEDURE — 87491 CHLMYD TRACH DNA AMP PROBE: CPT

## 2019-09-03 PROCEDURE — 71045 X-RAY EXAM CHEST 1 VIEW: CPT

## 2019-09-03 RX ORDER — ONDANSETRON 8 MG/1
4 TABLET, FILM COATED ORAL ONCE
Refills: 0 | Status: COMPLETED | OUTPATIENT
Start: 2019-09-03 | End: 2019-09-03

## 2019-09-03 RX ORDER — KETOROLAC TROMETHAMINE 30 MG/ML
15 SYRINGE (ML) INJECTION ONCE
Refills: 0 | Status: DISCONTINUED | OUTPATIENT
Start: 2019-09-03 | End: 2019-09-03

## 2019-09-03 RX ORDER — SODIUM CHLORIDE 9 MG/ML
2000 INJECTION INTRAMUSCULAR; INTRAVENOUS; SUBCUTANEOUS ONCE
Refills: 0 | Status: COMPLETED | OUTPATIENT
Start: 2019-09-03 | End: 2019-09-03

## 2019-09-03 RX ORDER — FAMOTIDINE 10 MG/ML
20 INJECTION INTRAVENOUS ONCE
Refills: 0 | Status: COMPLETED | OUTPATIENT
Start: 2019-09-03 | End: 2019-09-03

## 2019-09-03 RX ADMIN — Medication 1 MILLIGRAM(S): at 19:21

## 2019-09-03 RX ADMIN — ONDANSETRON 4 MILLIGRAM(S): 8 TABLET, FILM COATED ORAL at 15:45

## 2019-09-03 RX ADMIN — FAMOTIDINE 20 MILLIGRAM(S): 10 INJECTION INTRAVENOUS at 15:45

## 2019-09-03 RX ADMIN — SODIUM CHLORIDE 2000 MILLILITER(S): 9 INJECTION INTRAMUSCULAR; INTRAVENOUS; SUBCUTANEOUS at 15:44

## 2019-09-03 RX ADMIN — SODIUM CHLORIDE 2000 MILLILITER(S): 9 INJECTION INTRAMUSCULAR; INTRAVENOUS; SUBCUTANEOUS at 16:45

## 2019-09-03 RX ADMIN — Medication 15 MILLIGRAM(S): at 19:41

## 2019-09-03 RX ADMIN — Medication 15 MILLIGRAM(S): at 19:26

## 2019-09-03 NOTE — ED STATDOCS - PROGRESS NOTE DETAILS
64 y/o M pt with history of oral cancer (on immunotherapy), with mandibular resection, with a stomach PEG presents to the ED c/o diarrhea and oral vomiting. Pt has been weak and has been vomiting daily whenever he is fed through his abdominal PEG. Pt had a CAT scan done of his throat last Saturday at [facility], and a PET scan done two weeks ago. Pt has been unable to sleep or eat in the past few days. On exam, pt has deformity to the left side of his jaw, with redness and an area of hard skin on the left side of his neck. Focused eval, protocol orders entered. Pt to be moved to main ED for complete evaluation by another provider.

## 2019-09-03 NOTE — ED PROVIDER NOTE - CLINICAL SUMMARY MEDICAL DECISION MAKING FREE TEXT BOX
Pt is a 63 y.o. M hx of oral cancer s/p mandibular resection, currently on immunotherapy, s/p gastric PEG tube placed c/o emesis and weakness for three days. Pt has been unable to drink or tolerate PEG tube feeds for the last three days. CT abdomen/pelvis/chest is not suggestive of obvious pathology. Pt without abdominal pain. No episodes of emesis and tolerating PO liquid intake. Discharged and advised to go for gastroenterology follow up and follow up with PMD. Pt has had no diarrheal episodes while in the ED.

## 2019-09-03 NOTE — ED PROVIDER NOTE - OBJECTIVE STATEMENT
Pt is a 63 y.o. M hx of oral cancer s/p mandibular resection on currently on immunotherapy s/p gastric PEG tube placed c/o emesis and weakness for three days. Pt has been unable to drink or tolerate PEG tube feeds for the last three days. He states after feeds he has NBNB emesis 20 to 30 minutes afterward. Pt is a 63 y.o. M hx of oral cancer s/p mandibular resection, currently on immunotherapy, s/p gastric PEG tube placed c/o emesis and weakness for three days. Pt has been unable to drink or tolerate PEG tube feeds for the last three days. He states after feeds he has NBNB emesis 20 to 30 minutes afterward, describes them clear/slightly yellow. States he has minimal bowel movements with are watery. On ciprofloxacin as prescribed by ENT. Recently discharged after enlargement of his left face, thought to be due to tumor burden and lymph node necrosis. Denies any abdominal pain, chest pain, flank pain, dysuria, hematuria, melena, hematochezia. He also describes a painless lesion at the tip of the penis. Pt is a 63 y.o. M hx of oral cancer s/p mandibular resection, currently on immunotherapy, s/p gastric PEG tube placed c/o emesis and weakness for three days. Pt has been unable to drink or tolerate PEG tube feeds for the last three days. He states after feeds he has NBNB emesis 20 to 30 minutes afterward, minimal not entire meal, only small amount describes them clear/slightly yellow. States he has minimal bowel movements with are watery. On ciprofloxacin as prescribed by ENT. Recently discharged after enlargement of his left face, thought to be due to tumor burden and lymph node necrosis. Denies any abdominal pain, chest pain, flank pain, dysuria, hematuria, melena, hematochezia. He also describes a painless lesion at the tip of the penis.

## 2019-09-03 NOTE — ED ADULT NURSE NOTE - NSIMPLEMENTINTERV_GEN_ALL_ED
Implemented All Universal Safety Interventions:  [place] to call system. Call bell, personal items and telephone within reach. Instruct patient to call for assistance. Room bathroom lighting operational. Non-slip footwear when patient is off stretcher. Physically safe environment: no spills, clutter or unnecessary equipment. Stretcher in lowest position, wheels locked, appropriate side rails in place.

## 2019-09-03 NOTE — ED PROVIDER NOTE - NS ED ROS FT
Eyes: no pain, no redness, and no visual changes.  ENMT: no ear pain and no hearing problems, no nasal congestion/drainage  CV: no chest pain, no edema.  Resp: no chest pain, no cough, no dyspnea  GI: no abdominal pain, no bloating, no constipation, no diarrhea, no nausea and no vomiting.  : no dysuria, no hematuria  MSK: no msk pain, no weakness  Skin: no jaundice  Neuro: no headache, no sensory deficits, and no weakness.  Endo: no diabetes

## 2019-09-03 NOTE — ED ADULT NURSE REASSESSMENT NOTE - NS ED NURSE REASSESS COMMENT FT1
pt sleeping in bed with comfortable appearance.  son at bedside.  aware of plan of care.  Will continue to monitor and reassess.

## 2019-09-03 NOTE — ED ADULT NURSE NOTE - OBJECTIVE STATEMENT
PT awake, alert and oriented x3 c/o vomiting over past 3 days.  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  pt states he normally consumes 5 cans of his supplement via PEG but over past three days is vomiting after consuming only 1/4 of each can.    pt also states subjective fevers and  chills at home.  Respirations even and unlabored, denies chest pain.  Abdomen soft, nontender, nondistended.  Skin warm and dry.  Moving all extremities well and with purpose.

## 2019-09-03 NOTE — ED PROVIDER NOTE - PLAN OF CARE
1. Return to ED for worsening, progressive or any other concerning symptoms   2. Follow up with your primary care doctor in 2-3days   3. Take motrin for pain   4. Follow up with you gastroenterologist

## 2019-09-03 NOTE — ED PROVIDER NOTE - PATIENT PORTAL LINK FT
You can access the FollowMyHealth Patient Portal offered by [facility] by registering at the following website: [email or web address]. By joining [employer]’s FollowMyHealth portal, you will also be able to view your health information using other applications (apps) compatible with our system.

## 2019-09-03 NOTE — ED PROVIDER NOTE - ATTENDING CONTRIBUTION TO CARE
I, Jennifer Garcia, have personally seen and examined this patient. I have fully participated in the care of this patient. I have reviewed all pertinent clinical information, including history, physical exam, plan and the Resident's note and agree except as noted below.     62yo M with oral CA s/p radiation and surgery not on current tx with decreased ability to tolerate PEG feeds, no chanbge in quantity/type of feeds, but after feels nauseaous and vomits small amount, has chronic watery/loose stool. today fever to 101. +cough. +neck face swelling on cipro and followign with oncologist- recent diagnosis new tumor and necrotic lymph node.     Gen: NAD, AOx3  Head: +deformity jaw +mild tender firm nonmobile mass left jaw no fluctuance/drainage mild erythema   HEENT: PERRL, EOMI, oral mucosa moist, normal conjunctiva, neck supple  Lung: CTAB, no respiratory distress, +wet cough  CV: rrr, no murmur, Normal perfusion  Abd: soft, NTND, PEG tube in place  MSK: No edema, no visible deformities  Neuro: No focal neurologic deficits  Skin: No rash   Psych: normal affect     fever of unknown origin though afebrile here, will repeat scan of enck- patient unsure if improving or the same, cxr r/o PNA. CT A/P due to nausea/vomiting/diarrhea. labs reasses

## 2019-09-03 NOTE — ED PROVIDER NOTE - CARE PLAN
Principal Discharge DX:	Nausea and vomiting  Assessment and plan of treatment:	1. Return to ED for worsening, progressive or any other concerning symptoms   2. Follow up with your primary care doctor in 2-3days   3. Take motrin for pain   4. Follow up with you gastroenterologist

## 2019-09-03 NOTE — ED PROVIDER NOTE - PROGRESS NOTE DETAILS
CT and US without any acute surgical or infectious process. remains afebrile. family aware of leukoctosis. patient has not vomited here. no abd pain. will d/c with GI Follow up -Radha ALCANTARA

## 2019-09-03 NOTE — ED PROVIDER NOTE - PHYSICAL EXAMINATION
General: well appearing, NAD  Head:  NC, AT  Eyes: EOMI, PERRLA, no scleral icterus  Ears: no external auditory canal erythema/drainage  Nose: midline, no bleeding/drainage  Throat: uvula midline, no lesions, portion of mandible resected, L. angle of mandible enlarged without any drainage or open wound  Cardiac: RRR, no m/r/g, no lower extremity edema  Respiratory: CTABL, no wheezes/rales, equal chest wall expansions  Abdomen: soft, ND, NT, PEG tube in place, skin around the PEG tube without any evidence of infection, no erytehma/drainage/necrotic appearing tissue, no rebound tenderness, no guarding, nonperitonitic  MSK/Vascular: full ROM,  warm extremities  Genitals: circumcised male, dry ulcerative lesion on the right meatus of the penis without any erythema, nontender, testicles appropriately descended and not enlarged or shrunken  Neuro: AAO, motor/strength intact  Psych: calm, cooperative, normal affect General: well appearing, NAD  Head:  deformity to mandibular region with firm mass left jaw, AT  Eyes: EOMI, PERRLA, no scleral icterus  Ears: no external auditory canal erythema/drainage  Nose: midline, no bleeding/drainage  Throat: uvula midline, no lesions, portion of mandible resected, L. angle of mandible enlarged without any drainage or open wound  Cardiac: RRR, no m/r/g, no lower extremity edema  Respiratory: CTABL, no wheezes/rales, equal chest wall expansions  Abdomen: soft, ND, NT, PEG tube in place, skin around the PEG tube without any evidence of infection, no erytehma/drainage/necrotic appearing tissue, no rebound tenderness, no guarding, nonperitonitic  MSK/Vascular: full ROM,  warm extremities  Genitals: circumcised male, dry ulcerative lesion on the right meatus of the penis without any erythema, nontender, testicles appropriately descended and not enlarged or shrunken  Neuro: AAO, motor/strength intact  Psych: calm, cooperative, normal affect

## 2019-09-05 LAB
C TRACH RRNA SPEC QL NAA+PROBE: SIGNIFICANT CHANGE UP
N GONORRHOEA RRNA SPEC QL NAA+PROBE: SIGNIFICANT CHANGE UP
SPECIMEN SOURCE: SIGNIFICANT CHANGE UP
T PALLIDUM AB TITR SER: NEGATIVE — SIGNIFICANT CHANGE UP

## 2019-09-06 ENCOUNTER — INPATIENT (INPATIENT)
Facility: HOSPITAL | Age: 64
LOS: 0 days | Discharge: HOME | End: 2019-09-07
Attending: OTOLARYNGOLOGY | Admitting: OTOLARYNGOLOGY
Payer: COMMERCIAL

## 2019-09-06 VITALS
DIASTOLIC BLOOD PRESSURE: 65 MMHG | SYSTOLIC BLOOD PRESSURE: 119 MMHG | OXYGEN SATURATION: 97 % | HEART RATE: 117 BPM | RESPIRATION RATE: 16 BRPM

## 2019-09-06 DIAGNOSIS — C06.9 MALIGNANT NEOPLASM OF MOUTH, UNSPECIFIED: ICD-10-CM

## 2019-09-06 DIAGNOSIS — Z98.890 OTHER SPECIFIED POSTPROCEDURAL STATES: Chronic | ICD-10-CM

## 2019-09-06 DIAGNOSIS — Z85.819 PERSONAL HISTORY OF MALIGNANT NEOPLASM OF UNSPECIFIED SITE OF LIP, ORAL CAVITY, AND PHARYNX: Chronic | ICD-10-CM

## 2019-09-06 DIAGNOSIS — L02.01 CUTANEOUS ABSCESS OF FACE: ICD-10-CM

## 2019-09-06 DIAGNOSIS — Z43.1 ENCOUNTER FOR ATTENTION TO GASTROSTOMY: Chronic | ICD-10-CM

## 2019-09-06 LAB
ALBUMIN SERPL ELPH-MCNC: 3.7 G/DL — SIGNIFICANT CHANGE UP (ref 3.3–5)
ALP SERPL-CCNC: 80 U/L — SIGNIFICANT CHANGE UP (ref 40–120)
ALT FLD-CCNC: 296 U/L — HIGH (ref 4–41)
ANION GAP SERPL CALC-SCNC: 13 MMO/L — SIGNIFICANT CHANGE UP (ref 7–14)
APTT BLD: 26.9 SEC — LOW (ref 27.5–36.3)
AST SERPL-CCNC: 70 U/L — HIGH (ref 4–40)
BASE EXCESS BLDV CALC-SCNC: 7.4 MMOL/L — SIGNIFICANT CHANGE UP
BASOPHILS # BLD AUTO: 0.06 K/UL — SIGNIFICANT CHANGE UP (ref 0–0.2)
BASOPHILS NFR BLD AUTO: 0.5 % — SIGNIFICANT CHANGE UP (ref 0–2)
BILIRUB SERPL-MCNC: 0.5 MG/DL — SIGNIFICANT CHANGE UP (ref 0.2–1.2)
BLOOD GAS VENOUS - CREATININE: 0.54 MG/DL — SIGNIFICANT CHANGE UP (ref 0.5–1.3)
BUN SERPL-MCNC: 8 MG/DL — SIGNIFICANT CHANGE UP (ref 7–23)
CALCIUM SERPL-MCNC: 9.7 MG/DL — SIGNIFICANT CHANGE UP (ref 8.4–10.5)
CHLORIDE BLDV-SCNC: 97 MMOL/L — SIGNIFICANT CHANGE UP (ref 96–108)
CHLORIDE SERPL-SCNC: 93 MMOL/L — LOW (ref 98–107)
CO2 SERPL-SCNC: 26 MMOL/L — SIGNIFICANT CHANGE UP (ref 22–31)
CREAT SERPL-MCNC: 0.6 MG/DL — SIGNIFICANT CHANGE UP (ref 0.5–1.3)
EOSINOPHIL # BLD AUTO: 0.15 K/UL — SIGNIFICANT CHANGE UP (ref 0–0.5)
EOSINOPHIL NFR BLD AUTO: 1.2 % — SIGNIFICANT CHANGE UP (ref 0–6)
GAS PNL BLDV: 127 MMOL/L — LOW (ref 136–146)
GLUCOSE BLDV-MCNC: 125 MG/DL — HIGH (ref 70–99)
GLUCOSE SERPL-MCNC: 124 MG/DL — HIGH (ref 70–99)
HCO3 BLDV-SCNC: 28 MMOL/L — HIGH (ref 20–27)
HCT VFR BLD CALC: 42.3 % — SIGNIFICANT CHANGE UP (ref 39–50)
HCT VFR BLDV CALC: 45 % — SIGNIFICANT CHANGE UP (ref 39–51)
HGB BLD-MCNC: 14 G/DL — SIGNIFICANT CHANGE UP (ref 13–17)
HGB BLDV-MCNC: 14.7 G/DL — SIGNIFICANT CHANGE UP (ref 13–17)
IMM GRANULOCYTES NFR BLD AUTO: 0.9 % — SIGNIFICANT CHANGE UP (ref 0–1.5)
INR BLD: 1.1 — SIGNIFICANT CHANGE UP (ref 0.88–1.17)
LACTATE BLDV-MCNC: 2.3 MMOL/L — HIGH (ref 0.5–2)
LYMPHOCYTES # BLD AUTO: 1.16 K/UL — SIGNIFICANT CHANGE UP (ref 1–3.3)
LYMPHOCYTES # BLD AUTO: 9.1 % — LOW (ref 13–44)
MCHC RBC-ENTMCNC: 29 PG — SIGNIFICANT CHANGE UP (ref 27–34)
MCHC RBC-ENTMCNC: 33.1 % — SIGNIFICANT CHANGE UP (ref 32–36)
MCV RBC AUTO: 87.8 FL — SIGNIFICANT CHANGE UP (ref 80–100)
MONOCYTES # BLD AUTO: 1.72 K/UL — HIGH (ref 0–0.9)
MONOCYTES NFR BLD AUTO: 13.5 % — SIGNIFICANT CHANGE UP (ref 2–14)
NEUTROPHILS # BLD AUTO: 9.56 K/UL — HIGH (ref 1.8–7.4)
NEUTROPHILS NFR BLD AUTO: 74.8 % — SIGNIFICANT CHANGE UP (ref 43–77)
NRBC # FLD: 0 K/UL — SIGNIFICANT CHANGE UP (ref 0–0)
PCO2 BLDV: 57 MMHG — HIGH (ref 41–51)
PH BLDV: 7.38 PH — SIGNIFICANT CHANGE UP (ref 7.32–7.43)
PLATELET # BLD AUTO: 442 K/UL — HIGH (ref 150–400)
PMV BLD: 9 FL — SIGNIFICANT CHANGE UP (ref 7–13)
PO2 BLDV: 20 MMHG — LOW (ref 35–40)
POTASSIUM BLDV-SCNC: 3.9 MMOL/L — SIGNIFICANT CHANGE UP (ref 3.4–4.5)
POTASSIUM SERPL-MCNC: 4.1 MMOL/L — SIGNIFICANT CHANGE UP (ref 3.5–5.3)
POTASSIUM SERPL-SCNC: 4.1 MMOL/L — SIGNIFICANT CHANGE UP (ref 3.5–5.3)
PROT SERPL-MCNC: 7.4 G/DL — SIGNIFICANT CHANGE UP (ref 6–8.3)
PROTHROM AB SERPL-ACNC: 12.3 SEC — SIGNIFICANT CHANGE UP (ref 9.8–13.1)
RBC # BLD: 4.82 M/UL — SIGNIFICANT CHANGE UP (ref 4.2–5.8)
RBC # FLD: 14.8 % — HIGH (ref 10.3–14.5)
SAO2 % BLDV: 22.6 % — LOW (ref 60–85)
SODIUM SERPL-SCNC: 132 MMOL/L — LOW (ref 135–145)
WBC # BLD: 12.76 K/UL — HIGH (ref 3.8–10.5)
WBC # FLD AUTO: 12.76 K/UL — HIGH (ref 3.8–10.5)

## 2019-09-06 PROCEDURE — 70491 CT SOFT TISSUE NECK W/DYE: CPT | Mod: 26

## 2019-09-06 PROCEDURE — 71046 X-RAY EXAM CHEST 2 VIEWS: CPT | Mod: 26

## 2019-09-06 PROCEDURE — 70487 CT MAXILLOFACIAL W/DYE: CPT | Mod: 26

## 2019-09-06 RX ORDER — ACETAMINOPHEN 500 MG
650 TABLET ORAL EVERY 6 HOURS
Refills: 0 | Status: DISCONTINUED | OUTPATIENT
Start: 2019-09-06 | End: 2019-09-06

## 2019-09-06 RX ORDER — FAMOTIDINE 10 MG/ML
20 INJECTION INTRAVENOUS ONCE
Refills: 0 | Status: COMPLETED | OUTPATIENT
Start: 2019-09-06 | End: 2019-09-06

## 2019-09-06 RX ORDER — DIPHENHYDRAMINE HCL 50 MG
25 CAPSULE ORAL ONCE
Refills: 0 | Status: DISCONTINUED | OUTPATIENT
Start: 2019-09-06 | End: 2019-09-06

## 2019-09-06 RX ORDER — HYDROMORPHONE HYDROCHLORIDE 2 MG/ML
1 INJECTION INTRAMUSCULAR; INTRAVENOUS; SUBCUTANEOUS ONCE
Refills: 0 | Status: DISCONTINUED | OUTPATIENT
Start: 2019-09-06 | End: 2019-09-06

## 2019-09-06 RX ORDER — LANOLIN ALCOHOL/MO/W.PET/CERES
6 CREAM (GRAM) TOPICAL ONCE
Refills: 0 | Status: DISCONTINUED | OUTPATIENT
Start: 2019-09-06 | End: 2019-09-07

## 2019-09-06 RX ORDER — PIPERACILLIN AND TAZOBACTAM 4; .5 G/20ML; G/20ML
3.38 INJECTION, POWDER, LYOPHILIZED, FOR SOLUTION INTRAVENOUS ONCE
Refills: 0 | Status: COMPLETED | OUTPATIENT
Start: 2019-09-06 | End: 2019-09-06

## 2019-09-06 RX ORDER — SODIUM CHLORIDE 9 MG/ML
1000 INJECTION, SOLUTION INTRAVENOUS
Refills: 0 | Status: DISCONTINUED | OUTPATIENT
Start: 2019-09-06 | End: 2019-09-07

## 2019-09-06 RX ORDER — ACETAMINOPHEN 500 MG
650 TABLET ORAL EVERY 6 HOURS
Refills: 0 | Status: DISCONTINUED | OUTPATIENT
Start: 2019-09-06 | End: 2019-09-07

## 2019-09-06 RX ORDER — SODIUM CHLORIDE 9 MG/ML
1000 INJECTION INTRAMUSCULAR; INTRAVENOUS; SUBCUTANEOUS ONCE
Refills: 0 | Status: COMPLETED | OUTPATIENT
Start: 2019-09-06 | End: 2019-09-06

## 2019-09-06 RX ORDER — INFLUENZA VIRUS VACCINE 15; 15; 15; 15 UG/.5ML; UG/.5ML; UG/.5ML; UG/.5ML
0.5 SUSPENSION INTRAMUSCULAR ONCE
Refills: 0 | Status: DISCONTINUED | OUTPATIENT
Start: 2019-09-06 | End: 2019-09-07

## 2019-09-06 RX ORDER — HEPARIN SODIUM 5000 [USP'U]/ML
5000 INJECTION INTRAVENOUS; SUBCUTANEOUS EVERY 12 HOURS
Refills: 0 | Status: DISCONTINUED | OUTPATIENT
Start: 2019-09-06 | End: 2019-09-07

## 2019-09-06 RX ORDER — OXYCODONE HYDROCHLORIDE 5 MG/1
5 TABLET ORAL EVERY 6 HOURS
Refills: 0 | Status: DISCONTINUED | OUTPATIENT
Start: 2019-09-06 | End: 2019-09-07

## 2019-09-06 RX ADMIN — HYDROMORPHONE HYDROCHLORIDE 1 MILLIGRAM(S): 2 INJECTION INTRAMUSCULAR; INTRAVENOUS; SUBCUTANEOUS at 19:17

## 2019-09-06 RX ADMIN — PIPERACILLIN AND TAZOBACTAM 200 GRAM(S): 4; .5 INJECTION, POWDER, LYOPHILIZED, FOR SOLUTION INTRAVENOUS at 23:17

## 2019-09-06 RX ADMIN — OXYCODONE HYDROCHLORIDE 5 MILLIGRAM(S): 5 TABLET ORAL at 23:51

## 2019-09-06 RX ADMIN — FAMOTIDINE 20 MILLIGRAM(S): 10 INJECTION INTRAVENOUS at 15:45

## 2019-09-06 RX ADMIN — Medication 0.5 MILLIGRAM(S): at 17:49

## 2019-09-06 RX ADMIN — HYDROMORPHONE HYDROCHLORIDE 1 MILLIGRAM(S): 2 INJECTION INTRAMUSCULAR; INTRAVENOUS; SUBCUTANEOUS at 15:22

## 2019-09-06 RX ADMIN — PIPERACILLIN AND TAZOBACTAM 200 GRAM(S): 4; .5 INJECTION, POWDER, LYOPHILIZED, FOR SOLUTION INTRAVENOUS at 15:22

## 2019-09-06 RX ADMIN — SODIUM CHLORIDE 75 MILLILITER(S): 9 INJECTION, SOLUTION INTRAVENOUS at 23:17

## 2019-09-06 RX ADMIN — SODIUM CHLORIDE 1000 MILLILITER(S): 9 INJECTION INTRAMUSCULAR; INTRAVENOUS; SUBCUTANEOUS at 15:22

## 2019-09-06 NOTE — ED ADULT NURSE REASSESSMENT NOTE - NS ED NURSE REASSESS COMMENT FT1
Pt a&ox3, in NAD, breathing even and unlabored, reports mild discomfort to neck, does not request additional medication at this time, denies cp/discomfort, denies headache/dizziness, requested anxiety medication and given ativan as MD orders. Will continue to monitor.

## 2019-09-06 NOTE — ED ADULT NURSE NOTE - OBJECTIVE STATEMENT
amelia RN: 63yom A&ox4 amb hx oral CA on immuno therapy, no chemo/radiation presents to ed c/o increased pain, swelling to neck w/ difficulty swallowing. pt w/ tumor on L jaw, per son has been becoming increasingly more red, swollen and painful. pt w/ abscess to L neck area, was I&D'ed at [place], but continues to be draining purulent drainage. pt noted to be drooling, states in occasionally able to swallow his secretions, but over the past month has prevented him from sleeping. per son, pt has been coughing productive x 1 month, and since yesterday cough sputum has become blood tinged. pt notably weaker per son. pt was on abx for abscess however had allergix rx to first abx, was placed on cipro, but was unable to tolerate 2/2 n/v, only took for 1 week. pt w/ PEG tube, receives all meds/nutrition through PEG. site clean/dry/intact. pt breathing even/unlabored. abdomen soft, nontender, nondistended. skin warm, dry, and intact. denies cp, sob, dizziness, lightheadedness, fever/chills. 18g iv lock placed to R ac. labs sent. primary RN Bobby santoyo.

## 2019-09-06 NOTE — CONSULT NOTE ADULT - SUBJECTIVE AND OBJECTIVE BOX
History of mandibulectomy with Chemo, immunotherapy and radiation. Patient presents stating he has been having left   sided cheek swelling for about one week. Went to [facility] and has FNA. was Being treated wtth cipro but became nauseas   and stopped on his own. Patient is peg dependant. Denies any fevers, chills,.    AVSS, P02 98%  HEENT:  Left facial swelling with area of draining pus. Culture taken.   + tenderness to palpation.   Mouth:   Drooling. no trismus.   Neck :  No erythema, No fluctuance.

## 2019-09-06 NOTE — PATIENT PROFILE ADULT - OVER THE PAST TWO WEEKS HAVE YOU FELT DOWN, DEPRESSED OR HOPELESS?
· Chronic in the setting of alcoholism / cirrhosis  · Remains stable  · Will place on Lovenox - chemical DVT PPx no

## 2019-09-06 NOTE — ED PROVIDER NOTE - PHYSICAL EXAMINATION
mass left mandible, no skin changes,   submandibular area punctate area draining purulent material, culture sent   mouth limited opening, left side tongue swollen

## 2019-09-06 NOTE — CONSULT NOTE ADULT - PROBLEM SELECTOR RECOMMENDATION 9
1. CT scan head and neck  2. Zosyn as per ED  3. Pain control as per ED,. Consider Palliative consult  4. Will follow up  CT  5. Discussed with ENT residents.

## 2019-09-06 NOTE — ED PROVIDER NOTE - CLINICAL SUMMARY MEDICAL DECISION MAKING FREE TEXT BOX
63 yr old with oral CA with severe pain, difficulty with secretions draining abscess- ent involved concern for infection/ recurrence of CA, partialobstruction, CT head and neck , pain control, CXR labs reassess

## 2019-09-06 NOTE — ED PROVIDER NOTE - INPATIENT RESIDENT/ACP NOTIFIED DATE
06-Sep-2019 19:20 unable to ascertain this info from transfer documents sent with patient from [facility]

## 2019-09-06 NOTE — H&P ADULT - HISTORY OF PRESENT ILLNESS
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 Patient presenting to ED with worsening pain and swelling of left submandibular area. Patient says he woke up yesterday with increasing neck swelling and noted copious purulent material draining from neck. Has had associated pain and subjective fevers. Denies any respiratory complaints. Patient denies other medical history or home medications.

## 2019-09-06 NOTE — H&P ADULT - NSHPPHYSICALEXAM_GEN_ALL_CORE
General: NAD  Neck: Posttreatment changes. Small tender area at left lateral neck. Purulent material expressed, sent for culture  NC: wnl  OC/OP: pooling of secretions which patient says is his baseline, post-surgical changes

## 2019-09-06 NOTE — ED ADULT TRIAGE NOTE - CHIEF COMPLAINT QUOTE
pt comes to ED for coughing and difficulty speaking and swallowing his own secretions. pt has hx of oral CA rosi n chemo at this time. pt is on immunotherapy. pt son this has been getting worse over the past 3 weeks. pt has a peg tube  pt son states he has a abbess on his neck as well and his voice sounds different. pt VSS unable to get oral temp. pt denies CP or SOB

## 2019-09-06 NOTE — H&P ADULT - ASSESSMENT
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 Patient presenting to ED with worsening pain and swelling of left submandibular area, clinical findings concerning for soft tissue infection.     -Admit to ENT  -IV Zosyn  -Pain control  -F/U cx  -D/w Dr. Hdz

## 2019-09-06 NOTE — H&P ADULT - NSHPLABSRESULTS_GEN_ALL_CORE
CT NECK IMPRESSION:     Redemonstrated partially necrotic infiltrating mass containing foci of air   involving the left oral cavity-floor of mouth and left oropharynx,   inseparable from the medial pterygoid, extending to the inferior aspect of   the left parotid space and left submandibular space. Areas of underlying   abscess formation can't be excluded in the region of necrosis. These   findings appear similar compared to the September 3 exam.

## 2019-09-06 NOTE — ED PROVIDER NOTE - OBJECTIVE STATEMENT
63 yr old male with  oral CA, had RT and immunotherapy, did better for a while , but c/o difficulty swallowing secretions, cough keeping him up all night for 3 weeks, draining pus from left submandibular area and swelling left mandible. Unable to sleep. Scheduled to go to [place]  in 2 days for entry into study. Patient doesn't want palliative at this time.  pain severe, doesn't tolerate morphine or methadone. Was in [place] for abd pain n/v/diarrhea CT chest and abd nml.

## 2019-09-07 ENCOUNTER — TRANSCRIPTION ENCOUNTER (OUTPATIENT)
Age: 64
End: 2019-09-07

## 2019-09-07 VITALS
SYSTOLIC BLOOD PRESSURE: 135 MMHG | HEART RATE: 98 BPM | OXYGEN SATURATION: 100 % | RESPIRATION RATE: 16 BRPM | TEMPERATURE: 98 F | DIASTOLIC BLOOD PRESSURE: 93 MMHG

## 2019-09-07 LAB
ANION GAP SERPL CALC-SCNC: 10 MMO/L — SIGNIFICANT CHANGE UP (ref 7–14)
BUN SERPL-MCNC: 7 MG/DL — SIGNIFICANT CHANGE UP (ref 7–23)
CALCIUM SERPL-MCNC: 9.8 MG/DL — SIGNIFICANT CHANGE UP (ref 8.4–10.5)
CHLORIDE SERPL-SCNC: 96 MMOL/L — LOW (ref 98–107)
CO2 SERPL-SCNC: 29 MMOL/L — SIGNIFICANT CHANGE UP (ref 22–31)
CREAT SERPL-MCNC: 0.76 MG/DL — SIGNIFICANT CHANGE UP (ref 0.5–1.3)
GLUCOSE SERPL-MCNC: 98 MG/DL — SIGNIFICANT CHANGE UP (ref 70–99)
HCT VFR BLD CALC: 39.7 % — SIGNIFICANT CHANGE UP (ref 39–50)
HGB BLD-MCNC: 12.6 G/DL — LOW (ref 13–17)
MAGNESIUM SERPL-MCNC: 2 MG/DL — SIGNIFICANT CHANGE UP (ref 1.6–2.6)
MCHC RBC-ENTMCNC: 28.4 PG — SIGNIFICANT CHANGE UP (ref 27–34)
MCHC RBC-ENTMCNC: 31.7 % — LOW (ref 32–36)
MCV RBC AUTO: 89.4 FL — SIGNIFICANT CHANGE UP (ref 80–100)
NRBC # FLD: 0 K/UL — SIGNIFICANT CHANGE UP (ref 0–0)
PHOSPHATE SERPL-MCNC: 4.2 MG/DL — SIGNIFICANT CHANGE UP (ref 2.5–4.5)
PLATELET # BLD AUTO: 402 K/UL — HIGH (ref 150–400)
PMV BLD: 9.3 FL — SIGNIFICANT CHANGE UP (ref 7–13)
POTASSIUM SERPL-MCNC: 4.1 MMOL/L — SIGNIFICANT CHANGE UP (ref 3.5–5.3)
POTASSIUM SERPL-SCNC: 4.1 MMOL/L — SIGNIFICANT CHANGE UP (ref 3.5–5.3)
RBC # BLD: 4.44 M/UL — SIGNIFICANT CHANGE UP (ref 4.2–5.8)
RBC # FLD: 14.9 % — HIGH (ref 10.3–14.5)
SODIUM SERPL-SCNC: 135 MMOL/L — SIGNIFICANT CHANGE UP (ref 135–145)
SPECIMEN SOURCE: SIGNIFICANT CHANGE UP
WBC # BLD: 10.34 K/UL — SIGNIFICANT CHANGE UP (ref 3.8–10.5)
WBC # FLD AUTO: 10.34 K/UL — SIGNIFICANT CHANGE UP (ref 3.8–10.5)

## 2019-09-07 RX ORDER — PIPERACILLIN AND TAZOBACTAM 4; .5 G/20ML; G/20ML
3.38 INJECTION, POWDER, LYOPHILIZED, FOR SOLUTION INTRAVENOUS EVERY 8 HOURS
Refills: 0 | Status: DISCONTINUED | OUTPATIENT
Start: 2019-09-07 | End: 2019-09-07

## 2019-09-07 RX ORDER — PIPERACILLIN AND TAZOBACTAM 4; .5 G/20ML; G/20ML
3.38 INJECTION, POWDER, LYOPHILIZED, FOR SOLUTION INTRAVENOUS ONCE
Refills: 0 | Status: COMPLETED | OUTPATIENT
Start: 2019-09-07 | End: 2019-09-07

## 2019-09-07 RX ORDER — DIPHENHYDRAMINE HCL 50 MG
25 CAPSULE ORAL ONCE
Refills: 0 | Status: COMPLETED | OUTPATIENT
Start: 2019-09-07 | End: 2019-09-07

## 2019-09-07 RX ADMIN — Medication 0.5 MILLIGRAM(S): at 00:13

## 2019-09-07 RX ADMIN — Medication 100 MILLIGRAM(S): at 02:09

## 2019-09-07 RX ADMIN — OXYCODONE HYDROCHLORIDE 5 MILLIGRAM(S): 5 TABLET ORAL at 05:55

## 2019-09-07 RX ADMIN — OXYCODONE HYDROCHLORIDE 5 MILLIGRAM(S): 5 TABLET ORAL at 13:30

## 2019-09-07 RX ADMIN — OXYCODONE HYDROCHLORIDE 5 MILLIGRAM(S): 5 TABLET ORAL at 00:50

## 2019-09-07 RX ADMIN — HEPARIN SODIUM 5000 UNIT(S): 5000 INJECTION INTRAVENOUS; SUBCUTANEOUS at 05:56

## 2019-09-07 RX ADMIN — Medication 25 MILLIGRAM(S): at 02:10

## 2019-09-07 RX ADMIN — OXYCODONE HYDROCHLORIDE 5 MILLIGRAM(S): 5 TABLET ORAL at 06:55

## 2019-09-07 RX ADMIN — Medication 0.5 MILLIGRAM(S): at 14:00

## 2019-09-07 RX ADMIN — OXYCODONE HYDROCHLORIDE 5 MILLIGRAM(S): 5 TABLET ORAL at 12:42

## 2019-09-07 RX ADMIN — PIPERACILLIN AND TAZOBACTAM 200 GRAM(S): 4; .5 INJECTION, POWDER, LYOPHILIZED, FOR SOLUTION INTRAVENOUS at 12:42

## 2019-09-07 NOTE — PROGRESS NOTE ADULT - SUBJECTIVE AND OBJECTIVE BOX
Patient seen and examined at bedside. No overnight events. Doing well.    Vital Signs Last 24 Hrs  T(C): 37.1 (07 Sep 2019 05:53), Max: 37.9 (06 Sep 2019 16:13)  T(F): 98.7 (07 Sep 2019 05:53), Max: 100.2 (06 Sep 2019 16:13)  HR: 90 (07 Sep 2019 05:53) (90 - 117)  BP: 123/76 (07 Sep 2019 05:53) (108/66 - 142/82)  BP(mean): --  RR: 17 (07 Sep 2019 05:53) (16 - 19)  SpO2: 100% (07 Sep 2019 05:53) (97% - 100%)    General: NAD  Neck: post-treatment changes, small tender area at left lateral neck, decrease in purulence expressed.   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 necrotic tumor.    -Tube feeds  -Pain control  -Will complete 24h of Zosyn then discharge on Medrol dose pack  -D/w Dr. Hdz

## 2019-09-07 NOTE — DISCHARGE NOTE PROVIDER - NSDCFUSCHEDAPPT_GEN_ALL_CORE_FT
CASSIE RAE ; 09/12/2019 ; [facility]  CASSIE RAE ; 09/18/2019 ; [facility]  CASSIE RAE ; 09/25/2019 ; [facility]  CASSIE RAE ; 10/14/2019 ; [facility]  CASSIE RAE ; 10/16/2019 ; [facility]

## 2019-09-07 NOTE — DISCHARGE NOTE NURSING/CASE MANAGEMENT/SOCIAL WORK - PATIENT PORTAL LINK FT
You can access the FollowMyHealth Patient Portal offered by [facility] by registering at the following website: [email or web address]. By joining [facility]’s FollowMyHealth portal, you will also be able to view your health information using other applications (apps) compatible with our system.

## 2019-09-07 NOTE — DISCHARGE NOTE PROVIDER - HOSPITAL COURSE
Patient was admitted for drainage from left neck on 9/6/2019. Drainage serous in nature. No fevers, wbc 10 this am. Received 24 hours of zosyn. Doing well and ready for discharge

## 2019-09-07 NOTE — DISCHARGE NOTE PROVIDER - NSDCCPCAREPLAN_GEN_ALL_CORE_FT
PRINCIPAL DISCHARGE DIAGNOSIS  Diagnosis: Malignant neoplasm of mouth  Assessment and Plan of Treatment: and mandible      SECONDARY DISCHARGE DIAGNOSES  Diagnosis: Abscess  Assessment and Plan of Treatment:

## 2019-09-07 NOTE — DISCHARGE NOTE PROVIDER - CARE PROVIDER_API CALL
Krystian Hdz)  Otolaryngology  [address] [place] [address]  Phone: [phone]  Fax: [phone]  Follow Up Time:

## 2019-09-08 LAB
CULTURE RESULTS: SIGNIFICANT CHANGE UP
CULTURE RESULTS: SIGNIFICANT CHANGE UP
SPECIMEN SOURCE: SIGNIFICANT CHANGE UP
SPECIMEN SOURCE: SIGNIFICANT CHANGE UP

## 2019-09-08 NOTE — ADDENDUM
[FreeTextEntry1] : I, Mary Bahena, acted solely as a scribe for Dr. Yajaira Cai on this date 6/20/19.

## 2019-09-08 NOTE — RESULTS/DATA
[FreeTextEntry1] : 6/13/19 CT Neck:\par Redemonstration of post-op changes. There is recurrent neoplasm in the left aspect of the oral cavity 4.9 x 3.0 cm, not significantly change since prior PET 4/22/19. \par IMPRESSION: recurrent tumor in the left oral aspect of the cavity. New abnormal thickening involving the epiglottis. New posterior supraglottic hyperpharyngeal thickening. This can represent post radiation changes. Direct visualization recommended. \par \par 5/6/19 MRI Spine: Ill-defined soft tissue lesion on the left at the level of the C3/C4 and C4/C5 neural foramen extending into the adjacent perivertebral space. Question of lesion abutting vs. surrounding the left vertebral artery. Anterior extent of mass is not visualized secondary to susceptibility artifact. Multilevel cervical spondylosis. \par \par 4/22/19 PET:\par Status post left composite mandibular resection with free flap reconstruction and bilateral neck dissection.  Limited evaluation of oral cavity and neck due to metallic artifact. New irregular FDG avidity with central photopenia along the left posterior floor of mouth/left oral tongue, SUV 14.5 (image 62 of the dedicated head and neck series), \par corresponding to masslike nodular enhancement which measures approximately 4.6 x 2.0 x 4.2 cm, on recent MRI. Multiple adjacent smaller foci superiorly and inferiorly associated with multiple surgical clips New FDG-avid focus in the left lower neck, SUV 8.4, corresponding to metastatic nodule between the scalene musculature, \par associated with left C4 nerve root/trunk on MRI.  New FDG-avid small left level IV cervical node, 0.8 x 0.7 cm, SUV 7.1.  New FDG-avid focus associated with plate and screws in the anterior mandibular region, slightly to the left of the midline, SUV 10.1. 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 The area of abnormal enhancement measures approximately 4.6 x 2.0 x 4.2 cm. Posteriorly, abnormally enhancing soft tissue encases the external and internal carotid arteries and abuts the common carotid artery proximal to the carotid bifurcation.  Medially, there is mass effect upon the floor of mouth with denervation fatty atrophy of the left hemiglossus.\par The anterior extent of abnormally enhancing soft tissue is difficult to discern due to susceptibility artifact.  Superiorly, abnormally enhancing soft tissue spans the glossotonsillar sulcus, and is seen involving the left lateral oropharyngeal wall up to the soft palate. Abnormal signal and enhancement is inseparable from the left medial pterygoid. There is however, no abnormal asymmetric enhancement extending along V3 through the skull base to the cavernous sinus or Meckel's cave. There is no evidence for abnormal enhancement along the left parotid gland or intratemporal facial nerve. Laterally, there is abnormal enhancement which spans the left floor of mouth into the flap, and extends underneath the native mandible to involve the insertion of the left masseter muscle. There is however, no visible extension into the overlying skin. Evaluation of mandibular marrow signal abnormality is limited by susceptibility abnormality. There has been interval enlargement of a nodule of metastatic disease deep to the left neck dissection (2:22, 12:28) between scalene musculature. This nodule of abnormal signal and enhancement measures approximately 0.7 x 1.0 x 2.2 cm, and appears associated with the left C4 nerve root and trunk. There is no evidence for extension into the neural foramen, or spinal canal.  The remainder of the brachial plexus appears intact. There is otherwise no evidence for pathologic lymphadenopathy.\par \par 4/26/18 Pathology:\par Lymph nodes, left level 1, neck dissection: 8 lymph nodes negative for metastatic carcinoma. Submandibular gland with chronic inflammation and atrophy, negative for carcinoma\par Lymph nodes, left level 2, neck dissection: 13 lymph nodes negative for metastatic carcinoma\par Lymph nodes, left level 3, neck dissection: 6 lymph nodes negative for metastatic carcinoma\par Lymph nodes, left level 4, neck dissection: 13 lymph nodes negative for metastatic carcinoma\par Oral cavity, composite resection mandibular alveolar cancer - Invasive squamous cell carcinoma, well-differentiated.  Resection margins negative for carcinoma\par Left lower lip, excision: Lip tissue, negative for carcinoma\par Right lower lip, excision: Lip tissue, negative for carcinoma\par Right floor of mouth, excision: Squamous mucosa, negative for carcinoma\par Left floor of mouth, excision: Squamous mucosa, negative for carcinoma\par Deep margin, excision: Soft tissue and skeletal muscle, negative for carcinoma\par Lymph nodes, right level 1,2,3,4, neck dissection: 9 lymph nodes negative for metastatic carcinoma. Submandibular gland with chronic inflammation and atrophy, negative for carcinoma\par Tooth #20, removal: Tooth, gross examination only\par Specimen:  Mandibular alveolar\par Procedure:  Composite resection\par Specimen Size:  5 x 1.7 x 0.5 cm portion of mandibular ramus, and soft tissue anterior (3.8 x 3.3 x 0.8 cm, inked blue) and soft tissue posterior (2.7 x 1 x 0.5 cm, inked black)\par Additional dimensions: Not applicable\par Specimen Laterality: Not applicable\par Tumor Site: Mandibular alveolar\par Tumor Focality: Unifocal\par Tumor Size: 1.8 x 1.2 x 0.4 cm\par Tumor Depth of Invasion (DOI) (millimeters): 3 mm\par Tumor Thickness (pT1 and pT2 tumors): 0.4 cm\par \par Tumor Description Gross Subtype: Solid\par \par Histologic Type: Squamous cell carcinoma\par Histologic Grade:  Well differentiated\par Margins:\par Uninvolved by invasive tumor\par Distance from closest margin (millimeters): 2 mm\par Specify location of closest margin, per orientation, if possible: Anterior soft tissue margin\par Lymph-Vascular Invasion: Not identified\par Perineural Invasion: Not identified\par Lymph Nodes, Extranodal Extension: Not identified\par Pathologic Staging (pTNM): Primary Tumor (pT):  pT1 \par Regional Lymph Nodes:  pN0\par # Lymph nodes examined:  39\par # Lymph nodes involved:0\par Size of Largest Metastatic Deposit (centimeters): Not applicable\par Distant Metastasis (pM):  pMX\par

## 2019-09-08 NOTE — HISTORY OF PRESENT ILLNESS
[de-identified] : The patient was diagnosed with recurrent head and neck SCC in April 2019 at the age of 63.  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  He completed radiation therapy (7,000 cGy) to the oral cavity on 2/11/16.  He continued with pain in the right lower lip. He was referred to Dr. Mahamed Paulson.  Biopsy 2/13/2018 of the right mandibular vestibule showed squamous cell carcinoma, well differentiated. PET/CT 3/2/18 revealed new FDG avidity in the anterior mandibular buccal region with associated irregularity of the underlying mandibular cortex, unchanged level lA lymph node. No evidence of distant metastasis. He followed with Dr. Farley who ordered a CT neck on 3/22/18 which revealed new lytic bony destruction, anterior superior aspect of the mandibular symphysis in the midline involving the roots for both central incisor teeth, no lymph nodes.  Biopsy was c/w SCCa in the area with erosion of the mandible, T4a N0 M0. \par \par He is now s/p composite resection and reconstruction with FFF on April 26, 2018. Final pathology pT1 N0 M0; however, given his original presentation, likely still  [id number] mandibular alveolar SCCa. He is s/p a release of the lower lip scar and mucosal graft on 3/7/19. An MRI 4/5/19 showed masslike nodular enhancement, increased in comparison to prior MR 12/20/2018 along the left posterior floor of mouth, spanning the base of tongue, glossotonsillar sulcus, and inferior left parapharyngeal space,  space, system with progression of recurrent disease.  Biopsy positive for invasive squamous cell carcinoma, keratinizing, well to moderately differentiated.  Follow up PET showed new irregular FDG avidity with central photopenia along the left posterior floor of mouth/left oral tongue, SUV 14.5, corresponding to masslike nodular enhancement which measures approximately 4.6 x 2.0 x 4.2 cm, on recent MRI.  [de-identified] : Patient presents for C2D22 Nivolumab for recurrent head and neck SCC.  He is s/p a release of the lower lip scar and mucosal graft on 3/7/19. + Odynophagia and dysphagia, recently much worse.  +Dysgeusia. + Fatigue. + Mucositis. + Excessive secretions + Left neck pain stiffness improved.  + Decreased appetite with associated weight loss. Left trapezius pain resolved.  No diarrhea or constipation. Mild nausea, no emesis. No edema. No neuropathy. \par \par Patient was discharged from [facility] 6/18/19, admitted for dysphagia. PEG was placed.

## 2019-09-08 NOTE — PHYSICAL EXAM
[Ambulatory and capable of all self care but unable to carry out any work activities] : Status 2- Ambulatory and capable of all self care but unable to carry out any work activities. Up and about more than 50% of waking hours [Normal] : affect appropriate [de-identified] : microstomia with drooling. Status post composite resection and fibular graft reconstruction of right jaw and neck dissection. Large firm minimally mobile mass left mid neck. Buckle mucosa left side nodularity, swelling of left anterior lateral tongue.\par mcrostomia with drooling. atatus post composite resection and fibular graft reconstruction of right jaw and neck dissection. large firm minimally mobile mass left mid neck. \par mcrostomia with drooling. atatus post composite resection and fibular graft reconstruction of right jaw and neck dissection. large firm minimally mobile mass left mid neck. \par macrostomia with drooling.

## 2019-09-09 ENCOUNTER — INBOUND DOCUMENT (OUTPATIENT)
Age: 64
End: 2019-09-09

## 2019-09-09 LAB — BACTERIA WND CULT: SIGNIFICANT CHANGE UP

## 2019-09-11 LAB
BACTERIA BLD CULT: SIGNIFICANT CHANGE UP
BACTERIA BLD CULT: SIGNIFICANT CHANGE UP

## 2019-09-12 ENCOUNTER — APPOINTMENT (OUTPATIENT)
Dept: HEMATOLOGY ONCOLOGY | Facility: CLINIC | Age: 64
End: 2019-09-12
Payer: COMMERCIAL

## 2019-09-12 ENCOUNTER — RESULT REVIEW (OUTPATIENT)
Age: 64
End: 2019-09-12

## 2019-09-12 VITALS
HEART RATE: 100 BPM | SYSTOLIC BLOOD PRESSURE: 109 MMHG | BODY MASS INDEX: 21.27 KG/M2 | OXYGEN SATURATION: 99 % | WEIGHT: 135.5 LBS | TEMPERATURE: 97.8 F | HEIGHT: 67 IN | DIASTOLIC BLOOD PRESSURE: 74 MMHG

## 2019-09-12 DIAGNOSIS — L70.8 OTHER ACNE: ICD-10-CM

## 2019-09-12 LAB
BASOPHILS # BLD AUTO: 0.2 K/UL — SIGNIFICANT CHANGE UP (ref 0–0.2)
BASOPHILS NFR BLD AUTO: 1.2 % — SIGNIFICANT CHANGE UP (ref 0–2)
EOSINOPHIL # BLD AUTO: 0.3 K/UL — SIGNIFICANT CHANGE UP (ref 0–0.5)
EOSINOPHIL NFR BLD AUTO: 1.5 % — SIGNIFICANT CHANGE UP (ref 0–6)
HCT VFR BLD CALC: 41.4 % — SIGNIFICANT CHANGE UP (ref 39–50)
HGB BLD-MCNC: 13.7 G/DL — SIGNIFICANT CHANGE UP (ref 13–17)
LYMPHOCYTES # BLD AUTO: 1.5 K/UL — SIGNIFICANT CHANGE UP (ref 1–3.3)
LYMPHOCYTES # BLD AUTO: 8.8 % — LOW (ref 13–44)
MCHC RBC-ENTMCNC: 29.2 PG — SIGNIFICANT CHANGE UP (ref 27–34)
MCHC RBC-ENTMCNC: 33.1 G/DL — SIGNIFICANT CHANGE UP (ref 32–36)
MCV RBC AUTO: 88.2 FL — SIGNIFICANT CHANGE UP (ref 80–100)
MONOCYTES # BLD AUTO: 0.8 K/UL — SIGNIFICANT CHANGE UP (ref 0–0.9)
MONOCYTES NFR BLD AUTO: 4.8 % — SIGNIFICANT CHANGE UP (ref 2–14)
NEUTROPHILS # BLD AUTO: 14.3 K/UL — HIGH (ref 1.8–7.4)
NEUTROPHILS NFR BLD AUTO: 83.7 % — HIGH (ref 43–77)
PLATELET # BLD AUTO: 334 K/UL — SIGNIFICANT CHANGE UP (ref 150–400)
RBC # BLD: 4.69 M/UL — SIGNIFICANT CHANGE UP (ref 4.2–5.8)
RBC # FLD: 13.8 % — SIGNIFICANT CHANGE UP (ref 10.3–14.5)
WBC # BLD: 17 K/UL — HIGH (ref 3.8–10.5)
WBC # FLD AUTO: 17 K/UL — HIGH (ref 3.8–10.5)

## 2019-09-12 PROCEDURE — 99215 OFFICE O/P EST HI 40 MIN: CPT

## 2019-09-12 RX ORDER — MORPHINE SULFATE 100 MG/5ML
20 SOLUTION ORAL
Qty: 60 | Refills: 0 | Status: ACTIVE | COMMUNITY
Start: 2019-04-29 | End: 1900-01-01

## 2019-09-13 PROBLEM — L70.8 ACNEIFORM RASH: Status: ACTIVE | Noted: 2019-09-13

## 2019-09-13 LAB
ALBUMIN SERPL ELPH-MCNC: 3.8 G/DL
ALP BLD-CCNC: 68 U/L
ALT SERPL-CCNC: 49 U/L
ANION GAP SERPL CALC-SCNC: 11 MMOL/L
AST SERPL-CCNC: 15 U/L
BILIRUB SERPL-MCNC: 0.3 MG/DL
BUN SERPL-MCNC: 14 MG/DL
CALCIUM SERPL-MCNC: 9.6 MG/DL
CHLORIDE SERPL-SCNC: 94 MMOL/L
CO2 SERPL-SCNC: 30 MMOL/L
CREAT SERPL-MCNC: 0.71 MG/DL
GLUCOSE SERPL-MCNC: 154 MG/DL
MAGNESIUM SERPL-MCNC: 2.1 MG/DL
POTASSIUM SERPL-SCNC: 4.8 MMOL/L
PROT SERPL-MCNC: 6.5 G/DL
SODIUM SERPL-SCNC: 135 MMOL/L

## 2019-09-16 ENCOUNTER — RESULT REVIEW (OUTPATIENT)
Age: 64
End: 2019-09-16

## 2019-09-16 ENCOUNTER — LABORATORY RESULT (OUTPATIENT)
Age: 64
End: 2019-09-16

## 2019-09-16 ENCOUNTER — APPOINTMENT (OUTPATIENT)
Age: 64
End: 2019-09-16

## 2019-09-16 ENCOUNTER — INPATIENT (INPATIENT)
Facility: HOSPITAL | Age: 64
LOS: 2 days | Discharge: HOME | End: 2019-09-19
Attending: HOSPITALIST | Admitting: HOSPITALIST
Payer: COMMERCIAL

## 2019-09-16 VITALS
RESPIRATION RATE: 18 BRPM | OXYGEN SATURATION: 98 % | DIASTOLIC BLOOD PRESSURE: 74 MMHG | SYSTOLIC BLOOD PRESSURE: 103 MMHG | HEART RATE: 78 BPM | TEMPERATURE: 98 F

## 2019-09-16 DIAGNOSIS — Z98.890 OTHER SPECIFIED POSTPROCEDURAL STATES: Chronic | ICD-10-CM

## 2019-09-16 DIAGNOSIS — G47.00 INSOMNIA, UNSPECIFIED: ICD-10-CM

## 2019-09-16 DIAGNOSIS — Z43.1 ENCOUNTER FOR ATTENTION TO GASTROSTOMY: Chronic | ICD-10-CM

## 2019-09-16 DIAGNOSIS — L02.91 CUTANEOUS ABSCESS, UNSPECIFIED: ICD-10-CM

## 2019-09-16 DIAGNOSIS — Z85.819 PERSONAL HISTORY OF MALIGNANT NEOPLASM OF UNSPECIFIED SITE OF LIP, ORAL CAVITY, AND PHARYNX: Chronic | ICD-10-CM

## 2019-09-16 DIAGNOSIS — Z29.9 ENCOUNTER FOR PROPHYLACTIC MEASURES, UNSPECIFIED: ICD-10-CM

## 2019-09-16 DIAGNOSIS — C06.9 MALIGNANT NEOPLASM OF MOUTH, UNSPECIFIED: ICD-10-CM

## 2019-09-16 PROBLEM — R13.12 OROPHARYNGEAL DYSPHAGIA: Status: ACTIVE | Noted: 2019-06-28

## 2019-09-16 PROBLEM — R11.0 NAUSEA: Status: ACTIVE | Noted: 2019-05-08

## 2019-09-16 LAB
ALBUMIN SERPL ELPH-MCNC: 3.8 G/DL — SIGNIFICANT CHANGE UP (ref 3.3–5)
ALP SERPL-CCNC: 70 U/L — SIGNIFICANT CHANGE UP (ref 40–120)
ALT FLD-CCNC: 49 U/L — HIGH (ref 4–41)
ANION GAP SERPL CALC-SCNC: 11 MMO/L — SIGNIFICANT CHANGE UP (ref 7–14)
AST SERPL-CCNC: 26 U/L — SIGNIFICANT CHANGE UP (ref 4–40)
BASE EXCESS BLDV CALC-SCNC: 8.4 MMOL/L — SIGNIFICANT CHANGE UP
BASOPHILS # BLD AUTO: 0.1 K/UL — SIGNIFICANT CHANGE UP (ref 0–0.2)
BASOPHILS # BLD AUTO: 0.2 K/UL — SIGNIFICANT CHANGE UP (ref 0–0.2)
BASOPHILS NFR BLD AUTO: 0.8 % — SIGNIFICANT CHANGE UP (ref 0–2)
BASOPHILS NFR BLD AUTO: 1.5 % — SIGNIFICANT CHANGE UP (ref 0–2)
BILIRUB SERPL-MCNC: 0.5 MG/DL — SIGNIFICANT CHANGE UP (ref 0.2–1.2)
BLOOD GAS VENOUS - CREATININE: 0.78 MG/DL — SIGNIFICANT CHANGE UP (ref 0.5–1.3)
BLOOD GAS VENOUS - FIO2: 21 — SIGNIFICANT CHANGE UP
BUN SERPL-MCNC: 11 MG/DL — SIGNIFICANT CHANGE UP (ref 7–23)
CALCIUM SERPL-MCNC: 9.6 MG/DL — SIGNIFICANT CHANGE UP (ref 8.4–10.5)
CHLORIDE BLDV-SCNC: 92 MMOL/L — LOW (ref 96–108)
CHLORIDE SERPL-SCNC: 90 MMOL/L — LOW (ref 98–107)
CO2 SERPL-SCNC: 31 MMOL/L — SIGNIFICANT CHANGE UP (ref 22–31)
CREAT SERPL-MCNC: 0.7 MG/DL — SIGNIFICANT CHANGE UP (ref 0.5–1.3)
EOSINOPHIL # BLD AUTO: 0.42 K/UL — SIGNIFICANT CHANGE UP (ref 0–0.5)
EOSINOPHIL # BLD AUTO: 0.5 K/UL — SIGNIFICANT CHANGE UP (ref 0–0.5)
EOSINOPHIL NFR BLD AUTO: 3 % — SIGNIFICANT CHANGE UP (ref 0–6)
EOSINOPHIL NFR BLD AUTO: 3.2 % — SIGNIFICANT CHANGE UP (ref 0–6)
GAS PNL BLDV: 130 MMOL/L — LOW (ref 136–146)
GLUCOSE BLDV-MCNC: 97 MG/DL — SIGNIFICANT CHANGE UP (ref 70–99)
GLUCOSE SERPL-MCNC: 96 MG/DL — SIGNIFICANT CHANGE UP (ref 70–99)
HCO3 BLDV-SCNC: 29 MMOL/L — HIGH (ref 20–27)
HCT VFR BLD CALC: 39.2 % — SIGNIFICANT CHANGE UP (ref 39–50)
HCT VFR BLD CALC: 40.7 % — SIGNIFICANT CHANGE UP (ref 39–50)
HCT VFR BLDV CALC: 43.4 % — SIGNIFICANT CHANGE UP (ref 39–51)
HGB BLD-MCNC: 13.3 G/DL — SIGNIFICANT CHANGE UP (ref 13–17)
HGB BLD-MCNC: 13.7 G/DL — SIGNIFICANT CHANGE UP (ref 13–17)
HGB BLDV-MCNC: 14.1 G/DL — SIGNIFICANT CHANGE UP (ref 13–17)
IMM GRANULOCYTES NFR BLD AUTO: 2.4 % — HIGH (ref 0–1.5)
LACTATE BLDV-MCNC: 1.6 MMOL/L — SIGNIFICANT CHANGE UP (ref 0.5–2)
LYMPHOCYTES # BLD AUTO: 1 K/UL — SIGNIFICANT CHANGE UP (ref 1–3.3)
LYMPHOCYTES # BLD AUTO: 1.04 K/UL — SIGNIFICANT CHANGE UP (ref 1–3.3)
LYMPHOCYTES # BLD AUTO: 7 % — LOW (ref 13–44)
LYMPHOCYTES # BLD AUTO: 7.8 % — LOW (ref 13–44)
MCHC RBC-ENTMCNC: 29.2 PG — SIGNIFICANT CHANGE UP (ref 27–34)
MCHC RBC-ENTMCNC: 29.7 PG — SIGNIFICANT CHANGE UP (ref 27–34)
MCHC RBC-ENTMCNC: 33.7 % — SIGNIFICANT CHANGE UP (ref 32–36)
MCHC RBC-ENTMCNC: 33.9 G/DL — SIGNIFICANT CHANGE UP (ref 32–36)
MCV RBC AUTO: 86.3 FL — SIGNIFICANT CHANGE UP (ref 80–100)
MCV RBC AUTO: 88.3 FL — SIGNIFICANT CHANGE UP (ref 80–100)
MONOCYTES # BLD AUTO: 1.4 K/UL — HIGH (ref 0–0.9)
MONOCYTES # BLD AUTO: 1.47 K/UL — HIGH (ref 0–0.9)
MONOCYTES NFR BLD AUTO: 11 % — SIGNIFICANT CHANGE UP (ref 2–14)
MONOCYTES NFR BLD AUTO: 9.2 % — SIGNIFICANT CHANGE UP (ref 2–14)
NEUTROPHILS # BLD AUTO: 11.8 K/UL — HIGH (ref 1.8–7.4)
NEUTROPHILS # BLD AUTO: 9.98 K/UL — HIGH (ref 1.8–7.4)
NEUTROPHILS NFR BLD AUTO: 74.8 % — SIGNIFICANT CHANGE UP (ref 43–77)
NEUTROPHILS NFR BLD AUTO: 79.3 % — HIGH (ref 43–77)
NRBC # FLD: 0.02 K/UL — SIGNIFICANT CHANGE UP (ref 0–0)
PCO2 BLDV: 60 MMHG — HIGH (ref 41–51)
PH BLDV: 7.37 PH — SIGNIFICANT CHANGE UP (ref 7.32–7.43)
PLATELET # BLD AUTO: 294 K/UL — SIGNIFICANT CHANGE UP (ref 150–400)
PLATELET # BLD AUTO: 349 K/UL — SIGNIFICANT CHANGE UP (ref 150–400)
PMV BLD: 9.2 FL — SIGNIFICANT CHANGE UP (ref 7–13)
PO2 BLDV: 20 MMHG — LOW (ref 35–40)
POTASSIUM BLDV-SCNC: 3.9 MMOL/L — SIGNIFICANT CHANGE UP (ref 3.4–4.5)
POTASSIUM SERPL-MCNC: 4.3 MMOL/L — SIGNIFICANT CHANGE UP (ref 3.5–5.3)
POTASSIUM SERPL-SCNC: 4.3 MMOL/L — SIGNIFICANT CHANGE UP (ref 3.5–5.3)
PROT SERPL-MCNC: 7.1 G/DL — SIGNIFICANT CHANGE UP (ref 6–8.3)
RBC # BLD: 4.54 M/UL — SIGNIFICANT CHANGE UP (ref 4.2–5.8)
RBC # BLD: 4.61 M/UL — SIGNIFICANT CHANGE UP (ref 4.2–5.8)
RBC # FLD: 13.6 % — SIGNIFICANT CHANGE UP (ref 10.3–14.5)
RBC # FLD: 15.2 % — HIGH (ref 10.3–14.5)
SAO2 % BLDV: 22.9 % — LOW (ref 60–85)
SODIUM SERPL-SCNC: 132 MMOL/L — LOW (ref 135–145)
WBC # BLD: 13.33 K/UL — HIGH (ref 3.8–10.5)
WBC # BLD: 14.9 K/UL — HIGH (ref 3.8–10.5)
WBC # FLD AUTO: 13.33 K/UL — HIGH (ref 3.8–10.5)
WBC # FLD AUTO: 14.9 K/UL — HIGH (ref 3.8–10.5)

## 2019-09-16 PROCEDURE — 99223 1ST HOSP IP/OBS HIGH 75: CPT

## 2019-09-16 PROCEDURE — 70491 CT SOFT TISSUE NECK W/DYE: CPT | Mod: 26

## 2019-09-16 RX ORDER — INFLUENZA VIRUS VACCINE 15; 15; 15; 15 UG/.5ML; UG/.5ML; UG/.5ML; UG/.5ML
0.5 SUSPENSION INTRAMUSCULAR ONCE
Refills: 0 | Status: DISCONTINUED | OUTPATIENT
Start: 2019-09-16 | End: 2019-09-19

## 2019-09-16 RX ORDER — HEPARIN SODIUM 5000 [USP'U]/ML
5000 INJECTION INTRAVENOUS; SUBCUTANEOUS EVERY 8 HOURS
Refills: 0 | Status: DISCONTINUED | OUTPATIENT
Start: 2019-09-16 | End: 2019-09-16

## 2019-09-16 RX ORDER — FENTANYL CITRATE 50 UG/ML
1 INJECTION INTRAVENOUS
Refills: 0 | Status: DISCONTINUED | OUTPATIENT
Start: 2019-09-16 | End: 2019-09-19

## 2019-09-16 RX ORDER — MIRTAZAPINE 45 MG/1
1 TABLET, ORALLY DISINTEGRATING ORAL
Qty: 0 | Refills: 0 | DISCHARGE

## 2019-09-16 RX ORDER — HYDROMORPHONE HYDROCHLORIDE 2 MG/ML
1 INJECTION INTRAMUSCULAR; INTRAVENOUS; SUBCUTANEOUS ONCE
Refills: 0 | Status: DISCONTINUED | OUTPATIENT
Start: 2019-09-16 | End: 2019-09-16

## 2019-09-16 RX ORDER — SODIUM CHLORIDE 9 MG/ML
1000 INJECTION INTRAMUSCULAR; INTRAVENOUS; SUBCUTANEOUS ONCE
Refills: 0 | Status: COMPLETED | OUTPATIENT
Start: 2019-09-16 | End: 2019-09-16

## 2019-09-16 RX ORDER — PIPERACILLIN AND TAZOBACTAM 4; .5 G/20ML; G/20ML
3.38 INJECTION, POWDER, LYOPHILIZED, FOR SOLUTION INTRAVENOUS ONCE
Refills: 0 | Status: COMPLETED | OUTPATIENT
Start: 2019-09-16 | End: 2019-09-16

## 2019-09-16 RX ORDER — ENOXAPARIN SODIUM 100 MG/ML
40 INJECTION SUBCUTANEOUS DAILY
Refills: 0 | Status: DISCONTINUED | OUTPATIENT
Start: 2019-09-16 | End: 2019-09-17

## 2019-09-16 RX ORDER — SCOPALAMINE 1 MG/3D
1 PATCH, EXTENDED RELEASE TRANSDERMAL
Qty: 0 | Refills: 0 | DISCHARGE

## 2019-09-16 RX ORDER — DIAZEPAM 5 MG
1 TABLET ORAL
Qty: 0 | Refills: 0 | DISCHARGE

## 2019-09-16 RX ORDER — ZOLPIDEM TARTRATE 10 MG/1
5 TABLET ORAL AT BEDTIME
Refills: 0 | Status: DISCONTINUED | OUTPATIENT
Start: 2019-09-16 | End: 2019-09-17

## 2019-09-16 RX ORDER — PIPERACILLIN AND TAZOBACTAM 4; .5 G/20ML; G/20ML
3.38 INJECTION, POWDER, LYOPHILIZED, FOR SOLUTION INTRAVENOUS EVERY 8 HOURS
Refills: 0 | Status: DISCONTINUED | OUTPATIENT
Start: 2019-09-16 | End: 2019-09-19

## 2019-09-16 RX ORDER — HYDROMORPHONE HYDROCHLORIDE 2 MG/ML
1 INJECTION INTRAMUSCULAR; INTRAVENOUS; SUBCUTANEOUS EVERY 4 HOURS
Refills: 0 | Status: DISCONTINUED | OUTPATIENT
Start: 2019-09-16 | End: 2019-09-17

## 2019-09-16 RX ORDER — GABAPENTIN 400 MG/1
15 CAPSULE ORAL
Qty: 0 | Refills: 0 | DISCHARGE

## 2019-09-16 RX ADMIN — SODIUM CHLORIDE 1000 MILLILITER(S): 9 INJECTION INTRAMUSCULAR; INTRAVENOUS; SUBCUTANEOUS at 14:46

## 2019-09-16 RX ADMIN — HYDROMORPHONE HYDROCHLORIDE 1 MILLIGRAM(S): 2 INJECTION INTRAMUSCULAR; INTRAVENOUS; SUBCUTANEOUS at 21:45

## 2019-09-16 RX ADMIN — PIPERACILLIN AND TAZOBACTAM 200 GRAM(S): 4; .5 INJECTION, POWDER, LYOPHILIZED, FOR SOLUTION INTRAVENOUS at 17:46

## 2019-09-16 RX ADMIN — HYDROMORPHONE HYDROCHLORIDE 1 MILLIGRAM(S): 2 INJECTION INTRAMUSCULAR; INTRAVENOUS; SUBCUTANEOUS at 17:08

## 2019-09-16 RX ADMIN — PIPERACILLIN AND TAZOBACTAM 25 GRAM(S): 4; .5 INJECTION, POWDER, LYOPHILIZED, FOR SOLUTION INTRAVENOUS at 21:42

## 2019-09-16 RX ADMIN — HYDROMORPHONE HYDROCHLORIDE 1 MILLIGRAM(S): 2 INJECTION INTRAMUSCULAR; INTRAVENOUS; SUBCUTANEOUS at 14:45

## 2019-09-16 RX ADMIN — FENTANYL CITRATE 1 PATCH: 50 INJECTION INTRAVENOUS at 22:20

## 2019-09-16 RX ADMIN — HYDROMORPHONE HYDROCHLORIDE 1 MILLIGRAM(S): 2 INJECTION INTRAMUSCULAR; INTRAVENOUS; SUBCUTANEOUS at 21:30

## 2019-09-16 NOTE — ED PROVIDER NOTE - ATTENDING CONTRIBUTION TO CARE
Dr. Coronado:  I have personally performed a face to face bedside history and physical examination of this patient. I have discussed the history, examination, review of systems, assessment and plan of management with the resident. I have reviewed the electronic medical record and amended it to reflect my history, review of systems, physical exam, assessment and plan.    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  Pt recently had developed abscess, was admitted for drainage by ENT and given Zosyn x 1 day, discharged on Cipro.  Endorses increased drainage recently.  Denies fevers and other constitutional symptoms    Exam:  - nad  - +purulent discharge left submandibular area  - rrr  - ctab   -abd soft ntnd    A/P  - abscess, eval if worsening  - basic labs, vbg, CT max-face  - consult ENT

## 2019-09-16 NOTE — ED ADULT TRIAGE NOTE - CHIEF COMPLAINT QUOTE
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

## 2019-09-16 NOTE — H&P ADULT - PROBLEM SELECTOR PLAN 4
DVT ppx: lovenox 40 mg qd  Diet: Jevity 1.2 through PEG, nutrition c/s DVT ppx: lovenox 40 mg qd  Diet: Jevity 1.2 through PEG, nutrition c/s  Code status: DVT ppx: lovenox 40 mg qd  Diet: Jevity 1.2 through PEG, nutrition c/s  Code status: full code DVT ppx: Hold pharmacologic ppx for now pending ENT attending eval and possibility of procedure  Diet: Jevity 1.2 through PEG, nutrition c/s  Code status: full code

## 2019-09-16 NOTE — PHYSICAL EXAM
[Ambulatory and capable of all self care but unable to carry out any work activities] : Status 2- Ambulatory and capable of all self care but unable to carry out any work activities. Up and about more than 50% of waking hours [Normal] : grossly intact [de-identified] : microstomia with drooling. Status post composite resection and fibular graft reconstruction of right jaw and neck dissection. Large firm minimally mobile mass left mid neck, no longer palpable. Buckle mucosa left side nodularity, swelling of left anterior lateral tongue. Left tongue, swollen, anebulus\par mcrostomia with drooling. atatus post composite resection and fibular graft reconstruction of right jaw and neck dissection. large firm minimally mobile mass left mid neck. \par mcrostomia with drooling. atatus post composite resection and fibular graft reconstruction of right jaw and neck dissection. large firm minimally mobile mass left mid neck. \par macrostomia with drooling. [de-identified] : No palpable neck nodes

## 2019-09-16 NOTE — H&P ADULT - NSHPREVIEWOFSYSTEMS_GEN_ALL_CORE
REVIEW OF SYSTEMS:    CONSTITUTIONAL: No weakness, fatigue, malaise, fevers or chills, no weight change, appetite change  EYES: No visual changes; No double vision,  No vertigo, eye pain  Ears: no otalgia, no otorhea, no hearing loss, tinnitus  Nose: no epistaxis, rhinorrhea, post-discharge, sinus pressure  Throat: no throat pain, no oral lesions, tooth pain   NECK: No pain or stiffness  RESPIRATORY: No cough (productive or dry), wheezing, hemoptysis; No shortness of breath, orthnopnea, PND, BANEGAS, snoring,  CARDIOVASCULAR: No chest pain or palpitations, no leg edema, no claudication    GASTROINTESTINAL: No abdominal or epigastric pain. No nausea, vomiting, or hematemesis; No diarrhea or constipation. No melena or hematochezia.  GENITOURINARY: No dysuria, frequency, urgency or hematuria, no pelvic pain, urinary incontinence, urgency  Muscloskeletal: no joints or muscle pain, no swelling in joints or muscles  NEUROLOGICAL: No numbness or weakness, headache, memory loss, seizures, dizziness, vertigo, syncope, ataxia  SKIN: No pruritis, rashes, lesions or new moles  Psych: No anxiety, sadness, insomnia, suicide thoughts  Endocrine: No Heat or Cold intolerance, polydipsia, polyphagia  Heme/Lymph: no LN enlargement, no easy bruising or bleeding REVIEW OF SYSTEMS:  CONSTITUTIONAL: +weakness, fatigue. no fevers or chills; weight stable  EYES: No visual changes; No double vision   Ears: no otalgia, no hearing loss   Nose: no rhinorrhea, post-nasal discharge   Throat: no throat pain   NECK: No pain or stiffness  RESPIRATORY: No cough (productive or dry), wheezing; No shortness of breath, orthnopnea, PND, BANEGAS, snoring,  CARDIOVASCULAR: No chest pain or palpitations   GASTROINTESTINAL: intermittent nausea. No abdominal or epigastric pain. no vomiting; No diarrhea or constipation   GENITOURINARY: No dysuria, frequency, urgency or hematuria   Muscloskeletal: no joints or muscle pain   NEUROLOGICAL: No HA  SKIN: No pruritis, rashes   Psych: + anxiety, insomnia REVIEW OF SYSTEMS:  CONSTITUTIONAL: +weakness, fatigue. no fevers or chills; weight stable  EYES: No visual changes; No double vision   Ears: no otalgia, no hearing loss   Nose: no rhinorrhea, post-nasal discharge   Throat: no throat pain   NECK: No pain or stiffness  RESPIRATORY: No cough (productive or dry), wheezing; No shortness of breath  CARDIOVASCULAR: No chest pain or palpitations   GASTROINTESTINAL: intermittent nausea. No abdominal or epigastric pain. no vomiting; No diarrhea or constipation   GENITOURINARY: No dysuria, frequency, urgency or hematuria   Muscloskeletal: no joints or muscle pain   NEUROLOGICAL: No HA  SKIN: No pruritis, rashes   Psych: + anxiety, insomnia REVIEW OF SYSTEMS:  CONSTITUTIONAL: +weakness, fatigue. no fevers or chills; weight stable  EYES: No visual changes; No double vision   Ears: no otalgia, no hearing loss   Nose: no rhinorrhea, post-nasal discharge   Throat: +oral secretions, +dysphagia, no throat pain or odynophagia  NECK: No pain or stiffness  RESPIRATORY: No cough (productive or dry), wheezing; No shortness of breath  CARDIOVASCULAR: No chest pain or palpitations   GASTROINTESTINAL: intermittent nausea. No abdominal or epigastric pain. no vomiting; No diarrhea or constipation   GENITOURINARY: No dysuria, frequency, urgency or hematuria   Musculoskeletal: No joints or muscle pain   NEUROLOGICAL: No syncope, seizure, headache, or paresthesias  SKIN: No pruritis or rashes   Psych: +anxiety, +insomnia

## 2019-09-16 NOTE — ED PROVIDER NOTE - OBJECTIVE STATEMENT
Mr. Ventura is a 63M with recurrent SCC of the head and neck requiring chemo/radiation about 5 years ago and then recurrence s/p maxillofacial reconstruction here due to elevated WBC and worsening symptoms of his abscess. Patient was sent in by chemo RN and physician due to elevated WBC. Was suppose to start chemo today. Patient reports that about a month ago patient had swelling and draining in the submandibular area and went to [facility]. There they discharged him on Bactrim. Had a reaction to Bactrim widespread rash and swelling. Was switched to cipro and symptoms mildly improved. Since then has been back and forth out of the hospital due to continual drainage of the abscess. Most recently patient was hospitalized about a week ago received 1 day of Zosyn and then discharged. Imaging consistent with left necrotic infiltrating mass with possible signs of an abscess. Since then he has had continual drainage that was initially clear and now brown/yellow as well as foul smelling. No fevers/chills, nausea or vomiting at home. Spoke to a family friend who prescribed him Cipro and Keflex this past weekend with no improvement. Has 7/10 left jaw/submandibular "electrical shooting" pain. Reports morphine does not help. Uses a fentanyl patch with minimal improvement. Would liked to be seen by ENT today.    ENT: Dr. Hdz  Oncologist: Dr. Cai

## 2019-09-16 NOTE — H&P ADULT - NSHPSOCIALHISTORY_GEN_ALL_CORE
Pt lives with brother and son. He owns a security company. HE denies hx of etoh/tobacco/drug use. He has a 20 year hx of using chewing tobacco. . Pt lives with brother and son. He owns a security company. Hs denies hx of etoh/smoking/drug use. He has a 20 year hx of using chewing tobacco. . Pt lives with brother and son. He owns a security company. Hs denies hx of etoh/smoking/illicit drug use. He has a 20 year hx of using chewing tobacco. .

## 2019-09-16 NOTE — H&P ADULT - PROBLEM SELECTOR PLAN 1
CT neck showing similar-appearing partially necrotic infiltrating mass containing foci of air involving the left oral cavity-floor of mouth and soft tissues of the   left oropharynx, inseparable from the medial pterygoid, extending to the   left parotid space and left submandibular space. The presence of abscess   within these necrotic regions is not excluded.  - ENT evaluated pt in ED, no intervention currently indicated  - c/w IV zosyn  - npo w/ tube feeds CT neck showing similar-appearing partially necrotic infiltrating mass containing foci of air involving the left oral cavity-floor of mouth and soft tissues of the   left oropharynx, inseparable from the medial pterygoid, extending to the   left parotid space and left submandibular space. The presence of abscess   within these necrotic regions is not excluded.  - ENT evaluated pt in ED, no intervention currently indicated  - c/w IV zosyn CT neck showing similar-appearing partially necrotic infiltrating mass containing foci of air involving the left oral cavity-floor of mouth and soft tissues of the   left oropharynx, inseparable from the medial pterygoid, extending to the   left parotid space and left submandibular space. The presence of abscess   within these necrotic regions is not excluded.   - ENT evaluated pt in ED, no intervention currently indicated  - c/w IV zosyn CT neck showing similar-appearing partially necrotic infiltrating mass containing foci of air involving the left oral cavity-floor of mouth and soft tissues of the   left oropharynx, inseparable from the medial pterygoid, extending to the   left parotid space and left submandibular space. The presence of abscess   within these necrotic regions is not excluded. However, leukocytosis likely from pt's recent use of Medrol dosepak, with pt remaining afebrile.  - ENT evaluated pt in ED, no intervention currently indicated as per ENT; will f/u ENT attending recs  - C/w IV zosyn for now pending ENT attending eval and ID consult  - ID consult in AM  - Pt currently protecting airway despite oral secretions. However, will monitor on continuous pulse ox, especially as pt is on increased doses of pain meds with risk of oversedation.  - Monitor VBG pCO2

## 2019-09-16 NOTE — ED PROVIDER NOTE - PHYSICAL EXAMINATION
PHYSICAL EXAM:  GENERAL: NAD, well-groomed, well-developed  HEAD: Atraumatic, Normocephalic  EYES: EOMI, PERRLA, conjunctiva and sclera clear  ENMT: No tonsillar erythema, exudates. S/p mandibular reconstruction. Left submandibular erythema with 2 small holes about a couple mm each. Tender to palpation. On dressing yellow/brown drainage.   NECK: Supple  NERVOUS SYSTEM: Alert & Oriented X3, Good concentration; Motor Strength 5/5 B/L upper and lower extremities  CHEST/LUNG: Clear to auscultation bilaterally; No rales, rhonchi, wheezing, or rubs  HEART: Regular rate and rhythm; S1 and S2; No murmurs, rubs, or gallops  ABDOMEN: Soft, Nontender, Nondistended: Bowel sounds present. PEG tube site C/D/I  EXTREMITIES: 2+ Peripheral Pulses, No clubbing, cyanosis, or edema  SKIN: As noted above regarding submandibular region otherwise no rash

## 2019-09-16 NOTE — ED PROVIDER NOTE - CLINICAL SUMMARY MEDICAL DECISION MAKING FREE TEXT BOX
63M with recurrent SCC here for worsening drainage and symptoms for left abscess. No fevers/chills. Nontoxic appearing. Will get repeat CT maxillofacial with IV contrast. ENT consult. CBC/CMP. Dilaudid 1mg x 1. 1L of NS.

## 2019-09-16 NOTE — ED ADULT NURSE NOTE - OBJECTIVE STATEMENT
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 IVL placed to left AC 20 gauge and labs drawn and sent. Medication ordered and given.  ADDIE Mccall

## 2019-09-16 NOTE — CONSULT NOTE ADULT - SUBJECTIVE AND OBJECTIVE BOX
HPI: 63M with recurrent SCC of the head and neck s/p CRT about 5 years ago with recurrence s/p maxillofacial reconstruction here due to elevated WBC and some small amount of drainage from previous site. Was suppose to start chemo today. Patient reports that about a month ago patient had swelling and draining in the submandibular area and went to [facility]. Patient has been back and forth out of the hospital due to continual drainage from the neck that typically improves with abxs. Most recently patient was hospitalized about a week ago received 1 day of Zosyn and then discharged. Imaging consistent with left necrotic infiltrating mass with ? signs of an abscess. No fluctuance or I&D performed at that time. Since then he has had continual drainage that was initially clear and now brown/yellow as well as foul smelling. No fevers/chills, nausea or vomiting at home. Spoke to a family friend who prescribed him Cipro and Keflex this past weekend with no improvement. Has 7/10 left jaw/submandibular "electrical shooting" pain, without significant improvement with pain medications. No respiratory distress, tolerating PEG tube feeds.     T(C): 36.7 (09-16-19 @ 11:29), Max: 36.7 (09-16-19 @ 10:07)  HR: 78 (09-16-19 @ 17:31) (78 - 99)  BP: 116/73 (09-16-19 @ 17:31) (103/74 - 116/73)  RR: 18 (09-16-19 @ 17:31) (16 - 18)  SpO2: 98% (09-16-19 @ 17:31) (98% - 98%)  General: NAD  Neck: post-treatment changes, small tender area at left lateral neck, with ~1cm shallow opening with granulation tissue, <1cc of drainage expressed. Non purulent   NC: wnl  OC/OP: some pooling of secretions which is baseline per patient, post-surgical changes, evidence of disease in the left FOM    WBC - 13  CT - no significant change since previous admission    A/P: 63M with recurrent SCC with progression of disease causing left neck drainage. Does not appear grossly infected at this time and no evidence of abscess.  - no acute ENT intervention  - admit to medicine for IV zosyn  - nothing by mouth, tube feeds  - will discuss case with attending and update team with additional recommendations  - will follow  - please page with questions

## 2019-09-16 NOTE — ED PROVIDER NOTE - PROGRESS NOTE DETAILS
Pain improved. Spoke to ENT plan to admit to medicine for IV abx. ENT will follow up with primary team once admitted. Start Zosyn.

## 2019-09-16 NOTE — ED PROVIDER NOTE - NS ED ROS FT
REVIEW OF SYSTEMS:    CONSTITUTIONAL: No weakness, fevers or chills  EYES/ENT: No visual changes;  No vertigo or throat pain   NECK: +neck pain +drainage   RESPIRATORY: No cough, wheezing, hemoptysis; No shortness of breath  CARDIOVASCULAR: No chest pain or palpitations  GASTROINTESTINAL: No abdominal or epigastric pain. No nausea, vomiting, or hematemesis; No diarrhea or constipation.   GENITOURINARY: No dysuria  NEUROLOGICAL: No numbness or weakness  SKIN: No rashes

## 2019-09-16 NOTE — H&P ADULT - ASSESSMENT
Pt is a 64 yo M w/ recurrent SCC of the head and neck s/p 5 cycles nivolumab/radiation about 5 years ago with recurrence s/p maxillofacial reconstruction 2018 now on chemo, PEG, presenting with leukocytosis and worsening drainage from his L mandibular abscess. CT neck demonstrates possible abscess within necrotic infiltrating oropharyngeal mass, no significant interval change. Pt initiated on IV zosyn. Pt is a 64 yo M w/ recurrent SCC of the head and neck s/p 5 cycles nivolumab/radiation about 5 years ago with recurrence s/p maxillofacial reconstruction 2018 now on chemo, PEG, presenting with leukocytosis and worsening drainage from his L mandibular abscess. CT neck demonstrates possible abscess within necrotic infiltrating oropharyngeal mass, no significant interval change 9/6. Pt initiated on IV zosyn.

## 2019-09-16 NOTE — H&P ADULT - NSHPLABSRESULTS_GEN_ALL_CORE
LABS:                         13.7   13.33 )-----------( 349      ( 16 Sep 2019 15:00 )             40.7     09-16    132<L>  |  90<L>  |  11  ----------------------------<  96  4.3   |  31  |  0.70    Ca    9.6      16 Sep 2019 15:00    TPro  7.1  /  Alb  3.8  /  TBili  0.5  /  DBili  x   /  AST  26  /  ALT  49<H>  /  AlkPhos  70  09-16    RADIOLOGY, EKG & ADDITIONAL TESTS: Reviewed.    < from: CT Neck Soft Tissue w/ IV Cont (09.16.19 @ 16:46) >    EXAM:  CT NECK SOFT TISSUE IC    PROCEDURE DATE:  Sep 16 2019     INTERPRETATION:  Contrast-enhanced CT of the neck    CLINICAL INDICATION: History of squamous cell carcinoma, neck inflammation    TECHNIQUE: Axial CT scanning of the neck was obtained after the   intravenous administration of 90 cc of Omnipaque 350 (10 cc were   discarded) from skull base to the clavicles.  Sagittal and coronal   reformats were provided.      COMPARISON: CTA neck 9/6/2019    FINDINGS:   Status post anterior mandibular resection and fibular free flap   reconstruction, with plates and screws.  Similar-appearing partially necrotic infiltrating mass containing foci of   air involving the left oral cavity-floor of mouth and soft tissues of the   left oropharynx, inseparable from the medial pterygoid, extending to the   left parotid space and left submandibular space. The presence of abscess   within these necrotic regions is not excluded.  Redemonstrated tumor infiltration of the inferior the left parotid gland.   Soft tissue swelling at the level of the right vallecula right piriform   sinus could reflect posttreatment changes or additional areas of tumor.  Similar-appearing edema and heterogeneous mucosal enhancement involves   the larynx and post cricoid region.  Persistent loss of the soft tissue planes with loss of submucosal fat   planes, which may represent sequela of prior radiation treatment.  The submandibular glands are surgically absent. The thyroid gland appears   unremarkable.  There are no suspicious osteolytic or blastic lesions.  The lung apices are within normal limits.    IMPRESSION:  No significant interval change from CT neck obtained on 9/6/2019.  < end of copied text > LABS:                         13.7   13.33 )-----------( 349      ( 16 Sep 2019 15:00 )             40.7     09-16    132<L>  |  90<L>  |  11  ----------------------------<  96  4.3   |  31  |  0.70    Ca    9.6      16 Sep 2019 15:00    TPro  7.1  /  Alb  3.8  /  TBili  0.5  /  DBili  x   /  AST  26  /  ALT  49<H>  /  AlkPhos  70  09-16    RADIOLOGY, EKG & ADDITIONAL TESTS: Personally reviewed.    < from: CT Neck Soft Tissue w/ IV Cont (09.16.19 @ 16:46) >    EXAM:  CT NECK SOFT TISSUE IC    PROCEDURE DATE:  Sep 16 2019     INTERPRETATION:  Contrast-enhanced CT of the neck    CLINICAL INDICATION: History of squamous cell carcinoma, neck inflammation    TECHNIQUE: Axial CT scanning of the neck was obtained after the   intravenous administration of 90 cc of Omnipaque 350 (10 cc were   discarded) from skull base to the clavicles.  Sagittal and coronal   reformats were provided.      COMPARISON: CTA neck 9/6/2019    FINDINGS:   Status post anterior mandibular resection and fibular free flap   reconstruction, with plates and screws.  Similar-appearing partially necrotic infiltrating mass containing foci of   air involving the left oral cavity-floor of mouth and soft tissues of the   left oropharynx, inseparable from the medial pterygoid, extending to the   left parotid space and left submandibular space. The presence of abscess   within these necrotic regions is not excluded.  Redemonstrated tumor infiltration of the inferior the left parotid gland.   Soft tissue swelling at the level of the right vallecula right piriform   sinus could reflect posttreatment changes or additional areas of tumor.  Similar-appearing edema and heterogeneous mucosal enhancement involves   the larynx and post cricoid region.  Persistent loss of the soft tissue planes with loss of submucosal fat   planes, which may represent sequela of prior radiation treatment.  The submandibular glands are surgically absent. The thyroid gland appears   unremarkable.  There are no suspicious osteolytic or blastic lesions.  The lung apices are within normal limits.    IMPRESSION:  No significant interval change from CT neck obtained on 9/6/2019.    EKG: Sinus tach at 122 bpm, no acute ischemic changes, QTc 427 ms.

## 2019-09-16 NOTE — H&P ADULT - HISTORY OF PRESENT ILLNESS
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 Pt was due to start chemotherapy today and was found to have leukocytosis.       Pt most recently admitted at [facility] 9/6-9/7 for draining abscess. Pt received 24 hrs of zosyn and was discharged home on medrol dosepak. Pt states he has had continued drainage that has changed in color from serosanguinous to red/brown in color.     In ED: VS Temp 98, HR 78, /74, RR 18 100% SpO2 on RA. WBCs 14.9, Na 132, Cl 90, AST 26/ ALT 49. CT neck with no significant interval change from 9/6; demonstrates partially necrotic infiltrating mass containing foci of air involving the left oral cavity-floor of mouth and soft tissues of the left oropharynx extending to the left parotid space and left submandibular space. The presence of abscess within these necrotic regions is not excluded.  Pt s/p 3.375 mg zosyn, 1L NS, 1 mg IV Dilaudid x2. 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 Pt was due to start chemotherapy today and was found to have leukocytosis and increasing drainage from a mandibular ulcer, so he was sent to the ED. Pt was initially reports that he first noticed a L mandibular pimple about a month ago associated with worsening erythema, swelling, and drainage which prompted him to go to [facility]. He was treated with Bactrim but developed a diffuse skin rash and was switched to Cipro with some symptom improvement. However, since that time he has had numerous ED visits for the drainage. Pt most recently admitted at [facility] 9/6-9/7 for draining abscess. CT neck from 9/6 demonstrated a partially necrotic infiltrating mass containing foci of air involving the L oral cavity floor and L oropharynx extending to the inferior aspect of the L parotid space and LE submandibular space; abscess could not be excluded. Pt received 24 hrs of zosyn and was discharged home on medrol dosepak. Pt states he has had continued drainage that has changed in color from serosanguinous to red/brown in color and become much more profuse requiring q1hr dressing changes. He was prescribed Cipro and Keflex this week with no improvement Pt developed sharp pain in the area today and took his home morphine with no relief. Pt denies f/c, n/v, abdominal pain, cp, sob.     In ED: VS Temp 98, HR 78, /74, RR 18 100% SpO2 on RA. WBCs 14.9, Na 132, Cl 90, AST 26/ ALT 49. CT neck with no significant interval change from 9/6; demonstrates partially necrotic infiltrating mass containing foci of air involving the left oral cavity-floor of mouth and soft tissues of the left oropharynx extending to the left parotid space and left submandibular space. The presence of abscess within these necrotic regions is not excluded.  Pt s/p 3.375 mg zosyn, 1L NS, 1 mg IV Dilaudid x2. 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 Pt was due to start chemotherapy today and was found to have leukocytosis and increasing drainage from a mandibular ulcer, so he was sent to the ED. Pt was initially reports that he first noticed a L mandibular pimple about a month ago associated with worsening erythema, swelling, and drainage which prompted him to go to [facility]. He was treated with Bactrim but developed a diffuse skin rash and was switched to Cipro with some symptom improvement. However, since that time he has had numerous ED visits for the drainage. Pt most recently admitted at [facility] 9/6-9/7 for draining abscess. CT neck from 9/6 demonstrated a partially necrotic infiltrating mass containing foci of air involving the L oral cavity floor and L oropharynx extending to the inferior aspect of the L parotid space and LE submandibular space; abscess could not be excluded. Pt received 24 hrs of zosyn and was discharged home on medrol dosepak. Pt states he has had continued drainage that has changed in color the past few days from serosanguinous to red/brown in color and become much more profuse requiring q1hr dressing changes. It is also foul smelling. He was prescribed Cipro and Keflex this week with no improvement. Pt developed sharp pain in the area today and took his home morphine with no relief. Pt denies f/c, n/v, abdominal pain, cp, sob.     In ED: VS Temp 98, HR 78, /74, RR 18 100% SpO2 on RA. WBCs 14.9, Na 132, Cl 90, AST 26/ ALT 49. CT neck with no significant interval change from 9/6; demonstrates partially necrotic infiltrating mass containing foci of air involving the left oral cavity-floor of mouth and soft tissues of the left oropharynx extending to the left parotid space and left submandibular space. The presence of abscess within these necrotic regions is not excluded.  Pt s/p 3.375 mg zosyn, 1L NS, 1 mg IV Dilaudid x2. 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 Pt was due to start chemotherapy today and was found to have leukocytosis and increasing drainage from a mandibular ulcer, so he was sent to the ED. Pt was initially reports that he first noticed a L mandibular pimple about a month ago associated with worsening erythema, swelling, and drainage which prompted him to go to [facility]. He was treated with Bactrim but developed a diffuse skin rash and was switched to Cipro with some symptom improvement. However, since that time he has had numerous ED visits for the drainage. Pt most recently admitted at [facility] 9/6-9/7 for draining abscess. CT neck from 9/6 demonstrated a partially necrotic infiltrating mass containing foci of air involving the L oral cavity floor and L oropharynx extending to the inferior aspect of the L parotid space and LE submandibular space; abscess could not be excluded. Pt received 24 hrs of zosyn and was discharged home on medrol dosepak. Pt states he has had continued drainage that has changed in color the past few days from serosanguinous to red/brown in color and become much more profuse requiring q1hr dressing changes. It is also foul smelling. He was prescribed Cipro and Keflex this week with no improvement. Pt developed sharp pain in the area today. Pt denies f/c, n/v, abdominal pain, cp, sob.     In ED: VS Temp 98, HR 78, /74, RR 18 100% SpO2 on RA. WBCs 14.9, Na 132, Cl 90, AST 26/ ALT 49. CT neck with no significant interval change from 9/6; demonstrates partially necrotic infiltrating mass containing foci of air involving the left oral cavity-floor of mouth and soft tissues of the left oropharynx extending to the left parotid space and left submandibular space. The presence of abscess within these necrotic regions is not excluded.  Pt s/p 3.375 mg zosyn, 1L NS, 1 mg IV Dilaudid x2.

## 2019-09-16 NOTE — RESULTS/DATA
[FreeTextEntry1] : 8/22/19 PET:  HEAD/NECK: Postsurgical changes of composite mandibular resection with fibular free flap reconstruction and bilateral cervical lymph node dissection, again noted. Slightly increased FDG avidity of difficult to delineate mass along the left posterior floor of mouth/left base of tongue, SUV 18.9, previous SUV 14.5. Few new FDG avid left neck lymph nodes/masses. New centrally necrotic peripherally FDG avid focus in the region of the left parotid gland, 3.5 x 2.3 cm (image 58), SUV 10.0. Inferiorly, in the left level Ib cervical node location, new FDG avid mass, SUV 20.6. Several mildly FDG avid small left level IIb lymph nodes. Small lymph node just lateral to the internal jugular vein and anterior to the mastoid process, 0.9 x 0.6 cm, SUV 5.4. Previously seen FDG-avid metastatic left lower neck nodule between the scalene musculature, associated with left C4 nerve root/trunk, no longer present. Unchanged FDG-avid small left level IV cervical node, 0.8 x 0.7 cm, SUV 6.5; previously 0.8 x 0.7 cm, SUV 7.1.Resolution of previously seen FDG-avid focus associated with plate and screws in the anterior mandibular region, slightly to the left of the midline, SUV 10.1. New FDG-avidity along the right posterior floor of mouth region, just medial to the hyoid bone and lateral to the vallecula, SUV 9.3. New mildly FDG avid difficult to delineate small lymph node, posterior to the right thyroid lobe and right common carotid artery, 0.6 cm (image 84), SUV 4.0.  THORAX: Several new FDG-avid difficult to delineate small mediastinal and right hilar nodes. For reference, right paratracheal node, SUV 3.9, Right perihilar node, SUV 3.1. LUNGS: New groundglass opacity in the lingula, 1.5 cm, SUV 1.4. New cluster of tiny \par nodular opacities in the anterior right upper lobe, SUV 1.3.\par \par 7/30/19 Modified Barium Swallow:\par XR Modified barium swallow\par Modified barium esophagram is performed in conjunction with the speech pathologist. Fluoroscopy time 3.8 minutes. Please refer to official speech and swallow report for full details.\par \par 6/13/19 CT Neck:\par Redemonstration of post-op changes. There is recurrent neoplasm in the left aspect of the oral cavity 4.9 x 3.0 cm, not significantly change since prior PET 4/22/19. \par IMPRESSION: recurrent tumor in the left oral aspect of the cavity. New abnormal thickening involving the epiglottis. New posterior supraglottic hyperpharyngeal thickening. This can represent post radiation changes. Direct visualization recommended. \par \par 5/6/19 MRI Spine: Ill-defined soft tissue lesion on the left at the level of the C3/C4 and C4/C5 neural foramen extending into the adjacent perivertebral space. Question of lesion abutting vs. surrounding the left vertebral artery. Anterior extent of mass is not visualized secondary to susceptibility artifact. Multilevel cervical spondylosis. \par \par 4/22/19 PET:\par Status post left composite mandibular resection with free flap reconstruction and bilateral neck dissection.  Limited evaluation of oral cavity and neck due to metallic artifact. New irregular FDG avidity with central photopenia along the left posterior floor of mouth/left oral tongue, SUV 14.5 (image 62 of the dedicated head and neck series), \par corresponding to masslike nodular enhancement which measures approximately 4.6 x 2.0 x 4.2 cm, on recent MRI. Multiple adjacent smaller foci superiorly and inferiorly associated with multiple surgical clips New FDG-avid focus in the left lower neck, SUV 8.4, corresponding to metastatic nodule between the scalene musculature, \par associated with left C4 nerve root/trunk on MRI.  New FDG-avid small left level IV cervical node, 0.8 x 0.7 cm, SUV 7.1.  New FDG-avid focus associated with plate and screws in the anterior mandibular region, slightly to the left of the midline, SUV 10.1. 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 The area of abnormal enhancement measures approximately 4.6 x 2.0 x 4.2 cm. Posteriorly, abnormally enhancing soft tissue encases the external and internal carotid arteries and abuts the common carotid artery proximal to the carotid bifurcation.  Medially, there is mass effect upon the floor of mouth with denervation fatty atrophy of the left hemiglossus.\par The anterior extent of abnormally enhancing soft tissue is difficult to discern due to susceptibility artifact.  Superiorly, abnormally enhancing soft tissue spans the glossotonsillar sulcus, and is seen involving the left lateral oropharyngeal wall up to the soft palate. Abnormal signal and enhancement is inseparable from the left medial pterygoid. There is however, no abnormal asymmetric enhancement extending along V3 through the skull base to the cavernous sinus or Meckel's cave. There is no evidence for abnormal enhancement along the left parotid gland or intratemporal facial nerve. Laterally, there is abnormal enhancement which spans the left floor of mouth into the flap, and extends underneath the native mandible to involve the insertion of the left masseter muscle. There is however, no visible extension into the overlying skin. Evaluation of mandibular marrow signal abnormality is limited by susceptibility abnormality. There has been interval enlargement of a nodule of metastatic disease deep to the left neck dissection (2:22, 12:28) between scalene musculature. This nodule of abnormal signal and enhancement measures approximately 0.7 x 1.0 x 2.2 cm, and appears associated with the left C4 nerve root and trunk. There is no evidence for extension into the neural foramen, or spinal canal.  The remainder of the brachial plexus appears intact. There is otherwise no evidence for pathologic lymphadenopathy.\par \par 4/26/18 Pathology:\par Lymph nodes, left level 1, neck dissection: 8 lymph nodes negative for metastatic carcinoma. Submandibular gland with chronic inflammation and atrophy, negative for carcinoma\par Lymph nodes, left level 2, neck dissection: 13 lymph nodes negative for metastatic carcinoma\par Lymph nodes, left level 3, neck dissection: 6 lymph nodes negative for metastatic carcinoma\par Lymph nodes, left level 4, neck dissection: 13 lymph nodes negative for metastatic carcinoma\par Oral cavity, composite resection mandibular alveolar cancer - Invasive squamous cell carcinoma, well-differentiated.  Resection margins negative for carcinoma\par Left lower lip, excision: Lip tissue, negative for carcinoma\par Right lower lip, excision: Lip tissue, negative for carcinoma\par Right floor of mouth, excision: Squamous mucosa, negative for carcinoma\par Left floor of mouth, excision: Squamous mucosa, negative for carcinoma\par Deep margin, excision: Soft tissue and skeletal muscle, negative for carcinoma\par Lymph nodes, right level 1,2,3,4, neck dissection: 9 lymph nodes negative for metastatic carcinoma. Submandibular gland with chronic inflammation and atrophy, negative for carcinoma\par Tooth #20, removal: Tooth, gross examination only\par Specimen:  Mandibular alveolar\par Procedure:  Composite resection\par Specimen Size:  5 x 1.7 x 0.5 cm portion of mandibular ramus, and soft tissue anterior (3.8 x 3.3 x 0.8 cm, inked blue) and soft tissue posterior (2.7 x 1 x 0.5 cm, inked black)\par Additional dimensions: Not applicable\par Specimen Laterality: Not applicable\par Tumor Site: Mandibular alveolar\par Tumor Focality: Unifocal\par Tumor Size: 1.8 x 1.2 x 0.4 cm\par Tumor Depth of Invasion (DOI) (millimeters): 3 mm\par Tumor Thickness (pT1 and pT2 tumors): 0.4 cm\par \par Tumor Description Gross Subtype: Solid\par \par Histologic Type: Squamous cell carcinoma\par Histologic Grade:  Well differentiated\par Margins:\par Uninvolved by invasive tumor\par Distance from closest margin (millimeters): 2 mm\par Specify location of closest margin, per orientation, if possible: Anterior soft tissue margin\par Lymph-Vascular Invasion: Not identified\par Perineural Invasion: Not identified\par Lymph Nodes, Extranodal Extension: Not identified\par Pathologic Staging (pTNM): Primary Tumor (pT):  pT1 \par Regional Lymph Nodes:  pN0\par # Lymph nodes examined:  39\par # Lymph nodes involved:0\par Size of Largest Metastatic Deposit (centimeters): Not applicable\par Distant Metastasis (pM):  pMX\par

## 2019-09-16 NOTE — H&P ADULT - PROBLEM SELECTOR PLAN 3
Pt prescribed alprazolam 0.5 mg on 8/30 but has not been taking it. He is concerned about his inability to fall asleep  - restart alprazolam 0.5 mg qhs Pt prescribed alprazolam 0.5 mg on 8/30 but has not been taking it. He is concerned about his inability to fall asleep  - hold for now given concern for oversedation as he is on dilaudid 1 mg q4hr prn Pt prescribed alprazolam 0.5 mg on 8/30 but has not been taking it. Pt also prescribed zolpidem 10 mg on 8/31 that he took last night, but he also does not take it consistently. He is concerned about his inability to fall asleep  - hold for now given concern for oversedation as he is on dilaudid 1 mg q4hr prn Pt prescribed alprazolam 0.5 mg on 8/30 but has not been taking it. Pt also prescribed zolpidem 10 mg on 8/31 that he took last night, but he also does not take it consistently. He is concerned about his inability to fall asleep  - will c/w zolpidem for now but will hold alprazolam given concern for oversedation as he is also on increased doses of opioids

## 2019-09-16 NOTE — H&P ADULT - PROBLEM SELECTOR PLAN 2
- per Dr. Cai's oupt note, consensus of head/neck tumor board for palliative measures, no surgery or RT - per Dr. Cai's oupt note, consensus of head/neck tumor board for palliative measures, no surgery or RT  - pt will need outpt f/u  - pain control. pt previously on morphine sulf 100 mg/5 ml conc (prescribed by pain management istop# [id number]) through NGT but states it is no longer helping  - c/w fentanyl 25 mcg patch  - consider palliative c/s for symptom management - per Dr. Cai's oupt note, consensus of head/neck tumor board for palliative measures, no surgery or RT  - pt will need outpt f/u  - pain control. pt previously on morphine sulf 100 mg/5 ml conc (prescribed by pain management istop# [id number]) through PEG but states it is no longer helping  - c/w fentanyl 25 mcg patch. will c/w dilaudid 1 mg IVP q4 prn   - consider palliative c/s for symptom management - per Dr. Cai's oupt note, consensus of head/neck tumor board for palliative measures, no surgery or RT  - pt will need outpt f/u  - pain control. pt previously on morphine sulf 100 mg/5 ml conc (prescribed by pain management istop# [id number]) through PEG but states it is no longer helping  - c/w fentanyl 25 mcg patch. will c/w dilaudid 0.5 mg IVP q4h PRN for moderate pain and 1 mg IVP q4h PRN for severe pain.  - consider palliative c/s for symptom management

## 2019-09-16 NOTE — H&P ADULT - NSHPPHYSICALEXAM_GEN_ALL_CORE
PHYSICAL EXAM:  GENERAL: NAD, well-groomed, well-developed  HEAD:  Atraumatic, Normocephalic  EYES: EOMI, PERRLA, conjunctiva and sclera clear  ENMT: No tonsillar erythema, exudates, or enlargement; Moist mucous membranes  NECK: Supple, No JVD, Normal thyroid  HEART: Regular rate and rhythm; No murmurs, rubs, or gallops  RESPIRATORY: CTA B/L, No W/R/R  ABDOMEN: Soft, Nontender, Nondistended; Bowel sounds present  NEUROLOGY: A&Ox3, nonfocal, moving all extremities  EXTREMITIES:  2+ Peripheral Pulses, No clubbing, cyanosis, or edema  SKIN: warm, dry, normal color, no rash or abnormal lesions PHYSICAL EXAM:  GENERAL: NAD  HEAD:  Atraumatic, Normocephalic  EYES: EOMI, PERRLA, conjunctiva and sclera clear  ENMT: profuse thick oral secretions. Draining L submandibular abscess, erthematous and TTP  NECK: Supple   HEART: Regular rate and rhythm; No murmurs   RESPIRATORY: CTA B/L, No W/R/R  ABDOMEN: Soft, Nontender, Nondistended; Bowel sounds present. PEG CDI  NEUROLOGY: A&Ox3, nonfocal, moving all extremities  EXTREMITIES:  2+ Peripheral Pulses, No clubbing, cyanosis, or edema  SKIN: warm, dry, normal color, no rash PHYSICAL EXAM:  GENERAL: NAD  HEAD:  Atraumatic, Normocephalic  EYES: EOMI, PERRLA, conjunctiva and sclera clear  ENMT: profuse thick oral secretions. Draining L submandibular abscess, erythematous and TTP  NECK: Supple   HEART: Regular rate and rhythm; No murmurs   RESPIRATORY: CTA B/L, No W/R/R  ABDOMEN: Soft, Nontender, Nondistended; Bowel sounds present. PEG CDI  NEUROLOGY: A&Ox3, nonfocal, moving all extremities  EXTREMITIES:  2+ Peripheral Pulses, No clubbing, cyanosis, or edema  SKIN: warm, dry, normal color, no rash PHYSICAL EXAM:  GENERAL: Awake and alert, NAD  HEAD: Atraumatic, normocephalic  EYES: EOMI, PERRL, conjunctiva and sclera clear  MOUTH: Profuse thick oral secretions. Draining L submandibular abscess, erythematous and TTP. Surgical changes to mandible.   NECK: Supple, full ROM, trachea midline  HEART: Regular rate and rhythm; +S1 and S2; No murmurs; No LE edema b/l   RESPIRATORY: Good inspiratory effort; coarse breath sounds/transmitted upper airway sounds bibasilarly; no stridor  ABDOMEN: Soft, Nontender, Nondistended; Bowel sounds present. PEG site CDI.  NEUROLOGY: A&Ox3, nonfocal, moving all extremities  EXTREMITIES: 2+ Peripheral Pulses, No cyanosis  SKIN: Warm, dry, normal color, no rash

## 2019-09-16 NOTE — HISTORY OF PRESENT ILLNESS
[de-identified] : The patient was diagnosed with recurrent head and neck SCC in April 2019 at the age of 63.  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  He completed radiation therapy (7,000 cGy) to the oral cavity on 2/11/16.  He continued with pain in the right lower lip. He was referred to Dr. Mahamed Paulson.  Biopsy 2/13/2018 of the right mandibular vestibule showed squamous cell carcinoma, well differentiated. PET/CT 3/2/18 revealed new FDG avidity in the anterior mandibular buccal region with associated irregularity of the underlying mandibular cortex, unchanged level lA lymph node. No evidence of distant metastasis. He followed with Dr. Farley who ordered a CT neck on 3/22/18 which revealed new lytic bony destruction, anterior superior aspect of the mandibular symphysis in the midline involving the roots for both central incisor teeth, no lymph nodes.  Biopsy was c/w SCCa in the area with erosion of the mandible, T4a N0 M0. \par \par He is now s/p composite resection and reconstruction with FFF on April 26, 2018. 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 He is s/p a release of the lower lip scar and mucosal graft on 3/7/19. An MRI 4/5/19 showed masslike nodular enhancement, increased in comparison to prior MR 12/20/2018 along the left posterior floor of mouth, spanning the base of tongue, glossotonsillar sulcus, and inferior left parapharyngeal space,  space, system with progression of recurrent disease.  Biopsy positive for invasive squamous cell carcinoma, keratinizing, well to moderately differentiated.  Follow up PET showed new irregular FDG avidity with central photopenia along the left posterior floor of mouth/left oral tongue, SUV 14.5, corresponding to masslike nodular enhancement which measures approximately 4.6 x 2.0 x 4.2 cm, on recent MRI.  [de-identified] : Patient presents s/p C5 Nivolumab for recurrent head and neck SCC.  + Fatigue. Odynophagia and dysphagia improved.  + Excessive secretions, improved, using suction machine less often.  Nutrition remains nearly 80% via PEG, weight stable. + Constipation.  No diarrhea, no nausea, no emesis. No edema. No neuropathy. No fevers. Swallow test 7/30/19 Wnl for patient.  He complaints of PEG dependency, dysphasia, insomnia, left neck/shoulder stiffness for which he is seeing Dr. Pascual; missed dexamethasone dose 2-3 days in a row. Patient also complains of left side tongue swelling, sore throat.

## 2019-09-17 LAB
ALBUMIN SERPL ELPH-MCNC: 3.4 G/DL — SIGNIFICANT CHANGE UP (ref 3.3–5)
ALP SERPL-CCNC: 57 U/L — SIGNIFICANT CHANGE UP (ref 40–120)
ALT FLD-CCNC: 40 U/L — SIGNIFICANT CHANGE UP (ref 4–41)
ANION GAP SERPL CALC-SCNC: 9 MMO/L — SIGNIFICANT CHANGE UP (ref 7–14)
AST SERPL-CCNC: 16 U/L — SIGNIFICANT CHANGE UP (ref 4–40)
BASOPHILS # BLD AUTO: 0.1 K/UL — SIGNIFICANT CHANGE UP (ref 0–0.2)
BASOPHILS NFR BLD AUTO: 0.9 % — SIGNIFICANT CHANGE UP (ref 0–2)
BILIRUB SERPL-MCNC: 0.8 MG/DL — SIGNIFICANT CHANGE UP (ref 0.2–1.2)
BUN SERPL-MCNC: 10 MG/DL — SIGNIFICANT CHANGE UP (ref 7–23)
CALCIUM SERPL-MCNC: 9.5 MG/DL — SIGNIFICANT CHANGE UP (ref 8.4–10.5)
CHLORIDE SERPL-SCNC: 93 MMOL/L — LOW (ref 98–107)
CO2 SERPL-SCNC: 30 MMOL/L — SIGNIFICANT CHANGE UP (ref 22–31)
CREAT SERPL-MCNC: 0.73 MG/DL — SIGNIFICANT CHANGE UP (ref 0.5–1.3)
EOSINOPHIL # BLD AUTO: 0.32 K/UL — SIGNIFICANT CHANGE UP (ref 0–0.5)
EOSINOPHIL NFR BLD AUTO: 2.9 % — SIGNIFICANT CHANGE UP (ref 0–6)
GLUCOSE SERPL-MCNC: 106 MG/DL — HIGH (ref 70–99)
HCT VFR BLD CALC: 37.3 % — LOW (ref 39–50)
HGB BLD-MCNC: 12 G/DL — LOW (ref 13–17)
IMM GRANULOCYTES NFR BLD AUTO: 2.4 % — HIGH (ref 0–1.5)
LYMPHOCYTES # BLD AUTO: 0.79 K/UL — LOW (ref 1–3.3)
LYMPHOCYTES # BLD AUTO: 7.1 % — LOW (ref 13–44)
MAGNESIUM SERPL-MCNC: 2.1 MG/DL — SIGNIFICANT CHANGE UP (ref 1.6–2.6)
MCHC RBC-ENTMCNC: 28.2 PG — SIGNIFICANT CHANGE UP (ref 27–34)
MCHC RBC-ENTMCNC: 32.2 % — SIGNIFICANT CHANGE UP (ref 32–36)
MCV RBC AUTO: 87.6 FL — SIGNIFICANT CHANGE UP (ref 80–100)
MONOCYTES # BLD AUTO: 1.37 K/UL — HIGH (ref 0–0.9)
MONOCYTES NFR BLD AUTO: 12.3 % — SIGNIFICANT CHANGE UP (ref 2–14)
NEUTROPHILS # BLD AUTO: 8.3 K/UL — HIGH (ref 1.8–7.4)
NEUTROPHILS NFR BLD AUTO: 74.4 % — SIGNIFICANT CHANGE UP (ref 43–77)
NRBC # FLD: 0 K/UL — SIGNIFICANT CHANGE UP (ref 0–0)
PHOSPHATE SERPL-MCNC: 5 MG/DL — HIGH (ref 2.5–4.5)
PLATELET # BLD AUTO: 307 K/UL — SIGNIFICANT CHANGE UP (ref 150–400)
PMV BLD: 9.1 FL — SIGNIFICANT CHANGE UP (ref 7–13)
POTASSIUM SERPL-MCNC: 4.1 MMOL/L — SIGNIFICANT CHANGE UP (ref 3.5–5.3)
POTASSIUM SERPL-SCNC: 4.1 MMOL/L — SIGNIFICANT CHANGE UP (ref 3.5–5.3)
PROT SERPL-MCNC: 6.6 G/DL — SIGNIFICANT CHANGE UP (ref 6–8.3)
RBC # BLD: 4.26 M/UL — SIGNIFICANT CHANGE UP (ref 4.2–5.8)
RBC # FLD: 15 % — HIGH (ref 10.3–14.5)
SODIUM SERPL-SCNC: 132 MMOL/L — LOW (ref 135–145)
WBC # BLD: 11.15 K/UL — HIGH (ref 3.8–10.5)
WBC # FLD AUTO: 11.15 K/UL — HIGH (ref 3.8–10.5)

## 2019-09-17 PROCEDURE — 99222 1ST HOSP IP/OBS MODERATE 55: CPT

## 2019-09-17 PROCEDURE — 99233 SBSQ HOSP IP/OBS HIGH 50: CPT

## 2019-09-17 RX ORDER — SENNA PLUS 8.6 MG/1
10 TABLET ORAL AT BEDTIME
Refills: 0 | Status: DISCONTINUED | OUTPATIENT
Start: 2019-09-17 | End: 2019-09-19

## 2019-09-17 RX ORDER — ZOLPIDEM TARTRATE 10 MG/1
5 TABLET ORAL AT BEDTIME
Refills: 0 | Status: DISCONTINUED | OUTPATIENT
Start: 2019-09-17 | End: 2019-09-19

## 2019-09-17 RX ORDER — HYDROMORPHONE HYDROCHLORIDE 2 MG/ML
1 INJECTION INTRAMUSCULAR; INTRAVENOUS; SUBCUTANEOUS EVERY 4 HOURS
Refills: 0 | Status: DISCONTINUED | OUTPATIENT
Start: 2019-09-17 | End: 2019-09-19

## 2019-09-17 RX ORDER — DOCUSATE SODIUM 100 MG
100 CAPSULE ORAL
Refills: 0 | Status: DISCONTINUED | OUTPATIENT
Start: 2019-09-17 | End: 2019-09-19

## 2019-09-17 RX ORDER — HYDROMORPHONE HYDROCHLORIDE 2 MG/ML
0.5 INJECTION INTRAMUSCULAR; INTRAVENOUS; SUBCUTANEOUS EVERY 4 HOURS
Refills: 0 | Status: DISCONTINUED | OUTPATIENT
Start: 2019-09-17 | End: 2019-09-18

## 2019-09-17 RX ORDER — TETRACAINE/BENZOCAINE/BUTAMBEN 2%-14%-2%
1 OINTMENT (GRAM) TOPICAL THREE TIMES A DAY
Refills: 0 | Status: DISCONTINUED | OUTPATIENT
Start: 2019-09-17 | End: 2019-09-19

## 2019-09-17 RX ADMIN — HYDROMORPHONE HYDROCHLORIDE 1 MILLIGRAM(S): 2 INJECTION INTRAMUSCULAR; INTRAVENOUS; SUBCUTANEOUS at 05:31

## 2019-09-17 RX ADMIN — FENTANYL CITRATE 1 PATCH: 50 INJECTION INTRAVENOUS at 07:42

## 2019-09-17 RX ADMIN — HYDROMORPHONE HYDROCHLORIDE 1 MILLIGRAM(S): 2 INJECTION INTRAMUSCULAR; INTRAVENOUS; SUBCUTANEOUS at 16:57

## 2019-09-17 RX ADMIN — PIPERACILLIN AND TAZOBACTAM 25 GRAM(S): 4; .5 INJECTION, POWDER, LYOPHILIZED, FOR SOLUTION INTRAVENOUS at 22:17

## 2019-09-17 RX ADMIN — HYDROMORPHONE HYDROCHLORIDE 1 MILLIGRAM(S): 2 INJECTION INTRAMUSCULAR; INTRAVENOUS; SUBCUTANEOUS at 20:36

## 2019-09-17 RX ADMIN — PIPERACILLIN AND TAZOBACTAM 25 GRAM(S): 4; .5 INJECTION, POWDER, LYOPHILIZED, FOR SOLUTION INTRAVENOUS at 13:34

## 2019-09-17 RX ADMIN — HYDROMORPHONE HYDROCHLORIDE 1 MILLIGRAM(S): 2 INJECTION INTRAMUSCULAR; INTRAVENOUS; SUBCUTANEOUS at 20:51

## 2019-09-17 RX ADMIN — PIPERACILLIN AND TAZOBACTAM 25 GRAM(S): 4; .5 INJECTION, POWDER, LYOPHILIZED, FOR SOLUTION INTRAVENOUS at 05:06

## 2019-09-17 RX ADMIN — FENTANYL CITRATE 1 PATCH: 50 INJECTION INTRAVENOUS at 19:00

## 2019-09-17 RX ADMIN — HYDROMORPHONE HYDROCHLORIDE 1 MILLIGRAM(S): 2 INJECTION INTRAMUSCULAR; INTRAVENOUS; SUBCUTANEOUS at 05:16

## 2019-09-17 RX ADMIN — SENNA PLUS 10 MILLILITER(S): 8.6 TABLET ORAL at 22:18

## 2019-09-17 RX ADMIN — HYDROMORPHONE HYDROCHLORIDE 0.5 MILLIGRAM(S): 2 INJECTION INTRAMUSCULAR; INTRAVENOUS; SUBCUTANEOUS at 10:39

## 2019-09-17 RX ADMIN — ZOLPIDEM TARTRATE 5 MILLIGRAM(S): 10 TABLET ORAL at 23:28

## 2019-09-17 RX ADMIN — HYDROMORPHONE HYDROCHLORIDE 0.5 MILLIGRAM(S): 2 INJECTION INTRAMUSCULAR; INTRAVENOUS; SUBCUTANEOUS at 11:09

## 2019-09-17 RX ADMIN — Medication 100 MILLIGRAM(S): at 22:18

## 2019-09-17 RX ADMIN — ZOLPIDEM TARTRATE 5 MILLIGRAM(S): 10 TABLET ORAL at 00:11

## 2019-09-17 RX ADMIN — HYDROMORPHONE HYDROCHLORIDE 1 MILLIGRAM(S): 2 INJECTION INTRAMUSCULAR; INTRAVENOUS; SUBCUTANEOUS at 16:27

## 2019-09-17 NOTE — PROGRESS NOTE ADULT - ASSESSMENT
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 CT neck demonstrates possible abscess within necrotic infiltrating oropharyngeal mass, no significant interval change 9/6. Pt initiated on IV zosyn.   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 CT neck demonstrates possible abscess within necrotic infiltrating oropharyngeal mass, no significant interval change 9/6. Pt initiated on IV zosyn.   ID consult requested  Severe protein kamala malnutrition

## 2019-09-17 NOTE — DIETITIAN INITIAL EVALUATION ADULT. - PERTINENT LABORATORY DATA
09-17 Na132 mmol/L<L> Glu 106 mg/dL<H> K+ 4.1 mmol/L Cr  0.73 mg/dL BUN 10 mg/dL 09-17 Phos 5.0 mg/dL<H> 09-17 Alb 3.4 g/dL

## 2019-09-17 NOTE — CHART NOTE - NSCHARTNOTEFT_GEN_A_CORE
NUTRITION SERVICES     Upon Nutritional Assessment by the Registered Dietitian your patient was determined to meet criteria/ has evidence of the following diagnosis/diagnoses:  [ ] Mild Protein Calorie Malnutrition   [ ] Moderate Protein Calorie Malnutrition   [X ] Severe Protein Calorie Malnutrition   [ ] Unspecified Protein Calorie Malnutrition   [ ] Underweight / BMI <19  [ ] Morbid Obesity / BMI >40    Findings as based on:  •  Comprehensive nutritional assessment and consultation    Please refer to Initial Dietitian Evaluation or Nutrition Follow-up via documents section of [employer] EMR for further recommendations.

## 2019-09-17 NOTE — DIETITIAN INITIAL EVALUATION ADULT. - ETIOLOGY
patient meets criteria for severe malnutrition in the context of chronic illness (SCC of oral cavity)

## 2019-09-17 NOTE — DIETITIAN INITIAL EVALUATION ADULT. - PHYSICAL APPEARANCE
underweight/Nutrition focused physical exam conducted - found signs of malnutrition [ ]absent [X]presentSubcutaneous fat loss: [Severe] Orbital fat pads region, [unable ]Buccal fat region, [ severe]Triceps region,   Muscle wasting: [ severe]Temples region, [severe ]Clavicle region,[severe ]Interosseous region,  [severe ]thigh region, [Severe ]Calf region

## 2019-09-17 NOTE — DIETITIAN INITIAL EVALUATION ADULT. - ENTERAL
Suggest TwoCal HN bolus feeds via PEG at 250ml [id number] equates to total volume of 1000ml, 2000kcal and 83.5g pro/day to meet estimated kcal/pro needs. 2. Monitor weights, labs, BM's, skin integrity, tolerance to EN 3. Further adjustments to rate/volume/duration/free water provision of enteral feeds dependant on long term monitoring of patient's tolerance, weight trends and needs.

## 2019-09-17 NOTE — CONSULT NOTE ADULT - SUBJECTIVE AND OBJECTIVE BOX
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  Then, s/p composite resection and bilateral neck dissection with fibular free flap construction on 4/26/2018, release of lower lip scar and mucosal graft on 3/7/2019, with extensive locoregional recurrence in left floor of mouth, left anterior tongue and left cervical nodes, on nivolumab and RT.  CT from 9/6 with progression of disease.  Admitted this time with leukocytosis and worsening drainage from his L mandibular area.      Per the chart, he was due to start chemotherapy 9/16, however, noted to have leukocytosis and increasing drainage from a mandibular ulcer - referred to the ER.  Per chart, patient first noticed a L mandibular pimple about a month ago associated with worsening erythema, swelling, and drainage which prompted him to go to [facility]. He was treated with Bactrim but developed a diffuse skin rash and was switched to Cipro with some symptom improvement. However, since that time he has had numerous ED visits for the drainage. Pt most recently admitted at [facility] 9/6-9/7 for draining abscess. CT neck from 9/6 demonstrated a partially necrotic infiltrating mass containing foci of air involving the L oral cavity floor and L oropharynx extending to the inferior aspect of the L parotid space and LE submandibular space; abscess could not be excluded. Pt received 24 hrs of zosyn and was discharged home on medrol dosepak. Pt states he has had continued drainage that has changed in color the past few days from serosanguinous to red/brown in color and become much more profuse requiring q1hr dressing changes. It is also foul smelling. He was prescribed Cipro and Keflex this week with no improvement. Pt developed sharp pain in the area day of admission.  No f/c, n/v, abdominal pain, cp, sob.     Patient was started on zosyn.  CT with continued partially necrotic infiltrating mass with foci of air of the left oral cavity/floor of the mouth and left oropharynx inseparable from the medial pterygoid and extending to the inferior aspect of the left parotid space and left submandibular space. areas of underlying abscess formation cannot be excluded in the region of necrosis.    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 3.375 every 8 hours (9/16-)    MEDICATIONS  (STANDING):  docusate sodium Liquid 100 two times a day  fentaNYL   Patch  25 MICROgram(s)/Hr 1 every 72 hours  influenza   Vaccine 0.5 once  senna Syrup 10 at bedtime  PRN  HYDROmorphone  Injectable 0.5 milliGRAM(s) IV Push every 4 hours PRN  HYDROmorphone  Injectable 1 milliGRAM(s) IV Push every 4 hours PRN  zolpidem 5 milliGRAM(s) Oral at bedtime PRN    SOCIAL HISTORY:       FAMILY HISTORY:  Family history of lung cancer: in Father    REVIEW OF SYSTEMS  [  ] ROS unobtainable because:    [  ] All other systems negative except as noted below:	    Constitutional:  [ ] fever [ ] chills  [ ] weight loss  [ ] weakness  Skin:  [ ] rash [ ] phlebitis	  Eyes: [ ] icterus [ ] pain  [ ] discharge	  ENMT: [ ] sore throat  [ ] thrush [ ] ulcers [ ] exudates  Respiratory: [ ] dyspnea [ ] hemoptysis [ ] cough [ ] sputum	  Cardiovascular:  [ ] chest pain [ ] palpitations [ ] edema	  Gastrointestinal:  [ ] nausea [ ] vomiting [ ] diarrhea [ ] constipation [ ] pain	  Genitourinary:  [ ] dysuria [ ] frequency [ ] hematuria [ ] discharge [ ] flank pain  [ ] incontinence  Musculoskeletal:  [ ] myalgias [ ] arthralgias [ ] arthritis  [ ] back pain  Neurological:  [ ] headache [ ] seizures  [ ] confusion/altered mental status  Psychiatric:  [ ] anxiety [ ] depression	  Hematology/Lymphatics:  [ ] lymphadenopathy  Endocrine:  [ ] adrenal [ ] thyroid  Allergic/Immunologic:	 [ ] transplant [ ] seasonal    Vital Signs Last 24 Hrs  T(F): 98.8 (09-17-19 @ 14:45), Max: 98.9 (09-17-19 @ 10:00)  Vital Signs Last 24 Hrs  HR: 98 (09-17-19 @ 14:45) (74 - 98)  BP: 110/75 (09-17-19 @ 14:45) (102/66 - 123/69)  RR: 18 (09-17-19 @ 14:45)  SpO2: 99% (09-17-19 @ 14:45) (95% - 100%)  Wt(kg): --    PHYSICAL EXAM:                              12.0   11.15 )-----------( 307      ( 17 Sep 2019 07:30 )             37.3     132<L>  |  93<L>  |  10  ----------------------------<  106<H>  4.1   |  30  |  0.73    Ca    9.5      17 Sep 2019 07:30  Phos  5.0     09-17  Mg     2.1     09-17    TPro  6.6  /  Alb  3.4  /  TBili  0.8  /  DBili  x   /  AST  16  /  ALT  40  /  AlkPhos  57  09-17    MICROBIOLOGY:  Culture - Blood (09.06.19 @ 16:01)    Culture - Blood:   NO ORGANISMS ISOLATED    Specimen Source: BLOOD VENOUS    Culture - Blood (09.06.19 @ 16:01)    Culture - Blood:   NO ORGANISMS ISOLATED    Specimen Source: BLOOD PERIPHERAL    Culture - Wound (09.06.19 @ 15:25)    Culture - Wound:   MODERATE  STRAAC^Streptococcus constellatus  PASCAN^Pasteurella canis    Specimen Source: ARM - LEFT    RADIOLOGY:  imaging below personally reviewed    CT Neck Soft Tissue w/ IV Cont (09.16.19 @ 16:46) >  IMPRESSION:  No significant interval change from CT neck obtained on 9/6/2019.      CT Neck Soft Tissue w/ IV Cont (09.06.19 @ 17:10) >  IMPRESSION:  Redemonstrated partially necrotic infiltrating mass containing foci of air involving the left oral cavity-floor of mouth and left oropharynx, inseparable from the medial pterygoid, extending to the inferior aspect of the left parotid space and left submandibular space. Areas of underlying abscess formation can't be excluded in the region of necrosis. These findings appear similar compared to the September 3 exam.    Sono Gallbladder (09.03.19 @ 21:50) >  IMPRESSION:  Cholelithiasis without sonographic evidence of acute cholecystitis.    NM PET/CT Onc FDG Skull to Thigh, Subsq (08.22.19 @ 14:32) >  IMPRESSION: Since FDG-PET/CT scan 4/22/2019:  1. Findings suggestive of progression of left base of tongue/oropharyngeal malignancy with few new left left cervical lymphadenopathy.  2. New FDG-avidity along the right posterior floor of mouth region and mildly FDG avid difficult to delineate small lymph node, posterior to the right thyroid lobe and right common carotid artery. Please correlate clinically.  3.  New groundglassopacity in the lingula and cluster of tiny nodular opacities in the anterior right upper lobe, possibly infectious/inflammatory.  Short interval follow-up chest CT for further evaluation.   4. Several new FDG-avid difficult to delineate smallmediastinal and right hilar nodes, nonspecific.    CT Neck Soft Tissue w/ IV Cont (09.03.19 @ 18:38) >  IMPRESSION:  Findings consistent with progressive malignancy without acute findings Rochelle Payne - [phone]    Patient is a 63y old  Male who presents with a chief complaint of leukocytosis, abscess drainage (17 Sep 2019 10:48)    HPI:  63M with Right mandibular squamous cell carcinoma 2015 s/p chemo/RT.  Recurrent of tumor 2018 s/p resection and bilateral neck dissection with fibular free flap construction on 4/26/2018, release of lower lip scar and mucosal graft on 3/7/2019.  For the past month, he has not been feeling well and was eventually diagnosed with extensive locoregional recurrence in left floor of mouth, left anterior tongue and left cervical nodes. Presented to [facility] where aspirated fluid grew streptococcus.  Placed on oral bactrim.  Complicated by rash.  Changed to ciprofloxacin.     Pt most recently admitted at [facility] 9/6-9/7 for draining abscess. CT neck from 9/6 demonstrated a partially necrotic infiltrating mass containing foci of air involving the L oral cavity floor and L oropharynx extending to the inferior aspect of the L parotid space and LE submandibular space; abscess could not be excluded. Pt received 24 hrs of zosyn and was discharged home on medrol dosepak. Pt states he has had continued drainage that has changed in color the past few days from serosanguinous to red/brown in color and become much more profuse requiring q1hr dressing changes. It is also foul smelling. He was prescribed Cipro and Keflex this week with no improvement. Pt developed sharp pain in the area day of admission.  No f/c, n/v, abdominal pain, cp, sob.     He was due to start chemotherapy 9/16, however, noted to have leukocytosis and increasing drainage from a mandibular ulcer - referred to the ER.  Patient was started on zosyn.  CT with continued partially necrotic infiltrating mass with foci of air of the left oral cavity/floor of the mouth and left oropharynx inseparable from the medial pterygoid and extending to the inferior aspect of the left parotid space and left submandibular space. areas of underlying abscess formation cannot be excluded in the region of necrosis.    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 3.375 every 8 hours (9/16-)    MEDICATIONS  (STANDING):  docusate sodium Liquid 100 two times a day  fentaNYL   Patch  25 MICROgram(s)/Hr 1 every 72 hours  influenza   Vaccine 0.5 once  senna Syrup 10 at bedtime  PRN  HYDROmorphone  Injectable 0.5 milliGRAM(s) IV Push every 4 hours PRN  HYDROmorphone  Injectable 1 milliGRAM(s) IV Push every 4 hours PRN  zolpidem 5 milliGRAM(s) Oral at bedtime PRN    SOCIAL HISTORY:  chewing tobacco    FAMILY HISTORY:  Family history of lung cancer: in Father    REVIEW OF SYSTEMS  [  ] ROS unobtainable because:    [ x ] All other systems negative except as noted below:	    Constitutional:  [ ] fever [ ] chills  [ ] weight loss  [ x] weakness  Skin:  [ ] rash [ ] phlebitis	  Eyes: [ ] icterus [ ] pain  [ ] discharge	  ENMT: [ ] sore throat  [ ] thrush [x ] ulcers with foul smelling drainage and swelling of the left face [ ] exudates  Respiratory: [ ] dyspnea [ ] hemoptysis [ ] cough [ ] sputum	  Cardiovascular:  [ ] chest pain [ ] palpitations [ ] edema	  Gastrointestinal:  [ ] nausea [ ] vomiting [ ] diarrhea [ ] constipation [ ] pain	  Genitourinary:  [ ] dysuria [ ] frequency [ ] hematuria [ ] discharge [ ] flank pain  [ ] incontinence  Musculoskeletal:  [ ] myalgias [ ] arthralgias [ ] arthritis  [ ] back pain  Neurological:  [ ] headache [ ] seizures  [ ] confusion/altered mental status  Psychiatric:  [ ] anxiety [ ] depression	  Hematology/Lymphatics:  [ ] lymphadenopathy  Endocrine:  [ ] adrenal [ ] thyroid  Allergic/Immunologic:	 [ ] transplant [ ] seasonal    Vital Signs Last 24 Hrs  T(F): 98.8 (09-17-19 @ 14:45), Max: 98.9 (09-17-19 @ 10:00)  Vital Signs Last 24 Hrs  HR: 98 (09-17-19 @ 14:45) (74 - 98)  BP: 110/75 (09-17-19 @ 14:45) (102/66 - 123/69)  RR: 18 (09-17-19 @ 14:45)  SpO2: 99% (09-17-19 @ 14:45) (95% - 100%)  Wt(kg): --    PHYSICAL EXAM:  General: non-toxic  HEAD/EYES: anicteric  ENT:  swelling L face near parotid with warmth, redness and ulcer that has malodorous drainage; post-surgical changes chin  Cardiovascular:   S1, S2  Respiratory:  clear bilaterally  GI:  soft, PEG okay  :  no dow  Musculoskeletal:  no synovitis  Neurologic:  grossly non-focal  Skin:  no rash  Psychiatric:  appropriate affect  Vascular:  no phlebitis                        12.0   11.15 )-----------( 307      ( 17 Sep 2019 07:30 )             37.3     132<L>  |  93<L>  |  10  ----------------------------<  106<H>  4.1   |  30  |  0.73    Ca    9.5      17 Sep 2019 07:30  Phos  5.0     09-17  Mg     2.1     09-17    TPro  6.6  /  Alb  3.4  /  TBili  0.8  /  DBili  x   /  AST  16  /  ALT  40  /  AlkPhos  57  09-17    MICROBIOLOGY:  Culture - Blood (09.06.19 @ 16:01)    Culture - Blood:   NO ORGANISMS ISOLATED    Specimen Source: BLOOD VENOUS    Culture - Blood (09.06.19 @ 16:01)    Culture - Blood:   NO ORGANISMS ISOLATED    Specimen Source: BLOOD PERIPHERAL    Culture - Wound (09.06.19 @ 15:25)    Culture - Wound:   MODERATE  STRAAC^Streptococcus constellatus  PASCAN^Pasteurella canis    Specimen Source: ARM - LEFT    RADIOLOGY:  imaging below personally reviewed    CT Neck Soft Tissue w/ IV Cont (09.16.19 @ 16:46) >  IMPRESSION:  No significant interval change from CT neck obtained on 9/6/2019.      CT Neck Soft Tissue w/ IV Cont (09.06.19 @ 17:10) >  IMPRESSION:  Redemonstrated partially necrotic infiltrating mass containing foci of air involving the left oral cavity-floor of mouth and left oropharynx, inseparable from the medial pterygoid, extending to the inferior aspect of the left parotid space and left submandibular space. Areas of underlying abscess formation can't be excluded in the region of necrosis. These findings appear similar compared to the September 3 exam.    Sono Gallbladder (09.03.19 @ 21:50) >  IMPRESSION:  Cholelithiasis without sonographic evidence of acute cholecystitis.    NM PET/CT Onc FDG Skull to Thigh, Subsq (08.22.19 @ 14:32) >  IMPRESSION: Since FDG-PET/CT scan 4/22/2019:  1. Findings suggestive of progression of left base of tongue/oropharyngeal malignancy with few new left left cervical lymphadenopathy.  2. New FDG-avidity along the right posterior floor of mouth region and mildly FDG avid difficult to delineate small lymph node, posterior to the right thyroid lobe and right common carotid artery. Please correlate clinically.  3.  New groundglassopacity in the lingula and cluster of tiny nodular opacities in the anterior right upper lobe, possibly infectious/inflammatory.  Short interval follow-up chest CT for further evaluation.   4. Several new FDG-avid difficult to delineate smallmediastinal and right hilar nodes, nonspecific.    CT Neck Soft Tissue w/ IV Cont (09.03.19 @ 18:38) >  IMPRESSION:  Findings consistent with progressive malignancy without acute findings

## 2019-09-17 NOTE — CONSULT NOTE ADULT - ASSESSMENT
63M with recurrent squamous cell carcinoma.  Hx chemo/RT 2015. Hx resection, neck dissection and fibular bone flap 2018.  Recurrent disease 2019 with superinfection of L oral cavity and oropharyngeal mass.  Will need to discuss with ENT if there is concern for involvement of the bone graft.    Suggest:  - zosyn for now is okay  - likely transition to oral augmentin when he is stable for discharge  - duration TBD pending discussion with ENT

## 2019-09-17 NOTE — DIETITIAN INITIAL EVALUATION ADULT. - ENERGY NEEDS
Weight 132lbs/60kg Height 5'7" (confirmed) BMI=20.7kg/m2  IBW: 148lbs (+/-10%) %IBW: 89%  No edema or pressure injury noted.
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
## 2019-09-17 NOTE — DIETITIAN INITIAL EVALUATION ADULT. - PROBLEM SELECTOR PLAN 4
DVT ppx: Hold pharmacologic ppx for now pending ENT attending eval and possibility of procedure  Diet: Jevity 1.2 through PEG, nutrition c/s  Code status: full code

## 2019-09-17 NOTE — DIETITIAN INITIAL EVALUATION ADULT. - PERTINENT MEDS FT
MEDICATIONS  (STANDING):  fentaNYL   Patch  25 MICROgram(s)/Hr 1 Patch Transdermal every 72 hours  influenza   Vaccine 0.5 milliLiter(s) IntraMuscular once  piperacillin/tazobactam IVPB.. 3.375 Gram(s) IV Intermittent every 8 hours    MEDICATIONS  (PRN):  HYDROmorphone  Injectable 0.5 milliGRAM(s) IV Push every 4 hours PRN Moderate Pain (4 - 6)  HYDROmorphone  Injectable 1 milliGRAM(s) IV Push every 4 hours PRN Severe Pain (7 - 10)  zolpidem 5 milliGRAM(s) Oral at bedtime PRN Insomnia

## 2019-09-17 NOTE — DIETITIAN INITIAL EVALUATION ADULT. - PROBLEM SELECTOR PLAN 1
CT neck showing similar-appearing partially necrotic infiltrating mass containing foci of air involving the left oral cavity-floor of mouth and soft tissues of the   left oropharynx, inseparable from the medial pterygoid, extending to the   left parotid space and left submandibular space. The presence of abscess   within these necrotic regions is not excluded. However, leukocytosis likely from pt's recent use of Medrol dosepak, with pt remaining afebrile.  - ENT evaluated pt in ED, no intervention currently indicated as per ENT; will f/u ENT attending recs  - C/w IV zosyn for now pending ENT attending eval and ID consult  - ID consult in AM  - Pt currently protecting airway despite oral secretions. However, will monitor on continuous pulse ox, especially as pt is on increased doses of pain meds with risk of oversedation.  - Monitor VBG pCO2

## 2019-09-17 NOTE — DIETITIAN INITIAL EVALUATION ADULT. - PROBLEM SELECTOR PLAN 3
Pt prescribed alprazolam 0.5 mg on 8/30 but has not been taking it. Pt also prescribed zolpidem 10 mg on 8/31 that he took last night, but he also does not take it consistently. He is concerned about his inability to fall asleep  - will c/w zolpidem for now but will hold alprazolam given concern for oversedation as he is also on increased doses of opioids

## 2019-09-17 NOTE — DIETITIAN INITIAL EVALUATION ADULT. - PROBLEM SELECTOR PLAN 2
- per Dr. Cai's oupt note, consensus of head/neck tumor board for palliative measures, no surgery or RT  - pt will need outpt f/u  - pain control. pt previously on morphine sulf 100 mg/5 ml conc (prescribed by pain management istop# [id number]) through PEG but states it is no longer helping  - c/w fentanyl 25 mcg patch. will c/w dilaudid 0.5 mg IVP q4h PRN for moderate pain and 1 mg IVP q4h PRN for severe pain.  - consider palliative c/s for symptom management

## 2019-09-17 NOTE — PROGRESS NOTE ADULT - SUBJECTIVE AND OBJECTIVE BOX
Patient seen and examined at the bedside. No acute events overnight. Overall able to sleep, no significant change in neck.    General: NAD  Neck: post-treatment changes, small tender area at left lateral neck, with ~1cm shallow opening with granulation tissue, 0cc of drainage expressed this morning, wound bed superficially debrided with healthy granulation tissue seen   NC: wnl  OC/OP: some pooling of secretions which is baseline per patient, post-surgical changes, evidence of disease in the left FOM    WBC - 13  CT - no significant change since previous admission    A/P: 63M with recurrent SCC with progression of disease causing left neck drainage. Does not appear grossly infected at this time and no evidence of abscess.  - no acute ENT intervention  - can continue IV zosyn  - nothing by mouth, tube feeds  - will discuss case with attending and update team with additional recommendations  - will follow  - please page with questions

## 2019-09-17 NOTE — PROGRESS NOTE ADULT - SUBJECTIVE AND OBJECTIVE BOX
Patient is a 63y old  Male who presents with a chief complaint of leukocytosis, abscess drainage (17 Sep 2019 07:48)      SUBJECTIVE / OVERNIGHT EVENTS:    MEDICATIONS  (STANDING):  fentaNYL   Patch  25 MICROgram(s)/Hr 1 Patch Transdermal every 72 hours  influenza   Vaccine 0.5 milliLiter(s) IntraMuscular once  piperacillin/tazobactam IVPB.. 3.375 Gram(s) IV Intermittent every 8 hours    MEDICATIONS  (PRN):  HYDROmorphone  Injectable 0.5 milliGRAM(s) IV Push every 4 hours PRN Moderate Pain (4 - 6)  HYDROmorphone  Injectable 1 milliGRAM(s) IV Push every 4 hours PRN Severe Pain (7 - 10)  zolpidem 5 milliGRAM(s) Oral at bedtime PRN Insomnia      Vital Signs Last 24 Hrs  T(C): 37.2 (17 Sep 2019 10:00), Max: 37.2 (17 Sep 2019 10:00)  T(F): 98.9 (17 Sep 2019 10:00), Max: 98.9 (17 Sep 2019 10:00)  HR: 80 (17 Sep 2019 10:38) (74 - 88)  BP: 107/71 (17 Sep 2019 10:38) (102/66 - 123/69)  BP(mean): --  RR: 18 (17 Sep 2019 10:38) (17 - 19)  SpO2: 97% (17 Sep 2019 10:38) (95% - 100%)      PHYSICAL EXAM:  GENERAL: NAD, well-developed  HEAD:  Atraumatic, Normocephalic  EYES: EOMI, PERRLA, conjunctiva and sclera clear  NECK: Supple, No JVD  CHEST/LUNG: Clear to auscultation bilaterally; No wheeze  HEART: Regular rate and rhythm; No murmurs, rubs, or gallops  ABDOMEN: Soft, Nontender, Nondistended; Bowel sounds present  EXTREMITIES:  2+ Peripheral Pulses, No clubbing, cyanosis, or edema  PSYCH: Alert    LABS:                        12.0   11.15 )-----------( 307      ( 17 Sep 2019 07:30 )             37.3     09-17    132<L>  |  93<L>  |  10  ----------------------------<  106<H>  4.1   |  30  |  0.73    Ca    9.5      17 Sep 2019 07:30  Phos  5.0     09-17  Mg     2.1     09-17    TPro  6.6  /  Alb  3.4  /  TBili  0.8  /  DBili  x   /  AST  16  /  ALT  40  /  AlkPhos  57  09-17              RADIOLOGY & ADDITIONAL TESTS:    Imaging Personally Reviewed:    Consultant(s) Notes Reviewed:      Care Discussed with Consultants/Other Providers: Patient is a 63y old  Male who presents with a chief complaint of leukocytosis, abscess drainage (17 Sep 2019 07:48)      SUBJECTIVE / OVERNIGHT EVENTS:  patient seen with NP.  Patient notes he is on Antibiotics    MEDICATIONS  (STANDING):  fentaNYL   Patch  25 MICROgram(s)/Hr 1 Patch Transdermal every 72 hours  influenza   Vaccine 0.5 milliLiter(s) IntraMuscular once  piperacillin/tazobactam IVPB.. 3.375 Gram(s) IV Intermittent every 8 hours    MEDICATIONS  (PRN):  HYDROmorphone  Injectable 0.5 milliGRAM(s) IV Push every 4 hours PRN Moderate Pain (4 - 6)  HYDROmorphone  Injectable 1 milliGRAM(s) IV Push every 4 hours PRN Severe Pain (7 - 10)  zolpidem 5 milliGRAM(s) Oral at bedtime PRN Insomnia      Vital Signs Last 24 Hrs  T(C): 37.2 (17 Sep 2019 10:00), Max: 37.2 (17 Sep 2019 10:00)  T(F): 98.9 (17 Sep 2019 10:00), Max: 98.9 (17 Sep 2019 10:00)  HR: 80 (17 Sep 2019 10:38) (74 - 88)  BP: 107/71 (17 Sep 2019 10:38) (102/66 - 123/69)  BP(mean): --  RR: 18 (17 Sep 2019 10:38) (17 - 19)  SpO2: 97% (17 Sep 2019 10:38) (95% - 100%)      PHYSICAL EXAM:  GENERAL: NAD, well-developed  L jaw swelling  HEAD:  Atraumatic, Normocephalic  EYES: EOMI, PERRLA, conjunctiva and sclera clear  NECK: Supple, No JVD  Dressing to front neck  CHEST/LUNG: Clear to auscultation bilaterally; No wheeze  HEART: Regular rate and rhythm; No murmurs, rubs, or gallops  ABDOMEN: Soft, Nontender, Nondistended; Bowel sounds present  Peg in place  EXTREMITIES:  2+ Peripheral Pulses, No clubbing, cyanosis, or edema  PSYCH: Alert, Ox3    LABS:                        12.0   11.15 )-----------( 307      ( 17 Sep 2019 07:30 )             37.3     09-17    132<L>  |  93<L>  |  10  ----------------------------<  106<H>  4.1   |  30  |  0.73    Ca    9.5      17 Sep 2019 07:30  Phos  5.0     09-17  Mg     2.1     09-17    TPro  6.6  /  Alb  3.4  /  TBili  0.8  /  DBili  x   /  AST  16  /  ALT  40  /  AlkPhos  57  09-17            Care Discussed with Consultants/Other Providers:  Np

## 2019-09-17 NOTE — DIETITIAN INITIAL EVALUATION ADULT. - OTHER INFO
Patient familiar to RDN from prior admission. 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 At home, patient maintain on TwoCal HN via PEG bolus feeds of 4cans per day equates to 948ml, 1900kcal, 79.6g pro/day tolerating  well. He is currently on continuos feeds of Jevity 1.2 40ml/hr = 960ml, 1152kcal and 53g pro insufficient kcal/pro. Patient denies any nausea and vomiting, no bowel movement last 2 days. Case discussed with ADS-recommendations suggested and appreciate change of order. TF changed to home regimen (TwoCal HN) as patient requested and bowel regimen suggested. Discussed with patient to increase TF to 250ml [id number] to meet estimated kcal/pro needs which he was amenable to. Patient with history of weight loss (refer to RDN note on 6/15/19). Weight trend as follows: 170# from November; 150 lbs 5/14/19 as per [facility] record. He weighed 134.9lbs on last admission 6/14/19 and now 60kg/132.2lbs on 9/16/19 indicative of further weight loss.

## 2019-09-18 ENCOUNTER — TRANSCRIPTION ENCOUNTER (OUTPATIENT)
Age: 64
End: 2019-09-18

## 2019-09-18 ENCOUNTER — APPOINTMENT (OUTPATIENT)
Age: 64
End: 2019-09-18

## 2019-09-18 LAB
ANION GAP SERPL CALC-SCNC: 10 MMO/L — SIGNIFICANT CHANGE UP (ref 7–14)
BASOPHILS # BLD AUTO: 0.09 K/UL — SIGNIFICANT CHANGE UP (ref 0–0.2)
BASOPHILS NFR BLD AUTO: 0.6 % — SIGNIFICANT CHANGE UP (ref 0–2)
BUN SERPL-MCNC: 11 MG/DL — SIGNIFICANT CHANGE UP (ref 7–23)
CALCIUM SERPL-MCNC: 9.4 MG/DL — SIGNIFICANT CHANGE UP (ref 8.4–10.5)
CHLORIDE SERPL-SCNC: 93 MMOL/L — LOW (ref 98–107)
CO2 SERPL-SCNC: 30 MMOL/L — SIGNIFICANT CHANGE UP (ref 22–31)
CREAT SERPL-MCNC: 0.7 MG/DL — SIGNIFICANT CHANGE UP (ref 0.5–1.3)
EOSINOPHIL # BLD AUTO: 0.47 K/UL — SIGNIFICANT CHANGE UP (ref 0–0.5)
EOSINOPHIL NFR BLD AUTO: 3.4 % — SIGNIFICANT CHANGE UP (ref 0–6)
GLUCOSE SERPL-MCNC: 171 MG/DL — HIGH (ref 70–99)
HCT VFR BLD CALC: 38.5 % — LOW (ref 39–50)
HGB BLD-MCNC: 12.5 G/DL — LOW (ref 13–17)
IMM GRANULOCYTES NFR BLD AUTO: 1.4 % — SIGNIFICANT CHANGE UP (ref 0–1.5)
LYMPHOCYTES # BLD AUTO: 0.83 K/UL — LOW (ref 1–3.3)
LYMPHOCYTES # BLD AUTO: 5.9 % — LOW (ref 13–44)
MAGNESIUM SERPL-MCNC: 2.1 MG/DL — SIGNIFICANT CHANGE UP (ref 1.6–2.6)
MCHC RBC-ENTMCNC: 28.9 PG — SIGNIFICANT CHANGE UP (ref 27–34)
MCHC RBC-ENTMCNC: 32.5 % — SIGNIFICANT CHANGE UP (ref 32–36)
MCV RBC AUTO: 88.9 FL — SIGNIFICANT CHANGE UP (ref 80–100)
MONOCYTES # BLD AUTO: 1.25 K/UL — HIGH (ref 0–0.9)
MONOCYTES NFR BLD AUTO: 8.9 % — SIGNIFICANT CHANGE UP (ref 2–14)
NEUTROPHILS # BLD AUTO: 11.17 K/UL — HIGH (ref 1.8–7.4)
NEUTROPHILS NFR BLD AUTO: 79.8 % — HIGH (ref 43–77)
NRBC # FLD: 0 K/UL — SIGNIFICANT CHANGE UP (ref 0–0)
PHOSPHATE SERPL-MCNC: 3.3 MG/DL — SIGNIFICANT CHANGE UP (ref 2.5–4.5)
PLATELET # BLD AUTO: 353 K/UL — SIGNIFICANT CHANGE UP (ref 150–400)
PMV BLD: 9.4 FL — SIGNIFICANT CHANGE UP (ref 7–13)
POTASSIUM SERPL-MCNC: 3.8 MMOL/L — SIGNIFICANT CHANGE UP (ref 3.5–5.3)
POTASSIUM SERPL-SCNC: 3.8 MMOL/L — SIGNIFICANT CHANGE UP (ref 3.5–5.3)
RBC # BLD: 4.33 M/UL — SIGNIFICANT CHANGE UP (ref 4.2–5.8)
RBC # FLD: 14.9 % — HIGH (ref 10.3–14.5)
SODIUM SERPL-SCNC: 133 MMOL/L — LOW (ref 135–145)
WBC # BLD: 14.01 K/UL — HIGH (ref 3.8–10.5)
WBC # FLD AUTO: 14.01 K/UL — HIGH (ref 3.8–10.5)

## 2019-09-18 PROCEDURE — 99232 SBSQ HOSP IP/OBS MODERATE 35: CPT

## 2019-09-18 RX ORDER — OXYCODONE HYDROCHLORIDE 5 MG/1
5 TABLET ORAL EVERY 4 HOURS
Refills: 0 | Status: DISCONTINUED | OUTPATIENT
Start: 2019-09-18 | End: 2019-09-19

## 2019-09-18 RX ADMIN — HYDROMORPHONE HYDROCHLORIDE 0.5 MILLIGRAM(S): 2 INJECTION INTRAMUSCULAR; INTRAVENOUS; SUBCUTANEOUS at 17:02

## 2019-09-18 RX ADMIN — HYDROMORPHONE HYDROCHLORIDE 1 MILLIGRAM(S): 2 INJECTION INTRAMUSCULAR; INTRAVENOUS; SUBCUTANEOUS at 05:21

## 2019-09-18 RX ADMIN — HYDROMORPHONE HYDROCHLORIDE 0.5 MILLIGRAM(S): 2 INJECTION INTRAMUSCULAR; INTRAVENOUS; SUBCUTANEOUS at 12:40

## 2019-09-18 RX ADMIN — OXYCODONE HYDROCHLORIDE 5 MILLIGRAM(S): 5 TABLET ORAL at 22:45

## 2019-09-18 RX ADMIN — Medication 100 MILLIGRAM(S): at 22:17

## 2019-09-18 RX ADMIN — FENTANYL CITRATE 1 PATCH: 50 INJECTION INTRAVENOUS at 19:00

## 2019-09-18 RX ADMIN — HYDROMORPHONE HYDROCHLORIDE 1 MILLIGRAM(S): 2 INJECTION INTRAMUSCULAR; INTRAVENOUS; SUBCUTANEOUS at 01:30

## 2019-09-18 RX ADMIN — SENNA PLUS 10 MILLILITER(S): 8.6 TABLET ORAL at 22:16

## 2019-09-18 RX ADMIN — Medication 1 SPRAY(S): at 00:04

## 2019-09-18 RX ADMIN — HYDROMORPHONE HYDROCHLORIDE 1 MILLIGRAM(S): 2 INJECTION INTRAMUSCULAR; INTRAVENOUS; SUBCUTANEOUS at 05:37

## 2019-09-18 RX ADMIN — Medication 100 MILLIGRAM(S): at 12:10

## 2019-09-18 RX ADMIN — OXYCODONE HYDROCHLORIDE 5 MILLIGRAM(S): 5 TABLET ORAL at 22:16

## 2019-09-18 RX ADMIN — PIPERACILLIN AND TAZOBACTAM 25 GRAM(S): 4; .5 INJECTION, POWDER, LYOPHILIZED, FOR SOLUTION INTRAVENOUS at 16:28

## 2019-09-18 RX ADMIN — HYDROMORPHONE HYDROCHLORIDE 0.5 MILLIGRAM(S): 2 INJECTION INTRAMUSCULAR; INTRAVENOUS; SUBCUTANEOUS at 17:32

## 2019-09-18 RX ADMIN — PIPERACILLIN AND TAZOBACTAM 25 GRAM(S): 4; .5 INJECTION, POWDER, LYOPHILIZED, FOR SOLUTION INTRAVENOUS at 05:21

## 2019-09-18 RX ADMIN — HYDROMORPHONE HYDROCHLORIDE 1 MILLIGRAM(S): 2 INJECTION INTRAMUSCULAR; INTRAVENOUS; SUBCUTANEOUS at 01:15

## 2019-09-18 RX ADMIN — ZOLPIDEM TARTRATE 5 MILLIGRAM(S): 10 TABLET ORAL at 23:08

## 2019-09-18 RX ADMIN — HYDROMORPHONE HYDROCHLORIDE 0.5 MILLIGRAM(S): 2 INJECTION INTRAMUSCULAR; INTRAVENOUS; SUBCUTANEOUS at 12:10

## 2019-09-18 RX ADMIN — FENTANYL CITRATE 1 PATCH: 50 INJECTION INTRAVENOUS at 07:24

## 2019-09-18 NOTE — PROGRESS NOTE ADULT - ASSESSMENT
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 CT neck demonstrates possible abscess within necrotic infiltrating oropharyngeal mass, no significant interval change 9/6. Pt initiated on IV zosyn.   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 CT neck demonstrates possible abscess within necrotic infiltrating oropharyngeal mass, no significant interval change 9/6. Pt initiated on IV zosyn.   ID consult requested-  Severe protein kamala malnutrition- zosyn can be changed to augmentin 875mg bid upon discharge - complete 2 weeks  - patient wishes to stay on IV antibiotics for one more day

## 2019-09-18 NOTE — PROGRESS NOTE ADULT - SUBJECTIVE AND OBJECTIVE BOX
Patient is a 63y old  Male who presents with a chief complaint of leukocytosis, abscess drainage (18 Sep 2019 11:56)      SUBJECTIVE / OVERNIGHT EVENTS:    MEDICATIONS  (STANDING):  docusate sodium Liquid 100 milliGRAM(s) Oral two times a day  fentaNYL   Patch  25 MICROgram(s)/Hr 1 Patch Transdermal every 72 hours  influenza   Vaccine 0.5 milliLiter(s) IntraMuscular once  piperacillin/tazobactam IVPB.. 3.375 Gram(s) IV Intermittent every 8 hours  senna Syrup 10 milliLiter(s) Oral at bedtime    MEDICATIONS  (PRN):  HYDROmorphone  Injectable 0.5 milliGRAM(s) IV Push every 4 hours PRN Moderate Pain (4 - 6)  HYDROmorphone  Injectable 1 milliGRAM(s) IV Push every 4 hours PRN Severe Pain (7 - 10)  tetracaine/benzocaine/butamben Spray 1 Spray(s) Topical three times a day PRN sore throat  zolpidem 5 milliGRAM(s) Oral at bedtime PRN Insomnia      17 Sep 2019 07:01  -  18 Sep 2019 07:00  --------------------------------------------------------  IN: 2740 mL / OUT: 2275 mL / NET: 465 mL    18 Sep 2019 07:01  -  18 Sep 2019 13:17  --------------------------------------------------------  IN: 0 mL / OUT: 500 mL / NET: -500 mL    Vital Signs Last 24 Hrs  T(C): 36.7 (18 Sep 2019 10:10), Max: 37.1 (17 Sep 2019 14:45)  T(F): 98 (18 Sep 2019 10:10), Max: 98.8 (17 Sep 2019 14:45)  HR: 83 (18 Sep 2019 12:28) (75 - 99)  BP: 118/81 (18 Sep 2019 12:28) (98/64 - 118/81)  BP(mean): --  RR: 18 (18 Sep 2019 12:28) (17 - 18)  SpO2: 97% (18 Sep 2019 12:28) (93% - 99%)    PHYSICAL EXAM:  GENERAL: NAD, well-developed  HEAD:  Atraumatic, Normocephalic, L jaw mass  EYES: EOMI, PERRLA, conjunctiva and sclera clear  NECK: Supple, No JVD, midline dressing  CHEST/LUNG: Clear to auscultation bilaterally; No wheeze  HEART: Regular rate and rhythm; No murmurs, rubs, or gallops  ABDOMEN: Soft, Nontender, Nondistended; Bowel sounds present  EXTREMITIES:  2+ Peripheral Pulses, No clubbing, cyanosis, or edema  PSYCH: AAOx3  NEUROLOGY: non-focal  SKIN: No rashes or lesions    LABS:                        12.5   14.01 )-----------( 353      ( 18 Sep 2019 06:55 )             38.5     09-18    133<L>  |  93<L>  |  11  ----------------------------<  171<H>  3.8   |  30  |  0.70    Ca    9.4      18 Sep 2019 06:55  Phos  3.3     09-18  Mg     2.1     09-18    TPro  6.6  /  Alb  3.4  /  TBili  0.8  /  DBili  x   /  AST  16  /  ALT  40  /  AlkPhos  57  09-17        RADIOLOGY & ADDITIONAL TESTS:  < from: CT Neck Soft Tissue w/ IV Cont (09.16.19 @ 16:46) >  IMPRESSION:  No significant interval change from CT neck obtained on 9/6/2019.          Imaging Personally Reviewed:    Consultant(s) Notes Reviewed:      Care Discussed with Consultants/Other Providers: Patient is a 63y old  Male who presents with a chief complaint of leukocytosis, abscess drainage (18 Sep 2019 11:56)      SUBJECTIVE / OVERNIGHT EVENTS:    MEDICATIONS  (STANDING):  docusate sodium Liquid 100 milliGRAM(s) Oral two times a day  fentaNYL   Patch  25 MICROgram(s)/Hr 1 Patch Transdermal every 72 hours  influenza   Vaccine 0.5 milliLiter(s) IntraMuscular once  piperacillin/tazobactam IVPB.. 3.375 Gram(s) IV Intermittent every 8 hours  senna Syrup 10 milliLiter(s) Oral at bedtime    MEDICATIONS  (PRN):  HYDROmorphone  Injectable 0.5 milliGRAM(s) IV Push every 4 hours PRN Moderate Pain (4 - 6)  HYDROmorphone  Injectable 1 milliGRAM(s) IV Push every 4 hours PRN Severe Pain (7 - 10)  tetracaine/benzocaine/butamben Spray 1 Spray(s) Topical three times a day PRN sore throat  zolpidem 5 milliGRAM(s) Oral at bedtime PRN Insomnia      17 Sep 2019 07:01  -  18 Sep 2019 07:00  --------------------------------------------------------  IN: 2740 mL / OUT: 2275 mL / NET: 465 mL    18 Sep 2019 07:01  -  18 Sep 2019 13:17  --------------------------------------------------------  IN: 0 mL / OUT: 500 mL / NET: -500 mL    Vital Signs Last 24 Hrs  T(C): 36.7 (18 Sep 2019 10:10), Max: 37.1 (17 Sep 2019 14:45)  T(F): 98 (18 Sep 2019 10:10), Max: 98.8 (17 Sep 2019 14:45)  HR: 83 (18 Sep 2019 12:28) (75 - 99)  BP: 118/81 (18 Sep 2019 12:28) (98/64 - 118/81)  BP(mean): --  RR: 18 (18 Sep 2019 12:28) (17 - 18)  SpO2: 97% (18 Sep 2019 12:28) (93% - 99%)    PHYSICAL EXAM:  GENERAL: NAD, well-developed  HEAD:  Atraumatic, Normocephalic, L jaw mass  EYES: EOMI, PERRLA, conjunctiva and sclera clear  NECK: Supple, No JVD, midline dressing  CHEST/LUNG: Clear to auscultation bilaterally; No wheeze  HEART: Regular rate and rhythm; No murmurs, rubs, or gallops  ABDOMEN: Soft, Nontender, Nondistended; Bowel sounds present  peg  EXTREMITIES:  2+ Peripheral Pulses, No clubbing, cyanosis, or edema  PSYCH: AAOx3  NEUROLOGY: non-focal  SKIN: No rashes or lesions    LABS:                        12.5   14.01 )-----------( 353      ( 18 Sep 2019 06:55 )             38.5     09-18    133<L>  |  93<L>  |  11  ----------------------------<  171<H>  3.8   |  30  |  0.70    Ca    9.4      18 Sep 2019 06:55  Phos  3.3     09-18  Mg     2.1     09-18    TPro  6.6  /  Alb  3.4  /  TBili  0.8  /  DBili  x   /  AST  16  /  ALT  40  /  AlkPhos  57  09-17        RADIOLOGY & ADDITIONAL TESTS:  < from: CT Neck Soft Tissue w/ IV Cont (09.16.19 @ 16:46) >  IMPRESSION:  No significant interval change from CT neck obtained on 9/6/2019.          Imaging Personally Reviewed:    Consultant(s) Notes Reviewed:      Care Discussed with Consultants/Other Providers:

## 2019-09-18 NOTE — DISCHARGE NOTE PROVIDER - HOSPITAL COURSE
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    ID consulted - zosyn inpatient and change to augmentin upon discharge for 2 week total course.    Management of SCC outpatient - as per pt he was accepted to clinical trial.            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 CT neck demonstrates possible abscess within necrotic infiltrating oropharyngeal mass, no significant interval change 9/6. Pt initiated on IV zosyn. can change to Augmentin today    ID consult requested-    CT with abscess - ENT recs no surgical intervention at this time.    ID consulted - zosyn inpatient and change to augmentin upon discharge for 2 week total course.    Management of SCC outpatient - as per pt he was accepted to clinical trial.        # recurrent squamous cell carcinoma.  Hx chemo/RT 2015. Hx resection, neck dissection and fibular bone flap 2018.  Recurrent disease 2019 with superinfection of L oral cavity and oropharyngeal mass.  D/w ENT, unlikely infection to bone.  Probably mostly tumor.    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 CT neck demonstrates possible abscess within necrotic infiltrating oropharyngeal mass, no significant interval change 9/6. Pt initiated on IV zosyn. can change to Augmentin today for discharge. patient to follow up with ENT Dr. Nina, and Oncology Dr. Cai outpatient.         Abscess.      - CT neck showing similar-appearing partially necrotic infiltrating mass containing foci of air involving the left oral cavity-floor of mouth and soft tissues of the     left oropharynx, inseparable from the medial pterygoid, extending to the left parotid space and left submandibular space. The presence of abscess     within these necrotic regions is not excluded. However, leukocytosis likely from pt's recent use of Medrol dosepak, with pt remaining afebrile.    - C/w IV zosyn for now pending ENT attending eval and ID consult    - ID recs- c/w ABX - may change IV Zosyn to augmentin 875mg PO BID x2weeks for discharge     - Pt currently protecting airway despite oral secretions. However, will monitor on continuous pulse ox, especially as pt is on increased doses of pain meds with risk of oversedation.    - Monitor VBG pCO2.     - ENT recs - CT with abscess- no surgical intervention at this time, outpatient f/u ENT Dr. Nina within 1 week        Squamous cell carcinoma of oral cavity.      - Hx chemo/RT 2015. Hx resection, neck dissection and fibular bone flap 2018.  Recurrent disease 2019 with superinfection of L oral cavity and oropharyngeal mass.  D/w ENT, unlikely infection to bone.  Probably mostly tumor.    - per Dr. Cai's oupt note, consensus of head/neck tumor board for palliative measures, no surgery or RT    - pt will need outpt f/u    - pain control. pt previously on morphine sulf 100 mg/5 ml conc (prescribed by pain management istop# [id number]) through PEG but states it is no longer helping    - c/w fentanyl 25 mcg patch. will c/w dilaudid 0.5 mg IVP q4h PRN for moderate pain and 1 mg IVP q4h PRN for severe pain.    - consider palliative c/s for symptom management.     - Management of SCC outpatient - as per pt he was accepted to clinical trial.    - pt has outpatient appt with Oncology Dr. Cai on monday, 9/23/19 for follow up         Insomnia.     - Pt prescribed alprazolam 0.5 mg on 8/30 but has not been taking it. Pt also prescribed zolpidem 10 mg on 8/31 that he took last night, but he also does not take it consistently. He is concerned about his inability to fall asleep    - will c/w zolpidem for now but will hold alprazolam given concern for oversedation as he is also on increased doses of opioids.         Severe protein kamala malnutrition    - NPO with Tube feeds- Jevity 1.2 through PEG    - Nutrition recs- TwoCal HN bolus feeds via PEG at 250ml [id number] equates to total volume of 1000ml, 2000kcal and 83.5g pro/day to meet estimated kcal/pro needs. Monitor weights, labs, BM's, skin integrity, tolerance to EN         Need for prophylactic measure.     -DVT ppx: SCDs, Hold pharmacologic ppx for now pending ENT attending eval and possibility of procedure    Code status: full code.         9/19- VSS, afebrile. Pt with no acute complaints, pain controlled. patient for discharge home today, has f/u appt with Onc Dr. Cai on monday, 9/23. No ENT surgical intervention, follow up outpatient with ENT Dr. nina; complete 2 weeks of Augmentin 875mg BID PO as per ID recs. 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 CT neck demonstrates possible abscess within necrotic infiltrating oropharyngeal mass, no significant interval change 9/6. Pt initiated on IV zosyn, can change to Augmentin today for discharge. patient to follow up with ENT Dr. Nina, and Oncology Dr. Cai outpatient.         Abscess.      - CT neck showing similar-appearing partially necrotic infiltrating mass containing foci of air involving the left oral cavity-floor of mouth and soft tissues of the     left oropharynx, inseparable from the medial pterygoid, extending to the left parotid space and left submandibular space. The presence of abscess     within these necrotic regions is not excluded. However, leukocytosis likely from pt's recent use of Medrol dosepak, with pt remaining afebrile.    - C/w IV zosyn for now pending ENT attending eval and ID consult    - ID recs- c/w ABX - may change IV Zosyn to augmentin 875mg PO BID x2weeks for discharge     - Pt currently protecting airway despite oral secretions. However, will monitor on continuous pulse ox, especially as pt is on increased doses of pain meds with risk of oversedation.    - Monitor VBG pCO2.     - ENT recs - CT with abscess- no surgical intervention at this time, outpatient f/u ENT Dr. Nina within 1 week        Squamous cell carcinoma of oral cavity.      - Hx chemo/RT 2015. Hx resection, neck dissection and fibular bone flap 2018.  Recurrent disease 2019 with superinfection of L oral cavity and oropharyngeal mass.  D/w ENT, unlikely infection to bone.  Probably mostly tumor.    - per Dr. Cai's oupt note, consensus of head/neck tumor board for palliative measures, no surgery or RT    - pt will need outpt f/u    - pain control. pt previously on morphine sulf 100 mg/5 ml conc (prescribed by pain management istop# [id number]) through PEG but states it is no longer helping    - c/w fentanyl 25 mcg patch. will c/w dilaudid 0.5 mg IVP q4h PRN for moderate pain and 1 mg IVP q4h PRN for severe pain.    - consider palliative c/s for symptom management.     - Management of SCC outpatient - as per pt he was accepted to clinical trial.    - pt has outpatient appt with Oncology Dr. Cai on monday, 9/23/19 for follow up         Insomnia.     - Pt prescribed alprazolam 0.5 mg on 8/30 but has not been taking it. Pt also prescribed zolpidem 10 mg on 8/31 that he took last night, but he also does not take it consistently. He is concerned about his inability to fall asleep    - will c/w zolpidem for now but will hold alprazolam given concern for oversedation as he is also on increased doses of opioids        Severe protein kamala malnutrition    - NPO with Tube feeds- Jevity 1.2 through PEG    - Nutrition recs- TwoCal HN bolus feeds via PEG at 250ml [id number] equates to total volume of 1000ml, 2000kcal and 83.5g pro/day to meet estimated kcal/pro needs. Monitor weights, labs, BM's, skin integrity, tolerance to EN         Need for prophylactic measure.     -DVT ppx: SCDs, Hold pharmacologic ppx for now pending ENT attending eval and possibility of procedure    Code status: full code.         9/19- VSS, afebrile. Pt with no acute complaints, pain controlled. patient for discharge home today, has f/u appt with Onc Dr. Cai on monday, 9/23. No ENT surgical intervention, follow up outpatient with ENT Dr. nina; complete 2 weeks of Augmentin 875mg BID PO as per ID recs. 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 CT neck demonstrates possible abscess within necrotic infiltrating oropharyngeal mass, no significant interval change 9/6. Pt initiated on IV zosyn, can change to Augmentin today for discharge. patient to follow up with ENT Dr. Nina, and Oncology Dr. Cai outpatient.         Abscess.      - CT neck showing similar-appearing partially necrotic infiltrating mass containing foci of air involving the left oral cavity-floor of mouth and soft tissues of the     left oropharynx, inseparable from the medial pterygoid, extending to the left parotid space and left submandibular space. The presence of abscess     within these necrotic regions is not excluded. However, leukocytosis likely from pt's recent use of Medrol dosepak, with pt remaining afebrile.    - C/w IV zosyn for now pending ENT attending eval and ID consult    - ID recs- c/w ABX - may change IV Zosyn to augmentin 875mg PO BID x2weeks for discharge     - Pt currently protecting airway despite oral secretions. However, will monitor on continuous pulse ox, especially as pt is on increased doses of pain meds with risk of oversedation.    - Monitor VBG pCO2.     - ENT recs - CT with abscess- no surgical intervention at this time, outpatient f/u ENT Dr. Nina within 1 week        Squamous cell carcinoma of oral cavity.      - Hx chemo/RT 2015. Hx resection, neck dissection and fibular bone flap 2018.  Recurrent disease 2019 with superinfection of L oral cavity and oropharyngeal mass.  D/w ENT, unlikely infection to bone.  Probably mostly tumor.    - per Dr. Cai's oupt note, consensus of head/neck tumor board for palliative measures, no surgery or RT    - pt will need outpt f/u    - pain control. pt previously on morphine sulf 100 mg/5 ml conc (prescribed by pain management istop# [id number]) through PEG but states it is no longer helping    - c/w fentanyl 25 mcg patch. will c/w dilaudid 0.5 mg IVP q4h PRN for moderate pain and 1 mg IVP q4h PRN for severe pain.    - consider palliative c/s for symptom management.     - Management of SCC outpatient - as per pt he was accepted to clinical trial.    - pt has outpatient appt with Oncology Dr. Cia on monday, 9/23/19 for follow up         Insomnia.     - Pt prescribed alprazolam 0.5 mg on 8/30 but has not been taking it. Pt also prescribed zolpidem 10 mg on 8/31 that he took last night, but he also does not take it consistently. He is concerned about his inability to fall asleep    - will c/w zolpidem for now but will hold alprazolam given concern for oversedation as he is also on increased doses of opioids        Severe protein kamala malnutrition    - NPO with Tube feeds- Jevity 1.2 through PEG    - Nutrition recs- TwoCal HN bolus feeds via PEG at 250ml [id number] equates to total volume of 1000ml, 2000kcal and 83.5g pro/day to meet estimated kcal/pro needs. Monitor weights, labs, BM's, skin integrity, tolerance to EN         Need for prophylactic measure.     -DVT ppx: SCDs, Hold pharmacologic ppx for now pending ENT attending eval and possibility of procedure    Code status: full code.         9/19- VSS, afebrile. Pt with no acute complaints, pain controlled. patient for discharge home today, has f/u appt with Onc Dr. Cai on monday, 9/23. No ENT surgical intervention, follow up outpatient with ENT Dr. nina; complete 2 weeks of Augmentin 875mg BID PO as per ID recs. 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 CT neck demonstrates possible abscess within necrotic infiltrating oropharyngeal mass, no significant interval change 9/6. Pt initiated on IV zosyn, can change to Augmentin today for discharge. patient to follow up with ENT Dr. Nina, and Oncology Dr. Cai outpatient.         Abscess.      - CT neck showing similar-appearing partially necrotic infiltrating mass containing foci of air involving the left oral cavity-floor of mouth and soft tissues of the     left oropharynx, inseparable from the medial pterygoid, extending to the left parotid space and left submandibular space. The presence of abscess     within these necrotic regions is not excluded. However, leukocytosis likely from pt's recent use of Medrol dosepak, with pt remaining afebrile.    - C/w IV zosyn for now pending ENT attending eval and ID consult    - ID recs- c/w ABX - may change IV Zosyn to augmentin 875mg PO BID x2weeks for discharge     - Pt currently protecting airway despite oral secretions. However, will monitor on continuous pulse ox, especially as pt is on increased doses of pain meds with risk of oversedation.    - Monitor VBG pCO2.     - ENT recs - CT with abscess- no surgical intervention at this time, outpatient f/u ENT Dr. Nina within 1 week        Squamous cell carcinoma of oral cavity.      - Hx chemo/RT 2015. Hx resection, neck dissection and fibular bone flap 2018.  Recurrent disease 2019 with superinfection of L oral cavity and oropharyngeal mass.  D/w ENT, unlikely infection to bone.  Probably mostly tumor.    - per Dr. Cai's oupt note, consensus of head/neck tumor board for palliative measures, no surgery or RT    - pt will need outpt f/u    - pain control. pt previously on morphine sulf 100 mg/5 ml conc (prescribed by pain management istop# [id number]) through PEG but states it is no longer helping    - c/w fentanyl 25 mcg patch. will c/w dilaudid 0.5 mg IVP q4h PRN for moderate pain and 1 mg IVP q4h PRN for severe pain.    - consider palliative c/s for symptom management.     - Management of SCC outpatient - as per pt he was accepted to clinical trial.    - pt has outpatient appt with Oncology Dr. Cai on monday, 9/23/19 for follow up         Insomnia.     - Pt prescribed alprazolam 0.5 mg on 8/30 but has not been taking it. Pt also prescribed zolpidem 10 mg on 8/31 that he took last night, but he also does not take it consistently. He is concerned about his inability to fall asleep    - will c/w zolpidem for now but will hold alprazolam given concern for oversedation as he is also on increased doses of opioids        Severe protein kamala malnutrition    - NPO with Tube feeds- Jevity 1.2 through PEG    - Nutrition recs- TwoCal HN bolus feeds via PEG at 250ml [id number] equates to total volume of 1000ml, 2000kcal and 83.5g pro/day to meet estimated kcal/pro needs. Monitor weights, labs, BM's, skin integrity, tolerance to EN         Need for prophylactic measure.     -DVT ppx: SCDs, Hold pharmacologic ppx for now pending ENT attending eval and possibility of procedure    Code status: full code.         9/19- VSS, afebrile. Pt with no acute complaints, pain controlled. patient for discharge home today, has f/u appt with Onc Dr. Cai on monday, 9/23. No ENT surgical intervention, follow up outpatient with ENT Dr. nina; complete 2 weeks of Augmentin 875mg BID PO as per ID recs. Meds sent to [facility] and pt's preferred [facility] pharmacy for abx. Patient is medically cleared and optimized for discharge home, plan of care and meds discussed with Dr. Monte.

## 2019-09-18 NOTE — DISCHARGE NOTE PROVIDER - NSDCCPCAREPLAN_GEN_ALL_CORE_FT
PRINCIPAL DISCHARGE DIAGNOSIS  Diagnosis: Abscess of oral space  Assessment and Plan of Treatment: Continue and complete antibiotics as directed.  Please follow up with ENT and infectious disease within 1 week of completing antibiotics.  Please return to ED if you experience recurrent fevers, or worsening pain.      SECONDARY DISCHARGE DIAGNOSES  Diagnosis: Squamous cell carcinoma of oral cavity  Assessment and Plan of Treatment: Outpatient follow up. PRINCIPAL DISCHARGE DIAGNOSIS  Diagnosis: Abscess of oral space  Assessment and Plan of Treatment: Continue and complete antibiotics as directed.  Please follow up with ENT and infectious disease within 1 week of completing antibiotics.  Please return to ED if you experience recurrent fevers, or worsening pain.      SECONDARY DISCHARGE DIAGNOSES  Diagnosis: Squamous cell carcinoma of oral cavity  Assessment and Plan of Treatment: Outpatient follow up. with Dr Cai - oncology- please keep appointment on MONDAY 9/23/19 PRINCIPAL DISCHARGE DIAGNOSIS  Diagnosis: Abscess of oral space  Assessment and Plan of Treatment: Continue and complete antibiotics as directed (sent to [facility])  Please follow up with ENT Dr. Hdz  and infectious disease doctor within 1 week of completing antibiotics.  Please return to ED if you experience recurrent fevers, or worsening pain.  Follow up with your primary care doctor within 1-2 weeks for further evaluation and management      SECONDARY DISCHARGE DIAGNOSES  Diagnosis: Squamous cell carcinoma of oral cavity  Assessment and Plan of Treatment: Outpatient follow up. with Dr Cai - oncology- please keep your appointment on MONDAY 9/23/19    Diagnosis: Severe protein-calorie malnutrition  Assessment and Plan of Treatment: Continue PEG tube feedings as directed  Continue medications. Follow up with your primary care doctor for further evaluation and management. Please call to make an appointment within 1-2 weeks of discharge. PRINCIPAL DISCHARGE DIAGNOSIS  Diagnosis: Abscess of oral space  Assessment and Plan of Treatment: Continue and complete antibiotics as directed (sent to [facility] [phone])  Please follow up with ENT Dr. Hdz  and infectious disease doctor within 1 week of completing antibiotics.  Please return to ED if you experience recurrent fevers, or worsening pain.  Follow up with your primary care doctor within 1-2 weeks for further evaluation and management      SECONDARY DISCHARGE DIAGNOSES  Diagnosis: Squamous cell carcinoma of oral cavity  Assessment and Plan of Treatment: Outpatient follow up. with Dr Cai - oncology- please keep your appointment on MONDAY 9/23/19    Diagnosis: Severe protein-calorie malnutrition  Assessment and Plan of Treatment: Continue PEG tube feedings as directed  Continue medications. Follow up with your primary care doctor for further evaluation and management. Please call to make an appointment within 1-2 weeks of discharge.

## 2019-09-18 NOTE — DISCHARGE NOTE PROVIDER - CARE PROVIDER_API CALL
Yajaira Cai)  Medical Oncology  [facility], [address] [place] [address]  Phone: [phone]  Fax: [phone]  Follow Up Time:     Krystian Hdz)  Otolaryngology  [address] [place] [id number]  Phone: [phone]  Fax: [phone]  Follow Up Time:     Rochelle Payne)  Infectious Disease; Internal Medicine  [address] [place] [address]  Phone: [phone]  Fax: [phone]  Follow Up Time:

## 2019-09-18 NOTE — DISCHARGE NOTE PROVIDER - NSFOLLOWUPCLINICS_GEN_ALL_ED_FT
[facility]  Geriatrics  [address]  [place] [address]  Phone: [phone]  Fax:   Follow Up Time:     Pediatric Otolaryngology (ENT)  Pediatric Otolaryngology (ENT)  [address] [place] [address]  Phone: [phone]  Fax: [phone]  Follow Up Time:

## 2019-09-18 NOTE — DISCHARGE NOTE PROVIDER - PROVIDER TOKENS
PROVIDER:[TOKEN:[[id number]]],PROVIDER:[TOKEN:[[id number]]],PROVIDER:[TOKEN:[[id number]]]

## 2019-09-18 NOTE — DISCHARGE NOTE PROVIDER - CARE PROVIDERS DIRECT ADDRESSES
[email or web address]

## 2019-09-18 NOTE — PROGRESS NOTE ADULT - SUBJECTIVE AND OBJECTIVE BOX
Patient is a 63y old  Male who presents with a chief complaint of leukocytosis, abscess drainage (17 Sep 2019 10:48)    f/u infected tumor    Interval History/ROS:  no fever/chills.  less malodor; similar amount of pain.  no abdominal pain.  no dysuria.  Remainder of ROS otherwise negative.    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 3.375 every 8 hours (9/16-)    MEDICATIONS  (STANDING):  docusate sodium Liquid 100 two times a day  fentaNYL   Patch  25 MICROgram(s)/Hr 1 every 72 hours  influenza   Vaccine 0.5 once  senna Syrup 10 at bedtime  PRN  HYDROmorphone  Injectable 0.5 milliGRAM(s) IV Push every 4 hours PRN  HYDROmorphone  Injectable 1 milliGRAM(s) IV Push every 4 hours PRN  zolpidem 5 milliGRAM(s) Oral at bedtime PRN    Vital Signs Last 24 Hrs  T(F): 98 (09-18-19 @ 10:10), Max: 98.8 (09-17-19 @ 14:45)  HR: 76 (09-18-19 @ 10:10)  BP: 106/72 (09-18-19 @ 10:10)  RR: 18 (09-18-19 @ 10:10)  SpO2: 95% (09-18-19 @ 10:10) (93% - 99%)    PHYSICAL EXAM:  General: non-toxic  HEAD/EYES: anicteric  ENT:  swelling L face near parotid with warmth; improved redness; continued tenderness; no malodor beneath dressing today.  maybe a little less drainage; post-surgical changes chin  Cardiovascular:   S1, S2  Respiratory:  clear bilaterally  GI:  soft, PEG okay  :  no dow  Musculoskeletal:  no synovitis  Neurologic:  grossly non-focal  Skin:  no rash  Psychiatric:  appropriate affect  Vascular:  no phlebitis                                12.5   14.01 )-----------( 353      ( 18 Sep 2019 06:55 )             38.5 09-18    133  |  93  |  11  ----------------------------<  171  3.8   |  30  |  0.70  Ca    9.4      18 Sep 2019 06:55Phos  3.3     09-18Mg     2.1     09-18  TPro  6.6  /  Alb  3.4  /  TBili  0.8  /  DBili  x   /  AST  16  /  ALT  40  /  AlkPhos  57  09-17    WBC Count: 14.01 (09-18-19 @ 06:55)  WBC Count: 11.15 (09-17-19 @ 07:30)  WBC Count: 13.33 (09-16-19 @ 15:00)  WBC Count: 14.9 (09-16-19 @ 09:15)  WBC Count: 17.0 (09-12-19 @ 16:20)    MICROBIOLOGY:  Culture - Blood (09.06.19 @ 16:01)    Culture - Blood:   NO ORGANISMS ISOLATED    Specimen Source: BLOOD VENOUS    Culture - Blood (09.06.19 @ 16:01)    Culture - Blood:   NO ORGANISMS ISOLATED    Specimen Source: BLOOD PERIPHERAL    Culture - Wound (09.06.19 @ 15:25)    Culture - Wound:   MODERATE  STRAAC^Streptococcus constellatus  PASCAN^Pasteurella canis    Specimen Source: ARM - LEFT    RADIOLOGY:  imaging below personally reviewed    CT Neck Soft Tissue w/ IV Cont (09.16.19 @ 16:46) >  IMPRESSION:  No significant interval change from CT neck obtained on 9/6/2019.      CT Neck Soft Tissue w/ IV Cont (09.06.19 @ 17:10) >  IMPRESSION:  Redemonstrated partially necrotic infiltrating mass containing foci of air involving the left oral cavity-floor of mouth and left oropharynx, inseparable from the medial pterygoid, extending to the inferior aspect of the left parotid space and left submandibular space. Areas of underlying abscess formation can't be excluded in the region of necrosis. These findings appear similar compared to the September 3 exam.    Sono Gallbladder (09.03.19 @ 21:50) >  IMPRESSION:  Cholelithiasis without sonographic evidence of acute cholecystitis.    NM PET/CT Onc FDG Skull to Thigh, Subsq (08.22.19 @ 14:32) >  IMPRESSION: Since FDG-PET/CT scan 4/22/2019:  1. Findings suggestive of progression of left base of tongue/oropharyngeal malignancy with few new left left cervical lymphadenopathy.  2. New FDG-avidity along the right posterior floor of mouth region and mildly FDG avid difficult to delineate small lymph node, posterior to the right thyroid lobe and right common carotid artery. Please correlate clinically.  3.  New groundglassopacity in the lingula and cluster of tiny nodular opacities in the anterior right upper lobe, possibly infectious/inflammatory.  Short interval follow-up chest CT for further evaluation.   4. Several new FDG-avid difficult to delineate smallmediastinal and right hilar nodes, nonspecific.    CT Neck Soft Tissue w/ IV Cont (09.03.19 @ 18:38) >  IMPRESSION:  Findings consistent with progressive malignancy without acute findings

## 2019-09-18 NOTE — PROGRESS NOTE ADULT - ATTENDING COMMENTS
need antibiotics- now on iv zosyn  Id appreciated need antibiotics- now on iv zosyn  Id appreciated-  - zosyn can be changed to Augmentin 875mg bid upon discharge - complete 2 weeks  - patient wishes to stay on IV antibiotics for one more day

## 2019-09-18 NOTE — DISCHARGE NOTE PROVIDER - NSDCFUSCHEDAPPT_GEN_ALL_CORE_FT
CASSIE RAE ; 09/23/2019 ; [facility]  CASSIE RAE ; 09/23/2019 ; [facility]  CASSIE RAE ; 09/25/2019 ; [facility]  CASSIE RAE ; 09/30/2019 ; [facility]  CASSIE RAE ; 10/03/2019 ; [facility]  CASSIE RAE ; 10/07/2019 ; [facility]  CASSIE RAE ; 10/14/2019 ; [facility] [address] [facility]  CASSIE RAE ; 10/15/2019 ; [facility]  CASSIE RAE ; 10/18/2019 ; [facility]  CASSIE RAE ; 10/22/2019 ; [facility]  CASSIE RAE ; 10/29/2019 ; [facility]  CASSIE RAE ; 10/29/2019 ; [facility] CASSIE RAE ; 09/23/2019 ; [facility]  CASSIE RAE ; 09/23/2019 ; [facility]  CASSIE RAE ; 09/25/2019 ; [facility]  CASSIE RAE ; 09/30/2019 ; [facility]  CASSIE RAE ; 10/03/2019 ; [facility]  CASSIE RAE ; 10/07/2019 ; [facility]  CASSIE RAE ; 10/14/2019 ; [facility] [address] [facility]  CASSIE RAE ; 10/15/2019 ; [facility]  CASSIE RAE ; 10/18/2019 ; [facility]  CASSIE RAE ; 10/22/2019 ; [facility]  CASSIE RAE ; 10/29/2019 ; [facility]  CASSIE RAE ; 10/29/2019 ; [facility] CASSIE RAE ; 09/23/2019 ; [facility]  CASSIE RAE ; 09/23/2019 ; [facility]  CASSIE RAE ; 09/25/2019 ; [facility]  CASSIE RAE ; 09/30/2019 ; [facility]  CASSIE RAE ; 10/03/2019 ; [facility]  CASSIE RAE ; 10/07/2019 ; [facility]  CASSIE RAE ; 10/14/2019 ; [facility] [address] [facility]  CASSIE RAE ; 10/15/2019 ; [facility]  CASSIE RAE ; 10/18/2019 ; [facility]  CASSIE RAE ; 10/22/2019 ; [facility]  CASSIE RAE ; 10/29/2019 ; [facility]  CASSIE RAE ; 10/29/2019 ; [facility] CASSIE RAE ; 09/23/2019 ; [facility]  CASSIE RAE ; 09/23/2019 ; [facility]  CASSIE RAE ; 09/25/2019 ; [facility]  CASSIE RAE ; 09/30/2019 ; [facility]  CASSIE RAE ; 10/03/2019 ; [facility]  CASSIE RAE ; 10/07/2019 ; [facility]  CASSIE RAE ; 10/14/2019 ; [facility] [address] [facility]  CASSIE RAE ; 10/15/2019 ; [facility]  CASSIE RAE ; 10/18/2019 ; [facility]  CASSIE RAE ; 10/22/2019 ; [facility]  CASSIE RAE ; 10/29/2019 ; [facility]  CASSIE RAE ; 10/29/2019 ; [facility] CASSIE RAE ; 09/23/2019 ; [facility]  CASSIE RAE ; 09/23/2019 ; [facility]  CASSIE RAE ; 09/25/2019 ; [facility]  CASSIE RAE ; 09/30/2019 ; [facility]  CASSIE RAE ; 10/03/2019 ; [facility]  CASSIE RAE ; 10/07/2019 ; [facility]  CASSIE RAE ; 10/14/2019 ; [facility] [address] [facility]  CASSIE RAE ; 10/15/2019 ; [facility]  CASSIE RAE ; 10/18/2019 ; [facility]  CASSIE RAE ; 10/22/2019 ; [facility]  CASSIE RAE ; 10/29/2019 ; [facility]  CASSIE RAE ; 10/29/2019 ; [facility] CASSIE RAE ; 09/23/2019 ; [facility]  CASSIE RAE ; 09/23/2019 ; [facility]  CASSIE RAE ; 09/25/2019 ; [facility]  CASSIE RAE ; 09/30/2019 ; [facility]  CASSIE RAE ; 10/03/2019 ; [facility]  CASSIE RAE ; 10/07/2019 ; [facility]  CASSIE RAE ; 10/14/2019 ; [facility] [address] [facility]  CASSIE RAE ; 10/15/2019 ; [facility]  CASSIE RAE ; 10/18/2019 ; [facility]  CASSIE RAE ; 10/22/2019 ; [facility]  CASSIE RAE ; 10/29/2019 ; [facility]  CASSIE RAE ; 10/29/2019 ; [facility] CASSIE RAE ; 09/23/2019 ; [facility]  CASSIE ARE ; 09/23/2019 ; [facility]  CASSIE RAE ; 09/25/2019 ; [facility]  CASSIE RAE ; 09/30/2019 ; [facility]  CASSIE RAE ; 10/03/2019 ; [facility]  CASSIE RAE ; 10/07/2019 ; [facility]  CASSIE RAE ; 10/14/2019 ; [facility] [address] [facility]  CASSIE RAE ; 10/15/2019 ; [facility]  CASSIE RAE ; 10/18/2019 ; [facility]  CASSIE RAE ; 10/22/2019 ; [facility]  CASSIE RAE ; 10/29/2019 ; [facility]  CASSIE RAE ; 10/29/2019 ; [facility] CASSIE RAE ; 09/23/2019 ; [facility]  CASSIE RAE ; 09/23/2019 ; [facility]  CASSIE RAE ; 09/25/2019 ; [facility]  CASSIE RAE ; 09/30/2019 ; [facility]  CASSIE RAE ; 10/03/2019 ; [facility]  CASSIE RAE ; 10/07/2019 ; [facility]  CASSIE RAE ; 10/14/2019 ; [facility] [address] [facility]  CASSIE RAE ; 10/15/2019 ; [facility]  CASSIE RAE ; 10/18/2019 ; [facility]  CASSIE RAE ; 10/22/2019 ; [facility]  CASSIE RAE ; 10/29/2019 ; [facility]  CASSIE RAE ; 10/29/2019 ; [facility]

## 2019-09-18 NOTE — PROGRESS NOTE ADULT - SUBJECTIVE AND OBJECTIVE BOX
Patient seen and examined at the bedside. No acute events overnight. No significant changes in neck. Still requiring frequent dressing changes for neck leaking fluid.    General: NAD  Neck: post-treatment changes, small tender area at left lateral neck, with ~1cm shallow opening with granulation tissue, 0cc of drainage expressed this morning, wound bed superficially debrided with healthy granulation tissue seen   NC: wnl  OC/OP: some pooling of secretions which is baseline per patient, post-surgical changes, evidence of disease in the left FOM    A/P: 63M with recurrent SCC with progression of disease causing left neck drainage. Does not appear grossly infected at this time and no evidence of abscess.    - no acute ENT intervention  - can continue IV zosyn  - nothing by mouth, tube feeds  - will discuss case with attending and update team with additional recommendations  - will follow  - please page with questions

## 2019-09-18 NOTE — PROGRESS NOTE ADULT - ASSESSMENT
63M with recurrent squamous cell carcinoma.  Hx chemo/RT 2015. Hx resection, neck dissection and fibular bone flap 2018.  Recurrent disease 2019 with superinfection of L oral cavity and oropharyngeal mass.  D/w ENT, unlikely infection to bone.  Probably mostly tumor.    Suggest:  - zosyn can be changed to augmentin 875mg bid upon discharge - complete 2 weeks  - patient wishes to stay on IV antibiotics for one more day    above d/w hospitalist as well as son at bedside

## 2019-09-19 ENCOUNTER — TRANSCRIPTION ENCOUNTER (OUTPATIENT)
Age: 64
End: 2019-09-19

## 2019-09-19 ENCOUNTER — OUTPATIENT (OUTPATIENT)
Dept: OUTPATIENT SERVICES | Facility: HOSPITAL | Age: 64
LOS: 1 days | Discharge: HOME | End: 2019-09-19

## 2019-09-19 VITALS
DIASTOLIC BLOOD PRESSURE: 80 MMHG | OXYGEN SATURATION: 100 % | SYSTOLIC BLOOD PRESSURE: 122 MMHG | RESPIRATION RATE: 17 BRPM | TEMPERATURE: 98 F | HEART RATE: 75 BPM

## 2019-09-19 DIAGNOSIS — Z85.819 PERSONAL HISTORY OF MALIGNANT NEOPLASM OF UNSPECIFIED SITE OF LIP, ORAL CAVITY, AND PHARYNX: Chronic | ICD-10-CM

## 2019-09-19 DIAGNOSIS — E43 UNSPECIFIED SEVERE PROTEIN-CALORIE MALNUTRITION: ICD-10-CM

## 2019-09-19 DIAGNOSIS — C06.9 MALIGNANT NEOPLASM OF MOUTH, UNSPECIFIED: ICD-10-CM

## 2019-09-19 DIAGNOSIS — Z98.890 OTHER SPECIFIED POSTPROCEDURAL STATES: Chronic | ICD-10-CM

## 2019-09-19 DIAGNOSIS — C76.0 MALIGNANT NEOPLASM OF HEAD, FACE AND NECK: ICD-10-CM

## 2019-09-19 DIAGNOSIS — Z43.1 ENCOUNTER FOR ATTENTION TO GASTROSTOMY: Chronic | ICD-10-CM

## 2019-09-19 LAB
ANION GAP SERPL CALC-SCNC: 12 MMO/L — SIGNIFICANT CHANGE UP (ref 7–14)
BUN SERPL-MCNC: 11 MG/DL — SIGNIFICANT CHANGE UP (ref 7–23)
CALCIUM SERPL-MCNC: 9.9 MG/DL — SIGNIFICANT CHANGE UP (ref 8.4–10.5)
CHLORIDE SERPL-SCNC: 93 MMOL/L — LOW (ref 98–107)
CO2 SERPL-SCNC: 30 MMOL/L — SIGNIFICANT CHANGE UP (ref 22–31)
CREAT SERPL-MCNC: 0.66 MG/DL — SIGNIFICANT CHANGE UP (ref 0.5–1.3)
GLUCOSE SERPL-MCNC: 104 MG/DL — HIGH (ref 70–99)
HCT VFR BLD CALC: 41.2 % — SIGNIFICANT CHANGE UP (ref 39–50)
HGB BLD-MCNC: 12.9 G/DL — LOW (ref 13–17)
MCHC RBC-ENTMCNC: 28.2 PG — SIGNIFICANT CHANGE UP (ref 27–34)
MCHC RBC-ENTMCNC: 31.3 % — LOW (ref 32–36)
MCV RBC AUTO: 90.2 FL — SIGNIFICANT CHANGE UP (ref 80–100)
NRBC # FLD: 0 K/UL — SIGNIFICANT CHANGE UP (ref 0–0)
PLATELET # BLD AUTO: 370 K/UL — SIGNIFICANT CHANGE UP (ref 150–400)
PMV BLD: 9.3 FL — SIGNIFICANT CHANGE UP (ref 7–13)
POTASSIUM SERPL-MCNC: 3.9 MMOL/L — SIGNIFICANT CHANGE UP (ref 3.5–5.3)
POTASSIUM SERPL-SCNC: 3.9 MMOL/L — SIGNIFICANT CHANGE UP (ref 3.5–5.3)
RBC # BLD: 4.57 M/UL — SIGNIFICANT CHANGE UP (ref 4.2–5.8)
RBC # FLD: 15 % — HIGH (ref 10.3–14.5)
SODIUM SERPL-SCNC: 135 MMOL/L — SIGNIFICANT CHANGE UP (ref 135–145)
WBC # BLD: 13.58 K/UL — HIGH (ref 3.8–10.5)
WBC # FLD AUTO: 13.58 K/UL — HIGH (ref 3.8–10.5)

## 2019-09-19 PROCEDURE — 99239 HOSP IP/OBS DSCHRG MGMT >30: CPT

## 2019-09-19 PROCEDURE — 99232 SBSQ HOSP IP/OBS MODERATE 35: CPT

## 2019-09-19 RX ORDER — SENNA PLUS 8.6 MG/1
10 TABLET ORAL
Qty: 300 | Refills: 0
Start: 2019-09-19 | End: 2019-10-18

## 2019-09-19 RX ORDER — DOCUSATE SODIUM 100 MG
10 CAPSULE ORAL
Qty: 600 | Refills: 0
Start: 2019-09-19 | End: 2019-10-18

## 2019-09-19 RX ORDER — CIPROFLOXACIN LACTATE 400MG/40ML
1 VIAL (ML) INTRAVENOUS
Qty: 0 | Refills: 0 | DISCHARGE

## 2019-09-19 RX ORDER — CEPHALEXIN 500 MG
1 CAPSULE ORAL
Qty: 0 | Refills: 0 | DISCHARGE

## 2019-09-19 RX ORDER — OXYCODONE HYDROCHLORIDE 5 MG/1
5 TABLET ORAL
Qty: 150 | Refills: 0
Start: 2019-09-19 | End: 2019-09-23

## 2019-09-19 RX ADMIN — FENTANYL CITRATE 1 PATCH: 50 INJECTION INTRAVENOUS at 05:41

## 2019-09-19 RX ADMIN — OXYCODONE HYDROCHLORIDE 5 MILLIGRAM(S): 5 TABLET ORAL at 06:15

## 2019-09-19 RX ADMIN — OXYCODONE HYDROCHLORIDE 5 MILLIGRAM(S): 5 TABLET ORAL at 11:30

## 2019-09-19 RX ADMIN — OXYCODONE HYDROCHLORIDE 5 MILLIGRAM(S): 5 TABLET ORAL at 10:30

## 2019-09-19 RX ADMIN — Medication 100 MILLIGRAM(S): at 10:30

## 2019-09-19 RX ADMIN — OXYCODONE HYDROCHLORIDE 5 MILLIGRAM(S): 5 TABLET ORAL at 13:48

## 2019-09-19 RX ADMIN — OXYCODONE HYDROCHLORIDE 5 MILLIGRAM(S): 5 TABLET ORAL at 05:46

## 2019-09-19 RX ADMIN — OXYCODONE HYDROCHLORIDE 5 MILLIGRAM(S): 5 TABLET ORAL at 14:48

## 2019-09-19 RX ADMIN — PIPERACILLIN AND TAZOBACTAM 25 GRAM(S): 4; .5 INJECTION, POWDER, LYOPHILIZED, FOR SOLUTION INTRAVENOUS at 02:00

## 2019-09-19 NOTE — PROGRESS NOTE ADULT - PROBLEM SELECTOR PROBLEM 2
Squamous cell carcinoma of oral cavity

## 2019-09-19 NOTE — PROGRESS NOTE ADULT - ATTENDING COMMENTS
need antibiotics- now on iv zosyn  Id appreciated-  - zosyn can be changed to Augmentin 875mg bid upon discharge - complete 2 weeks  - patient wishes to stay on IV antibiotics for one more day until 9/18    35 min to coordinate d/c need antibiotics- now on iv zosyn  Id appreciated-  - zosyn can be changed to Augmentin 875mg bid upon discharge - complete 2 weeks  - patient wishes to stay on IV antibiotics for one more day until 9/18    Out patient f/u ENT Dr Hdz and with Oncology Dr Cai  40 min to coordinate d/c need antibiotics- now on iv zosyn  Id appreciated-  - zosyn can be changed to Augmentin 875mg bid upon discharge - complete 2 weeks  - patient wishes to stay on IV antibiotics for one more day until 9/18  Patient seen and examined.  He has appointment with Oncology on Monday 9/23/19  he is to get Augmentin x 2 weeks and is requested oxycodone sol  for pain - wants medication sent to [facility]    Out patient f/u ENT Dr Hdz and with Oncology Dr Cai on Monday 9/23/19  45 min to coordinate d/c

## 2019-09-19 NOTE — PROGRESS NOTE ADULT - ASSESSMENT
Pt is a 64 yo M w/ recurrent SCC of the head and neck s/p 5 cycles nivolumab/radiation about 5 years ago with recurrence s/p maxillofacial reconstruction 2018 now on chemo, PEG, presenting with leukocytosis and worsening drainage from his L mandibular abscess. CT neck demonstrates possible abscess within necrotic infiltrating oropharyngeal mass, no significant interval change 9/6. Pt initiated on IV zosyn.   ID consult requested-  Severe protein kamala malnutrition- zosyn can be changed to augmentin 875mg bid upon discharge - complete 2 weeks  - patient wishes to stay on IV antibiotics until 9/19 Pt is a 64 yo M w/ recurrent SCC of the head and neck s/p 5 cycles nivolumab/radiation about 5 years ago with recurrence s/p maxillofacial reconstruction 2018 now on chemo, PEG, presenting with leukocytosis and worsening drainage from his L mandibular abscess. CT neck demonstrates possible abscess within necrotic infiltrating oropharyngeal mass, no significant interval change 9/6. Pt initiated on IV zosyn. can change to Augmentin today  ID consult requested-    # recurrent squamous cell carcinoma.  Hx chemo/RT 2015. Hx resection, neck dissection and fibular bone flap 2018.  Recurrent disease 2019 with superinfection of L oral cavity and oropharyngeal mass.  D/w ENT, unlikely infection to bone.  Probably mostly tumor.  #Severe protein kamala malnutrition- zosyn can be changed to Augmentin 875mg bid upon discharge - complete 2 weeks  - patient wishes to stay on IV antibiotics until 9/19

## 2019-09-19 NOTE — CHART NOTE - NSCHARTNOTEFT_GEN_A_CORE
Spoke with Dr. Monte this morning, patient is medically stable for discharge home today, c/w Augmentin 875mg BID x2weeks. Pt has f/u appt with Oncology Dr. Cai on 9/23; also to f/u with ENT Dr. Hdz. Patient informed of the same, and agreeable for plan of care and discharge. Dressing supplies to be sent home with patient, RN informed.   Sent Augmentin and pain mecs (5days supply), iSTOP [id number]) to [facility]. Received call from [facility], sent Rx for Suspension via PEG, converted to 7.3ml via PEG BID by Pharmacist x14 days. However, [facility] carries only 10days supply of suspension, states that pt would need to come back for the remaining 4 days to [facility] or send over rest of abx to his preferred pharmacy.  Spoke with patient this afternoon 1300, pt states he will take the 10days suspension supply from [facility] now, and  to send his remaining 4days supply to his [facility] pharmacy [place] [address], phone [phone]. Spoke with Dr. Monte this morning, patient is medically stable for discharge home today, c/w Augmentin 875mg BID x2weeks. Pt has f/u appt with Oncology Dr. Cai on 9/23; also to f/u with ENT Dr. Hdz. Patient informed of the same, and agreeable for plan of care and discharge. Dressing supplies to be sent home with patient, RN informed.   Sent Augmentin and pain mecs (5days supply), iSTOP [id number]) to [facility]. Received call from [facility], sent Rx for Suspension via PEG, converted to 7.3ml via PEG BID by Pharmacist x14 days. However, [facility] carries only 10days supply of suspension, states that pt would need to come back for the remaining 4 days to [facility] or send over rest of abx to his preferred pharmacy.  Spoke with patient this afternoon 1300, pt states he will take the 10days suspension supply from [facility] now, and  to send his remaining 4days supply to his [facility] pharmacy [place] [address], phone [phone]. called and verified his [facility] pharmacy, Rx received told to hold off until tomorrow and call patient for fill. [facility] to deliver meds to bed

## 2019-09-19 NOTE — PROGRESS NOTE ADULT - REASON FOR ADMISSION
leukocytosis, abscess drainage

## 2019-09-19 NOTE — PROGRESS NOTE ADULT - SUBJECTIVE AND OBJECTIVE BOX
Patient is a 63y old  Male who presents with a chief complaint of leukocytosis, abscess drainage (18 Sep 2019 11:56)      SUBJECTIVE / OVERNIGHT EVENTS:    MEDICATIONS  (STANDING):  docusate sodium Liquid 100 milliGRAM(s) Oral two times a day  fentaNYL   Patch  25 MICROgram(s)/Hr 1 Patch Transdermal every 72 hours  influenza   Vaccine 0.5 milliLiter(s) IntraMuscular once  oxyCODONE    Solution 5 milliGRAM(s) Oral every 4 hours  piperacillin/tazobactam IVPB.. 3.375 Gram(s) IV Intermittent every 8 hours  senna Syrup 10 milliLiter(s) Oral at bedtime    MEDICATIONS  (PRN):  HYDROmorphone  Injectable 1 milliGRAM(s) IV Push every 4 hours PRN Severe Pain (7 - 10)  tetracaine/benzocaine/butamben Spray 1 Spray(s) Topical three times a day PRN sore throat  zolpidem 5 milliGRAM(s) Oral at bedtime PRN Insomnia          I&O's Summary    18 Sep 2019 07:01  -  19 Sep 2019 07:00  --------------------------------------------------------  IN: 1675 mL / OUT: 2425 mL / NET: -750 mL    Vital Signs Last 24 Hrs  T(C): 36.9 (19 Sep 2019 05:40), Max: 37.2 (18 Sep 2019 17:01)  T(F): 98.4 (19 Sep 2019 05:40), Max: 98.9 (18 Sep 2019 17:01)  HR: 97 (19 Sep 2019 05:40) (76 - 99)  BP: 127/79 (19 Sep 2019 05:40) (106/72 - 127/79)  BP(mean): --  RR: 18 (19 Sep 2019 05:40) (17 - 18)  SpO2: 95% (19 Sep 2019 05:40) (95% - 99%)    PHYSICAL EXAM:  GENERAL: NAD, well-developed  HEAD:  Atraumatic, Normocephalic  EYES: EOMI, PERRLA, conjunctiva and sclera clear  NECK: Supple, No JVD, dressing in place  L jaw swelling  CHEST/LUNG: Clear to auscultation bilaterally; No wheeze  HEART: Regular rate and rhythm; No murmurs, rubs, or gallops  ABDOMEN: Soft, Nontender, Nondistended; Bowel sounds present  EXTREMITIES:  2+ Peripheral Pulses, No clubbing, cyanosis, or edema  PSYCH: AAOx3  NEUROLOGY: non-focal  SKIN: No rashes or lesions    LABS:                        12.9   13.58 )-----------( 370      ( 19 Sep 2019 05:47 )             41.2     09-19    135  |  93<L>  |  11  ----------------------------<  104<H>  3.9   |  30  |  0.66    Ca    9.9      19 Sep 2019 05:47  Phos  3.3     09-18  Mg     2.1     09-18          RADIOLOGY & ADDITIONAL TESTS:  < from: CT Neck Soft Tissue w/ IV Cont (09.16.19 @ 16:46) >  IMPRESSION:  No significant interval change from CT neck obtained on 9/6/2019.        Care Discussed with Consultants/Other Providers: Patient is a 63y old  Male who presents with a chief complaint of leukocytosis, abscess drainage (18 Sep 2019 11:56)      SUBJECTIVE / OVERNIGHT EVENTS:  Patient seen and examined.  He has appointment with Oncology on Monday 9/23/19  he is to get Augmentin x 2 weeks and is requested oxycodone sol  for pain - wants medication sent to [facility]    MEDICATIONS  (STANDING):  docusate sodium Liquid 100 milliGRAM(s) Oral two times a day  fentaNYL   Patch  25 MICROgram(s)/Hr 1 Patch Transdermal every 72 hours  influenza   Vaccine 0.5 milliLiter(s) IntraMuscular once  oxyCODONE    Solution 5 milliGRAM(s) Oral every 4 hours  piperacillin/tazobactam IVPB.. 3.375 Gram(s) IV Intermittent every 8 hours  senna Syrup 10 milliLiter(s) Oral at bedtime    MEDICATIONS  (PRN):  HYDROmorphone  Injectable 1 milliGRAM(s) IV Push every 4 hours PRN Severe Pain (7 - 10)  tetracaine/benzocaine/butamben Spray 1 Spray(s) Topical three times a day PRN sore throat  zolpidem 5 milliGRAM(s) Oral at bedtime PRN Insomnia          I&O's Summary    18 Sep 2019 07:01  -  19 Sep 2019 07:00  --------------------------------------------------------  IN: 1675 mL / OUT: 2425 mL / NET: -750 mL    Vital Signs Last 24 Hrs  T(C): 36.9 (19 Sep 2019 05:40), Max: 37.2 (18 Sep 2019 17:01)  T(F): 98.4 (19 Sep 2019 05:40), Max: 98.9 (18 Sep 2019 17:01)  HR: 97 (19 Sep 2019 05:40) (76 - 99)  BP: 127/79 (19 Sep 2019 05:40) (106/72 - 127/79)  BP(mean): --  RR: 18 (19 Sep 2019 05:40) (17 - 18)  SpO2: 95% (19 Sep 2019 05:40) (95% - 99%)    PHYSICAL EXAM:  GENERAL: NAD, well-developed  HEAD:  Atraumatic, Normocephalic  EYES: EOMI, PERRLA, conjunctiva and sclera clear  NECK: Supple, No JVD, dressing in place  L jaw swelling  CHEST/LUNG: Clear to auscultation bilaterally; No wheeze  HEART: Regular rate and rhythm; No murmurs, rubs, or gallops  ABDOMEN: Soft, Nontender, Nondistended; Bowel sounds present  EXTREMITIES:  2+ Peripheral Pulses, No clubbing, cyanosis, or edema  PSYCH: AAOx3  NEUROLOGY: non-focal  SKIN: No rashes or lesions    LABS:                        12.9   13.58 )-----------( 370      ( 19 Sep 2019 05:47 )             41.2     09-19    135  |  93<L>  |  11  ----------------------------<  104<H>  3.9   |  30  |  0.66    Ca    9.9      19 Sep 2019 05:47  Phos  3.3     09-18  Mg     2.1     09-18          RADIOLOGY & ADDITIONAL TESTS:  < from: CT Neck Soft Tissue w/ IV Cont (09.16.19 @ 16:46) >  IMPRESSION:  No significant interval change from CT neck obtained on 9/6/2019.        Care Discussed with Consultants/Other Providers: Patient is a 63y old  Male who presents with a chief complaint of leukocytosis, abscess drainage (18 Sep 2019 11:56)      SUBJECTIVE / OVERNIGHT EVENTS:  Patient seen and examined.  He has appointment with Oncology on Monday 9/23/19  he is to get Augmentin x 2 weeks and is requested oxycodone sol  for pain - wants medication sent to [facility]    MEDICATIONS  (STANDING):  docusate sodium Liquid 100 milliGRAM(s) Oral two times a day  fentaNYL   Patch  25 MICROgram(s)/Hr 1 Patch Transdermal every 72 hours  influenza   Vaccine 0.5 milliLiter(s) IntraMuscular once  oxyCODONE    Solution 5 milliGRAM(s) Oral every 4 hours  piperacillin/tazobactam IVPB.. 3.375 Gram(s) IV Intermittent every 8 hours  senna Syrup 10 milliLiter(s) Oral at bedtime    MEDICATIONS  (PRN):  HYDROmorphone  Injectable 1 milliGRAM(s) IV Push every 4 hours PRN Severe Pain (7 - 10)  tetracaine/benzocaine/butamben Spray 1 Spray(s) Topical three times a day PRN sore throat  zolpidem 5 milliGRAM(s) Oral at bedtime PRN Insomnia    I&O's Summary    18 Sep 2019 07:01  -  19 Sep 2019 07:00  --------------------------------------------------------  IN: 1675 mL / OUT: 2425 mL / NET: -750 mL    Vital Signs Last 24 Hrs  T(C): 36.9 (19 Sep 2019 05:40), Max: 37.2 (18 Sep 2019 17:01)  T(F): 98.4 (19 Sep 2019 05:40), Max: 98.9 (18 Sep 2019 17:01)  HR: 97 (19 Sep 2019 05:40) (76 - 99)  BP: 127/79 (19 Sep 2019 05:40) (106/72 - 127/79)  BP(mean): --  RR: 18 (19 Sep 2019 05:40) (17 - 18)  SpO2: 95% (19 Sep 2019 05:40) (95% - 99%)    PHYSICAL EXAM:  GENERAL: NAD, well-developed  HEAD:  Atraumatic, Normocephalic  EYES: EOMI, PERRLA, conjunctiva and sclera clear  NECK: Supple, No JVD, dressing in place  L jaw swelling  CHEST/LUNG: Clear to auscultation bilaterally; No wheeze  HEART: Regular rate and rhythm; No murmurs, rubs, or gallops  ABDOMEN: Soft, Nontender, Nondistended; Bowel sounds present  Peg in place  EXTREMITIES:  2+ Peripheral Pulses, No clubbing, cyanosis, or edema  PSYCH: AAOx3  NEUROLOGY: non-focal  SKIN: No rashes or lesions    LABS:                        12.9   13.58 )-----------( 370      ( 19 Sep 2019 05:47 )             41.2     09-19    135  |  93<L>  |  11  ----------------------------<  104<H>  3.9   |  30  |  0.66    Ca    9.9      19 Sep 2019 05:47  Phos  3.3     09-18  Mg     2.1     09-18          RADIOLOGY & ADDITIONAL TESTS:  < from: CT Neck Soft Tissue w/ IV Cont (09.16.19 @ 16:46) >  IMPRESSION:  No significant interval change from CT neck obtained on 9/6/2019.        Care Discussed with Consultants/Other Providers:

## 2019-09-19 NOTE — PROGRESS NOTE ADULT - PROBLEM SELECTOR PLAN 2
- per Dr. Cai's oupt note, consensus of head/neck tumor board for palliative measures, no surgery or RT  - pt will need outpt f/u  - pain control. pt previously on morphine sulf 100 mg/5 ml conc (prescribed by pain management istop# [id number]) through PEG but states it is no longer helping  - c/w fentanyl 25 mcg patch. will c/w dilaudid 0.5 mg IVP q4h PRN for moderate pain and 1 mg IVP q4h PRN for severe pain.  - consider palliative c/s for symptom management
- per Dr. Cai's oupt note, consensus of head/neck tumor board for palliative measures, no surgery or RT  - pt will need outpt f/u  - pain control. pt previously on morphine sulf 100 mg/5 ml conc (prescribed by pain management istop# [id number]) through PEG but states it is no longer helping  - c/w fentanyl 25 mcg patch. will c/w dilaudid 0.5 mg IVP q4h PRN for moderate pain and 1 mg IVP q4h PRN for severe pain.  - consider palliative c/s for symptom management
- per Dr. Cai's oupt note, consensus of head/neck tumor board for palliative measures, no surgery or RT  - pt will need outpt f/u  - pain control. pt previously on morphine sulf 100 mg/5 ml conc (prescribed by pain management istop# [id number]) through PEG but states it is no longer helping  - c/w fentanyl 25 mcg patch. will c/w dilaudid 0.5 mg IVP q4h PRN for moderate pain and 1 mg IVP q4h PRN for severe pain.  - consider palliative c/s for symptom management

## 2019-09-19 NOTE — PROGRESS NOTE ADULT - ASSESSMENT
63M with recurrent squamous cell carcinoma.  Hx chemo/RT 2015. Hx resection, neck dissection and fibular bone flap 2018.  Recurrent disease 2019 with superinfection of L oral cavity and oropharyngeal mass.  D/w ENT, unlikely infection to bone.  Probably mostly tumor.  Better today c/w yesterday.    Suggest:  - zosyn can be changed to augmentin 875mg bid- complete 2 weeks  - warm compresses  - ENT follow up  - d/c planning    Please call Infectious Diseases if there is a change in status.  Thank you.  [phone].

## 2019-09-19 NOTE — DISCHARGE NOTE NURSING/CASE MANAGEMENT/SOCIAL WORK - PATIENT PORTAL LINK FT
You can access the FollowMyHealth Patient Portal offered by [facility] by registering at the following website: [email or web address]. By joining [facility]’s FollowMyHealth portal, you will also be able to view your health information using other applications (apps) compatible with our system.

## 2019-09-19 NOTE — PROGRESS NOTE ADULT - SUBJECTIVE AND OBJECTIVE BOX
Patient is a 63y old  Male who presents with a chief complaint of leukocytosis, abscess drainage (17 Sep 2019 10:48)    f/u infected tumor    Interval History/ROS: no fever.  left face better.  can now taste the purulent drainage.  Remainder of ROS otherwise negative.    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 3.375 every 8 hours (9/16-)    MEDICATIONS  (STANDING):  docusate sodium Liquid 100 two times a day  fentaNYL   Patch  25 MICROgram(s)/Hr 1 every 72 hours  influenza   Vaccine 0.5 once  oxyCODONE    Solution 5 every 4 hours  senna Syrup 10 at bedtime  PRN  HYDROmorphone  Injectable 1 milliGRAM(s) IV Push every 4 hours PRN  zolpidem 5 milliGRAM(s) Oral at bedtime PRN    Vital Signs Last 24 Hrs  T(F): 97.6 (09-19-19 @ 10:46), Max: 98.9 (09-18-19 @ 17:01)  HR: 81 (09-19-19 @ 10:46)  BP: 111/70 (09-19-19 @ 10:46)  RR: 18 (09-19-19 @ 10:46)  SpO2: 96% (09-19-19 @ 10:46) (95% - 99%)    PHYSICAL EXAM:  General: non-toxic  HEAD/EYES: anicteric  ENT:  swelling L face near parotid - flatter; less tender; no redness; no malodor; post-surgical changes chin  Cardiovascular:   S1, S2  Respiratory:  clear bilaterally  GI:  soft, PEG okay  :  no dow  Musculoskeletal:  no synovitis  Neurologic:  grossly non-focal  Skin:  no rash  Psychiatric:  appropriate affect  Vascular:  no phlebitis                          12.9   13.58 )-----------( 370      ( 19 Sep 2019 05:47 )             41.2 09-19    135  |  93  |  11  ----------------------------<  104  3.9   |  30  |  0.66  Ca    9.9      19 Sep 2019 05:47Phos  3.3     09-18Mg     2.1     09-18    MICROBIOLOGY:  Culture - Blood (09.06.19 @ 16:01)    Culture - Blood:   NO ORGANISMS ISOLATED    Specimen Source: BLOOD VENOUS    Culture - Blood (09.06.19 @ 16:01)    Culture - Blood:   NO ORGANISMS ISOLATED    Specimen Source: BLOOD PERIPHERAL    Culture - Wound (09.06.19 @ 15:25)    Culture - Wound:   MODERATE  STRAAC^Streptococcus constellatus  PASCAN^Pasteurella canis    Specimen Source: ARM - LEFT    RADIOLOGY:  imaging below personally reviewed    CT Neck Soft Tissue w/ IV Cont (09.16.19 @ 16:46) >  IMPRESSION:  No significant interval change from CT neck obtained on 9/6/2019.      CT Neck Soft Tissue w/ IV Cont (09.06.19 @ 17:10) >  IMPRESSION:  Redemonstrated partially necrotic infiltrating mass containing foci of air involving the left oral cavity-floor of mouth and left oropharynx, inseparable from the medial pterygoid, extending to the inferior aspect of the left parotid space and left submandibular space. Areas of underlying abscess formation can't be excluded in the region of necrosis. These findings appear similar compared to the September 3 exam.    Sono Gallbladder (09.03.19 @ 21:50) >  IMPRESSION:  Cholelithiasis without sonographic evidence of acute cholecystitis.    NM PET/CT Onc FDG Skull to Thigh, Subsq (08.22.19 @ 14:32) >  IMPRESSION: Since FDG-PET/CT scan 4/22/2019:  1. Findings suggestive of progression of left base of tongue/oropharyngeal malignancy with few new left left cervical lymphadenopathy.  2. New FDG-avidity along the right posterior floor of mouth region and mildly FDG avid difficult to delineate small lymph node, posterior to the right thyroid lobe and right common carotid artery. Please correlate clinically.  3.  New groundglassopacity in the lingula and cluster of tiny nodular opacities in the anterior right upper lobe, possibly infectious/inflammatory.  Short interval follow-up chest CT for further evaluation.   4. Several new FDG-avid difficult to delineate smallmediastinal and right hilar nodes, nonspecific.    CT Neck Soft Tissue w/ IV Cont (09.03.19 @ 18:38) >  IMPRESSION:  Findings consistent with progressive malignancy without acute findings

## 2019-09-23 ENCOUNTER — CHART COPY (OUTPATIENT)
Age: 64
End: 2019-09-23

## 2019-09-23 ENCOUNTER — APPOINTMENT (OUTPATIENT)
Dept: HEMATOLOGY ONCOLOGY | Facility: CLINIC | Age: 64
End: 2019-09-23
Payer: COMMERCIAL

## 2019-09-23 ENCOUNTER — LABORATORY RESULT (OUTPATIENT)
Age: 64
End: 2019-09-23

## 2019-09-23 ENCOUNTER — RESULT REVIEW (OUTPATIENT)
Age: 64
End: 2019-09-23

## 2019-09-23 ENCOUNTER — APPOINTMENT (OUTPATIENT)
Age: 64
End: 2019-09-23

## 2019-09-23 ENCOUNTER — INBOUND DOCUMENT (OUTPATIENT)
Age: 64
End: 2019-09-23

## 2019-09-23 DIAGNOSIS — G47.00 INSOMNIA, UNSPECIFIED: ICD-10-CM

## 2019-09-23 LAB
BASOPHILS # BLD AUTO: 0.2 K/UL — SIGNIFICANT CHANGE UP (ref 0–0.2)
BASOPHILS NFR BLD AUTO: 1.4 % — SIGNIFICANT CHANGE UP (ref 0–2)
EOSINOPHIL # BLD AUTO: 0.2 K/UL — SIGNIFICANT CHANGE UP (ref 0–0.5)
EOSINOPHIL NFR BLD AUTO: 1.4 % — SIGNIFICANT CHANGE UP (ref 0–6)
HCT VFR BLD CALC: 38.7 % — LOW (ref 39–50)
HGB BLD-MCNC: 13 G/DL — SIGNIFICANT CHANGE UP (ref 13–17)
LYMPHOCYTES # BLD AUTO: 0.8 K/UL — LOW (ref 1–3.3)
LYMPHOCYTES # BLD AUTO: 5.8 % — LOW (ref 13–44)
MCHC RBC-ENTMCNC: 29.2 PG — SIGNIFICANT CHANGE UP (ref 27–34)
MCHC RBC-ENTMCNC: 33.6 G/DL — SIGNIFICANT CHANGE UP (ref 32–36)
MCV RBC AUTO: 87 FL — SIGNIFICANT CHANGE UP (ref 80–100)
MONOCYTES # BLD AUTO: 0.8 K/UL — SIGNIFICANT CHANGE UP (ref 0–0.9)
MONOCYTES NFR BLD AUTO: 6.2 % — SIGNIFICANT CHANGE UP (ref 2–14)
NEUTROPHILS # BLD AUTO: 11.2 K/UL — HIGH (ref 1.8–7.4)
NEUTROPHILS NFR BLD AUTO: 85.2 % — HIGH (ref 43–77)
PLATELET # BLD AUTO: 397 K/UL — SIGNIFICANT CHANGE UP (ref 150–400)
RBC # BLD: 4.45 M/UL — SIGNIFICANT CHANGE UP (ref 4.2–5.8)
RBC # FLD: 13.4 % — SIGNIFICANT CHANGE UP (ref 10.3–14.5)
WBC # BLD: 13.1 K/UL — HIGH (ref 3.8–10.5)
WBC # FLD AUTO: 13.1 K/UL — HIGH (ref 3.8–10.5)

## 2019-09-23 PROCEDURE — 99214 OFFICE O/P EST MOD 30 MIN: CPT

## 2019-09-24 DIAGNOSIS — R11.2 NAUSEA WITH VOMITING, UNSPECIFIED: ICD-10-CM

## 2019-09-24 DIAGNOSIS — Z51.11 ENCOUNTER FOR ANTINEOPLASTIC CHEMOTHERAPY: ICD-10-CM

## 2019-09-24 RX ORDER — ZOLPIDEM TARTRATE 10 MG/1
10 TABLET ORAL
Qty: 30 | Refills: 0 | Status: ACTIVE | COMMUNITY
Start: 2019-08-21 | End: 1900-01-01

## 2019-09-25 ENCOUNTER — APPOINTMENT (OUTPATIENT)
Dept: HEMATOLOGY ONCOLOGY | Facility: CLINIC | Age: 64
End: 2019-09-25
Payer: COMMERCIAL

## 2019-09-25 VITALS
TEMPERATURE: 98.2 F | WEIGHT: 132.5 LBS | BODY MASS INDEX: 20.8 KG/M2 | OXYGEN SATURATION: 99 % | HEIGHT: 67 IN | SYSTOLIC BLOOD PRESSURE: 118 MMHG | DIASTOLIC BLOOD PRESSURE: 80 MMHG | HEART RATE: 93 BPM

## 2019-09-25 DIAGNOSIS — R13.10 DYSPHAGIA, UNSPECIFIED: ICD-10-CM

## 2019-09-25 DIAGNOSIS — R68.89 OTHER GENERAL SYMPTOMS AND SIGNS: ICD-10-CM

## 2019-09-25 DIAGNOSIS — M79.2 NEURALGIA AND NEURITIS, UNSPECIFIED: ICD-10-CM

## 2019-09-25 DIAGNOSIS — G89.3 NEOPLASM RELATED PAIN (ACUTE) (CHRONIC): ICD-10-CM

## 2019-09-25 PROCEDURE — 99214 OFFICE O/P EST MOD 30 MIN: CPT

## 2019-09-27 PROBLEM — R13.10 ODYNOPHAGIA: Status: ACTIVE | Noted: 2019-06-10

## 2019-09-27 PROBLEM — M79.2 NEUROPATHIC PAIN: Status: ACTIVE | Noted: 2019-05-17

## 2019-09-27 PROBLEM — R68.89 EXCESSIVE ORAL SECRETIONS: Status: ACTIVE | Noted: 2019-06-28

## 2019-09-27 PROBLEM — G89.3 CANCER ASSOCIATED PAIN: Status: ACTIVE | Noted: 2019-04-29

## 2019-09-27 NOTE — HISTORY OF PRESENT ILLNESS
[Disease: _____________________] : Disease: [unfilled] [de-identified] : 63 year old gentleman recurrent SCC of the head and neck here for evaluation of disease related pain.\par \par Oncologic Hx\par 2015 Diagnosed with  [id number] SCC lip completed radiation therapy 7000 cGy 2/11/16.\par \par 7/28/16 Alveolar vestibule biopsy:carcinoma in situ\par 9/30/16 Excision and repair of lower lip sulcus cancer\par \par 8/2017 Pain to level of lower lip Amitriptyline 10 mg nightly for pain; changed to gabapentin\par \par 2/13/18 Leukoplakia on oral exam, biopsy by Dr Paulson of the right mandibular vestibule: squamous cell carcinoma, well differentiated. \par \par 3/2/18 PET/CT new FDG avidity in the anterior mandibular buccal region with associated irregularity of the underlying mandibular cortex. \par \par 3/22/18 CT Scan: new lytic bony destruction anterior superior aspect of the mandibular symphysis in the midline involving the roots for both central incisor teeth; corresponds to + uptake on PET CT .\par \par 4/26/18 Composite resection of oral cavity cancer with segmental mandibulectomy  and floor of mouth resection, right neck dissection, left neck dissection, tracheostomy; Free osteocutaneous fibular flap, vestibuloplasty, complex closure of the neck, split thickness skin graft for squamous cell cancer  with erosion of the mandible right anterior mandibular vestibule, T4a N0 M0. \par Adjuvant RT \par \par November 2018 began to experience left tongue numbness which progressed and included left pre auricular numbness; pain stiffness along the left neck. \par \par 3/7/19 Direct laryngoscopy  and esophagoscopy.  Revision of the soft tissue pedicle and debulking of the flap; lease of lower lip scar and mucosal graft for flap failure\par implant placement drooling do to oral incompetence.\par \par 4/22/19 Biopsy left base of tongue - Invasive squamous cell carcinoma, keratinizing, well to moderately differentiated \par \par PET CT shows new FDG-avid lesion along the left posterior floor of mouth/left oral tongue, left neck nodule, c/w  recurrent disease; FDG-avid small left level IV cervical node, suspicious for metastasis; . New FDG-avid focus associated with plate and screws in the anterior mandibular region, slightly to the left of the midline, possibly post surgical/treatment change.\par \par He is scheduled to see Dr Lei today for medical oncology evaluation. [de-identified] : squamous cell cancer  [de-identified] : Pt w recurrence since last visit after IMMRX, receiving pall chemo.Pain is under control w current meds.Needs re-fills as per his format,somewhat under-utilized as he admits tryiong not to use opioids if poss..Less depressed.

## 2019-09-27 NOTE — DISCUSSION/SUMMARY
[FreeTextEntry1] : Evan called to let us know pain is well controlled but Mr Ventura is unable to sleep with any intervention other than Valium 5 mg. DR Rogers is now following him at [facility], [place] with his primary oncologist, Dr Cai, last seen on 5/8.  It may enhance analgesic benefit as well. Discussed with Dr. Rogers who agrees that it would be beneficial.

## 2019-09-27 NOTE — RESULTS/DATA
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 \par PET/CT is done as part of the subsequent treatment strategy evaluation. \par \par RADIOPHARMACEUTICAL: 12.0 mCi F-18, FDG, I.V. \par \par TECHNIQUE: Fasting blood sugar measured prior to injection of \par radiopharmaceutical was 97 mg/dl. Following intravenous injection of the \par radiopharmaceutical and an uptake period of approximately 50 minutes, \par FDG-PET/CT was obtained on a [employer] Discovery 710 from skull base to mid thigh. \par Dedicated imaging of the head and neck was also performed beginning \par approximately 72 minutes after injection. Oral contrast was given during the \par uptake period. CT was performed during shallow respiration. The CT protocol \par was optimized for PET attenuation correction and anatomic localization. The \par CT protocol was not designed to produce and cannot replace state-of-the-art \par diagnostic CT images with specific imaging protocols for different body \par parts and indications. Images were reviewed on a dedicated workstation using \par multiplanar reconstruction. \par \par The standardized uptake values (SUV) are normalized to patient body weight \par and indicate the highest activity concentration (SUVmax) in a given disease \par site. All image numbers refer to axial image number. \par \par COMPARISON: PET/CT 7/30/2018. \par \par OTHER STUDIES USED FOR CORRELATION: MRI orbit and C-spine 4/5/2019. \par \par FINDINGS: \par \par HEAD/NECK: Status post left composite mandibular resection with free flap \par reconstruction and bilateral neck dissection. Limited evaluation of oral \par cavity and neck due to metallic artifact. New irregular FDG avidity with \par central photopenia along the left posterior floor of mouth/left oral tongue, \par SUV 14.5 (image 62 of the dedicated head and neck series), corresponding to \par masslike nodular enhancement which measures approximately 4.6 x 2.0 x 4.2 \par cm, on recent MRI. Multiple adjacent smaller foci superiorly and inferiorly \par associated with multiple surgical clips New FDG-avid focus in the left lower \par neck, SUV 8.4 (image 74), corresponding to metastatic nodule between the \par scalene musculature, associated with left C4 nerve root/trunk on MRI. \par \par New FDG-avid small left level IV cervical node, 0.8 x 0.7 cm (image 93), SUV \par 7.1. \par \par New FDG-avid focus associated with plate and screws in the anterior \par mandibular region, slightly to the left of the midline, SUV 10.1 (image 64). \par \par Postsurgical/treatment changes in the oral cavity and neck. Physiologic FDG \par activity in the brain and bilateral neck muscles. \par \par THORAX: Physiologic FDG activity. \par \par LUNGS: No abnormal FDG activity. No pulmonary nodules. \par \par PLEURA/PERICARDIUM: No abnormal FDG activity. No pleural or pericardial \par effusion. \par \par HEPATOBILIARY/PANCREAS: Physiologic FDG activity. Liver SUV mean 2.6; \par previously 2.4. Cholelithiasis, unchanged. \par \par SPLEEN: Physiologic FDG activity. \par \par ADRENAL GLANDS: No abnormal FDG activity. No nodules. \par \par KIDNEYS/URINARY BLADDER: Physiologic excreted FDG activity. \par \par ABDOMINOPELVIC NODES: No abnormal FDG activity. \par \par BOWEL/PERITONEUM/MESENTERY: No abnormal FDG activity. \par \par PELVIC ORGANS: No abnormal FDG activity. \par \par BONES/SOFT TISSUES: No abnormal FDG activity. \par \par IMPRESSION: Since FDG-PET/CT scan 7/30/2018: \par \par 1. New FDG-avid lesion along the left posterior floor of mouth/left oral \par tongue and and left neck nodule, compatible with known recurrent disease. \par \par 2. New FDG-avid small left level IV cervical node, suspicious for metastasis. \par \par 3. New FDG-avid focus associated with plate and screws in the anterior \par mandibular region, slightly to the left of the midline, possibly post \par surgical/treatment change. Please correlate clinically. \par \par \par LES WILLAM M.D., NUCLEAR MEDICINE ATTENDING \par This document has been electronically signed. Apr 23 2019 10:19AM \par \par \par CASSIE RAE    Surgical Final Report\par \par Final Diagnosis\par \par 1. Momo, left base, biopsy\par - Invasive squamous cell carcinoma, keratinizing, well to\par moderately differentiated\par \par Verified by: YSABEL COFFMAN MD\par (Electronic Signature)\par Reported on: 04/22/19 12:38 EDT, [address], [place], [place]\par [address]\par _________________________________________________________________\par \par Intraoperative Consultation\par 1- Left base of tongue\par - Invasive squamous cell carcinoma with keratinization\par By Dr. NORRIS Phillip\par \par Frozen section Performed at [facility],\par Department of Pathology, [address] [place] [address].\par \par Clinical History\par Malignant neoplasm of mouth\par \par Specimen(s) Submitted\par 1- Left base of tongue\par \par Gross Description\par The specimen is received fresh for intraoperative consultation\par and the specimen container is labeled: Left base of tongue.  It\par consists of a multiple fragments of tan pink soft tissue\par measuring together 1.5 x 1.0 x 0.3 cm in aggregate.  Entirely\par submitted for frozen examination in one cassette, 1FSA.  Frozen\par cassette was opened to confirm the presence of tissue inside the\par cassette.\par

## 2019-09-27 NOTE — ASSESSMENT
[Supportive] : Goals of care discussed with patient: Supportive [FreeTextEntry1] : Stable pain and polys/s, w ongoing s/s control issues,but much more settled w his routine.Meds renewed.May benefit from glycopyrrolate if secretions become an issue.He is going to [facility] protocol.Will check w us on his return in December.

## 2019-09-27 NOTE — REVIEW OF SYSTEMS
[Patient Intake Form Reviewed] : Patient intake form was reviewed [Fatigue] : fatigue [Recent Change In Weight] : ~T recent weight change [Dysphagia] : dysphagia [Dizziness] : dizziness [Anxiety] : anxiety [Negative] : Heme/Lymph [FreeTextEntry2] : lost 75 to 69 kg 1 month  anorexia [FreeTextEntry4] : +drooling  [FreeTextEntry7] : nausea since hospitalization;  moving bowels regularly with laxatives [FreeTextEntry9] : Hx sciatica; see interval Hx [de-identified] : with nausea, positional  [de-identified] : post surgical and RT changes

## 2019-09-27 NOTE — PHYSICAL EXAM
[Ambulatory and capable of all self care but unable to carry out any work activities] : Status 2- Ambulatory and capable of all self care but unable to carry out any work activities. Up and about more than 50% of waking hours [Normal] : affect appropriate [de-identified] : microstomia; dental implants (3) otherwise edentulous; drooling

## 2019-09-30 ENCOUNTER — APPOINTMENT (OUTPATIENT)
Age: 64
End: 2019-09-30

## 2019-09-30 ENCOUNTER — RESULT REVIEW (OUTPATIENT)
Age: 64
End: 2019-09-30

## 2019-09-30 ENCOUNTER — LABORATORY RESULT (OUTPATIENT)
Age: 64
End: 2019-09-30

## 2019-09-30 LAB
BASOPHILS # BLD AUTO: 0.1 K/UL — SIGNIFICANT CHANGE UP (ref 0–0.2)
BASOPHILS NFR BLD AUTO: 1.8 % — SIGNIFICANT CHANGE UP (ref 0–2)
EOSINOPHIL # BLD AUTO: 0.3 K/UL — SIGNIFICANT CHANGE UP (ref 0–0.5)
EOSINOPHIL NFR BLD AUTO: 4.1 % — SIGNIFICANT CHANGE UP (ref 0–6)
HCT VFR BLD CALC: 37.6 % — LOW (ref 39–50)
HGB BLD-MCNC: 12.6 G/DL — LOW (ref 13–17)
LYMPHOCYTES # BLD AUTO: 0.7 K/UL — LOW (ref 1–3.3)
LYMPHOCYTES # BLD AUTO: 9.7 % — LOW (ref 13–44)
MCHC RBC-ENTMCNC: 29.2 PG — SIGNIFICANT CHANGE UP (ref 27–34)
MCHC RBC-ENTMCNC: 33.4 G/DL — SIGNIFICANT CHANGE UP (ref 32–36)
MCV RBC AUTO: 87.2 FL — SIGNIFICANT CHANGE UP (ref 80–100)
MONOCYTES # BLD AUTO: 0.5 K/UL — SIGNIFICANT CHANGE UP (ref 0–0.9)
MONOCYTES NFR BLD AUTO: 6.7 % — SIGNIFICANT CHANGE UP (ref 2–14)
NEUTROPHILS # BLD AUTO: 5.9 K/UL — SIGNIFICANT CHANGE UP (ref 1.8–7.4)
NEUTROPHILS NFR BLD AUTO: 77.6 % — HIGH (ref 43–77)
PLATELET # BLD AUTO: 308 K/UL — SIGNIFICANT CHANGE UP (ref 150–400)
RBC # BLD: 4.32 M/UL — SIGNIFICANT CHANGE UP (ref 4.2–5.8)
RBC # FLD: 13.4 % — SIGNIFICANT CHANGE UP (ref 10.3–14.5)
WBC # BLD: 7.6 K/UL — SIGNIFICANT CHANGE UP (ref 3.8–10.5)
WBC # FLD AUTO: 7.6 K/UL — SIGNIFICANT CHANGE UP (ref 3.8–10.5)

## 2019-10-02 ENCOUNTER — RECORD ABSTRACTING (OUTPATIENT)
Age: 64
End: 2019-10-02

## 2019-10-02 RX ORDER — HYDROCODONE BITARTRATE AND HOMATROPINE METHYLBROMIDE 5; 1.5 MG/5ML; MG/5ML
5-1.5 SYRUP ORAL
Qty: 300 | Refills: 0 | Status: ACTIVE | COMMUNITY
Start: 2019-08-29 | End: 1900-01-01

## 2019-10-02 RX ORDER — ONDANSETRON 8 MG/1
8 TABLET, ORALLY DISINTEGRATING ORAL EVERY 8 HOURS
Qty: 90 | Refills: 0 | Status: ACTIVE | COMMUNITY
Start: 2019-08-29 | End: 1900-01-01

## 2019-10-03 ENCOUNTER — APPOINTMENT (OUTPATIENT)
Dept: HEMATOLOGY ONCOLOGY | Facility: CLINIC | Age: 64
End: 2019-10-03
Payer: COMMERCIAL

## 2019-10-03 VITALS
OXYGEN SATURATION: 97 % | HEIGHT: 67 IN | DIASTOLIC BLOOD PRESSURE: 77 MMHG | BODY MASS INDEX: 21.82 KG/M2 | WEIGHT: 139 LBS | HEART RATE: 101 BPM | SYSTOLIC BLOOD PRESSURE: 124 MMHG | TEMPERATURE: 98.5 F

## 2019-10-03 DIAGNOSIS — R13.12 DYSPHAGIA, OROPHARYNGEAL PHASE: ICD-10-CM

## 2019-10-03 DIAGNOSIS — R11.0 NAUSEA: ICD-10-CM

## 2019-10-03 PROCEDURE — 99215 OFFICE O/P EST HI 40 MIN: CPT

## 2019-10-03 NOTE — PHYSICAL EXAM
[Ambulatory and capable of all self care but unable to carry out any work activities] : Status 2- Ambulatory and capable of all self care but unable to carry out any work activities. Up and about more than 50% of waking hours [Normal] : affect appropriate [de-identified] : microstomia with drooling. Status post composite resection and fibular graft reconstruction of right jaw and neck dissection. Large firm minimally mobile mass left mid neck, no longer palpable. Buckle mucosa left side nodularity, swelling of left anterior lateral tongue. Left tongue, swollen, anebulus\par mcrostomia with drooling. atatus post composite resection and fibular graft reconstruction of right jaw and neck dissection. large firm minimally mobile mass left mid neck. \par mcrostomia with drooling. atatus post composite resection and fibular graft reconstruction of right jaw and neck dissection. large firm minimally mobile mass left mid neck. \par macrostomia with drooling. [de-identified] : No palpable neck nodes

## 2019-10-03 NOTE — HISTORY OF PRESENT ILLNESS
[de-identified] : Patient presents s/p C5 Nivolumab for recurrent head and neck SCC.  + Fatigue. Odynophagia and dysphagia improved.  + Excessive secretions, improved, using suction machine less often.  Nutrition remains nearly 80% via PEG, weight stable. + Constipation.  No diarrhea, no nausea, no emesis. No edema. No neuropathy. No fevers. Swallow test 7/30/19 Wnl for patient.  He complaints of PEG dependency, dysphasia, insomnia, left neck/shoulder stiffness for which he is seeing Dr. Pascual; missed dexamethasone dose 2-3 days in a row. Patient also complains of left side tongue swelling, sore throat.   [de-identified] : The patient was diagnosed with recurrent head and neck SCC in April 2019 at the age of 63.  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  He completed radiation therapy (7,000 cGy) to the oral cavity on 2/11/16.  He continued with pain in the right lower lip. He was referred to Dr. Mahamed Paulson.  Biopsy 2/13/2018 of the right mandibular vestibule showed squamous cell carcinoma, well differentiated. PET/CT 3/2/18 revealed new FDG avidity in the anterior mandibular buccal region with associated irregularity of the underlying mandibular cortex, unchanged level lA lymph node. No evidence of distant metastasis. He followed with Dr. Farley who ordered a CT neck on 3/22/18 which revealed new lytic bony destruction, anterior superior aspect of the mandibular symphysis in the midline involving the roots for both central incisor teeth, no lymph nodes.  Biopsy was c/w SCCa in the area with erosion of the mandible, T4a N0 M0. \par \par He is now s/p composite resection and reconstruction with FFF on April 26, 2018. Final pathology pT1 N0 M0; however, given his original presentation, likely still  [id number] mandibular alveolar SCCa. He is s/p a release of the lower lip scar and mucosal graft on 3/7/19. An MRI 4/5/19 showed masslike nodular enhancement, increased in comparison to prior MR 12/20/2018 along the left posterior floor of mouth, spanning the base of tongue, glossotonsillar sulcus, and inferior left parapharyngeal space,  space, system with progression of recurrent disease.  Biopsy positive for invasive squamous cell carcinoma, keratinizing, well to moderately differentiated.  Follow up PET showed new irregular FDG avidity with central photopenia along the left posterior floor of mouth/left oral tongue, SUV 14.5, corresponding to masslike nodular enhancement which measures approximately 4.6 x 2.0 x 4.2 cm, on recent MRI.

## 2019-10-04 RX ORDER — AMOXICILLIN 500 MG/1
500 TABLET, FILM COATED ORAL TWICE DAILY
Qty: 20 | Refills: 0 | Status: ACTIVE | COMMUNITY
Start: 2019-10-04 | End: 1900-01-01

## 2019-10-07 ENCOUNTER — APPOINTMENT (OUTPATIENT)
Age: 64
End: 2019-10-07

## 2019-10-08 ENCOUNTER — APPOINTMENT (OUTPATIENT)
Dept: INTERNAL MEDICINE | Facility: CLINIC | Age: 64
End: 2019-10-08
Payer: COMMERCIAL

## 2019-10-08 ENCOUNTER — LABORATORY RESULT (OUTPATIENT)
Age: 64
End: 2019-10-08

## 2019-10-08 VITALS
HEIGHT: 67 IN | DIASTOLIC BLOOD PRESSURE: 71 MMHG | TEMPERATURE: 98.4 F | WEIGHT: 139 LBS | SYSTOLIC BLOOD PRESSURE: 111 MMHG | HEART RATE: 88 BPM | BODY MASS INDEX: 21.82 KG/M2 | RESPIRATION RATE: 16 BRPM

## 2019-10-08 DIAGNOSIS — L08.9 LOCAL INFECTION OF THE SKIN AND SUBCUTANEOUS TISSUE, UNSPECIFIED: ICD-10-CM

## 2019-10-08 DIAGNOSIS — K12.2 CELLULITIS AND ABSCESS OF MOUTH: ICD-10-CM

## 2019-10-08 PROCEDURE — 36415 COLL VENOUS BLD VENIPUNCTURE: CPT

## 2019-10-08 PROCEDURE — 99204 OFFICE O/P NEW MOD 45 MIN: CPT | Mod: 25

## 2019-10-08 PROCEDURE — 99358 PROLONG SERVICE W/O CONTACT: CPT

## 2019-10-08 RX ORDER — DEXAMETHASONE 2 MG/1
2 TABLET ORAL
Qty: 30 | Refills: 1 | Status: DISCONTINUED | COMMUNITY
Start: 2019-07-15 | End: 2019-10-08

## 2019-10-08 RX ORDER — DEXAMETHASONE 1 MG/1
1 TABLET ORAL
Qty: 7 | Refills: 0 | Status: DISCONTINUED | COMMUNITY
Start: 2019-08-08 | End: 2019-10-08

## 2019-10-08 RX ORDER — DOXYCYCLINE 100 MG/1
100 TABLET, FILM COATED ORAL
Qty: 60 | Refills: 5 | Status: DISCONTINUED | COMMUNITY
Start: 2019-09-13 | End: 2019-10-08

## 2019-10-08 RX ORDER — NORMAL SALT TABLETS 1 G/G
1 TABLET ORAL
Qty: 30 | Refills: 0 | Status: DISCONTINUED | COMMUNITY
Start: 2019-08-23 | End: 2019-10-08

## 2019-10-08 RX ORDER — MIRTAZAPINE 15 MG/1
15 TABLET, FILM COATED ORAL
Qty: 30 | Refills: 0 | Status: DISCONTINUED | COMMUNITY
Start: 2019-05-08 | End: 2019-10-08

## 2019-10-08 RX ORDER — DEXAMETHASONE 1 MG/1
1 TABLET ORAL
Qty: 4 | Refills: 0 | Status: DISCONTINUED | COMMUNITY
Start: 2019-08-22 | End: 2019-10-08

## 2019-10-08 RX ORDER — CLINDAMYCIN PHOSPHATE 10 MG/ML
1 LOTION TOPICAL TWICE DAILY
Qty: 1 | Refills: 3 | Status: DISCONTINUED | COMMUNITY
Start: 2019-09-13 | End: 2019-10-08

## 2019-10-08 NOTE — PHYSICAL EXAM
[Ambulatory and capable of all self care but unable to carry out any work activities] : Status 2- Ambulatory and capable of all self care but unable to carry out any work activities. Up and about more than 50% of waking hours [Normal] : affect appropriate [de-identified] : microstomia with drooling. Status post composite resection and fibular graft reconstruction of right jaw and neck dissection. Large firm minimally mobile mass left mid neck. Buckle mucosa left side nodularity, swelling of left anterior lateral tongue.\par mcrostomia with drooling. atatus post composite resection and fibular graft reconstruction of right jaw and neck dissection. large firm minimally mobile mass left mid neck. \par mcrostomia with drooling. atatus post composite resection and fibular graft reconstruction of right jaw and neck dissection. large firm minimally mobile mass left mid neck. \par macrostomia with drooling.

## 2019-10-08 NOTE — HISTORY OF PRESENT ILLNESS
[de-identified] : The patient was diagnosed with recurrent head and neck SCC in April 2019 at the age of 63.  He was originally diagnosed with squamous cell carcinoma of the lower lip [id number], stage 0, in September 2015.  He completed radiation therapy (7,000 cGy) to the oral cavity on 2/11/16.  He continued with pain in the right lower lip. He was referred to Dr. Mahamed Paulson.  Biopsy 2/13/2018 of the right mandibular vestibule showed squamous cell carcinoma, well differentiated. PET/CT 3/2/18 revealed new FDG avidity in the anterior mandibular buccal region with associated irregularity of the underlying mandibular cortex, unchanged level lA lymph node. No evidence of distant metastasis. He followed with Dr. Farley who ordered a CT neck on 3/22/18 which revealed new lytic bony destruction, anterior superior aspect of the mandibular symphysis in the midline involving the roots for both central incisor teeth, no lymph nodes.  Biopsy was c/w SCCa in the area with erosion of the mandible, T4a N0 M0. \par \par He is now s/p composite resection and reconstruction with FFF on April 26, 2018. Final pathology pT1 N0 M0; however, given his original presentation, likely still  [id number] mandibular alveolar SCCa. He is s/p a release of the lower lip scar and mucosal graft on 3/7/19. An MRI 4/5/19 showed masslike nodular enhancement, increased in comparison to prior MR 12/20/2018 along the left posterior floor of mouth, spanning the base of tongue, glossotonsillar sulcus, and inferior left parapharyngeal space,  space, system with progression of recurrent disease.  Biopsy positive for invasive squamous cell carcinoma, keratinizing, well to moderately differentiated.  Follow up PET showed new irregular FDG avidity with central photopenia along the left posterior floor of mouth/left oral tongue, SUV 14.5, corresponding to masslike nodular enhancement which measures approximately 4.6 x 2.0 x 4.2 cm, on recent MRI.  [de-identified] : Patient presents for C4D1 Nivolumab for recurrent head and neck SCC.  He is s/p a release of the lower lip scar and mucosal graft on 3/7/19.  + Fatigue but much improved s/p starting low dose dexamethasone, which has also improved appetite. + Odynophagia and dysphagia, improved with recent PO intake of "poached eggs."  + Excessive secretions, unchanged but better controlled with suction machine.  Nutrition remains nearly 100% via PEG, weight stable. + Constipation.  No diarrhea, no nausea, no emesis. No edema. No neuropathy. No fevers.\par \par Patient was discharged from [facility] 6/18/19, admitted for dysphagia. PEG was placed.

## 2019-10-08 NOTE — HISTORY OF PRESENT ILLNESS
[de-identified] : The patient was diagnosed with recurrent head and neck SCC in April 2019 at the age of 63.  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  He completed radiation therapy (7,000 cGy) to the oral cavity on 2/11/16.  He continued with pain in the right lower lip. He was referred to Dr. Mahamed Paulson.  Biopsy 2/13/2018 of the right mandibular vestibule showed squamous cell carcinoma, well differentiated. PET/CT 3/2/18 revealed new FDG avidity in the anterior mandibular buccal region with associated irregularity of the underlying mandibular cortex, unchanged level lA lymph node. No evidence of distant metastasis. He followed with Dr. Farley who ordered a CT neck on 3/22/18 which revealed new lytic bony destruction, anterior superior aspect of the mandibular symphysis in the midline involving the roots for both central incisor teeth, no lymph nodes.  Biopsy was c/w SCCa in the area with erosion of the mandible, T4a N0 M0. \par \par He is now s/p composite resection and reconstruction with FFF on April 26, 2018. 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 He is s/p a release of the lower lip scar and mucosal graft on 3/7/19. An MRI 4/5/19 showed masslike nodular enhancement, increased in comparison to prior MR 12/20/2018 along the left posterior floor of mouth, spanning the base of tongue, glossotonsillar sulcus, and inferior left parapharyngeal space,  space, system with progression of recurrent disease.  Biopsy positive for invasive squamous cell carcinoma, keratinizing, well to moderately differentiated.  Follow up PET showed new irregular FDG avidity with central photopenia along the left posterior floor of mouth/left oral tongue, SUV 14.5, corresponding to masslike nodular enhancement which measures approximately 4.6 x 2.0 x 4.2 cm, on recent MRI.  [de-identified] : Patient presents for C3D20 Nivolumab for recurrent head and neck SCC.  He is s/p a release of the lower lip scar and mucosal graft on 3/7/19.  + Fatigue, much worse recently as on top of increased fatigue he has insomnia. + Odynophagia and dysphagia, recently much worse.  + Excessive secretions, worse.  Nutrition 100% via PEG, weight stable.  No diarrhea or constipation.  No nausea, no emesis. No edema. No neuropathy. No fevers.\par \par Patient was discharged from [facility] 6/18/19, admitted for dysphagia. PEG was placed.

## 2019-10-08 NOTE — PHYSICAL EXAM
[Ambulatory and capable of all self care but unable to carry out any work activities] : Status 2- Ambulatory and capable of all self care but unable to carry out any work activities. Up and about more than 50% of waking hours [Normal] : affect appropriate [de-identified] : microstomia with drooling. Status post composite resection and fibular graft reconstruction of right jaw and neck dissection. Large firm minimally mobile mass left mid neck. Buckle mucosa left side nodularity, swelling of left anterior lateral tongue.\par mcrostomia with drooling. atatus post composite resection and fibular graft reconstruction of right jaw and neck dissection. large firm minimally mobile mass left mid neck. \par mcrostomia with drooling. atatus post composite resection and fibular graft reconstruction of right jaw and neck dissection. large firm minimally mobile mass left mid neck. \par macrostomia with drooling.

## 2019-10-09 NOTE — PHYSICAL EXAM
[Ambulatory and capable of all self care but unable to carry out any work activities] : Status 2- Ambulatory and capable of all self care but unable to carry out any work activities. Up and about more than 50% of waking hours [Normal] : affect appropriate [de-identified] : microstomia with drooling. Status post composite resection and fibular graft reconstruction of right jaw and neck dissection. Large firm minimally mobile mass left mid neck, no longer palpable. Buckle mucosa left side nodularity, swelling of left anterior lateral tongue. Left tongue, swollen, anebulus\par mcrostomia with drooling. atatus post composite resection and fibular graft reconstruction of right jaw and neck dissection. large firm minimally mobile mass left mid neck. \par mcrostomia with drooling. atatus post composite resection and fibular graft reconstruction of right jaw and neck dissection. large firm minimally mobile mass left mid neck. \par macrostomia with drooling. [de-identified] : No palpable neck nodes

## 2019-10-09 NOTE — HISTORY OF PRESENT ILLNESS
[de-identified] : The patient was diagnosed with recurrent head and neck SCC in April 2019 at the age of 63.  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  He completed radiation therapy (7,000 cGy) to the oral cavity on 2/11/16.  He continued with pain in the right lower lip. He was referred to Dr. Mahamed Paulson.  Biopsy 2/13/2018 of the right mandibular vestibule showed squamous cell carcinoma, well differentiated. PET/CT 3/2/18 revealed new FDG avidity in the anterior mandibular buccal region with associated irregularity of the underlying mandibular cortex, unchanged level lA lymph node. No evidence of distant metastasis. He followed with Dr. Farley who ordered a CT neck on 3/22/18 which revealed new lytic bony destruction, anterior superior aspect of the mandibular symphysis in the midline involving the roots for both central incisor teeth, no lymph nodes.  Biopsy was c/w SCCa in the area with erosion of the mandible, T4a N0 M0. \par \par He is now s/p composite resection and reconstruction with FFF on April 26, 2018. Final pathology pT1 N0 M0; however, given his original presentation, likely still  [id number] mandibular alveolar SCCa. He is s/p a release of the lower lip scar and mucosal graft on 3/7/19. An MRI 4/5/19 showed masslike nodular enhancement, increased in comparison to prior MR 12/20/2018 along the left posterior floor of mouth, spanning the base of tongue, glossotonsillar sulcus, and inferior left parapharyngeal space,  space, system with progression of recurrent disease.  Biopsy positive for invasive squamous cell carcinoma, keratinizing, well to moderately differentiated.  Follow up PET showed new irregular FDG avidity with central photopenia along the left posterior floor of mouth/left oral tongue, SUV 14.5, corresponding to masslike nodular enhancement which measures approximately 4.6 x 2.0 x 4.2 cm, on recent MRI.  [de-identified] : Patient presents for C1D1 Carbo/Taxol + Cetuximab for recurrent head and neck SCC.  He is s/p C5 Nivolumab with POD on PET 8/26/19.  + Fatigue, less severe. Odynophagia and dysphagia, unchanged. Nutrition remains nearly 100 % via PEG.  + Excessive secretions.  + Loose stools. + Insomnia. No edema. No neuropathy. No N/V.  No fevers, no chills.  Overall he states he feels better and is in better spirits today.

## 2019-10-14 ENCOUNTER — APPOINTMENT (OUTPATIENT)
Dept: OTOLARYNGOLOGY | Facility: CLINIC | Age: 64
End: 2019-10-14
Payer: COMMERCIAL

## 2019-10-14 VITALS
WEIGHT: 139 LBS | HEART RATE: 90 BPM | SYSTOLIC BLOOD PRESSURE: 123 MMHG | BODY MASS INDEX: 21.82 KG/M2 | HEIGHT: 67 IN | DIASTOLIC BLOOD PRESSURE: 84 MMHG

## 2019-10-14 PROCEDURE — 99214 OFFICE O/P EST MOD 30 MIN: CPT | Mod: 25

## 2019-10-14 PROCEDURE — 31575 DIAGNOSTIC LARYNGOSCOPY: CPT

## 2019-10-14 NOTE — PHYSICAL EXAM
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  There is no obvious overlying cellulitis.   [FreeTextEntry1] : FFF skin paddle is well healed.  There is a firm mass underlying the left hemitongue and FOM.   [de-identified] : R. leg healing well.

## 2019-10-14 NOTE — PROCEDURE
[None] : none [Trismus] : trismus preventing mirror examination [Flexible Endoscope] : examined with the flexible endoscope [Serial Number: ___] : Serial Number: [unfilled] [de-identified] : No lesions in the NPx, HPx or larynx.  The L. OPx is somewhat crowded from known disease, no obvious bleeding at this time.  VC are mobile, airway is patent. [de-identified] : Recurrent SCCa

## 2019-10-14 NOTE — HISTORY OF PRESENT ILLNESS
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  He is currently on systemic palliation with chemotherapy given progression of disease on immunotherapy.  He is in a clinical trial at [facility] with plans to return to [place] next month for next step in the protocol.  He has had persistent drainage from his necrotic tumor wound from the left neck for which he has received antibiotics.  He was seen by ID last week and was asked to be evaluated for possible I&D.  He denies any dyspnea, fever.  He has PEG tube, and most of his nutrition through PEG tube with small amount of water by mouth.  His weight remains stable.

## 2019-10-15 ENCOUNTER — LABORATORY RESULT (OUTPATIENT)
Age: 64
End: 2019-10-15

## 2019-10-15 ENCOUNTER — RESULT REVIEW (OUTPATIENT)
Age: 64
End: 2019-10-15

## 2019-10-15 ENCOUNTER — APPOINTMENT (OUTPATIENT)
Age: 64
End: 2019-10-15

## 2019-10-15 LAB
BASOPHILS # BLD AUTO: 0.2 K/UL — SIGNIFICANT CHANGE UP (ref 0–0.2)
BASOPHILS NFR BLD AUTO: 2.1 % — HIGH (ref 0–2)
EOSINOPHIL # BLD AUTO: 0.1 K/UL — SIGNIFICANT CHANGE UP (ref 0–0.5)
EOSINOPHIL NFR BLD AUTO: 0.7 % — SIGNIFICANT CHANGE UP (ref 0–6)
HCT VFR BLD CALC: 35.9 % — LOW (ref 39–50)
HGB BLD-MCNC: 12.5 G/DL — LOW (ref 13–17)
LYMPHOCYTES # BLD AUTO: 0.8 K/UL — LOW (ref 1–3.3)
LYMPHOCYTES # BLD AUTO: 7.2 % — LOW (ref 13–44)
MCHC RBC-ENTMCNC: 30 PG — SIGNIFICANT CHANGE UP (ref 27–34)
MCHC RBC-ENTMCNC: 34.9 G/DL — SIGNIFICANT CHANGE UP (ref 32–36)
MCV RBC AUTO: 85.9 FL — SIGNIFICANT CHANGE UP (ref 80–100)
MONOCYTES # BLD AUTO: 1.2 K/UL — HIGH (ref 0–0.9)
MONOCYTES NFR BLD AUTO: 10.6 % — SIGNIFICANT CHANGE UP (ref 2–14)
NEUTROPHILS # BLD AUTO: 8.8 K/UL — HIGH (ref 1.8–7.4)
NEUTROPHILS NFR BLD AUTO: 79.4 % — HIGH (ref 43–77)
PLATELET # BLD AUTO: 421 K/UL — HIGH (ref 150–400)
RBC # BLD: 4.18 M/UL — LOW (ref 4.2–5.8)
RBC # FLD: 13.6 % — SIGNIFICANT CHANGE UP (ref 10.3–14.5)
WBC # BLD: 11 K/UL — HIGH (ref 3.8–10.5)
WBC # FLD AUTO: 11 K/UL — HIGH (ref 3.8–10.5)

## 2019-10-16 ENCOUNTER — APPOINTMENT (OUTPATIENT)
Age: 64
End: 2019-10-16

## 2019-10-16 LAB
ANION GAP SERPL CALC-SCNC: 10 MMOL/L
BASOPHILS # BLD AUTO: 0.1 K/UL
BASOPHILS NFR BLD AUTO: 1.8 %
BUN SERPL-MCNC: 8 MG/DL
CALCIUM SERPL-MCNC: 9.5 MG/DL
CHLORIDE SERPL-SCNC: 93 MMOL/L
CO2 SERPL-SCNC: 30 MMOL/L
CREAT SERPL-MCNC: 0.6 MG/DL
CRP SERPL-MCNC: 3.14 MG/DL
EOSINOPHIL # BLD AUTO: 0.11 K/UL
EOSINOPHIL NFR BLD AUTO: 2 %
ERYTHROCYTE [SEDIMENTATION RATE] IN BLOOD BY WESTERGREN METHOD: 40 MM/HR
GLUCOSE SERPL-MCNC: 99 MG/DL
HCT VFR BLD CALC: 34.5 %
HGB BLD-MCNC: 10.9 G/DL
IMM GRANULOCYTES NFR BLD AUTO: 2.1 %
LYMPHOCYTES # BLD AUTO: 0.61 K/UL
LYMPHOCYTES NFR BLD AUTO: 10.9 %
MAN DIFF?: NORMAL
MCHC RBC-ENTMCNC: 29.6 PG
MCHC RBC-ENTMCNC: 31.6 GM/DL
MCV RBC AUTO: 93.8 FL
MONOCYTES # BLD AUTO: 1.23 K/UL
MONOCYTES NFR BLD AUTO: 21.9 %
NEUTROPHILS # BLD AUTO: 3.45 K/UL
NEUTROPHILS NFR BLD AUTO: 61.3 %
PLATELET # BLD AUTO: 359 K/UL
POTASSIUM SERPL-SCNC: 4.5 MMOL/L
PROCALCITONIN SERPL-MCNC: 0.07 NG/ML
RBC # BLD: 3.68 M/UL
RBC # FLD: 15.6 %
SODIUM SERPL-SCNC: 133 MMOL/L
WBC # FLD AUTO: 5.62 K/UL

## 2019-10-18 ENCOUNTER — RESULT REVIEW (OUTPATIENT)
Age: 64
End: 2019-10-18

## 2019-10-18 ENCOUNTER — APPOINTMENT (OUTPATIENT)
Dept: HEMATOLOGY ONCOLOGY | Facility: CLINIC | Age: 64
End: 2019-10-18
Payer: COMMERCIAL

## 2019-10-18 ENCOUNTER — OTHER (OUTPATIENT)
Age: 64
End: 2019-10-18

## 2019-10-18 VITALS
HEIGHT: 67 IN | HEART RATE: 108 BPM | SYSTOLIC BLOOD PRESSURE: 117 MMHG | BODY MASS INDEX: 21.19 KG/M2 | OXYGEN SATURATION: 93 % | WEIGHT: 135.02 LBS | DIASTOLIC BLOOD PRESSURE: 77 MMHG

## 2019-10-18 LAB
BASOPHILS # BLD AUTO: 0.2 K/UL — SIGNIFICANT CHANGE UP (ref 0–0.2)
BASOPHILS NFR BLD AUTO: 1.8 % — SIGNIFICANT CHANGE UP (ref 0–2)
EOSINOPHIL # BLD AUTO: 0.1 K/UL — SIGNIFICANT CHANGE UP (ref 0–0.5)
EOSINOPHIL NFR BLD AUTO: 0.9 % — SIGNIFICANT CHANGE UP (ref 0–6)
HCT VFR BLD CALC: 39.6 % — SIGNIFICANT CHANGE UP (ref 39–50)
HGB BLD-MCNC: 12.7 G/DL — LOW (ref 13–17)
LYMPHOCYTES # BLD AUTO: 0.7 K/UL — LOW (ref 1–3.3)
LYMPHOCYTES # BLD AUTO: 7.7 % — LOW (ref 13–44)
MCHC RBC-ENTMCNC: 28.7 PG — SIGNIFICANT CHANGE UP (ref 27–34)
MCHC RBC-ENTMCNC: 32.2 G/DL — SIGNIFICANT CHANGE UP (ref 32–36)
MCV RBC AUTO: 89.2 FL — SIGNIFICANT CHANGE UP (ref 80–100)
MONOCYTES # BLD AUTO: 0.7 K/UL — SIGNIFICANT CHANGE UP (ref 0–0.9)
MONOCYTES NFR BLD AUTO: 7.1 % — SIGNIFICANT CHANGE UP (ref 2–14)
NEUTROPHILS # BLD AUTO: 7.6 K/UL — HIGH (ref 1.8–7.4)
NEUTROPHILS NFR BLD AUTO: 82.4 % — HIGH (ref 43–77)
PLATELET # BLD AUTO: 357 K/UL — SIGNIFICANT CHANGE UP (ref 150–400)
RBC # BLD: 4.44 M/UL — SIGNIFICANT CHANGE UP (ref 4.2–5.8)
RBC # FLD: 14.1 % — SIGNIFICANT CHANGE UP (ref 10.3–14.5)
WBC # BLD: 9.2 K/UL — SIGNIFICANT CHANGE UP (ref 3.8–10.5)
WBC # FLD AUTO: 9.2 K/UL — SIGNIFICANT CHANGE UP (ref 3.8–10.5)

## 2019-10-18 PROCEDURE — 99215 OFFICE O/P EST HI 40 MIN: CPT

## 2019-10-21 ENCOUNTER — RX RENEWAL (OUTPATIENT)
Age: 64
End: 2019-10-21

## 2019-10-21 ENCOUNTER — APPOINTMENT (OUTPATIENT)
Dept: OTOLARYNGOLOGY | Facility: CLINIC | Age: 64
End: 2019-10-21
Payer: COMMERCIAL

## 2019-10-21 ENCOUNTER — OUTPATIENT (OUTPATIENT)
Dept: OUTPATIENT SERVICES | Facility: HOSPITAL | Age: 64
LOS: 1 days | Discharge: HOME | End: 2019-10-21

## 2019-10-21 VITALS — BODY MASS INDEX: 21.19 KG/M2 | WEIGHT: 135 LBS | HEIGHT: 67 IN

## 2019-10-21 DIAGNOSIS — Z98.890 OTHER SPECIFIED POSTPROCEDURAL STATES: Chronic | ICD-10-CM

## 2019-10-21 DIAGNOSIS — C76.0 MALIGNANT NEOPLASM OF HEAD, FACE AND NECK: ICD-10-CM

## 2019-10-21 DIAGNOSIS — Z85.819 PERSONAL HISTORY OF MALIGNANT NEOPLASM OF UNSPECIFIED SITE OF LIP, ORAL CAVITY, AND PHARYNX: Chronic | ICD-10-CM

## 2019-10-21 DIAGNOSIS — C06.9 MALIGNANT NEOPLASM OF MOUTH, UNSPECIFIED: ICD-10-CM

## 2019-10-21 DIAGNOSIS — S11.90XA UNSPECIFIED OPEN WOUND OF UNSPECIFIED PART OF NECK, INITIAL ENCOUNTER: ICD-10-CM

## 2019-10-21 DIAGNOSIS — Z43.1 ENCOUNTER FOR ATTENTION TO GASTROSTOMY: Chronic | ICD-10-CM

## 2019-10-21 PROCEDURE — 99215 OFFICE O/P EST HI 40 MIN: CPT

## 2019-10-21 RX ORDER — FENTANYL 25 UG/H
25 PATCH, EXTENDED RELEASE TRANSDERMAL
Qty: 10 | Refills: 0 | Status: ACTIVE | COMMUNITY
Start: 2019-09-12 | End: 1900-01-01

## 2019-10-21 NOTE — HISTORY OF PRESENT ILLNESS
[de-identified] : The patient was diagnosed with recurrent head and neck SCC in April 2019 at the age of 63.  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  He completed radiation therapy (7,000 cGy) to the oral cavity on 2/11/16.  He continued with pain in the right lower lip. He was referred to Dr. Mahamed Paulson.  Biopsy 2/13/2018 of the right mandibular vestibule showed squamous cell carcinoma, well differentiated. PET/CT 3/2/18 revealed new FDG avidity in the anterior mandibular buccal region with associated irregularity of the underlying mandibular cortex, unchanged level lA lymph node. No evidence of distant metastasis. He followed with Dr. Farley who ordered a CT neck on 3/22/18 which revealed new lytic bony destruction, anterior superior aspect of the mandibular symphysis in the midline involving the roots for both central incisor teeth, no lymph nodes.  Biopsy was c/w SCCa in the area with erosion of the mandible, T4a N0 M0. \par \par He is now s/p composite resection and reconstruction with FFF on April 26, 2018. Final pathology pT1 N0 M0; however, given his original presentation, likely still  [id number] mandibular alveolar SCCa. He is s/p a release of the lower lip scar and mucosal graft on 3/7/19. An MRI 4/5/19 showed masslike nodular enhancement, increased in comparison to prior MR 12/20/2018 along the left posterior floor of mouth, spanning the base of tongue, glossotonsillar sulcus, and inferior left parapharyngeal space,  space, system with progression of recurrent disease.  Biopsy positive for invasive squamous cell carcinoma, keratinizing, well to moderately differentiated.  Follow up PET showed new irregular FDG avidity with central photopenia along the left posterior floor of mouth/left oral tongue, SUV 14.5, corresponding to masslike nodular enhancement which measures approximately 4.6 x 2.0 x 4.2 cm, on recent MRI.  [de-identified] : Patient presents s/p C5 Nivolumab for recurrent head and neck SCC.  + Fatigue. Odynophagia and dysphagia improved.  + Excessive secretions, improved, using suction machine less often.  Nutrition remains nearly 80% via PEG, weight stable. + Constipation.  No diarrhea, no nausea, no emesis. No edema. No neuropathy. No fevers. Swallow test 7/30/19 Wnl for patient.  He complaints of PEG dependency, dysphasia, insomnia, left neck/shoulder stiffness for which he is seeing Dr. Pascual; missed dexamethasone dose 2-3 days in a row. Patient also complains of left side tongue swelling, sore throat. Had golf ball-sized clots in mouth and in dressing yesterday and today. No other complaints today.

## 2019-10-21 NOTE — PHYSICAL EXAM
[Ambulatory and capable of all self care but unable to carry out any work activities] : Status 2- Ambulatory and capable of all self care but unable to carry out any work activities. Up and about more than 50% of waking hours [Normal] : affect appropriate [de-identified] : microstomia with drooling. Status post composite resection and fibular graft reconstruction of right jaw and neck dissection. Large firm minimally mobile mass left mid neck, no longer palpable. Buckle mucosa left side nodularity, swelling of left anterior lateral tongue. Left tongue, swollen, anebulus\par mcrostomia with drooling. atatus post composite resection and fibular graft reconstruction of right jaw and neck dissection. large firm minimally mobile mass left mid neck. \par mcrostomia with drooling. atatus post composite resection and fibular graft reconstruction of right jaw and neck dissection. large firm minimally mobile mass left mid neck. \par macrostomia with drooling. [de-identified] : No palpable neck nodes

## 2019-10-22 ENCOUNTER — RX RENEWAL (OUTPATIENT)
Age: 64
End: 2019-10-22

## 2019-10-22 ENCOUNTER — APPOINTMENT (OUTPATIENT)
Age: 64
End: 2019-10-22

## 2019-10-22 RX ORDER — OXYCODONE HYDROCHLORIDE 5 MG/5ML
5 SOLUTION ORAL
Qty: 650 | Refills: 0 | Status: ACTIVE | COMMUNITY
Start: 2019-09-24 | End: 1900-01-01

## 2019-10-25 ENCOUNTER — RESULT REVIEW (OUTPATIENT)
Age: 64
End: 2019-10-25

## 2019-10-25 ENCOUNTER — APPOINTMENT (OUTPATIENT)
Age: 64
End: 2019-10-25

## 2019-10-25 ENCOUNTER — INPATIENT (INPATIENT)
Facility: HOSPITAL | Age: 64
LOS: 5 days | Discharge: HOME HEALTH | End: 2019-10-31
Attending: HOSPITALIST | Admitting: HOSPITALIST
Payer: COMMERCIAL

## 2019-10-25 VITALS
SYSTOLIC BLOOD PRESSURE: 124 MMHG | RESPIRATION RATE: 16 BRPM | OXYGEN SATURATION: 100 % | HEART RATE: 98 BPM | DIASTOLIC BLOOD PRESSURE: 86 MMHG | TEMPERATURE: 98 F

## 2019-10-25 DIAGNOSIS — Z85.819 PERSONAL HISTORY OF MALIGNANT NEOPLASM OF UNSPECIFIED SITE OF LIP, ORAL CAVITY, AND PHARYNX: Chronic | ICD-10-CM

## 2019-10-25 DIAGNOSIS — E87.1 HYPO-OSMOLALITY AND HYPONATREMIA: ICD-10-CM

## 2019-10-25 DIAGNOSIS — Z43.1 ENCOUNTER FOR ATTENTION TO GASTROSTOMY: Chronic | ICD-10-CM

## 2019-10-25 DIAGNOSIS — Z98.890 OTHER SPECIFIED POSTPROCEDURAL STATES: Chronic | ICD-10-CM

## 2019-10-25 LAB
ALBUMIN SERPL ELPH-MCNC: 3.7 G/DL — SIGNIFICANT CHANGE UP (ref 3.3–5)
ALP SERPL-CCNC: 73 U/L — SIGNIFICANT CHANGE UP (ref 40–120)
ALT FLD-CCNC: 12 U/L — SIGNIFICANT CHANGE UP (ref 4–41)
ANION GAP SERPL CALC-SCNC: 11 MMO/L — SIGNIFICANT CHANGE UP (ref 7–14)
APPEARANCE UR: SIGNIFICANT CHANGE UP
AST SERPL-CCNC: 13 U/L — SIGNIFICANT CHANGE UP (ref 4–40)
BACTERIA # UR AUTO: NEGATIVE — SIGNIFICANT CHANGE UP
BASE EXCESS BLDV CALC-SCNC: 8.9 MMOL/L — SIGNIFICANT CHANGE UP
BASOPHILS # BLD AUTO: 0.06 K/UL — SIGNIFICANT CHANGE UP (ref 0–0.2)
BASOPHILS # BLD AUTO: 0.1 K/UL — SIGNIFICANT CHANGE UP (ref 0–0.2)
BASOPHILS NFR BLD AUTO: 0.7 % — SIGNIFICANT CHANGE UP (ref 0–2)
BASOPHILS NFR BLD AUTO: 1 % — SIGNIFICANT CHANGE UP (ref 0–2)
BILIRUB SERPL-MCNC: 0.4 MG/DL — SIGNIFICANT CHANGE UP (ref 0.2–1.2)
BILIRUB UR-MCNC: NEGATIVE — SIGNIFICANT CHANGE UP
BLD GP AB SCN SERPL QL: NEGATIVE — SIGNIFICANT CHANGE UP
BLOOD GAS VENOUS - CREATININE: 0.57 MG/DL — SIGNIFICANT CHANGE UP (ref 0.5–1.3)
BLOOD GAS VENOUS - FIO2: 21 — SIGNIFICANT CHANGE UP
BLOOD UR QL VISUAL: NEGATIVE — SIGNIFICANT CHANGE UP
BUN SERPL-MCNC: 11 MG/DL — SIGNIFICANT CHANGE UP (ref 7–23)
BUN SERPL-MCNC: 9 MG/DL — SIGNIFICANT CHANGE UP (ref 7–23)
CA-I BLDA-SCNC: 1.2 MMOL/L — SIGNIFICANT CHANGE UP (ref 1.12–1.3)
CALCIUM SERPL-MCNC: 9.7 MG/DL — SIGNIFICANT CHANGE UP (ref 8.4–10.5)
CHLORIDE BLDV-SCNC: 94 MMOL/L — LOW (ref 96–108)
CHLORIDE SERPL-SCNC: 86 MMOL/L — LOW (ref 96–108)
CHLORIDE SERPL-SCNC: 87 MMOL/L — LOW (ref 98–107)
CHLORIDE UR-SCNC: 40 MMOL/L — SIGNIFICANT CHANGE UP
CO2 SERPL-SCNC: 31 MMOL/L — SIGNIFICANT CHANGE UP (ref 22–31)
CO2 SERPL-SCNC: 34 MMOL/L — HIGH (ref 22–31)
COLOR SPEC: SIGNIFICANT CHANGE UP
CORTIS SERPL-MCNC: 24.6 UG/DL — HIGH (ref 2.7–18.4)
CREAT ?TM UR-MCNC: 29 MG/DL — SIGNIFICANT CHANGE UP
CREAT SERPL-MCNC: 0.56 MG/DL — SIGNIFICANT CHANGE UP (ref 0.5–1.3)
CREAT SERPL-MCNC: 0.6 MG/DL — SIGNIFICANT CHANGE UP (ref 0.5–1.3)
EOSINOPHIL # BLD AUTO: 0.1 K/UL — SIGNIFICANT CHANGE UP (ref 0–0.5)
EOSINOPHIL # BLD AUTO: 0.21 K/UL — SIGNIFICANT CHANGE UP (ref 0–0.5)
EOSINOPHIL NFR BLD AUTO: 1.6 % — SIGNIFICANT CHANGE UP (ref 0–6)
EOSINOPHIL NFR BLD AUTO: 2.4 % — SIGNIFICANT CHANGE UP (ref 0–6)
GAS PNL BLDV: 125 MMOL/L — LOW (ref 136–146)
GLUCOSE BLDV-MCNC: 107 MG/DL — HIGH (ref 70–99)
GLUCOSE SERPL-MCNC: 110 MG/DL — HIGH (ref 70–99)
GLUCOSE SERPL-MCNC: 186 MG/DL — HIGH (ref 70–99)
GLUCOSE UR-MCNC: NEGATIVE — SIGNIFICANT CHANGE UP
HCO3 BLDV-SCNC: 30 MMOL/L — HIGH (ref 20–27)
HCT VFR BLD CALC: 34.2 % — LOW (ref 39–50)
HCT VFR BLD CALC: 37.4 % — LOW (ref 39–50)
HCT VFR BLDV CALC: 37 % — LOW (ref 39–51)
HGB BLD-MCNC: 11.2 G/DL — LOW (ref 13–17)
HGB BLD-MCNC: 11.7 G/DL — LOW (ref 13–17)
HGB BLDV-MCNC: 12 G/DL — LOW (ref 13–17)
HYALINE CASTS # UR AUTO: NEGATIVE — SIGNIFICANT CHANGE UP
IMM GRANULOCYTES NFR BLD AUTO: 0.8 % — SIGNIFICANT CHANGE UP (ref 0–1.5)
KETONES UR-MCNC: NEGATIVE — SIGNIFICANT CHANGE UP
LACTATE BLDV-MCNC: 1.8 MMOL/L — SIGNIFICANT CHANGE UP (ref 0.5–2)
LEUKOCYTE ESTERASE UR-ACNC: NEGATIVE — SIGNIFICANT CHANGE UP
LIDOCAIN IGE QN: 9.4 U/L — SIGNIFICANT CHANGE UP (ref 7–60)
LYMPHOCYTES # BLD AUTO: 0.2 K/UL — LOW (ref 1–3.3)
LYMPHOCYTES # BLD AUTO: 0.44 K/UL — LOW (ref 1–3.3)
LYMPHOCYTES # BLD AUTO: 2.6 % — LOW (ref 13–44)
LYMPHOCYTES # BLD AUTO: 5 % — LOW (ref 13–44)
MAGNESIUM SERPL-MCNC: 2 MG/DL — SIGNIFICANT CHANGE UP (ref 1.6–2.6)
MCHC RBC-ENTMCNC: 28.1 PG — SIGNIFICANT CHANGE UP (ref 27–34)
MCHC RBC-ENTMCNC: 28.9 PG — SIGNIFICANT CHANGE UP (ref 27–34)
MCHC RBC-ENTMCNC: 31.3 % — LOW (ref 32–36)
MCHC RBC-ENTMCNC: 32.7 G/DL — SIGNIFICANT CHANGE UP (ref 32–36)
MCV RBC AUTO: 88.4 FL — SIGNIFICANT CHANGE UP (ref 80–100)
MCV RBC AUTO: 89.7 FL — SIGNIFICANT CHANGE UP (ref 80–100)
MONOCYTES # BLD AUTO: 1 K/UL — HIGH (ref 0–0.9)
MONOCYTES # BLD AUTO: 1.3 K/UL — HIGH (ref 0–0.9)
MONOCYTES NFR BLD AUTO: 11.6 % — SIGNIFICANT CHANGE UP (ref 2–14)
MONOCYTES NFR BLD AUTO: 14.7 % — HIGH (ref 2–14)
NEUTROPHILS # BLD AUTO: 6.74 K/UL — SIGNIFICANT CHANGE UP (ref 1.8–7.4)
NEUTROPHILS # BLD AUTO: 7.4 K/UL — SIGNIFICANT CHANGE UP (ref 1.8–7.4)
NEUTROPHILS NFR BLD AUTO: 76.4 % — SIGNIFICANT CHANGE UP (ref 43–77)
NEUTROPHILS NFR BLD AUTO: 83.2 % — HIGH (ref 43–77)
NITRITE UR-MCNC: NEGATIVE — SIGNIFICANT CHANGE UP
NRBC # FLD: 0 K/UL — SIGNIFICANT CHANGE UP (ref 0–0)
OSMOLALITY SERPL: 270 MOSMO/KG — LOW (ref 275–295)
OSMOLALITY UR: 237 MOSMO/KG — SIGNIFICANT CHANGE UP (ref 50–1200)
PCO2 BLDV: 54 MMHG — HIGH (ref 41–51)
PH BLDV: 7.41 PH — SIGNIFICANT CHANGE UP (ref 7.32–7.43)
PH UR: 7.5 — SIGNIFICANT CHANGE UP (ref 5–8)
PHOSPHATE SERPL-MCNC: 3.6 MG/DL — SIGNIFICANT CHANGE UP (ref 2.5–4.5)
PLATELET # BLD AUTO: 248 K/UL — SIGNIFICANT CHANGE UP (ref 150–400)
PLATELET # BLD AUTO: 252 K/UL — SIGNIFICANT CHANGE UP (ref 150–400)
PMV BLD: 9.1 FL — SIGNIFICANT CHANGE UP (ref 7–13)
PO2 BLDV: < 24 MMHG — LOW (ref 35–40)
POTASSIUM BLDV-SCNC: 4.3 MMOL/L — SIGNIFICANT CHANGE UP (ref 3.4–4.5)
POTASSIUM SERPL-MCNC: 4.6 MMOL/L — SIGNIFICANT CHANGE UP (ref 3.5–5.3)
POTASSIUM SERPL-MCNC: 4.7 MMOL/L — SIGNIFICANT CHANGE UP (ref 3.5–5.3)
POTASSIUM SERPL-SCNC: 4.6 MMOL/L — SIGNIFICANT CHANGE UP (ref 3.5–5.3)
POTASSIUM SERPL-SCNC: 4.7 MMOL/L — SIGNIFICANT CHANGE UP (ref 3.5–5.3)
POTASSIUM UR-SCNC: 24.8 MMOL/L — SIGNIFICANT CHANGE UP
PROT SERPL-MCNC: 7.3 G/DL — SIGNIFICANT CHANGE UP (ref 6–8.3)
PROT UR-MCNC: NEGATIVE — SIGNIFICANT CHANGE UP
RBC # BLD: 3.87 M/UL — LOW (ref 4.2–5.8)
RBC # BLD: 4.17 M/UL — LOW (ref 4.2–5.8)
RBC # FLD: 13.7 % — SIGNIFICANT CHANGE UP (ref 10.3–14.5)
RBC # FLD: 14.5 % — SIGNIFICANT CHANGE UP (ref 10.3–14.5)
RBC CASTS # UR COMP ASSIST: SIGNIFICANT CHANGE UP (ref 0–?)
RH IG SCN BLD-IMP: POSITIVE — SIGNIFICANT CHANGE UP
SAO2 % BLDV: 29.6 % — LOW (ref 60–85)
SODIUM SERPL-SCNC: 126 MMOL/L — LOW (ref 135–145)
SODIUM SERPL-SCNC: 129 MMOL/L — LOW (ref 135–145)
SODIUM UR-SCNC: 45 MMOL/L — SIGNIFICANT CHANGE UP
SP GR SPEC: 1.01 — SIGNIFICANT CHANGE UP (ref 1–1.04)
SQUAMOUS # UR AUTO: SIGNIFICANT CHANGE UP
UROBILINOGEN FLD QL: NORMAL — SIGNIFICANT CHANGE UP
UUN UR-MCNC: 258 MG/DL — SIGNIFICANT CHANGE UP
WBC # BLD: 8.82 K/UL — SIGNIFICANT CHANGE UP (ref 3.8–10.5)
WBC # BLD: 8.9 K/UL — SIGNIFICANT CHANGE UP (ref 3.8–10.5)
WBC # FLD AUTO: 8.82 K/UL — SIGNIFICANT CHANGE UP (ref 3.8–10.5)
WBC # FLD AUTO: 8.9 K/UL — SIGNIFICANT CHANGE UP (ref 3.8–10.5)
WBC UR QL: SIGNIFICANT CHANGE UP (ref 0–?)

## 2019-10-25 PROCEDURE — 71045 X-RAY EXAM CHEST 1 VIEW: CPT | Mod: 26

## 2019-10-25 PROCEDURE — 99223 1ST HOSP IP/OBS HIGH 75: CPT

## 2019-10-25 PROCEDURE — 74177 CT ABD & PELVIS W/CONTRAST: CPT | Mod: 26

## 2019-10-25 RX ORDER — INFLUENZA VIRUS VACCINE 15; 15; 15; 15 UG/.5ML; UG/.5ML; UG/.5ML; UG/.5ML
0.5 SUSPENSION INTRAMUSCULAR ONCE
Refills: 0 | Status: COMPLETED | OUTPATIENT
Start: 2019-10-25 | End: 2019-10-31

## 2019-10-25 RX ORDER — HYDROMORPHONE HYDROCHLORIDE 2 MG/ML
0.5 INJECTION INTRAMUSCULAR; INTRAVENOUS; SUBCUTANEOUS
Refills: 0 | Status: DISCONTINUED | OUTPATIENT
Start: 2019-10-25 | End: 2019-10-26

## 2019-10-25 RX ORDER — HYDROMORPHONE HYDROCHLORIDE 2 MG/ML
0.5 INJECTION INTRAMUSCULAR; INTRAVENOUS; SUBCUTANEOUS ONCE
Refills: 0 | Status: DISCONTINUED | OUTPATIENT
Start: 2019-10-25 | End: 2019-10-25

## 2019-10-25 RX ORDER — KETOROLAC TROMETHAMINE 30 MG/ML
15 SYRINGE (ML) INJECTION EVERY 6 HOURS
Refills: 0 | Status: DISCONTINUED | OUTPATIENT
Start: 2019-10-25 | End: 2019-10-26

## 2019-10-25 RX ORDER — MORPHINE SULFATE 50 MG/1
0.5 CAPSULE, EXTENDED RELEASE ORAL
Qty: 0 | Refills: 0 | DISCHARGE

## 2019-10-25 RX ORDER — HYDROMORPHONE HYDROCHLORIDE 2 MG/ML
1 INJECTION INTRAMUSCULAR; INTRAVENOUS; SUBCUTANEOUS
Refills: 0 | Status: DISCONTINUED | OUTPATIENT
Start: 2019-10-25 | End: 2019-10-26

## 2019-10-25 RX ORDER — ACETAMINOPHEN 500 MG
650 TABLET ORAL EVERY 4 HOURS
Refills: 0 | Status: DISCONTINUED | OUTPATIENT
Start: 2019-10-25 | End: 2019-10-31

## 2019-10-25 RX ORDER — FENTANYL CITRATE 50 UG/ML
1 INJECTION INTRAVENOUS
Qty: 0 | Refills: 0 | DISCHARGE

## 2019-10-25 RX ORDER — SENNA PLUS 8.6 MG/1
10 TABLET ORAL AT BEDTIME
Refills: 0 | Status: DISCONTINUED | OUTPATIENT
Start: 2019-10-25 | End: 2019-10-31

## 2019-10-25 RX ORDER — SODIUM CHLORIDE 9 MG/ML
0 INJECTION INTRAMUSCULAR; INTRAVENOUS; SUBCUTANEOUS
Qty: 0 | Refills: 0 | DISCHARGE

## 2019-10-25 RX ORDER — ONDANSETRON 8 MG/1
4 TABLET, FILM COATED ORAL THREE TIMES A DAY
Refills: 0 | Status: DISCONTINUED | OUTPATIENT
Start: 2019-10-25 | End: 2019-10-26

## 2019-10-25 RX ORDER — SODIUM CHLORIDE 9 MG/ML
1000 INJECTION INTRAMUSCULAR; INTRAVENOUS; SUBCUTANEOUS ONCE
Refills: 0 | Status: COMPLETED | OUTPATIENT
Start: 2019-10-25 | End: 2019-10-25

## 2019-10-25 RX ORDER — HEPARIN SODIUM 5000 [USP'U]/ML
5000 INJECTION INTRAVENOUS; SUBCUTANEOUS EVERY 8 HOURS
Refills: 0 | Status: DISCONTINUED | OUTPATIENT
Start: 2019-10-25 | End: 2019-10-31

## 2019-10-25 RX ORDER — OXYCODONE HYDROCHLORIDE 5 MG/1
10 TABLET ORAL EVERY 4 HOURS
Refills: 0 | Status: DISCONTINUED | OUTPATIENT
Start: 2019-10-25 | End: 2019-10-27

## 2019-10-25 RX ORDER — FENTANYL CITRATE 50 UG/ML
1 INJECTION INTRAVENOUS
Refills: 0 | Status: DISCONTINUED | OUTPATIENT
Start: 2019-10-25 | End: 2019-10-27

## 2019-10-25 RX ADMIN — HYDROMORPHONE HYDROCHLORIDE 0.5 MILLIGRAM(S): 2 INJECTION INTRAMUSCULAR; INTRAVENOUS; SUBCUTANEOUS at 16:41

## 2019-10-25 RX ADMIN — Medication 0.5 MILLIGRAM(S): at 21:12

## 2019-10-25 RX ADMIN — Medication 0.5 MILLIGRAM(S): at 23:53

## 2019-10-25 RX ADMIN — HYDROMORPHONE HYDROCHLORIDE 0.5 MILLIGRAM(S): 2 INJECTION INTRAMUSCULAR; INTRAVENOUS; SUBCUTANEOUS at 19:54

## 2019-10-25 RX ADMIN — SODIUM CHLORIDE 1000 MILLILITER(S): 9 INJECTION INTRAMUSCULAR; INTRAVENOUS; SUBCUTANEOUS at 15:51

## 2019-10-25 RX ADMIN — HYDROMORPHONE HYDROCHLORIDE 0.5 MILLIGRAM(S): 2 INJECTION INTRAMUSCULAR; INTRAVENOUS; SUBCUTANEOUS at 21:12

## 2019-10-25 RX ADMIN — HYDROMORPHONE HYDROCHLORIDE 0.5 MILLIGRAM(S): 2 INJECTION INTRAMUSCULAR; INTRAVENOUS; SUBCUTANEOUS at 16:54

## 2019-10-25 RX ADMIN — HYDROMORPHONE HYDROCHLORIDE 0.5 MILLIGRAM(S): 2 INJECTION INTRAMUSCULAR; INTRAVENOUS; SUBCUTANEOUS at 19:12

## 2019-10-25 RX ADMIN — SODIUM CHLORIDE 1000 MILLILITER(S): 9 INJECTION INTRAMUSCULAR; INTRAVENOUS; SUBCUTANEOUS at 16:43

## 2019-10-25 NOTE — H&P ADULT - PROBLEM SELECTOR PLAN 3
-Highest suspicion for known bleeding from oral ulcer transiting to stomach and being digested.  Lower suspicion from primary GI process such as peptic ulcer or AVM or other malignancy  -Will monitor hemoglobin and start PPI  -If hemoglobin dropping or worsened signs of bleeding, will consider further evaluation Likely 2/2 new metastatic disease  CT raises question of enteritis but no WBC, diarrhea, fever, other infectious symptoms  Monitor off antibiotics  Pain control as above

## 2019-10-25 NOTE — ED PROVIDER NOTE - OBJECTIVE STATEMENT
The patient is a 64 year old male presents with throat pain with history of oral and throat cancer and has G-tube and sent in for hyponatremia  No fever, No CP, No SOB, No abd pain.  The patient states that he has generalized weak and fatigue.

## 2019-10-25 NOTE — H&P ADULT - PROBLEM SELECTOR PLAN 2
Client did not call or show for his appointment.  Left him message to call me and let me know if he wants to schedule more appointments or not.   -Likely from disease progression as evidenced by CT a/p findings consistent with new metastatic disease  -Attempt pain control.  Nutrition to assist with tube feed regimen.

## 2019-10-25 NOTE — H&P ADULT - PROBLEM SELECTOR PLAN 6
Improve score 3 for age and cancer.  At increased risk for VTE.  SQH for prophylaxis    Patient assigned to my care for initial evaluation and management

## 2019-10-25 NOTE — H&P ADULT - PROBLEM SELECTOR PLAN 5
Improve score 3 for age and cancer.  At increased risk for VTE.  SQH for prophylaxis    Patient assigned to my care for initial evaluation and management -Follows with Dr. Cai in Oncology and Dr. Hdz with ENT  -Pain control with fentanyl 25 mcg patch.  Will increase oxycodone prn to 10 mg and add dilaudid IV as back up for pain control.    -Outpatient IStop ([id number]) confirmed fentanyl 25 mcg patch and oxycodone 5 mg solution prescriptions.  Also with intermettent alprazolam prescriptions last 8/30  -Consider CT neck with IV contrast to reevaluate ulcer - will defer given received one IV contrast load today  -Consider continued GOC discussions and palliative care consult.

## 2019-10-25 NOTE — ED PROVIDER NOTE - CONSTITUTIONAL, MLM
normal... Ill appearing, mal- nourished, awake, alert, oriented to person, place, time/situation and in no apparent distress.

## 2019-10-25 NOTE — ED PROVIDER NOTE - PROGRESS NOTE DETAILS
[place]: CT showing extensive metastatic disease; sodium improved to 129 with 1 L IVF; no further IVF at this time; admitted to medicine for further management.

## 2019-10-25 NOTE — ED PROVIDER NOTE - ATTENDING CONTRIBUTION TO CARE
The patient seen and examined    The patient presents with dehydration with hyponatremia  Failure to thrive    I, Yanick Velasquez, performed the initial face to face bedside interview with this patient regarding history of present illness, review of symptoms and relevant past medical, social and family history.  I completed an independent physical examination.  I was the initial provider who evaluated this patient. I have signed out the follow up of any pending tests (i.e. labs, radiological studies) to the resident.  I have communicated the patient’s plan of care and disposition with the resident.

## 2019-10-25 NOTE — PATIENT PROFILE ADULT - FUNCTIONAL SCREEN CURRENT LEVEL: SWALLOWING (IF SCORE 2 OR MORE FOR ANY ITEM, CONSULT REHAB SERVICES), MLM)
2 = difficulty swallowing liquids/foods pt is getting meds through peg/2 = difficulty swallowing liquids/foods

## 2019-10-25 NOTE — H&P ADULT - PROBLEM SELECTOR PLAN 1
-Likely from not taking in tube feeds today with resultant hypovolemic hyponatremia  -UrOsm 237, Mena 45, SOsm 270  -Na improved with IVF to 129  -Restart tube feeds.  Nutrition consult for assistance.  Continue to monitor with tube feeds resumption

## 2019-10-25 NOTE — ED ADULT TRIAGE NOTE - CHIEF COMPLAINT QUOTE
Pt with oral cancer on chemo. Pt sent in for Na of 128. Pt c/o throat, abd and back pain. Pt appears uncomfortable.

## 2019-10-25 NOTE — H&P ADULT - HISTORY OF PRESENT ILLNESS
Mr. 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    He was in his usual chronically ill state of health until 3 days ago when he became more lethargic and tired.  He endorsed some malaise and abdominal pain.  2 days ago, he noted a few dark stools.  Today, he was not taking his usual tube feeds due to abdominal discomfort.  He was seen by his oncologist today for this and had labs drawn showing hyponatremia and was directed to [facility] ED.  He did not have any chest pain, shortness of breath, vomitting, or diarrhea, or fevers and chills.      IN the ED, he was afebrile with normal vital signs.  Diagnostics revealed a hgb of 11.7, sodium improved from previous 126 to 129.  CT a/p showed enteritis and new evidence of metastatic disease in the abdomen and pelvis.  He was given NS and dilaudid and lorazepam and admitted.    On evaluation, he gave the above history and requested dressing changes, dilaudid, and lorazepam.

## 2019-10-25 NOTE — ED ADULT NURSE NOTE - OBJECTIVE STATEMENT
sent in by his pmd for low sodium level. Patient has a left sided neck wound dressing in place, h/o neck ca. Peg tube in place.

## 2019-10-25 NOTE — H&P ADULT - NSHPLABSRESULTS_GEN_ALL_CORE
Diagnostics reviewed and:  CBC w/ hgb 11.7  BMP w/ Na 129, Cl 87  Lipase 9.4  Corisol 24.6  Mg 2.0, phos 3.6  Sosm 270  VBG 7.41/54/30, lactate 1.8  Ur Osm 237, Na 45  UA without infection  CXR personally reviewed and clear  CT a/p with concern for enteritis and new metastatic disease to abdomen and pelvis  EKG personally reviewed and NSR without ST-t changes.  J point elevation in V2 noted.

## 2019-10-25 NOTE — H&P ADULT - NSHPREVIEWOFSYSTEMS_GEN_ALL_CORE
ROS:  Constitutional:  (+) fatigue, (-) fevers, chills, sweats, unintentional weight loss, sick contacts, recent travel, trauma  Ears/Nose/Mouth/Throat: (+) chronic throat pain and wound drainage (-)  rhinorrhea,   CV: (+) none; (-) chest pain, palpitations, lower extremity edema, orthopnea, PND  Resp: (+) cough (-) shortness of breath  GI: (+) abdominal pain, nausea, possible melana (-)  vomitting, diarrhea, constipation, hematochezia  : (+) none; (-) dysuria, hematuria  MSK: (+) none; (-) joint pain, joint swelling  Skin: (+) none; (-) rash, new yellowing/darkening of skin  Neuro: (+) lightheadedness/dizziness (-) HA, vision changes, weakness, confusion,   Endo: (+) none; (-) heat/cold intolerance, recent skin/hair/nail changes  Heme/Lymph: (+) none; (-) swollen lymph nodes, night sweats

## 2019-10-25 NOTE — H&P ADULT - ASSESSMENT
64 year old man with a history of throat SCC (dx in 2015 and recurred 2019) who presents for abdominal pain, lethargy, dark stools and found to be hyponatremic.

## 2019-10-25 NOTE — H&P ADULT - PROBLEM SELECTOR PLAN 4
-Follows with Dr. Cai in Oncology and Dr. Hdz with ENT  -Pain control with fentanyl 25 mcg patch.  Will increase oxycodone prn to 10 mg and add dilaudid IV as back up for pain control.    -Outpatient IStop ([id number]) confirmed fentanyl 25 mcg patch and oxycodone 5 mg solution prescriptions.  Also with intermettent alprazolam prescriptions last 8/30  -Consider CT neck with IV contrast to reevaluate ulcer - will defer given received one IV contrast load today  -Consider continued GOC discussions and palliative care consult. -Highest suspicion for known bleeding from oral ulcer transiting to stomach and being digested.  Lower suspicion from primary GI process such as peptic ulcer or AVM or other malignancy  -Will monitor hemoglobin and start PPI  -If hemoglobin dropping or worsened signs of bleeding, will consider further evaluation

## 2019-10-25 NOTE — H&P ADULT - NSHPPHYSICALEXAM_GEN_ALL_CORE
Physical exam:  Constitutional-Vitals signs reviewed and afebrile, hr 90s, bp 125-144/86-90, RR 16, 100% on RA, awake, alert, not in acute distress  Neuro-awake, alert, appropriately interactive, moving all extremities,  face symmetric  Eyes-pupils equally round and reactive to light, non icteric, no discharge noted  Ears, nose, mouth, throat-dressing in place over neck wound  CV-regular rate and rhythm, no rubs, murmurs, gallops appreciated, no lower extremity edema, palpable distal pulses (radial, dorsalis pedis, posterior tibial)  Resp-clear to auscultation bilaterally, normal respiratory effort,   GI-abdomen soft and mildly tender, no rebound or guarding present, PEG in place with dressing clean, dry intact, no surrounding erythema  -not examined  MSK-normal range of motion of bilateral elbows and knees, no obvious deformities   Skin-warm, dry, no rash appreciated  Psych-calm, cooperative, normal affect, not attending to internal stimuli

## 2019-10-25 NOTE — ED PROVIDER NOTE - CLINICAL SUMMARY MEDICAL DECISION MAKING FREE TEXT BOX
The patient presents with hyponatremia with failure to thrive and most likely will admit for further evaluation

## 2019-10-26 DIAGNOSIS — Z29.9 ENCOUNTER FOR PROPHYLACTIC MEASURES, UNSPECIFIED: ICD-10-CM

## 2019-10-26 DIAGNOSIS — K92.1 MELENA: ICD-10-CM

## 2019-10-26 DIAGNOSIS — R10.84 GENERALIZED ABDOMINAL PAIN: ICD-10-CM

## 2019-10-26 DIAGNOSIS — E87.1 HYPO-OSMOLALITY AND HYPONATREMIA: ICD-10-CM

## 2019-10-26 DIAGNOSIS — C06.9 MALIGNANT NEOPLASM OF MOUTH, UNSPECIFIED: ICD-10-CM

## 2019-10-26 DIAGNOSIS — R62.7 ADULT FAILURE TO THRIVE: ICD-10-CM

## 2019-10-26 LAB
ANION GAP SERPL CALC-SCNC: 12 MMO/L — SIGNIFICANT CHANGE UP (ref 7–14)
BLD GP AB SCN SERPL QL: NEGATIVE — SIGNIFICANT CHANGE UP
BLD GP AB SCN SERPL QL: NEGATIVE — SIGNIFICANT CHANGE UP
BUN SERPL-MCNC: 10 MG/DL — SIGNIFICANT CHANGE UP (ref 7–23)
CALCIUM SERPL-MCNC: 10 MG/DL — SIGNIFICANT CHANGE UP (ref 8.4–10.5)
CHLORIDE SERPL-SCNC: 91 MMOL/L — LOW (ref 98–107)
CO2 SERPL-SCNC: 27 MMOL/L — SIGNIFICANT CHANGE UP (ref 22–31)
CREAT SERPL-MCNC: 0.56 MG/DL — SIGNIFICANT CHANGE UP (ref 0.5–1.3)
GLUCOSE SERPL-MCNC: 120 MG/DL — HIGH (ref 70–99)
HCT VFR BLD CALC: 35 % — LOW (ref 39–50)
HGB BLD-MCNC: 11.4 G/DL — LOW (ref 13–17)
MAGNESIUM SERPL-MCNC: 2.1 MG/DL — SIGNIFICANT CHANGE UP (ref 1.6–2.6)
MCHC RBC-ENTMCNC: 28.7 PG — SIGNIFICANT CHANGE UP (ref 27–34)
MCHC RBC-ENTMCNC: 32.6 % — SIGNIFICANT CHANGE UP (ref 32–36)
MCV RBC AUTO: 88.2 FL — SIGNIFICANT CHANGE UP (ref 80–100)
NRBC # FLD: 0 K/UL — SIGNIFICANT CHANGE UP (ref 0–0)
PHOSPHATE SERPL-MCNC: 3.7 MG/DL — SIGNIFICANT CHANGE UP (ref 2.5–4.5)
PLATELET # BLD AUTO: 250 K/UL — SIGNIFICANT CHANGE UP (ref 150–400)
PMV BLD: 9.4 FL — SIGNIFICANT CHANGE UP (ref 7–13)
POTASSIUM SERPL-MCNC: 4.2 MMOL/L — SIGNIFICANT CHANGE UP (ref 3.5–5.3)
POTASSIUM SERPL-SCNC: 4.2 MMOL/L — SIGNIFICANT CHANGE UP (ref 3.5–5.3)
RBC # BLD: 3.97 M/UL — LOW (ref 4.2–5.8)
RBC # FLD: 14.5 % — SIGNIFICANT CHANGE UP (ref 10.3–14.5)
RH IG SCN BLD-IMP: POSITIVE — SIGNIFICANT CHANGE UP
RH IG SCN BLD-IMP: POSITIVE — SIGNIFICANT CHANGE UP
SODIUM SERPL-SCNC: 130 MMOL/L — LOW (ref 135–145)
WBC # BLD: 8.57 K/UL — SIGNIFICANT CHANGE UP (ref 3.8–10.5)
WBC # FLD AUTO: 8.57 K/UL — SIGNIFICANT CHANGE UP (ref 3.8–10.5)

## 2019-10-26 PROCEDURE — 99233 SBSQ HOSP IP/OBS HIGH 50: CPT | Mod: GC

## 2019-10-26 PROCEDURE — 99222 1ST HOSP IP/OBS MODERATE 55: CPT | Mod: GC

## 2019-10-26 RX ORDER — HYDROMORPHONE HYDROCHLORIDE 2 MG/ML
0.5 INJECTION INTRAMUSCULAR; INTRAVENOUS; SUBCUTANEOUS
Refills: 0 | Status: DISCONTINUED | OUTPATIENT
Start: 2019-10-26 | End: 2019-10-27

## 2019-10-26 RX ORDER — PANTOPRAZOLE SODIUM 20 MG/1
40 TABLET, DELAYED RELEASE ORAL
Refills: 0 | Status: DISCONTINUED | OUTPATIENT
Start: 2019-10-26 | End: 2019-10-31

## 2019-10-26 RX ORDER — HYDROMORPHONE HYDROCHLORIDE 2 MG/ML
1 INJECTION INTRAMUSCULAR; INTRAVENOUS; SUBCUTANEOUS
Refills: 0 | Status: DISCONTINUED | OUTPATIENT
Start: 2019-10-26 | End: 2019-10-27

## 2019-10-26 RX ORDER — ALPRAZOLAM 0.25 MG
0.5 TABLET ORAL ONCE
Refills: 0 | Status: DISCONTINUED | OUTPATIENT
Start: 2019-10-26 | End: 2019-10-26

## 2019-10-26 RX ORDER — ONDANSETRON 8 MG/1
4 TABLET, FILM COATED ORAL EVERY 8 HOURS
Refills: 0 | Status: DISCONTINUED | OUTPATIENT
Start: 2019-10-26 | End: 2019-10-31

## 2019-10-26 RX ADMIN — HEPARIN SODIUM 5000 UNIT(S): 5000 INJECTION INTRAVENOUS; SUBCUTANEOUS at 00:06

## 2019-10-26 RX ADMIN — HEPARIN SODIUM 5000 UNIT(S): 5000 INJECTION INTRAVENOUS; SUBCUTANEOUS at 14:40

## 2019-10-26 RX ADMIN — HYDROMORPHONE HYDROCHLORIDE 0.5 MILLIGRAM(S): 2 INJECTION INTRAMUSCULAR; INTRAVENOUS; SUBCUTANEOUS at 22:40

## 2019-10-26 RX ADMIN — HYDROMORPHONE HYDROCHLORIDE 0.5 MILLIGRAM(S): 2 INJECTION INTRAMUSCULAR; INTRAVENOUS; SUBCUTANEOUS at 00:17

## 2019-10-26 RX ADMIN — HYDROMORPHONE HYDROCHLORIDE 0.5 MILLIGRAM(S): 2 INJECTION INTRAMUSCULAR; INTRAVENOUS; SUBCUTANEOUS at 00:02

## 2019-10-26 RX ADMIN — Medication 15 MILLIGRAM(S): at 06:28

## 2019-10-26 RX ADMIN — PANTOPRAZOLE SODIUM 40 MILLIGRAM(S): 20 TABLET, DELAYED RELEASE ORAL at 18:09

## 2019-10-26 RX ADMIN — Medication 15 MILLIGRAM(S): at 06:40

## 2019-10-26 RX ADMIN — HEPARIN SODIUM 5000 UNIT(S): 5000 INJECTION INTRAVENOUS; SUBCUTANEOUS at 22:24

## 2019-10-26 RX ADMIN — HYDROMORPHONE HYDROCHLORIDE 0.5 MILLIGRAM(S): 2 INJECTION INTRAMUSCULAR; INTRAVENOUS; SUBCUTANEOUS at 03:25

## 2019-10-26 RX ADMIN — HEPARIN SODIUM 5000 UNIT(S): 5000 INJECTION INTRAVENOUS; SUBCUTANEOUS at 07:26

## 2019-10-26 RX ADMIN — HYDROMORPHONE HYDROCHLORIDE 1 MILLIGRAM(S): 2 INJECTION INTRAMUSCULAR; INTRAVENOUS; SUBCUTANEOUS at 11:54

## 2019-10-26 RX ADMIN — HYDROMORPHONE HYDROCHLORIDE 1 MILLIGRAM(S): 2 INJECTION INTRAMUSCULAR; INTRAVENOUS; SUBCUTANEOUS at 11:38

## 2019-10-26 RX ADMIN — HYDROMORPHONE HYDROCHLORIDE 0.5 MILLIGRAM(S): 2 INJECTION INTRAMUSCULAR; INTRAVENOUS; SUBCUTANEOUS at 08:42

## 2019-10-26 RX ADMIN — Medication 0.5 MILLIGRAM(S): at 18:09

## 2019-10-26 RX ADMIN — SENNA PLUS 10 MILLILITER(S): 8.6 TABLET ORAL at 22:24

## 2019-10-26 RX ADMIN — FENTANYL CITRATE 1 PATCH: 50 INJECTION INTRAVENOUS at 11:46

## 2019-10-26 RX ADMIN — OXYCODONE HYDROCHLORIDE 10 MILLIGRAM(S): 5 TABLET ORAL at 05:30

## 2019-10-26 RX ADMIN — OXYCODONE HYDROCHLORIDE 10 MILLIGRAM(S): 5 TABLET ORAL at 04:43

## 2019-10-26 RX ADMIN — PANTOPRAZOLE SODIUM 40 MILLIGRAM(S): 20 TABLET, DELAYED RELEASE ORAL at 06:27

## 2019-10-26 RX ADMIN — HYDROMORPHONE HYDROCHLORIDE 0.5 MILLIGRAM(S): 2 INJECTION INTRAMUSCULAR; INTRAVENOUS; SUBCUTANEOUS at 19:35

## 2019-10-26 RX ADMIN — HYDROMORPHONE HYDROCHLORIDE 0.5 MILLIGRAM(S): 2 INJECTION INTRAMUSCULAR; INTRAVENOUS; SUBCUTANEOUS at 19:50

## 2019-10-26 RX ADMIN — HYDROMORPHONE HYDROCHLORIDE 1 MILLIGRAM(S): 2 INJECTION INTRAMUSCULAR; INTRAVENOUS; SUBCUTANEOUS at 14:55

## 2019-10-26 RX ADMIN — HYDROMORPHONE HYDROCHLORIDE 0.5 MILLIGRAM(S): 2 INJECTION INTRAMUSCULAR; INTRAVENOUS; SUBCUTANEOUS at 22:25

## 2019-10-26 RX ADMIN — FENTANYL CITRATE 1 PATCH: 50 INJECTION INTRAVENOUS at 19:51

## 2019-10-26 RX ADMIN — ONDANSETRON 4 MILLIGRAM(S): 8 TABLET, FILM COATED ORAL at 22:24

## 2019-10-26 RX ADMIN — HYDROMORPHONE HYDROCHLORIDE 0.5 MILLIGRAM(S): 2 INJECTION INTRAMUSCULAR; INTRAVENOUS; SUBCUTANEOUS at 08:27

## 2019-10-26 RX ADMIN — HYDROMORPHONE HYDROCHLORIDE 1 MILLIGRAM(S): 2 INJECTION INTRAMUSCULAR; INTRAVENOUS; SUBCUTANEOUS at 17:51

## 2019-10-26 RX ADMIN — Medication 0.5 MILLIGRAM(S): at 10:08

## 2019-10-26 RX ADMIN — HYDROMORPHONE HYDROCHLORIDE 1 MILLIGRAM(S): 2 INJECTION INTRAMUSCULAR; INTRAVENOUS; SUBCUTANEOUS at 17:36

## 2019-10-26 RX ADMIN — HYDROMORPHONE HYDROCHLORIDE 0.5 MILLIGRAM(S): 2 INJECTION INTRAMUSCULAR; INTRAVENOUS; SUBCUTANEOUS at 03:11

## 2019-10-26 RX ADMIN — HYDROMORPHONE HYDROCHLORIDE 1 MILLIGRAM(S): 2 INJECTION INTRAMUSCULAR; INTRAVENOUS; SUBCUTANEOUS at 14:40

## 2019-10-26 NOTE — PROGRESS NOTE ADULT - SUBJECTIVE AND OBJECTIVE BOX
Brielle Cruz M.D.   PGY-3 | Internal Medicine   [phone] | [id number]        Patient is a 64y old  Male who presents with a chief complaint of CC: Abdominal pain and lethargy (25 Oct 2019 22:39)        SUBJECTIVE / OVERNIGHT EVENTS: Patient had no acute events overnight. Patient seen and examined at bedside this morning.     ROS: [ - ] Fever [ - ] Chills [ - ] Nausea/Vomiting [ - ] Chest Pain [ - ] Shortness of breath     MEDICATIONS  (STANDING):  fentaNYL   Patch  25 MICROgram(s)/Hr 1 Patch Transdermal every 72 hours  heparin  Injectable 5000 Unit(s) SubCutaneous every 8 hours  influenza   Vaccine 0.5 milliLiter(s) IntraMuscular once  pantoprazole  Injectable 40 milliGRAM(s) IV Push two times a day  senna Syrup 10 milliLiter(s) Oral at bedtime    MEDICATIONS  (PRN):  acetaminophen   Tablet .. 650 milliGRAM(s) Oral every 4 hours PRN Mild Pain (1 - 3)  HYDROmorphone  Injectable 0.5 milliGRAM(s) IV Push every 3 hours PRN Moderate Pain (4 - 6)  HYDROmorphone  Injectable 1 milliGRAM(s) IV Push every 3 hours PRN Severe Pain (7 - 10)  ketorolac   Injectable 15 milliGRAM(s) IV Push every 6 hours PRN Mild Pain (1 - 3)  ondansetron   Disintegrating Tablet 4 milliGRAM(s) Oral three times a day PRN Nausea and/or Vomiting  oxyCODONE    Solution 10 milliGRAM(s) Enteral Tube every 4 hours PRN Moderate Pain (4 - 6)      Vital Signs Last 24 Hrs  T(C): 37.1 (26 Oct 2019 06:25), Max: 37.1 (26 Oct 2019 06:25)  T(F): 98.7 (26 Oct 2019 06:25), Max: 98.7 (26 Oct 2019 06:25)  HR: 98 (26 Oct 2019 06:25) (90 - 704)  BP: 127/86 (26 Oct 2019 06:25) (119/69 - 144/90)  BP(mean): --  RR: 18 (26 Oct 2019 06:25) (16 - 20)  SpO2: 98% (26 Oct 2019 06:25) (97% - 100%)  CAPILLARY BLOOD GLUCOSE        I&O's Summary    25 Oct 2019 07:01  -  26 Oct 2019 07:00  --------------------------------------------------------  IN: 500 mL / OUT: 0 mL / NET: 500 mL        PHYSICAL EXAM  GENERAL: NAD, lying comfortably in bed   HEAD:  Atraumatic, Normocephalic  EYES: EOMI, PERRLA, conjunctiva and sclera clear  NECK: Supple, No JVD  CHEST/LUNG: Clear to auscultation bilaterally; No wheeze  HEART: Regular rate and rhythm; No murmurs, rubs, or gallops  ABDOMEN: Soft, Nontender, Nondistended; Bowel sounds present  EXTREMITIES:  2+ Peripheral Pulses, No clubbing, cyanosis, or edema  NEURO: AAOx3, non-focal  SKIN: No rashes or lesions      LABS:                        11.7   8.82  )-----------( 252      ( 25 Oct 2019 15:44 )             37.4     10-    129<L>  |  87<L>  |  9   ----------------------------<  110<H>  4.7   |  31  |  0.56    Ca    9.7      25 Oct 2019 15:44  Phos  3.6     10-25  Mg     2.0     10-25    TPro  7.3  /  Alb  3.7  /  TBili  0.4  /  DBili  x   /  AST  13  /  ALT  12  /  AlkPhos  73  10-25          Urinalysis Basic - ( 25 Oct 2019 15:29 )    Color: LIGHT YELLOW / Appearance: Lt TURBID / S.009 / pH: 7.5  Gluc: NEGATIVE / Ketone: NEGATIVE  / Bili: NEGATIVE / Urobili: NORMAL   Blood: NEGATIVE / Protein: NEGATIVE / Nitrite: NEGATIVE   Leuk Esterase: NEGATIVE / RBC: 0-2 / WBC 0-2   Sq Epi: OCC / Non Sq Epi: x / Bacteria: NEGATIVE Brielle Cruz M.D.   PGY-3 | Internal Medicine   [phone] | [id number]        Patient is a 64y old  Male who presents with a chief complaint of CC: Abdominal pain and lethargy (25 Oct 2019 22:39)        SUBJECTIVE / OVERNIGHT EVENTS: Patient had no acute events overnight. Patient seen and examined at bedside this morning. Patient complaining of diffuse abdominal pain.     ROS: [ - ] Fever [ - ] Chills [ - ] Nausea/Vomiting [ - ] Chest Pain [ - ] Shortness of breath     MEDICATIONS  (STANDING):  fentaNYL   Patch  25 MICROgram(s)/Hr 1 Patch Transdermal every 72 hours  heparin  Injectable 5000 Unit(s) SubCutaneous every 8 hours  influenza   Vaccine 0.5 milliLiter(s) IntraMuscular once  pantoprazole  Injectable 40 milliGRAM(s) IV Push two times a day  senna Syrup 10 milliLiter(s) Oral at bedtime    MEDICATIONS  (PRN):  acetaminophen   Tablet .. 650 milliGRAM(s) Oral every 4 hours PRN Mild Pain (1 - 3)  HYDROmorphone  Injectable 0.5 milliGRAM(s) IV Push every 3 hours PRN Moderate Pain (4 - 6)  HYDROmorphone  Injectable 1 milliGRAM(s) IV Push every 3 hours PRN Severe Pain (7 - 10)  ketorolac   Injectable 15 milliGRAM(s) IV Push every 6 hours PRN Mild Pain (1 - 3)  ondansetron   Disintegrating Tablet 4 milliGRAM(s) Oral three times a day PRN Nausea and/or Vomiting  oxyCODONE    Solution 10 milliGRAM(s) Enteral Tube every 4 hours PRN Moderate Pain (4 - 6)      Vital Signs Last 24 Hrs  T(C): 37.1 (26 Oct 2019 06:25), Max: 37.1 (26 Oct 2019 06:25)  T(F): 98.7 (26 Oct 2019 06:25), Max: 98.7 (26 Oct 2019 06:25)  HR: 98 (26 Oct 2019 06:25) (90 - 704)  BP: 127/86 (26 Oct 2019 06:25) (119/69 - 144/90)  BP(mean): --  RR: 18 (26 Oct 2019 06:25) (16 - 20)  SpO2: 98% (26 Oct 2019 06:25) (97% - 100%)  CAPILLARY BLOOD GLUCOSE        I&O's Summary    25 Oct 2019 07:01  -  26 Oct 2019 07:00  --------------------------------------------------------  IN: 500 mL / OUT: 0 mL / NET: 500 mL        PHYSICAL EXAM  GENERAL: NAD, lying comfortably in bed   HEAD:  Atraumatic, Normocephalic  EYES: EOMI, PERRLA, conjunctiva and sclera clear  NECK: Supple, No JVD  CHEST/LUNG: Clear to auscultation bilaterally; No wheeze  HEART: Regular rate and rhythm; No murmurs, rubs, or gallops  ABDOMEN: Soft, Mild diffuse tenderness, PEG in place with dressing clean, dry intact, no surrounding erythema  EXTREMITIES:  2+ Peripheral Pulses, No clubbing, cyanosis, or edema  NEURO: AAOx3, non-focal  SKIN: No rashes or lesions      LABS:                        11.7   8.82  )-----------( 252      ( 25 Oct 2019 15:44 )             37.4     10-    129<L>  |  87<L>  |  9   ----------------------------<  110<H>  4.7   |  31  |  0.56    Ca    9.7      25 Oct 2019 15:44  Phos  3.6     10-  Mg     2.0     10-    TPro  7.3  /  Alb  3.7  /  TBili  0.4  /  DBili  x   /  AST  13  /  ALT  12  /  AlkPhos  73  10-25          Urinalysis Basic - ( 25 Oct 2019 15:29 )    Color: LIGHT YELLOW / Appearance: Lt TURBID / S.009 / pH: 7.5  Gluc: NEGATIVE / Ketone: NEGATIVE  / Bili: NEGATIVE / Urobili: NORMAL   Blood: NEGATIVE / Protein: NEGATIVE / Nitrite: NEGATIVE   Leuk Esterase: NEGATIVE / RBC: 0-2 / WBC 0-2   Sq Epi: OCC / Non Sq Epi: x / Bacteria: NEGATIVE

## 2019-10-26 NOTE — PROGRESS NOTE ADULT - PROBLEM SELECTOR PLAN 5
-Follows with Dr. Cai in Oncology and Dr. Hdz with ENT  -Pain control with fentanyl 25 mcg patch.  Will increase oxycodone prn to 10 mg and add dilaudid IV as back up for pain control.    -Outpatient [facility] ([id number]) confirmed fentanyl 25 mcg patch and oxycodone 5 mg solution prescriptions.  Also with intermittent alprazolam prescriptions last 8/30  -Consider CT neck with IV contrast to reevaluate ulcer - will defer given received one IV contrast load today  -Consider continued GOC discussions and palliative care consult. -Follows with Dr. Cai in Oncology and Dr. Hdz with ENT  -Pain control with fentanyl 25 mcg patch.  Will increase oxycodone prn to 10 mg and add dilaudid IV as back up for pain control.    -Outpatient [facility] ([id number]) confirmed fentanyl 25 mcg patch and oxycodone 5 mg solution prescriptions.  Also with intermittent alprazolam prescriptions last 8/30  -Consider CT neck with IV contrast to reevaluate ulcer - will defer given received one IV contrast load today  - Will consult Oncology   -Consider continued GOC discussions and palliative care consult.

## 2019-10-26 NOTE — PROGRESS NOTE ADULT - PROBLEM SELECTOR PLAN 1
Likely from not taking in tube feeds with resultant hypovolemic hyponatremia  -UrOsm 237, Mena 45, SOsm 270  -Na improved with IVF to 129  -Restart tube feeds.  Nutrition consult for assistance.  Continue to monitor with tube feeds resumption

## 2019-10-26 NOTE — PROGRESS NOTE ADULT - PROBLEM SELECTOR PLAN 3
Likely 2/2 new metastatic disease  - CT raises question of enteritis but no WBC, diarrhea, fever, other infectious symptoms Monitor off antibiotics  - Pain control as above

## 2019-10-26 NOTE — PROGRESS NOTE ADULT - PROBLEM SELECTOR PLAN 2
-Likely from disease progression as evidenced by CT a/p findings consistent with new metastatic disease  -Attempt pain control.  Nutrition to assist with tube feed regimen.

## 2019-10-26 NOTE — PROGRESS NOTE ADULT - PROBLEM SELECTOR PLAN 4
-Highest suspicion for known bleeding from oral ulcer transiting to stomach and being digested.  Lower suspicion from primary GI process such as peptic ulcer or AVM or other malignancy  -Will monitor hemoglobin and start PPI  -If hemoglobin dropping or worsened signs of bleeding, will consider further evaluation

## 2019-10-26 NOTE — CONSULT NOTE ADULT - ASSESSMENT
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   Oncology consulted for history of SCC.     # Abdominal pain:   - CT A/P with new metastatic disease  - recommend pain management/Palliative consult for pain regimen     # Hyponatremia:   - thought to be secondary to not taking tube feeds  - Nutrition consult appropriate      # Anterior vestibular SCC:   - s/p resection and radiation  - progressed on nivolumab, now on Carbo/Taxol/Cetuximab  - Patient has been evaluated and is on a clinical trial at [facility] ([place]) for CarT-cell therapy.  He is supposed to return to [place] on 11/3.  - Recommend medical management of hyponatremia and pain control in a timely manner so patient can return for clinical trial.      Sherin Richardson MD  Hematology/Oncology Fellow, PGY-5  pager: [phone]  After 5pm or on weekends, please page the on-call fellow. 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   Oncology consulted for history of SCC.     # Abdominal pain:   - CT A/P with new metastatic disease  - recommend pain management/Palliative consult for pain regimen     # Hyponatremia:   - thought to be secondary to not taking tube feeds  - Nutrition consult appropriate      # Anterior vestibular SCC:   - s/p resection and radiation  - progressed on nivolumab, now on Carbo/Taxol/Cetuximab - held since 10/21  - Patient has been evaluated and accepted for a clinical trial at [facility] ([place]) for CarT-cell therapy.  He is supposed to return to [place] on 11/3.  - Recommend medical management of hyponatremia and pain control in a timely manner so patient can return for clinical trial.      Sherin Richardson MD  Hematology/Oncology Fellow, PGY-5  pager: [phone]  After 5pm or on weekends, please page the on-call fellow. 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   Oncology consulted for history of SCC.     # Abdominal pain:   - CT A/P with new metastatic disease  - recommend pain management/Palliative consult for pain regimen     # Hyponatremia:   - thought to be secondary to not taking tube feeds  - Nutrition consult appropriate      # Anterior vestibular SCC:   - s/p resection and radiation  - progressed on nivolumab, now on Carbo/Taxol/Cetuximab - held since 10/21  - Patient has been evaluated and accepted for a clinical trial at [facility] ([place]) for CAR T-cell therapy.  He is supposed to return to [place] on 11/3.  - Recommend medical management of hyponatremia and pain control in a timely manner so patient can return for clinical trial.      Sherin Richardson MD  Hematology/Oncology Fellow, PGY-5  pager: [phone]  After 5pm or on weekends, please page the on-call fellow. 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   Oncology consulted for history of SCC.     # Abdominal pain:   - CT A/P with new metastatic disease  - recommend pain management/Palliative consult for pain regimen     # Hyponatremia:   - thought to be secondary to not taking tube feeds  - Nutrition consult appropriate      # Anterior vestibular SCC:   - s/p resection and radiation  - progressed on nivolumab, now on Carbo/Taxol/Cetuximab - held since 10/21  - Patient has been evaluated and accepted for a clinical trial at [facility] ([place]) for CAR T-cell therapy.  He is supposed to return to [place] on 11/3.  - Recommend medical management of hyponatremia and pain control in a timely manner so patient can return for clinical trial.      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   Oncology consulted for history of SCC.     # Abdominal pain:   - CT A/P with new metastatic disease  - recommend pain management/Palliative consult for pain regimen     # Hyponatremia:   - thought to be secondary to not taking tube feeds  - Nutrition consult appropriate    # Anterior vestibular SCC:   - s/p resection and radiation  - progressed on nivolumab, now on Carbo/Taxol/Cetuximab - held since 10/21  - Patient has been evaluated and accepted for a clinical trial at [facility] ([place]) for CAR T-cell therapy.  He is supposed to return to [place] on 11/3.  - Recommend medical management of hyponatremia and pain control in a timely manner so patient can return for clinical trial.      Sherin Richardson MD  Hematology/Oncology Fellow, PGY-5  pager: [phone]  SATURDAY COVERAGE ONLY

## 2019-10-26 NOTE — CONSULT NOTE ADULT - SUBJECTIVE AND OBJECTIVE BOX
HPI:  Mr. 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  He was in his usual chronically ill state of health until 3 days ago when he became more lethargic and tired.  He endorsed some malaise and abdominal pain.  2 days ago, he noted a few dark stools.  Today, he was not taking his usual tube feeds due to abdominal discomfort.  He was seen by his oncologist today for this and had labs drawn showing hyponatremia and was directed to [facility] ED.  He did not have any chest pain, shortness of breath, vomitting, or diarrhea, or fevers and chills.    IN the ED, he was afebrile with normal vital signs.  Diagnostics revealed a hgb of 11.7, sodium improved from previous 126 to 129.  CT a/p showed enteritis and new evidence of metastatic disease in the abdomen and pelvis.  He was given NS and dilaudid and lorazepam and admitted.  On evaluation, he gave the above history and requested dressing changes, dilaudid, and lorazepam. (25 Oct 2019 22:39)      ONCOLOGY HISTORY:  Recurrent head and neck SCC in April 2019 at the age of 63. 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 He completed radiation therapy (7,000 cGy) to the oral cavity on 2/11/16. He continued with pain in the right lower lip. He was referred to Dr. Mahamed Paulson. Biopsy 2/13/2018 of the right mandibular vestibule showed squamous cell carcinoma, well differentiated. PET/CT 3/2/18 revealed new FDG avidity in the anterior mandibular buccal region with associated irregularity of the underlying mandibular cortex, unchanged level lA lymph node. No evidence of distant metastasis. He followed with Dr. Farley who ordered a CT neck on 3/22/18 which revealed new lytic bony destruction, anterior superior aspect of the mandibular symphysis in the midline involving the roots for both central incisor teeth, no lymph nodes. Biopsy was c/w SCCa in the area with erosion of the mandible, T4a N0 M0.     He is now s/p composite resection and reconstruction with FFF on April 26, 2018. 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 He is s/p a release of the lower lip scar and mucosal graft on 3/7/19. An MRI 4/5/19 showed masslike nodular enhancement, increased in comparison to prior MR 12/20/2018 along the left posterior floor of mouth, spanning the base of tongue, glossotonsillar sulcus, and inferior left parapharyngeal space,  space, system with progression of recurrent disease. Biopsy positive for invasive squamous cell carcinoma, keratinizing, well to moderately differentiated. Follow up PET showed new irregular FDG avidity with central photopenia along the left posterior floor of mouth/left oral tongue, SUV 14.5, corresponding to masslike nodular enhancement which measures approximately 4.6 x 2.0 x 4.2 cm, on recent MRI.      Treatment: Carbo AUC 2, taxol 60 mg/m2 and Cetuximab 250 mg/m2 3 weeks on, 1 week off every 28 days (holding cetuximab d/t infusion rxn)      - s/p 5 cycles of nivolumab Q4 weeks (Due to POD on nivolumab, treatment changed to Carbo/Taxol/Cetuximab)      - extensive locoregional recurrence in left FOM, left anterior tongue and left cervical LN.      - consensus of head and Neck tumor board for palliative measures --no surgery or RT.      - formerly treated by Dr. Butch Zhang in 2015 w/ induction TPF x 3 cycles followed by chemoradiation.           - s/p mandibular resection by Dr Hdz in May 2018 for locoregional recurrence.       - met with Dr. Rose and Dr. 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, madibular reconstruction with bone graft 2018  H/O shoulder surgery: impingement left 2005, right 2007      Allergies    Bactrim (Hives)  carboplatin (Pruritus)  cetuximab (Hives; Pruritus)    Intolerances        MEDICATIONS  (STANDING):  fentaNYL   Patch  25 MICROgram(s)/Hr 1 Patch Transdermal every 72 hours  heparin  Injectable 5000 Unit(s) SubCutaneous every 8 hours  influenza   Vaccine 0.5 milliLiter(s) IntraMuscular once  pantoprazole  Injectable 40 milliGRAM(s) IV Push two times a day  senna Syrup 10 milliLiter(s) Oral at bedtime    MEDICATIONS  (PRN):  acetaminophen   Tablet .. 650 milliGRAM(s) Oral every 4 hours PRN Mild Pain (1 - 3)  HYDROmorphone  Injectable 0.5 milliGRAM(s) IV Push every 3 hours PRN Moderate Pain (4 - 6)  HYDROmorphone  Injectable 1 milliGRAM(s) IV Push every 3 hours PRN Severe Pain (7 - 10)  ketorolac   Injectable 15 milliGRAM(s) IV Push every 6 hours PRN Mild Pain (1 - 3)  ondansetron   Disintegrating Tablet 4 milliGRAM(s) Oral three times a day PRN Nausea and/or Vomiting  oxyCODONE    Solution 10 milliGRAM(s) Enteral Tube every 4 hours PRN Moderate Pain (4 - 6)      FAMILY HISTORY:  Family history of lung cancer: in Father      SOCIAL HISTORY: No EtOH, no tobacco    REVIEW OF SYSTEMS:    CONSTITUTIONAL: No weakness, fevers or chills  EYES/ENT: No visual changes;  No vertigo or throat pain   NECK: No pain or stiffness  RESPIRATORY: No cough, wheezing, hemoptysis; No shortness of breath  CARDIOVASCULAR: No chest pain or palpitations  GASTROINTESTINAL: No abdominal or epigastric pain. No nausea, vomiting, or hematemesis; No diarrhea or constipation. No melena or hematochezia.  GENITOURINARY: No dysuria, frequency or hematuria  NEUROLOGICAL: No numbness or weakness  SKIN: No itching, burning, rashes, or lesions   All other review of systems is negative unless indicated above.    Height (cm): 170.2 (10-25 @ 23:10)  Weight (kg): 58.7 (10-25 @ 23:10)  BMI (kg/m2): 20.3 (10-25 @ 23:10)  BSA (m2): 1.68 (10-25 @ 23:10)    T(F): 97.9 (10-26-19 @ 08:18), Max: 98.7 (10-26-19 @ 06:25)  HR: 100 (10-26-19 @ 08:18)  BP: 123/71 (10-26-19 @ 08:18)  RR: 18 (10-26-19 @ 08:18)  SpO2: 99% (10-26-19 @ 08:18)  Wt(kg): --    GENERAL: NAD, well-developed  HEAD:  Atraumatic, Normocephalic  EYES: EOMI, PERRLA, conjunctiva and sclera clear  NECK: Supple, No JVD  CHEST/LUNG: Clear to auscultation bilaterally; No wheeze  HEART: Regular rate and rhythm; No murmurs, rubs, or gallops  ABDOMEN: Soft, Nontender, Nondistended; Bowel sounds present  EXTREMITIES:  2+ Peripheral Pulses, No clubbing, cyanosis, or edema  NEUROLOGY: non-focal  SKIN: No rashes or lesions                          11.4   8.57  )-----------( 250      ( 26 Oct 2019 06:47 )             35.0       10-26    130<L>  |  91<L>  |  10  ----------------------------<  120<H>  4.2   |  27  |  0.56    Ca    10.0      26 Oct 2019 06:47  Phos  3.7     10-26  Mg     2.1     10-26    TPro  7.3  /  Alb  3.7  /  TBili  0.4  /  DBili  x   /  AST  13  /  ALT  12  /  AlkPhos  73  10-25      Phosphorus Level, Serum: 3.7 mg/dL (10-26 @ 06:47)  Magnesium, Serum: 2.1 mg/dL (10-26 @ 06:47)  Magnesium, Serum: 2.0 mg/dL (10-25 @ 15:44)  Phosphorus Level, Serum: 3.6 mg/dL (10-25 @ 15:44) HPI:  Mr. 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  He was in his usual chronically ill state of health until 3 days ago when he became more lethargic and tired.  He endorsed some malaise and abdominal pain.  2 days ago, he noted a few dark stools.  Today, he was not taking his usual tube feeds due to abdominal discomfort.  He was seen by his oncologist today for this and had labs drawn showing hyponatremia and was directed to [facility] ED.  He did not have any chest pain, shortness of breath, vomitting, or diarrhea, or fevers and chills.    IN the ED, he was afebrile with normal vital signs.  Diagnostics revealed a hgb of 11.7, sodium improved from previous 126 to 129.  CT a/p showed enteritis and new evidence of metastatic disease in the abdomen and pelvis.  He was given NS and dilaudid and lorazepam and admitted.  On evaluation, he gave the above history and requested dressing changes, dilaudid, and lorazepam. (25 Oct 2019 22:39)      ONCOLOGY HISTORY:  Recurrent head and neck SCC in April 2019 at the age of 63. 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 He completed radiation therapy (7,000 cGy) to the oral cavity on 2/11/16. He continued with pain in the right lower lip. He was referred to Dr. Mahamed Paulson. Biopsy 2/13/2018 of the right mandibular vestibule showed squamous cell carcinoma, well differentiated. PET/CT 3/2/18 revealed new FDG avidity in the anterior mandibular buccal region with associated irregularity of the underlying mandibular cortex, unchanged level lA lymph node. No evidence of distant metastasis. He followed with Dr. Farley who ordered a CT neck on 3/22/18 which revealed new lytic bony destruction, anterior superior aspect of the mandibular symphysis in the midline involving the roots for both central incisor teeth, no lymph nodes. Biopsy was c/w SCCa in the area with erosion of the mandible, T4a N0 M0.     He is now s/p composite resection and reconstruction with FFF on April 26, 2018. Final pathology pT1 N0 M0; however, given his original presentation, likely still [id number] mandibular alveolar SCCa. He is s/p a release of the lower lip scar and mucosal graft on 3/7/19. An MRI 4/5/19 showed masslike nodular enhancement, increased in comparison to prior MR 12/20/2018 along the left posterior floor of mouth, spanning the base of tongue, glossotonsillar sulcus, and inferior left parapharyngeal space,  space, system with progression of recurrent disease. Biopsy positive for invasive squamous cell carcinoma, keratinizing, well to moderately differentiated. Follow up PET showed new irregular FDG avidity with central photopenia along the left posterior floor of mouth/left oral tongue, SUV 14.5, corresponding to masslike nodular enhancement which measures approximately 4.6 x 2.0 x 4.2 cm, on recent MRI.      Treatment: Carbo AUC 2, taxol 60 mg/m2 and Cetuximab 250 mg/m2 3 weeks on, 1 week off every 28 days (holding cetuximab d/t infusion rxn)      - s/p 5 cycles of nivolumab Q4 weeks (Due to POD on nivolumab, treatment changed to Carbo/Taxol/Cetuximab)      - met with Dr. Rose and Dr. 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, madibular reconstruction with bone graft 2018  H/O shoulder surgery: impingement left 2005, right 2007      Allergies  Bactrim (Hives)  carboplatin (Pruritus)  cetuximab (Hives; Pruritus)        MEDICATIONS  (STANDING):  fentaNYL   Patch  25 MICROgram(s)/Hr 1 Patch Transdermal every 72 hours  heparin  Injectable 5000 Unit(s) SubCutaneous every 8 hours  influenza   Vaccine 0.5 milliLiter(s) IntraMuscular once  pantoprazole  Injectable 40 milliGRAM(s) IV Push two times a day  senna Syrup 10 milliLiter(s) Oral at bedtime    MEDICATIONS  (PRN):  acetaminophen   Tablet .. 650 milliGRAM(s) Oral every 4 hours PRN Mild Pain (1 - 3)  HYDROmorphone  Injectable 0.5 milliGRAM(s) IV Push every 3 hours PRN Moderate Pain (4 - 6)  HYDROmorphone  Injectable 1 milliGRAM(s) IV Push every 3 hours PRN Severe Pain (7 - 10)  ketorolac   Injectable 15 milliGRAM(s) IV Push every 6 hours PRN Mild Pain (1 - 3)  ondansetron   Disintegrating Tablet 4 milliGRAM(s) Oral three times a day PRN Nausea and/or Vomiting  oxyCODONE    Solution 10 milliGRAM(s) Enteral Tube every 4 hours PRN Moderate Pain (4 - 6)      FAMILY HISTORY:  Family history of lung cancer: in Father      SOCIAL HISTORY: No EtOH, no tobacco      REVIEW OF SYSTEMS:  CONSTITUTIONAL: No weakness, fevers or chills  EYES/ENT: No visual changes;  No vertigo or throat pain   NECK: No pain or stiffness  RESPIRATORY: No cough, wheezing, hemoptysis; No shortness of breath  CARDIOVASCULAR: No chest pain or palpitations  GASTROINTESTINAL: No abdominal or epigastric pain. No nausea, vomiting, or hematemesis; No diarrhea or constipation. No melena or hematochezia.  GENITOURINARY: No dysuria, frequency or hematuria  NEUROLOGICAL: No numbness or weakness  SKIN: No itching, burning, rashes, or lesions   All other review of systems is negative unless indicated above.      Height (cm): 170.2 (10-25 @ 23:10)  Weight (kg): 58.7 (10-25 @ 23:10)  BMI (kg/m2): 20.3 (10-25 @ 23:10)  BSA (m2): 1.68 (10-25 @ 23:10)      T(F): 97.9 (10-26-19 @ 08:18), Max: 98.7 (10-26-19 @ 06:25)  HR: 100 (10-26-19 @ 08:18)  BP: 123/71 (10-26-19 @ 08:18)  RR: 18 (10-26-19 @ 08:18)  SpO2: 99% (10-26-19 @ 08:18)      GENERAL: NAD, sleeping but arousable   HEAD:  Atraumatic, post-surgical changes at jaw   EYES: EOMI, conjunctiva and sclera clear  NECK: Supple  CHEST/LUNG: Clear to auscultation bilaterally; No wheezes or crackles   HEART: Regular rate and rhythm; No murmurs, rubs, or gallops  ABDOMEN: Soft, Nontender, Nondistended, +PEG  EXTREMITIES: No clubbing, cyanosis, or edema  NEUROLOGY: non-focal  SKIN: No rashes or lesions                          11.4   8.57  )-----------( 250      ( 26 Oct 2019 06:47 )             35.0       10-26    130<L>  |  91<L>  |  10  ----------------------------<  120<H>  4.2   |  27  |  0.56    Ca    10.0      26 Oct 2019 06:47  Phos  3.7     10-26  Mg     2.1     10-26    TPro  7.3  /  Alb  3.7  /  TBili  0.4  /  DBili  x   /  AST  13  /  ALT  12  /  AlkPhos  73  10-25      Phosphorus Level, Serum: 3.7 mg/dL (10-26 @ 06:47)  Magnesium, Serum: 2.1 mg/dL (10-26 @ 06:47)  Magnesium, Serum: 2.0 mg/dL (10-25 @ 15:44)  Phosphorus Level, Serum: 3.6 mg/dL (10-25 @ 15:44)      RADIOLOGY:  EXAM:  CT ABDOMEN AND PELVIS IC    PROCEDURE DATE:  Oct 25 2019     INTERPRETATION:  CLINICAL INFORMATION: Abdominal pain.     COMPARISON: 9/3/2019.    PROCEDURE:   CT of the Abdomen and Pelvis was performed with intravenous contrast.   Intravenous contrast: 90 ml Omnipaque 350. 10 ml discarded.  Oral contrast: positive contrast was administered.  Sagittal and coronal reformats were performed.    FINDINGS:    LOWER CHEST: Small groundglass opacities left lower lobe.    LIVER: Within normal limits.   BILE DUCTS: Normal caliber.  GALLBLADDER: Cholelithiasis.  SPLEEN: Within normal limits.   PANCREAS: Within normal limits.  ADRENALS: Within normal limits.   KIDNEYS/URETERS: Within normal limits.     BLADDER: Within normal limits.   REPRODUCTIVE ORGANS: Within normal limits.    BOWEL: No bowel obstruction. Gastrostomy tube is in place. Normal appendix. Moderate segment small bowel thickening in the right abdomen.  PERITONEUM: Multiple new metastatic implants in the abdomen and pelvis since the prior exam. There are numerous subcentimeter retroperitoneal nodules in the perinephric space. There is heterogeneous soft tissue   implant involving the cecum on series 2 image 61 measuring about 4 cm. There is ill-defined soft tissue along the celiac axis. There are nodular implants along the surface of the liver and spleen. Peripancreatic implants. A 4 cm anterior peritoneal implant on image 42.  VESSELS:  Within normal limits.  RETROPERITONEUM/LYMPH NODES: No lymphadenopathy.     ABDOMINAL WALL: Within normal limits.  BONES: Within normal limits.     IMPRESSION:   Enteritis.    Extensive metastatic disease in the abdomen and pelvis, new since the prior exam.      LAZARO DUONG M.D., ATTENDING RADIOLOGIST  This document has been electronically signed. Oct 25 2019  7:12PM HPI:  Mr. 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    He was in his usual chronically ill state of health until 3 days ago when he became more lethargic and tired.  He endorsed some malaise and abdominal pain.  2 days ago, he noted a few dark stools.  Today, he was not taking his usual tube feeds due to abdominal discomfort.  He was seen by his oncologist today for this and had labs drawn showing hyponatremia and was directed to [facility] ED.  He did not have any chest pain, shortness of breath, vomitting, or diarrhea, or fevers and chills.    IN the ED, he was afebrile with normal vital signs.  Diagnostics revealed a hgb of 11.7, sodium improved from previous 126 to 129.  CT a/p showed enteritis and new evidence of metastatic disease in the abdomen and pelvis.  He was given NS and dilaudid and lorazepam and admitted. On evaluation, he gave the above history and requested dressing changes, dilaudid, and lorazepam. (25 Oct 2019 22:39)    ONCOLOGY HISTORY:  Recurrent head and neck SCC in April 2019 at the age of 63. 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 He completed radiation therapy (7,000 cGy) to the oral cavity on 2/11/16. He continued with pain in the right lower lip. He was referred to Dr. Mahamed Paulson. Biopsy 2/13/2018 of the right mandibular vestibule showed squamous cell carcinoma, well differentiated. PET/CT 3/2/18 revealed new FDG avidity in the anterior mandibular buccal region with associated irregularity of the underlying mandibular cortex, unchanged level lA lymph node. No evidence of distant metastasis. He followed with Dr. Farley who ordered a CT neck on 3/22/18 which revealed new lytic bony destruction, anterior superior aspect of the mandibular symphysis in the midline involving the roots for both central incisor teeth, no lymph nodes. Biopsy was c/w SCCa in the area with erosion of the mandible, T4a N0 M0.     He is now s/p composite resection and reconstruction with FFF on April 26, 2018. Final pathology pT1 N0 M0; however, given his original presentation, likely still [id number] mandibular alveolar SCCa. He is s/p a release of the lower lip scar and mucosal graft on 3/7/19. An MRI 4/5/19 showed masslike nodular enhancement, increased in comparison to prior MR 12/20/2018 along the left posterior floor of mouth, spanning the base of tongue, glossotonsillar sulcus, and inferior left parapharyngeal space,  space, system with progression of recurrent disease. Biopsy positive for invasive squamous cell carcinoma, keratinizing, well to moderately differentiated. Follow up PET showed new irregular FDG avidity with central photopenia along the left posterior floor of mouth/left oral tongue, SUV 14.5, corresponding to masslike nodular enhancement which measures approximately 4.6 x 2.0 x 4.2 cm, on recent MRI.    Treatment: Carbo AUC 2, taxol 60 mg/m2 and Cetuximab 250 mg/m2 3 weeks on, 1 week off every 28 days (holding cetuximab d/t infusion rxn)      - s/p 5 cycles of nivolumab Q4 weeks (Due to POD on nivolumab, treatment changed to Carbo/Taxol/Cetuximab)      - met with Dr. Rose and Dr. 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, madibular reconstruction with bone graft 2018  H/O shoulder surgery: impingement left 2005, right 2007      Allergies  Bactrim (Hives)  carboplatin (Pruritus)  cetuximab (Hives; Pruritus)    MEDICATIONS  (STANDING):  fentaNYL   Patch  25 MICROgram(s)/Hr 1 Patch Transdermal every 72 hours  heparin  Injectable 5000 Unit(s) SubCutaneous every 8 hours  influenza   Vaccine 0.5 milliLiter(s) IntraMuscular once  pantoprazole  Injectable 40 milliGRAM(s) IV Push two times a day  senna Syrup 10 milliLiter(s) Oral at bedtime    MEDICATIONS  (PRN):  acetaminophen   Tablet .. 650 milliGRAM(s) Oral every 4 hours PRN Mild Pain (1 - 3)  HYDROmorphone  Injectable 0.5 milliGRAM(s) IV Push every 3 hours PRN Moderate Pain (4 - 6)  HYDROmorphone  Injectable 1 milliGRAM(s) IV Push every 3 hours PRN Severe Pain (7 - 10)  ketorolac   Injectable 15 milliGRAM(s) IV Push every 6 hours PRN Mild Pain (1 - 3)  ondansetron   Disintegrating Tablet 4 milliGRAM(s) Oral three times a day PRN Nausea and/or Vomiting  oxyCODONE    Solution 10 milliGRAM(s) Enteral Tube every 4 hours PRN Moderate Pain (4 - 6)      FAMILY HISTORY:  Family history of lung cancer: in Father      SOCIAL HISTORY: No EtOH, no tobacco      REVIEW OF SYSTEMS:  CONSTITUTIONAL: No weakness, fevers or chills  EYES/ENT: No visual changes;  No vertigo or throat pain   NECK: No pain or stiffness  RESPIRATORY: No cough, wheezing, hemoptysis; No shortness of breath  CARDIOVASCULAR: No chest pain or palpitations  GASTROINTESTINAL: No abdominal or epigastric pain. No nausea, vomiting, or hematemesis; No diarrhea or constipation. No melena or hematochezia.  GENITOURINARY: No dysuria, frequency or hematuria  NEUROLOGICAL: No numbness or weakness  SKIN: No itching, burning, rashes, or lesions   All other review of systems is negative unless indicated above.      Height (cm): 170.2 (10-25 @ 23:10)  Weight (kg): 58.7 (10-25 @ 23:10)  BMI (kg/m2): 20.3 (10-25 @ 23:10)  BSA (m2): 1.68 (10-25 @ 23:10)      T(F): 97.9 (10-26-19 @ 08:18), Max: 98.7 (10-26-19 @ 06:25)  HR: 100 (10-26-19 @ 08:18)  BP: 123/71 (10-26-19 @ 08:18)  RR: 18 (10-26-19 @ 08:18)  SpO2: 99% (10-26-19 @ 08:18)      GENERAL: NAD, sleeping but arousable   HEAD:  Atraumatic, post-surgical changes at jaw   EYES: EOMI, conjunctiva and sclera clear  NECK: Supple  CHEST/LUNG: Clear to auscultation bilaterally; No wheezes or crackles   HEART: Regular rate and rhythm; No murmurs, rubs, or gallops  ABDOMEN: Soft, Nontender, Nondistended, +PEG  EXTREMITIES: No clubbing, cyanosis, or edema  NEUROLOGY: non-focal  SKIN: No rashes or lesions                          11.4   8.57  )-----------( 250      ( 26 Oct 2019 06:47 )             35.0       10-26    130<L>  |  91<L>  |  10  ----------------------------<  120<H>  4.2   |  27  |  0.56    Ca    10.0      26 Oct 2019 06:47  Phos  3.7     10-26  Mg     2.1     10-26    TPro  7.3  /  Alb  3.7  /  TBili  0.4  /  DBili  x   /  AST  13  /  ALT  12  /  AlkPhos  73  10-25      Phosphorus Level, Serum: 3.7 mg/dL (10-26 @ 06:47)  Magnesium, Serum: 2.1 mg/dL (10-26 @ 06:47)  Magnesium, Serum: 2.0 mg/dL (10-25 @ 15:44)  Phosphorus Level, Serum: 3.6 mg/dL (10-25 @ 15:44)      RADIOLOGY:  EXAM:  CT ABDOMEN AND PELVIS IC    PROCEDURE DATE:  Oct 25 2019     INTERPRETATION:  CLINICAL INFORMATION: Abdominal pain.     COMPARISON: 9/3/2019.    PROCEDURE:   CT of the Abdomen and Pelvis was performed with intravenous contrast.   Intravenous contrast: 90 ml Omnipaque 350. 10 ml discarded.  Oral contrast: positive contrast was administered.  Sagittal and coronal reformats were performed.    FINDINGS:    LOWER CHEST: Small groundglass opacities left lower lobe.    LIVER: Within normal limits.   BILE DUCTS: Normal caliber.  GALLBLADDER: Cholelithiasis.  SPLEEN: Within normal limits.   PANCREAS: Within normal limits.  ADRENALS: Within normal limits.   KIDNEYS/URETERS: Within normal limits.     BLADDER: Within normal limits.   REPRODUCTIVE ORGANS: Within normal limits.    BOWEL: No bowel obstruction. Gastrostomy tube is in place. Normal appendix. Moderate segment small bowel thickening in the right abdomen.  PERITONEUM: Multiple new metastatic implants in the abdomen and pelvis since the prior exam. There are numerous subcentimeter retroperitoneal nodules in the perinephric space. There is heterogeneous soft tissue   implant involving the cecum on series 2 image 61 measuring about 4 cm. There is ill-defined soft tissue along the celiac axis. There are nodular implants along the surface of the liver and spleen. Peripancreatic implants. A 4 cm anterior peritoneal implant on image 42.  VESSELS:  Within normal limits.  RETROPERITONEUM/LYMPH NODES: No lymphadenopathy.     ABDOMINAL WALL: Within normal limits.  BONES: Within normal limits.     IMPRESSION:   Enteritis.    Extensive metastatic disease in the abdomen and pelvis, new since the prior exam.      LAZARO DUONG M.D., ATTENDING RADIOLOGIST  This document has been electronically signed. Oct 25 2019  7:12PM

## 2019-10-27 ENCOUNTER — TRANSCRIPTION ENCOUNTER (OUTPATIENT)
Age: 64
End: 2019-10-27

## 2019-10-27 LAB
ALBUMIN SERPL ELPH-MCNC: 4 G/DL
ALP BLD-CCNC: 74 U/L
ALT SERPL-CCNC: 12 U/L
ANION GAP SERPL CALC-SCNC: 10 MMO/L — SIGNIFICANT CHANGE UP (ref 7–14)
ANION GAP SERPL CALC-SCNC: 13 MMOL/L
ANION GAP SERPL CALC-SCNC: 9 MMO/L — SIGNIFICANT CHANGE UP (ref 7–14)
AST SERPL-CCNC: 14 U/L
BILIRUB SERPL-MCNC: 0.6 MG/DL
BUN SERPL-MCNC: 13 MG/DL
BUN SERPL-MCNC: 15 MG/DL — SIGNIFICANT CHANGE UP (ref 7–23)
BUN SERPL-MCNC: 16 MG/DL — SIGNIFICANT CHANGE UP (ref 7–23)
CALCIUM SERPL-MCNC: 9.4 MG/DL — SIGNIFICANT CHANGE UP (ref 8.4–10.5)
CALCIUM SERPL-MCNC: 9.5 MG/DL — SIGNIFICANT CHANGE UP (ref 8.4–10.5)
CALCIUM SERPL-MCNC: 9.7 MG/DL
CHLORIDE SERPL-SCNC: 90 MMOL/L — LOW (ref 98–107)
CHLORIDE SERPL-SCNC: 91 MMOL/L
CHLORIDE SERPL-SCNC: 92 MMOL/L — LOW (ref 98–107)
CO2 SERPL-SCNC: 28 MMOL/L — SIGNIFICANT CHANGE UP (ref 22–31)
CO2 SERPL-SCNC: 29 MMOL/L
CO2 SERPL-SCNC: 30 MMOL/L — SIGNIFICANT CHANGE UP (ref 22–31)
CREAT SERPL-MCNC: 0.54 MG/DL — SIGNIFICANT CHANGE UP (ref 0.5–1.3)
CREAT SERPL-MCNC: 0.6 MG/DL
CREAT SERPL-MCNC: 0.7 MG/DL — SIGNIFICANT CHANGE UP (ref 0.5–1.3)
GLUCOSE SERPL-MCNC: 132 MG/DL — HIGH (ref 70–99)
GLUCOSE SERPL-MCNC: 153 MG/DL — HIGH (ref 70–99)
GLUCOSE SERPL-MCNC: 72 MG/DL
HCT VFR BLD CALC: 33.5 % — LOW (ref 39–50)
HGB BLD-MCNC: 10.7 G/DL — LOW (ref 13–17)
MAGNESIUM SERPL-MCNC: 1.9 MG/DL — SIGNIFICANT CHANGE UP (ref 1.6–2.6)
MAGNESIUM SERPL-MCNC: 2.3 MG/DL
MCHC RBC-ENTMCNC: 28.4 PG — SIGNIFICANT CHANGE UP (ref 27–34)
MCHC RBC-ENTMCNC: 31.9 % — LOW (ref 32–36)
MCV RBC AUTO: 88.9 FL — SIGNIFICANT CHANGE UP (ref 80–100)
NRBC # FLD: 0 K/UL — SIGNIFICANT CHANGE UP (ref 0–0)
PHOSPHATE SERPL-MCNC: 3.8 MG/DL — SIGNIFICANT CHANGE UP (ref 2.5–4.5)
PLATELET # BLD AUTO: 205 K/UL — SIGNIFICANT CHANGE UP (ref 150–400)
PMV BLD: 9.4 FL — SIGNIFICANT CHANGE UP (ref 7–13)
POTASSIUM SERPL-MCNC: 4.1 MMOL/L — SIGNIFICANT CHANGE UP (ref 3.5–5.3)
POTASSIUM SERPL-MCNC: 4.6 MMOL/L — SIGNIFICANT CHANGE UP (ref 3.5–5.3)
POTASSIUM SERPL-SCNC: 4.1 MMOL/L — SIGNIFICANT CHANGE UP (ref 3.5–5.3)
POTASSIUM SERPL-SCNC: 4.6 MMOL/L — SIGNIFICANT CHANGE UP (ref 3.5–5.3)
POTASSIUM SERPL-SCNC: 4.9 MMOL/L
PROT SERPL-MCNC: 6.8 G/DL
RBC # BLD: 3.77 M/UL — LOW (ref 4.2–5.8)
RBC # FLD: 14.6 % — HIGH (ref 10.3–14.5)
SODIUM SERPL-SCNC: 127 MMOL/L — LOW (ref 135–145)
SODIUM SERPL-SCNC: 132 MMOL/L — LOW (ref 135–145)
SODIUM SERPL-SCNC: 133 MMOL/L
WBC # BLD: 9.89 K/UL — SIGNIFICANT CHANGE UP (ref 3.8–10.5)
WBC # FLD AUTO: 9.89 K/UL — SIGNIFICANT CHANGE UP (ref 3.8–10.5)

## 2019-10-27 PROCEDURE — 99233 SBSQ HOSP IP/OBS HIGH 50: CPT | Mod: GC

## 2019-10-27 PROCEDURE — 71045 X-RAY EXAM CHEST 1 VIEW: CPT | Mod: 26

## 2019-10-27 RX ORDER — VANCOMYCIN HCL 1 G
1000 VIAL (EA) INTRAVENOUS ONCE
Refills: 0 | Status: COMPLETED | OUTPATIENT
Start: 2019-10-27 | End: 2019-10-28

## 2019-10-27 RX ORDER — AZITHROMYCIN 500 MG/1
TABLET, FILM COATED ORAL
Refills: 0 | Status: DISCONTINUED | OUTPATIENT
Start: 2019-10-27 | End: 2019-10-29

## 2019-10-27 RX ORDER — FENTANYL CITRATE 50 UG/ML
1 INJECTION INTRAVENOUS
Refills: 0 | Status: DISCONTINUED | OUTPATIENT
Start: 2019-10-27 | End: 2019-10-29

## 2019-10-27 RX ORDER — AZITHROMYCIN 500 MG/1
500 TABLET, FILM COATED ORAL EVERY 24 HOURS
Refills: 0 | Status: DISCONTINUED | OUTPATIENT
Start: 2019-10-28 | End: 2019-10-29

## 2019-10-27 RX ORDER — SODIUM CHLORIDE 9 MG/ML
1000 INJECTION INTRAMUSCULAR; INTRAVENOUS; SUBCUTANEOUS
Refills: 0 | Status: DISCONTINUED | OUTPATIENT
Start: 2019-10-27 | End: 2019-10-27

## 2019-10-27 RX ORDER — PIPERACILLIN AND TAZOBACTAM 4; .5 G/20ML; G/20ML
3.38 INJECTION, POWDER, LYOPHILIZED, FOR SOLUTION INTRAVENOUS EVERY 8 HOURS
Refills: 0 | Status: DISCONTINUED | OUTPATIENT
Start: 2019-10-27 | End: 2019-10-28

## 2019-10-27 RX ORDER — HYDROMORPHONE HYDROCHLORIDE 2 MG/ML
1.5 INJECTION INTRAMUSCULAR; INTRAVENOUS; SUBCUTANEOUS
Refills: 0 | Status: DISCONTINUED | OUTPATIENT
Start: 2019-10-27 | End: 2019-10-29

## 2019-10-27 RX ORDER — AZITHROMYCIN 500 MG/1
500 TABLET, FILM COATED ORAL ONCE
Refills: 0 | Status: COMPLETED | OUTPATIENT
Start: 2019-10-27 | End: 2019-10-27

## 2019-10-27 RX ORDER — VANCOMYCIN HCL 1 G
VIAL (EA) INTRAVENOUS
Refills: 0 | Status: DISCONTINUED | OUTPATIENT
Start: 2019-10-27 | End: 2019-10-27

## 2019-10-27 RX ORDER — VANCOMYCIN HCL 1 G
1000 VIAL (EA) INTRAVENOUS EVERY 12 HOURS
Refills: 0 | Status: DISCONTINUED | OUTPATIENT
Start: 2019-10-28 | End: 2019-10-30

## 2019-10-27 RX ORDER — PIPERACILLIN AND TAZOBACTAM 4; .5 G/20ML; G/20ML
3.38 INJECTION, POWDER, LYOPHILIZED, FOR SOLUTION INTRAVENOUS ONCE
Refills: 0 | Status: COMPLETED | OUTPATIENT
Start: 2019-10-27 | End: 2019-10-27

## 2019-10-27 RX ORDER — ACETAMINOPHEN 500 MG
1000 TABLET ORAL EVERY 6 HOURS
Refills: 0 | Status: DISCONTINUED | OUTPATIENT
Start: 2019-10-27 | End: 2019-10-31

## 2019-10-27 RX ORDER — VANCOMYCIN HCL 1 G
VIAL (EA) INTRAVENOUS
Refills: 0 | Status: DISCONTINUED | OUTPATIENT
Start: 2019-10-28 | End: 2019-10-30

## 2019-10-27 RX ADMIN — HEPARIN SODIUM 5000 UNIT(S): 5000 INJECTION INTRAVENOUS; SUBCUTANEOUS at 21:55

## 2019-10-27 RX ADMIN — HYDROMORPHONE HYDROCHLORIDE 1 MILLIGRAM(S): 2 INJECTION INTRAMUSCULAR; INTRAVENOUS; SUBCUTANEOUS at 00:33

## 2019-10-27 RX ADMIN — FENTANYL CITRATE 1 PATCH: 50 INJECTION INTRAVENOUS at 10:25

## 2019-10-27 RX ADMIN — HYDROMORPHONE HYDROCHLORIDE 1.5 MILLIGRAM(S): 2 INJECTION INTRAMUSCULAR; INTRAVENOUS; SUBCUTANEOUS at 10:57

## 2019-10-27 RX ADMIN — HYDROMORPHONE HYDROCHLORIDE 0.5 MILLIGRAM(S): 2 INJECTION INTRAMUSCULAR; INTRAVENOUS; SUBCUTANEOUS at 04:15

## 2019-10-27 RX ADMIN — HYDROMORPHONE HYDROCHLORIDE 1.5 MILLIGRAM(S): 2 INJECTION INTRAMUSCULAR; INTRAVENOUS; SUBCUTANEOUS at 22:19

## 2019-10-27 RX ADMIN — HYDROMORPHONE HYDROCHLORIDE 0.5 MILLIGRAM(S): 2 INJECTION INTRAMUSCULAR; INTRAVENOUS; SUBCUTANEOUS at 03:56

## 2019-10-27 RX ADMIN — HYDROMORPHONE HYDROCHLORIDE 1 MILLIGRAM(S): 2 INJECTION INTRAMUSCULAR; INTRAVENOUS; SUBCUTANEOUS at 00:45

## 2019-10-27 RX ADMIN — HYDROMORPHONE HYDROCHLORIDE 1.5 MILLIGRAM(S): 2 INJECTION INTRAMUSCULAR; INTRAVENOUS; SUBCUTANEOUS at 21:49

## 2019-10-27 RX ADMIN — Medication 0.5 MILLIGRAM(S): at 02:25

## 2019-10-27 RX ADMIN — HYDROMORPHONE HYDROCHLORIDE 1 MILLIGRAM(S): 2 INJECTION INTRAMUSCULAR; INTRAVENOUS; SUBCUTANEOUS at 08:09

## 2019-10-27 RX ADMIN — HYDROMORPHONE HYDROCHLORIDE 1.5 MILLIGRAM(S): 2 INJECTION INTRAMUSCULAR; INTRAVENOUS; SUBCUTANEOUS at 18:06

## 2019-10-27 RX ADMIN — PANTOPRAZOLE SODIUM 40 MILLIGRAM(S): 20 TABLET, DELAYED RELEASE ORAL at 18:06

## 2019-10-27 RX ADMIN — Medication 1000 MILLIGRAM(S): at 23:50

## 2019-10-27 RX ADMIN — HYDROMORPHONE HYDROCHLORIDE 1.5 MILLIGRAM(S): 2 INJECTION INTRAMUSCULAR; INTRAVENOUS; SUBCUTANEOUS at 10:22

## 2019-10-27 RX ADMIN — PANTOPRAZOLE SODIUM 40 MILLIGRAM(S): 20 TABLET, DELAYED RELEASE ORAL at 06:00

## 2019-10-27 RX ADMIN — HEPARIN SODIUM 5000 UNIT(S): 5000 INJECTION INTRAVENOUS; SUBCUTANEOUS at 05:59

## 2019-10-27 RX ADMIN — ONDANSETRON 4 MILLIGRAM(S): 8 TABLET, FILM COATED ORAL at 05:59

## 2019-10-27 RX ADMIN — HYDROMORPHONE HYDROCHLORIDE 1.5 MILLIGRAM(S): 2 INJECTION INTRAMUSCULAR; INTRAVENOUS; SUBCUTANEOUS at 18:21

## 2019-10-27 RX ADMIN — SODIUM CHLORIDE 100 MILLILITER(S): 9 INJECTION INTRAMUSCULAR; INTRAVENOUS; SUBCUTANEOUS at 09:30

## 2019-10-27 RX ADMIN — ONDANSETRON 4 MILLIGRAM(S): 8 TABLET, FILM COATED ORAL at 21:51

## 2019-10-27 RX ADMIN — Medication 1 MILLIGRAM(S): at 10:27

## 2019-10-27 RX ADMIN — HYDROMORPHONE HYDROCHLORIDE 1.5 MILLIGRAM(S): 2 INJECTION INTRAMUSCULAR; INTRAVENOUS; SUBCUTANEOUS at 14:58

## 2019-10-27 RX ADMIN — PIPERACILLIN AND TAZOBACTAM 200 GRAM(S): 4; .5 INJECTION, POWDER, LYOPHILIZED, FOR SOLUTION INTRAVENOUS at 23:49

## 2019-10-27 RX ADMIN — HYDROMORPHONE HYDROCHLORIDE 1 MILLIGRAM(S): 2 INJECTION INTRAMUSCULAR; INTRAVENOUS; SUBCUTANEOUS at 07:54

## 2019-10-27 RX ADMIN — ONDANSETRON 4 MILLIGRAM(S): 8 TABLET, FILM COATED ORAL at 14:43

## 2019-10-27 RX ADMIN — SENNA PLUS 10 MILLILITER(S): 8.6 TABLET ORAL at 21:55

## 2019-10-27 RX ADMIN — FENTANYL CITRATE 1 PATCH: 50 INJECTION INTRAVENOUS at 07:30

## 2019-10-27 RX ADMIN — HYDROMORPHONE HYDROCHLORIDE 1.5 MILLIGRAM(S): 2 INJECTION INTRAMUSCULAR; INTRAVENOUS; SUBCUTANEOUS at 14:43

## 2019-10-27 RX ADMIN — FENTANYL CITRATE 1 PATCH: 50 INJECTION INTRAVENOUS at 19:24

## 2019-10-27 NOTE — DISCHARGE NOTE PROVIDER - CARE PROVIDER_API CALL
Yajaira Cai)  Medical Oncology  [facility], [address] [place] [address]  Phone: [phone]  Fax: [phone]  Follow Up Time: 1 week

## 2019-10-27 NOTE — DISCHARGE NOTE PROVIDER - HOSPITAL COURSE
Mr. 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        He was in his usual chronically ill state of health until 3 days ago when he became more lethargic and tired.  He endorsed some malaise and abdominal pain.  2 days ago, he noted a few dark stools.  Today, he was not taking his usual tube feeds due to abdominal discomfort.  He was seen by his oncologist today for this and had labs drawn showing hyponatremia and was directed to [facility] ED.  He did not have any chest pain, shortness of breath, vomitting, or diarrhea, or fevers and chills.          IN the ED, he was afebrile with normal vital signs.  Diagnostics revealed a hgb of 11.7, sodium improved from previous 126 to 129.  CT a/p showed enteritis and new evidence of metastatic disease in the abdomen and pelvis.  He was given NS and dilaudid and lorazepam and admitted.        On evaluation, he gave the above history and requested dressing changes, dilaudid, and lorazepam. He was treated with these agents to manage his pain.    His sodium was still decreased, so urine electrolytes were sent to determine the etiology. He was treated with NS fluids. Mr. 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        He was in his usual chronically ill state of health until 3 days ago when he became more lethargic and tired.  He endorsed some malaise and abdominal pain.  2 days ago, he noted a few dark stools.  Today, he was not taking his usual tube feeds due to abdominal discomfort.  He was seen by his oncologist today for this and had labs drawn showing hyponatremia and was directed to [facility] ED.  He did not have any chest pain, shortness of breath, vomitting, or diarrhea, or fevers and chills.          IN the ED, he was afebrile with normal vital signs.  Diagnostics revealed a hgb of 11.7, sodium improved from previous 126 to 129.  CT a/p showed enteritis and new evidence of metastatic disease in the abdomen and pelvis.  He was given NS and dilaudid and lorazepam and admitted.        On evaluation, he gave the above history and requested dressing changes, dilaudid, and lorazepam. He was treated with these agents to manage his pain.    His sodium was still decreased, so urine electrolytes were sent to determine the etiology. He was treated with NS fluids. Wound care gave him a pouch, but pt prefers old dressing.  Pain regimen readjusted to prior regimen as per palliative; no longer hypotensive.  Performed a CT neck to r/o abscess - no apparent abscess on CT neck. Mr. 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        He was in his usual chronically ill state of health until 3 days ago when he became more lethargic and tired.  He endorsed some malaise and abdominal pain.  2 days ago, he noted a few dark stools.  Today, he was not taking his usual tube feeds due to abdominal discomfort.  He was seen by his oncologist today for this and had labs drawn showing hyponatremia and was directed to [facility] ED.  He did not have any chest pain, shortness of breath, vomitting, or diarrhea, or fevers and chills.          IN the ED, he was afebrile with normal vital signs.  Diagnostics revealed a hgb of 11.7, sodium improved from previous 126 to 129.  CT a/p showed enteritis and new evidence of metastatic disease in the abdomen and pelvis.  He was given NS and dilaudid and lorazepam and admitted.        On evaluation, he gave the above history and requested dressing changes, dilaudid, and lorazepam. He was treated with these agents to manage his pain.    His sodium was still decreased, so urine electrolytes were sent to determine the etiology. He was treated with NS fluids. Wound care gave him a pouch, but pt prefers old dressing.  Pain regimen readjusted to prior regimen as per palliative; no longer hypotensive.  Performed a CT neck to r/o abscess - no apparent abscess on CT neck.  Patient was treated with 5 days of vanc and zosyn from 10/27-10/31. Patient is discharged with 2 days of levofloxacin to be completed on 11/2/19. Mr. 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        He was in his usual chronically ill state of health until 3 days ago when he became more lethargic and tired.  He endorsed some malaise and abdominal pain.  2 days ago, he noted a few dark stools.  Today, he was not taking his usual tube feeds due to abdominal discomfort.  He was seen by his oncologist today for this and had labs drawn showing hyponatremia and was directed to [facility] ED.  He did not have any chest pain, shortness of breath, vomitting, or diarrhea, or fevers and chills.          IN the ED, he was afebrile with normal vital signs.  Diagnostics revealed a hgb of 11.7, sodium improved from previous 126 to 129.  CT a/p showed enteritis and new evidence of metastatic disease in the abdomen and pelvis.  He was given NS and dilaudid and lorazepam and admitted.        On evaluation, he gave the above history and requested dressing changes, dilaudid, and lorazepam. He was treated with these agents to manage his pain.    His sodium was still decreased, so urine electrolytes were sent to determine the etiology. He was treated with NS fluids. Wound care gave him a pouch, but pt prefers old dressing.  Pain regimen readjusted to prior regimen as per palliative; no longer hypotensive.  Performed a CT neck to rule out abscess - no apparent abscess on CT neck.  Patient was treated with 5 days of vanc and zosyn from 10/27-10/31. Patient is discharged with 2 days of levofloxacin to be completed on 11/2/19.

## 2019-10-27 NOTE — DISCHARGE NOTE PROVIDER - NSDCCPCAREPLAN_GEN_ALL_CORE_FT
PRINCIPAL DISCHARGE DIAGNOSIS  Diagnosis: Hyponatremia  Assessment and Plan of Treatment: Your sodium level is low. This may be due to a combination of factors including your chronic pain, as well as your decreased oral intake. We treated you with fluids. Please see your primary care doctor as an outpatient to continue treatment.      SECONDARY DISCHARGE DIAGNOSES  Diagnosis: Failure to thrive in adult  Assessment and Plan of Treatment: You had malnutrition, likely due to decreased intake of food and nutrients. Please continue using your feeding tube to ensure that you have appropriate nutrition. Please follow up with your primary care doctor after discharge. PRINCIPAL DISCHARGE DIAGNOSIS  Diagnosis: Hyponatremia  Assessment and Plan of Treatment: Your sodium level is low. This may be due to a combination of factors including your chronic pain, as well as your decreased oral intake. We treated you with fluids. Please see your primary care doctor as an outpatient to continue treatment.      SECONDARY DISCHARGE DIAGNOSES  Diagnosis: Squamous cell carcinoma of neck  Assessment and Plan of Treatment: You have a malignancy of the throat. You have been treated with surgery and chemotherapies in the past. As part of a clinical trial, you are encouraged to go to [facility] in [place] to be part of this clinical trial.    Diagnosis: Infection of skin of neck  Assessment and Plan of Treatment: You have an infection of the surgical site where you were operated on for squamous cell carcinoma. You received five days of antibiotics for this infection. Please continue to take levofloxacin via the PEG tube for two days after discharge, with the last dose on November 2nd.    Diagnosis: Failure to thrive in adult  Assessment and Plan of Treatment: You had malnutrition, likely due to decreased intake of food and nutrients. Please continue using your feeding tube to ensure that you have appropriate nutrition. Please follow up with your primary care doctor after discharge.

## 2019-10-27 NOTE — DIETITIAN INITIAL EVALUATION ADULT. - ENTERAL
Recommend to change tube feeds to 2 kamala HN, 250 ml /hr every 6 hours instead of every 4 hours, which will provide 1000 ml total volume, 2000 kcal and 83.5 grams of protein.

## 2019-10-27 NOTE — DIETITIAN INITIAL EVALUATION ADULT. - PHYSICAL APPEARANCE
Muscle mass loss (temples, clavicles, thigh) severe, Body fat loss orbital , tricep (severe)/underweight

## 2019-10-27 NOTE — DIETITIAN INITIAL EVALUATION ADULT. - MALNUTRITION
considering weight loss of 15% in 5 months and physical findings, pt meets the criteria for severe malnutrition in the context of chronic illness.

## 2019-10-27 NOTE — DIETITIAN INITIAL EVALUATION ADULT. - OTHER INFO
63 y/o male admitted with hyponatremia, squamous cell carcinoma of oral cavity, new metastatic disease, dark stools , discontinued tube feed prior to admit due to abdominal pain. As per son present at bedside, At home pt takes 237 ml cans of 2 kamala HN 5 x daily via gastrostomy tube, equates to 1185 ml x 24 hours providing 2370 kcal and 99 grams of protein. Current tube feed orders providing 2 kamala HN  250 ml bolus feeds every 4 hours, providing 1500 ml total volume in 24 hours, 3000 kcal and 125 grams protein, exceeding nutrition needs. Recommend to change tube feeds to 2 kamala HN, 250 ml /hr every 6 hours instead of every 4 hours, which will provide 1000 ml total volume, 2000 kcal and 83.5 grams of protein. Pt denies any nausea, vomiting and diarrhea at this time. Previous weight loss history also noted, 150# , 5/14/19, 134# 6/14/19, 132# 9/16/19, current admit weight 127# no edema on 10/25/2019.    Pt may resume to previous bolus feed regimen 2 kamala HN, 5 cans (237 ml) x 24 hours after discharge at home. 65 y/o male admitted with hyponatremia, squamous cell carcinoma of oral cavity, new metastatic disease, dark stools , discontinued tube feed prior to admit due to abdominal pain. As per son present at bedside,  pt takes 5 cans  237 ml/each of 2 kamala HN 5 x daily via gastrostomy tube, equates to 1185 ml x 24 hours providing 2370 kcal and 99 grams of protein. Current tube feed orders providing 2 kamala HN  250 ml bolus feeds every 4 hours, providing 1500 ml total volume in 24 hours, 3000 kcal and 125 grams protein, exceeding nutrition needs. Recommend to change tube feeds to 2 kamala HN, 250 ml /hr every 6 hours instead of every 4 hours, which will provide 1000 ml total volume, 2000 kcal and 83.5 grams of protein. Pt denies any nausea, vomiting and diarrhea at this time. Previous weight loss history also noted, 150# , 5/14/19, 134# 6/14/19, 132# 9/16/19, current admit weight 127# no edema on 10/25/2019.    Pt may resume to previous bolus feed regimen 2 kamala HN, 5 cans (237 ml) x 24 hours after discharge at home.

## 2019-10-27 NOTE — CHART NOTE - NSCHARTNOTEFT_GEN_A_CORE
NUTRITION SERVICES     Upon Nutritional Assessment by the Registered Dietitian your patient was determined to meet criteria/ has evidence of the following diagnosis/diagnoses:  [ ] Mild Protein Calorie Malnutrition   [ ] Moderate Protein Calorie Malnutrition   [ x] Severe Protein Calorie Malnutrition   [ ] Unspecified Protein Calorie Malnutrition   [ ] Underweight / BMI <19  [ ] Morbid Obesity / BMI >40    Findings as based on:  •  Comprehensive nutrition assessment and consultation       Treatment:  The following diet has been recommended:  Refer to initial assessment completed under document section.

## 2019-10-27 NOTE — PROGRESS NOTE ADULT - SUBJECTIVE AND OBJECTIVE BOX
Internal Medicine Progress Note    Vin David  PGY-1 Internal Medicine  [id number]  [id number] ([facility])    Patient is a 64y old  Male who presents with a chief complaint of CC: Abdominal pain and lethargy (26 Oct 2019 09:26)      SUBJECTIVE / OVERNIGHT EVENTS:  -still with ongoing throat/abdominal pain. relieved with PRN meds. productive cough with clear sputum, no hemoptysis. no chest pain, SOB.     MEDICATIONS  (STANDING):  fentaNYL   Patch  25 MICROgram(s)/Hr 1 Patch Transdermal every 72 hours  heparin  Injectable 5000 Unit(s) SubCutaneous every 8 hours  influenza   Vaccine 0.5 milliLiter(s) IntraMuscular once  ondansetron Injectable 4 milliGRAM(s) IV Push every 8 hours  pantoprazole  Injectable 40 milliGRAM(s) IV Push two times a day  senna Syrup 10 milliLiter(s) Oral at bedtime  sodium chloride 0.9%. 1000 milliLiter(s) (100 mL/Hr) IV Continuous <Continuous>    MEDICATIONS  (PRN):  acetaminophen   Tablet .. 650 milliGRAM(s) Oral every 4 hours PRN Mild Pain (1 - 3)  HYDROmorphone  Injectable 1 milliGRAM(s) IV Push every 3 hours PRN Severe Pain (7 - 10)  HYDROmorphone  Injectable 0.5 milliGRAM(s) IV Push every 3 hours PRN Severe Pain (7 - 10)  LORazepam   Injectable 0.5 milliGRAM(s) IV Push every 8 hours PRN Anxiety  oxyCODONE    Solution 10 milliGRAM(s) Enteral Tube every 4 hours PRN Moderate Pain (4 - 6)    Vital Signs Last 24 Hrs  T(C): 37.8 (27 Oct 2019 07:50), Max: 37.8 (26 Oct 2019 15:07)  T(F): 100.1 (27 Oct 2019 07:50), Max: 100.1 (26 Oct 2019 15:07)  HR: 118 (27 Oct 2019 07:50) (98 - 121)  BP: 107/60 (27 Oct 2019 07:59) (100/61 - 127/82)  BP(mean): --  RR: 18 (27 Oct 2019 07:50) (16 - 18)  SpO2: 98% (27 Oct 2019 07:50) (96% - 99%)    CAPILLARY BLOOD GLUCOSE        I&O's Summary    26 Oct 2019 07:01  -  27 Oct 2019 07:00  --------------------------------------------------------  IN: 1250 mL / OUT: 0 mL / NET: 1250 mL        PHYSICAL EXAM  GENERAL: cachectic, elderly man coughing extensively, difficulty with speaking due to throat pain  HEAD:  Atraumatic  EYES: EOMI, PERRLA, conjunctiva and sclera clear  NECK: Supple, No JVD  CHEST/LUNG: Clear to auscultation bilaterally; No wheeze  HEART: Regular rate and rhythm; No murmurs, rubs, or gallops  ABDOMEN: Soft, Nontender, Nondistended; Bowel sounds present, PEG in place with dressing clean, dry intact, no surrounding erythema  EXTREMITIES:  2+ Peripheral Pulses, No clubbing, cyanosis, or edema  NEURO/PSYCH: AAOx3, nonfocal  SKIN: No rashes or lesions      LABS:                        10.7   9.89  )-----------( 205      ( 27 Oct 2019 07:22 )             33.5     Hemoglobin: 10.7 g/dL (10-27 @ 07:22)  Hemoglobin: 11.4 g/dL (10-26 @ 06:47)  Hemoglobin: 11.7 g/dL (10-25 @ 15:44)  Hemoglobin: 11.2 g/dL (10-25 @ 11:28)    CBC Full  -  ( 27 Oct 2019 07:22 )  WBC Count : 9.89 K/uL  RBC Count : 3.77 M/uL  Hemoglobin : 10.7 g/dL  Hematocrit : 33.5 %  Platelet Count - Automated : 205 K/uL  Mean Cell Volume : 88.9 fL  Mean Cell Hemoglobin : 28.4 pg  Mean Cell Hemoglobin Concentration : 31.9 %  Auto Neutrophil # : x  Auto Lymphocyte # : x  Auto Monocyte # : x  Auto Eosinophil # : x  Auto Basophil # : x  Auto Neutrophil % : x  Auto Lymphocyte % : x  Auto Monocyte % : x  Auto Eosinophil % : x  Auto Basophil % : x    10    127<L>  |  90<L>  |  15  ----------------------------<  132<H>  4.1   |  28  |  0.54    Ca    9.4      27 Oct 2019 07:22  Phos  3.8     10-27  Mg     1.9     10-27    TPro  7.3  /  Alb  3.7  /  TBili  0.4  /  DBili  x   /  AST  13  /  ALT  12  /  AlkPhos  73  10-25    Creatinine Trend: 0.54<--, 0.56<--, 0.56<--, 0.60<--  LIVER FUNCTIONS - ( 25 Oct 2019 15:44 )  Alb: 3.7 g/dL / Pro: 7.3 g/dL / ALK PHOS: 73 u/L / ALT: 12 u/L / AST: 13 u/L / GGT: x               hs Troponin:            Urinalysis Basic - ( 25 Oct 2019 15:29 )    Color: LIGHT YELLOW / Appearance: Lt TURBID / S.009 / pH: 7.5  Gluc: NEGATIVE / Ketone: NEGATIVE  / Bili: NEGATIVE / Urobili: NORMAL   Blood: NEGATIVE / Protein: NEGATIVE / Nitrite: NEGATIVE   Leuk Esterase: NEGATIVE / RBC: 0-2 / WBC 0-2   Sq Epi: OCC / Non Sq Epi: x / Bacteria: NEGATIVE

## 2019-10-27 NOTE — PROGRESS NOTE ADULT - PROBLEM SELECTOR PLAN 5
-Follows with Dr. Cai in Oncology and Dr. Hdz with ENT  -Pain control with fentanyl 25 mcg patch.  Will increase oxycodone prn to 10 mg and add dilaudid IV as back up for pain control.    -Outpatient IStop ([id number]) confirmed fentanyl 25 mcg patch and oxycodone 5 mg solution prescriptions.  Also with intermittent alprazolam prescriptions last 8/30  -Consider CT neck with IV contrast to reevaluate ulcer - will defer given received one IV contrast load today  - Will consult Oncology   -Consider continued GOC discussions and palliative care consult.

## 2019-10-27 NOTE — DIETITIAN INITIAL EVALUATION ADULT. - PROBLEM SELECTOR PLAN 3
Likely 2/2 new metastatic disease  CT raises question of enteritis but no WBC, diarrhea, fever, other infectious symptoms  Monitor off antibiotics  Pain control as above

## 2019-10-27 NOTE — PROGRESS NOTE ADULT - PROBLEM SELECTOR PLAN 1
Likely from not taking in tube feeds with resultant hypovolemic hyponatremia  -UrOsm 237, Mena 45, SOsm 270  -Na improved with IVF to 129. Continuing NS infusion  -Restart tube feeds.  Nutrition consult for assistance.  Continue to monitor with tube feeds resumption

## 2019-10-27 NOTE — CHART NOTE - NSCHARTNOTEFT_GEN_A_CORE
Notified for fever up to 101.2. Pt seen at bedside. Pt actively coughing red, gray sputum, room malodorous. Pt reports coughing is not new but getting worse. Pt reports some on and off abdominal pain. Otherwise, pt denies headaches, chills, chest p/p, sob, n/v/d, new skin changes, dysuria. Rest of pt vitals are , /67, RR 16, O2 sat 95 on RA. Pt reported to have elevated temperature of 100.1 earlier this morning. Physical exam significant for decreased breath sounds bilaterally associated with wheezing bilaterally. Wound on L neck malodorous, blood leaking but no purulent material draining. Otherwise, rest of physical exam wnl.     Will obtain:    [] Bcx x2, Ucx, UA, CXR, urine legionella, RVP, Sputum culture  [] Start vanc, zosyn azithro  [] vitals q4 Notified for fever up to 101.2. Pt seen at bedside. Pt actively coughing red, gray sputum, room malodorous. Pt reports coughing is not new but getting worse. Pt reports some on and off abdominal pain. Otherwise, pt denies headaches, chills, chest p/p, sob, n/v/d, new skin changes, dysuria. Rest of pt vitals are , /67, RR 16, O2 sat 95 on RA. Pt reported to have elevated temperature of 100.1 earlier this morning. Physical exam significant for decreased breath sounds bilaterally associated with wheezing bilaterally. Wound on L neck malodorous, blood leaking but no purulent material draining. Otherwise, rest of physical exam wnl.     Will obtain:  [] Bcx x2, Ucx, UA, CXR, urine legionella, RVP, Sputum cx, L neck wound cx  [] Start vanc, zosyn azithro  [] vitals q4

## 2019-10-27 NOTE — DIETITIAN INITIAL EVALUATION ADULT. - PROBLEM SELECTOR PLAN 5
-Follows with Dr. Cai in Oncology and Dr. Hdz with ENT  -Pain control with fentanyl 25 mcg patch.  Will increase oxycodone prn to 10 mg and add dilaudid IV as back up for pain control.    -Outpatient IStop ([id number]) confirmed fentanyl 25 mcg patch and oxycodone 5 mg solution prescriptions.  Also with intermettent alprazolam prescriptions last 8/30  -Consider CT neck with IV contrast to reevaluate ulcer - will defer given received one IV contrast load today  -Consider continued GOC discussions and palliative care consult.

## 2019-10-28 DIAGNOSIS — K59.03 DRUG INDUCED CONSTIPATION: ICD-10-CM

## 2019-10-28 DIAGNOSIS — R68.89 OTHER GENERAL SYMPTOMS AND SIGNS: ICD-10-CM

## 2019-10-28 DIAGNOSIS — G89.3 NEOPLASM RELATED PAIN (ACUTE) (CHRONIC): ICD-10-CM

## 2019-10-28 DIAGNOSIS — F41.9 ANXIETY DISORDER, UNSPECIFIED: ICD-10-CM

## 2019-10-28 DIAGNOSIS — Z51.5 ENCOUNTER FOR PALLIATIVE CARE: ICD-10-CM

## 2019-10-28 DIAGNOSIS — Z71.89 OTHER SPECIFIED COUNSELING: ICD-10-CM

## 2019-10-28 LAB
ALBUMIN SERPL ELPH-MCNC: 2.9 G/DL — LOW (ref 3.3–5)
ALP SERPL-CCNC: 59 U/L — SIGNIFICANT CHANGE UP (ref 40–120)
ALT FLD-CCNC: 13 U/L — SIGNIFICANT CHANGE UP (ref 4–41)
ANION GAP SERPL CALC-SCNC: 9 MMO/L — SIGNIFICANT CHANGE UP (ref 7–14)
ANISOCYTOSIS BLD QL: SLIGHT — SIGNIFICANT CHANGE UP
AST SERPL-CCNC: 14 U/L — SIGNIFICANT CHANGE UP (ref 4–40)
B PERT DNA SPEC QL NAA+PROBE: NOT DETECTED — SIGNIFICANT CHANGE UP
BASOPHILS # BLD AUTO: 0.04 K/UL — SIGNIFICANT CHANGE UP (ref 0–0.2)
BASOPHILS NFR BLD AUTO: 0.4 % — SIGNIFICANT CHANGE UP (ref 0–2)
BASOPHILS NFR SPEC: 0 % — SIGNIFICANT CHANGE UP (ref 0–2)
BILIRUB SERPL-MCNC: 0.5 MG/DL — SIGNIFICANT CHANGE UP (ref 0.2–1.2)
BLASTS # FLD: 0 % — SIGNIFICANT CHANGE UP (ref 0–0)
BUN SERPL-MCNC: 20 MG/DL — SIGNIFICANT CHANGE UP (ref 7–23)
C PNEUM DNA SPEC QL NAA+PROBE: NOT DETECTED — SIGNIFICANT CHANGE UP
CALCIUM SERPL-MCNC: 9.2 MG/DL — SIGNIFICANT CHANGE UP (ref 8.4–10.5)
CHLORIDE SERPL-SCNC: 93 MMOL/L — LOW (ref 98–107)
CO2 SERPL-SCNC: 29 MMOL/L — SIGNIFICANT CHANGE UP (ref 22–31)
CORTIS SERPL-MCNC: 26 UG/DL — HIGH (ref 2.7–18.4)
CREAT SERPL-MCNC: 0.66 MG/DL — SIGNIFICANT CHANGE UP (ref 0.5–1.3)
EOSINOPHIL # BLD AUTO: 0.04 K/UL — SIGNIFICANT CHANGE UP (ref 0–0.5)
EOSINOPHIL NFR BLD AUTO: 0.4 % — SIGNIFICANT CHANGE UP (ref 0–6)
EOSINOPHIL NFR FLD: 0 % — SIGNIFICANT CHANGE UP (ref 0–6)
FLUAV H1 2009 PAND RNA SPEC QL NAA+PROBE: NOT DETECTED — SIGNIFICANT CHANGE UP
FLUAV H1 RNA SPEC QL NAA+PROBE: NOT DETECTED — SIGNIFICANT CHANGE UP
FLUAV H3 RNA SPEC QL NAA+PROBE: NOT DETECTED — SIGNIFICANT CHANGE UP
FLUAV SUBTYP SPEC NAA+PROBE: NOT DETECTED — SIGNIFICANT CHANGE UP
FLUBV RNA SPEC QL NAA+PROBE: NOT DETECTED — SIGNIFICANT CHANGE UP
GLUCOSE SERPL-MCNC: 116 MG/DL — HIGH (ref 70–99)
GRAM STN WND: SIGNIFICANT CHANGE UP
HADV DNA SPEC QL NAA+PROBE: NOT DETECTED — SIGNIFICANT CHANGE UP
HCOV PNL SPEC NAA+PROBE: SIGNIFICANT CHANGE UP
HCT VFR BLD CALC: 33 % — LOW (ref 39–50)
HGB BLD-MCNC: 10.4 G/DL — LOW (ref 13–17)
HMPV RNA SPEC QL NAA+PROBE: NOT DETECTED — SIGNIFICANT CHANGE UP
HPIV1 RNA SPEC QL NAA+PROBE: NOT DETECTED — SIGNIFICANT CHANGE UP
HPIV2 RNA SPEC QL NAA+PROBE: NOT DETECTED — SIGNIFICANT CHANGE UP
HPIV3 RNA SPEC QL NAA+PROBE: NOT DETECTED — SIGNIFICANT CHANGE UP
HPIV4 RNA SPEC QL NAA+PROBE: NOT DETECTED — SIGNIFICANT CHANGE UP
HYPOCHROMIA BLD QL: SLIGHT — SIGNIFICANT CHANGE UP
IMM GRANULOCYTES NFR BLD AUTO: 0.7 % — SIGNIFICANT CHANGE UP (ref 0–1.5)
LYMPHOCYTES # BLD AUTO: 0.49 K/UL — LOW (ref 1–3.3)
LYMPHOCYTES # BLD AUTO: 4.7 % — LOW (ref 13–44)
LYMPHOCYTES NFR SPEC AUTO: 3.5 % — LOW (ref 13–44)
MACROCYTES BLD QL: SLIGHT — SIGNIFICANT CHANGE UP
MAGNESIUM SERPL-MCNC: 1.9 MG/DL — SIGNIFICANT CHANGE UP (ref 1.6–2.6)
MCHC RBC-ENTMCNC: 28.4 PG — SIGNIFICANT CHANGE UP (ref 27–34)
MCHC RBC-ENTMCNC: 31.5 % — LOW (ref 32–36)
MCV RBC AUTO: 90.2 FL — SIGNIFICANT CHANGE UP (ref 80–100)
METAMYELOCYTES # FLD: 0 % — SIGNIFICANT CHANGE UP (ref 0–1)
MONOCYTES # BLD AUTO: 1.54 K/UL — HIGH (ref 0–0.9)
MONOCYTES NFR BLD AUTO: 14.7 % — HIGH (ref 2–14)
MONOCYTES NFR BLD: 11.5 % — HIGH (ref 2–9)
MYELOCYTES NFR BLD: 0 % — SIGNIFICANT CHANGE UP (ref 0–0)
NEUTROPHIL AB SER-ACNC: 78.8 % — HIGH (ref 43–77)
NEUTROPHILS # BLD AUTO: 8.27 K/UL — HIGH (ref 1.8–7.4)
NEUTROPHILS NFR BLD AUTO: 79.1 % — HIGH (ref 43–77)
NEUTS BAND # BLD: 3.5 % — SIGNIFICANT CHANGE UP (ref 0–6)
NRBC # FLD: 0.02 K/UL — SIGNIFICANT CHANGE UP (ref 0–0)
OTHER - HEMATOLOGY %: 0 — SIGNIFICANT CHANGE UP
OVALOCYTES BLD QL SMEAR: SLIGHT — SIGNIFICANT CHANGE UP
PHOSPHATE SERPL-MCNC: 3.9 MG/DL — SIGNIFICANT CHANGE UP (ref 2.5–4.5)
PLATELET # BLD AUTO: 182 K/UL — SIGNIFICANT CHANGE UP (ref 150–400)
PLATELET COUNT - ESTIMATE: NORMAL — SIGNIFICANT CHANGE UP
PMV BLD: 9.1 FL — SIGNIFICANT CHANGE UP (ref 7–13)
POIKILOCYTOSIS BLD QL AUTO: SLIGHT — SIGNIFICANT CHANGE UP
POTASSIUM SERPL-MCNC: 4.5 MMOL/L — SIGNIFICANT CHANGE UP (ref 3.5–5.3)
POTASSIUM SERPL-SCNC: 4.5 MMOL/L — SIGNIFICANT CHANGE UP (ref 3.5–5.3)
PROMYELOCYTES # FLD: 0 % — SIGNIFICANT CHANGE UP (ref 0–0)
PROT SERPL-MCNC: 6.3 G/DL — SIGNIFICANT CHANGE UP (ref 6–8.3)
RBC # BLD: 3.66 M/UL — LOW (ref 4.2–5.8)
RBC # FLD: 14.6 % — HIGH (ref 10.3–14.5)
RSV RNA SPEC QL NAA+PROBE: NOT DETECTED — SIGNIFICANT CHANGE UP
RV+EV RNA SPEC QL NAA+PROBE: NOT DETECTED — SIGNIFICANT CHANGE UP
SODIUM SERPL-SCNC: 131 MMOL/L — LOW (ref 135–145)
SPECIMEN SOURCE: SIGNIFICANT CHANGE UP
SPECIMEN SOURCE: SIGNIFICANT CHANGE UP
URATE UR-MCNC: 87.4 MG/DL — SIGNIFICANT CHANGE UP
VARIANT LYMPHS # BLD: 2.7 % — SIGNIFICANT CHANGE UP
WBC # BLD: 10.45 K/UL — SIGNIFICANT CHANGE UP (ref 3.8–10.5)
WBC # FLD AUTO: 10.45 K/UL — SIGNIFICANT CHANGE UP (ref 3.8–10.5)

## 2019-10-28 PROCEDURE — 99232 SBSQ HOSP IP/OBS MODERATE 35: CPT

## 2019-10-28 PROCEDURE — 99233 SBSQ HOSP IP/OBS HIGH 50: CPT | Mod: GC

## 2019-10-28 PROCEDURE — 99223 1ST HOSP IP/OBS HIGH 75: CPT

## 2019-10-28 PROCEDURE — 93010 ELECTROCARDIOGRAM REPORT: CPT

## 2019-10-28 RX ORDER — DULOXETINE HYDROCHLORIDE 30 MG/1
30 CAPSULE, DELAYED RELEASE ORAL DAILY
Refills: 0 | Status: DISCONTINUED | OUTPATIENT
Start: 2019-10-28 | End: 2019-10-28

## 2019-10-28 RX ORDER — SODIUM CHLORIDE 9 MG/ML
1000 INJECTION, SOLUTION INTRAVENOUS ONCE
Refills: 0 | Status: COMPLETED | OUTPATIENT
Start: 2019-10-28 | End: 2019-10-28

## 2019-10-28 RX ORDER — PIPERACILLIN AND TAZOBACTAM 4; .5 G/20ML; G/20ML
3.38 INJECTION, POWDER, LYOPHILIZED, FOR SOLUTION INTRAVENOUS EVERY 8 HOURS
Refills: 0 | Status: DISCONTINUED | OUTPATIENT
Start: 2019-10-28 | End: 2019-10-31

## 2019-10-28 RX ORDER — SENNA PLUS 8.6 MG/1
2 TABLET ORAL AT BEDTIME
Refills: 0 | Status: DISCONTINUED | OUTPATIENT
Start: 2019-10-28 | End: 2019-10-28

## 2019-10-28 RX ORDER — MIDODRINE HYDROCHLORIDE 2.5 MG/1
20 TABLET ORAL ONCE
Refills: 0 | Status: COMPLETED | OUTPATIENT
Start: 2019-10-28 | End: 2019-10-28

## 2019-10-28 RX ADMIN — FENTANYL CITRATE 1 PATCH: 50 INJECTION INTRAVENOUS at 18:28

## 2019-10-28 RX ADMIN — HYDROMORPHONE HYDROCHLORIDE 1.5 MILLIGRAM(S): 2 INJECTION INTRAMUSCULAR; INTRAVENOUS; SUBCUTANEOUS at 19:18

## 2019-10-28 RX ADMIN — PANTOPRAZOLE SODIUM 40 MILLIGRAM(S): 20 TABLET, DELAYED RELEASE ORAL at 05:07

## 2019-10-28 RX ADMIN — ONDANSETRON 4 MILLIGRAM(S): 8 TABLET, FILM COATED ORAL at 05:08

## 2019-10-28 RX ADMIN — PIPERACILLIN AND TAZOBACTAM 25 GRAM(S): 4; .5 INJECTION, POWDER, LYOPHILIZED, FOR SOLUTION INTRAVENOUS at 21:43

## 2019-10-28 RX ADMIN — Medication 250 MILLIGRAM(S): at 11:23

## 2019-10-28 RX ADMIN — HYDROMORPHONE HYDROCHLORIDE 1.5 MILLIGRAM(S): 2 INJECTION INTRAMUSCULAR; INTRAVENOUS; SUBCUTANEOUS at 11:24

## 2019-10-28 RX ADMIN — Medication 1000 MILLIGRAM(S): at 00:50

## 2019-10-28 RX ADMIN — HYDROMORPHONE HYDROCHLORIDE 1.5 MILLIGRAM(S): 2 INJECTION INTRAMUSCULAR; INTRAVENOUS; SUBCUTANEOUS at 15:44

## 2019-10-28 RX ADMIN — Medication 250 MILLIGRAM(S): at 00:41

## 2019-10-28 RX ADMIN — ONDANSETRON 4 MILLIGRAM(S): 8 TABLET, FILM COATED ORAL at 13:23

## 2019-10-28 RX ADMIN — PIPERACILLIN AND TAZOBACTAM 25 GRAM(S): 4; .5 INJECTION, POWDER, LYOPHILIZED, FOR SOLUTION INTRAVENOUS at 13:23

## 2019-10-28 RX ADMIN — PIPERACILLIN AND TAZOBACTAM 25 GRAM(S): 4; .5 INJECTION, POWDER, LYOPHILIZED, FOR SOLUTION INTRAVENOUS at 07:54

## 2019-10-28 RX ADMIN — Medication 1 MILLIGRAM(S): at 01:06

## 2019-10-28 RX ADMIN — HEPARIN SODIUM 5000 UNIT(S): 5000 INJECTION INTRAVENOUS; SUBCUTANEOUS at 21:43

## 2019-10-28 RX ADMIN — HYDROMORPHONE HYDROCHLORIDE 1.5 MILLIGRAM(S): 2 INJECTION INTRAMUSCULAR; INTRAVENOUS; SUBCUTANEOUS at 11:50

## 2019-10-28 RX ADMIN — HYDROMORPHONE HYDROCHLORIDE 1.5 MILLIGRAM(S): 2 INJECTION INTRAMUSCULAR; INTRAVENOUS; SUBCUTANEOUS at 01:36

## 2019-10-28 RX ADMIN — Medication 1000 MILLIGRAM(S): at 23:04

## 2019-10-28 RX ADMIN — HYDROMORPHONE HYDROCHLORIDE 1.5 MILLIGRAM(S): 2 INJECTION INTRAMUSCULAR; INTRAVENOUS; SUBCUTANEOUS at 23:36

## 2019-10-28 RX ADMIN — HYDROMORPHONE HYDROCHLORIDE 1.5 MILLIGRAM(S): 2 INJECTION INTRAMUSCULAR; INTRAVENOUS; SUBCUTANEOUS at 16:04

## 2019-10-28 RX ADMIN — PANTOPRAZOLE SODIUM 40 MILLIGRAM(S): 20 TABLET, DELAYED RELEASE ORAL at 17:47

## 2019-10-28 RX ADMIN — ONDANSETRON 4 MILLIGRAM(S): 8 TABLET, FILM COATED ORAL at 21:44

## 2019-10-28 RX ADMIN — HYDROMORPHONE HYDROCHLORIDE 1.5 MILLIGRAM(S): 2 INJECTION INTRAMUSCULAR; INTRAVENOUS; SUBCUTANEOUS at 01:06

## 2019-10-28 RX ADMIN — AZITHROMYCIN 255 MILLIGRAM(S): 500 TABLET, FILM COATED ORAL at 00:40

## 2019-10-28 RX ADMIN — SENNA PLUS 10 MILLILITER(S): 8.6 TABLET ORAL at 21:42

## 2019-10-28 RX ADMIN — MIDODRINE HYDROCHLORIDE 20 MILLIGRAM(S): 2.5 TABLET ORAL at 05:08

## 2019-10-28 RX ADMIN — FENTANYL CITRATE 1 PATCH: 50 INJECTION INTRAVENOUS at 08:29

## 2019-10-28 RX ADMIN — SODIUM CHLORIDE 1000 MILLILITER(S): 9 INJECTION, SOLUTION INTRAVENOUS at 02:29

## 2019-10-28 RX ADMIN — HEPARIN SODIUM 5000 UNIT(S): 5000 INJECTION INTRAVENOUS; SUBCUTANEOUS at 05:10

## 2019-10-28 RX ADMIN — HYDROMORPHONE HYDROCHLORIDE 1.5 MILLIGRAM(S): 2 INJECTION INTRAMUSCULAR; INTRAVENOUS; SUBCUTANEOUS at 19:53

## 2019-10-28 RX ADMIN — HYDROMORPHONE HYDROCHLORIDE 1.5 MILLIGRAM(S): 2 INJECTION INTRAMUSCULAR; INTRAVENOUS; SUBCUTANEOUS at 23:06

## 2019-10-28 RX ADMIN — HEPARIN SODIUM 5000 UNIT(S): 5000 INJECTION INTRAVENOUS; SUBCUTANEOUS at 13:23

## 2019-10-28 RX ADMIN — Medication 1000 MILLIGRAM(S): at 21:43

## 2019-10-28 NOTE — PROGRESS NOTE ADULT - PROBLEM SELECTOR PLAN 6
Improve score 3 for age and cancer.  At increased risk for VTE.  SQH for prophylaxis    Patient assigned to my care for initial evaluation and management Improve score 3 for age and cancer.  At increased risk for VTE.  SQH for prophylaxis    Patient assigned to my care for initial evaluation and management.

## 2019-10-28 NOTE — ADVANCED PRACTICE NURSE CONSULT - RECOMMEDATIONS
Upon discharge Recommend follow up care at [facility]: [phone]. Address: [address] [place] [address]. Topical Recommendations  Continue to follow up with Heme/Onc for long term goals  Palliative team should continue to follow up with patient     Topical Recommendations    Peg tube care: Cleanse q shift with NS, apply liquid barrier film beneath ge disc.  If redness noted under ge disc bumper apply thin foam  dressing without border (Mepilex Lite)- cut to mid dressing with a 'Y' shape. Secondary securement to abdomen taping in 'H' fashion with Steri-strips.      Left side of the neck- Clean with NS. Pat dry. Apply Liquid barrier film to periwound skin. Place Pouch kin pouch (item #4406), change every other day or PRN. Alternatively after cleaning wound lightly pack with Aquacel AG, leave 2 inches out to wick, cover with Silicone foam with borders. Change daily.      Please contact Wound Care Service Line if we can be of further assistance (ext [phone]).

## 2019-10-28 NOTE — CONSULT NOTE ADULT - ASSESSMENT
63yo M with SCC of the throat, newly diagnosed diffuse intra-abdominal metastases. Palliative consulted for symptom management.

## 2019-10-28 NOTE — PROGRESS NOTE ADULT - SUBJECTIVE AND OBJECTIVE BOX
Patient is a 64y old  Male who presents with a chief complaint of CC: Abdominal pain and lethargy (28 Oct 2019 11:40)      SUBJECTIVE / OVERNIGHT EVENTS:    MEDICATIONS  (STANDING):  azithromycin  IVPB 500 milliGRAM(s) IV Intermittent every 24 hours  azithromycin  IVPB      fentaNYL   Patch  50 MICROgram(s)/Hr 1 Patch Transdermal every 72 hours  heparin  Injectable 5000 Unit(s) SubCutaneous every 8 hours  influenza   Vaccine 0.5 milliLiter(s) IntraMuscular once  ondansetron Injectable 4 milliGRAM(s) IV Push every 8 hours  pantoprazole  Injectable 40 milliGRAM(s) IV Push two times a day  piperacillin/tazobactam IVPB.. 3.375 Gram(s) IV Intermittent every 8 hours  senna Syrup 10 milliLiter(s) Oral at bedtime  vancomycin  IVPB 1000 milliGRAM(s) IV Intermittent every 12 hours  vancomycin  IVPB        MEDICATIONS  (PRN):  acetaminophen   Tablet .. 650 milliGRAM(s) Oral every 4 hours PRN Mild Pain (1 - 3)  acetaminophen   Tablet .. 1000 milliGRAM(s) Oral every 6 hours PRN Temp greater or equal to 38C (100.4F) and mild pain  HYDROmorphone  Injectable 1.5 milliGRAM(s) IV Push every 3 hours PRN Severe Pain (7 - 10)  LORazepam   Injectable 1 milliGRAM(s) IV Push every 8 hours PRN Anxiety        CAPILLARY BLOOD GLUCOSE        I&O's Summary    27 Oct 2019 07:01  -  28 Oct 2019 07:00  --------------------------------------------------------  IN: 2250 mL / OUT: 0 mL / NET: 2250 mL    Vital Signs Last 24 Hrs  T(C): 37.1 (28 Oct 2019 11:15), Max: 38.4 (27 Oct 2019 22:07)  T(F): 98.8 (28 Oct 2019 11:15), Max: 101.2 (27 Oct 2019 22:07)  HR: 94 (28 Oct 2019 11:15) (89 - 123)  BP: 109/70 (28 Oct 2019 11:15) (85/55 - 109/70)  BP(mean): --  RR: 18 (28 Oct 2019 11:15) (16 - 18)  SpO2: 96% (28 Oct 2019 11:15) (94% - 99%)    PHYSICAL EXAM:  GENERAL: NAD, well-developed  HEAD:  Atraumatic, Normocephalic  EYES: EOMI, PERRLA, conjunctiva and sclera clear  NECK: Supple, No JVD  CHEST/LUNG: Clear to auscultation bilaterally; No wheeze  HEART: Regular rate and rhythm; No murmurs, rubs, or gallops  ABDOMEN: Soft, Nontender, Nondistended; Bowel sounds present  EXTREMITIES:  2+ Peripheral Pulses, No clubbing, cyanosis, or edema  PSYCH: AAOx3  NEUROLOGY: non-focal  SKIN: No rashes or lesions    LABS:                        10.4   10.45 )-----------( 182      ( 28 Oct 2019 05:05 )             33.0     10-28    131<L>  |  93<L>  |  20  ----------------------------<  116<H>  4.5   |  29  |  0.66    Ca    9.2      28 Oct 2019 05:05  Phos  3.9     10-28  Mg     1.9     10-28    TPro  6.3  /  Alb  2.9<L>  /  TBili  0.5  /  DBili  x   /  AST  14  /  ALT  13  /  AlkPhos  59  10-28              RADIOLOGY & ADDITIONAL TESTS:  Studies reviewed. Patient is a 64y old  Male who presents with a chief complaint of CC: Abdominal pain and lethargy (28 Oct 2019 11:40)    SUBJECTIVE / OVERNIGHT EVENTS:  Patient noted to have an episode of hypotension overnight which responded to Midodrine x1. Patient was asymptomatic during the episode. Patient seen and examined. Patient's son was at bedside. Patient reports that his abdominal pain has been under better control since he presented to the hospital. He denies any complaints of chest pain or shortness of breath. Patient was afebrile overnight.    MEDICATIONS  (STANDING):  azithromycin  IVPB 500 milliGRAM(s) IV Intermittent every 24 hours  azithromycin  IVPB      fentaNYL   Patch  50 MICROgram(s)/Hr 1 Patch Transdermal every 72 hours  heparin  Injectable 5000 Unit(s) SubCutaneous every 8 hours  influenza   Vaccine 0.5 milliLiter(s) IntraMuscular once  ondansetron Injectable 4 milliGRAM(s) IV Push every 8 hours  pantoprazole  Injectable 40 milliGRAM(s) IV Push two times a day  piperacillin/tazobactam IVPB.. 3.375 Gram(s) IV Intermittent every 8 hours  senna Syrup 10 milliLiter(s) Oral at bedtime  vancomycin  IVPB 1000 milliGRAM(s) IV Intermittent every 12 hours  vancomycin  IVPB        MEDICATIONS  (PRN):  acetaminophen   Tablet .. 650 milliGRAM(s) Oral every 4 hours PRN Mild Pain (1 - 3)  acetaminophen   Tablet .. 1000 milliGRAM(s) Oral every 6 hours PRN Temp greater or equal to 38C (100.4F) and mild pain  HYDROmorphone  Injectable 1.5 milliGRAM(s) IV Push every 3 hours PRN Severe Pain (7 - 10)  LORazepam   Injectable 1 milliGRAM(s) IV Push every 8 hours PRN Anxiety        CAPILLARY BLOOD GLUCOSE        I&O's Summary    27 Oct 2019 07:01  -  28 Oct 2019 07:00  --------------------------------------------------------  IN: 2250 mL / OUT: 0 mL / NET: 2250 mL    Vital Signs Last 24 Hrs  T(C): 37.1 (28 Oct 2019 11:15), Max: 38.4 (27 Oct 2019 22:07)  T(F): 98.8 (28 Oct 2019 11:15), Max: 101.2 (27 Oct 2019 22:07)  HR: 94 (28 Oct 2019 11:15) (89 - 123)  BP: 109/70 (28 Oct 2019 11:15) (85/55 - 109/70)  BP(mean): --  RR: 18 (28 Oct 2019 11:15) (16 - 18)  SpO2: 96% (28 Oct 2019 11:15) (94% - 99%)    PHYSICAL EXAM:  GENERAL: NAD, Sleepy, bur arousable  HEENT: Atraumatic, Post-surgical changes at jaw, + L jaw wound w/ mild drainage seen (currently wrapped)  NECK: Supple  CHEST/LUNG: Grossly CTAB; No wheeze or rhonchi appreciated  HEART: RRR; +S1/S2  ABDOMEN: +BS, Soft, NT, + PEG  EXTREMITIES: No LE edema  NEUROLOGY: Awake and alert, Following commands  SKIN: + L jaw wound, Warm and dry  PSYCH: Calm    LABS:                        10.4   10.45 )-----------( 182      ( 28 Oct 2019 05:05 )             33.0     10-28    131<L>  |  93<L>  |  20  ----------------------------<  116<H>  4.5   |  29  |  0.66    Ca    9.2      28 Oct 2019 05:05  Phos  3.9     10-28  Mg     1.9     10-28    TPro  6.3  /  Alb  2.9<L>  /  TBili  0.5  /  DBili  x   /  AST  14  /  ALT  13  /  AlkPhos  59  10-28      RADIOLOGY & ADDITIONAL TESTS:  Studies reviewed.

## 2019-10-28 NOTE — CONSULT NOTE ADULT - PROBLEM SELECTOR RECOMMENDATION 3
.  Patient says he has tried various medications in the past but did not   react well with them    > Consider: placing a yankauer suction setup at bedside

## 2019-10-28 NOTE — PROGRESS NOTE ADULT - PROBLEM SELECTOR PLAN 2
-Likely from disease progression as evidenced by CT a/p findings consistent with new metastatic disease  -Attempt pain control.  Nutrition recs appreciated. -Likely from disease progression as evidenced by CT a/p findings consistent with new metastatic disease  -Pain control: Increase made yesterday to 50mcg fentanyl patch with 1.5mg dilaudid q3h. Will reassess in AM.    -Nutrition recs appreciated.

## 2019-10-28 NOTE — PROGRESS NOTE ADULT - ASSESSMENT
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 Oncology consulted for further evaluation.     *** NOTE INCOMPLETE *** Patient is a 63 y/o M w/ a PMHx of recurrent SCC of the oral cavity (Dx 2015, recurred in 2019) s/p RT and resection, most recently on Carbo/Taxol (last on 10/15) who initially p/w hyponatremia and abdominal pain, and was found to have new, extensive metastatic disease in the abdomen and pelvis. Oncology consulted for further evaluation.     # Abdominal pain - Improved  - Likely 2/2 new, extensive metastatic disease found on imaging  - Appreciate Palliative Care evaluation for assistance with pain management  - Appreciate care as per Medicine team     # Hyponatremia - Improved  - Possibly hypovolemic in nature in the setting of not taking tube feeds vs SIADH  - Appreciate Nutrition recs. C/w tube feeds and mIVF PRN  - Monitor BMP  - Further management per primary team    # Recurrent SCC of the oral cavity  - Initially Dx in 2015 w/ recurrence 2019. Patient s/p resection and RT in the past.  - Patient progressed after 5 cycles of Nivolumab (5/1-8/21). He was then switched to Carbo/Taxol/Cetuximab. Cetuximab was held after 1st cycle 2/2 transfusion reaction. He then completed 2 more cycles of Carbo/Taxol (last on 10/15).   - Patient has been evaluated and accepted for a clinical trial at [facility] ([facility]) for CAR T-cell therapy. He needs to be off treatment for 3 weeks prior to his scheduled visit. Patient is supposed to return to [facility] on 11/3.  - Hope is for medical management of hyponatremia and pain control to be completed in a timely manner so that patient can return for clinical trial  - Primary Oncologist: Dr. Yajaira Cai    Plan d/w patient and patient's son    Tanner Muse, PGY4  Hematology-Oncology Fellow  Pager: [phone] / [phone]

## 2019-10-28 NOTE — CONSULT NOTE ADULT - PROBLEM SELECTOR RECOMMENDATION 9
.  # Cancer-related pain  # Anticipatory pain    - NPO, even medications/ PEG in situ  - Meds he has tried before and developed a reaction to: Methadone (nausea), Morphine (no effect), Oxycodone (no effect)  - Current meds: Fentanyl patch 50mcg/hr (doubled from 25mcg/hr, started 10/2819, 10:30am), Dilaudid 1.5mg IV q3h PRN    OME  10/27: 250mg (Dilaudid 1.5 x 5, Fentanyl 50mcg)    > Concern for over-sedation as patient was falling asleep during our conversation, but was easily arousable. He said he didn't sleep well and was just tired. Fentanyl patch was doubled and he has been spiking a fever. Please watch out for over-sedation and respiratory depression  > Pain is still uncontrolled/ Patient is very anxious  > My plan is to watch patient closely. I will re-evaluate this afternoon to assess his pain control and wakefulness  > Consider: Dc'ing Fentanyl patch if patient continues to spike fever or is too somnolent  > Consider: Changing pain regimen to Dilaudid 0.4mg/hour IV continuous + 0.4mg PCA q15min lockout (total basal OME of 150mg)  > Consider: Starting Duloxetine 30mg via PEG daily

## 2019-10-28 NOTE — CHART NOTE - NSCHARTNOTEFT_GEN_A_CORE
Notified for low blood pressure. At 2am, pt found to be hypotensive to 85 systolic. S/p 1L LR. Pt seen at 5am, still found to be hypotensive 80/55 with manual BP check. Lung exam revealed crackles at the bases bilaterally. Pt still denying any acute symptoms, denying dizziness, headaches, chest p/p, sob, n.v.d, skin changes, dysuria.     Will add:   [] 20mg midodrine STAT 1x dose, recheck BP post midodrine  [] morning cortisol,   [] AM labs now  [] MICU if BP does not resolve Notified for low blood pressure. At 2am, pt found to be hypotensive to 85 systolic. S/p 1L LR. Pt seen at 5am, still found to be hypotensive 80/55 with manual BP check. Lung exam revealed crackles at the bases bilaterally. Pt still denying any acute symptoms, denying dizziness, headaches, chest p/p, sob, n.v.d, skin changes, dysuria.     Will add:   [] 20mg midodrine STAT 1x dose, recheck BP post midodrine  [] morning cortisol  [] AM labs now  [] Bedrest for now  [] MICU if BP does not resolve Notified for low blood pressure. At 2am, pt found to be hypotensive to 85 systolic. S/p 1L LR. Pt seen at 5am, still found to be hypotensive 80/55 with manual BP check. Rest of vitals wnl, HR 90s, RR 16 O2 sat >95 on RA, afebrile. Pt still denying any acute symptoms, denying dizziness, headaches, chest p/p, sob, n.v.d, skin changes, dysuria. Lung exam revealed crackles at the bases bilaterally.     Will add:   [] 20mg midodrine STAT 1x dose, recheck BP post midodrine  [] morning cortisol  [] AM labs now  [] Bedrest for now  [] MICU if BP does not resolve

## 2019-10-28 NOTE — CONSULT NOTE ADULT - SUBJECTIVE AND OBJECTIVE BOX
HPI:  Mr. Ventura is a 64 year old man with a history of:  # SCC of the throat  - Initially dx in 2015, with recurrence 2019  - Newly discovered diffuse intra-abdominal mets 10/2019  - Plan to travel to [place] this Sunday for Car-T    He was admitted 10/25 for abdominal pain and workup showed new findings of diffuse metastatic disease in the abdomen. Oncology has seen the patient and they have recommended CAR-T therapy which the patient is planning to travel to [place] for.    Palliative Medicine was called for symptom management, mainly:  1) Throat pain  2) Anxiety  3) Oral secretions  4) Constipation    For pain, the patient is c/o pain in his throat which is chronic. The pain is sharp and continuous, 10/10. No radiation. He has tried various medications, including methadone, morphine, oxycodone, fentanyl and dilaudid. He claims dilaudid is the only one that works for him, but only lasts a short time. There is a component of anxiety driving his pain (anticipatory pain). He has said his goal is to "sleep" which is worrisome as he may be using the medications as a crutch.      =================================================================================================  ----- PERTINENT PMH/ SXH/ FHX  Malignant neoplasm of mouth  Anxiety  Squamous cell carcinoma of oral cavity  Malignant neoplasm    History of mandibular surgery  History of oral cancer  Encounter for PEG (percutaneous endoscopic gastrostomy)  History of lip cancer  H/O shoulder surgery    FAMILY HISTORY:  Family history of lung cancer: in Father      ----- SOCIAL HISTORY:   Significant other/partner: Yes [ ]  No [ ] Children:  Yes [X ]  No [ ] Sabianism/Spirituality:  Substance hx: Yes[ ]  No [X ]   Tobacco hx:  Yes [ ] No [X ]   Alcohol hx: Yes [ ] No [X ]   Home Opioid hx:  [X ] I-Stop Reference No:  Living Situation: [ X]Home  [ ]Long term care  [ ]Rehab [ ]Other    ----- ADVANCE DIRECTIVES:    DNR  MOLST  [ ]  Living Will  [ ]   DECISION MAKER(s):  [ ] Health Care Proxy(s)  [ ] Surrogate(s)  [ ] Guardian           Name(s): Phone Number(s):    ----- BASELINE (I)ADL(s) (prior to admission):  [place]: [ ]Total  [ ] Moderate [ ]Dependent  Palliative Performance Status Version 2: 70-80%    =================================================================================================  -----MEDICATIONS AND ALLERGIES:  Allergies    Bactrim (Hives)  carboplatin (Pruritus)  cetuximab (Hives; Pruritus)    Intolerances    MEDICATIONS  (STANDING):  azithromycin  IVPB 500 milliGRAM(s) IV Intermittent every 24 hours  azithromycin  IVPB      fentaNYL   Patch  50 MICROgram(s)/Hr 1 Patch Transdermal every 72 hours  heparin  Injectable 5000 Unit(s) SubCutaneous every 8 hours  influenza   Vaccine 0.5 milliLiter(s) IntraMuscular once  ondansetron Injectable 4 milliGRAM(s) IV Push every 8 hours  pantoprazole  Injectable 40 milliGRAM(s) IV Push two times a day  piperacillin/tazobactam IVPB.. 3.375 Gram(s) IV Intermittent every 8 hours  senna Syrup 10 milliLiter(s) Oral at bedtime  vancomycin  IVPB 1000 milliGRAM(s) IV Intermittent every 12 hours  vancomycin  IVPB        MEDICATIONS  (PRN):  acetaminophen   Tablet .. 650 milliGRAM(s) Oral every 4 hours PRN Mild Pain (1 - 3)  acetaminophen   Tablet .. 1000 milliGRAM(s) Oral every 6 hours PRN Temp greater or equal to 38C (100.4F) and mild pain  HYDROmorphone  Injectable 1.5 milliGRAM(s) IV Push every 3 hours PRN Severe Pain (7 - 10)  LORazepam   Injectable 1 milliGRAM(s) IV Push every 8 hours PRN Anxiety        =================================================================================================  ----- SYMPTOM ASSESSMENT: [ ]Unable to obtain due to poor mentation   Source if other than patient:  [ ]Family   [ ]Team     Pain (Impact on QOL):  AS ABOVE  Location -   Severity -        Minimal acceptable level (0-10 scale):  Quality:   Onset:   Duration:                 Aggravating factors -  Relieving factors -  Radiation -    Dyspnea:  Yes [ ] No [X ] - [ ]Mild [ ]Moderate [ ]Severe  Anxiety:    Yes [X ] No [ ] - [ ]Mild [X ]Moderate [ ]Severe  Fatigue:    Yes [X ] No [ ] - [ ]Mild [ ]Moderate [ ]Severe  Nausea:    Yes [ ] No [X ] - [ ]Mild [ ]Moderate [ ]Severe                         Loss of appetite: Yes [ ] No [ ] - [ ]Mild [ ]Moderate [ ]Severe             Constipation:  Yes [X ] No [ ] - [ ]Mild [ ]Moderate [ ]Severe  Grief:  Yes [ ] No [ ]     Other Symptoms:  [ X]All other review of systems negative       =================================================================================================  ----- PHYSICAL EXAM:  Vital Signs Last 24 Hrs  T(C): 37.1 (28 Oct 2019 11:15), Max: 38.4 (27 Oct 2019 22:07)  T(F): 98.8 (28 Oct 2019 11:15), Max: 101.2 (27 Oct 2019 22:07)  HR: 94 (28 Oct 2019 11:15) (89 - 123)  BP: 109/70 (28 Oct 2019 11:15) (85/55 - 109/70)  BP(mean): --  RR: 18 (28 Oct 2019 11:15) (16 - 18)  SpO2: 96% (28 Oct 2019 11:15) (94% - 99%) I&O's Summary    27 Oct 2019 07:01  -  28 Oct 2019 07:00  --------------------------------------------------------  IN: 2250 mL / OUT: 0 mL / NET: 2250 mL      GEN: +Sleepy, arousable, NAD  HEENT: +Wound dressing Left neck, +Muffled voice, +Copious oral secretions  PULM: Comfortable work of breathing, clear BS  CV: RRR, normal S1 and S2, no murmurs, Regular pulse  ABD: Soft, non-tender, +BS, +PEG tube  EXT: No edema  PSYCH: +Depressed mood and affect  NEURO: No gross deficits, no tremors  SKIN: No rashes or lesions on exposed skin, No jaundice      =================================================================================================  ----- LABS:                        10.4   10.45 )-----------( 182      ( 28 Oct 2019 05:05 )             33.0   10-28    131<L>  |  93<L>  |  20  ----------------------------<  116<H>  4.5   |  29  |  0.66    Ca    9.2      28 Oct 2019 05:05  Phos  3.9     10-28  Mg     1.9     10-28    TPro  6.3  /  Alb  2.9<L>  /  TBili  0.5  /  DBili  x   /  AST  14  /  ALT  13  /  AlkPhos  59  10-28        -----RADIOLOGY & ADDITIONAL STUDIES:    PROTEIN CALORIE MALNUTRITION PRESENT: [ ] Yes [ ] No  [ ] PPSV2 < or = to 30% [ ] significant weight loss  [ ] poor nutritional intake [ ] catabolic state [ ] anasarca     Albumin, Serum: 2.9 g/dL (10-28-19 @ 05:05)      REFERRALS:   [ ]Chaplaincy  [ ] Hospice  [ ]Child Life  [ ]Social Work  [ ]Case management [ ]Holistic Therapy   Goals of Care Discussion Document:

## 2019-10-28 NOTE — PROGRESS NOTE ADULT - SUBJECTIVE AND OBJECTIVE BOX
Patient is a 64y old  Male who presents with a chief complaint of CC: Abdominal pain and lethargy (28 Oct 2019 08:38)      SUBJECTIVE / OVERNIGHT EVENTS: Overnight patient febrile to 101.2--cultures and studies sent, with vanc, azithro, and zosyn started. Later patient was hypotensive and received 1L bolus of LR. 2 hrs later patient still hypotensive with crackles on exam so 20mg midodrine given. Patient seen and examined at bedside. Feels well, denies dizziness, sob, cp. Denies fever or chills. Requested ativan and dilaudid ASAP.    MEDICATIONS  (STANDING):  azithromycin  IVPB 500 milliGRAM(s) IV Intermittent every 24 hours  azithromycin  IVPB      fentaNYL   Patch  50 MICROgram(s)/Hr 1 Patch Transdermal every 72 hours  heparin  Injectable 5000 Unit(s) SubCutaneous every 8 hours  influenza   Vaccine 0.5 milliLiter(s) IntraMuscular once  ondansetron Injectable 4 milliGRAM(s) IV Push every 8 hours  pantoprazole  Injectable 40 milliGRAM(s) IV Push two times a day  piperacillin/tazobactam IVPB.. 3.375 Gram(s) IV Intermittent every 8 hours  senna Syrup 10 milliLiter(s) Oral at bedtime  vancomycin  IVPB 1000 milliGRAM(s) IV Intermittent every 12 hours  vancomycin  IVPB        MEDICATIONS  (PRN):  acetaminophen   Tablet .. 650 milliGRAM(s) Oral every 4 hours PRN Mild Pain (1 - 3)  acetaminophen   Tablet .. 1000 milliGRAM(s) Oral every 6 hours PRN Temp greater or equal to 38C (100.4F) and mild pain  HYDROmorphone  Injectable 1.5 milliGRAM(s) IV Push every 3 hours PRN Severe Pain (7 - 10)  LORazepam   Injectable 1 milliGRAM(s) IV Push every 8 hours PRN Anxiety      Vital Signs Last 24 Hrs  T(C): 37.2 (28 Oct 2019 09:25), Max: 38.4 (27 Oct 2019 22:07)  T(F): 98.9 (28 Oct 2019 09:25), Max: 101.2 (27 Oct 2019 22:07)  HR: 92 (28 Oct 2019 09:25) (89 - 123)  BP: 108/72 (28 Oct 2019 09:25) (85/55 - 109/70)  BP(mean): --  RR: 18 (28 Oct 2019 09:25) (16 - 18)  SpO2: 96% (28 Oct 2019 09:25) (94% - 99%)  CAPILLARY BLOOD GLUCOSE        I&O's Summary    27 Oct 2019 07:01  -  28 Oct 2019 07:00  --------------------------------------------------------  IN: 2250 mL / OUT: 0 mL / NET: 2250 mL        PHYSICAL EXAM:  GENERAL: mild distress, cachectic. Dysarthritic speech.  HEAD:  Atraumatic, Normocephalic  EYES: EOMI, PERRLA, conjunctiva and sclera clear  NECK: L-sided mass with bandage limiting range of motion.  CHEST/LUNG: End-expiratory crackles bilaterally; No wheezes  HEART: Regular rate and rhythm; No murmurs, rubs, or gallops  ABDOMEN: Soft, moderately tender, Nondistended; Bowel sounds present. PEG tube placed, dry and intact. No surrounding erythema.  EXTREMITIES:  2+ Peripheral Pulses, No clubbing, cyanosis, or edema  NEUROLOGY: non-focal  SKIN: No rashes or lesions    LABS:                        10.4   10.45 )-----------( 182      ( 28 Oct 2019 05:05 )             33.0     10-28    131<L>  |  93<L>  |  20  ----------------------------<  116<H>  4.5   |  29  |  0.66    Ca    9.2      28 Oct 2019 05:05  Phos  3.9     10-28  Mg     1.9     10-28    TPro  6.3  /  Alb  2.9<L>  /  TBili  0.5  /  DBili  x   /  AST  14  /  ALT  13  /  AlkPhos  59  10-28              RADIOLOGY & ADDITIONAL TESTS:    Imaging Personally Reviewed:    Consultant(s) Notes Reviewed:      Care Discussed with Consultants/Other Providers:    Tej Mcgill, PGY-1; [facility] [place] Pager: [phone]; [place] Pager: [phone].

## 2019-10-28 NOTE — CONSULT NOTE ADULT - PROBLEM SELECTOR RECOMMENDATION 6
.  # Code status: Full    > I spoke to the patient, his son and his brother at bedside. They want to pursue all efforts to get cancer-directed therapy, including traveling to [place] this weekend for CAR-T therapy

## 2019-10-28 NOTE — PROGRESS NOTE ADULT - PROBLEM SELECTOR PLAN 1
Likely from not taking in tube feeds with resultant hypovolemic hyponatremia  -UrOsm 237, Mena 45, SOsm 270  -Na consistently around 130. Continuing NS infusion.  -c/w tube feeds.  Nutrition recs appreciated. Continue to monitor with tube feeds resumption. Likely from not taking in tube feeds with resultant hypovolemic hyponatremia  -Na consistently around 130. Continuing NS infusion.  -c/w tube feeds.  Nutrition recs appreciated. Continue to monitor with tube feeds resumption.  -UrOsm 237, Mena 45, SOsm 270--likely SIADH. Will add serum and urine uric acid.

## 2019-10-28 NOTE — ADVANCED PRACTICE NURSE CONSULT - REASON FOR CONSULT
Patient seen on skin care rounds after wound care referral received for assessment of skin impairment and recommendations of topical management. Chart reviewed: Michele 19, WBC 10.45, CT of the neck reports soft tissue of the air involving the left oral cavity floor of mouth and soft tissue, BMI 20.3kg/m2, patient interviewed, patient reports that he changes his dressing independently with Iodoform packing daily every 4 hours secondary to excessive drainage. Patient H/O of history of throat SCC (dx in 2015 and recurred 2019) who presents for abdominal pain, lethargy, dark stools and found to be hyponatremic. He was in his usual chronically ill state of health until 3 days ago when he became more lethargic and tired.  He endorsed some malaise and abdominal pain.  2 days ago, he noted a few dark stools.  Today, he was not taking his usual tube feeds due to abdominal discomfort.  He was seen by his oncologist today for this and had labs drawn showing hyponatremia and was directed to [facility] ED.  He did not have any chest pain, shortness of breath, vomiting or diarrhea, or fevers and chills. Patient Has being seeing by Palliative team for symptom management, and Heme/Onc for Head and neck SCC. Patient receiving IV antibiotics for aspiration pneumonia.

## 2019-10-28 NOTE — CONSULT NOTE ADULT - PROBLEM SELECTOR RECOMMENDATION 2
.  # Anticipatory pain  # Generalized anxiety    > Consider: Duloxetine 30mg via PEG daily  > Consider: Psychiatry consult .  # Anticipatory pain  # Generalized anxiety    > Consider: Duloxetine 30mg via PEG daily. This will target his centralized pain, mood, and some of his anxiety  > Consider: Psychiatry consult

## 2019-10-28 NOTE — PROGRESS NOTE ADULT - PROBLEM SELECTOR PLAN 5
-Follows with Dr. Cai in Oncology and Dr. Hdz with ENT  -Pain control with fentanyl 25 mcg patch.  Will increase oxycodone prn to 10 mg and add dilaudid IV as back up for pain control.    -Outpatient [facility]Stop ([id number]) confirmed fentanyl 25 mcg patch and oxycodone 5 mg solution prescriptions.  Also with intermittent alprazolam prescriptions last 8/30  -Consider CT neck with IV contrast to reevaluate ulcer - will defer given received one IV contrast load today  - Will consult Oncology   -Consider continued GOC discussions and palliative care consult. -Follows with Dr. Cai in Oncology and Dr. Hdz with ENT  -Patient is s/p many interventions and is currently in a trial for CAR-T therapy at [facility] in [place]. Will follow up inclusion criteria to make sure no intervention excludes him from the trial.  -Oncology following  -Continue GOC discussions

## 2019-10-28 NOTE — CONSULT NOTE ADULT - PROBLEM SELECTOR RECOMMENDATION 7
Thank you for allowing us to participate in your patient's care. We will continue to follow with you. Please call/ text: [phone] today for any q's or c's.     Mario Hill  Palliative Medicine

## 2019-10-28 NOTE — PROGRESS NOTE ADULT - PROBLEM SELECTOR PLAN 3
Likely 2/2 new metastatic disease  - CT raises question of enteritis but no WBC, diarrhea, fever, other infectious symptoms Monitor off antibiotics  - Pain control as above Likely 2/2 new metastatic disease  - CT raises question of enteritis, especially in light of new fevers overnight  - Pain control discussed w/ palliative care. Will speak with pharmacy about adding Cymbalta.   - Pain control as above

## 2019-10-28 NOTE — ADVANCED PRACTICE NURSE CONSULT - ASSESSMENT
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     Midline PEG tube-intact skin    Left side of the neck- malignant wound measuring 2cmx2.5cmx1.8cm. Undermining from 4-5 o'clock (unable to be measure secondary to sensitive structure, able to express seropurulent drainage from opening, + odor). Tissue type presenting as 100% pale pink tissue. Moderate to large seropurulent drainage. Periwound skin with blanching erythema from 5-6 o'clock that extends 5.5cm, At 4-6 o'clock there is an area of denuded epidermis exposing pale pink dermis measuring 2cmx1.5cmx0.2cm and a pinpoint opening nonmeasurable). No increased warmth, no induration. Goals of care: monitor for tissue type changes, manage drainage, symptom management wound is not expected to heal.    Discussed with patient and son that at this time goals of care include to manage drainage and reduce dressing changes. Discussed two possible dressing options to contain drainage. Patient is willing to try pouch system to contain large drainage and minimize odor.     Medical DrSofiya At the bedside during skin assessment

## 2019-10-29 ENCOUNTER — APPOINTMENT (OUTPATIENT)
Age: 64
End: 2019-10-29

## 2019-10-29 ENCOUNTER — APPOINTMENT (OUTPATIENT)
Dept: HEMATOLOGY ONCOLOGY | Facility: CLINIC | Age: 64
End: 2019-10-29

## 2019-10-29 DIAGNOSIS — R50.9 FEVER, UNSPECIFIED: ICD-10-CM

## 2019-10-29 LAB
ANION GAP SERPL CALC-SCNC: 10 MMO/L — SIGNIFICANT CHANGE UP (ref 7–14)
BUN SERPL-MCNC: 16 MG/DL — SIGNIFICANT CHANGE UP (ref 7–23)
CALCIUM SERPL-MCNC: 8.7 MG/DL — SIGNIFICANT CHANGE UP (ref 8.4–10.5)
CHLORIDE SERPL-SCNC: 94 MMOL/L — LOW (ref 98–107)
CO2 SERPL-SCNC: 31 MMOL/L — SIGNIFICANT CHANGE UP (ref 22–31)
CREAT SERPL-MCNC: 0.56 MG/DL — SIGNIFICANT CHANGE UP (ref 0.5–1.3)
GLUCOSE SERPL-MCNC: 101 MG/DL — HIGH (ref 70–99)
MAGNESIUM SERPL-MCNC: 1.8 MG/DL — SIGNIFICANT CHANGE UP (ref 1.6–2.6)
PHOSPHATE SERPL-MCNC: 3.1 MG/DL — SIGNIFICANT CHANGE UP (ref 2.5–4.5)
POTASSIUM SERPL-MCNC: 3.6 MMOL/L — SIGNIFICANT CHANGE UP (ref 3.5–5.3)
POTASSIUM SERPL-SCNC: 3.6 MMOL/L — SIGNIFICANT CHANGE UP (ref 3.5–5.3)
SODIUM SERPL-SCNC: 135 MMOL/L — SIGNIFICANT CHANGE UP (ref 135–145)
SPECIMEN SOURCE: SIGNIFICANT CHANGE UP
SPECIMEN SOURCE: SIGNIFICANT CHANGE UP
URATE SERPL-MCNC: 1.6 MG/DL — LOW (ref 3.4–8.8)

## 2019-10-29 PROCEDURE — 70491 CT SOFT TISSUE NECK W/DYE: CPT | Mod: 26

## 2019-10-29 PROCEDURE — 99233 SBSQ HOSP IP/OBS HIGH 50: CPT | Mod: GC

## 2019-10-29 PROCEDURE — 99223 1ST HOSP IP/OBS HIGH 75: CPT

## 2019-10-29 PROCEDURE — 99233 SBSQ HOSP IP/OBS HIGH 50: CPT

## 2019-10-29 RX ORDER — FENTANYL CITRATE 50 UG/ML
1 INJECTION INTRAVENOUS
Refills: 0 | Status: DISCONTINUED | OUTPATIENT
Start: 2019-10-29 | End: 2019-10-31

## 2019-10-29 RX ORDER — POLYETHYLENE GLYCOL 3350 17 G/17G
17 POWDER, FOR SOLUTION ORAL DAILY
Refills: 0 | Status: DISCONTINUED | OUTPATIENT
Start: 2019-10-29 | End: 2019-10-31

## 2019-10-29 RX ORDER — POLYETHYLENE GLYCOL 3350 17 G/17G
17 POWDER, FOR SOLUTION ORAL DAILY
Refills: 0 | Status: DISCONTINUED | OUTPATIENT
Start: 2019-10-29 | End: 2019-10-29

## 2019-10-29 RX ORDER — HYDROMORPHONE HYDROCHLORIDE 2 MG/ML
0.5 INJECTION INTRAMUSCULAR; INTRAVENOUS; SUBCUTANEOUS
Refills: 0 | Status: DISCONTINUED | OUTPATIENT
Start: 2019-10-29 | End: 2019-10-31

## 2019-10-29 RX ORDER — DULOXETINE HYDROCHLORIDE 30 MG/1
30 CAPSULE, DELAYED RELEASE ORAL DAILY
Refills: 0 | Status: DISCONTINUED | OUTPATIENT
Start: 2019-10-29 | End: 2019-10-31

## 2019-10-29 RX ORDER — HYDROMORPHONE HYDROCHLORIDE 2 MG/ML
0.5 INJECTION INTRAMUSCULAR; INTRAVENOUS; SUBCUTANEOUS ONCE
Refills: 0 | Status: DISCONTINUED | OUTPATIENT
Start: 2019-10-29 | End: 2019-10-29

## 2019-10-29 RX ADMIN — HEPARIN SODIUM 5000 UNIT(S): 5000 INJECTION INTRAVENOUS; SUBCUTANEOUS at 14:55

## 2019-10-29 RX ADMIN — Medication 650 MILLIGRAM(S): at 04:15

## 2019-10-29 RX ADMIN — Medication 250 MILLIGRAM(S): at 12:50

## 2019-10-29 RX ADMIN — FENTANYL CITRATE 1 PATCH: 50 INJECTION INTRAVENOUS at 07:44

## 2019-10-29 RX ADMIN — ONDANSETRON 4 MILLIGRAM(S): 8 TABLET, FILM COATED ORAL at 14:55

## 2019-10-29 RX ADMIN — HYDROMORPHONE HYDROCHLORIDE 1.5 MILLIGRAM(S): 2 INJECTION INTRAMUSCULAR; INTRAVENOUS; SUBCUTANEOUS at 02:43

## 2019-10-29 RX ADMIN — HYDROMORPHONE HYDROCHLORIDE 0.5 MILLIGRAM(S): 2 INJECTION INTRAMUSCULAR; INTRAVENOUS; SUBCUTANEOUS at 18:27

## 2019-10-29 RX ADMIN — HYDROMORPHONE HYDROCHLORIDE 0.5 MILLIGRAM(S): 2 INJECTION INTRAMUSCULAR; INTRAVENOUS; SUBCUTANEOUS at 15:06

## 2019-10-29 RX ADMIN — Medication 250 MILLIGRAM(S): at 00:49

## 2019-10-29 RX ADMIN — HYDROMORPHONE HYDROCHLORIDE 0.5 MILLIGRAM(S): 2 INJECTION INTRAMUSCULAR; INTRAVENOUS; SUBCUTANEOUS at 21:26

## 2019-10-29 RX ADMIN — HYDROMORPHONE HYDROCHLORIDE 1.5 MILLIGRAM(S): 2 INJECTION INTRAMUSCULAR; INTRAVENOUS; SUBCUTANEOUS at 09:23

## 2019-10-29 RX ADMIN — HEPARIN SODIUM 5000 UNIT(S): 5000 INJECTION INTRAVENOUS; SUBCUTANEOUS at 21:31

## 2019-10-29 RX ADMIN — ONDANSETRON 4 MILLIGRAM(S): 8 TABLET, FILM COATED ORAL at 21:30

## 2019-10-29 RX ADMIN — PANTOPRAZOLE SODIUM 40 MILLIGRAM(S): 20 TABLET, DELAYED RELEASE ORAL at 18:12

## 2019-10-29 RX ADMIN — HEPARIN SODIUM 5000 UNIT(S): 5000 INJECTION INTRAVENOUS; SUBCUTANEOUS at 05:45

## 2019-10-29 RX ADMIN — POLYETHYLENE GLYCOL 3350 17 GRAM(S): 17 POWDER, FOR SOLUTION ORAL at 18:12

## 2019-10-29 RX ADMIN — SENNA PLUS 10 MILLILITER(S): 8.6 TABLET ORAL at 21:30

## 2019-10-29 RX ADMIN — HYDROMORPHONE HYDROCHLORIDE 0.5 MILLIGRAM(S): 2 INJECTION INTRAMUSCULAR; INTRAVENOUS; SUBCUTANEOUS at 06:48

## 2019-10-29 RX ADMIN — DULOXETINE HYDROCHLORIDE 30 MILLIGRAM(S): 30 CAPSULE, DELAYED RELEASE ORAL at 18:12

## 2019-10-29 RX ADMIN — HYDROMORPHONE HYDROCHLORIDE 0.5 MILLIGRAM(S): 2 INJECTION INTRAMUSCULAR; INTRAVENOUS; SUBCUTANEOUS at 06:18

## 2019-10-29 RX ADMIN — FENTANYL CITRATE 1 PATCH: 50 INJECTION INTRAVENOUS at 20:36

## 2019-10-29 RX ADMIN — PIPERACILLIN AND TAZOBACTAM 25 GRAM(S): 4; .5 INJECTION, POWDER, LYOPHILIZED, FOR SOLUTION INTRAVENOUS at 14:55

## 2019-10-29 RX ADMIN — Medication 1 MILLIGRAM(S): at 10:54

## 2019-10-29 RX ADMIN — PIPERACILLIN AND TAZOBACTAM 25 GRAM(S): 4; .5 INJECTION, POWDER, LYOPHILIZED, FOR SOLUTION INTRAVENOUS at 05:45

## 2019-10-29 RX ADMIN — HYDROMORPHONE HYDROCHLORIDE 0.5 MILLIGRAM(S): 2 INJECTION INTRAMUSCULAR; INTRAVENOUS; SUBCUTANEOUS at 21:41

## 2019-10-29 RX ADMIN — HYDROMORPHONE HYDROCHLORIDE 1.5 MILLIGRAM(S): 2 INJECTION INTRAMUSCULAR; INTRAVENOUS; SUBCUTANEOUS at 09:08

## 2019-10-29 RX ADMIN — HYDROMORPHONE HYDROCHLORIDE 0.5 MILLIGRAM(S): 2 INJECTION INTRAMUSCULAR; INTRAVENOUS; SUBCUTANEOUS at 18:12

## 2019-10-29 RX ADMIN — FENTANYL CITRATE 1 PATCH: 50 INJECTION INTRAVENOUS at 20:37

## 2019-10-29 RX ADMIN — ONDANSETRON 4 MILLIGRAM(S): 8 TABLET, FILM COATED ORAL at 05:45

## 2019-10-29 RX ADMIN — Medication 650 MILLIGRAM(S): at 04:55

## 2019-10-29 RX ADMIN — HYDROMORPHONE HYDROCHLORIDE 0.5 MILLIGRAM(S): 2 INJECTION INTRAMUSCULAR; INTRAVENOUS; SUBCUTANEOUS at 14:51

## 2019-10-29 RX ADMIN — AZITHROMYCIN 255 MILLIGRAM(S): 500 TABLET, FILM COATED ORAL at 00:49

## 2019-10-29 RX ADMIN — HYDROMORPHONE HYDROCHLORIDE 1.5 MILLIGRAM(S): 2 INJECTION INTRAMUSCULAR; INTRAVENOUS; SUBCUTANEOUS at 02:13

## 2019-10-29 RX ADMIN — PIPERACILLIN AND TAZOBACTAM 25 GRAM(S): 4; .5 INJECTION, POWDER, LYOPHILIZED, FOR SOLUTION INTRAVENOUS at 21:30

## 2019-10-29 RX ADMIN — PANTOPRAZOLE SODIUM 40 MILLIGRAM(S): 20 TABLET, DELAYED RELEASE ORAL at 05:45

## 2019-10-29 NOTE — PROGRESS NOTE ADULT - PROBLEM SELECTOR PLAN 1
Patient febrile overnight with tachycardia to 145. Possible source enteritis seen on CT on admission.  - c/w vanc, azithro, zosyn  - f/u BCx, wound cx/  - ctm WBC, temps Patient febrile overnight with tachycardia to 145. Possible source enteritis seen on CT on admission.  - c/w vanc, zosyn  - f/u BCx  - f/u speciation wound cx (gram negative rods)  - ctm WBC, temps  - ID regarding management

## 2019-10-29 NOTE — PROGRESS NOTE ADULT - PROBLEM SELECTOR PLAN 6
-Follows with Dr. Cai in Oncology and Dr. Hdz with ENT  -Patient is s/p many interventions and is currently in a trial for CAR-T therapy at [facility] in [place]. Was advised not to use steroids.  -Oncology following  -Continue GOC discussions

## 2019-10-29 NOTE — PROGRESS NOTE ADULT - PROBLEM SELECTOR PLAN 1
,  # Cancer-related pain  # Anticipatory pain    - NPO, even medications/ PEG in situ  - Meds he has tried before and developed a reaction to: Methadone (nausea), Morphine (no effect), Oxycodone (no effect)  - Current meds: Fentanyl patch 50mcg/hr (doubled from 25mcg/hr, started 10/2819, 10:30am), Dilaudid 1.5mg IV q3h PRN    OME  10/28: 290mg (Fentanyl patch peaking)   10/27: 250mg    > Along with Ativan which was just given, the patient is high risk for opioid adverse effects, including: hemodynamic instability, somnolence, and constipation  > Consider: Decreasing Fentanyl patch to 25mcg/hour  > Consider: Changing Dilaudid PRN to 0.5 to 1mg IV q4h PRN  > Total available daily OME= 170 (from 250) which is around a 30% decrease. This is a safe reduction which minimizes risk for rebound pain crisis.  > Fentanyl is very lipophilic, so even if the patch is changed to 25mcg/hour, the stored drug from the 50mcg patch is STILL ACTIVE, so the patient should NOT be c/o sudden pain. Again, I suspect that although he is opioid-responsive cancer pain, a lot of his pain is anticipatory/ central pain.   > Continue Duloxetine 30mg via PEG daily. 1st dose still pending- to be given today 10/29. Can do rapid uptitration to 60mg by 7 days.

## 2019-10-29 NOTE — PROGRESS NOTE ADULT - SUBJECTIVE AND OBJECTIVE BOX
Patient is a 64y old  Male who presents with a chief complaint of CC: Abdominal pain and lethargy (29 Oct 2019 06:27)      SUBJECTIVE / OVERNIGHT EVENTS: Overnight patient was febrile to 38.9. Received tylenol without other intervention. Patient BP was 95/60, so only 0.5 of his 1.5mg dilaudid was given. Patient seen and examined at bedside afterwards. He denies n/v/f/c, dizziness, or chest pain. Still has a cough and produced ~20ml yellow sputum during exam. Has persistent abdominal pain unchanged. He complains that he has not slept in 48h due to pain.    MEDICATIONS  (STANDING):  DULoxetine 30 milliGRAM(s) Oral daily  fentaNYL   Patch  50 MICROgram(s)/Hr 1 Patch Transdermal every 72 hours  heparin  Injectable 5000 Unit(s) SubCutaneous every 8 hours  influenza   Vaccine 0.5 milliLiter(s) IntraMuscular once  ondansetron Injectable 4 milliGRAM(s) IV Push every 8 hours  pantoprazole  Injectable 40 milliGRAM(s) IV Push two times a day  piperacillin/tazobactam IVPB.. 3.375 Gram(s) IV Intermittent every 8 hours  senna Syrup 10 milliLiter(s) Oral at bedtime  vancomycin  IVPB 1000 milliGRAM(s) IV Intermittent every 12 hours  vancomycin  IVPB        MEDICATIONS  (PRN):  acetaminophen   Tablet .. 650 milliGRAM(s) Oral every 4 hours PRN Mild Pain (1 - 3)  acetaminophen   Tablet .. 1000 milliGRAM(s) Oral every 6 hours PRN Temp greater or equal to 38C (100.4F) and mild pain  HYDROmorphone  Injectable 1.5 milliGRAM(s) IV Push every 3 hours PRN Severe Pain (7 - 10)  LORazepam   Injectable 1 milliGRAM(s) IV Push every 8 hours PRN Anxiety      Vital Signs Last 24 Hrs  T(C): 36.3 (29 Oct 2019 07:59), Max: 38.9 (28 Oct 2019 21:25)  T(F): 97.4 (29 Oct 2019 07:59), Max: 102.1 (28 Oct 2019 21:25)  HR: 99 (29 Oct 2019 07:59) (94 - 145)  BP: 105/75 (29 Oct 2019 07:59) (80/52 - 113/74)  BP(mean): --  RR: 18 (29 Oct 2019 07:59) (17 - 20)  SpO2: 97% (29 Oct 2019 07:59) (94% - 98%)  CAPILLARY BLOOD GLUCOSE        I&O's Summary    28 Oct 2019 07:01  -  29 Oct 2019 07:00  --------------------------------------------------------  IN: 1900 mL / OUT: 1150 mL / NET: 750 mL        PHYSICAL EXAM:  GENERAL: Mild distress, cachectic. Dysarthritic speech.  HEAD:  Atraumatic, Normocephalic  EYES: EOMI, PERRLA, conjunctiva and sclera clear  NECK: L-sided mass with bandage limiting range of motion.  CHEST/LUNG: End-expiratory crackles bilaterally; No wheezes  HEART: Regular rate and rhythm; No murmurs, rubs, or gallops  ABDOMEN: Soft, moderately tender, Nondistended; Bowel sounds present. PEG tube placed, moist with gauze around entry site. No surrounding erythema.  EXTREMITIES:  2+ Peripheral Pulses, No clubbing, cyanosis, or edema  NEUROLOGY: non-focal  SKIN: No rashes or lesions    LABS:                        10.4   10.45 )-----------( 182      ( 28 Oct 2019 05:05 )             33.0     10-29    135  |  94<L>  |  16  ----------------------------<  101<H>  3.6   |  31  |  0.56    Ca    8.7      29 Oct 2019 06:30  Phos  3.1     10-29  Mg     1.8     10-29    TPro  6.3  /  Alb  2.9<L>  /  TBili  0.5  /  DBili  x   /  AST  14  /  ALT  13  /  AlkPhos  59  10-28              RADIOLOGY & ADDITIONAL TESTS:    Imaging Personally Reviewed:    Consultant(s) Notes Reviewed:      Care Discussed with Consultants/Other Providers:    Tej Mcgill, PGY-1; [facility] Pager: [phone]; [facility] Pager: [phone]. Patient is a 64y old  Male who presents with a chief complaint of CC: Abdominal pain and lethargy (29 Oct 2019 06:27)      SUBJECTIVE / OVERNIGHT EVENTS: Overnight patient was febrile to 38.9. Received tylenol without other intervention. Patient BP was 95/60, so only 0.5 of his 1.5mg dilaudid was given. Patient seen and examined at bedside afterwards. He denies n/v/f/c, dizziness, or chest pain. Still has a cough and produced ~20ml yellow sputum during exam. Has persistent abdominal pain unchanged. He complains that he has not slept in 48h due to pain.    MEDICATIONS  (STANDING):  DULoxetine 30 milliGRAM(s) Oral daily  fentaNYL   Patch  50 MICROgram(s)/Hr 1 Patch Transdermal every 72 hours  heparin  Injectable 5000 Unit(s) SubCutaneous every 8 hours  influenza   Vaccine 0.5 milliLiter(s) IntraMuscular once  ondansetron Injectable 4 milliGRAM(s) IV Push every 8 hours  pantoprazole  Injectable 40 milliGRAM(s) IV Push two times a day  piperacillin/tazobactam IVPB.. 3.375 Gram(s) IV Intermittent every 8 hours  senna Syrup 10 milliLiter(s) Oral at bedtime  vancomycin  IVPB 1000 milliGRAM(s) IV Intermittent every 12 hours  vancomycin  IVPB        MEDICATIONS  (PRN):  acetaminophen   Tablet .. 650 milliGRAM(s) Oral every 4 hours PRN Mild Pain (1 - 3)  acetaminophen   Tablet .. 1000 milliGRAM(s) Oral every 6 hours PRN Temp greater or equal to 38C (100.4F) and mild pain  HYDROmorphone  Injectable 1.5 milliGRAM(s) IV Push every 3 hours PRN Severe Pain (7 - 10)  LORazepam   Injectable 1 milliGRAM(s) IV Push every 8 hours PRN Anxiety      Vital Signs Last 24 Hrs  T(C): 36.3 (29 Oct 2019 07:59), Max: 38.9 (28 Oct 2019 21:25)  T(F): 97.4 (29 Oct 2019 07:59), Max: 102.1 (28 Oct 2019 21:25)  HR: 99 (29 Oct 2019 07:59) (94 - 145)  BP: 105/75 (29 Oct 2019 07:59) (80/52 - 113/74)  BP(mean): --  RR: 18 (29 Oct 2019 07:59) (17 - 20)  SpO2: 97% (29 Oct 2019 07:59) (94% - 98%)  CAPILLARY BLOOD GLUCOSE        I&O's Summary    28 Oct 2019 07:01  -  29 Oct 2019 07:00  --------------------------------------------------------  IN: 1900 mL / OUT: 1150 mL / NET: 750 mL        PHYSICAL EXAM:  GENERAL: Mild distress, cachectic. Dysarthritic speech.  HEAD:  Atraumatic, Normocephalic  EYES: EOMI, PERRLA, conjunctiva and sclera clear  NECK: L-sided mass with bandage limiting range of motion.  CHEST/LUNG: End-expiratory crackles bilaterally; No wheezes  HEART: Regular rate and rhythm; No murmurs, rubs, or gallops  ABDOMEN: Soft, moderately tender, Nondistended; Bowel sounds present. PEG tube placed, moist with gauze around entry site. No surrounding erythema.  EXTREMITIES:  2+ Peripheral Pulses, No clubbing, cyanosis, or edema  NEUROLOGY: non-focal  SKIN: No rashes or lesions    LABS:                        10.4   10.45 )-----------( 182      ( 28 Oct 2019 05:05 )             33.0     10-29    135  |  94<L>  |  16  ----------------------------<  101<H>  3.6   |  31  |  0.56    Ca    8.7      29 Oct 2019 06:30  Phos  3.1     10-29  Mg     1.8     10-29    TPro  6.3  /  Alb  2.9<L>  /  TBili  0.5  /  DBili  x   /  AST  14  /  ALT  13  /  AlkPhos  59  10-28    RADIOLOGY & ADDITIONAL TESTS:    Consultant(s) Notes Reviewed:  Heme-Onc    Tej Mcgill, PGY-1; [facility] Pager: [phone]; [facility] Pager: [phone]. Patient is a 64y old  Male who presents with a chief complaint of CC: Abdominal pain and lethargy (29 Oct 2019 06:27)      SUBJECTIVE / OVERNIGHT EVENTS: Overnight patient was febrile to 38.9. Received tylenol without other intervention. Patient BP was 95/60, so only 0.5 of his 1.5mg dilaudid was given. Patient seen and examined at bedside afterwards. He denies n/v/f/c, dizziness, or chest pain. Still has a cough and produced ~20ml yellow sputum during exam. Has persistent abdominal pain unchanged. He complains that he has not slept in 48h due to pain.    MEDICATIONS  (STANDING):  DULoxetine 30 milliGRAM(s) Oral daily  fentaNYL   Patch  50 MICROgram(s)/Hr 1 Patch Transdermal every 72 hours  heparin  Injectable 5000 Unit(s) SubCutaneous every 8 hours  influenza   Vaccine 0.5 milliLiter(s) IntraMuscular once  ondansetron Injectable 4 milliGRAM(s) IV Push every 8 hours  pantoprazole  Injectable 40 milliGRAM(s) IV Push two times a day  piperacillin/tazobactam IVPB.. 3.375 Gram(s) IV Intermittent every 8 hours  senna Syrup 10 milliLiter(s) Oral at bedtime  vancomycin  IVPB 1000 milliGRAM(s) IV Intermittent every 12 hours  vancomycin  IVPB        MEDICATIONS  (PRN):  acetaminophen   Tablet .. 650 milliGRAM(s) Oral every 4 hours PRN Mild Pain (1 - 3)  acetaminophen   Tablet .. 1000 milliGRAM(s) Oral every 6 hours PRN Temp greater or equal to 38C (100.4F) and mild pain  HYDROmorphone  Injectable 1.5 milliGRAM(s) IV Push every 3 hours PRN Severe Pain (7 - 10)  LORazepam   Injectable 1 milliGRAM(s) IV Push every 8 hours PRN Anxiety      Vital Signs Last 24 Hrs  T(C): 36.3 (29 Oct 2019 07:59), Max: 38.9 (28 Oct 2019 21:25)  T(F): 97.4 (29 Oct 2019 07:59), Max: 102.1 (28 Oct 2019 21:25)  HR: 99 (29 Oct 2019 07:59) (94 - 145)  BP: 105/75 (29 Oct 2019 07:59) (80/52 - 113/74)  BP(mean): --  RR: 18 (29 Oct 2019 07:59) (17 - 20)  SpO2: 97% (29 Oct 2019 07:59) (94% - 98%)  CAPILLARY BLOOD GLUCOSE        I&O's Summary    28 Oct 2019 07:01  -  29 Oct 2019 07:00  --------------------------------------------------------  IN: 1900 mL / OUT: 1150 mL / NET: 750 mL        PHYSICAL EXAM:  GENERAL: Mild distress, cachectic. Dysarthritic speech.  HEAD:  Atraumatic, Normocephalic  EYES: EOMI, PERRLA, conjunctiva and sclera clear  NECK: L-sided mass with bandage limiting range of motion.  CHEST/LUNG: End-expiratory crackles bilaterally; No wheezes  HEART: Regular rate and rhythm; No murmurs, rubs, or gallops  ABDOMEN: Soft, moderately tender, Nondistended; Bowel sounds present. PEG tube placed, moist with gauze around entry site. No surrounding erythema.  EXTREMITIES:  2+ Peripheral Pulses, No clubbing, cyanosis, or edema  NEUROLOGY: non-focal  SKIN: No rashes or lesions    LABS:                        10.4   10.45 )-----------( 182      ( 28 Oct 2019 05:05 )             33.0     10-29    135  |  94<L>  |  16  ----------------------------<  101<H>  3.6   |  31  |  0.56    Ca    8.7      29 Oct 2019 06:30  Phos  3.1     10-29  Mg     1.8     10-29    TPro  6.3  /  Alb  2.9<L>  /  TBili  0.5  /  DBili  x   /  AST  14  /  ALT  13  /  AlkPhos  59  10-28    BCx 24h NGTD    Serum uric acid: 1.6  Urine uric acid: 87.4      RADIOLOGY & ADDITIONAL TESTS:    Consultant(s) Notes Reviewed:  Heme-Onc    Tej Mcgill, PGY-1; [facility] Pager: [phone]; [facility] Pager: [phone]. Patient is a 64y old  Male who presents with a chief complaint of CC: Abdominal pain and lethargy (29 Oct 2019 06:27)      SUBJECTIVE / OVERNIGHT EVENTS: Overnight patient was febrile to 38.9. Received tylenol without other intervention. Patient BP was 95/60, so only 0.5 of his 1.5mg dilaudid was given. Patient seen and examined at bedside afterwards. He denies n/v/f/c, dizziness, or chest pain. Still has a cough and produced ~20ml yellow sputum during exam. Has persistent abdominal pain unchanged. He complains that he has not slept in 48h due to pain.    MEDICATIONS  (STANDING):  DULoxetine 30 milliGRAM(s) Oral daily  fentaNYL   Patch  50 MICROgram(s)/Hr 1 Patch Transdermal every 72 hours  heparin  Injectable 5000 Unit(s) SubCutaneous every 8 hours  influenza   Vaccine 0.5 milliLiter(s) IntraMuscular once  ondansetron Injectable 4 milliGRAM(s) IV Push every 8 hours  pantoprazole  Injectable 40 milliGRAM(s) IV Push two times a day  piperacillin/tazobactam IVPB.. 3.375 Gram(s) IV Intermittent every 8 hours  senna Syrup 10 milliLiter(s) Oral at bedtime  vancomycin  IVPB 1000 milliGRAM(s) IV Intermittent every 12 hours  vancomycin  IVPB        MEDICATIONS  (PRN):  acetaminophen   Tablet .. 650 milliGRAM(s) Oral every 4 hours PRN Mild Pain (1 - 3)  acetaminophen   Tablet .. 1000 milliGRAM(s) Oral every 6 hours PRN Temp greater or equal to 38C (100.4F) and mild pain  HYDROmorphone  Injectable 1.5 milliGRAM(s) IV Push every 3 hours PRN Severe Pain (7 - 10)  LORazepam   Injectable 1 milliGRAM(s) IV Push every 8 hours PRN Anxiety      Vital Signs Last 24 Hrs  T(C): 36.3 (29 Oct 2019 07:59), Max: 38.9 (28 Oct 2019 21:25)  T(F): 97.4 (29 Oct 2019 07:59), Max: 102.1 (28 Oct 2019 21:25)  HR: 99 (29 Oct 2019 07:59) (94 - 145)  BP: 105/75 (29 Oct 2019 07:59) (80/52 - 113/74)  BP(mean): --  RR: 18 (29 Oct 2019 07:59) (17 - 20)  SpO2: 97% (29 Oct 2019 07:59) (94% - 98%)  CAPILLARY BLOOD GLUCOSE        I&O's Summary    28 Oct 2019 07:01  -  29 Oct 2019 07:00  --------------------------------------------------------  IN: 1900 mL / OUT: 1150 mL / NET: 750 mL        PHYSICAL EXAM:  GENERAL: Mild distress, cachectic. Dysarthritic speech.  HEAD:  Atraumatic, Normocephalic  EYES: EOMI, PERRLA, conjunctiva and sclera clear  NECK: L-sided mass with bandage limiting range of motion. Foul smelling.  CHEST/LUNG: End-expiratory crackles bilaterally; No wheezes  HEART: Regular rate and rhythm; No murmurs, rubs, or gallops  ABDOMEN: Soft, moderately tender, Nondistended; Bowel sounds present. PEG tube placed, moist with gauze around entry site. No surrounding erythema.  EXTREMITIES:  2+ Peripheral Pulses, No clubbing, cyanosis, or edema  NEUROLOGY: non-focal  SKIN: No rashes or lesions    LABS:                        10.4   10.45 )-----------( 182      ( 28 Oct 2019 05:05 )             33.0     10-29    135  |  94<L>  |  16  ----------------------------<  101<H>  3.6   |  31  |  0.56    Ca    8.7      29 Oct 2019 06:30  Phos  3.1     10-29  Mg     1.8     10-29    TPro  6.3  /  Alb  2.9<L>  /  TBili  0.5  /  DBili  x   /  AST  14  /  ALT  13  /  AlkPhos  59  10-28    Wound cx: GNR, no speciation  BCx 24h NGTD    Serum uric acid: 1.6  Urine uric acid: 87.4      RADIOLOGY & ADDITIONAL TESTS:    Consultant(s) Notes Reviewed:  Heme-Onc    Tej Mcgill, PGY-1; [facility] Pager: [phone]; [facility] Pager: [phone].

## 2019-10-29 NOTE — CONSULT NOTE ADULT - ASSESSMENT
Mr. 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 Wound drainage culture shows GNRs and GBS. No leukocytosis, has spiked a fever the last 2 nights.     Mandibular wound drainage, possible infection  - Stop azithromycin  - Continue with vancomycin 1g q 12 hours  - Continue with Zosyn 3.375g q 8 hours   - Follow up susceptibilities  - Monitor for fevers  - Trend WBCs    Soumya Montano, PGY-4  Infectious Disease Fellow   Pager: [phone]  After 5pm/weekends: [phone]

## 2019-10-29 NOTE — PROGRESS NOTE ADULT - PROBLEM SELECTOR PLAN 6
Thank you for allowing us to participate in your patient's care. We will continue to follow with you. Please call/ text: [phone] today for any q's or c's.     Mario Hill  Palliative Medicine Yes

## 2019-10-29 NOTE — CHART NOTE - NSCHARTNOTEFT_GEN_A_CORE
Notified for low blood pressure to 80/50 manually. Pt seen at bedside. Pt in moderate distress from pain in abdomen. Pt denies other acute complaints at this time, denies fevers, chills, headaches, dizziness, chest p/p ,sob, n/v/d. Rechecked BP manually, elevated to 95/60, rest of vitals wnl, afebrile, HR 95, RR17, O2 sat 98 RA. Physical exam unremarkable. Pt due for dilaudid 1.5 at this time. Will decrease dose of dilaudid to 0.5mg. Recheck BP post dilaudid. Low BP possibly 2/2 to pain regimen.

## 2019-10-29 NOTE — ADVANCED PRACTICE NURSE CONSULT - ASSESSMENT
Upon assessment of pouch system noted slight leaking thru the pouch. Emptied 20cc of seropurulent drainage. Pouch changed. Patient was able to participate in pouch change and reports felling better with the pouch. Patient able to demonstrate pouch emptying and able to participate in some aspects of pouch change. Patient able to state steps to change pouch. Questions answer.      Given small to moderate drainage from areas od denuded epidermis will recommend Aquacel AG over this areas to contain drainage and the open wound to continue to pouch and change every day. Patient feels confident that he will be able to perform pouch change independently daily.

## 2019-10-29 NOTE — PROGRESS NOTE ADULT - PROBLEM SELECTOR PLAN 3
.  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

## 2019-10-29 NOTE — PROGRESS NOTE ADULT - PROBLEM SELECTOR PLAN 4
Likely 2/2 new metastatic disease  - CT raises question of enteritis, especially in light of new fevers overnight  - Pain control discussed w/ palliative care. Will speak with pharmacy about adding Cymbalta.   - Pain control as above Likely 2/2 new metastatic disease  - CT raises question of enteritis, especially in light of new fevers overnight  - Will return to old regimen of 25mcg fentanyl patch, 0.5mg dilaudid IV q3h PRN. Discussed w/ palliative care.   - Will add Cymbalta 30mg  - Add miralax for constipation 2-3 days

## 2019-10-29 NOTE — CONSULT NOTE ADULT - SUBJECTIVE AND OBJECTIVE BOX
INFECTIOUS DISEASE SERVICE INITIAL CONSULTATION NOTE    HPI:  Mr. 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    He was in his usual chronically ill state of health until 3 days ago when he became more lethargic and tired.  He endorsed some malaise and abdominal pain.  2 days ago, he noted a few dark stools.  Today, he was not taking his usual tube feeds due to abdominal discomfort.  He was seen by his oncologist today for this and had labs drawn showing hyponatremia and was directed to [facility] ED.  He did not have any chest pain, shortness of breath, vomitting, or diarrhea, or fevers and chills.      IN the ED, he was afebrile with normal vital signs.  Diagnostics revealed a hgb of 11.7, sodium improved from previous 126 to 129.  CT a/p showed enteritis and new evidence of metastatic disease in the abdomen and pelvis.  He was given NS and dilaudid and lorazepam and admitted.    On evaluation, he gave the above history and requested dressing changes, dilaudid, and lorazepam. 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, madibular reconstruction with bone graft 2018  H/O shoulder surgery: impingement left 2005, right 2007      REVIEW OF SYSTEMS:  Constitutional: no weakness, no fevers, no chills  Dermatologic: no rash  Respiratory: no SOB, no cough  Cardiovascular: no chest pain, no palpitations  Gastrointestinal: no nausea, no vomiting, no diarrhea  Genitourinary: no dysuria, no urinary frequency, no hematuria, no urinary retention  Musculoskeletal:	no weakness, no joint swelling/pain  Neurological: no focal weakness or numbness  Endocrine: no polyuria, no polydipsia    ACTIVE ANTIMICROBIAL/ANTIBIOTIC MEDICATIONS:  piperacillin/tazobactam IVPB.. 3.375 Gram(s) IV Intermittent every 8 hours  vancomycin  IVPB 1000 milliGRAM(s) IV Intermittent every 12 hours  vancomycin  IVPB          OTHER MEDICATIONS:  acetaminophen   Tablet .. 650 milliGRAM(s) Oral every 4 hours PRN  acetaminophen   Tablet .. 1000 milliGRAM(s) Oral every 6 hours PRN  DULoxetine 30 milliGRAM(s) Oral daily  fentaNYL   Patch  25 MICROgram(s)/Hr 1 Patch Transdermal every 72 hours  heparin  Injectable 5000 Unit(s) SubCutaneous every 8 hours  HYDROmorphone  Injectable 0.5 milliGRAM(s) IV Push every 3 hours PRN  influenza   Vaccine 0.5 milliLiter(s) IntraMuscular once  LORazepam   Injectable 1 milliGRAM(s) IV Push every 8 hours PRN  ondansetron Injectable 4 milliGRAM(s) IV Push every 8 hours  pantoprazole  Injectable 40 milliGRAM(s) IV Push two times a day  polyethylene glycol 3350 17 Gram(s) Oral daily  senna Syrup 10 milliLiter(s) Oral at bedtime      ALLERGIES:  Allergies    Bactrim (Hives)  carboplatin (Pruritus)  cetuximab (Hives; Pruritus)    Intolerances        SOCIAL HISTORY:      FAMILY HISTORY:  FAMILY HISTORY:  Family history of lung cancer: in Father      VITAL SIGNS:  ICU Vital Signs Last 24 Hrs  T(C): 37 (29 Oct 2019 10:54), Max: 38.9 (28 Oct 2019 21:25)  T(F): 98.6 (29 Oct 2019 10:54), Max: 102.1 (28 Oct 2019 21:25)  HR: 102 (29 Oct 2019 11:08) (95 - 145)  BP: 109/62 (29 Oct 2019 11:08) (80/52 - 113/74)  BP(mean): --  ABP: --  ABP(mean): --  RR: 16 (29 Oct 2019 11:08) (16 - 20)  SpO2: 97% (29 Oct 2019 11:08) (94% - 98%)      PHYSICAL EXAM:  Constitutional: WDWN  Head: NC/AT  Eyes: PERRLA, EOMI; anicteric slcera  ENMT: no rhinorrhea; no sinus tenderness on palpation; no oropharyngeal lesions, erythema, or exudates	  Neck: supple; no JVD or LAD  Respiratory: CTA B/L  Cardiovascular: +S1/S2, RRR; no appreciable murmurs  Gastrointestinal: soft, NT/ND; +BSx4, no HSM  Extremities: WWP; no clubbing, cyanosis, or edema  Vascular: 2+ radial, DP/PT pulses B/L  Dermatologic: skin warm and dry; no visible rashes or lesions  Neurologic: AAOx3; no focal deficits    LABS:                        10.4   10.45 )-----------( 182      ( 28 Oct 2019 05:05 )             33.0     10-29    135  |  94<L>  |  16  ----------------------------<  101<H>  3.6   |  31  |  0.56    Ca    8.7      29 Oct 2019 06:30  Phos  3.1     10-29  Mg     1.8     10-29    TPro  6.3  /  Alb  2.9<L>  /  TBili  0.5  /  DBili  x   /  AST  14  /  ALT  13  /  AlkPhos  59  10-28          MICROBIOLOGY:    Culture - Blood (collected 10-28-19 @ 01:54)  Source: BLOOD VENOUS  Preliminary Report (10-29-19 @ 01:55):    NO ORGANISMS ISOLATED    NO ORGANISMS ISOLATED AT 24 HOURS    Culture - Blood (collected 10-28-19 @ 01:54)  Source: BLOOD PERIPHERAL  Preliminary Report (10-29-19 @ 01:55):    NO ORGANISMS ISOLATED    NO ORGANISMS ISOLATED AT 24 HOURS    Culture - Wound (collected 10-28-19 @ 00:40)  Source: OTHER  Preliminary Report (10-29-19 @ 10:16):    MODERATE    GNRID^Gram Neg Giovani To Be Identified        RADIOLOGY & ADDITIONAL STUDIES: INFECTIOUS DISEASE SERVICE INITIAL CONSULTATION NOTE    HPI:  Mr. 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    He was in his usual chronically ill state of health until 3 days ago when he became more lethargic and tired.  He endorsed some malaise and abdominal pain.  2 days ago, he noted a few dark stools.  Today, he was not taking his usual tube feeds due to abdominal discomfort.  He was seen by his oncologist today for this and had labs drawn showing hyponatremia and was directed to [facility] ED.  He did not have any chest pain, shortness of breath, vomitting, or diarrhea, or fevers and chills.      IN the ED, he was afebrile with normal vital signs.  Diagnostics revealed a hgb of 11.7, sodium improved from previous 126 to 129.  CT a/p showed enteritis and new evidence of metastatic disease in the abdomen and pelvis.  He was given NS and dilaudid and lorazepam and admitted.    On evaluation, he gave the above history and requested dressing changes, dilaudid, and lorazepam. (25 Oct 2019 22:39)    The patient was admitted to medicine for hyponatremia, and not tolerating tube feedings. He was improving until he spiked a fever of the evening of 10/27, work up was performed. ID consulted for GNRs in wound drainage.  The patient reports that his draining mandibular wound has grown a small amount since his last hospitalization abd has developed a small tract right under the main one. There has also been more drainage. No fevers or chills at home. He has also had a chronic productive cough since May, which has not worsened at all. No dysuria, chest pain, nausea, vomiting, diarrhea, rash, or leg swelling.        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, madibular reconstruction with bone graft 2018  H/O shoulder surgery: impingement left 2005, right 2007      REVIEW OF SYSTEMS:  Constitutional: no weakness, +fevers, no chills  Dermatologic: no rash  Respiratory: no SOB, +cough  Cardiovascular: no chest pain, no palpitations  Gastrointestinal: no nausea, no vomiting, no diarrhea  Genitourinary: no dysuria, no urinary frequency, no hematuria, no urinary retention  Musculoskeletal:	no weakness, no joint swelling/pain  Neurological: no focal weakness or numbness  Endocrine: no polyuria, no polydipsia    ACTIVE ANTIMICROBIAL/ANTIBIOTIC MEDICATIONS:  piperacillin/tazobactam IVPB.. 3.375 Gram(s) IV Intermittent every 8 hours  vancomycin  IVPB 1000 milliGRAM(s) IV Intermittent every 12 hours  vancomycin  IVPB          OTHER MEDICATIONS:  acetaminophen   Tablet .. 650 milliGRAM(s) Oral every 4 hours PRN  acetaminophen   Tablet .. 1000 milliGRAM(s) Oral every 6 hours PRN  DULoxetine 30 milliGRAM(s) Oral daily  fentaNYL   Patch  25 MICROgram(s)/Hr 1 Patch Transdermal every 72 hours  heparin  Injectable 5000 Unit(s) SubCutaneous every 8 hours  HYDROmorphone  Injectable 0.5 milliGRAM(s) IV Push every 3 hours PRN  influenza   Vaccine 0.5 milliLiter(s) IntraMuscular once  LORazepam   Injectable 1 milliGRAM(s) IV Push every 8 hours PRN  ondansetron Injectable 4 milliGRAM(s) IV Push every 8 hours  pantoprazole  Injectable 40 milliGRAM(s) IV Push two times a day  polyethylene glycol 3350 17 Gram(s) Oral daily  senna Syrup 10 milliLiter(s) Oral at bedtime      ALLERGIES:  Allergies    Bactrim (Hives)  carboplatin (Pruritus)  cetuximab (Hives; Pruritus)    Intolerances        SOCIAL HISTORY: Lives in [place] with his son and brother. Retired . Former smoker. Denies alcohol.     FAMILY HISTORY:  Family history of lung cancer: in Father      VITAL SIGNS:  ICU Vital Signs Last 24 Hrs  T(C): 37 (29 Oct 2019 10:54), Max: 38.9 (28 Oct 2019 21:25)  T(F): 98.6 (29 Oct 2019 10:54), Max: 102.1 (28 Oct 2019 21:25)  HR: 102 (29 Oct 2019 11:08) (95 - 145)  BP: 109/62 (29 Oct 2019 11:08) (80/52 - 113/74)  BP(mean): --  ABP: --  ABP(mean): --  RR: 16 (29 Oct 2019 11:08) (16 - 20)  SpO2: 97% (29 Oct 2019 11:08) (94% - 98%)      PHYSICAL EXAM:  Constitutional: WDWN  Eyes: anicteric sclera  ENMT: ?paralysis of the lower mandibular area. wound near the left angle of the mandible with serous drainage, covered with small ostomy-like bag  Neck: supple; no JVD or LAD  Respiratory: CTA B/L  Cardiovascular: +S1/S2, RRR; no appreciable murmurs  Gastrointestinal: soft, NT/ND; +BSx4, no HSM; +PEG  Extremities: WWP; no clubbing, cyanosis, or edema  Dermatologic: skin warm and dry; no visible rashes or lesions  Neurologic: AAOx3; no focal deficits    LABS:                        10.4   10.45 )-----------( 182      ( 28 Oct 2019 05:05 )             33.0     10-29    135  |  94<L>  |  16  ----------------------------<  101<H>  3.6   |  31  |  0.56    Ca    8.7      29 Oct 2019 06:30  Phos  3.1     10-29  Mg     1.8     10-29    TPro  6.3  /  Alb  2.9<L>  /  TBili  0.5  /  DBili  x   /  AST  14  /  ALT  13  /  AlkPhos  59  10-28          MICROBIOLOGY:    Culture - Blood (collected 10-28-19 @ 01:54)  Source: BLOOD VENOUS  Preliminary Report (10-29-19 @ 01:55):    NO ORGANISMS ISOLATED    NO ORGANISMS ISOLATED AT 24 HOURS    Culture - Blood (collected 10-28-19 @ 01:54)  Source: BLOOD PERIPHERAL  Preliminary Report (10-29-19 @ 01:55):    NO ORGANISMS ISOLATED    NO ORGANISMS ISOLATED AT 24 HOURS    Culture - Wound (collected 10-28-19 @ 00:40)  Source: OTHER  Preliminary Report (10-29-19 @ 10:16):    MODERATE    GNRID^Gram Neg Giovani To Be Identified        RADIOLOGY & ADDITIONAL STUDIES:    < from: Xray Chest 1 View AP/PA (10.27.19 @ 23:57) >  EXAM:  XR CHEST AP OR PA 1V        PROCEDURE DATE:  Oct 27 2019         INTERPRETATION:  CLINICAL INFORMATION: New fever and cough.    TECHNIQUE: AP view of the chest.    COMPARISON: Chest radiograph from 10/25/2019    IMPRESSION:    Mild left lungbase linear atelectasis. No pleural effusion or   pneumothorax. Mediastinal silhouette is normal. Bones and soft tissues   are unremarkable.    A wire overlies the neck; correlate for object external to the patient.                HANNAH BAUGH M.D., RADIOLOGY RESIDENT  This document has been electronically signed.  KENNETH CORTES M.D. ATTENDING RADIOLOGIST  This document has been electronically signed. Oct 28 2019  2:58PM        < end of copied text > INFECTIOUS DISEASE SERVICE INITIAL CONSULTATION NOTE    HPI:  Mr. 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    He was in his usual chronically ill state of health until 3 days ago when he became more lethargic and tired.  He endorsed some malaise and abdominal pain.  2 days ago, he noted a few dark stools.  Today, he was not taking his usual tube feeds due to abdominal discomfort.  He was seen by his oncologist today for this and had labs drawn showing hyponatremia and was directed to [facility] ED.  He did not have any chest pain, shortness of breath, vomitting, or diarrhea, or fevers and chills.      IN the ED, he was afebrile with normal vital signs.  Diagnostics revealed a hgb of 11.7, sodium improved from previous 126 to 129.  CT a/p showed enteritis and new evidence of metastatic disease in the abdomen and pelvis.  He was given NS and dilaudid and lorazepam and admitted.    On evaluation, he gave the above history and requested dressing changes, dilaudid, and lorazepam. (25 Oct 2019 22:39)    The patient was admitted to medicine for hyponatremia, and not tolerating tube feedings. He was improving until he spiked a fever of the evening of 10/27, work up was performed. ID consulted for GNRs in wound drainage.  The patient reports that his draining mandibular wound has grown a small amount since his last hospitalization abd has developed a small tract right under the main one. There has also been more drainage. No fevers or chills at home. He has also had a chronic productive cough since May, which has not worsened at all. No dysuria, chest pain, nausea, vomiting, diarrhea, rash, or leg swelling.        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 no subjective fevers or chills  Dermatologic: no rash  ENT: hole at his left jaw, not painful   Respiratory: no SOB, +cough chronic unchanged   Cardiovascular: no chest pain, no palpitations  Gastrointestinal: no nausea, no vomiting, no diarrhea  Genitourinary: no dysuria, no urinary frequency, no hematuria, no urinary retention  Musculoskeletal:	no weakness, no joint swelling/pain  Neurological: no focal weakness or numbness  Endocrine: no polyuria, no polydipsia  Psych: mood stable     ACTIVE ANTIMICROBIAL/ANTIBIOTIC MEDICATIONS:  piperacillin/tazobactam IVPB.. 3.375 Gram(s) IV Intermittent every 8 hours  vancomycin  IVPB 1000 milliGRAM(s) IV Intermittent every 12 hours  vancomycin  IVPB          OTHER MEDICATIONS:  acetaminophen   Tablet .. 650 milliGRAM(s) Oral every 4 hours PRN  acetaminophen   Tablet .. 1000 milliGRAM(s) Oral every 6 hours PRN  DULoxetine 30 milliGRAM(s) Oral daily  fentaNYL   Patch  25 MICROgram(s)/Hr 1 Patch Transdermal every 72 hours  heparin  Injectable 5000 Unit(s) SubCutaneous every 8 hours  HYDROmorphone  Injectable 0.5 milliGRAM(s) IV Push every 3 hours PRN  influenza   Vaccine 0.5 milliLiter(s) IntraMuscular once  LORazepam   Injectable 1 milliGRAM(s) IV Push every 8 hours PRN  ondansetron Injectable 4 milliGRAM(s) IV Push every 8 hours  pantoprazole  Injectable 40 milliGRAM(s) IV Push two times a day  polyethylene glycol 3350 17 Gram(s) Oral daily  senna Syrup 10 milliLiter(s) Oral at bedtime      ALLERGIES:  Allergies    Bactrim (Hives)  carboplatin (Pruritus)  cetuximab (Hives; Pruritus)    Intolerances    SOCIAL HISTORY: Lives in [place] with his son and brother. Retired . Former smoker. Denies alcohol.     FAMILY HISTORY:  Family history of lung cancer: in Father      VITAL SIGNS:  ICU Vital Signs Last 24 Hrs  T(C): 37 (29 Oct 2019 10:54), Max: 38.9 (28 Oct 2019 21:25)  T(F): 98.6 (29 Oct 2019 10:54), Max: 102.1 (28 Oct 2019 21:25)  HR: 102 (29 Oct 2019 11:08) (95 - 145)  BP: 109/62 (29 Oct 2019 11:08) (80/52 - 113/74)  BP(mean): --  ABP: --  ABP(mean): --  RR: 16 (29 Oct 2019 11:08) (16 - 20)  SpO2: 97% (29 Oct 2019 11:08) (94% - 98%)      PHYSICAL EXAM:  Constitutional: WDWN  Eyes: anicteric sclera  ENMT: limited ROM of the jaw. circular deep wound near the left angle of the mandible with serous drainage, covered with small ostomy-like bag, no surrounding cellulitis, nontender   Neck: supple; no JVD or LAD  Respiratory: nonlabored, CTA B/L  Cardiovascular: +S1/S2, RRR; no appreciable murmurs  Gastrointestinal: soft, NT/ND; +BSx4, no HSM; +PEG  Extremities: WWP; no clubbing, cyanosis, or edema  Dermatologic: skin warm and dry; no visible rashes or lesions  Neurologic: AAOx3; no focal deficits  psych: pleasant, cooperative     LABS:                        10.4   10.45 )-----------( 182      ( 28 Oct 2019 05:05 )             33.0     10-29    135  |  94<L>  |  16  ----------------------------<  101<H>  3.6   |  31  |  0.56    Ca    8.7      29 Oct 2019 06:30  Phos  3.1     10-29  Mg     1.8     10-29    TPro  6.3  /  Alb  2.9<L>  /  TBili  0.5  /  DBili  x   /  AST  14  /  ALT  13  /  AlkPhos  59  10-28    MICROBIOLOGY:  Culture - Blood (collected 10-28-19 @ 01:54)  Source: BLOOD VENOUS  Preliminary Report (10-29-19 @ 01:55):    NO ORGANISMS ISOLATED    NO ORGANISMS ISOLATED AT 24 HOURS    Culture - Blood (collected 10-28-19 @ 01:54)  Source: BLOOD PERIPHERAL  Preliminary Report (10-29-19 @ 01:55):    NO ORGANISMS ISOLATED    NO ORGANISMS ISOLATED AT 24 HOURS    Culture - Wound (collected 10-28-19 @ 00:40)  Source: OTHER  Preliminary Report (10-29-19 @ 10:16):    MODERATE    GNRID^Gram Neg Giovani To Be Identified        RADIOLOGY & ADDITIONAL STUDIES:  Xray Chest 1 View AP/PA (10.27.19 @ 23:57)   Mild left lungbase linear atelectasis. No pleural effusion or   pneumothorax. Mediastinal silhouette is normal. Bones and soft tissues   are unremarkable.  A wire overlies the neck; correlate for object external to the patient.

## 2019-10-29 NOTE — PROGRESS NOTE ADULT - PROBLEM SELECTOR PLAN 3
-Likely from disease progression as evidenced by CT a/p findings consistent with new metastatic disease  -Pain control: Concern that existing pain regimen is leading to overnight drop in BPs. Will reduce to  -Nutrition recs appreciated. -Likely from disease progression as evidenced by CT a/p findings consistent with new metastatic disease  -Pain control: Concern that existing pain regimen is leading to overnight drop in BPs. Reduced opiate regimen today as described below.  -Nutrition recs appreciated.

## 2019-10-29 NOTE — PROGRESS NOTE ADULT - PROBLEM SELECTOR PLAN 2
.  # Anticipatory pain  # Generalized anxiety    > Duloxetine 30mg via PEG daily. This will target his centralized pain, mood, and some of his anxiety. First dose to be given today 10/29. Can do rapid uptitration to 60mg daily by 7 days.  > Consider: Psychiatry consult.

## 2019-10-29 NOTE — PROGRESS NOTE ADULT - PROBLEM SELECTOR PLAN 2
Likely from not taking in tube feeds with resultant hypovolemic hyponatremia  -Na consistently around 130. Continuing NS infusion.  -c/w tube feeds.  Nutrition recs appreciated. Continue to monitor with tube feeds resumption.  -f/u serum and urine uric acid.

## 2019-10-29 NOTE — PROGRESS NOTE ADULT - SUBJECTIVE AND OBJECTIVE BOX
HPI:  Mr. Ventura is a 64 year old man with a history of:  # SCC of the throat  - Initially dx in 2015, with recurrence 2019  - Newly discovered diffuse intra-abdominal mets 10/2019  - Plan to travel to [place] this Sunday for Car-T    Palliative Medicine was asked to co-manage his symptoms.    10/29  - During my visit ,the patient was very somnolent. He was arousable but would drift off to sleep.  - He claimed he was "just tired" and confused as he just "woke up"  - Pain control is adequate  - BP low this AM    ========================================================================================  INTERVAL HPI/OVERNIGHT EVENTS:    Code Status:   Allergies    Bactrim (Hives)  carboplatin (Pruritus)  cetuximab (Hives; Pruritus)    Intolerances    MEDICATIONS  (STANDING):  DULoxetine 30 milliGRAM(s) Oral daily  fentaNYL   Patch  50 MICROgram(s)/Hr 1 Patch Transdermal every 72 hours  heparin  Injectable 5000 Unit(s) SubCutaneous every 8 hours  influenza   Vaccine 0.5 milliLiter(s) IntraMuscular once  ondansetron Injectable 4 milliGRAM(s) IV Push every 8 hours  pantoprazole  Injectable 40 milliGRAM(s) IV Push two times a day  piperacillin/tazobactam IVPB.. 3.375 Gram(s) IV Intermittent every 8 hours  senna Syrup 10 milliLiter(s) Oral at bedtime  vancomycin  IVPB 1000 milliGRAM(s) IV Intermittent every 12 hours  vancomycin  IVPB        MEDICATIONS  (PRN):  acetaminophen   Tablet .. 650 milliGRAM(s) Oral every 4 hours PRN Mild Pain (1 - 3)  acetaminophen   Tablet .. 1000 milliGRAM(s) Oral every 6 hours PRN Temp greater or equal to 38C (100.4F) and mild pain  HYDROmorphone  Injectable 1.5 milliGRAM(s) IV Push every 3 hours PRN Severe Pain (7 - 10)  LORazepam   Injectable 1 milliGRAM(s) IV Push every 8 hours PRN Anxiety      PRESENT SYMPTOMS: [X ]Unable to obtain due to poor mentation   Source if other than patient:  [ ]Family   [X ]Team     Pain (Impact on QOL):  same as in consult note  Location:  Severity:  Minimal acceptable level (0-10 scale):       Quality:       Onset:  Duration:  Aggravating factors:  Relieving Factors  Radiation:    Dyspnea:  Yes [ ] No [ X] - [ ]Mild [ ]Moderate [ ]Severe  Anxiety:    Yes [X ] No [ ] - [ ]Mild [X ]Moderate [ ]Severe  Fatigue:    Yes [X ] No [ ] - [X ]Mild [ ]Moderate [ ]Severe  Nausea:    Yes [ ] No [X ] - [ ]Mild [ ]Moderate [ ]Severe                         Loss of appetite: Yes [ ] No [ ] - [ ]Mild [ ]Moderate [ ]Severe             Constipation:  Yes [X ] No [ ] - [ ]Mild [X ]Moderate [ ]Severe  Grief: Yes [ ] No [ ]       Other Symptoms:  [X ]All other review of systems negative       =========================================================================================  PHYSICAL EXAM:  Vital Signs Last 24 Hrs  T(C): 37 (29 Oct 2019 10:54), Max: 38.9 (28 Oct 2019 21:25)  T(F): 98.6 (29 Oct 2019 10:54), Max: 102.1 (28 Oct 2019 21:25)  HR: 102 (29 Oct 2019 11:08) (95 - 145)  BP: 109/62 (29 Oct 2019 11:08) (80/52 - 113/74)  BP(mean): --  RR: 16 (29 Oct 2019 11:08) (16 - 20)  SpO2: 97% (29 Oct 2019 11:08) (94% - 98%) I&O's Summary    28 Oct 2019 07:01  -  29 Oct 2019 07:00  --------------------------------------------------------  IN: 1900 mL / OUT: 1150 mL / NET: 750 mL    29 Oct 2019 07:01  -  29 Oct 2019 12:02  --------------------------------------------------------  IN: 250 mL / OUT: 200 mL / NET: 50 mL       GEN: +Somnolent, NAD, RR 12 cpm  HEENT: +Wound bag left neck  PULM: Comfortable work of breathing  CV: RRR, normal S2 and S2, no murmurs, Regular pulse  ABD: Soft, non-tender  EXT: No edema  PSYCH: Unable to assess  NEURO: No gross deficits, No tremors  SKIN: No rashes, lesions on exposed skin, No jaundice      =========================================================================================  LABS:                        10.4   10.45 )-----------( 182      ( 28 Oct 2019 05:05 )             33.0   10-29    135  |  94<L>  |  16  ----------------------------<  101<H>  3.6   |  31  |  0.56    Ca    8.7      29 Oct 2019 06:30  Phos  3.1     10-29  Mg     1.8     10-29    TPro  6.3  /  Alb  2.9<L>  /  TBili  0.5  /  DBili  x   /  AST  14  /  ALT  13  /  AlkPhos  59  10-28        RADIOLOGY & ADDITIONAL STUDIES:    Protein Calorie Malnutrition Present: [ ] yes [ ] no  [ ] PPSV2 < or = 30%  [ ] significant weight loss [ ] poor nutritional intake [ ] anasarca [ ] catabolic state Albumin, Serum: 2.9 g/dL (10-28-19 @ 05:05)      REFERRALS:   [ ]Chaplaincy  [ ] Hospice  [ ]Child Life  [ ]Social Work  [ ]Case management [ ]Holistic Therapy   Goals of Care Document:

## 2019-10-29 NOTE — CONSULT NOTE ADULT - ATTENDING COMMENTS
Oral cancer with failure to thrive and metabolic derangements.  Fevers in the last few days. Nonlocalizing and he doesn't notice them.   GNR growing from his mandibular wound but it's not a sterile site to begin with and there's no cellulitis or purulence.   Chronic unchanged cough without PNA on CXR.   Tumor fever? Mass read as necrotic on CT in Sept.   I don't think the Azithromycin is adding anything, would stop this.   Continue Vancomycin and Zosyn  f/u wound cultures   consider re-imaging head/neck     Communicated with primary team     Tres Cobb MD   Infectious Disease   Pager [phone]   After 5PM and on weekends please page fellow on call or call [phone]
Patient seen and examined with medical oncology fellow Dr. Richardson. I agree with the hx, ROS, exam, lab review and plan as outlined. We do not plan to administer chemotherapy during this admission.  Supportive care continues, with more attention to pain management today. For trial at [facility] in near future if he can be stabilized here.

## 2019-10-29 NOTE — PROGRESS NOTE ADULT - PROBLEM SELECTOR PLAN 5
.  # Code status: Full    > I spoke to the patient, his son and his brother at bedside on 10/28. They want to pursue all efforts to get cancer-directed therapy, including traveling to [place] this weekend for CAR-T therapy.

## 2019-10-29 NOTE — ADVANCED PRACTICE NURSE CONSULT - RECOMMEDATIONS
Left side of the neck- Clean with NS. Pat dry. Apply Liquid barrier film to periwound skin. Place a piece of Aquacel AG over area of pinpoint opening in periwound skin, cover with silicone foam with borders. Place Pouch kin pouch (item #3796 over left neck wound), change every day or PRN. Alternatively for left neck wound, after cleaning wound lightly pack with Aquacel AG, leave 2 inches out to wick, cover with Silicone foam with borders. Change twice a day or PRN.     Please contact Wound Care Service Line if we can be of further assistance (ext [phone]).

## 2019-10-30 LAB
-  AMIKACIN: SIGNIFICANT CHANGE UP
-  AMPICILLIN/SULBACTAM: SIGNIFICANT CHANGE UP
-  AMPICILLIN: SIGNIFICANT CHANGE UP
-  AZTREONAM: SIGNIFICANT CHANGE UP
-  CEFAZOLIN: SIGNIFICANT CHANGE UP
-  CEFEPIME: SIGNIFICANT CHANGE UP
-  CEFOXITIN: SIGNIFICANT CHANGE UP
-  CEFTAZIDIME: SIGNIFICANT CHANGE UP
-  CEFTRIAXONE: SIGNIFICANT CHANGE UP
-  ERTAPENEM: SIGNIFICANT CHANGE UP
-  GENTAMICIN: SIGNIFICANT CHANGE UP
-  IMIPENEM: SIGNIFICANT CHANGE UP
-  LEVOFLOXACIN: SIGNIFICANT CHANGE UP
-  MEROPENEM: SIGNIFICANT CHANGE UP
-  PIPERACILLIN/TAZOBACTAM: SIGNIFICANT CHANGE UP
-  TIGECYCLINE: SIGNIFICANT CHANGE UP
-  TOBRAMYCIN: SIGNIFICANT CHANGE UP
-  TRIMETHOPRIM/SULFAMETHOXAZOLE: SIGNIFICANT CHANGE UP
ANION GAP SERPL CALC-SCNC: 11 MMO/L — SIGNIFICANT CHANGE UP (ref 7–14)
BACTERIA WND CULT: SIGNIFICANT CHANGE UP
BASOPHILS # BLD AUTO: 0.02 K/UL — SIGNIFICANT CHANGE UP (ref 0–0.2)
BASOPHILS NFR BLD AUTO: 0.2 % — SIGNIFICANT CHANGE UP (ref 0–2)
BUN SERPL-MCNC: 10 MG/DL — SIGNIFICANT CHANGE UP (ref 7–23)
CALCIUM SERPL-MCNC: 8.7 MG/DL — SIGNIFICANT CHANGE UP (ref 8.4–10.5)
CHLORIDE SERPL-SCNC: 89 MMOL/L — LOW (ref 98–107)
CO2 SERPL-SCNC: 28 MMOL/L — SIGNIFICANT CHANGE UP (ref 22–31)
CREAT SERPL-MCNC: 0.5 MG/DL — SIGNIFICANT CHANGE UP (ref 0.5–1.3)
EOSINOPHIL # BLD AUTO: 0.18 K/UL — SIGNIFICANT CHANGE UP (ref 0–0.5)
EOSINOPHIL NFR BLD AUTO: 2.1 % — SIGNIFICANT CHANGE UP (ref 0–6)
GLUCOSE SERPL-MCNC: 189 MG/DL — HIGH (ref 70–99)
HCT VFR BLD CALC: 28.6 % — LOW (ref 39–50)
HGB BLD-MCNC: 9.4 G/DL — LOW (ref 13–17)
IMM GRANULOCYTES NFR BLD AUTO: 0.5 % — SIGNIFICANT CHANGE UP (ref 0–1.5)
LYMPHOCYTES # BLD AUTO: 0.32 K/UL — LOW (ref 1–3.3)
LYMPHOCYTES # BLD AUTO: 3.7 % — LOW (ref 13–44)
MAGNESIUM SERPL-MCNC: 1.7 MG/DL — SIGNIFICANT CHANGE UP (ref 1.6–2.6)
MANUAL SMEAR VERIFICATION: SIGNIFICANT CHANGE UP
MCHC RBC-ENTMCNC: 28.7 PG — SIGNIFICANT CHANGE UP (ref 27–34)
MCHC RBC-ENTMCNC: 32.9 % — SIGNIFICANT CHANGE UP (ref 32–36)
MCV RBC AUTO: 87.5 FL — SIGNIFICANT CHANGE UP (ref 80–100)
METHOD TYPE: SIGNIFICANT CHANGE UP
MONOCYTES # BLD AUTO: 0.72 K/UL — SIGNIFICANT CHANGE UP (ref 0–0.9)
MONOCYTES NFR BLD AUTO: 8.4 % — SIGNIFICANT CHANGE UP (ref 2–14)
NEUTROPHILS # BLD AUTO: 7.28 K/UL — SIGNIFICANT CHANGE UP (ref 1.8–7.4)
NEUTROPHILS NFR BLD AUTO: 85.1 % — HIGH (ref 43–77)
NRBC # FLD: 0 K/UL — SIGNIFICANT CHANGE UP (ref 0–0)
ORGANISM # SPEC MICROSCOPIC CNT: SIGNIFICANT CHANGE UP
PHOSPHATE SERPL-MCNC: 2.8 MG/DL — SIGNIFICANT CHANGE UP (ref 2.5–4.5)
PLATELET # BLD AUTO: 207 K/UL — SIGNIFICANT CHANGE UP (ref 150–400)
PMV BLD: 9.6 FL — SIGNIFICANT CHANGE UP (ref 7–13)
POTASSIUM SERPL-MCNC: 3.5 MMOL/L — SIGNIFICANT CHANGE UP (ref 3.5–5.3)
POTASSIUM SERPL-SCNC: 3.5 MMOL/L — SIGNIFICANT CHANGE UP (ref 3.5–5.3)
RBC # BLD: 3.27 M/UL — LOW (ref 4.2–5.8)
RBC # FLD: 14.2 % — SIGNIFICANT CHANGE UP (ref 10.3–14.5)
SODIUM SERPL-SCNC: 128 MMOL/L — LOW (ref 135–145)
VANCOMYCIN TROUGH SERPL-MCNC: 7.3 UG/ML — LOW (ref 10–20)
WBC # BLD: 8.56 K/UL — SIGNIFICANT CHANGE UP (ref 3.8–10.5)
WBC # FLD AUTO: 8.56 K/UL — SIGNIFICANT CHANGE UP (ref 3.8–10.5)

## 2019-10-30 PROCEDURE — 99233 SBSQ HOSP IP/OBS HIGH 50: CPT | Mod: GC

## 2019-10-30 PROCEDURE — 99233 SBSQ HOSP IP/OBS HIGH 50: CPT

## 2019-10-30 PROCEDURE — 99232 SBSQ HOSP IP/OBS MODERATE 35: CPT

## 2019-10-30 RX ORDER — HYDROMORPHONE HYDROCHLORIDE 2 MG/ML
0.5 INJECTION INTRAMUSCULAR; INTRAVENOUS; SUBCUTANEOUS ONCE
Refills: 0 | Status: DISCONTINUED | OUTPATIENT
Start: 2019-10-30 | End: 2019-10-30

## 2019-10-30 RX ORDER — VANCOMYCIN HCL 1 G
1250 VIAL (EA) INTRAVENOUS EVERY 12 HOURS
Refills: 0 | Status: DISCONTINUED | OUTPATIENT
Start: 2019-10-30 | End: 2019-10-30

## 2019-10-30 RX ORDER — LACTULOSE 10 G/15ML
20 SOLUTION ORAL ONCE
Refills: 0 | Status: COMPLETED | OUTPATIENT
Start: 2019-10-30 | End: 2019-10-30

## 2019-10-30 RX ADMIN — HYDROMORPHONE HYDROCHLORIDE 0.5 MILLIGRAM(S): 2 INJECTION INTRAMUSCULAR; INTRAVENOUS; SUBCUTANEOUS at 06:22

## 2019-10-30 RX ADMIN — PIPERACILLIN AND TAZOBACTAM 25 GRAM(S): 4; .5 INJECTION, POWDER, LYOPHILIZED, FOR SOLUTION INTRAVENOUS at 21:57

## 2019-10-30 RX ADMIN — HYDROMORPHONE HYDROCHLORIDE 0.5 MILLIGRAM(S): 2 INJECTION INTRAMUSCULAR; INTRAVENOUS; SUBCUTANEOUS at 00:42

## 2019-10-30 RX ADMIN — HEPARIN SODIUM 5000 UNIT(S): 5000 INJECTION INTRAVENOUS; SUBCUTANEOUS at 21:56

## 2019-10-30 RX ADMIN — ONDANSETRON 4 MILLIGRAM(S): 8 TABLET, FILM COATED ORAL at 13:06

## 2019-10-30 RX ADMIN — HYDROMORPHONE HYDROCHLORIDE 0.5 MILLIGRAM(S): 2 INJECTION INTRAMUSCULAR; INTRAVENOUS; SUBCUTANEOUS at 14:12

## 2019-10-30 RX ADMIN — HYDROMORPHONE HYDROCHLORIDE 0.5 MILLIGRAM(S): 2 INJECTION INTRAMUSCULAR; INTRAVENOUS; SUBCUTANEOUS at 03:25

## 2019-10-30 RX ADMIN — LACTULOSE 20 GRAM(S): 10 SOLUTION ORAL at 18:28

## 2019-10-30 RX ADMIN — Medication 1 MILLIGRAM(S): at 06:03

## 2019-10-30 RX ADMIN — PANTOPRAZOLE SODIUM 40 MILLIGRAM(S): 20 TABLET, DELAYED RELEASE ORAL at 06:07

## 2019-10-30 RX ADMIN — HYDROMORPHONE HYDROCHLORIDE 0.5 MILLIGRAM(S): 2 INJECTION INTRAMUSCULAR; INTRAVENOUS; SUBCUTANEOUS at 23:29

## 2019-10-30 RX ADMIN — PIPERACILLIN AND TAZOBACTAM 25 GRAM(S): 4; .5 INJECTION, POWDER, LYOPHILIZED, FOR SOLUTION INTRAVENOUS at 13:07

## 2019-10-30 RX ADMIN — ONDANSETRON 4 MILLIGRAM(S): 8 TABLET, FILM COATED ORAL at 06:05

## 2019-10-30 RX ADMIN — Medication 100 MILLIGRAM(S): at 21:56

## 2019-10-30 RX ADMIN — Medication 100 MILLIGRAM(S): at 03:21

## 2019-10-30 RX ADMIN — DULOXETINE HYDROCHLORIDE 30 MILLIGRAM(S): 30 CAPSULE, DELAYED RELEASE ORAL at 12:23

## 2019-10-30 RX ADMIN — HYDROMORPHONE HYDROCHLORIDE 0.5 MILLIGRAM(S): 2 INJECTION INTRAMUSCULAR; INTRAVENOUS; SUBCUTANEOUS at 06:37

## 2019-10-30 RX ADMIN — HYDROMORPHONE HYDROCHLORIDE 0.5 MILLIGRAM(S): 2 INJECTION INTRAMUSCULAR; INTRAVENOUS; SUBCUTANEOUS at 17:33

## 2019-10-30 RX ADMIN — HEPARIN SODIUM 5000 UNIT(S): 5000 INJECTION INTRAVENOUS; SUBCUTANEOUS at 13:06

## 2019-10-30 RX ADMIN — HYDROMORPHONE HYDROCHLORIDE 0.5 MILLIGRAM(S): 2 INJECTION INTRAMUSCULAR; INTRAVENOUS; SUBCUTANEOUS at 21:00

## 2019-10-30 RX ADMIN — Medication 100 MILLIGRAM(S): at 12:23

## 2019-10-30 RX ADMIN — HEPARIN SODIUM 5000 UNIT(S): 5000 INJECTION INTRAVENOUS; SUBCUTANEOUS at 06:11

## 2019-10-30 RX ADMIN — Medication 1 MILLIGRAM(S): at 14:11

## 2019-10-30 RX ADMIN — Medication 166.67 MILLIGRAM(S): at 05:13

## 2019-10-30 RX ADMIN — FENTANYL CITRATE 1 PATCH: 50 INJECTION INTRAVENOUS at 20:07

## 2019-10-30 RX ADMIN — HYDROMORPHONE HYDROCHLORIDE 0.5 MILLIGRAM(S): 2 INJECTION INTRAMUSCULAR; INTRAVENOUS; SUBCUTANEOUS at 17:18

## 2019-10-30 RX ADMIN — HYDROMORPHONE HYDROCHLORIDE 0.5 MILLIGRAM(S): 2 INJECTION INTRAMUSCULAR; INTRAVENOUS; SUBCUTANEOUS at 11:10

## 2019-10-30 RX ADMIN — PANTOPRAZOLE SODIUM 40 MILLIGRAM(S): 20 TABLET, DELAYED RELEASE ORAL at 17:18

## 2019-10-30 RX ADMIN — HYDROMORPHONE HYDROCHLORIDE 0.5 MILLIGRAM(S): 2 INJECTION INTRAMUSCULAR; INTRAVENOUS; SUBCUTANEOUS at 14:27

## 2019-10-30 RX ADMIN — FENTANYL CITRATE 1 PATCH: 50 INJECTION INTRAVENOUS at 08:12

## 2019-10-30 RX ADMIN — Medication 10 MILLIGRAM(S): at 12:23

## 2019-10-30 RX ADMIN — ONDANSETRON 4 MILLIGRAM(S): 8 TABLET, FILM COATED ORAL at 21:56

## 2019-10-30 RX ADMIN — HYDROMORPHONE HYDROCHLORIDE 0.5 MILLIGRAM(S): 2 INJECTION INTRAMUSCULAR; INTRAVENOUS; SUBCUTANEOUS at 00:27

## 2019-10-30 RX ADMIN — HYDROMORPHONE HYDROCHLORIDE 0.5 MILLIGRAM(S): 2 INJECTION INTRAMUSCULAR; INTRAVENOUS; SUBCUTANEOUS at 10:56

## 2019-10-30 RX ADMIN — HYDROMORPHONE HYDROCHLORIDE 0.5 MILLIGRAM(S): 2 INJECTION INTRAMUSCULAR; INTRAVENOUS; SUBCUTANEOUS at 21:30

## 2019-10-30 RX ADMIN — HYDROMORPHONE HYDROCHLORIDE 0.5 MILLIGRAM(S): 2 INJECTION INTRAMUSCULAR; INTRAVENOUS; SUBCUTANEOUS at 03:40

## 2019-10-30 RX ADMIN — POLYETHYLENE GLYCOL 3350 17 GRAM(S): 17 POWDER, FOR SOLUTION ORAL at 12:23

## 2019-10-30 RX ADMIN — PIPERACILLIN AND TAZOBACTAM 25 GRAM(S): 4; .5 INJECTION, POWDER, LYOPHILIZED, FOR SOLUTION INTRAVENOUS at 06:13

## 2019-10-30 RX ADMIN — Medication 1 ENEMA: at 17:18

## 2019-10-30 NOTE — PROGRESS NOTE ADULT - SUBJECTIVE AND OBJECTIVE BOX
Patient is a 64y old  Male who presents with a chief complaint of CC: Abdominal pain and lethargy (30 Oct 2019 06:37)      SUBJECTIVE / OVERNIGHT EVENTS: OVernight aptient tachy to 120. Patient seen and examined at bedside. Has lots of coughing with phlegm production. Denies f/c/cp/sob/n/v/diarrhea. Endorses some anxiety. Says abdominal pain is so-so. Was able to sleep last night.    MEDICATIONS  (STANDING):  DULoxetine 30 milliGRAM(s) Oral daily  fentaNYL   Patch  25 MICROgram(s)/Hr 1 Patch Transdermal every 72 hours  heparin  Injectable 5000 Unit(s) SubCutaneous every 8 hours  influenza   Vaccine 0.5 milliLiter(s) IntraMuscular once  ondansetron Injectable 4 milliGRAM(s) IV Push every 8 hours  pantoprazole  Injectable 40 milliGRAM(s) IV Push two times a day  piperacillin/tazobactam IVPB.. 3.375 Gram(s) IV Intermittent every 8 hours  polyethylene glycol 3350 17 Gram(s) Oral daily  senna Syrup 10 milliLiter(s) Oral at bedtime  vancomycin  IVPB 1250 milliGRAM(s) IV Intermittent every 12 hours    MEDICATIONS  (PRN):  acetaminophen   Tablet .. 650 milliGRAM(s) Oral every 4 hours PRN Mild Pain (1 - 3)  acetaminophen   Tablet .. 1000 milliGRAM(s) Oral every 6 hours PRN Temp greater or equal to 38C (100.4F) and mild pain  guaiFENesin    Syrup 100 milliGRAM(s) Oral every 6 hours PRN cough and phlegm  HYDROmorphone  Injectable 0.5 milliGRAM(s) IV Push every 3 hours PRN Severe Pain (7 - 10)  LORazepam   Injectable 1 milliGRAM(s) IV Push every 8 hours PRN Anxiety      Vital Signs Last 24 Hrs  T(C): 37.1 (30 Oct 2019 06:01), Max: 37.6 (29 Oct 2019 21:23)  T(F): 98.8 (30 Oct 2019 06:01), Max: 99.6 (29 Oct 2019 21:23)  HR: 101 (30 Oct 2019 06:01) (97 - 121)  BP: 115/79 (30 Oct 2019 06:01) (103/67 - 119/73)  BP(mean): --  RR: 17 (30 Oct 2019 06:01) (16 - 19)  SpO2: 98% (30 Oct 2019 06:01) (97% - 98%)  CAPILLARY BLOOD GLUCOSE        I&O's Summary    29 Oct 2019 07:01  -  30 Oct 2019 07:00  --------------------------------------------------------  IN: 2080 mL / OUT: 200 mL / NET: 1880 mL        PHYSICAL EXAM:  GENERAL: Mild distress, cachectic. Dysarthritic speech.  HEAD:  Atraumatic, normocephalic  EYES: EOMI, conjunctiva and sclera clear  NECK: L-sided mass with bandage limiting range of motion.  CHEST/LUNG: End-expiratory crackles bilaterally; No wheezes  HEART: Regular rate and rhythm; No murmurs, rubs, or gallops  ABDOMEN: Soft, moderately tender, nondistended; Bowel sounds present. PEG tube placed, moist with gauze around entry site. No surrounding erythema.  EXTREMITIES:  2+ Peripheral Pulses, No clubbing, cyanosis, or edema  NEUROLOGY: non-focal  SKIN: No rashes or lesions besides neck wound    LABS:    10-29    135  |  94<L>  |  16  ----------------------------<  101<H>  3.6   |  31  |  0.56    Ca    8.7      29 Oct 2019 06:30  Phos  3.1     10-29  Mg     1.8     10-29    Vancomycin level: 7.3    Blood cultures (48h): NGTD    Wound culture is growing Streptococcus agalactiae and Klebsiella pneumoniae    Consultant(s) Notes Reviewed:  Infectious Disease, Palliative Care    Tej Mcgill, PGY-1; [facility] Pager: [phone]; [facility] Pager: [phone].

## 2019-10-30 NOTE — PROVIDER CONTACT NOTE (OTHER) - ACTION/TREATMENT ORDERED:
no new treatment was ordered at this time
Will assess pt. at bedside.  No further interventions at this time.
1L bolus of LR ordered.  Continue to monitor for s/s of acute distress.  No further interventions at this time.
Continue to monitor post tylenol administration.  No further intervention at this time.
MD made aware. Give pt bowel movement medications. Continue to encourage pt to feed if can tolerate. Continue to reassess pt. Will continue to monitor.
Pt. assessed at bedside, BP rechecked, improvement noted.  Will order decreased dose of dilaudid.
Will assess pt. at bedside.
Will f/u with md with orderers.

## 2019-10-30 NOTE — PROGRESS NOTE ADULT - PROBLEM SELECTOR PLAN 3
.  > Continue Cymbalta 30mg via PEG daily. Can do rapid uptitration every 7 days.  > Consider changing Lorazepam orders to: Lorazepam 0.5mg PO q8h PRN. I would like to schedule it to 0.5mg PO q8h in the evening/ bedtime and then 0.5mg PO qdaily PRN once the Duloxetine has reached steady state.  > Consider a Psych consult. I discussed this with him.

## 2019-10-30 NOTE — PROVIDER CONTACT NOTE (OTHER) - RECOMMENDATIONS
administer PRN pain medication, and reassess
Consider assessing pt. at bedside.
Consider assessing patient at the bedside.
Consider ordering IV fluid bolus.  Continue to monitor for s/s of acute distress.
No orderers made, will f/u with MD with orderers
Notify MD
Pt.  medicated for pain as ordered.  Consider assessing pt. at the bedside & ordering tylenol for fever.
Tylenol administered as ordered.  Will continue to monitor.

## 2019-10-30 NOTE — PROGRESS NOTE ADULT - PROBLEM SELECTOR PLAN 2
Likely from not taking in tube feeds with resultant hypovolemic hyponatremia  -Na consistently around 130  -c/w tube feeds.  Nutrition recs appreciated. Continue to monitor with tube feeds resumption.

## 2019-10-30 NOTE — PROVIDER CONTACT NOTE (OTHER) - BACKGROUND
Pt hx of SCC, came in for hyponatremia dx
Pt. admitted for Abdominal pain, lethargy, dark stools, was found to be hyponatremic.  Currently on antibiotics for sepsis work up.
Pt. admitted for Abdominal pain, lethargy, dark stools, was found to be hyponatremic.  Currently on antibiotics for sepsis work up.  Has been receiving dilaudid for pain, currently requesting next dos
Pt. admitted for hyponatremia, abd pain, lethargy, & dark stools
Pt. admitted for hyponatremia.  P/w abdominal pain, lethargy, & dark stools.
pt was admitted with hyponatremia
Pt was admitted for Increase pain, hyponatremia, and failure to thrive
Pt. admitted for hyponatremia, abd pain, lethargy, & dark stools

## 2019-10-30 NOTE — PROGRESS NOTE ADULT - PROBLEM SELECTOR PLAN 4
Likely 2/2 new metastatic disease  - c/w 25mcg fentanyl patch, 0.5mg dilaudid IV q3h PRN. Discussed w/ palliative care.   - c/w Duloxetine 30mg  - c/w senna, miralax for reduced stools production

## 2019-10-30 NOTE — PROGRESS NOTE ADULT - PROBLEM SELECTOR PLAN 3
-Likely from disease progression as evidenced by CT a/p findings consistent with new metastatic disease  -Pain control: c/w fentanyl 25 mcg patch q72h, 0.5 mg IV dilaudid q3h PRN, duloxetine through PEG  -Nutrition recs appreciated.

## 2019-10-30 NOTE — PROVIDER CONTACT NOTE (OTHER) - SITUATION
Pt refusing tube feedings
Pt. found to have BP of 80/52 manually.
Pt. found to have temp of 102.1.
Pt. was found to be hypotensive with BP of 85/55 & tachycardic with HR of 123.
Pt. was found to have a temp. of 101.2 & heart rate of 120.
pt is febrile with temp 100.1f
Pt is tachycardic
Pt. found to have BP of 88/59 s/p 1L bolus of LR.

## 2019-10-30 NOTE — PROGRESS NOTE ADULT - PROBLEM SELECTOR PLAN 1
Patient febrile overnight with tachycardia to 145. Possible source enteritis seen on CT on admission.  - ID on board  - Wound cultures showing Klebsiella pneumoniae and Strep agalactiae (group B)  - c/w vanc, zosyn  - f/u BCx (NGTD)  - ctm WBC, temps Patient febrile overnight with tachycardia to 145. Possible source enteritis seen on CT on admission.  - ID on board  - Wound cultures showing Klebsiella pneumoniae and Strep agalactiae (group B)  - c/w VancLarysylaisha (10/27-)  - f/u BCx (NGTD)  - ctm WBC, temps

## 2019-10-30 NOTE — PROVIDER CONTACT NOTE (OTHER) - DATE AND TIME:
28-Oct-2019 02:19
27-Oct-2019 08:00
27-Oct-2019 22:40
28-Oct-2019 04:29
28-Oct-2019 22:14
29-Oct-2019 05:40
30-Oct-2019 17:52
29-Oct-2019 21:25

## 2019-10-30 NOTE — PROGRESS NOTE ADULT - SUBJECTIVE AND OBJECTIVE BOX
Follow Up: Fever    Interval History/ROS: No fevers in almost 48hrs.     Allergies  Bactrim (Hives)  carboplatin (Pruritus)  cetuximab (Hives; Pruritus)    ANTIMICROBIALS:  piperacillin/tazobactam IVPB.. 3.375 every 8 hours    OTHER MEDS:  acetaminophen   Tablet .. 650 milliGRAM(s) Oral every 4 hours PRN  acetaminophen   Tablet .. 1000 milliGRAM(s) Oral every 6 hours PRN  DULoxetine 30 milliGRAM(s) Oral daily  fentaNYL   Patch  25 MICROgram(s)/Hr 1 Patch Transdermal every 72 hours  guaiFENesin    Syrup 100 milliGRAM(s) Oral every 6 hours PRN  heparin  Injectable 5000 Unit(s) SubCutaneous every 8 hours  HYDROmorphone  Injectable 0.5 milliGRAM(s) IV Push every 3 hours PRN  influenza   Vaccine 0.5 milliLiter(s) IntraMuscular once  LORazepam   Injectable 1 milliGRAM(s) IV Push every 8 hours PRN  ondansetron Injectable 4 milliGRAM(s) IV Push every 8 hours  pantoprazole  Injectable 40 milliGRAM(s) IV Push two times a day  polyethylene glycol 3350 17 Gram(s) Oral daily  senna Syrup 10 milliLiter(s) Oral at bedtime      Vital Signs Last 24 Hrs  T(C): 37 (30 Oct 2019 14:12), Max: 37.6 (29 Oct 2019 21:23)  T(F): 98.6 (30 Oct 2019 14:12), Max: 99.6 (29 Oct 2019 21:23)  HR: 115 (30 Oct 2019 14:12) (97 - 121)  BP: 110/64 (30 Oct 2019 14:12) (106/67 - 120/78)  BP(mean): --  RR: 16 (30 Oct 2019 14:12) (16 - 19)  SpO2: 98% (30 Oct 2019 14:12) (97% - 98%)    Physical Exam:  General: NAD, non toxic  Eye: normal sclera and conjunctiva  ENT: jaw disfigured. left angle of mandible with open tracking wound, serous drainage, no surrounding cellulitis.   Cardio:  regular rate and rhythm   Respiratory: nonlabored, clear bilaterally, no wheezing  abd: PEG. soft, BS +, no tenderness  Musculoskeletal: no joint swelling, no edema  vascular: no phlebitis   Skin: no rash  Neurologic: no focal deficit  psych: normal affect                          9.4    8.56  )-----------( 207      ( 30 Oct 2019 06:22 )             28.6       10-30    128<L>  |  89<L>  |  10  ----------------------------<  189<H>  3.5   |  28  |  0.50    Ca    8.7      30 Oct 2019 06:22  Phos  2.8     10-30  Mg     1.7     10-30      MICROBIOLOGY:  Vancomycin Level, Trough: 7.3 ug/mL (10-30-19 @ 01:00)    Culture - Blood (collected 10-28-19 @ 01:54)  Source: BLOOD VENOUS  Preliminary Report (10-30-19 @ 01:55):    NO ORGANISMS ISOLATED    NO ORGANISMS ISOLATED AT 48 HRS.    Culture - Blood (collected 10-28-19 @ 01:54)  Source: BLOOD PERIPHERAL  Preliminary Report (10-30-19 @ 01:55):    NO ORGANISMS ISOLATED    NO ORGANISMS ISOLATED AT 48 HRS.    Culture - Wound (collected 10-28-19 @ 00:40)  Source: OTHER  Final Report (10-30-19 @ 15:35):    MODERATECR6    Streptococcus  agalactiae (Group B) isolated. Group BCR6    Streptococci are susceptible to ampicillin, andCR6    cefazolin, but may be resistant to erythromycin andCR6    clindamycin.CR6    Recommendations for intrapartum prophylaxis for Group BCR6    streptococci are penicillin or ampicillin.  Organism: Klebsiella pneumoniae  Streptococcus agalactiae Gp B (10-30-19 @ 15:35)  Organism: Streptococcus agalactiae Gp B (10-30-19 @ 15:35)  Organism: Klebsiella pneumoniae (10-30-19 @ 15:35)      -  Amikacin: S <=8 GIBRAN      -  Ampicillin: R >16 GIBRAN      -  Ampicillin/Sulbactam: S <=4/2 GIBRAN      -  Aztreonam: S <=4 GIBRAN      -  Cefazolin: S <=2 GIBRAN      -  Cefepime: S <=2 GIBRAN      -  Cefoxitin: S <=4 GIBRAN      -  Ceftazidime: S <=1 GIBRAN      -  Ceftriaxone: S <=1 GIBRAN      -  Ertapenem: S <=0.5 GIBRAN      -  Gentamicin: S <=1 GIBRAN      -  Imipenem: S <=1 GIBRAN      -  Levofloxacin: S <=1 GIBRAN      -  Meropenem: S <=1 GIBRAN      -  Piperacillin/Tazobactam: S <=8 GIBRAN      -  Tigecycline: S      -  Tobramycin: S <=2 GIBRAN      -  Trimethoprim/Sulfamethoxazole: S <=0.5/9.5 GIBRAN      Method Type: NEGATIVE GIBRAN 43    RADIOLOGY:  Images below reviewed personally  CT Neck Soft Tissue w/ IV Cont (10.29.19 @ 16:44)   Redemonstration of a large infiltrative mass within the left oral cavity   and floor of mouth extending into the left submandibular space and   parotid space with central air and new fistulization with the skin.   Superimposed abscess or phlegmon cannot entirely be excluded within this region of central necrosis.  Soft tissue thickening and mucosal ulceration at the junction between the   right pharyngoepiglottic fold and vallecula where increased uptake was seen on recent PET suspicious for tumor infiltration.  Enlarging necrotic right level IV lymph node and mildly enlarging left level II lymph node suspicious for metastatic disease.  Findings suspicious for new necrotic mediastinal adenopathy.  Partially visualized new tree-in-bud opacity in the right upper lobe   which may represent pneumonia. Dedicated CT of the chest is recommended for further evaluation.

## 2019-10-30 NOTE — CHART NOTE - NSCHARTNOTEFT_GEN_A_CORE
Notified for elevated HR to 120s and episode of ?vomiting. Pt seen at bedside. Pt in mild distress from abdominal pain, unchanged since prior. Pt also reports episode of questionable vomiting, which he believes is all phlegm, which he has had trouble expelling. Pt otherwise denies fevers, chills, chest p/p, sob, n/v/d. Vitals significant for HR of 119, otherwise rest of vitals wnl. Physical exam unremarkable at this time, lung sounds clear. Elevated HR possibly 2/2 to abdominal pain vs dehydration    Will give:  [] tessalon nae and Robitussin to break phlegm  [] free water through PEG for dehydration, no IV fluids for now. Notified for elevated HR to 120s and episode of ?vomiting. Pt seen at bedside. Pt in mild distress from abdominal pain, unchanged since prior. Pt also reports episode of questionable vomiting, which he believes is all phlegm, which he has had trouble expelling. Pt otherwise denies fevers, chills, chest p/p, sob, n/v/d. Vitals significant for HR of 119, otherwise rest of vitals wnl. Physical exam unremarkable at this time, lung sounds clear. Elevated HR possibly 2/2 to abdominal pain vs dehydration    Will give:  [] Robitussin to break phlegm  [] free water through PEG for dehydration, no IV fluids for now  [] c/w current pain regimen give previous regimen caused hypotension Notified for elevated HR to 120s and episode of ?vomiting. Pt seen at bedside. Pt in mild distress from abdominal pain, unchanged since prior. Pt also reports episode of questionable vomiting, which he believes is all phlegm, which he has had trouble expelling. Pt otherwise denies fevers, chills, chest p/p, sob, n/v/d. Vitals significant for HR of 119, otherwise rest of vitals wnl. Physical exam unremarkable at this time, lung sounds clear. Elevated HR possibly 2/2 to abdominal pain vs dehydration    Will give:  [] Robitussin to break phlegm  [] free water through PEG for dehydration, no IV fluids for now  [] c/w current pain regimen given previous regimen caused hypotension

## 2019-10-30 NOTE — PROVIDER CONTACT NOTE (OTHER) - ASSESSMENT
Pt is complaining of chronic abdominal pain. he denies having chest pain of SOB
Pt. is currently resting comfortably with no s/s of acute distress noted.  Denies headache, lightheadedness, dizziness, or any other associated s/s.
Pt complains of feeling bloated and gassy after feeding. Agreeable to free water. Attempting to have bowel movement utilizing medications .
Pt. c/o abd pain, was coughing at the time.  No c/o CP, chest tightness, SOB, or any other associated s/s.
Pt. is currently resting comfortably with no s/s of acute distress noted.  Denies any associated s/s.
Pt. is resting comfortably with no s/s of acute distress noted.  Denies headache, dizziness, lightheadedness, & chest pain/discomfort.
Pt. is resting comfortably with no s/s of acute distress noted.  Denies headache, dizziness, lightheadedness, or any other associated s/s.
pt is febrile with low grade temp, also pt is coughing

## 2019-10-30 NOTE — PROGRESS NOTE ADULT - PROBLEM SELECTOR PLAN 4
.  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   > If no BM after suppository, please offer enema

## 2019-10-30 NOTE — PROGRESS NOTE ADULT - PROBLEM SELECTOR PLAN 1
.  # Cancer-related pain  # Anticipatory pain    - NPO, even medications/ PEG in situ  - Meds he has tried before and developed a reaction to: Methadone (nausea), Morphine (no effect), Oxycodone (no effect)  - Current meds: Fentanyl patch 50mcg/hr (doubled from 25mcg/hr, started 10/2819, 10:30am), Dilaudid 1.5mg IV q3h PRN    OME  10/29: 160mg  10/28: 290mg (Fentanyl patch peaking)   10/27: 250mg    > Significantly less OME's compared to previosu days but STILL with good pain control- the pain really does seem to be more anticipatory or anxiety-driven  > Continue Fentanyl patch 25mcg/hr (started 10/29 20:36) --> no fevers past 24h  > Continue Dilaudid at 0.5mg IV q3h PRN--> he took 6 doses in past 24h  > If the patient is on a stable dose tomorrow, will convert to Dilaudid tablets. I discussed this with the patient and he agreed. His overall goal is to make it to [place] and he realizes that he cannot do that if he is on high doses of IV medications that can make him somnolent

## 2019-10-30 NOTE — PROGRESS NOTE ADULT - PROBLEM SELECTOR PLAN 2
.  # Peritoneal carcinomatosis  # R/o Fecal impaction    > Continue Senna  > Offer suppository today. If no BM by tomorrow, consider enema  > Consider: Abdominal x-ray to evaluate for impaction, if no BM by tomorrow

## 2019-10-30 NOTE — PROGRESS NOTE ADULT - SUBJECTIVE AND OBJECTIVE BOX
HPI:  Mr. Ventura is a 64 year old man with a history of:  # SCC of the throat  - Initially dx in 2015, with recurrence 2019  - Newly discovered diffuse intra-abdominal mets 10/2019  - Plan to travel to [place] this Sunday for Car-T    Palliative Medicine was asked to co-manage his symptoms.      ========================================================================================    10/30  - I spoke extensively with the patient this morning about my concerns about his pain management. The patient DID NOT even remember the multiple visits I had with him yesterday. I shared with him that the side effects of the medications can put him at risk for more serious events like respiratory depression and even death  - I spoke to him about his anxiety and pain, and the interplay of the two (makes each other worse). He did admit he was feeling more of the anxiety rather than the pain. I said that I do not doubt he has opioid-responsive pain, but we have to address both appropriately  - Aside from this, the patient was c/o abdominal pain which is episodic. He says at times it feels "gassy" but it "does not want to come out". He has been having watery BM's but mixed with hard pellets.       ========================================================================================  INTERVAL HPI/OVERNIGHT EVENTS:    Code Status:   Allergies    Bactrim (Hives)  carboplatin (Pruritus)  cetuximab (Hives; Pruritus)    Intolerances    MEDICATIONS  (STANDING):  bisacodyl Suppository 10 milliGRAM(s) Rectal once  DULoxetine 30 milliGRAM(s) Oral daily  fentaNYL   Patch  25 MICROgram(s)/Hr 1 Patch Transdermal every 72 hours  heparin  Injectable 5000 Unit(s) SubCutaneous every 8 hours  influenza   Vaccine 0.5 milliLiter(s) IntraMuscular once  ondansetron Injectable 4 milliGRAM(s) IV Push every 8 hours  pantoprazole  Injectable 40 milliGRAM(s) IV Push two times a day  piperacillin/tazobactam IVPB.. 3.375 Gram(s) IV Intermittent every 8 hours  polyethylene glycol 3350 17 Gram(s) Oral daily  senna Syrup 10 milliLiter(s) Oral at bedtime  vancomycin  IVPB 1250 milliGRAM(s) IV Intermittent every 12 hours    MEDICATIONS  (PRN):  acetaminophen   Tablet .. 650 milliGRAM(s) Oral every 4 hours PRN Mild Pain (1 - 3)  acetaminophen   Tablet .. 1000 milliGRAM(s) Oral every 6 hours PRN Temp greater or equal to 38C (100.4F) and mild pain  guaiFENesin    Syrup 100 milliGRAM(s) Oral every 6 hours PRN cough and phlegm  HYDROmorphone  Injectable 0.5 milliGRAM(s) IV Push every 3 hours PRN Severe Pain (7 - 10)  LORazepam   Injectable 1 milliGRAM(s) IV Push every 8 hours PRN Anxiety      PRESENT SYMPTOMS: [ ]Unable to obtain due to poor mentation   Source if other than patient:  [ ]Family   [ ]Team     Pain (Impact on QOL): as above  Location:  Severity:  Minimal acceptable level (0-10 scale):       Quality:       Onset:  Duration:  Aggravating factors:  Relieving Factors  Radiation:    Dyspnea:  Yes [ ] No [ X] - [ ]Mild [ ]Moderate [ ]Severe  Anxiety:    Yes [X ] No [ ] - [ ]Mild [X ]Moderate [ ]Severe  Fatigue:    Yes [X ] No [ ] - [X ]Mild [ ]Moderate [ ]Severe  Nausea:    Yes [ ] No [X ] - [ ]Mild [ ]Moderate [ ]Severe                         Loss of appetite: Yes [ ] No [ ] - [ ]Mild [ ]Moderate [ ]Severe             Constipation:  Yes [X ] No [ ] - [ ]Mild [X ]Moderate [ ]Severe  Grief: Yes [ ] No [ ]       Other Symptoms:  [X ]All other review of systems negative       =========================================================================================  PHYSICAL EXAM:  Vital Signs Last 24 Hrs  T(C): 37 (30 Oct 2019 11:11), Max: 37.6 (29 Oct 2019 21:23)  T(F): 98.6 (30 Oct 2019 11:11), Max: 99.6 (29 Oct 2019 21:23)  HR: 113 (30 Oct 2019 11:11) (97 - 121)  BP: 117/72 (30 Oct 2019 11:11) (106/67 - 120/78)  BP(mean): --  RR: 16 (30 Oct 2019 11:11) (16 - 19)  SpO2: 98% (30 Oct 2019 11:11) (97% - 98%)    GEN: Awake ,alert, NAD  HEENT: +Wound bag left neck, +Muffled voice  PULM: Comfortable work of breathing  CV: RRR, normal S2 and S2, no murmurs, Regular pulse  ABD: Soft, +tenderness to palpation  EXT: No edema  PSYCH: Unable to assess  NEURO: No gross deficits, No tremors  SKIN: No rashes, lesions on exposed skin, No jaundice      =========================================================================================  LABS:             10-30    128<L>  |  89<L>  |  10  ----------------------------<  189<H>  3.5   |  28  |  0.50    Ca    8.7      30 Oct 2019 06:22  Phos  2.8     10-30  Mg     1.7     10-30

## 2019-10-30 NOTE — PROGRESS NOTE ADULT - ASSESSMENT
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   Developed fevers up to 102.1F. Unclear if they represent an acute infection. He feels fine and doesn't even notice them.   The mass is large and necrotic and is growing GBS and Klebsiella pneumoniae although he has no pain, there's no cellulitis/pus and it's not a sterile site to begin with. Chronic unchanged cough without PNA on CXR. No pyuria or dysuria. Tumor fever?   Planning transfer to [facility] in [place] for further cancer treatment.     Suggest  -let's stop Vancomycin  -if he remains afebrile and clinically well tomorrow we can change Zosyn to Levaquin 500mg PO daily to complete a week of antibiotics     Spoke with primary team     Tres Cobb MD   Infectious Disease   Pager [phone]   After 5PM and on weekends please page fellow on call or call [phone]

## 2019-10-30 NOTE — PROGRESS NOTE ADULT - ASSESSMENT
10/30:    # PAIN MANAGEMENT  - Spoke to patient earlier today and he was feeling more alert. He could NOT remember my visits yesterday  - D/w him the adverse effects of opioids including: sedation, respiratory depression, constipation  - Also d/w him that anxiety and pain are hard to distinguish, but it seems he is using the medications as a chemical crutch to treat his anxiety  - He agreed that it was more of anxiety that was driving his symptoms    > Continue Fentanyl patch 25mcg/hr (started 10/29 20:36) --> no fevers past 24h  > Continue Dilaudid at 0.5mg IV q3h PRN--> he took 6 doses in past 24h  > If the patient is on a stable dose tomorrow, will convert to Dilaudid tablets. I discussed this with the patient and he agreed. His overall goal is to make it to [place] and he realizes that he cannot do that if he is on high doses of IV medications that can make him somnolent      # ANXIETY  > Continue Cymbalta 30mg via PEG daily. Can do rapid uptitration every 7 days.  > Consider changing Lorazepam orders to: Lorazepam 0.5mg PO q8h PRN. I would like to schedule it to 0.5mg PO q8h in the evening/ bedtime and then 0.5mg PO qdaily PRN once the Duloxetine has reached steady state.  > Consider a Psych consult. I discussed this with him.      # SECRETIONS  > Please setup a Yankauer suction at bedside      *****REST OF CONSULT NOTE TO FOLLOW 63yo M with SCC of the throat, newly diagnosed diffuse intra-abdominal metastases. Palliative consulted for symptom management.

## 2019-10-30 NOTE — PROGRESS NOTE ADULT - PROBLEM SELECTOR PLAN 6
-Follows with Dr. Cai in Oncology and Dr. Hdz with ENT  -Patient is s/p many interventions and is currently in a trial for CAR-T therapy at [facility] in [place]. Was advised not to use steroids.  -Oncology following  -Continue GOC discussions

## 2019-10-31 ENCOUNTER — TRANSCRIPTION ENCOUNTER (OUTPATIENT)
Age: 64
End: 2019-10-31

## 2019-10-31 VITALS
DIASTOLIC BLOOD PRESSURE: 88 MMHG | TEMPERATURE: 98 F | OXYGEN SATURATION: 100 % | SYSTOLIC BLOOD PRESSURE: 137 MMHG | HEART RATE: 111 BPM

## 2019-10-31 LAB
ANION GAP SERPL CALC-SCNC: 15 MMO/L — HIGH (ref 7–14)
BASOPHILS # BLD AUTO: 0.02 K/UL — SIGNIFICANT CHANGE UP (ref 0–0.2)
BASOPHILS NFR BLD AUTO: 0.2 % — SIGNIFICANT CHANGE UP (ref 0–2)
BUN SERPL-MCNC: 10 MG/DL — SIGNIFICANT CHANGE UP (ref 7–23)
CALCIUM SERPL-MCNC: 9.3 MG/DL — SIGNIFICANT CHANGE UP (ref 8.4–10.5)
CHLORIDE SERPL-SCNC: 87 MMOL/L — LOW (ref 98–107)
CO2 SERPL-SCNC: 26 MMOL/L — SIGNIFICANT CHANGE UP (ref 22–31)
CREAT SERPL-MCNC: 0.55 MG/DL — SIGNIFICANT CHANGE UP (ref 0.5–1.3)
EOSINOPHIL # BLD AUTO: 0.23 K/UL — SIGNIFICANT CHANGE UP (ref 0–0.5)
EOSINOPHIL NFR BLD AUTO: 2.6 % — SIGNIFICANT CHANGE UP (ref 0–6)
GLUCOSE SERPL-MCNC: 187 MG/DL — HIGH (ref 70–99)
HCT VFR BLD CALC: 32.1 % — LOW (ref 39–50)
HGB BLD-MCNC: 10.2 G/DL — LOW (ref 13–17)
IMM GRANULOCYTES NFR BLD AUTO: 0.5 % — SIGNIFICANT CHANGE UP (ref 0–1.5)
LYMPHOCYTES # BLD AUTO: 0.37 K/UL — LOW (ref 1–3.3)
LYMPHOCYTES # BLD AUTO: 4.2 % — LOW (ref 13–44)
MAGNESIUM SERPL-MCNC: 1.8 MG/DL — SIGNIFICANT CHANGE UP (ref 1.6–2.6)
MCHC RBC-ENTMCNC: 27.8 PG — SIGNIFICANT CHANGE UP (ref 27–34)
MCHC RBC-ENTMCNC: 31.8 % — LOW (ref 32–36)
MCV RBC AUTO: 87.5 FL — SIGNIFICANT CHANGE UP (ref 80–100)
MONOCYTES # BLD AUTO: 0.64 K/UL — SIGNIFICANT CHANGE UP (ref 0–0.9)
MONOCYTES NFR BLD AUTO: 7.2 % — SIGNIFICANT CHANGE UP (ref 2–14)
NEUTROPHILS # BLD AUTO: 7.57 K/UL — HIGH (ref 1.8–7.4)
NEUTROPHILS NFR BLD AUTO: 85.3 % — HIGH (ref 43–77)
NRBC # FLD: 0 K/UL — SIGNIFICANT CHANGE UP (ref 0–0)
PHOSPHATE SERPL-MCNC: 3.3 MG/DL — SIGNIFICANT CHANGE UP (ref 2.5–4.5)
PLATELET # BLD AUTO: 288 K/UL — SIGNIFICANT CHANGE UP (ref 150–400)
PMV BLD: 9.6 FL — SIGNIFICANT CHANGE UP (ref 7–13)
POTASSIUM SERPL-MCNC: 3.5 MMOL/L — SIGNIFICANT CHANGE UP (ref 3.5–5.3)
POTASSIUM SERPL-SCNC: 3.5 MMOL/L — SIGNIFICANT CHANGE UP (ref 3.5–5.3)
RBC # BLD: 3.67 M/UL — LOW (ref 4.2–5.8)
RBC # FLD: 14 % — SIGNIFICANT CHANGE UP (ref 10.3–14.5)
SODIUM SERPL-SCNC: 128 MMOL/L — LOW (ref 135–145)
WBC # BLD: 8.87 K/UL — SIGNIFICANT CHANGE UP (ref 3.8–10.5)
WBC # FLD AUTO: 8.87 K/UL — SIGNIFICANT CHANGE UP (ref 3.8–10.5)

## 2019-10-31 PROCEDURE — 99232 SBSQ HOSP IP/OBS MODERATE 35: CPT

## 2019-10-31 PROCEDURE — 99233 SBSQ HOSP IP/OBS HIGH 50: CPT | Mod: GC

## 2019-10-31 PROCEDURE — 99233 SBSQ HOSP IP/OBS HIGH 50: CPT

## 2019-10-31 RX ORDER — DULOXETINE HYDROCHLORIDE 30 MG/1
1 CAPSULE, DELAYED RELEASE ORAL
Qty: 0 | Refills: 0 | DISCHARGE
Start: 2019-10-31

## 2019-10-31 RX ADMIN — PIPERACILLIN AND TAZOBACTAM 25 GRAM(S): 4; .5 INJECTION, POWDER, LYOPHILIZED, FOR SOLUTION INTRAVENOUS at 14:27

## 2019-10-31 RX ADMIN — Medication 1000 MILLIGRAM(S): at 14:28

## 2019-10-31 RX ADMIN — HYDROMORPHONE HYDROCHLORIDE 0.5 MILLIGRAM(S): 2 INJECTION INTRAMUSCULAR; INTRAVENOUS; SUBCUTANEOUS at 04:00

## 2019-10-31 RX ADMIN — HYDROMORPHONE HYDROCHLORIDE 0.5 MILLIGRAM(S): 2 INJECTION INTRAMUSCULAR; INTRAVENOUS; SUBCUTANEOUS at 08:40

## 2019-10-31 RX ADMIN — ONDANSETRON 4 MILLIGRAM(S): 8 TABLET, FILM COATED ORAL at 05:26

## 2019-10-31 RX ADMIN — HYDROMORPHONE HYDROCHLORIDE 0.5 MILLIGRAM(S): 2 INJECTION INTRAMUSCULAR; INTRAVENOUS; SUBCUTANEOUS at 03:37

## 2019-10-31 RX ADMIN — HEPARIN SODIUM 5000 UNIT(S): 5000 INJECTION INTRAVENOUS; SUBCUTANEOUS at 05:26

## 2019-10-31 RX ADMIN — Medication 1 MILLIGRAM(S): at 05:27

## 2019-10-31 RX ADMIN — PANTOPRAZOLE SODIUM 40 MILLIGRAM(S): 20 TABLET, DELAYED RELEASE ORAL at 05:26

## 2019-10-31 RX ADMIN — HYDROMORPHONE HYDROCHLORIDE 0.5 MILLIGRAM(S): 2 INJECTION INTRAMUSCULAR; INTRAVENOUS; SUBCUTANEOUS at 08:27

## 2019-10-31 RX ADMIN — ONDANSETRON 4 MILLIGRAM(S): 8 TABLET, FILM COATED ORAL at 14:28

## 2019-10-31 RX ADMIN — INFLUENZA VIRUS VACCINE 0.5 MILLILITER(S): 15; 15; 15; 15 SUSPENSION INTRAMUSCULAR at 14:51

## 2019-10-31 RX ADMIN — HYDROMORPHONE HYDROCHLORIDE 0.5 MILLIGRAM(S): 2 INJECTION INTRAMUSCULAR; INTRAVENOUS; SUBCUTANEOUS at 12:30

## 2019-10-31 RX ADMIN — DULOXETINE HYDROCHLORIDE 30 MILLIGRAM(S): 30 CAPSULE, DELAYED RELEASE ORAL at 12:14

## 2019-10-31 RX ADMIN — Medication 1000 MILLIGRAM(S): at 15:00

## 2019-10-31 RX ADMIN — PIPERACILLIN AND TAZOBACTAM 25 GRAM(S): 4; .5 INJECTION, POWDER, LYOPHILIZED, FOR SOLUTION INTRAVENOUS at 05:27

## 2019-10-31 RX ADMIN — HYDROMORPHONE HYDROCHLORIDE 0.5 MILLIGRAM(S): 2 INJECTION INTRAMUSCULAR; INTRAVENOUS; SUBCUTANEOUS at 12:12

## 2019-10-31 RX ADMIN — HYDROMORPHONE HYDROCHLORIDE 0.5 MILLIGRAM(S): 2 INJECTION INTRAMUSCULAR; INTRAVENOUS; SUBCUTANEOUS at 00:00

## 2019-10-31 RX ADMIN — FENTANYL CITRATE 1 PATCH: 50 INJECTION INTRAVENOUS at 05:25

## 2019-10-31 NOTE — PROGRESS NOTE ADULT - PROBLEM SELECTOR PROBLEM 4
Marlin
Marlin
Congestion of throat
Drug-induced constipation
Generalized abdominal pain
Generalized abdominal pain
Marlin
Drug-induced constipation
Generalized abdominal pain

## 2019-10-31 NOTE — PROGRESS NOTE ADULT - SUBJECTIVE AND OBJECTIVE BOX
HPI:  Mr. Ventura is a 64 year old man with a history of:  # SCC of the throat  - Initially dx in 2015, with recurrence 2019  - Newly discovered diffuse intra-abdominal mets 10/2019  - Plan to travel to [place] this Sunday for Car-T    Palliative Medicine was asked to co-manage his symptoms.      ========================================================================================    10/31  - Afebrile past 24 hours  - BM -->  - On Fentanyl 25mcg/hr + Dilaudid 0.5mg IV x 6 doses over past 24h  - VSS      ========================================================================================  Code Status:   Allergies    Bactrim (Hives)  carboplatin (Pruritus)  cetuximab (Hives; Pruritus)    MEDICATIONS  (STANDING):  DULoxetine 30 milliGRAM(s) Oral daily  fentaNYL   Patch  25 MICROgram(s)/Hr 1 Patch Transdermal every 72 hours  heparin  Injectable 5000 Unit(s) SubCutaneous every 8 hours  influenza   Vaccine 0.5 milliLiter(s) IntraMuscular once  ondansetron Injectable 4 milliGRAM(s) IV Push every 8 hours  pantoprazole  Injectable 40 milliGRAM(s) IV Push two times a day  piperacillin/tazobactam IVPB.. 3.375 Gram(s) IV Intermittent every 8 hours  polyethylene glycol 3350 17 Gram(s) Oral daily  senna Syrup 10 milliLiter(s) Oral at bedtime    MEDICATIONS  (PRN):  acetaminophen   Tablet .. 650 milliGRAM(s) Oral every 4 hours PRN Mild Pain (1 - 3)  acetaminophen   Tablet .. 1000 milliGRAM(s) Oral every 6 hours PRN Temp greater or equal to 38C (100.4F) and mild pain  guaiFENesin    Syrup 100 milliGRAM(s) Oral every 6 hours PRN cough and phlegm  HYDROmorphone  Injectable 0.5 milliGRAM(s) IV Push every 3 hours PRN Severe Pain (7 - 10)  LORazepam   Injectable 1 milliGRAM(s) IV Push every 8 hours PRN Anxiety      PRESENT SYMPTOMS: [ ]Unable to obtain due to poor mentation   Source if other than patient:  [ ]Family   [ ]Team     Dyspnea:  Yes [ ] No [ X] - [ ]Mild [ ]Moderate [ ]Severe  Anxiety:    Yes [X ] No [ ] - [ ]Mild [X ]Moderate [ ]Severe  Fatigue:    Yes [X ] No [ ] - [X ]Mild [ ]Moderate [ ]Severe  Nausea:    Yes [ ] No [X ] - [ ]Mild [ ]Moderate [ ]Severe                         Loss of appetite: Yes [ ] No [ ] - [ ]Mild [ ]Moderate [ ]Severe             Constipation:  Yes [X ] No [ ] - [ ]Mild [X ]Moderate [ ]Severe  Grief: Yes [ ] No [ ]       Other Symptoms:  [X ]All other review of systems negative       =========================================================================================  PHYSICAL EXAM:  Vital Signs Last 24 Hrs  T(C): 36.7 (31 Oct 2019 05:23), Max: 37 (30 Oct 2019 11:11)  T(F): 98.1 (31 Oct 2019 05:23), Max: 98.6 (30 Oct 2019 11:11)  HR: 114 (31 Oct 2019 08:23) (99 - 115)  BP: 129/91 (31 Oct 2019 08:23) (110/64 - 135/93)  BP(mean): --  RR: 17 (31 Oct 2019 05:23) (16 - 19)  SpO2: 98% (31 Oct 2019 05:23) (97% - 98%)                  =========================================================================================  LABS:    10-30    128<L>  |  89<L>  |  10  ----------------------------<  189<H>  3.5   |  28  |  0.50    Ca    8.7      30 Oct 2019 06:22  Phos  2.8     10-30  Mg     1.7     10-30 HPI:  Mr. Ventura is a 64 year old man with a history of:  # SCC of the throat  - Initially dx in 2015, with recurrence 2019  - Newly discovered diffuse intra-abdominal mets 10/2019  - Plan to travel to [place] this Sunday for Car-T    Palliative Medicine was asked to co-manage his symptoms.      ========================================================================================    10/31  - Afebrile past 24 hours  - BM --> None  - On Fentanyl 25mcg/hr + Dilaudid 0.5mg IV x 6 doses over past 24h  - VSS      ========================================================================================  Code Status:   Allergies    Bactrim (Hives)  carboplatin (Pruritus)  cetuximab (Hives; Pruritus)    MEDICATIONS  (STANDING):  DULoxetine 30 milliGRAM(s) Oral daily  fentaNYL   Patch  25 MICROgram(s)/Hr 1 Patch Transdermal every 72 hours  heparin  Injectable 5000 Unit(s) SubCutaneous every 8 hours  influenza   Vaccine 0.5 milliLiter(s) IntraMuscular once  ondansetron Injectable 4 milliGRAM(s) IV Push every 8 hours  pantoprazole  Injectable 40 milliGRAM(s) IV Push two times a day  piperacillin/tazobactam IVPB.. 3.375 Gram(s) IV Intermittent every 8 hours  polyethylene glycol 3350 17 Gram(s) Oral daily  senna Syrup 10 milliLiter(s) Oral at bedtime    MEDICATIONS  (PRN):  acetaminophen   Tablet .. 650 milliGRAM(s) Oral every 4 hours PRN Mild Pain (1 - 3)  acetaminophen   Tablet .. 1000 milliGRAM(s) Oral every 6 hours PRN Temp greater or equal to 38C (100.4F) and mild pain  guaiFENesin    Syrup 100 milliGRAM(s) Oral every 6 hours PRN cough and phlegm  HYDROmorphone  Injectable 0.5 milliGRAM(s) IV Push every 3 hours PRN Severe Pain (7 - 10)  LORazepam   Injectable 1 milliGRAM(s) IV Push every 8 hours PRN Anxiety      PRESENT SYMPTOMS: [ ]Unable to obtain due to poor mentation   Source if other than patient:  [ ]Family   [ ]Team     Dyspnea:  Yes [ ] No [ X] - [ ]Mild [ ]Moderate [ ]Severe  Anxiety:    Yes [X ] No [ ] - [ ]Mild [X ]Moderate [ ]Severe  Fatigue:    Yes [X ] No [ ] - [X ]Mild [ ]Moderate [ ]Severe  Nausea:    Yes [ ] No [X ] - [ ]Mild [ ]Moderate [ ]Severe                         Loss of appetite: Yes [ ] No [ ] - [ ]Mild [ ]Moderate [ ]Severe             Constipation:  Yes [X ] No [ ] - [ ]Mild [X ]Moderate [ ]Severe  Grief: Yes [ ] No [ ]       Other Symptoms:  [X ]All other review of systems negative       =========================================================================================  PHYSICAL EXAM:  Vital Signs Last 24 Hrs  T(C): 36.7 (31 Oct 2019 05:23), Max: 37 (30 Oct 2019 11:11)  T(F): 98.1 (31 Oct 2019 05:23), Max: 98.6 (30 Oct 2019 11:11)  HR: 114 (31 Oct 2019 08:23) (99 - 115)  BP: 129/91 (31 Oct 2019 08:23) (110/64 - 135/93)  BP(mean): --  RR: 17 (31 Oct 2019 05:23) (16 - 19)  SpO2: 98% (31 Oct 2019 05:23) (97% - 98%)    GEN: Awake ,alert, NAD  HEENT: +Wound bag left neck, +Muffled voice  PULM: Comfortable work of breathing  CV: RRR, normal S2 and S2, no murmurs, Regular pulse  ABD: Soft, +tenderness to palpation, +BS  EXT: No edema  PSYCH: Unable to assess  NEURO: No gross deficits, No tremors  SKIN: No rashes, lesions on exposed skin, No jaundice      =========================================================================================  LABS:    10-30    128<L>  |  89<L>  |  10  ----------------------------<  189<H>  3.5   |  28  |  0.50    Ca    8.7      30 Oct 2019 06:22  Phos  2.8     10-30  Mg     1.7     10-30

## 2019-10-31 NOTE — PROGRESS NOTE ADULT - PROBLEM SELECTOR PLAN 5
-Highest suspicion for known bleeding from oral ulcer transiting to stomach and being digested.  Lower suspicion from primary GI process such as peptic ulcer or AVM or other malignancy  -c/w PPI  -If hemoglobin dropping or worsened signs of bleeding, will consider further evaluation

## 2019-10-31 NOTE — PROGRESS NOTE ADULT - ATTENDING COMMENTS
I discussed the case and saw the patient with the fellow and agree with the above    Janie Maria MD
Abdominal pain likely d/t progression of metastatic SCC of throat, increase Fentanyl patch to 50mcg and Dilaudid to 1.5mg IV q3h prn, palliative care consult   Hypovolemic hyponatremia vs SIADH, c/w IVF, c/w tube feeds, repeat urine lytes, monitor Na  Squamous cell carcinoma of throat w/ progression of disease, plans to start clinical trial as outpatient   Likely severe protein calorie malnutrition, c/w tube feeds, nutrition eval  Melena likely swallowed blood from throat CA, monitor Hgb
Abdominal pain likely d/t progression of metastatic SCC of throat, pain control w/ Fentanyl patch and Dilaudid IV prn, palliative care consult   Hypovolemic hyponatremia, improving w/ IVF, monitor Na  Squamous cell carcinoma of throat w/ progression of disease, f/up oncology recs   Likely severe protein calorie malnutrition, c/w tube feeds, nutrition eval  Melena likely swallowed blood from throat CA, monitor Hgb
Pt feeling well and tolerating continued neck wound drainage related to necrotic mass. Will discharge on augmentin. Pt has plans for clinical trial at [facility] in [place]. Time planning discharge 35 minutes.
Pt's only complaint today is feeling constipation, and he is requesting suppository. Otherwise, he thinks his neck wound drainage is unchanged and manageable. Will give dulcolax trial and reassess. Will review final CT neck report with ID and decide if further abx are indicated. Goal is to optimize pt medically for discharge as soon as possible so he can travel to [place] and participate in a CAR T-cell therapy trial at [facility]. Time planning discharge 35 minutes.
Continue to monitor electrolytes on tube feeds. Will also continue wound control and monitor pain from neck wound. Plan to contact investigator for pt's clinical trial -- he has plans to fly to [facility] next week for CAR T-cell therapy.
Pt clinically doing well with pain controlled. However, neck wound culture growing GNRs. Will order CT neck to evaluate any deeper soft tissue infection. Continue current abx regimen.

## 2019-10-31 NOTE — PROGRESS NOTE ADULT - PROBLEM SELECTOR PROBLEM 1
Fever of unknown origin
Fever of unknown origin
Hyponatremia
Pain due to neoplasm
Hyponatremia
Hyponatremia
Fever of unknown origin

## 2019-10-31 NOTE — PROGRESS NOTE ADULT - SUBJECTIVE AND OBJECTIVE BOX
Follow Up: Fever    Interval History/ROS: No fever in almost three days. Feels fine except for intermittent abdominal pain that is not new.     Allergies  Bactrim (Hives)  carboplatin (Pruritus)  cetuximab (Hives; Pruritus)    ANTIMICROBIALS:  piperacillin/tazobactam IVPB.. 3.375 every 8 hours      OTHER MEDS:  acetaminophen   Tablet .. 650 milliGRAM(s) Oral every 4 hours PRN  acetaminophen   Tablet .. 1000 milliGRAM(s) Oral every 6 hours PRN  DULoxetine 30 milliGRAM(s) Oral daily  fentaNYL   Patch  25 MICROgram(s)/Hr 1 Patch Transdermal every 72 hours  guaiFENesin    Syrup 100 milliGRAM(s) Oral every 6 hours PRN  heparin  Injectable 5000 Unit(s) SubCutaneous every 8 hours  HYDROmorphone  Injectable 0.5 milliGRAM(s) IV Push every 3 hours PRN  LORazepam   Injectable 1 milliGRAM(s) IV Push every 8 hours PRN  ondansetron Injectable 4 milliGRAM(s) IV Push every 8 hours  pantoprazole  Injectable 40 milliGRAM(s) IV Push two times a day  polyethylene glycol 3350 17 Gram(s) Oral daily  senna Syrup 10 milliLiter(s) Oral at bedtime      Vital Signs Last 24 Hrs  T(C): 36.6 (31 Oct 2019 14:50), Max: 36.7 (31 Oct 2019 05:23)  T(F): 97.9 (31 Oct 2019 14:50), Max: 98.1 (31 Oct 2019 05:23)  HR: 111 (31 Oct 2019 14:50) (99 - 115)  BP: 137/88 (31 Oct 2019 14:50) (121/86 - 139/100)  BP(mean): --  RR: 18 (31 Oct 2019 10:17) (17 - 19)  SpO2: 100% (31 Oct 2019 14:50) (96% - 100%)    Physical Exam:  General: NAD, non toxic  Head: atraumatic, normocephalic  ENT: disfigured jaw. left angle mandible with open wound, no signs of active infection   Cardio:  regular rate and rhythm   Respiratory: nonlabored, clear bilaterally, no wheezing  abd: PEG. soft, BS +   Musculoskeletal: no joint swelling, no edema  Neurologic: no focal deficit  psych: normal affect                          10.2   8.87  )-----------( 288      ( 31 Oct 2019 07:30 )             32.1       10-31    128<L>  |  87<L>  |  10  ----------------------------<  187<H>  3.5   |  26  |  0.55    Ca    9.3      31 Oct 2019 07:30  Phos  3.3     10-31  Mg     1.8     10-31    MICROBIOLOGY:  Culture - Blood (collected 10-28-19 @ 01:54)  Source: BLOOD VENOUS  Preliminary Report (10-31-19 @ 01:55):    NO ORGANISMS ISOLATED    NO ORGANISMS ISOLATED AT 72 HRS.    Culture - Blood (collected 10-28-19 @ 01:54)  Source: BLOOD PERIPHERAL  Preliminary Report (10-31-19 @ 01:55):    NO ORGANISMS ISOLATED    NO ORGANISMS ISOLATED AT 72 HRS.    Culture - Wound (collected 10-28-19 @ 00:40)  Source: OTHER  Final Report (10-30-19 @ 15:35):    MODERATECR6    Streptococcus  agalactiae (Group B) isolated. Group BCR6    Streptococci are susceptible to ampicillin, andCR6    cefazolin, but may be resistant to erythromycin andCR6    clindamycin.CR6    Recommendations for intrapartum prophylaxis for Group BCR6    streptococci are penicillin or ampicillin.  Organism: Klebsiella pneumoniae  Streptococcus agalactiae Gp B (10-30-19 @ 15:35)  Organism: Streptococcus agalactiae Gp B (10-30-19 @ 15:35)  Organism: Klebsiella pneumoniae (10-30-19 @ 15:35)      -  Amikacin: S <=8 GIBRAN      -  Ampicillin: R >16 GIBRAN      -  Ampicillin/Sulbactam: S <=4/2 GIBRAN      -  Aztreonam: S <=4 GIBRAN      -  Cefazolin: S <=2 GIBRAN      -  Cefepime: S <=2 GIBRAN      -  Cefoxitin: S <=4 GIBRAN      -  Ceftazidime: S <=1 GIBRAN      -  Ceftriaxone: S <=1 GIBRAN      -  Ertapenem: S <=0.5 GIBRAN      -  Gentamicin: S <=1 GIBRAN      -  Imipenem: S <=1 GIBRAN      -  Levofloxacin: S <=1 GIBRAN      -  Meropenem: S <=1 GIBRAN      -  Piperacillin/Tazobactam: S <=8 GIBRAN      -  Tigecycline: S      -  Tobramycin: S <=2 GIBRAN      -  Trimethoprim/Sulfamethoxazole: S <=0.5/9.5 GIBRAN      Method Type: NEGATIVE GIBRAN 43    RADIOLOGY:  Images below reviewed personally

## 2019-10-31 NOTE — PROGRESS NOTE ADULT - REASON FOR ADMISSION
CC: Abdominal pain and lethargy

## 2019-10-31 NOTE — PROGRESS NOTE ADULT - PROBLEM SELECTOR PLAN 3
-Likely from disease progression as evidenced by CT a/p findings consistent with new metastatic disease  -Pain control: c/w fentanyl 25 mcg patch q72h, 0.5 mg IV dilaudid q3h PRN, duloxetine through PEG.   -Nutrition recs appreciated.

## 2019-10-31 NOTE — PROGRESS NOTE ADULT - PROBLEM SELECTOR PLAN 7
Improve score 3 for age and cancer.  At increased risk for VTE.  SQH for prophylaxis.  Dispo: To [place] for CAR-T therapy, likely today if ID approves    Dispo is to get to [facility] for clinical trial
.  # Code status: Full    > I spoke to the patient, his son and his brother at bedside on 10/28. They want to pursue all efforts to get cancer-directed therapy, including traveling to [place] this weekend for CAR-T therapy.
Improve score 3 for age and cancer.  At increased risk for VTE.  SQH for prophylaxis.    Dispo is to get to [facility] for clinical trial
Improve score 3 for age and cancer.  At increased risk for VTE.  SQH for prophylaxis.    Dispo is to get to [facility] for clinical trial
.  # Code status: Full    > I spoke to the patient, his son and his brother at bedside on 10/28. They want to pursue all efforts to get cancer-directed therapy, including traveling to [place] this weekend for CAR-T therapy.

## 2019-10-31 NOTE — PROGRESS NOTE ADULT - PROBLEM SELECTOR PROBLEM 7
Need for prophylactic measure
Advance care planning
Need for prophylactic measure
Need for prophylactic measure
Advance care planning

## 2019-10-31 NOTE — DISCHARGE NOTE NURSING/CASE MANAGEMENT/SOCIAL WORK - NSDCPNINST_GEN_ALL_CORE
patient alert and clinically stable. patient with pain and discomfort, medication management provided. patient tolerating bolus feeding. patient safety maintained. no sxs of acute distress noted. wound care provided. supplies will be provided to patient. will continue to monitor. patient alert and clinically stable. patient with pain and discomfort, medication management provided. patient tolerating bolus feeding. patient safety maintained. no sxs of acute distress noted. wound care provided. side effects of medication management provided. supplies will be provided to patient. will continue to monitor.

## 2019-10-31 NOTE — PROGRESS NOTE ADULT - PROBLEM SELECTOR PROBLEM 5
Squamous cell carcinoma of oral cavity
Marlin
Squamous cell carcinoma of oral cavity
Advance care planning
Congestion of throat
Marlin
Marlin
Squamous cell carcinoma of oral cavity
Congestion of throat

## 2019-10-31 NOTE — PROGRESS NOTE ADULT - PROBLEM SELECTOR PLAN 6
-Follows with Dr. Cai in Oncology and Dr. Hdz with ENT  -Patient is s/p many interventions and is currently in a trial for CAR-T therapy at [facility] in [place]. Was advised not to use steroids.  -Oncology following  -Continue GOC discussions

## 2019-10-31 NOTE — PROGRESS NOTE ADULT - PROBLEM SELECTOR PROBLEM 2
Anxiety
Failure to thrive in adult
Generalized abdominal pain
Hyponatremia
Hyponatremia
Generalized abdominal pain
Failure to thrive in adult
Failure to thrive in adult
Hyponatremia

## 2019-10-31 NOTE — PROGRESS NOTE ADULT - PROBLEM SELECTOR PLAN 1
.  # Cancer-related pain  # Anticipatory pain    - NPO, even medications/ PEG in situ  - Meds he has tried before and developed a reaction to: Methadone (nausea), Morphine (no effect), Oxycodone (no effect)    OME  10/30: 110mg  10/29: 160mg  10/28: 290mg (Fentanyl patch peaking)   10/27: 250mg    > Relatively stable OME's past 24 hours  > Consider changing pain medictions to: Fentanyl 25mcg/hr transdermally (change q72h) + Dilaudid 2mg via PEG q4h PRN  > The regimen above is essentially his home pain regimen, except for Oxycodone 5mg liquid via PEG q4h PRN. This has similar OME's to the Dilaudid 2mg tablet (7.5mg vs 10mg)  > He is an ideal candidate for Methadone, however he has said that it "did not work" in the past. On my review I do not know how Methadone was initiated and titrated when he tried it.

## 2019-10-31 NOTE — PROGRESS NOTE ADULT - PROBLEM SELECTOR PROBLEM 6
Need for prophylactic measure
Squamous cell carcinoma of oral cavity
Need for prophylactic measure
Need for prophylactic measure
Palliative care encounter
Squamous cell carcinoma of oral cavity

## 2019-10-31 NOTE — PROGRESS NOTE ADULT - PROVIDER SPECIALTY LIST ADULT
Heme/Onc
Infectious Disease
Internal Medicine
Palliative Care
Infectious Disease
Internal Medicine

## 2019-10-31 NOTE — PROGRESS NOTE ADULT - SUBJECTIVE AND OBJECTIVE BOX
Patient is a 64y old  Male who presents with a chief complaint of CC: Abdominal pain and lethargy (31 Oct 2019 08:54)    SUBJECTIVE / OVERNIGHT EVENTS: No acute events overnight. Patient seen and examined at bedside. Patient pain is well managed. He is still tired. No nausea or vomiting. No bowel movements overnight.    MEDICATIONS  (STANDING):  DULoxetine 30 milliGRAM(s) Oral daily  fentaNYL   Patch  25 MICROgram(s)/Hr 1 Patch Transdermal every 72 hours  heparin  Injectable 5000 Unit(s) SubCutaneous every 8 hours  influenza   Vaccine 0.5 milliLiter(s) IntraMuscular once  ondansetron Injectable 4 milliGRAM(s) IV Push every 8 hours  pantoprazole  Injectable 40 milliGRAM(s) IV Push two times a day  piperacillin/tazobactam IVPB.. 3.375 Gram(s) IV Intermittent every 8 hours  polyethylene glycol 3350 17 Gram(s) Oral daily  senna Syrup 10 milliLiter(s) Oral at bedtime    MEDICATIONS  (PRN):  acetaminophen   Tablet .. 650 milliGRAM(s) Oral every 4 hours PRN Mild Pain (1 - 3)  acetaminophen   Tablet .. 1000 milliGRAM(s) Oral every 6 hours PRN Temp greater or equal to 38C (100.4F) and mild pain  guaiFENesin    Syrup 100 milliGRAM(s) Oral every 6 hours PRN cough and phlegm  HYDROmorphone  Injectable 0.5 milliGRAM(s) IV Push every 3 hours PRN Severe Pain (7 - 10)  LORazepam   Injectable 1 milliGRAM(s) IV Push every 8 hours PRN Anxiety      Vital Signs Last 24 Hrs  T(C): 36.7 (31 Oct 2019 05:23), Max: 37 (30 Oct 2019 11:11)  T(F): 98.1 (31 Oct 2019 05:23), Max: 98.6 (30 Oct 2019 11:11)  HR: 114 (31 Oct 2019 08:23) (99 - 115)  BP: 129/91 (31 Oct 2019 08:23) (110/64 - 135/93)  BP(mean): --  RR: 17 (31 Oct 2019 05:23) (16 - 19)  SpO2: 98% (31 Oct 2019 05:23) (97% - 98%)  CAPILLARY BLOOD GLUCOSE        I&O's Summary    30 Oct 2019 07:01  -  31 Oct 2019 07:00  --------------------------------------------------------  IN: 1280 mL / OUT: 0 mL / NET: 1280 mL        PHYSICAL EXAM:  GENERAL: No acute distress, cachectic. Dysarthritic speech.  HEAD:  Atraumatic, normocephalic  EYES: EOMI, conjunctiva and sclera clear  NECK: L-sided mass with bandage limiting range of motion.  CHEST/LUNG: End-expiratory crackles bilaterally; No wheezes  HEART: Regular rate and rhythm; No murmurs, rubs, or gallops  ABDOMEN: Soft, moderately tender, nondistended; Bowel sounds present. PEG tube placed, moist with gauze around entry site. No surrounding erythema.  EXTREMITIES:  2+ Peripheral Pulses, No clubbing, cyanosis, or edema  NEUROLOGY: non-focal  SKIN: No rashes or lesions besides neck wound    LABS:                        10.2   8.87  )-----------( 288      ( 31 Oct 2019 07:30 )             32.1     10-31    128<L>  |  87<L>  |  10  ----------------------------<  187<H>  3.5   |  26  |  0.55    Ca    9.3      31 Oct 2019 07:30  Phos  3.3     10-31  Mg     1.8     10-31    Klebsiella pneumoniae pan-sensitive.  Blood cultures (72h) NGTD)    Consultant(s) Notes Reviewed:  Infectious Disease, Palliative Care    Care Discussed with Consultants/Other Providers: Tres Cobb (Infectious Disease)    Tej Mcgill, PGY-1; [facility] Pager: [phone]; [facility] Pager: [phone].

## 2019-10-31 NOTE — PROGRESS NOTE ADULT - PROBLEM SELECTOR PLAN 3
.  > Continue Cymbalta 30mg via PEG daily (started 10/29). Can do rapid uptitration every 7 days.  > Consider changing Lorazepam orders to: Lorazepam 0.5mg PO q8h PRN. I would like to schedule it to 0.5mg PO q8h in the evening/ bedtime and then 0.5mg PO qdaily PRN once the Duloxetine has reached steady state.  > Consider a Psych consult. I discussed this with him.

## 2019-10-31 NOTE — PROGRESS NOTE ADULT - PROBLEM SELECTOR PLAN 8
Thank you for allowing us to participate in your patient's care. We will continue to follow with you. Please page [phone] for any q's or c's.     Mario CARR. Sergio  Palliative Medicine  Mario Hill  Palliative Medicine
Thank you for allowing us to participate in your patient's care. We will continue to follow with you. Please call/ text: [phone] today for any q's or c's.     Mario Hill  Palliative Medicine

## 2019-10-31 NOTE — PROGRESS NOTE ADULT - PROBLEM SELECTOR PLAN 4
.  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   > If no BM after suppository, please offer enema .  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

## 2019-10-31 NOTE — DISCHARGE NOTE NURSING/CASE MANAGEMENT/SOCIAL WORK - PATIENT PORTAL LINK FT
You can access the FollowMyHealth Patient Portal offered by [facility] by registering at the following website: [email or web address]. By joining [employer]’s FollowMyHealth portal, you will also be able to view your health information using other applications (apps) compatible with our system.

## 2019-10-31 NOTE — PROGRESS NOTE ADULT - PROBLEM SELECTOR PLAN 2
.  # Peritoneal carcinomatosis  # R/o Fecal impaction .  # Peritoneal carcinomatosis  # R/o Fecal impaction    > No BM despite suppository or enema  > Abdomen soft, BS+  > Consider upright abdominal xray to evaluate for fecal impaction

## 2019-10-31 NOTE — PROGRESS NOTE ADULT - PROBLEM SELECTOR PLAN 4
Likely 2/2 new metastatic disease  - c/w 25mcg fentanyl patch, 0.5mg dilaudid IV q3h PRN. Discussed w/ palliative care.   - c/w Duloxetine 30mg  - Pain has been well controlled on this regimen.  - s/p senna, miralax, lactulose, dulcolax enema

## 2019-10-31 NOTE — PROGRESS NOTE ADULT - PROBLEM SELECTOR PROBLEM 3
Anxiety
Drug-induced constipation
Failure to thrive in adult
Failure to thrive in adult
Generalized abdominal pain
Anxiety
Generalized abdominal pain
Generalized abdominal pain
Failure to thrive in adult

## 2019-10-31 NOTE — PROGRESS NOTE ADULT - ASSESSMENT
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   Developed fevers up to 102.1F.  Nonfocal  May be coming from the large necrotic mass. Growing GBS and Klebsiella pneumoniae although he has no pain, there's no cellulitis/pus and it's not a sterile site to begin with.   Planning transfer to [facility] in [place] for further cancer treatment.     Suggest  -Levaquin 500mg PO daily to complete a week of antibiotics   -no objection to discharge     Spoke with primary team     Signing off. I will be rotating to [facility] tomorrow. Please call ID fellow if follow up is needed.     Tres Cobb MD   Infectious Disease   Pager [phone]   After 5PM and on weekends please page fellow on call or call [phone]

## 2019-10-31 NOTE — PROGRESS NOTE ADULT - PROBLEM SELECTOR PLAN 1
Patient febrile overnight with tachycardia to 145. Possible source enteritis seen on CT on admission.  - ID on board  - Wound cultures showing Klebsiella pneumoniae (pan-sensitive) and Strep agalactiae (group B)  - c/w Zosyn (10/27-)  - f/u 10/28 BCx (NGTD)  - Possible switch to augmentin for PO as outpatient Patient febrile overnight with tachycardia to 145. Possible source enteritis seen on CT on admission.  - ID on board  - CT Neck showed progression of known necrotic neck mass with fistulization to the skin  - Wound cultures from this fistula showing Klebsiella pneumoniae (pan-sensitive) and Strep agalactiae (group B)  - c/w Zosyn (10/27-)  - f/u 10/28 BCx (NGTD)  - Possible switch to augmentin for PO as outpatient

## 2019-11-01 PROBLEM — S11.90XA OPEN NECK WOUND: Status: ACTIVE | Noted: 2019-10-14

## 2019-11-01 NOTE — HISTORY OF PRESENT ILLNESS
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 He is currently on systemic palliation with chemotherapy given progression of disease on immunotherapy. He is in a clinical trial at [facility] with plans to return to [place] next month for next step in the protocol. He has had persistent drainage from his necrotic tumor wound from the left neck for which he has received antibiotics. He was seen by ID last week and was asked to be evaluated for possible I&D. He denies any dyspnea, fever. He has PEG tube, and most of his nutrition through PEG tube with small amount of water by mouth. His weight remains stable.  He had a significant amount of bleeding from his L neck and oral cavity on Oct 18.  Since then the neck swelling has decreased and he has not had another episode of bleeding.  Unable to obtain electronic BP.

## 2019-11-01 NOTE — PHYSICAL EXAM
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  There is no obvious overlying cellulitis.   [FreeTextEntry1] : FFF skin paddle is well healed.  There is a firm mass underlying the left hemitongue and FOM.   [Normal] : no rashes [de-identified] : R. leg healing well.

## 2019-11-02 LAB
BACTERIA BLD CULT: SIGNIFICANT CHANGE UP
BACTERIA BLD CULT: SIGNIFICANT CHANGE UP

## 2019-11-05 ENCOUNTER — APPOINTMENT (OUTPATIENT)
Age: 64
End: 2019-11-05

## 2019-11-12 ENCOUNTER — APPOINTMENT (OUTPATIENT)
Age: 64
End: 2019-11-12

## 2019-11-19 ENCOUNTER — APPOINTMENT (OUTPATIENT)
Age: 64
End: 2019-11-19

## 2019-11-26 ENCOUNTER — APPOINTMENT (OUTPATIENT)
Age: 64
End: 2019-11-26

## 2019-12-18 ENCOUNTER — APPOINTMENT (OUTPATIENT)
Dept: HEMATOLOGY ONCOLOGY | Facility: CLINIC | Age: 64
End: 2019-12-18

## 2020-06-30 NOTE — DISCHARGE NOTE NURSING/CASE MANAGEMENT/SOCIAL WORK - NSFLUVACAGEDISCH_IMM_ALL_CORE
[facility] Pricing Office:  [phone]  [phone]    Pneumococcal 13 vaccine today    Information about cholesterol, high blood pressure and healthy diet and activity recommendations can be found at the following links on the Internet:    http://www.nhlbi.nih.gov/health/health-topics/topics/hbc  http://www.nhlbi.nih.gov/health/educational/lose_wt/index.htm  Http://www.nhlbi.nih.gov/files/docs/public/heart/hbp_low.pdf  http://www.heart.org/HEARTORG/  http://diabetes.org/  https://www.cdc.gov/  Https://healthfinder.gov/  https://health.gov/dietaryguidelines/2015/guidelines/  https://health.gov/paguidelines/second-edition/pdf/Physical_Activity_Guidelines_2nd_edition.pdf    
Adult

## 2020-07-10 NOTE — ED PROVIDER NOTE - OBJECTIVE STATEMENT
64 y/o M pt with PMHx of throat and neck CA (chemotherapy ended 3 years ago), mandibular resection, 39 lymph node removal presents to the ED c/o left sided redness and a growing firm abscess on the underside of his jaw. Pt states that it has been red and growing in size for the past few days. The area is very painful, and he has been unable to move his head without more pain than he normally experiences at baseline. Associated sore throat and neck pain. Pt denies attempting to drain the collection or agitating it. Since Sunday, he has been feeling extremely fatigued, and states that he has not been exposed to radiation since 2016. Pt is unable to swallow or drink since last Sunday. NKDA. Pt states that stiffness of neck is normal at baseline. Denies CP, HA, fever, chills. Focused eval, protocol orders entered.  Dr. Hdz is surgeon. Yes 64 y/o M pt with PMHx of throat and neck CA (chemotherapy ended 3 years ago), mandibular resection, 39 lymph node removal presents to the ED c/o left sided redness and a growing firm abscess on the underside of his jaw. Pt states that it has been red and growing in size for the past few days. Pt's brother states that he noticed the lump yesterday and the redness grew significantly since yesterday.  The area is very painful, and he has been unable to move his head without more pain than he normally experiences at baseline. Associated sore throat and neck pain.  Since Sunday, he has been feeling extremely fatigued, and states that he has not been exposed to radiation since 2016. Pt is unable to swallow or drink since last Sunday. Pt. uses a peg tube. NKDA. Pt states that stiffness of neck is normal at baseline. Denies CP, HA, fever, chills. Focused eval, protocol orders entered.  Dr. Hdz is surgeon.

## 2020-09-21 NOTE — PATIENT PROFILE ADULT - DO YOU FEEL THREATENED BY OTHERS?
MRI Thoracic spine     LP     Labs for MS mimickers     NMO/MOG     F/U after testing     Patient instructed to notify me if sx worsen during workup   
no

## 2021-01-28 NOTE — ED PROVIDER NOTE - CARDIAC, MLM
Routed to Dr Heredia    Normal rate, regular rhythm.  Heart sounds S1, S2.  No murmurs, rubs or gallops.

## 2021-02-19 NOTE — ED ADULT NURSE NOTE - NS ED NOTE ABUSE RESPONSE YN
Information came thru stating that the pts insurance did not cover Myrbetriq, I have no notes of doing PA, may have been done by another MA. Pt has a f/u in May and was not having any good response to the medication. Pt has tried and failed oxybutynin but has not tried Sanctura 20 mg, Trospium 20 mg which is usually covered, have tried calling the pharmacy to see what would be covered - no answer.  
Yes

## 2021-05-17 NOTE — ED ADULT NURSE NOTE - PAIN: PRESENCE, MLM
Detail Level: Detailed Depth Of Biopsy: dermis Was A Bandage Applied: Yes Size Of Lesion In Cm: 0.4 X Size Of Lesion In Cm: 0 Biopsy Type: H and E Biopsy Method: Dermablade Anesthesia Type: 2% lidocaine with epinephrine Anesthesia Volume In Cc (Will Not Render If 0): 0.5 Hemostasis: Drysol Wound Care: Aquaphor Dressing: bandage Destruction After The Procedure: No Type Of Destruction Used: Curettage Lab: [id number] Lab Facility: [id number] Billing Type: Third-Party Bill Information: Selecting Yes will display possible errors in your note based on the variables you have selected. This validation is only offered as a suggestion for you. PLEASE NOTE THAT THE VALIDATION TEXT WILL BE REMOVED WHEN YOU FINALIZE YOUR NOTE. IF YOU WANT TO FAX A PRELIMINARY NOTE YOU WILL NEED TO TOGGLE THIS TO 'NO' IF YOU DO NOT WANT IT IN YOUR FAXED NOTE. Lab: [id number] Lab Facility: [id number] Billing Type: Third-Party Bill denies pain/discomfort

## 2021-06-11 NOTE — H&P PST ADULT - NEGATIVE MUSCULOSKELETAL SYMPTOMS
Left message to call back for: Patient-if needed  Information to relay to patient:  Left detailed message on patient's VM of MD message. If additional questions to call back to clinic.     no joint swelling/no stiffness/no neck pain/no arthralgia/no muscle cramps/no muscle weakness/no arthritis/no back pain

## 2021-08-17 NOTE — PRE-OP CHECKLIST - IDENTIFICATION BAND VERIFIED
Hide Include Location In Plan Question?: No
Detail Level: Detailed
Include Location In Plan?: Yes
done
done

## 2021-08-23 NOTE — ED ADULT TRIAGE NOTE - CHIEF COMPLAINT QUOTE
Pt has a hx of oral CA complaining of severe pain. Pt states he was sent by his oncologist for admission and pain management. Pt appears uncomfortable in triage. Consent 1/Introductory Paragraph: The rationale for Mohs was explained to the patient and consent was obtained. The risks, benefits and alternatives to therapy were discussed in detail. Specifically, the risks of infection, scarring, bleeding, prolonged wound healing, incomplete removal, allergy to anesthesia, nerve injury and recurrence were addressed. Prior to the procedure, the treatment site was clearly identified and confirmed by the patient. All components of Universal Protocol/PAUSE Rule completed.

## 2021-10-13 NOTE — PATIENT PROFILE ADULT - JOB HELP
Rx Refill Note  Requested Prescriptions     Pending Prescriptions Disp Refills   • sildenafil (REVATIO) 20 MG tablet [Pharmacy Med Name: Sildenafil Citrate Oral Tablet 20 MG] 60 tablet 0     Sig: TAKE 1 TABLET BY MOUTH THREE TIMES A DAY      Last office visit with prescribing clinician: 9/24/2021      Next office visit with prescribing clinician: 10/13/2021            EARL CROOKS MA  10/13/21, 09:09 EDT   no

## 2022-03-08 NOTE — ED ADULT NURSE NOTE - AS SC BRADEN NUTRITION
(3) adequate Topical Sulfur Applications Pregnancy And Lactation Text: This medication is Pregnancy Category C and has an unknown safety profile during pregnancy. It is unknown if this topical medication is excreted in breast milk.

## 2022-04-02 NOTE — ED PROVIDER NOTE - CARE PLAN
Principal Discharge DX:	Neck mass Unable to fully assess 2/2 current sedation, but moving all extremities will full force spontaneously. Per Neurology Consult Note, pt was 5/5 in all extremities on motor exam.

## 2022-05-17 NOTE — ED ADULT TRIAGE NOTE - PRO INTERPRETER NEED 2
Order received from Dr Mcclelland for Patient to have VNG completed then follow up with him. Referral to audiology placed. Called Patient and notified of  Referral. Follow up to schedule an appointment with Dr Mcclelland once date of VNG is known.   English

## 2022-06-16 NOTE — H&P PST ADULT - ENMT
details… Attending MD Andujar: I personally have seen and examined this patient.  Resident note reviewed and agree on plan of care and except where noted.  See below for details.     seen in [facility] 2    82F with PMH/PSH including hysterectomy, plastic sx presents to the Emergency Department sent in by PMD for concern for COVID PNA.  Patient tested Covid positive.  Reports has had URI symptoms since Monday 6/13/22.  Reports she attributes it to having breast imaging in a cold room that day.  Reports runny nose, worsening cough, fever.  Denies taking antipyretics.  Reports is COVID vaccinated.  Patient reports eager for discharge and does not know why she needs to be in the Emergency Department.  Denies nausea, vomiting, diarrhea, abdominal pain.  Denies urinary complaints.  Denies chest pain, shortness of breath.  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  Denies LE edema.      Exam:   General: NAD, AAOx3 (name, place, year)  HENT: head NCAT, airway patent   Eyes: anicteric   Lungs: lungs CTAB with good inspiratory effort, no wheezing, no rhonchi, no rales, no increased work of breathing, ambulatory sat for this MD 95-96% (marching in place and conversing simultaneously), >95-96% at rest, no tachypnea  Cardiac: +S1S2, no obvious m/r/g, HR 99-low 100s  GI: abdomen soft with +BS, NT, ND  : no CVAT  MSK: ranging neck and extremities freely no calf tenderness, swelling, erythema or warmth  Neuro: moving all extremities spontaneously, sensory grossly intact, no gross neuro deficits  Psych: normal mood and affect     A/P: 82F with +COVID as outpatient, here for PMD concern for superimposed PNA, will obtain CXR for eval of lungs, not hypoxic here including on ambulation, AAO3, will check labs for metbaolic derangement and CXR for signs of PNA, will give antipyretic, patient eager for discharge. detailed exam mouth

## 2022-06-27 NOTE — PATIENT PROFILE ADULT - NSTRANSFERBELONGINGSDISPO_GEN_A_NUR
Right SubclavianCentral Line    Diagnosis: BiVentricular Heart Failure   Patient location during procedure: ICU  Timeout: 6/23/2022 6:02 PM  Procedure end time: 6/23/2022 6:17 PM    Staffing  Authorizing Provider: JEAN Lopez MD  Performing Provider: JEAN Lopez MD    Staffing  Performed: anesthesiologist   Anesthesiologist: JEAN Lopez MD  Anesthesiologist was present at the time of the procedure.  Preanesthetic Checklist  Completed: patient identified, IV checked, site marked, risks and benefits discussed, surgical consent, monitors and equipment checked, pre-op evaluation, timeout performed and anesthesia consent given  Indication   Indication: vascular access, med administration     Anesthesia   local infiltration    Central Line   Skin Prep: skin prepped with ChloraPrep, skin prep agent completely dried prior to procedure  Sterile Barriers Followed: Yes    All five maximal barriers used- gloves, gown, cap, mask, and large sterile sheet    hand hygiene performed prior to central venous catheter insertion  Location: right subclavian.   Catheter type: triple lumen  Catheter Size: 7 Fr  Inserted Catheter Length: 16 cm  Ultrasound: vascular probe with ultrasound  Vessel Caliber: medium, patent  Vascular Doppler:  not done, compressibility normal  Needle advanced into vessel with real time Ultrasound guidance.  Guidewire confirmed in vessel.  sterile gel and probe cover used in ultrasound-guided central venous catheter insertion   Manometry: Venous cannualation confirmed by visual estimation of blood vessel pressure using manometry.  Insertion Attempts: 1   Securement:line sutured, chlorhexidine patch, sterile dressing applied and blood return through all ports    Post-Procedure   Post-procedure assessment: line turned up the IJ.  Adverse Events:none      Guidewire Guidewire removed intact.                with patient

## 2022-07-01 NOTE — PRE-OP CHECKLIST - AS BP NONINV METHOD
electronic
If any s/s aspiration noted or pt unable to tolerate diet, rx d/c PO and initiate NPO with SLP to reassess/change of breathing pattern/oral hygiene/position upright (90Y)/cough/gurgly voice/fever/pneumonia/throat clearing/upper respiratory infection

## 2022-08-16 NOTE — REVIEW OF SYSTEMS
[Home] : at home, [unfilled] , at the time of the visit. [Other Location: e.g. Home (Enter Location, City,State)___] : at [unfilled] [Verbal consent obtained from patient] : the patient, [unfilled] [FreeTextEntry1] : This is a 70 year old female being seen obesity followup visit. \par \par According to last in person office visit with urologist, her weight is mostly unchanged\par \par She is now Eating 2 meals a day, B & D instead of 1 \par She is eating more plant based foods ie: butter and cream cheese, plant based pot pie  \par She states beans and cruciferous vegetables "destroy her stomach" \par \par Tolerating phentermine \par Physical activity is limited. Ordered a chair for home yoga \par \par Patient just had botox to her bladder for overactive bladder disorder \par \par  [Negative] : Heme/Lymph

## 2022-09-15 NOTE — PATIENT PROFILE ADULT - FLU SEASON?
Subjective   Patient ID: Manuel is a 9 year old female who is accompanied by: mother and father     Chief Complaint   Patient presents with   • Fever     Started 2 days ago headache and stomachache     HPI   Symptoms - fever Tmax 103, headache, abdominal pain, one episode of emesis, muffled hearing, sore throat, looser stools   Denies diarrhea, cough, rhinorrhea, dysuria, hematuria    Duration - 3 days   PO intake - normal    UOP - normal   Interventions - tylenol prn    Sick contacts - attends school, family members are well   PMHx - none     Review of Systems   Constitutional: Positive for fever.   HENT: Positive for sore throat.    Gastrointestinal: Positive for abdominal pain, diarrhea and vomiting.   Neurological: Positive for headaches.       Patient's medications, allergies, past medical, surgical, social and family histories were reviewed and updated as appropriate.    Objective   Vitals:Pulse 90   Temp 99.4 °F (37.4 °C) (Temporal)      Physical Exam  Constitutional:       General: She is active.      Appearance: She is well-developed.   HENT:      Head: Normocephalic and atraumatic.      Right Ear: Tympanic membrane normal.      Left Ear: Tympanic membrane normal.      Nose: Nose normal.      Mouth/Throat:      Mouth: Mucous membranes are moist.      Pharynx: Oropharynx is clear.      Neck: Normal range of motion and neck supple.   Eyes:      Extraocular Movements: Extraocular movements intact.      Pupils: Pupils are equal, round, and reactive to light.   Cardiovascular:      Rate and Rhythm: Normal rate and regular rhythm.      Pulses: Normal pulses.      Heart sounds: Normal heart sounds, S1 normal and S2 normal. No murmur heard.  Pulmonary:      Effort: Pulmonary effort is normal.      Breath sounds: Normal breath sounds and air entry.   Abdominal:      General: Bowel sounds are increased.      Palpations: Abdomen is soft. There is no mass.      Tenderness: There is generalized abdominal tenderness.    Musculoskeletal:         General: Normal range of motion.   Skin:     General: Skin is warm and dry.      Capillary Refill: Capillary refill takes less than 2 seconds.   Neurological:      General: No focal deficit present.      Mental Status: She is alert.      Cranial Nerves: No cranial nerve deficit.      Deep Tendon Reflexes: Reflexes normal.         Assessment   Problem List Items Addressed This Visit    None     Visit Diagnoses     Viral gastroenteritis    -  Primary    Relevant Orders    2019 NOVEL CORONAVIRUS (SARS-COV-2)    POCT SARS-COV-2 ANTIGEN        Viral illness, rapid COVID negative, PCR pending. Recommended supportive care alone and gave treatment instruction handout with anticipatory guidance on when to RTC.     Instructed to call if problem worsens or does not improve within the next 24 hours otherwise follow-up prn   Yes...

## 2022-12-13 NOTE — ED ADULT NURSE NOTE - TEMPLATE LIST FOR HEAD TO TOE ASSESSMENT
[FreeTextEntry1] : TSH and FT4 normal, T4 reported as elevated.  Will decrease levothyroxine to 25 mcg daily.
General

## 2023-04-20 NOTE — DISCHARGE NOTE ADULT - MEDICATION SUMMARY - MEDICATIONS TO STOP TAKING
Colonoscopy flyer sent.    I will STOP taking the medications listed below when I get home from the hospital:  None

## 2023-05-18 NOTE — DISCHARGE NOTE ADULT - REASON FOR ADMISSION
"I'm having the front part of my jaw removed" [Pre-Treatment Visit] : a pre-treatment [FreeTextEntry2] : primary peritoneal cancer currently on Marce and Olaparib maintenance started 9/2022

## 2023-06-04 NOTE — PROGRESS NOTE ADULT - PROBLEM SELECTOR PLAN 2
- Currently on immunotherapy monthly with nivolumab, seems to have possible disease progression  -as per onc, plan for outpatient follow up Yes

## 2024-01-01 NOTE — DATA REVIEWED
[de-identified] : MRI reports significant treatment related change and possible enhancement within the left inferolateral hemitongue with extension into the left GT sulcus.  No LAD. -Bathe with a washcloth until cord falls off and if a boy until circumcision heals.

## 2024-02-06 NOTE — PHYSICAL THERAPY INITIAL EVALUATION ADULT - WEIGHT-BEARING RESTRICTIONS: GAIT, REHAB EVAL
Medication: clopidogrel (PLAVIX) 75 MG tablet  passed protocol.     Last office visit date: 10/18/23  Next appointment scheduled?: Yes   Number of refills given: 1    Last OV Plan of care: Return in about 6 months (around 4/18/2024) for med check, fasting labs just prior to visit.     Last Refill:  10/19/23    Number of Refills Sent:  3    Quantity of Medication Given:  90 day supply     Controlled Substance: No        Date of any Lab Results pertaining to medication: None     Patient is requesting alternative pharmacy.  Prescription is sent to local pharmacy.   full weight-bearing

## 2024-03-20 NOTE — REASON FOR VISIT
"                     HEMATOLOGY ONCOLOGY OUTPATIENT FOLLOW UP       Patient name: Jourdan Lebron  : 1970  MRN: [id number]  Primary Care Physician: Justa Castano MD  Referring Physician: No ref. provider found  Reason For Consult:     No chief complaint on file.    HPI:   History of Present Illness:  Jourdan Lebron is 53 y.o. male who presented to our office on 2021 for consultation regarding elevated hematocrit    On 2021 Mr. Lebron was referred for the investigation of macrocytosis and elevated hematocrit.  He gave a history of a stroke in .  He also described a long history of anxiety and \"panic attacks\".  Finally he had undergone an adrenalectomy, apparently because of an adrenal adenoma.  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  In spite of that he felt poorly, mainly because of fatigue and had several investigations.  For a long time, at least since , he had been noted to have an elevated MCV and MCH.  A clear cause for this had not been identified and generally speaking he was asymptomatic at that time.  In , September and 2021 his blood counts had reported a hematocrit of 51.3, 51.6 and 53.8% respectively.  This was in the setting of a normal high hemoglobin.  He gave a history of prolonged and intense, at times of up to 3 packs of cigarettes per day, cigarette smoking.  Close to the time of the initial visit, he was smoking only cigars but did admit to inhaling the smoke.  He, as well, admitted to dyspnea with some exertion.    2022 Mr. Lebron was back in the office for follow-up.  At the time of his initial evaluation his blood count had been found to be within normal limits.  His folic acid was found to be immediately below the normal range of normalcy.  He started taking folic acid replacement.    7/15/2022: Back in the office for follow-up after 6 months.  Reported he had had some changes to his " medications and he was feeling somewhat better.  In particular, he discussed changes to his antidepressant, that seemed to have made a difference.  He also had stopped smoking.  The physical exam was unchanged.  The laboratory exams revealed normal hemoglobin and hematocrit, but he did persist with mild macrocytosis at 97.8 fL.  A clear explanation for this was not identified.  A decision was made to observe as it was unlikely to represent a serious problem.    10/10/2023: Seen in the office after an absence of more than one year. He reported that for a few months he had been experiencing dysphagia and weight loss. He was initially treated with a proton pump inhibitor, but as there was no response and rather he reported worsening of the symptoms, he underwent upper and lower gastrointestinal endoscopies. In the colon multiple polyps were identified in the cecum, the ascending colon, the transverse colon and the descending colon. In the esophagus a protruding lesion that seemed infiltrative and was fungating and ulcerated was found in the lower third of the esophagus. It extended from 36 to 46 cm from the incisors. Biopsy reported invasive moderate to poorly differentiated adenocarcinoma.     11/3/2023: In the office to review the PET scan. His symptoms have not changed much. He continues to have dysphagia and it is severe enough that he has been able to eat much; he has some success with soft foods but there fare some foods that he can definitely not swallow. He has lost about 10 lbs but none recently. He remains afebrile and has not had any cough. He has not had any pain. On exam no jaundice or pallor. Lungs diminished and heart regular. Abdomen soft and not tender. No edema. Reviewed the images of the PET scan. Reviewed the result of the endoscopic ultrasound and the FNA of the lymph nodes, at least one of which is positive for metastatic disease. This makes the tumor T2N1 disease. Neoadjuvant chemotherapy and  radiation was discussed with him and his spouse and I made referrals to Dr. Sabillon in Radiation Oncology and to Dr. Hurtado in Thoracic surgery. He is to have next generation sequencing, Her2 expression and PD-L1 expression on tumor tissue. Will present in tumor conference.     11/17/2023: Not any different than at the time of the last visit.  No further weight loss.  Able to swallow some foods without any difficulties.  However, other foods are very difficult to swallow, if not impossible.  He has not had any fevers.  He underwent placement of the port without any difficulties.  On exam alert, conversant and in no distress.  Port site clean and healing well.  Lungs diminished bilaterally.  Heart regular.  No edema.  Laboratory exams were reviewed.  Discussed with him concurrent chemotherapy and radiation.  Treatment with carboplatin and paclitaxel was discussed and a treatment plan was placed.  Discussed with Dr. Sabillon.  To begin on the week of November 27, 2023.    12/8/2023: Feeling reasonably well. Has experienced occasional headaches and facial pain. As well feels the lower extremities to be heavy and had some aching pain. Has been able to eat some though his appetite is poor. He may be swallowing better and does not have odynophagia. No abdominal pain or diarrhea and no dysuria. Has not lost weight. On exam no changes. The laboratory exams were reviewed. Discussed with him. To continue with the same therapy.     12/27/2023: Without new symptoms.  Reasonably active.  Not eating as well because of early satiety but no dysphagia.  No chest pains.  As well not coughing or more dyspneic than before.  On exam no changes.  No palpable supraclavicular adenopathy.  Lungs are diminished bilaterally.  Heart regular.  Abdomen soft and nontender.  There is no edema.  Small petechiae are present in the lower extremities.  The laboratory exams were reviewed.  He has thrombocytopenia today that does not require transfusion but that  will keep him from receiving the last dose of the chemotherapy.  To finish radiation tomorrow.  He will have a PET scan and will see me with the results in approximately 4 weeks.  Discussed with him.    1/25/2024: Feeling reasonably well.  Anxious about surgery.  Undergoing preoperative investigations he was found to have a thyroid nodule.  He is eating reasonably well and does not have major dysphagia although sometimes he needs to belch before he can continue to swallow.  No odynophagia.  He has also been free of dyspnea.  Denies abdominal pain and has maintained regular bowel activity.  No dysuria or hematuria.  No peripheral edema.  On exam alert, conversant and in good spirits.  No jaundice and no pallor.  Lungs diminished but clear.  Heart regular.  Abdomen soft.  Port site clean.  Laboratory exams reviewed.  Awaiting authorization from the insurance company to do the PET scan.  Continue to see Dr. Hurtado.  See me in approximately 4 weeks.    2/28/2024: Underwent an [place] Shayne type esophagectomy without immediate complications.  There was some concern over dehiscence of the anastomosis and he received a stent.  He was also asked to not consume any food by mouth and was receiving nutrition through a jejunostomy tube.  At the time of this visit feeling progressively better but still poorly.  Very tired and lacking energy to do anything.  Sleeping poorly.  Has started to take clear liquids by mouth.  On exam he appears chronically ill but he does not seem in any distress.  He is pale but not jaundiced.  All surgical incisions are healing well and the lungs are diminished bilaterally but clear.  Heart regular.  Abdomen soft.  No edema.  Laboratory exams were reviewed.  He has macrocytic anemia with a hemoglobin of 11.9 g/dL and thrombocytosis with a platelet count of 562,000/mm³.  He also has monocytosis and basophilia but no leukocytosis at this time.  The final review of pathology confirmed scattered foci of  residual moderate to poorly differentiated adenocarcinoma at the gastroesophageal junction with changes consistent with prior neoadjuvant therapy.  There were ASSO stated dissecting pools of mucin with changes again consistent with previous therapy.  Scattered individual tumor cells and small clusters were present near the gastroesophageal junction and extending through the muscularis propria into the adventitial soft tissue/fat and spanning an area of 21 x 18 x 7 mm.  No definitive lymphovascular space invasion was identified.  All margins were negative.  Of 20 lymph nodes analyzed known had metastatic disease.  A discussion was had with him in regards to immunotherapy given in the adjuvant setting.  Explained side effects and potential complications.  I asked him to return in 3 weeks.    3/22/2024: Not feeling back to normal yet.  Still not eating much.  He had an episode of nausea and emesis this morning.  He has been afebrile.  He denies chest pains but does complain of some epigastric discomfort that seems unrelated to the previous discomfort that he had after the surgery.  He has been without dyspnea and does not have any more cough than usual.  As well no abdominal pain.  He continues to tolerate the enteral nutrition without difficulties.  No edema.  No skin rash.  On exam no distress.  Conversant and oriented.  Lungs diminished bilaterally.  Heart regular.  Abdomen with the jejunostomy tube in place.  Opening looks clean.  It is soft and without palpable tumors.  No edema.  Laboratory exams reviewed.  Discussed with him.  Offered him immunotherapy.  He remains somewhat reluctant to consider treatment with immunotherapy.  He has asked for more time to think about it.    The following portions of the patient's history were reviewed and updated as appropriate: allergies, current medications, past family history, past medical history, past social history, past surgical history and problem list.    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    Anxiety     Brain fog     COVID 02/2023    Diverticulosis of large intestine without perforation or abscess without bleeding 10/02/2023    Esophageal cancer 10/2023    Generalized headaches     GERD (gastroesophageal reflux disease)     Hand tingling     History of blood clot in brain 2020    had thrombectomy    History of cancer chemotherapy 12/2023    History of COVID-19     History of kidney stones     History of radiation therapy 11/2023    Hyperlipidemia     Stroke 05/30/2020    Thyroid nodule     Tiredness     Trouble swallowing      Past Surgical History:   Procedure Laterality Date    ADRENALECTOMY      COLONOSCOPY      ENDOSCOPY      ENDOSCOPY N/A 02/17/2024    Procedure: ESOPHAGOGASTRODUODENOSCOPY WITH STENTING UNDER FLUROSCOPY GUIDENCE WITH ESOPHOGRAM;  Surgeon: Saurabh Hurtado MD;  Location: [facility] OR;  Service: Gastroenterology;  Laterality: N/A;    ENDOSCOPY N/A 3/8/2024    Procedure: ESOPHAGOGASTRODUODENOSCOPY WITH STENT REMOVAL;  Surgeon: Saurabh Hurtado MD;  Location: [facility] ENDOSCOPY;  Service: Gastroenterology;  Laterality: N/A;  Esophageal leak    ESOPHAGOGASTRECTOMY N/A 02/09/2024    Procedure: BRONCHOSCOPY, EGD, ESOPHAGECTOMY ALTAGRACIA-FABIOLA WITH DAVINCI ROBOT, LAPAROSCOPY, RIGHT THORACOSCOPY CONVERTED TO THORACOTOMY, FEEDING JEJUNOSTOMY TUBE PLACEMENT, INTERCOSTAL NERVE BLOCKS;  Surgeon: Saurabh Hurtado MD;  Location: [facility] OR;  Service: Robotics - DaVinci;  Laterality: N/A;    ESOPHAGUS SURGERY  10/2023    biopsy    KIDNEY STONE SURGERY      OTHER SURGICAL HISTORY  2020    loop recorder PLACED AND REMOVED    THROMBECTOMY  2020    UPPER ENDOSCOPIC ULTRASOUND W/ FNA N/A 10/27/2023    Procedure: ENDOSCOPIC ULTRASOUND WITH STAGING AND FINE NEEDLE ASPIRATION X2 AREAS;  Surgeon: Joselyn Savage MD;   Location: [facility] ENDOSCOPY;  Service: Gastroenterology;  Laterality: N/A;  POST:    VENOUS ACCESS DEVICE (PORT) INSERTION Right 11/13/2023    Procedure: INSERTION VENOUS ACCESS DEVICE;  Surgeon: Saurabh Hurtado MD;  Location: [facility] MAIN OR;  Service: Thoracic;  Laterality: Right;       Current Outpatient Medications:     acetaminophen (TYLENOL) 160 MG/5ML solution, Administer 31.23 mL per J tube 3 times a day. (Patient not taking: Reported on 3/19/2024), Disp: , Rfl:     ALPRAZolam (XANAX) 0.25 MG tablet, Administer 1 tablet per J tube 3 (Three) Times a Day As Needed for Anxiety. for anxiety, Disp: 90 tablet, Rfl: 3    aspirin 81 MG chewable tablet, Administer 1 tablet per J tube Daily., Disp: , Rfl:     Evolocumab (REPATHA) solution prefilled syringe injection, Inject 1 mL under the skin into the appropriate area as directed Every 14 (Fourteen) Days., Disp: 6 mL, Rfl: 3    famotidine (PEPCID) 40 mg/5 mL suspension, Take 2.5 mL by mouth 2 (Two) Times a Day for 30 days., Disp: 150 mL, Rfl: 2    FLUoxetine (PROzac) 20 MG capsule, Administer 1 capsule per J tube Daily for 30 days., Disp: 30 capsule, Rfl: 0    fluticasone (FLONASE) 50 MCG/ACT nasal spray, 2 sprays into the nostril(s) as directed by provider Daily., Disp: , Rfl:     gabapentin (NEURONTIN) 100 MG capsule, Take 1 capsule by mouth 3 (Three) Times a Day., Disp: 30 capsule, Rfl: 0    lidocaine-prilocaine (EMLA) 2.5-2.5 % cream, Apply 1 application  topically to the appropriate area as directed As Needed (apply 60 minutes prior to port access)., Disp: 30 g, Rfl: 2    metoclopramide (REGLAN) 10 MG/10ML solution solution, Administer 10 mL per J tube 3 times a day for 30 days. (Patient not taking: Reported on 3/19/2024), Disp: 900 mL, Rfl: 0    metoprolol tartrate (LOPRESSOR) 25 MG tablet, Administer 0.5 tablets per J tube Every 12 (Twelve) Hours for 30 days. (Patient not taking: Reported on 3/19/2024), Disp: 30 tablet, Rfl: 0    ondansetron (ZOFRAN) 8 MG tablet,  Take 1 tablet by mouth 3 (Three) Times a Day As Needed for Nausea or Vomiting. (Patient taking differently: Administer 1 tablet per J tube 3 (Three) Times a Day As Needed for Nausea or Vomiting.), Disp: 30 tablet, Rfl: 5    oxyCODONE (Roxicodone) 5 MG immediate release tablet, Take 1 tablet by mouth Every 12 (Twelve) Hours As Needed for Severe Pain for up to 10 days., Disp: 20 tablet, Rfl: 0    Allergies   Allergen Reactions    Cephalosporins Anaphylaxis     Throat swelling    Dye  [Contrast Dye (Echo Or Unknown Ct/Mr)] Hives    Iodinated Contrast Media Hives    Sulfa Antibiotics Hives    Sulfate Hives    Zetia [Ezetimibe] Other (See Comments) and Myalgia     Made tired    Statins Myalgia     Myalgia and fatique     Family History   Problem Relation Age of Onset    Arthritis Mother     Hypertension Mother     Heart failure Mother 73        Cause of death    Arthritis Father     Hypertension Father     Kidney cancer Father 57        Cause of death. Was a smoker    Heart disease Brother     Esophageal cancer Brother 59        Cause of death. Has a heavy drinker    Malig Hyperthermia Neg Hx      Cancer-related family history includes Esophageal cancer (age of onset: 59) in his brother; Kidney cancer (age of onset: 57) in his father.    Social History     Tobacco Use    Smoking status: Former     Current packs/day: 0.00     Average packs/day: 1 pack/day for 37.0 years (37.0 ttl pk-yrs)     Types: Cigars, Cigarettes     Start date: 1985     Quit date: 2022     Years since quittin.2     Passive exposure: Past    Smokeless tobacco: Never    Tobacco comments:     1 packs= stopped 10 years ago and continued cigars   2 cigars a day; has stopped cigars as of    Vaping Use    Vaping status: Never Used   Substance Use Topics    Alcohol use: Not Currently     Comment: Has now been abstinent for about one year    Drug use: Never     Social History     Social History Narrative    Not on file      ROS:     Review of  [Follow-Up Visit] : a follow-up Systems   Constitutional:  Positive for fatigue. Negative for activity change, appetite change, chills, diaphoresis, fever and unexpected weight change.   HENT:  Negative for congestion, dental problem, drooling, ear discharge, ear pain, facial swelling, hearing loss, mouth sores, nosebleeds, postnasal drip, rhinorrhea, sinus pressure, sinus pain, sneezing, sore throat, tinnitus, trouble swallowing and voice change.    Eyes:  Negative for photophobia, pain, discharge, redness, itching and visual disturbance.   Respiratory:  Negative for apnea, cough, choking, chest tightness, shortness of breath, wheezing and stridor.    Cardiovascular:  Negative for chest pain, palpitations and leg swelling.   Gastrointestinal:  Negative for abdominal distention, abdominal pain, anal bleeding, blood in stool, constipation, diarrhea, nausea, rectal pain and vomiting.   Endocrine: Negative for cold intolerance, heat intolerance, polydipsia and polyuria.   Genitourinary:  Negative for decreased urine volume, difficulty urinating, dysuria, flank pain, frequency, genital sores, hematuria and urgency.   Musculoskeletal:  Negative for arthralgias, back pain, gait problem, joint swelling, myalgias, neck pain and neck stiffness.   Skin:  Negative for color change, pallor and rash.   Neurological:  Negative for dizziness, tremors, seizures, syncope, facial asymmetry, speech difficulty, weakness, light-headedness, numbness and headaches.   Hematological:  Negative for adenopathy. Does not bruise/bleed easily.   Psychiatric/Behavioral:  Negative for agitation, behavioral problems, confusion, decreased concentration, hallucinations, self-injury, sleep disturbance and suicidal ideas. The patient is nervous/anxious.      Objective:    There were no vitals filed for this visit.    There is no height or weight on file to calculate BMI.  ECOG  (0) Fully active, able to carry on all predisease performance without restriction    Physical Exam:  [Family Member] : family member     Physical Exam  Constitutional:       General: He is not in acute distress.     Appearance: Normal appearance. He is not ill-appearing, toxic-appearing or diaphoretic.      Comments: Slender, well-built.  Seems older than the stated age.  No distress.   HENT:      Head: Normocephalic and atraumatic.      Right Ear: External ear normal. There is no impacted cerumen.      Left Ear: External ear normal. There is no impacted cerumen.      Nose: Nose normal. No congestion or rhinorrhea.      Mouth/Throat:      Mouth: Mucous membranes are moist.      Pharynx: Oropharynx is clear. No oropharyngeal exudate or posterior oropharyngeal erythema.      Comments: Oral cavity reveals poor dentition and poor dental hygiene.  No mucosal ulcerations are present.  Eyes:      General: No scleral icterus.        Right eye: No discharge.         Left eye: No discharge.      Conjunctiva/sclera: Conjunctivae normal.      Pupils: Pupils are equal, round, and reactive to light.   Neck:      Vascular: No carotid bruit.   Cardiovascular:      Rate and Rhythm: Normal rate and regular rhythm.      Pulses: Normal pulses.      Heart sounds: Normal heart sounds. No murmur heard.     No friction rub. No gallop.   Pulmonary:      Effort: No respiratory distress.      Breath sounds: No stridor. No wheezing, rhonchi or rales.      Comments: Breath sounds are diminished bilaterally.  Chest:      Chest wall: No tenderness.      Comments: A right thoracic surgical incision is healing well.  Smaller surgical incisions for drains also healing well.  Abdominal:      General: Abdomen is flat. Bowel sounds are normal. There is no distension.      Palpations: Abdomen is soft. There is no mass.      Tenderness: There is no abdominal tenderness. There is no right CVA tenderness, left CVA tenderness, guarding or rebound.      Comments: Jejunostomy in place.  Opening clean.   Musculoskeletal:         General: No swelling, tenderness, deformity or signs of  injury.      Cervical back: No rigidity or tenderness.      Right lower leg: No edema.      Left lower leg: No edema.      Comments: There is clubbing of the fingers in both hands   Lymphadenopathy:      Cervical: No cervical adenopathy.   Skin:     General: Skin is warm and dry.      Coloration: Skin is not jaundiced or pale.      Findings: No bruising, erythema or rash.   Neurological:      General: No focal deficit present.      Mental Status: He is alert and oriented to person, place, and time.      Cranial Nerves: No cranial nerve deficit.      Motor: No weakness.      Coordination: Coordination normal.      Gait: Gait normal.   Psychiatric:         Mood and Affect: Mood normal.         Behavior: Behavior normal.         Thought Content: Thought content normal.         Judgment: Judgment normal.     YOSELIN Mueller MD performed a physical exam on 3/22/2024 as documented above.    Lab Results - Last 18 Months   Lab Units 03/08/24  1124 02/28/24  0954 02/27/24  0949   WBC 10*3/mm3 8.78 10.37 12.30*   HEMOGLOBIN g/dL 10.7* 11.9* 13.0   HEMATOCRIT % 33.2* 34.2* 38.5   PLATELETS 10*3/mm3 415 562* 656*   MCV fL 101.2* 100.6* 102.4*     Lab Results - Last 18 Months   Lab Units 03/08/24  1124 02/28/24  0954 02/23/24  0614 02/09/24  1631 02/01/24  0657   SODIUM mmol/L 137 131* 137   < > 141   POTASSIUM mmol/L 4.2 4.4 4.9   < > 4.4   CHLORIDE mmol/L 100 94* 100   < > 104   CO2 mmol/L 27.3 29.0 28.7   < > 29.0   BUN mg/dL 16 24* 20   < > 9   CREATININE mg/dL 0.71* 0.67* 0.59*   < > 0.94   CALCIUM mg/dL 9.3 9.5 8.9   < > 9.6   BILIRUBIN mg/dL 0.2 0.2  --   --  <0.2   ALK PHOS U/L 92 160*  --   --  61   ALT (SGPT) U/L 20 38  --   --  17   AST (SGOT) U/L 14 26  --   --  18   GLUCOSE mg/dL 93 86 107*   < > 69    < > = values in this interval not displayed.     Lab Results   Component Value Date    GLUCOSE 93 03/08/2024    BUN 16 03/08/2024    CREATININE 0.71 (L) 03/08/2024    EGFRIFNONA 67 02/24/2022    BCR 22.5 03/08/2024     K 4.2 03/08/2024    CO2 27.3 03/08/2024    CALCIUM 9.3 03/08/2024    ALBUMIN 3.6 03/08/2024    LABIL2 1.3 09/25/2018    AST 14 03/08/2024    ALT 20 03/08/2024     Lab Results   Component Value Date    FOLATE 4.11 (L) 11/12/2021     Lab Results   Component Value Date    CMTEIBID51 1,108 (H) 12/22/2023     Uric Acid   Date Value Ref Range Status   12/22/2023 3.9 3.4 - 7.0 mg/dL Final     Lab Results   Component Value Date    SEDRATE 11 04/16/2021     Lab Results   Component Value Date    PSA 0.163 04/06/2021     Assessment & Plan     Assessment:  Adenocarcinoma of the gastroesophageal junction T2N1M0, microsatellite stable, low mutation burden, Her2 positive and PD-L1 expression negative: Completed neoadjuvant concurrent carboplatin and paclitaxel with radiation and underwent [place] Shayne type esophagectomy.  Recovering well.  Discussed the use of adjuvant immunotherapy, based on the Checkmate 577 study that demonstrated benefit regardless of PD-L1 expression, with less benefit for PD-L1 negative tumors.  Discussed treatment with immunotherapy with him and with his spouse.  He is somewhat reluctant to consider it.  He has asked for more time to think about it.  Reviewed the laboratory exams.  Discussed with him.  3.  Tobacco smoker: Remains abstinent.  3.  He will return to see me in approximately 3 weeks.    Plan:  As above.    Oliverio Mueller MD on 3/22/2024 at 1425.

## 2024-05-08 NOTE — ED ADULT NURSE NOTE - PAIN RATING/NUMBER SCALE (0-10): ACTIVITY
What Type Of Note Output Would You Prefer (Optional)?: Standard Output How Severe Is Your Skin Lesion?: moderate Has Your Skin Lesion Been Treated?: not been treated Is This A New Presentation, Or A Follow-Up?: Skin Lesions 0

## 2024-10-22 NOTE — ED ADULT NURSE NOTE - CHIEF COMPLAINT QUOTE
Addended by: COTY SWENSON on: 10/22/2024 07:37 AM     Modules accepted: Orders     Pt brought in by EMS as a transfer from [place] for 4 different kinds of mouth CA and eval of need for PEG tube.  As per EMS, was told pt has tumor that is sitting on carotid.  Pt received decadron and dilaudid prior to leaving [place].  20g to L AC.  Denies any difficulty breathing, respirations even and unlabored on room air.  No signs of compromised airway present.  Pt denies any chemo but states is on immunotherapy.  PMHx: anxiety, malignancy of mouth

## 2025-03-29 NOTE — DISCHARGE NOTE NURSING/CASE MANAGEMENT/SOCIAL WORK - CONTRAINDICATIONS & PRECAUTIONS (SELECT ALL THAT APPLY)
Patient/surrogate refused vaccine...
This document is complete and the patient is ready for discharge.

## 2025-06-06 NOTE — ED ADULT NURSE NOTE - OBJECTIVE STATEMENT
Pt ambulatory to bathroom     Erin Murray RN  06/06/25 1124    
Patient presents with neck pain radiating to trapezius x2 weeks with increasing severity.  Patient w/ h/o mouth cancer s/p mandibular resection, reports MRI shows new nodule growing.  Patient's airway is intact, respirations are even and unlabored, b/l arm strength equal, reports no numbness/tingling.

## 2025-07-07 NOTE — PROVIDER CONTACT NOTE (OTHER) - ASSESSMENT
Patient AxOx4, VS stable upon admission except slightly hypertensive at 153/96. patient states sudden onset of nausea, basin at bedside and dry heaving. No vomiting noted at this time. Denies any SOB or chest pain or pain at this time. Patient got dilaudid in ER prior to coming up to [facility]. HOB elevated, safety precautions maintained. Will continue to monitor. 07-Jul-2025